# Patient Record
Sex: MALE | Race: BLACK OR AFRICAN AMERICAN | Employment: OTHER | URBAN - METROPOLITAN AREA
[De-identification: names, ages, dates, MRNs, and addresses within clinical notes are randomized per-mention and may not be internally consistent; named-entity substitution may affect disease eponyms.]

---

## 2017-01-03 ENCOUNTER — HOSPITAL ENCOUNTER (OUTPATIENT)
Dept: LAB | Age: 72
Discharge: HOME | End: 2017-01-03

## 2017-01-03 PROCEDURE — 99001 SPECIMEN HANDLING PT-LAB: CPT | Performed by: INTERNAL MEDICINE

## 2017-07-05 ENCOUNTER — HOSPITAL ENCOUNTER (OUTPATIENT)
Dept: LAB | Age: 72
Discharge: HOME | End: 2017-07-05

## 2017-07-05 PROCEDURE — 99001 SPECIMEN HANDLING PT-LAB: CPT | Performed by: INTERNAL MEDICINE

## 2017-07-18 ENCOUNTER — APPOINTMENT (OUTPATIENT)
Dept: CT IMAGING | Age: 72
End: 2017-07-18
Attending: EMERGENCY MEDICINE
Payer: COMMERCIAL

## 2017-07-18 ENCOUNTER — HOSPITAL ENCOUNTER (EMERGENCY)
Age: 72
Discharge: HOME | End: 2017-07-18
Attending: EMERGENCY MEDICINE
Payer: COMMERCIAL

## 2017-07-18 VITALS
OXYGEN SATURATION: 96 % | SYSTOLIC BLOOD PRESSURE: 162 MMHG | HEART RATE: 76 BPM | TEMPERATURE: 98.8 F | DIASTOLIC BLOOD PRESSURE: 78 MMHG | RESPIRATION RATE: 16 BRPM

## 2017-07-18 DIAGNOSIS — R74.8 ELEVATED CPK: ICD-10-CM

## 2017-07-18 DIAGNOSIS — R11.2 NON-INTRACTABLE VOMITING WITH NAUSEA, UNSPECIFIED VOMITING TYPE: ICD-10-CM

## 2017-07-18 DIAGNOSIS — R10.84 ABDOMINAL PAIN, GENERALIZED: Primary | ICD-10-CM

## 2017-07-18 LAB
ALBUMIN SERPL BCP-MCNC: 3.8 G/DL (ref 3.4–5)
ALBUMIN/GLOB SERPL: 1 {RATIO} (ref 0.8–1.7)
ALP SERPL-CCNC: 67 U/L (ref 45–117)
ALT SERPL-CCNC: 34 U/L (ref 16–61)
ANION GAP BLD CALC-SCNC: 8 MMOL/L (ref 3–18)
APPEARANCE UR: CLEAR
AST SERPL W P-5'-P-CCNC: 25 U/L (ref 15–37)
BACTERIA URNS QL MICRO: NEGATIVE /HPF
BASOPHILS # BLD AUTO: 0 K/UL (ref 0–0.1)
BASOPHILS # BLD: 0 % (ref 0–2)
BILIRUB SERPL-MCNC: 0.3 MG/DL (ref 0.2–1)
BILIRUB UR QL: NEGATIVE
BUN SERPL-MCNC: 18 MG/DL (ref 7–18)
BUN/CREAT SERPL: 10 (ref 12–20)
CALCIUM SERPL-MCNC: 9 MG/DL (ref 8.5–10.1)
CHLORIDE SERPL-SCNC: 106 MMOL/L (ref 100–108)
CK MB CFR SERPL CALC: 0.7 % (ref 0–4)
CK MB SERPL-MCNC: 8 NG/ML (ref 5–25)
CK SERPL-CCNC: 1167 U/L (ref 39–308)
CO2 SERPL-SCNC: 23 MMOL/L (ref 21–32)
COLOR UR: YELLOW
CREAT SERPL-MCNC: 1.76 MG/DL (ref 0.6–1.3)
DIFFERENTIAL METHOD BLD: ABNORMAL
EOSINOPHIL # BLD: 0.1 K/UL (ref 0–0.4)
EOSINOPHIL NFR BLD: 1 % (ref 0–5)
EPITH CASTS URNS QL MICRO: NORMAL /LPF (ref 0–5)
ERYTHROCYTE [DISTWIDTH] IN BLOOD BY AUTOMATED COUNT: 15.2 % (ref 11.6–14.5)
GLOBULIN SER CALC-MCNC: 4 G/DL (ref 2–4)
GLUCOSE SERPL-MCNC: 181 MG/DL (ref 74–99)
GLUCOSE UR STRIP.AUTO-MCNC: 100 MG/DL
HCT VFR BLD AUTO: 36.5 % (ref 36–48)
HGB BLD-MCNC: 12.2 G/DL (ref 13–16)
HGB UR QL STRIP: ABNORMAL
KETONES UR QL STRIP.AUTO: NEGATIVE MG/DL
LEUKOCYTE ESTERASE UR QL STRIP.AUTO: NEGATIVE
LIPASE SERPL-CCNC: 183 U/L (ref 73–393)
LYMPHOCYTES # BLD AUTO: 11 % (ref 21–52)
LYMPHOCYTES # BLD: 1.5 K/UL (ref 0.9–3.6)
MAGNESIUM SERPL-MCNC: 2 MG/DL (ref 1.6–2.6)
MCH RBC QN AUTO: 28 PG (ref 24–34)
MCHC RBC AUTO-ENTMCNC: 33.4 G/DL (ref 31–37)
MCV RBC AUTO: 83.9 FL (ref 74–97)
MONOCYTES # BLD: 0.7 K/UL (ref 0.05–1.2)
MONOCYTES NFR BLD AUTO: 5 % (ref 3–10)
NEUTS SEG # BLD: 12.1 K/UL (ref 1.8–8)
NEUTS SEG NFR BLD AUTO: 83 % (ref 40–73)
NITRITE UR QL STRIP.AUTO: NEGATIVE
PH UR STRIP: 6 [PH] (ref 5–8)
PLATELET # BLD AUTO: 229 K/UL (ref 135–420)
PMV BLD AUTO: 10.4 FL (ref 9.2–11.8)
POTASSIUM SERPL-SCNC: 3.7 MMOL/L (ref 3.5–5.5)
PROT SERPL-MCNC: 7.8 G/DL (ref 6.4–8.2)
PROT UR STRIP-MCNC: ABNORMAL MG/DL
RBC # BLD AUTO: 4.35 M/UL (ref 4.7–5.5)
RBC #/AREA URNS HPF: NORMAL /HPF (ref 0–5)
SODIUM SERPL-SCNC: 137 MMOL/L (ref 136–145)
SP GR UR REFRACTOMETRY: 1.01 (ref 1–1.03)
TROPONIN I SERPL-MCNC: <0.02 NG/ML (ref 0–0.04)
UROBILINOGEN UR QL STRIP.AUTO: 0.2 EU/DL (ref 0.2–1)
WBC # BLD AUTO: 14.4 K/UL (ref 4.6–13.2)
WBC URNS QL MICRO: NEGATIVE /HPF (ref 0–4)

## 2017-07-18 PROCEDURE — 82550 ASSAY OF CK (CPK): CPT | Performed by: EMERGENCY MEDICINE

## 2017-07-18 PROCEDURE — 81001 URINALYSIS AUTO W/SCOPE: CPT | Performed by: EMERGENCY MEDICINE

## 2017-07-18 PROCEDURE — 85025 COMPLETE CBC W/AUTO DIFF WBC: CPT | Performed by: EMERGENCY MEDICINE

## 2017-07-18 PROCEDURE — 83735 ASSAY OF MAGNESIUM: CPT | Performed by: EMERGENCY MEDICINE

## 2017-07-18 PROCEDURE — 96361 HYDRATE IV INFUSION ADD-ON: CPT

## 2017-07-18 PROCEDURE — 83690 ASSAY OF LIPASE: CPT | Performed by: EMERGENCY MEDICINE

## 2017-07-18 PROCEDURE — 93005 ELECTROCARDIOGRAM TRACING: CPT

## 2017-07-18 PROCEDURE — 74177 CT ABD & PELVIS W/CONTRAST: CPT

## 2017-07-18 PROCEDURE — 80053 COMPREHEN METABOLIC PANEL: CPT | Performed by: EMERGENCY MEDICINE

## 2017-07-18 PROCEDURE — 99285 EMERGENCY DEPT VISIT HI MDM: CPT

## 2017-07-18 PROCEDURE — 87086 URINE CULTURE/COLONY COUNT: CPT | Performed by: EMERGENCY MEDICINE

## 2017-07-18 PROCEDURE — 74011636320 HC RX REV CODE- 636/320: Performed by: EMERGENCY MEDICINE

## 2017-07-18 PROCEDURE — 96360 HYDRATION IV INFUSION INIT: CPT

## 2017-07-18 PROCEDURE — 74011250636 HC RX REV CODE- 250/636: Performed by: EMERGENCY MEDICINE

## 2017-07-18 RX ADMIN — SODIUM CHLORIDE 500 ML: 900 INJECTION, SOLUTION INTRAVENOUS at 04:34

## 2017-07-18 RX ADMIN — IOPAMIDOL 70 ML: 612 INJECTION, SOLUTION INTRAVENOUS at 02:46

## 2017-07-18 RX ADMIN — SODIUM CHLORIDE 500 ML: 900 INJECTION, SOLUTION INTRAVENOUS at 01:42

## 2017-07-18 NOTE — DISCHARGE INSTRUCTIONS
Abdominal Pain: Care Instructions  Your Care Instructions    Abdominal pain has many possible causes. Some aren't serious and get better on their own in a few days. Others need more testing and treatment. If your pain continues or gets worse, you need to be rechecked and may need more tests to find out what is wrong. You may need surgery to correct the problem. Don't ignore new symptoms, such as fever, nausea and vomiting, urination problems, pain that gets worse, and dizziness. These may be signs of a more serious problem. Your doctor may have recommended a follow-up visit in the next 8 to 12 hours. If you are not getting better, you may need more tests or treatment. The doctor has checked you carefully, but problems can develop later. If you notice any problems or new symptoms, get medical treatment right away. Follow-up care is a key part of your treatment and safety. Be sure to make and go to all appointments, and call your doctor if you are having problems. It's also a good idea to know your test results and keep a list of the medicines you take. How can you care for yourself at home? · Rest until you feel better. · To prevent dehydration, drink plenty of fluids, enough so that your urine is light yellow or clear like water. Choose water and other caffeine-free clear liquids until you feel better. If you have kidney, heart, or liver disease and have to limit fluids, talk with your doctor before you increase the amount of fluids you drink. · If your stomach is upset, eat mild foods, such as rice, dry toast or crackers, bananas, and applesauce. Try eating several small meals instead of two or three large ones. · Wait until 48 hours after all symptoms have gone away before you have spicy foods, alcohol, and drinks that contain caffeine. · Do not eat foods that are high in fat. · Avoid anti-inflammatory medicines such as aspirin, ibuprofen (Advil, Motrin), and naproxen (Aleve).  These can cause stomach upset. Talk to your doctor if you take daily aspirin for another health problem. When should you call for help? Call 911 anytime you think you may need emergency care. For example, call if:  · You passed out (lost consciousness). · You pass maroon or very bloody stools. · You vomit blood or what looks like coffee grounds. · You have new, severe belly pain. Call your doctor now or seek immediate medical care if:  · Your pain gets worse, especially if it becomes focused in one area of your belly. · You have a new or higher fever. · Your stools are black and look like tar, or they have streaks of blood. · You have unexpected vaginal bleeding. · You have symptoms of a urinary tract infection. These may include:  ¨ Pain when you urinate. ¨ Urinating more often than usual.  ¨ Blood in your urine. · You are dizzy or lightheaded, or you feel like you may faint. Watch closely for changes in your health, and be sure to contact your doctor if:  · You are not getting better after 1 day (24 hours). Where can you learn more? Go to http://ayePANOSOLmari.info/. Enter H389 in the search box to learn more about \"Abdominal Pain: Care Instructions. \"  Current as of: March 20, 2017  Content Version: 11.3  © 3395-5408 [employer]. Care instructions adapted under license by [employer] (which disclaims liability or warranty for this information). If you have questions about a medical condition or this instruction, always ask your healthcare professional. [place] [address] any warranty or liability for your use of this information. Nausea and Vomiting: Care Instructions  Your Care Instructions    When you are nauseated, you may feel weak and sweaty and notice a lot of saliva in your mouth. Nausea often leads to vomiting. Most of the time you do not need to worry about nausea and vomiting, but they can be signs of other illnesses.   Two common causes of nausea and vomiting are stomach flu and food poisoning. Nausea and vomiting from viral stomach flu will usually start to improve within 24 hours. Nausea and vomiting from food poisoning may last from 12 to 48 hours. The doctor has checked you carefully, but problems can develop later. If you notice any problems or new symptoms, get medical treatment right away. Follow-up care is a key part of your treatment and safety. Be sure to make and go to all appointments, and call your doctor if you are having problems. It's also a good idea to know your test results and keep a list of the medicines you take. How can you care for yourself at home? · To prevent dehydration, drink plenty of fluids, enough so that your urine is light yellow or clear like water. Choose water and other caffeine-free clear liquids until you feel better. If you have kidney, heart, or liver disease and have to limit fluids, talk with your doctor before you increase the amount of fluids you drink. · Rest in bed until you feel better. · When you are able to eat, try clear soups, mild foods, and liquids until all symptoms are gone for 12 to 48 hours. Other good choices include dry toast, crackers, cooked cereal, and gelatin dessert, such as Jell-O. When should you call for help? Call 911 anytime you think you may need emergency care. For example, call if:  · You passed out (lost consciousness). Call your doctor now or seek immediate medical care if:  · You have symptoms of dehydration, such as:  ¨ Dry eyes and a dry mouth. ¨ Passing only a little dark urine. ¨ Feeling thirstier than usual.  · You have new or worsening belly pain. · You have a new or higher fever. · You vomit blood or what looks like coffee grounds. Watch closely for changes in your health, and be sure to contact your doctor if:  · You have ongoing nausea and vomiting. · Your vomiting is getting worse. · Your vomiting lasts longer than 2 days.   · You are not getting better as expected. Where can you learn more? Go to http://aye-mari.info/. Enter 25 [id number] in the search box to learn more about \"Nausea and Vomiting: Care Instructions. \"  Current as of: March 20, 2017  Content Version: 11.3  © 2466-4188 [employer]. Care instructions adapted under license by [employer] (which disclaims liability or warranty for this information). If you have questions about a medical condition or this instruction, always ask your healthcare professional. [place] [address] any warranty or liability for your use of this information.

## 2017-07-18 NOTE — ED PROVIDER NOTES
HPI Comments: 1:31 AM: Marixa Hanson is a 66 y.o. male presenting to the ED with a 2 hour hx of abdominal pain and vomiting. Pt states 2 hours ago he was getting ready for bed when he had a sudden onset of generalized abdominal pain with associated nausea and vomiting. Denies exacerbating or relieving factors of sx. Denies fever, chills, CP, SOB, diarrhea, dysuria, difficulty urinating, headache, or coughing. Patient is a 66 y.o. male presenting with vomiting. Vomiting           Past Medical History:   Diagnosis Date    Acute ischemic stroke ([facility] [address] 9/1/2015    Acute Ischemic Stroke (early subacute infarct at the posterior right lentiform nucleus) with residual left hemiparesis and dysphagia    CKD (chronic kidney disease) stage 3, GFR 30-59 ml/min 6/9/9462    Diastolic dysfunction without heart failure 9/2/2015    Grade 1 diastolic dysfunction on 2D ECHO done 9/02/2015    Dyslipidemia 9/2/2015    History of noncompliance with medical treatment, presenting hazards to health 9/1/2015    History of tobacco use 9/1/2015    Hypertensive heart and kidney disease without heart failure and with chronic kidney disease stage III 9/2/2015    Obesity, Class I, BMI 30-34.9 9/1/2015    Rhabdomyolysis 9/1/2015    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 9/1/2015    Vitamin D deficiency 9/6/2015       History reviewed. No pertinent surgical history. History reviewed. No pertinent family history. Social History     Social History    Marital status: SINGLE     Spouse name: N/A    Number of children: N/A    Years of education: N/A     Occupational History    Not on file.      Social History Main Topics    Smoking status: Former Smoker     Quit date: 12/1/2014    Smokeless tobacco: Not on file    Alcohol use Not on file    Drug use: Not on file    Sexual activity: Not on file     Other Topics Concern    Not on file     Social History Narrative         ALLERGIES: Lipitor [atorvastatin]    Review of Systems Constitutional: Negative. HENT: Negative. Negative for congestion. Eyes: Negative. Negative for visual disturbance. Respiratory: Negative. Negative for chest tightness and shortness of breath. Cardiovascular: Negative. Negative for leg swelling. Gastrointestinal: Positive for vomiting. Genitourinary: Negative. Negative for difficulty urinating. Musculoskeletal: Negative. Skin: Negative. Negative for rash and wound. Neurological: Negative. Negative for dizziness, speech difficulty, weakness and light-headedness. Psychiatric/Behavioral: Negative. Negative for self-injury. All other systems reviewed and are negative. Vitals:    07/18/17 0430 07/18/17 0500 07/18/17 0530 07/18/17 0600   BP:       Pulse: 70 70 76    Resp: 19 15 16    Temp:       SpO2: 97% 97% 94% 98%            Physical Exam   Constitutional: He is oriented to person, place, and time. He appears well-developed and well-nourished. No distress. HENT:   Head: Normocephalic and atraumatic. Eyes: Conjunctivae and EOM are normal. Pupils are equal, round, and reactive to light. No scleral icterus. Neck: Normal range of motion. Neck supple. No JVD present. No thyromegaly present. Cardiovascular: Normal rate, regular rhythm, S1 normal and S2 normal.  Exam reveals no gallop and no friction rub. No murmur heard. Pulmonary/Chest: Effort normal and breath sounds normal. No accessory muscle usage. No respiratory distress. Abdominal: Soft. Normal appearance. He exhibits no distension. There is generalized tenderness. There is no rigidity, no rebound and no guarding. Easily reducible umbilical TTP. Genitourinary:   Genitourinary Comments: Rectal exam shows normal, nontender prostate. Musculoskeletal: Normal range of motion. He exhibits no edema or tenderness. Neurological: He is alert and oriented to person, place, and time. He has normal strength. No cranial nerve deficit or sensory deficit.  Coordination normal.   Skin: Skin is warm and intact. No rash noted. Psychiatric: He has a normal mood and affect. His speech is normal and behavior is normal.   Vitals reviewed. MDM  Number of Diagnoses or Management Options  Abdominal pain, generalized:   Elevated CPK:   Non-intractable vomiting with nausea, unspecified vomiting type:   Diagnosis management comments: Radha Bui is a 66 y.o. Male coming in with sudden onset generalized abd pain and vomiting. No vomiting throughout stay and only mild TTP. Tolerated PO without difficulty. CT negative other than umbilical hernia and bladder wall thickening. Noted mild elevation of WBC with shift. Creatinine better than baseline, but CPK elevated. S/p IVF rehydration here. No evidence of PNA, sepsis, or prostatitis. No other obvious infectious source. Spoke to PFM (PCP) and thoroughly discussed case. They will see patient in the next 24-48 hours in the office. Strict return precautions and follow up plan discussed and patient and sister in agreement.     ED Course     Vitals:  Patient Vitals for the past 12 hrs:   Temp Pulse Resp BP SpO2   07/18/17 0600 - - - - 98 %   07/18/17 0530 - 76 16 - 94 %   07/18/17 0500 - 70 15 - 97 %   07/18/17 0430 - 70 19 - 97 %   07/18/17 0400 - 76 19 - 97 %   07/18/17 0345 - 73 21 - 95 %   07/18/17 0315 - 73 17 162/78 95 %   07/18/17 0300 - 70 19 162/88 97 %   07/18/17 0215 - 75 16 (!) 185/105 96 %   07/18/17 0200 - 81 18 (!) 182/102 97 %   07/18/17 0145 - 72 23 (!) 167/93 98 %   07/18/17 0130 - 73 15 (!) 177/95 98 %   07/18/17 0128 98.8 °F (37.1 °C) 73 21 174/89 98 %       Medications Ordered:  Medications   sodium chloride 0.9 % bolus infusion 500 mL (0 mL IntraVENous IV Completed 7/18/17 0222)   iopamidol (ISOVUE 300) 61 % contrast injection  mL (70 mL IntraVENous Given 7/18/17 0246)   sodium chloride 0.9 % bolus infusion 500 mL (0 mL IntraVENous IV Completed 7/18/17 1084)         Lab Findings:  Recent Results (from the past 12 hour(s))   CBC WITH AUTOMATED DIFF    Collection Time: 07/18/17  1:35 AM   Result Value Ref Range    WBC 14.4 (H) 4.6 - 13.2 K/uL    RBC 4.35 (L) 4.70 - 5.50 M/uL    HGB 12.2 (L) 13.0 - 16.0 g/dL    HCT 36.5 36.0 - 48.0 %    MCV 83.9 74.0 - 97.0 FL    MCH 28.0 24.0 - 34.0 PG    MCHC 33.4 31.0 - 37.0 g/dL    RDW 15.2 (H) 11.6 - 14.5 %    PLATELET 552 964 - 817 K/uL    MPV 10.4 9.2 - 11.8 FL    NEUTROPHILS 83 (H) 40 - 73 %    LYMPHOCYTES 11 (L) 21 - 52 %    MONOCYTES 5 3 - 10 %    EOSINOPHILS 1 0 - 5 %    BASOPHILS 0 0 - 2 %    ABS. NEUTROPHILS 12.1 (H) 1.8 - 8.0 K/UL    ABS. LYMPHOCYTES 1.5 0.9 - 3.6 K/UL    ABS. MONOCYTES 0.7 0.05 - 1.2 K/UL    ABS. EOSINOPHILS 0.1 0.0 - 0.4 K/UL    ABS. BASOPHILS 0.0 0.0 - 0.1 K/UL    DF AUTOMATED     METABOLIC PANEL, COMPREHENSIVE    Collection Time: 07/18/17  1:35 AM   Result Value Ref Range    Sodium 137 136 - 145 mmol/L    Potassium 3.7 3.5 - 5.5 mmol/L    Chloride 106 100 - 108 mmol/L    CO2 23 21 - 32 mmol/L    Anion gap 8 3.0 - 18 mmol/L    Glucose 181 (H) 74 - 99 mg/dL    BUN 18 7.0 - 18 MG/DL    Creatinine 1.76 (H) 0.6 - 1.3 MG/DL    BUN/Creatinine ratio 10 (L) 12 - 20      GFR est AA 46 (L) >60 ml/min/1.73m2    GFR est non-AA 38 (L) >60 ml/min/1.73m2    Calcium 9.0 8.5 - 10.1 MG/DL    Bilirubin, total 0.3 0.2 - 1.0 MG/DL    ALT (SGPT) 34 16 - 61 U/L    AST (SGOT) 25 15 - 37 U/L    Alk.  phosphatase 67 45 - 117 U/L    Protein, total 7.8 6.4 - 8.2 g/dL    Albumin 3.8 3.4 - 5.0 g/dL    Globulin 4.0 2.0 - 4.0 g/dL    A-G Ratio 1.0 0.8 - 1.7     LIPASE    Collection Time: 07/18/17  1:35 AM   Result Value Ref Range    Lipase 183 73 - 393 U/L   MAGNESIUM    Collection Time: 07/18/17  1:35 AM   Result Value Ref Range    Magnesium 2.0 1.6 - 2.6 mg/dL   CARDIAC PANEL,(CK, CKMB & TROPONIN)    Collection Time: 07/18/17  1:35 AM   Result Value Ref Range    CK 1167 (H) 39 - 308 U/L    CK - MB 8.0 (H) <3.6 ng/ml    CK-MB Index 0.7 0.0 - 4.0 %    Troponin-I, Qt. <0.02 0.0 - 0.045 NG/ML EKG, 12 LEAD, INITIAL    Collection Time: 07/18/17  1:42 AM   Result Value Ref Range    Ventricular Rate 70 BPM    Atrial Rate 70 BPM    P-R Interval 168 ms    QRS Duration 128 ms    Q-T Interval 430 ms    QTC Calculation (Bezet) 464 ms    Calculated P Axis 64 degrees    Calculated R Axis -51 degrees    Calculated T Axis 58 degrees    Diagnosis       Normal sinus rhythm  Right bundle branch block  Left anterior fascicular block  Bifascicular block  Abnormal ECG  When compared with ECG of 23-MAY-2016 16:04,  ST elevation now present in Inferior leads     URINALYSIS W/ RFLX MICROSCOPIC    Collection Time: 07/18/17  2:20 AM   Result Value Ref Range    Color YELLOW      Appearance CLEAR      Specific gravity 1.013 1.005 - 1.030      pH (UA) 6.0 5.0 - 8.0      Protein TRACE (A) NEG mg/dL    Glucose 100 (A) NEG mg/dL    Ketone NEGATIVE  NEG mg/dL    Bilirubin NEGATIVE  NEG      Blood SMALL (A) NEG      Urobilinogen 0.2 0.2 - 1.0 EU/dL    Nitrites NEGATIVE  NEG      Leukocyte Esterase NEGATIVE  NEG     URINE MICROSCOPIC ONLY    Collection Time: 07/18/17  2:20 AM   Result Value Ref Range    WBC NEGATIVE  0 - 4 /hpf    RBC 0 to 3 0 - 5 /hpf    Epithelial cells NONE SEEN 0 - 5 /lpf    Bacteria NEGATIVE  NEG /hpf       EKG Interpretation by ED physician:    1:47 AM: NSR. Rate 70. Bifascicular block. No other acute ischemic changes      X-ray, CT or radiology findings or impressions:  Ct Abd Pelv W Cont    Result Date: 7/18/2017  EXAM: CT Abdomen and Pelvis with IV contrast CLINICAL INDICATION: Pain, nausea and vomiting. TECHNIQUE: CT of the abdomen and pelvis performed post IV contrast. Sagittal and coronal reformations obtained.  All CT scans at this facility are performed using dose optimization technique as appropriate to a performed exam, to include automated exposure control, adjustment of the mA and/or kV according to patient size (including appropriate matching for site specific examination) or use of iterative reconstruction technique. IV CONTRAST: 70 cc of Isovue 300 ENTERIC CONTRAST: None COMPARISON: Ultrasound dated 12/22/2015 FINDINGS: Lower Chest: Mild groundglass opacities are noted in the lung bases. No effusions appreciated. The visualized heart and pericardium are unremarkable. Peritoneum: No free air appreciated. No free fluid identified. No fluid collections seen. Liver: No focal lesion appreciated. Biliary/Gallbladder: No intrahepatic or extrahepatic biliary ductal dilatation appreciated. The gallbladder is unremarkable. No pericholecystic fluid or inflammation. Spleen: Unremarkable. Pancreas: No focal lesion appreciated. The pancreatic duct is unremarkable. No peripancreatic inflammation or adenopathy. : The adrenal glands are unremarkable. In the superior pole of the right kidney, there is a 1.1 x 1.3 cm cyst. In the inferior pole, there is a punctate nonobstructing stone in the right kidney. In the inferior pole of the left kidney, there is a 8 x 5 mm cyst. There is normal enhancement of both kidneys. No hydroureteronephrosis of either collecting system. The bladder has a mildly thickened wall. There is mild adjacent fat stranding. The prostate measures 3.7 x 4.7 x 5.5 cm. GI: The stomach is unremarkable. The small bowel is without evidence of obstruction. No small bowel wall thickening. The mesentery is without inflammation or adenopathy. The appendix is unremarkable. No pericolonic inflammation or wall thickening appreciated. Aorta and retroperitoneum: No aortic aneurysm seen. No periaortic adenopathy seen. No fluid collections in the retroperitoneum appreciated. Abdominal wall: At the umbilicus, there is a hernia with a portion of small bowel extending into the defect. No evidence to suggest obstruction or inflammatory process. Musculoskeletal: Multilevel degenerative disc disease and bilateral hip osteoarthritis are noted. IMPRESSION: 1.   Groundglass changes in the lung bases which may represent atelectasis or infiltrate. 2.  Umbilical hernia with a portion of small bowel within it. No evidence of complication appreciated. 3.  Prostatic hypertrophy. 4.  Mild inflammation of the bladder and mild bladder wall thickening. This may reflect changes due to prostatic hypertrophy or represent cystitis. Clinical correlation with laboratory values is recommended. Reevaluation of the patient:    4:31 AM:  Discussed all results with pt. Pt feeling better. Pt has eaten crackers and drank juice with no nausea or vomiting. States his abdominal pain is resolved and would like to go home. Rectal exam was performed and found to be normal. No tenderness or bogginess of the prostate appreciated. Pt has had no vomiting while in the ED. Denies pain with BM or cough. Discussed F/U instructions, and red flags for return to the ED. Pt verbalized understanding and is agreeable to plan. Stable for discharge home. 6:33 AM Consult: I discussed care with Dr. Eran Solano  (family medicine). It was a standard discussion including patient history, chief complaint, available diagnostic results, and predicted treatment course. Made aware of pt visit and findings and she agrees with plan for discharge and close outpatient follow up. Diagnosis:   1. Abdominal pain, generalized    2. Non-intractable vomiting with nausea, unspecified vomiting type    3. Elevated CPK        Disposition: Discharge     Follow-up Information     Follow up With Details Comments Contact Info    [facility] EMERGENCY DEPT  If symptoms worsen [address] [place] [address] [address] Call in 2 days  Nayana [address]ett [address]  [address] [facility]  [phone]            Patient's Medications   Start Taking    No medications on file   Continue Taking    AMLODIPINE (NORVASC) 10 MG TABLET    Take 1 Tab by mouth daily. ASPIRIN 81 MG CHEWABLE TABLET    Take 1 Tab by mouth daily.     CHOLECALCIFEROL (VITAMIN D3) 5,000 UNIT CAPSULE    Take 1 Cap by mouth daily. DOCUSATE SODIUM (COLACE) 100 MG CAPSULE    Take 100 mg by mouth two (2) times a day. EZETIMIBE (ZETIA) 10 MG TABLET    Take 10 mg by mouth daily. HYDRALAZINE (APRESOLINE) 25 MG TABLET    Take 25 mg by mouth daily as needed. LABETALOL (NORMODYNE) 200 MG TABLET    Take 1 Tab by mouth every twelve (12) hours. LOSARTAN (COZAAR) 100 MG TABLET    Take 1 Tab by mouth daily. These Medications have changed    No medications on file   Stop Taking    No medications on file       Procedures    I, Ricardo Valero, acting as a scribe for and in the presence of Dr. Nereida Mariano MD    Provider Attestation  I personally performed the services described in the documentation, reviewed the documentation, as recorded by the scribe in my presence, and it accurately and completely records my words and actions.      Signed By: oscar Rothmanibe for Dr. Nereida Mariano MD

## 2017-07-19 LAB
ATRIAL RATE: 70 BPM
CALCULATED P AXIS, ECG09: 64 DEGREES
CALCULATED R AXIS, ECG10: -51 DEGREES
CALCULATED T AXIS, ECG11: 58 DEGREES
DIAGNOSIS, 93000: NORMAL
P-R INTERVAL, ECG05: 168 MS
Q-T INTERVAL, ECG07: 430 MS
QRS DURATION, ECG06: 128 MS
QTC CALCULATION (BEZET), ECG08: 464 MS
VENTRICULAR RATE, ECG03: 70 BPM

## 2017-07-20 LAB
BACTERIA SPEC CULT: NORMAL
SERVICE CMNT-IMP: NORMAL

## 2017-07-24 ENCOUNTER — HOSPITAL ENCOUNTER (OUTPATIENT)
Dept: LAB | Age: 72
Discharge: HOME | End: 2017-07-24

## 2017-07-24 PROCEDURE — 99001 SPECIMEN HANDLING PT-LAB: CPT | Performed by: INTERNAL MEDICINE

## 2017-11-07 ENCOUNTER — HOSPITAL ENCOUNTER (OUTPATIENT)
Dept: LAB | Age: 72
Discharge: HOME | End: 2017-11-07

## 2017-11-07 DIAGNOSIS — N18.30 CHRONIC KIDNEY DISEASE, STAGE III (MODERATE) (HCC): ICD-10-CM

## 2017-11-07 DIAGNOSIS — E78.5 HYPERLIPIDEMIA: ICD-10-CM

## 2017-11-07 DIAGNOSIS — I10 ESSENTIAL HYPERTENSION, BENIGN: ICD-10-CM

## 2017-11-07 PROCEDURE — 99001 SPECIMEN HANDLING PT-LAB: CPT | Performed by: INTERNAL MEDICINE

## 2018-03-08 ENCOUNTER — HOSPITAL ENCOUNTER (OUTPATIENT)
Dept: LAB | Age: 73
Discharge: HOME | End: 2018-03-08

## 2018-03-08 PROCEDURE — 99001 SPECIMEN HANDLING PT-LAB: CPT | Performed by: INTERNAL MEDICINE

## 2018-03-29 ENCOUNTER — OFFICE VISIT (OUTPATIENT)
Dept: UROLOGY | Age: 73
End: 2018-03-29

## 2018-03-29 VITALS
DIASTOLIC BLOOD PRESSURE: 78 MMHG | OXYGEN SATURATION: 95 % | HEIGHT: 71 IN | SYSTOLIC BLOOD PRESSURE: 128 MMHG | HEART RATE: 71 BPM

## 2018-03-29 DIAGNOSIS — N40.1 BENIGN PROSTATIC HYPERPLASIA WITH LOWER URINARY TRACT SYMPTOMS, SYMPTOM DETAILS UNSPECIFIED: ICD-10-CM

## 2018-03-29 DIAGNOSIS — R35.0 URINARY FREQUENCY: Primary | ICD-10-CM

## 2018-03-29 LAB
BILIRUB UR QL STRIP: NEGATIVE
GLUCOSE UR-MCNC: NEGATIVE MG/DL
KETONES P FAST UR STRIP-MCNC: NEGATIVE MG/DL
PH UR STRIP: 5 [PH] (ref 4.6–8)
PROT UR QL STRIP: NEGATIVE
SP GR UR STRIP: 1.01 (ref 1–1.03)
UA UROBILINOGEN AMB POC: NORMAL (ref 0.2–1)
URINALYSIS CLARITY POC: CLEAR
URINALYSIS COLOR POC: YELLOW
URINE BLOOD POC: NORMAL
URINE LEUKOCYTES POC: NEGATIVE
URINE NITRITES POC: NEGATIVE

## 2018-03-29 RX ORDER — FUROSEMIDE 20 MG/1
TABLET ORAL DAILY
COMMUNITY
End: 2018-04-30

## 2018-03-29 RX ORDER — TOLTERODINE 4 MG/1
4 CAPSULE, EXTENDED RELEASE ORAL DAILY
Qty: 30 CAP | Refills: 6 | Status: SHIPPED | OUTPATIENT
Start: 2018-03-29 | End: 2018-04-20

## 2018-03-29 RX ORDER — CHLORPHENIRAMINE MALEATE 4 MG
TABLET ORAL 2 TIMES DAILY
COMMUNITY
End: 2018-04-20

## 2018-03-29 RX ORDER — DICYCLOMINE HYDROCHLORIDE 20 MG/1
20 TABLET ORAL 2 TIMES DAILY
COMMUNITY
End: 2018-04-20

## 2018-03-29 RX ORDER — LANOLIN ALCOHOL/MO/W.PET/CERES
1000 CREAM (GRAM) TOPICAL DAILY
COMMUNITY
End: 2021-02-19

## 2018-03-29 RX ORDER — HYDROCORTISONE 1 %
CREAM (GRAM) TOPICAL 2 TIMES DAILY
COMMUNITY
End: 2018-04-20

## 2018-03-29 NOTE — PROGRESS NOTES
MrTrevor Liu has a reminder for a \"due or due soon\" health maintenance. I have asked that he contact his primary care provider for follow-up on this health maintenance.

## 2018-03-29 NOTE — PROGRESS NOTES
Gely Zuniga    Chief Complaint   Patient presents with    New Patient    Urinary Frequency    Urinary Incontinence       History and Physical    Patient is a pleasant 79-year-old -American male with a greater than six-month history of daytime urinary incontinence. The patient states that he has no problems with nocturnal enuresis and does not arise at night to urinate. However, he does wear a diaper. He states that more often than not, the diaper is dry in the morning. During the day he states that he will void perhaps 2 times during the day. There are episodes of urinary leakage and the caregiver advises that there are times when he will void and flood his diaper and she has had to wear pads for her car to protect the upholstery. However, recently, this has not been occurring. The patient never had this difficulty before. He suffered an ischemic stroke in 2015. His urine function was never much of a problem during his recuperative phase. The patient denies any  injury surgery or instrumentation. Family history is negative for significant prostatic illness. Patient does not have any dysuria. When he gets an urge to void there is no pain. When he does get to the bathroom the stream is prompt and strong and continuous and he feels that he empties completely. The patient has been on Bentyl for extended period of time. He had originally been on 40 mg twice a day but this seemed to cause him constipation and the dose was dropped to 20 mg twice daily. Now, the patient will only have bowel issues if he consumes chocolate or other things that his family and caregiver have advised him are bad for him. As for this reason that he continues to wear a diaper.   The patient does not consume caffeine in any form to any significant degree    Past Medical History:   Diagnosis Date    Acute ischemic stroke ([facility] [address] 9/1/2015    Acute Ischemic Stroke (early subacute infarct at the posterior right lentiform nucleus) with residual left hemiparesis and dysphagia    CKD (chronic kidney disease) stage 3, GFR 30-59 ml/min 6/3/4410    Diastolic dysfunction without heart failure 9/2/2015    Grade 1 diastolic dysfunction on 2D ECHO done 9/02/2015    Dyslipidemia 9/2/2015    History of noncompliance with medical treatment, presenting hazards to health 9/1/2015    History of tobacco use 9/1/2015    Hypertension     Hypertensive heart and kidney disease without heart failure and with chronic kidney disease stage III 9/2/2015    Obesity, Class I, BMI 30-34.9 9/1/2015    Rhabdomyolysis 9/1/2015    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 9/1/2015    Vitamin D deficiency 9/6/2015     Patient Active Problem List   Diagnosis Code    Hypertensive urgency I16.0    Acute ischemic stroke ([facility] I63.9    Hypertensive heart and kidney disease without heart failure and with chronic kidney disease stage III I13.10, I63.7    Diastolic dysfunction without heart failure I51.9    Dyslipidemia E78.5    CKD (chronic kidney disease) stage 3, GFR 30-59 ml/min N18.3    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 A64.5    Bacterial conjunctivitis H10.9    Rhabdomyolysis M62.82    Obesity, Class I, BMI 30-34.9 E66.9    History of noncompliance with medical treatment, presenting hazards to health Z91.19    History of tobacco use Z87.891    Impaired mobility and ADLs Z74.09    Vitamin D deficiency E55.9    Community acquired pneumonia J18.9    Elevated LFTs R79.89    Acute kidney injury superimposed on chronic kidney disease ([facility] [address] N17.9, N18.9    Acute metabolic encephalopathy U01.65    Hx of essential hypertension Z86.79     History reviewed. No pertinent surgical history. Current Outpatient Prescriptions   Medication Sig Dispense Refill    dicyclomine (BENTYL) 20 mg tablet Take 20 mg by mouth two (2) times a day.  cyanocobalamin (VITAMIN B-12) 1,000 mcg tablet Take 1,000 mcg by mouth daily.       furosemide (LASIX) 20 mg tablet Take  by mouth daily.  LACTASE PO Take 3,000 Units by mouth as needed.  clotrimazole (LOTRIMIN) 1 % topical cream Apply  to affected area two (2) times a day.  hydrocortisone (CORTAID) 1 % topical cream Apply  to affected area two (2) times a day. use thin layer      ezetimibe (ZETIA) 10 mg tablet Take 10 mg by mouth daily.  labetalol (NORMODYNE) 200 mg tablet Take 1 Tab by mouth every twelve (12) hours. 30 Tab 0    losartan (COZAAR) 100 mg tablet Take 1 Tab by mouth daily. 15 Tab 0    amLODIPine (NORVASC) 10 mg tablet Take 1 Tab by mouth daily. 15 Tab 0    aspirin 81 mg chewable tablet Take 1 Tab by mouth daily. 30 Tab 0    hydrALAZINE (APRESOLINE) 25 mg tablet Take 25 mg by mouth daily as needed.  docusate sodium (COLACE) 100 mg capsule Take 100 mg by mouth two (2) times a day.  cholecalciferol (VITAMIN D3) 5,000 unit capsule Take 1 Cap by mouth daily. 15 Cap 0     Allergies   Allergen Reactions    Lipitor [Atorvastatin] Other (comments)     Elevated CK level     Social History     Social History    Marital status: SINGLE     Spouse name: N/A    Number of children: N/A    Years of education: N/A     Occupational History    Not on file.      Social History Main Topics    Smoking status: Current Every Day Smoker     Last attempt to quit: 12/1/2014    Smokeless tobacco: Never Used    Alcohol use Yes    Drug use: No    Sexual activity: No     Other Topics Concern    Not on file     Social History Narrative      Family History   Problem Relation Age of Onset    Diabetes Mother     Stroke Mother     Heart Disease Father     Hypertension Father     Diabetes Brother     Hypertension Brother              Visit Vitals    /78 (BP 1 Location: Left arm, BP Patient Position: Sitting)    Pulse 71    Ht 5' 11\" (1.803 m)    SpO2 95%     Physical        Gen: WDWN adult NAD  Head  : normocephalic,  Normal ROM; eyes without normal pupils, EOMs, no masses;  conjunctiva normal  Neck: normal movement,  no evident mass,  No evident adenopathy, trachea midline,    Abd :bowel sounds normal, no masses, tenderness, organomegaly  Flanks  negative  -penis is uncircumcised and normal.  Scrotal contents revealed normal size and texture of testes. Rectal tone normal.  Prostate 30 g smooth and benign    Extremities-gait is somewhat compromised due to stroke sequela  Psych- oriented, no evident anxiety, no cognitive impairment evident    Trace positive for blood but negative on microscopic  Bladder scan reveals 38 cc  Chart reveals no recent PSA                  Impression/ PLAN  The patient has BPH primarily manifest by irritative symptoms with urgency leakage. He also, however, has difficulty with diarrhea that is firing medication. Plan:  Fully discussed with the patient and his caregiver I am going to get a PSA today and I will call them with the result. Have advised that the patient stop Bentyl and I am going to put him on Detrol. Side effect and method of discussion discussed. The patient does not have glaucoma. They will return to see me if there are continued problems we will consider other alternatives but it is hoped that the Detrol will help with the issue of bowel and the issue of bladder. It should also be noted that the patient wears suspenders and does confess that sometimes the difficulty with leaking urine before he can get situated in the bathroom is because he has trouble getting the suspenders off            This visit exceeded 30 minutes and >50% was counselling  The patient understands the discussion and plan    PLEASE NOTE:      This document has been produced using voice recognition software.   Unrecognized errors in transcription may be present    Briana Martins MD Yes Yes

## 2018-03-29 NOTE — PATIENT INSTRUCTIONS

## 2018-03-29 NOTE — MR AVS SNAPSHOT
[address] [facility] [address] [facility] [address] 
[phone] Patient: Nina Mishra MRN: [id number] YCA:[id number] Visit Information Date & Time Provider Department Dept. Phone Encounter #  
 3/29/2018  2:00 PM Darrell Solo [phone] Upcoming Health Maintenance Date Due DTaP/Tdap/Td series (1 - Tdap) 11/19/1965 FOBT Q 1 YEAR AGE 50-75 11/19/1994 ZOSTER VACCINE AGE 60> 9/19/2004 GLAUCOMA SCREENING Q2Y 11/19/2009 Pneumococcal 65+ Low/Medium Risk (2 of 2 - PCV13) 9/22/2016 Influenza Age 5 to Adult 8/1/2017 MEDICARE YEARLY EXAM 3/28/2018 Allergies as of 3/29/2018  Review Complete On: 3/29/2018 By: Ran Cruz MD  
  
 Severity Noted Reaction Type Reactions Lipitor [Atorvastatin]  09/05/2015    Other (comments) Elevated CK level Current Immunizations  Reviewed on 9/5/2015 Name Date Pneumococcal Polysaccharide (PPSV-23) 9/22/2015  6:00 PM  
  
 Not reviewed this visit You Were Diagnosed With   
  
 Codes Comments Urinary frequency    -  Primary ICD-10-CM: R35.0 ICD-9-CM: 788.41 Vitals BP Pulse Height(growth percentile) SpO2 Smoking Status 128/78 (BP 1 Location: Left arm, BP Patient Position: Sitting) 71 5' 11\" (1.803 m) 95% Current Every Day Smoker Vitals History Preferred Pharmacy Pharmacy Name Phone [facility] [address] [facility], [address] [facility] [phone] Your Updated Medication List  
  
   
This list is accurate as of 3/29/18  3:20 PM.  Always use your most recent med list. amLODIPine 10 mg tablet Commonly known as:  Tylene Juan Diego Take 1 Tab by mouth daily. aspirin 81 mg chewable tablet Take 1 Tab by mouth daily. cholecalciferol 5,000 unit capsule Commonly known as:  VITAMIN D3 Take 1 Cap by mouth daily. clotrimazole 1 % topical cream  
Commonly known as:  Tamera Plate Apply  to affected area two (2) times a day. dicyclomine 20 mg tablet Commonly known as:  BENTYL Take 20 mg by mouth two (2) times a day. docusate sodium 100 mg capsule Commonly known as:  Elejosé miguel Gong Take 100 mg by mouth two (2) times a day.  
  
 ezetimibe 10 mg tablet Commonly known as:  Niurka Sneddon Take 10 mg by mouth daily. furosemide 20 mg tablet Commonly known as:  LASIX Take  by mouth daily. hydrALAZINE 25 mg tablet Commonly known as:  APRESOLINE Take 25 mg by mouth daily as needed. hydrocortisone 1 % topical cream  
Commonly known as:  CORTAID Apply  to affected area two (2) times a day. use thin layer  
  
 labetalol 200 mg tablet Commonly known as:  Yudy Sumner Take 1 Tab by mouth every twelve (12) hours. LACTASE PO Take 3,000 Units by mouth as needed. losartan 100 mg tablet Commonly known as:  COZAAR Take 1 Tab by mouth daily. VITAMIN B-12 1,000 mcg tablet Generic drug:  cyanocobalamin Take 1,000 mcg by mouth daily. We Performed the Following AMB POC URINALYSIS DIP STICK AUTO W/O MICRO [67203 CPT(R)] WI LORIE,POST-VOID RES,US,NON-IMAGING C387605 CPT(R)] Patient Instructions Benign Prostatic Hyperplasia: Care Instructions Your Care Instructions Benign prostatic hyperplasia, or BPH, is an enlarged prostate gland. The prostate is a small gland that makes some of the fluid in semen. Prostate enlargement happens to almost all men as they age. It is usually not serious. BPH does not cause prostate cancer. As the prostate gets bigger, it may partly block the flow of urine. You may have a hard time getting a urine stream started or completely stopped. BPH can cause dribbling. You may have a weak urine stream, or you may have to urinate more often than you used to, especially at night. Most men find these problems easy to manage. You do not need treatment unless your symptoms bother you a lot or you have other problems, such as bladder infections or stones. In these cases, medicines may help. Surgery is not needed unless the urine flow is blocked or the symptoms do not get better with medicine. Follow-up care is a key part of your treatment and safety. Be sure to make and go to all appointments, and call your doctor if you are having problems. It's also a good idea to know your test results and keep a list of the medicines you take. How can you care for yourself at home? · Take plenty of time to urinate. Try to relax. · Try \"double voiding. \" Urinate as much you can, relax for a few moments, and then try to urinate again. · Sit on the toilet to urinate. · Read or think of other things while you are waiting. · Turn on a faucet, or try to picture running water. Some men find that this helps get their urine flowing. · If dribbling is a problem, wash your penis daily to avoid skin irritation and infection. · Avoid caffeine and alcohol. These drinks will increase how often you need to urinate. Spread your fluid intake throughout the day. If the urge to urinate often wakes you at night, limit your fluid intake in the evening. Urinate right before you go to bed. · Many over-the-counter cold and allergy medicines can make the symptoms of BPH worse. Avoid antihistamines, decongestants, and allergy pills, if you can. Read the warnings on the package. · If you take any prescription medicines, especially tranquilizers or antidepressants, ask your doctor or pharmacist whether they can cause urination problems. There may be other medicines you can use that do not cause urinary problems. · Be safe with medicines. Take your medicines exactly as prescribed. Call your doctor if you think you are having a problem with your medicine. When should you call for help? Call your doctor now or seek immediate medical care if: ? · You cannot urinate at all. ? · You have symptoms of a urinary infection. For example: ¨ You have blood or pus in your urine. ¨ You have pain in your back just below your rib cage. This is called flank pain. ¨ You have a fever, chills, or body aches. ¨ It hurts to urinate. ¨ You have groin or belly pain. ? Watch closely for changes in your health, and be sure to contact your doctor if: 
? · It hurts when you ejaculate. ? · Your urinary problems get a lot worse or bother you a lot. Where can you learn more? Go to http://aye-mari.info/. Enter P756 in the search box to learn more about \"Benign Prostatic Hyperplasia: Care Instructions. \" Current as of: March 14, 2017 Content Version: 11.4 © 5843-2176 [employer]. Care instructions adapted under license by [employer] (which disclaims liability or warranty for this information). If you have questions about a medical condition or this instruction, always ask your healthcare professional. [address] any warranty or liability for your use of this information. Introducing [facility]! Jose Corado introduces [employer] patient portal. Now you can access parts of your medical record, email your doctor's office, and request medication refills online. 1. In your internet browser, go to [email or web address] 2. Click on the First Time User? Click Here link in the Sign In box. You will see the New Member Sign Up page. 3. Enter your [employer] Access Code exactly as it appears below. You will not need to use this code after youve completed the sign-up process. If you do not sign up before the expiration date, you must request a new code. · [employer] Access Code: [email or web address] Expires: 6/6/2018 11:32 AM 
 
4. Enter the last four digits of your Social Security Number (xxxx) and Date of Birth (mm/dd/yyyy) as indicated and click Submit.  You will be taken to the next sign-up page. 5. Create a [employer] ID. This will be your [employer] login ID and cannot be changed, so think of one that is secure and easy to remember. 6. Create a [employer] password. You can change your password at any time. 7. Enter your Password Reset Question and Answer. This can be used at a later time if you forget your password. 8. Enter your e-mail address. You will receive e-mail notification when new information is available in [address]. 9. Click Sign Up. You can now view and download portions of your medical record. 10. Click the Download Summary menu link to download a portable copy of your medical information. If you have questions, please visit the Frequently Asked Questions section of the [employer] website. Remember, [employer] is NOT to be used for urgent needs. For medical emergencies, dial 911. Now available from your iPhone and Android! Please provide this summary of care documentation to your next provider. Your primary care clinician is listed as Samuel Perea. If you have any questions after today's visit, please call [phone].

## 2018-03-30 LAB — PSA SERPL-MCNC: 4.8 NG/ML (ref 0–4)

## 2018-04-11 ENCOUNTER — HOSPITAL ENCOUNTER (OUTPATIENT)
Dept: PHYSICAL THERAPY | Age: 73
Discharge: HOME | End: 2018-04-11
Payer: MEDICARE

## 2018-04-11 PROCEDURE — 97165 OT EVAL LOW COMPLEX 30 MIN: CPT

## 2018-04-11 PROCEDURE — 92507 TX SP LANG VOICE COMM INDIV: CPT

## 2018-04-11 PROCEDURE — 97162 PT EVAL MOD COMPLEX 30 MIN: CPT

## 2018-04-11 PROCEDURE — 97530 THERAPEUTIC ACTIVITIES: CPT

## 2018-04-11 PROCEDURE — 97110 THERAPEUTIC EXERCISES: CPT

## 2018-04-11 PROCEDURE — G9162 LANG EXPRESS CURRENT STATUS: HCPCS

## 2018-04-11 PROCEDURE — G8979 MOBILITY GOAL STATUS: HCPCS

## 2018-04-11 PROCEDURE — 92523 SPEECH SOUND LANG COMPREHEN: CPT

## 2018-04-11 PROCEDURE — 92522 EVALUATE SPEECH PRODUCTION: CPT

## 2018-04-11 PROCEDURE — G9163 LANG EXPRESS GOAL STATUS: HCPCS

## 2018-04-11 PROCEDURE — G8978 MOBILITY CURRENT STATUS: HCPCS

## 2018-04-11 NOTE — PROGRESS NOTES
[facility] [employer] [employer]  [address] [facility]  [phone] fax    Plan of Care/ Statement of Necessity for Physical Therapy Services    Patient name: Briana Harper Start of Care: 2018   Referral source: Teryl Blizzard, MD : 1945    Medical Diagnosis: CVA (cerebrovascular accident) [facility]) [I63.9]   Onset Date:CVA 18    Treatment Diagnosis: CVA with Left Danny   Prior Hospitalization: see medical history Provider#: [id number]   Medications: Verified on Patient summary List    Comorbidities: Kidney disease, Depression, HTN. Prior Level of Function: Used to be an active community ambulator. Ambulates with a [employer] currently. Lives with his sister and has an [employer] [facility] 6/hrs-day to help with dressing and grooming. Currently smokes 10 cigarettes /day. The Plan of Care and following information is based on the information from the initial evaluation. Assessment/ key information: Pt is a 67 yr old male s/p CVA with Left Danny 2015. Initial BP at [facility] ogate=346/93. Pt presents to therapy ambulating with a LBQC with a slow and guarded gait. Pt denies pain to his extremities and denies dizziness or HA's. He reports fatigue during ambulation. His static and dynamic balance is poor w/o an AD. Pt denies any previous falls. LE ROM is WFL. Sensation is intact. Romberg EO/EC= 30 sec with increased sway. Pt has decreased hip/glute and core strength during ambulation and standing activities. He reports he needs help with dressing and grooming activities. Pt was able to demonstrate transfers out of bed independently and stand w/o use of UE. Pt will benefit from skilled therapy to improve with balance, ambulation, improving endurance, strength and independent mobility to decrease functional limitations.     Evaluation Complexity History MEDIUM  Complexity : 1-2 comorbidities / personal factors will impact the outcome/ POC ; Examination MEDIUM Complexity : 3 Standardized tests and measures addressing body structure, function, activity limitation and / or participation in recreation  ;Presentation MEDIUM Complexity : Evolving with changing characteristics  ; Clinical Decision Making MEDIUM Complexity : FOTO score of 26-74  Overall Complexity Rating: MEDIUM  Problem List: decrease ROM, decrease strength, edema affecting function, impaired gait/ balance, decrease ADL/ functional abilitiies, decrease activity tolerance, decrease flexibility/ joint mobility and decrease transfer abilities   Treatment Plan may include any combination of the following: Therapeutic exercise, Therapeutic activities, Neuromuscular re-education, Physical agent/modality, Gait/balance training, Manual therapy, Patient education, Self Care training, Functional mobility training, Home safety training and Stair training  Patient / Family readiness to learn indicated by: asking questions, trying to perform skills and interest  Persons(s) to be included in education: patient (P)  Barriers to Learning/Limitations: None  Patient Goal (s): To walk outdoors again  Patient Self Reported Health Status: good  Rehabilitation Potential: good    Short Term Goals: To be accomplished in 1 weeks:   1. Pt will be independent with a HEP to improve with ambulation function. Long Term Goals: To be accomplished in 6 weeks:   1. Pt will increase FOTO score by 7 pts to improve functional mobility. 2. Pt will ambulate on the TM at least 6 mins with good tolerance to ease with community ambulation. 3.  Pt will perform TUG test <20 sec to improve with safety during ambulation. 4. Pt will perform sit to stand 10x in 30 sec to ease with independent transfers at home. Frequency / Duration: Patient to be seen 2 times per week for 6 weeks.     Patient/ Caregiver education and instruction: Diagnosis, prognosis, self care, brace/ splint application and exercises   [x]  Plan of care has been reviewed with PTA    G-Codes (GP)  Mobility  [id number] Current  CL= 60-79%   Goal  CK= 40-59%    The severity rating is based on clinical judgment. Certification Period: 4/11/18-7/10/18  Umang Butler, PT 4/11/2018 1:26 PM    ________________________________________________________________________    I certify that the above Therapy Services are being furnished while the patient is under my care. I agree with the treatment plan and certify that this therapy is necessary.     Physician's Signature:____________________  Date:____________Time: _________    Please sign and return to [facility] [facility] [employer]  [address] [facility]  [phone] fax

## 2018-04-11 NOTE — PROGRESS NOTES
ST DAILY TREATMENT NOTE    Patient Name: Lisa Rogers  Date:2018  : 1945  [x]  Patient  Verified  Payor: [address] / Plan: [facility] / Product Type: Managed Care Medicaid /   In time: 13:05  Out time:14:03  Total Treatment Time (min): 62  Visit #: 1 of 24    SUBJECTIVE  Pain Level (0-10 scale): 0  Any medication changes, allergies to medications, adverse drug reactions, diagnosis change, or new procedure performed?: [x] No    [] Yes (see summary sheet for update)    Subjective functional status/changes:   [] No changes reported  This 66 y/o pleasant gentleman reports to skilled ST s/p CVA with a reported decline in his expressive speech and memory skills. He and his sister deny any dysphagia at this time. Pt's sister reports he is consuming aregular diet with thin liquids \"just like everyone else\" without any problems or concerns. Apparent left facial droop is noted with slurred speech upon establishing rapport. Date of Onset: CVA 16    Social History: Pt is  with 1 daughter and lives with his older sister. He reports that he enjoys going for walks, watching television and reading romance novels. Prior Functional level: Pt has hx of dysphagia, but pt's sister reports he is tolerating a regular diet. He previously had minimal cognitive impairment for STM which reported to be age appropriate. Pt reports his spee/language skills were WNLs. Lives with his sister and has an [employer] 6/hrs-day to help with dressing and grooming. Currently smokes 10 cigarettes /day.     OBJECTIVE    OUTPATIENT SPEECH-LANGUAGE EVALUATION    Receptive Language:  Receptive vocabulary    [x] WNL    [] Impaired [] Mild [] Mod [] Severe  Reliability of yes/no responses to questions [x] WNL    [] Impaired [] Mild [] Mod [] Severe Reliability of responses to complex ideation [x] WNL    [] Impaired [] Mild [] Mod [] Severe  Auditory retention/processing   [] WNL    [x] Impaired [x] Mild [] Mod [] Severe  Follow commands [x] 1 Step [x] 2 Step [x] 3 Step [] complex  Objective Information:    Expressive Language  Automatic speech   [x] WNL    [] Impaired [] Mild [] Mod [] Severe  Able to identify objects/pictures  [] WNL    [x] Impaired [x] Mild [] Mod [] Severe  Preservations    [x] WNL    [] Impaired [] Mild [] Mod [] Severe  Word retrieval    [] WNL    [x] Impaired [x] Mild [x] Mod [] Severe  Paraphasias   [x]None[] Literal    [] Phenomic   [] Jargon   [] Semantic  Able to make needs known at level [x] Word   [x] Phrase   [] Sentence   [x] Gesture        [] Unable   Objective Information:    Writing: [] L or [x] R [] WNL    [x] Impaired @ [x] Word [x] Sentence [] Paragraph    Reading:  [x] WNL    [] Impaired @ [] Word [] Sentence [] Paragraph    Cognition:  Attention: [] Alert    [] Drowsy  Orientation: [x] Person   [x] Place    [] Time  Memory: [] WNL    [x] Impaired ST   [] Impaired LT  Comments:    Executive Functions:  Problem Solving: [] WNL     [x] Impaired [] Mild [x] Mod [] Severe  Neglect:  [x] None    [] Left   [] Right  Self-regulation  [x] Appropriate   [] Impulsive   [] Requires verbal cues  Awareness:  [] Poor initiation   [x] Disinhibition   [] Constant supervision        Speech:  Oral Verbal Apraxia: [x] None    [] Mild    [] Moderate    [] Severe   Dysarthria:  [] None    [] Mild    [x] Moderate    [] Severe   Intelligibility:  [] WNL    [x] Words   [x] Phrases   [x] Sentences   [] Conversation      % Intelligible: 60-80  Voice:   [x] WNL    [] Hoarse   [] Breathy   Comments:  Fluency:  [x] WNL    [] Dysfluent:        Patient/Caregiver instruction/education: [x] Review HEP    [] Progressed/Changed    HEP/Handouts given: Convergent naming with expressive writing.      Pain Level (0-10 scale) post treatment: 0    ASSESSMENT  [x]  See Plan of Care     PLAN  []  Upgrade activities as tolerated     [x]  Continue plan of care  []  Discharge due to:__  [x] Other:Tx initated through pt education and initiating HEP with verbal instructions.      Edda Lang, ALVA 4/11/2018  3:04 PM  MA, [id number] [facility]  Speech-Language Pathologist

## 2018-04-11 NOTE — PROGRESS NOTES
[facility]  JANI  [address] [place] [address]  [phone] fax    Plan of Care/Statement of Necessity for Occupational Therapy Services    Patient name: Lani George Start of Care: 2018   Referral source: Ana Duarte MD : 1945    Medical Diagnosis: Muscle weakness [M62.81]   Onset Date:    Treatment Diagnosis: Incoordination, weakness, ADL   Prior Hospitalization: see medical history Provider#: [id number]   Medications: Verified on Patient summary List    Comorbidities: CVA, cataracts, HTN   Prior Level of Function: Ship[employer], cards, I self care, did not drive          The Plan of Care and following information is based on the information from the initial evaluation. Assessment/ key information: 67year old right hand dominant male who had two  CVAs in  and has had weakness and impairment in self care since. Weakness is present in left shoulder and both hands with  of 34# in right and 40 in left hand. His fine motor skills are impaired bilaterally, and worse on left, although patient has reduced legibility of handwriting in right hand. He requires assistance of aide for bathing and upper body dressing as well as all home care and meals. He is currently living with his sister. He denies pain. He will benefit from skilled occupational therapy to improve upper extremity strength and coordination for improved level of independence in self care tasks.       Evaluation Complexity: History LOW Complexity : Brief history review  Examination LOW Complexity : 1-3 performance deficits relating to physical, cognitive , or psychosocial skils that result in activity limitations and / or participation restrictions  Clinical Decision Making LOW Complexity : No comorbidities that affect functional and no verbal or physical assistance needed to complete eval tasks   Overall Complexity Rating: LOW     Problem List: Decreased strength, Decreased coordination/prehension and Decreased ADL/functional abilities      Treatment Plan may include any combination of the following: Therapeutic exercise, Therapeutic activities and Patient education    Patient / Family readiness to learn indicated by: asking questions, trying to perform skills and interest    Persons(s) to be included in education:   patient (P)    Barriers to Learning/Limitations: yes;  reading/writing and sensory deficits-vision/hearing/speech    Patient Goal (s): Better handwriting, be able to do more for myself    Patient Self Reported Health Status: fair    Rehabilitation Potential: good    Short Term Goals: To be accomplished in 2 weeks:  1. Patient will be independent in home activities ot improve handwriting with right hand. 2.  Patient will be able to don and doff upper extremity garments withut assistance. 3.  Patient will be independent in home program for fine motor skills and hand strengthening for both hands. Long Term Goals: To be accomplished in 12 treatments:   1. Patient will increase bilateral fine motor skills at a least 20% for improved use of fasteners. 2.  Patient will increase bilateral  strength 10# for opening jars. 3.  patient will be able to complete personal information legibly and accurately to improve written communication. Frequency / Duration: Patient to be seen 2 times per week for 12 treatments:    Patient/ Caregiver education and instruction: Diagnosis, prognosis, self care, activity modification and exercises   [x]  Plan of care has been reviewed with SHANTELL Encarnacion/L 4/11/2018 4:41 PM    _____________________________________________________________________    I certify that the above Therapy Services are being furnished while the patient is under my care. I agree with the treatment plan and certify that this therapy is necessary.     Physician's Signature:____________________  Date:____________Time:__________    Please sign and return to [facility]   [address] [place] [address]  [phone] fax

## 2018-04-11 NOTE — PROGRESS NOTES
PT DAILY TREATMENT NOTE - [facility]     Patient Name: Briana Harper  Date:2018  : 1945  [x]  Patient  Verified  Payor: [address] / Plan: 231 [facility] / Product Type: Managed Care Medicaid /    In time:1210  Out time:100  Total Treatment Time (min): 50  Total Timed Codes (min): 25  1:1 Treatment Time ( [address] only): 50   Visit #: 1 of 12    Treatment Area: CVA (cerebrovascular accident) ([facility] [address] [I63.9]    SUBJECTIVE  Pain Level (0-10 scale): 0/10  Any medication changes, allergies to medications, adverse drug reactions, diagnosis change, or new procedure performed?: [x] No    [] Yes (see summary sheet for update)  Subjective functional status/changes:   [] No changes reported  CVA 2015 affected left side. Affected speech   Lives in 1 story home. Lives with sister and has  An aide 6/hrs / day. [facility] Need help with grooming dressing, bathing. No HA's. Wants to walk outdoors. Smokes 10 cigarettes a day. Pt is good historian and is pleasant. Wears dark sunglasses since Cataract surgery months ago  OBJECTIVE    25 min []Eval                  []Re-Eval       25 min Therapeutic Exercise:  [] See flow sheet :   Rationale: improve balance and increase proprioception to improve the patients ability to ease with ADL's            With   [] TE   [] TA   [] neuro   [] other: Patient Education: [x] Review HEP    [] Progressed/Changed HEP based on:   [] positioning   [] body mechanics   [] transfers   [] heat/ice application    [] other:      Other Objective/Functional Measures: BP =130/93, 1uc=545/91. LE Sensation is intact. Motor control is WNL. no ridgidity or spasticity. Understands all commands most times. Has back pain from previous old injury. Uses a quad cane in right UE. Ambulation is poor w/o AD.        Pain Level (0-10 scale) post treatment: 0/10    ASSESSMENT/Changes in Function: see poc    Patient will continue to benefit from skilled PT services to modify and progress therapeutic interventions, address functional mobility deficits, address ROM deficits, address strength deficits, analyze and address soft tissue restrictions, analyze and cue movement patterns, analyze and modify body mechanics/ergonomics, assess and modify postural abnormalities and address imbalance/dizziness to attain remaining goals.      [x]  See Plan of Care  []  See progress note/recertification  []  See Discharge Summary         Progress towards goals / Updated goals:  See POC    PLAN  []  Upgrade activities as tolerated     []  Continue plan of care  []  Update interventions per flow sheet       []  Discharge due to:_  []  Other:_      Yoni Cruz, PT 4/11/2018  12:16 PM    Future Appointments  Date Time Provider Clarice Jasmina   4/11/2018 1:00 PM Анна Choe, ALVA [facility]   4/11/2018 3:00 PM Joshua Platt OTR/L [facility]   4/26/2018 11:00 AM Christiano Fierro MD [address] [place] [address]

## 2018-04-11 NOTE — PROGRESS NOTES
[facility] JANI  [address] [facility] ([place], [place])  [phone] fax    Plan of Care/ Statement of Necessity for Speech Therapy Services    Patient name: Wilner Kauffman Start of Care: 2018   Referral source: Piotr Joshi MD : 1945    Medical Diagnosis: There are no admission diagnoses documented for this encounter. Onset Date: CVA 2017    Treatment Diagnosis: I69.320; R47.1   Prior Hospitalization: see medical history Provider#: [id number]   Medications: Verified on Patient summary List    Comorbidities: Kidney disease, Depression, HTN; cataracts   Prior Level of Function: Pt has hx of dysphagia, but pt's sister reports he is tolerating a regular diet. He previously had minimal cognitive impairment for STM which reported to be age appropriate. Pt reports his spee/language skills were WNLs. Lives with his sister and has an [employer] [employer] [facility] [employer] 6/hrs-day to help with dressing and grooming. Currently smokes 10 cigarettes /day. The Plan of Care and following information is based on the information from the initial evaluation. Assessment/ key information: This 68 y/o pleasant gentleman reports to skilled ST s/p CVA with a reported decline in his expressive speech and memory skills. He and his sister deny any dysphagia at this time. Pt's sister reports he is consuming aregular diet with thin liquids \"just like everyone else\" without any problems or concerns. Apparent left facial droop is noted with slurred speech upon establishing rapport. SLP administered portions of the [facility] Evaluation of Aphasia.  Results are as follows:  Conversational speech: 2-3 word utterances, distorted and omitted phonemes, effortful  Recognition of Common Nouns:   Naming Common Nouns:   Recognition of Common Functions:   Yes-No Questions: 10/10  Following commands:10/10  Repeating Phrases:   Logic Questions:8/10  Verbal sequencin/5  Definition of Terms: 4/13  Number recognition: 10/12  Numeric Word recognition: 10/12  Reading Words and Sentences Orally:  10/10  Writing words and sentences: 4/10  Mathmatic Calculation: DNT  Informally, pt noted to have Left facial droop with orofacial weakness. Intelligibility judged~ 60-80%, depending on level of hierarchy. Slurring/distortions/omissions noted. SLP recommends a formal dysarthria assessed be completed to further assess all structures/functions. Additionally, the [address] [place] [address] Status Examination (SLUMS) was administered. Pt obtained a score of 18/30, indicating a borderline score of mod cognitive deficit, c/b STM, immediate recall, orientation, problem solving, executive function, and divergent naming deficits. Based on the data collected during this evaluation, Mr Maxime Romero presents with  moderate expressive aphasia, a moderate cognitive deficit, and suspected moderate dysarthria. Pt would benefit from skilled ST to further address all impairments, in order to promote, safety, independence, ability to communicate effectively, and increased QOL. Problem List:      [x]aphasic  [x]dysarthric  []dysphagic       []alexic  []agraphic  []dysphonia       []dysfluency   []Cognitive-Linguistic Disorder       []other   Treatment Plan may include any combination of the following: Aphasia Treatment, Cognitive/Language Treatment, Oral Motor Therapeutic Excerise, Dysarthria Treatment and Patient Education      Patient / Family readiness to learn indicated by: trying to perform skills and interest    Persons(s) to be included in education:   patient (P) and family support person (FSP);list Ann Marie Ruth (sister)  Barriers to Learning/Limitations: yes;  sensory deficits-vision/hearing/speech    Patient Goal (s): Talk better    Patient Self Reported Health Status: fair    Rehabilitation Potential: good    Short Term Goals:  To be accomplished in 6 weeks  1) Patient will perform assorted STM tasks such as 2-3 errand recall, 3 word recall, recall from conversational stories with intervening imposed delays with 80% acc when provided with min-mod A to increase safety/ independence and QOL. 2). Patient will be oriented to the current date, day/wk, and daily schedule and to main temporal events  And respond to questions with 80% acc when provided with Polina and  use of  external compensatory strategies to increase safety/ independence and QOL. 3) Patient will respond to questions with 5+ word utterances with 90% acc when provided with min-mod cues to increase length of verbal utterance and communicative competence. 4) Patient will utilize comp strategies (ie: phonemic awareness, long vs short vowel sounds, proofreading, rhyming words, etc) to increase correct spelling in structured writing tasks using 3-6 word utterances with 80% acc when provided with mod cues to improve written expression of thought. 5) Patient will formulate and VERBALLY EXPRESS thoughts/sentences through various functional communication tasks (ie: to describe pictures, re-tell events, explain instructions, define word meanings, and sequencing of events with 90% acc when provided with min cues  in order to improve verbal expression/ word retrieval skills. 6) Pt will complete additional dx testing, suchas the FDA-2 to further assess his deficits in order to establish appropriate goals to promote successful return to PLOF. Long Term Goals: To be accomplished in 10-12 weeks   1) Patient will develop functional cognitive-linguistic based skills and utilize compensatory strategies to communicate want and needs effectively to different communication partners, maintain safety and participate socially in functional living environment with 80% acc when provided with min cues.   2) Patient will improve his verbal expression skills at the conversational level with 90% acc when provided with min cues in order to express his needs, wants, and thoughts in order to communicate to others more effectively and accurately. 3) Patient will develop functional and intelligible speech with use of compensatory strategies through the use of adequate labial and lingual function, adequate intensity, increased articulatory precision, and speech prosody with intelligibility > 90% acc. Frequency / Duration: Patient to be seen 2 times per week for 12 weeks: The severity rating is based on the following outcomes:    National Outcomes Measures (NOMS); clinical judgment; Portions of the [facility]/[place]    Patient/ Caregiver education and instruction: Diagnosis, prognosis, Establish POC with long term and short term goals, and  HEP  Certification Period: 4/11/2018-7/10/2018    ALVA Quintanilla 4/11/2018 3:13 PM  MA, [facility]  Speech-Language Pathologist    ________________________________________________________________________    I certify that the above Therapy Services are being furnished while the patient is under my care. I agree with the treatment plan and certify that this therapy is necessary.     Physician's Signature:____________________  Date:___________Time:_________    Please sign and return to [facility]  JANI  [address] [place] [address]  [phone] fax     Thank you

## 2018-04-11 NOTE — PROGRESS NOTES
OT DAILY TREATMENT NOTE - [facility]     Patient Name: Anshul Raymundo  Date:2018  : 1945  [x]  Patient  Verified  Payor: [address] / Plan: 231 [facility] / Product Type: Managed Care Medicaid /    In time:**210*  Out time:**250*  Total Treatment Time (min): 40     Visit #: 1 of 12    Treatment Area: There are no admission diagnoses documented for this encounter. SUBJECTIVE  Pain Level (0-10 scale): 0/10  Any medication changes, allergies to medications, adverse drug reactions, diagnosis change, or new procedure performed?: [x] No    [] Yes (see summary sheet for update)  Subjective functional status/changes:   [] No changes reported  My handwriting is bad    OBJECTIVE        **10* min Therapeutic Activity:  []  See flow sheet :   Rationale: improve coordination  to improve the patients ability to write       With   [] TE   [] TA   [] neuro   [] other: Patient Education: [x] Review HEP    [] Progressed/Changed HEP based on: handwriting homework  [] positioning   [] body mechanics   [] transfers   [] heat/ice application   [] Splint wear/care   [] Sensory re-education   [] scar management      [x] other: [employer]           Other Objective/Functional Measures:   Subjective: pt is a right hand dominant, 67 y.o.y/o, male who sustained his/her injury *2015 had rehabilitation at that time**had second CVA again a few months later. Prior level of function: Retired shipyard, plays cards, TV  o pain 10-debilitating pain) no      Current functional limitations/living situation: Lives with sister, aide helps with dressing , trouble with upper body garments,   Has help for bath on chair, sister cooks, laundry  Medical hx: HTN, cataracts, CVA x 2    Medications: See the written copy of this report in the patient's paper medical record.        Objective:    Range of Motion:WFL  Strength: right 4/5, Left 3+/5 shoulder, rest 4/5  Hand ROM WFL  Hand Strength:   Gross Grasp 3pt Pinch Lateral Pinch Tip Pinch   Right  34 10 20 8   Left 40 8 17 6     Nine-Hole Peg Test:  Left= __50___seconds  Right=__35___seconds  [place] left  214    Right 142  Finger Opposition: To all tips       ADLs  Feeding:        []MaxA   []ModA   []Ky   [] CGA   []SBA   []Keegan   [x]Independent  UE Dressing:       []MaxA   [x]ModA   []Ky   [] CGA   []SBA   []Keegan   []Independent  LE Dressing:       []MaxA   []ModA   []Ky   [] CGA   []SBA   [x]Keegan   []Independent  Grooming:       []MaxA   []ModA   []Ky   [] CGA   []SBA   [x]Keegan   []Independent  Toileting:       []MaxA   []ModA   []Ky   [] CGA   []SBA   []Keegan   [x]Independent  Bathing:       [x]MaxA   []ModA   []Ky   [] CGA   []SBA   []Keegan   []Independent  Light Meal Prep:    [x]MaxA   []ModA   []Ky   [] CGA   []SBA   []Keegan   []Independent  Household/Other:  [x]MaxA   []ModA   []Ky   [] CGA   []SBA   []Keegan   []Independent  Adaptive Equip:     []MaxA   []ModA   []Ky   [] CGA   []SBA   []Keegan   []Independent  Driving: N/A      []MaxA   []ModA   []Ky   [] CGA   []SBA   []Keegan   []Independent  Money Mgmt:        []MaxA   []ModA   []Ky   [] CGA   []SBA   []Keegan   []Independent    Coordination   GM                           FM [x]WFL          [] Impaired   []WFL          [x] Impaired    Tone/Motor Control [x]WFL          [] Impaired    Midline Symmetry [x]WFL          [] Impaired    Visual Perception:                    R/L   Neglect           R/L Discrimination                   Body Scheme [x]WFL          [] Impaired   [x]WFL          [] Impaired     [x]WFL          [] Impaired     [x]WFL          [] Impaired    Visual Motor Skills                           Tracking                           Tracing                            Writing   [x]WFL          [] Impaired     [x]WFL          [] Impaired   [x]WFL          [] Impaired    Cognition [x]Person         [x]Place            [x]Date   [x]Situation/ Behavior    Follows Commands  []     1-step  [] 2-step [x]Multi-step      Memory:                             STM                             LTM   [x]WFL          [] Impaired   []WFL          [x] Impaired    Safety Awareness [x]WFL          [] Impaired    Judgment  [x]WFL          [] Impaired    Express Needs []WFL          [x] Impaired           Pain Level (0-10 scale) post treatment: 0/10    ASSESSMENT/Changes in Function: *     [x]  See Plan of Care  []  See progress note/recertification  []  See Discharge Summary             PLAN  []  Upgrade activities as tolerated     []  Continue plan of care  []  Update interventions per flow sheet       []  Discharge due to:_  []  Other:_      Gayla Lira OTR/L 4/11/2018  1:57 PM    Future Appointments  Date Time Provider [facility] [place]   4/11/2018 2:00 PM Gayla Lira OTR/L [facility]   4/26/2018 11:00 AM Logan Lepe MD [address] [place] [address]

## 2018-04-18 ENCOUNTER — DOCUMENTATION ONLY (OUTPATIENT)
Dept: UROLOGY | Age: 73
End: 2018-04-18

## 2018-04-18 NOTE — PROGRESS NOTES
Alan Vaughan's sister called to let us know he was having a reaction from the Detrol Dr. Shari Nava had put him on. Talked with Dr. Shari Nava he said if he was cramping to put him back on the [facility] if not stop taking the Detrol to see if symptoms have in proved . She said she will do that and call me back on Friday and let me know. Mrs. Irma Dickinson is his sister her  Number is [phone].

## 2018-04-19 ENCOUNTER — APPOINTMENT (OUTPATIENT)
Dept: CT IMAGING | Age: 73
DRG: 338 | End: 2018-04-19
Attending: EMERGENCY MEDICINE
Payer: MEDICARE

## 2018-04-19 ENCOUNTER — HOSPITAL ENCOUNTER (INPATIENT)
Age: 73
LOS: 10 days | Discharge: NURSING, REHAB OR LONG-TERM CARE | DRG: 338 | End: 2018-04-30
Attending: EMERGENCY MEDICINE | Admitting: FAMILY MEDICINE
Payer: MEDICARE

## 2018-04-19 DIAGNOSIS — K65.1 INTRA-ABDOMINAL ABSCESS (HCC): ICD-10-CM

## 2018-04-19 DIAGNOSIS — N39.0 ACUTE UTI: ICD-10-CM

## 2018-04-19 DIAGNOSIS — K35.32 RUPTURED APPENDICITIS: Primary | ICD-10-CM

## 2018-04-19 LAB
ALBUMIN SERPL-MCNC: 3 G/DL (ref 3.4–5)
ALBUMIN/GLOB SERPL: 0.7 {RATIO} (ref 0.8–1.7)
ALP SERPL-CCNC: 164 U/L (ref 45–117)
ALT SERPL-CCNC: 91 U/L (ref 16–61)
ANION GAP SERPL CALC-SCNC: 11 MMOL/L (ref 3–18)
AST SERPL-CCNC: 46 U/L (ref 15–37)
BILIRUB SERPL-MCNC: 0.8 MG/DL (ref 0.2–1)
BUN SERPL-MCNC: 30 MG/DL (ref 7–18)
BUN/CREAT SERPL: 14 (ref 12–20)
CALCIUM SERPL-MCNC: 9.8 MG/DL (ref 8.5–10.1)
CHLORIDE SERPL-SCNC: 103 MMOL/L (ref 100–108)
CO2 SERPL-SCNC: 21 MMOL/L (ref 21–32)
CREAT SERPL-MCNC: 2.14 MG/DL (ref 0.6–1.3)
GLOBULIN SER CALC-MCNC: 4.3 G/DL (ref 2–4)
GLUCOSE SERPL-MCNC: 104 MG/DL (ref 74–99)
LIPASE SERPL-CCNC: 172 U/L (ref 73–393)
POTASSIUM SERPL-SCNC: 3.8 MMOL/L (ref 3.5–5.5)
PROT SERPL-MCNC: 7.3 G/DL (ref 6.4–8.2)
SODIUM SERPL-SCNC: 135 MMOL/L (ref 136–145)

## 2018-04-19 PROCEDURE — 83690 ASSAY OF LIPASE: CPT | Performed by: EMERGENCY MEDICINE

## 2018-04-19 PROCEDURE — 85025 COMPLETE CBC W/AUTO DIFF WBC: CPT | Performed by: EMERGENCY MEDICINE

## 2018-04-19 PROCEDURE — 99285 EMERGENCY DEPT VISIT HI MDM: CPT

## 2018-04-19 PROCEDURE — 93005 ELECTROCARDIOGRAM TRACING: CPT

## 2018-04-19 PROCEDURE — 85610 PROTHROMBIN TIME: CPT | Performed by: STUDENT IN AN ORGANIZED HEALTH CARE EDUCATION/TRAINING PROGRAM

## 2018-04-19 PROCEDURE — 80053 COMPREHEN METABOLIC PANEL: CPT | Performed by: EMERGENCY MEDICINE

## 2018-04-19 PROCEDURE — 74176 CT ABD & PELVIS W/O CONTRAST: CPT

## 2018-04-19 NOTE — IP AVS SNAPSHOT
[address] [id number] [phone] Patient: Adia Estes MRN: [id number] NWL:01/04/0448 A check ta indicates which time of day the medication should be taken. My Medications START taking these medications Instructions Each Dose to Equal  
 Morning Noon Evening Bedtime  
 docusate sodium 100 mg capsule Commonly known as:  Mojganaretha Harrdarnell Take 1 Cap by mouth daily for 90 days. 100 mg  
    
  
   
   
   
  
 fluconazole 200 mg tablet Commonly known as:  DIFLUCAN Take 2 Tabs by mouth daily for 3 days. FDA advises cautious prescribing of oral fluconazole in pregnancy. 400 mg HYDROcodone-acetaminophen 5-325 mg per tablet Commonly known as:  Luis Enrique Syeda Take 1 Tab by mouth every four (4) hours as needed. Max Daily Amount: 6 Tabs. 1 Tab  
    
   
   
   
  
 lactobacillus sp. 50 billion cpu 50 billion cell -375 mg Cap capsule Commonly known as:  BIO-K PLUS Take 1 Cap by mouth daily. 1 Cap  
    
  
   
   
   
  
 naloxone 0.4 mg/mL injection Commonly known as:  NARCAN  
   
 0.5 mL by IntraVENous route once as needed. 0.2 mg  
    
   
   
   
  
 simethicone 80 mg chewable tablet Commonly known as:  Jesús Watkins Take 1 Tab by mouth every eight (8) hours as needed for Flatulence. Indications: FLATULENCE  
 80 mg  
    
   
   
   
  
 tamsulosin 0.4 mg capsule Commonly known as:  FLOMAX Take 1 Cap by mouth daily. 0.4 mg  
    
  
   
   
   
  
  
CHANGE how you take these medications Instructions Each Dose to Equal  
 Morning Noon Evening Bedtime  
 labetalol 200 mg tablet Commonly known as:  Cyndi Blancas What changed:  how much to take Take 1 Tab by mouth every twelve (12) hours. 200 mg CONTINUE taking these medications Instructions Each Dose to Equal  
 Morning Noon Evening Bedtime amLODIPine 10 mg tablet Commonly known as:  Liseth Thomas Take 1 Tab by mouth daily. 10 mg  
    
  
   
   
   
  
 ezetimibe 10 mg tablet Commonly known as:  Criseldabhakti Souzaamari Take 10 mg by mouth daily. 10 mg  
    
  
   
   
   
  
 VITAMIN B-12 1,000 mcg tablet Generic drug:  cyanocobalamin Take 1,000 mcg by mouth daily. 1000 mcg STOP taking these medications   
 furosemide 20 mg tablet Commonly known as:  LASIX  
   
  
 losartan 100 mg tablet Commonly known as:  COZAAR Where to Get Your Medications These medications were [facility] [address] [place] [address] [facility] [address] [place] [address] Phone:  [phone]  
  docusate sodium 100 mg capsule  
 fluconazole 200 mg tablet  
 naloxone 0.4 mg/mL injection  
 simethicone 80 mg chewable tablet  
 tamsulosin 0.4 mg capsule Information on where to get these meds will be given to you by the nurse or doctor. ! Ask your nurse or doctor about these medications HYDROcodone-acetaminophen 5-325 mg per tablet  
 lactobacillus sp. 50 billion cpu 50 billion cell -375 mg Cap capsule

## 2018-04-19 NOTE — IP AVS SNAPSHOT
[address] [address] 
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 
[phone] MAGUE Durbin Schedule an appointment as soon as possible for a visit today  [address] [place] [id number] 
[phone] patient to transferred to facility Discharge Orders None A check ta indicates which time of day the medication should be taken. My Medications START taking these medications Instructions Each Dose to Equal  
 Morning Noon Evening Bedtime  
 docusate sodium 100 mg capsule Commonly known as:  Vernard Brandon Take 1 Cap by mouth daily for 90 days. 100 mg  
    
  
   
   
   
  
 fluconazole 200 mg tablet Commonly known as:  DIFLUCAN Take 2 Tabs by mouth daily for 3 days. FDA advises cautious prescribing of oral fluconazole in pregnancy. 400 mg HYDROcodone-acetaminophen 5-325 mg per tablet Commonly known as:  Tellis Anthony Take 1 Tab by mouth every four (4) hours as needed. Max Daily Amount: 6 Tabs. 1 Tab lactobacillus sp. 50 billion cpu 50 billion cell -375 mg Cap capsule Commonly known as:  BIO-K PLUS Take 1 Cap by mouth daily. 1 Cap  
    
  
   
   
   
  
 naloxone 0.4 mg/mL injection Commonly known as:  NARCAN  
   
 0.5 mL by IntraVENous route once as needed. 0.2 mg  
    
   
   
   
  
 simethicone 80 mg chewable tablet Commonly known as:  Alvin Exon Take 1 Tab by mouth every eight (8) hours as needed for Flatulence. Indications: FLATULENCE  
 80 mg  
    
   
   
   
  
 tamsulosin 0.4 mg capsule Commonly known as:  FLOMAX Take 1 Cap by mouth daily. 0.4 mg  
    
  
   
   
   
  
  
CHANGE how you take these medications Instructions Each Dose to Equal  
 Morning Noon Evening Bedtime  
 labetalol 200 mg tablet Commonly known as:  Trena Lee What changed:  how much to take Take 1 Tab by mouth every twelve (12) hours. 200 mg CONTINUE taking these medications Instructions Each Dose to Equal  
 Morning Noon Evening Bedtime  
 amLODIPine 10 mg tablet Commonly known as:  Liseth Acron Take 1 Tab by mouth daily. 10 mg  
    
  
   
   
   
  
 ezetimibe 10 mg tablet Commonly known as:  Abelino Boothe Take 10 mg by mouth daily. 10 mg  
    
  
   
   
   
  
 VITAMIN B-12 1,000 mcg tablet Generic drug:  cyanocobalamin Take 1,000 mcg by mouth daily. 1000 mcg STOP taking these medications   
 furosemide 20 mg tablet Commonly known as:  LASIX  
   
  
 losartan 100 mg tablet Commonly known as:  COZAAR Where to Get Your Medications [facility] [address] Marti Boyerjimi [facility] Phone:  [phone]  
  docusate sodium 100 mg capsule  
 fluconazole 200 mg tablet  
 naloxone 0.4 mg/mL injection  
 simethicone 80 mg chewable tablet tamsulosin 0.4 mg capsule Information on where to get these meds will be given to you by the nurse or doctor. ! Ask your nurse or doctor about these medications HYDROcodone-acetaminophen 5-325 mg per tablet  
 lactobacillus sp. 50 billion cpu 50 billion cell -375 mg Cap capsule Opioid Education Prescription Opioids: What You Need to Know: 
 
Prescription opioids can be used to help relieve moderate-to-severe pain and are often prescribed following a surgery or injury, or for certain health conditions. These medications can be an important part of treatment but also come with serious risks. Opioids are strong pain medicines. Examples include hydrocodone, oxycodone, fentanyl, and morphine. Heroin is an example of an illegal opioid. It is important to work with your health care provider to make sure you are getting the safest, most effective care. WHAT ARE THE RISKS AND SIDE EFFECTS OF OPIOID USE? Prescription opioids carry serious risks of addiction and overdose, especially with prolonged use. An opioid overdose, often marked by slow breathing, can cause sudden death. The use of prescription opioids can have a number of side effects as well, even when taken as directed. · Tolerance-meaning you might need to take more of a medication for the same pain relief · Physical dependence-meaning you have symptoms of withdrawal when the medication is stopped. Withdrawal symptoms can include nausea, sweating, chills, diarrhea, stomach cramps, and muscle aches. Withdrawal can last up to several weeks, depending on which drug you took and how long you took it. · Increased sensitivity to pain · Constipation · Nausea, vomiting, and dry mouth · Sleepiness and dizziness · Confusion · Depression · Low levels of testosterone that can result in lower sex drive, energy, and strength · Itching and sweating RISKS ARE GREATER WITH:      
 · History of drug misuse, substance use disorder, or overdose · Mental health conditions (such as depression or anxiety) · Sleep apnea · Older age (72 years or older) · Pregnancy Avoid alcohol while taking prescription opioids. Also, unless specifically advised by your health care provider, medications to avoid include: · Benzodiazepines (such as Xanax or Valium) · Muscle relaxants (such as Soma or Flexeril) · Hypnotics (such as Ambien or Lunesta) · Other prescription opioids KNOW YOUR OPTIONS Talk to your health care provider about ways to manage your pain that don't involve prescription opioids. Some of these options may actually work better and have fewer risks and side effects. Options may include: 
· Pain relievers such as acetaminophen, ibuprofen, and naproxen · Some medications that are also used for depression or seizures · Physical therapy and exercise · Counseling to help patients learn how to cope better with triggers of pain and stress. · Application of heat or cold compress · Massage therapy · Relaxation techniques Be Informed Make sure you know the name of your medication, how much and how often to take it, and its potential risks & side effects. IF YOU ARE PRESCRIBED OPIOIDS FOR PAIN: 
· Never take opioids in greater amounts or more often than prescribed. Remember the goal is not to be pain-free but to manage your pain at a tolerable level. · Follow up with your primary care provider to: · Work together to create a plan on how to manage your pain. · Talk about ways to help manage your pain that don't involve prescription opioids. · Talk about any and all concerns and side effects. · Help prevent misuse and abuse. · Never sell or share prescription opioids · Help prevent misuse and abuse. · Store prescription opioids in a secure place and out of reach of others (this may include visitors, children, friends, and family). · Safely dispose of unused/unwanted prescription opioids: Find your community drug take-back program or your pharmacy mail-back program, or flush them down the toilet, following guidance from the Food and Drug Administration (www.fda.gov/Drugs/ResourcesForYou). · Visit www.cdc.gov/drugoverdose to learn about the risks of opioid abuse and overdose. · If you believe you may be struggling with addiction, tell your health care provider and ask for guidance or call [facility] [employer] at 9-751-728-THQU. Discharge Instructions Appendectomy: What to Expect at [facility] Your Recovery Your doctor removed your appendix either by making many small cuts, called incisions, in your belly (laparoscopic surgery) or through open surgery. In open surgery, the doctor makes one large incision. The incisions leave scars that usually fade over time. After your surgery, it is normal to feel weak and tired for several days after you return home. Your belly may be swollen and may be painful. If you had laparoscopic surgery, you may have pain in your shoulder for about 24 hours. You may also feel sick to your stomach and have diarrhea, constipation, gas, or a headache. This usually goes away in a few days. Your recovery time depends on the type of surgery you had. If you had laparoscopic surgery, you will probably be able to return to work or a normal routine 1 to 3 weeks after surgery. If you had an open surgery, it may take 2 to 4 weeks. If your appendix ruptured, you may have a drain in your incision. Your body will work fine without an appendix. You will not have to make any changes in your diet or lifestyle. This care sheet gives you a general idea about how long it will take for you to recover. But each person recovers at a different pace. Follow the steps below to get better as quickly as possible. How can you care for yourself at home? Activity ? · Rest when you feel tired. Getting enough sleep will help you recover. ? · Try to walk each day. Start by walking a little more than you did the day before. Bit by bit, increase the amount you walk. Walking boosts blood flow and helps prevent pneumonia and constipation. ? · For about 2 weeks, avoid lifting anything that would make you strain. This may include a child, heavy grocery bags and milk containers, a heavy briefcase or backpack, cat litter or dog food bags, or a vacuum . ? · Avoid strenuous activities, such as bicycle riding, jogging, weight lifting, or aerobic exercise, until your doctor says it is okay. ? · You may be able to take showers (unless you have a drain near your incision) 24 to 48 hours after surgery. Pat the incision dry. Do not take a bath for the first 2 weeks, or until your doctor tells you it is okay. If you have a drain near your incision, follow your doctor's instructions. ? · You may drive when you are no longer taking pain medicine and can quickly move your foot from the gas pedal to the brake. You must also be able to sit comfortably for a long period of time, even if you do not plan on going far. You might get caught in traffic. ? · You will probably be able to go back to work in 1 to 3 weeks. If you had an open surgery, it may take 3 to 4 weeks. ? · Your doctor will tell you when you can have sex again. Diet ? · You can eat your normal diet. If your stomach is upset, try bland, low-fat foods like plain rice, broiled chicken, toast, and yogurt. ? · Drink plenty of fluids (unless your doctor tells you not to). ? · You may notice that your bowel movements are not regular right after your surgery. This is common. Try to avoid constipation and straining with bowel movements. You may want to take a fiber supplement every day. If you have not had a bowel movement after a couple of days, ask your doctor about taking a mild laxative. Medicines ? · Your doctor will tell you if and when you can restart your medicines. He or she will also give you instructions about taking any new medicines. ? · If you take blood thinners, such as warfarin (Coumadin), clopidogrel (Plavix), or aspirin, be sure to talk to your doctor. He or she will tell you if and when to start taking those medicines again. Make sure that you understand exactly what your doctor wants you to do. ? · If your appendix ruptured, you will need to take antibiotics. Take them as directed. Do not stop taking them just because you feel better. You need to take the full course of antibiotics. ? · Be safe with medicines. Take pain medicines exactly as directed. ¨ If the doctor gave you a prescription medicine for pain, take it as prescribed. ¨ If you are not taking a prescription pain medicine, take an over-the-counter medicine such as acetaminophen (Tylenol), ibuprofen (Advil, Motrin), or naproxen (Aleve). Read and follow all instructions on the label. ¨ Do not take two or more pain medicines at the same time unless the doctor told you to. Many pain medicines have acetaminophen, which is Tylenol. Too much Tylenol can be harmful. ? · If you think your pain medicine is making you sick to your stomach: 
¨ Take your medicine after meals (unless your doctor has told you not to). ¨ Ask your doctor for a different pain medicine. Incision care ? · If you had an open surgery, you may have staples in your incision. The doctor will take these out in 7 to 10 days. ? · If you have strips of tape on the incision, leave the tape on for a week or until it falls off.  
? · You may wash the area with warm, soapy water 24 to 48 hours after your surgery, unless your doctor tells you not to. Pat the area dry. ? · Keep the area clean and dry. You may cover it with a gauze bandage if it weeps or rubs against clothing. Change the bandage every day. ? · If your appendix ruptured, you may have an incision with packing in it. Change the packing as often as your doctor tells you to. ¨ Packing changes may hurt at first. Taking pain medicine about half an hour before you change the dressing can help. ¨ If your dressing sticks to your wound, try soaking it with warm water for about 10 minutes before you remove it. You can do this in the shower or by placing a wet washcloth over the dressing. ¨ Remove the old packing and flush the incision with water. Gently pat the top area dry. ¨ The size of the incision determines how much gauze you need to put inside. Fold the gauze over once, but do not wad it up so that it hurts. Put it in the wound carefully. You want to keep the sides of the wound from touching. A cotton swab may help you push the gauze in as needed. ¨ Put a gauze pad over the wound, and tape it down. ¨ You may notice greenish gray fluid seeping from your wound as you start to heal. This is normal. It is a sign that your wound is healing. Follow-up care is a key part of your treatment and safety. Be sure to make and go to all appointments, and call your doctor if you are having problems. It's also a good idea to know your test results and keep a list of the medicines you take. When should you call for help? Call 911 anytime you think you may need emergency care. For example, call if: 
? · You passed out (lost consciousness). ? · You are short of breath. Ruben Cevallos ? Call your doctor now or seek immediate medical care if: 
? · You are sick to your stomach and cannot drink fluids. ? · You cannot pass stools or gas. ? · You have pain that does not get better when you take your pain medicine. ? · You have signs of infection, such as: 
¨ Increased pain, swelling, warmth, or redness. ¨ Red streaks leading from the wound. ¨ Pus draining from the wound. ¨ A fever. ? · You have loose stitches, or your incision comes open. ? · Bright red blood has soaked through the bandage over your incision. ? · You have signs of a blood clot in your leg (called a deep vein thrombosis), such as: 
¨ Pain in your calf, back of knee, thigh, or groin. ¨ Redness and swelling in your leg or groin. ? Watch closely for any changes in your health, and be sure to contact your doctor if you have any problems. Where can you learn more? Go to http://aye-mari.info/. Enter N024 in the search box to learn more about \"Appendectomy: What to Expect at Home. \" Current as of: May 12, 2017 Content Version: 11.4 © 4960-7660 [employer]. Care instructions adapted under license by [employer] (which disclaims liability or warranty for this information). If you have questions about a medical condition or this instruction, always ask your healthcare professional. [address] [address] any warranty or liability for your use of this information. Learning About Appendectomy What is an appendectomy? An appendectomy is surgery to take out the appendix. This organ is a small sac that is shaped like a finger. It's attached to your large intestine. Appendicitis happens when the appendix becomes infected and inflamed. An appendectomy is the main treatment for it. If surgery is delayed, the inflamed appendix may burst. A burst appendix can cause serious health problems. If your appendix has burst, you may need an emergency surgery to remove the burst appendix. How is this surgery done? Before surgery, you will get medicine to make you sleep. Appendectomy is usually done as a laparoscopic surgery. That means it is done with only small cuts. These cuts are called incisions. The doctor puts a lighted tube, or scope, and other surgical tools through the cuts in your belly. The doctor is able to see your organs with the scope. The doctor removes the appendix.  The cuts heal quickly, and the scars usually fade over time. In some cases, the surgery is done through a single larger cut in the belly. This is called open surgery. What can you expect after surgery? Most people leave the hospital 1 to 3 days after surgery. Some even go home the same day. After you go home, it is normal to feel weak and tired for several days. Your belly may be swollen and may be painful. If you had laparoscopic surgery, you may have shoulder pain for about 24 hours. You may also feel sick to your stomach and have diarrhea, constipation, gas, or a headache. This usually goes away in a few days. Your recovery time depends on the type of surgery you had. If you had laparoscopic surgery, you will probably be able to go back to work or your normal routine 1 to 3 weeks after surgery. If you had an open surgery, it may take 2 to 4 weeks. If your appendix burst, you may have a drain in your incision. Your body will work fine without an appendix. You will not have to make any changes in your diet or daily life. After surgery, be sure to follow your doctor's advice about problems to watch for. These may include fever, worse belly pain, or problems with your incision. Follow-up care is a key part of your treatment and safety. Be sure to make and go to all appointments, and call your doctor if you are having problems. It's also a good idea to know your test results and keep a list of the medicines you take. Where can you learn more? Go to http://aye-mari.info/. Enter Z546 in the search box to learn more about \"Learning About Appendectomy. \" Current as of: May 12, 2017 Content Version: 11.4 © 7778-1283 [employer]. Care instructions adapted under license by [employer] (which disclaims liability or warranty for this information).  If you have questions about a medical condition or this instruction, always ask your healthcare professional. [employer] disclaims any warranty or liability for your use of this information. [facility] Announcement We are excited to announce that we are making your provider's discharge notes available to you in [facility]. You will see these notes when they are completed and signed by the physician that discharged you from your recent hospital stay. If you have any questions or concerns about any information you see in [facility], please call the Health Information Department where you were seen or reach out to your Primary Care Provider for more information about your plan of care. Introducing [facility]! [employer] introduces [employer] patient portal. Now you can access parts of your medical record, email your doctor's office, and request medication refills online. 1. In your internet browser, go to [email or web address][email or web address] 2. Click on the First Time User? Click Here link in the Sign In box. You will see the New Member Sign Up page. 3. Enter your [facility] Access Code exactly as it appears below. You will not need to use this code after youve completed the sign-up process. If you do not sign up before the expiration date, you must request a new code. · [facility] Access Code: [email or web address] Expires: 6/6/2018 11:32 AM 
 
4. Enter the last four digits of your Social Security Number (xxxx) and Date of Birth (mm/dd/yyyy) as indicated and click Submit. You will be taken to the next sign-up page. 5. Create a [facility] ID. This will be your [facility] login ID and cannot be changed, so think of one that is secure and easy to remember. 6. Create a [facility] password. You can change your password at any time. 7. Enter your Password Reset Question and Answer. This can be used at a later time if you forget your password. 8. Enter your e-mail address. You will receive e-mail notification when new information is available in [address] 9. Click Sign Up. You can now view and download portions of your medical record. 10. Click the Download Summary menu link to download a portable copy of your medical information. If you have questions, please visit the Frequently Asked Questions section of the [employer] website. Remember, [employer] is NOT to be used for urgent needs. For medical emergencies, dial 911. Now available from your iPhone and Android! Introducing Bradly Perez As a [facility] patient, I wanted to make you aware of our electronic visit tool called Bradly Perez. [employer] 24/7 allows you to connect within minutes with a medical provider 24 hours a day, seven days a week via a mobile device or tablet or logging into a secure website from your computer. You can access Bradly Perez from anywhere in the [place]. A virtual visit might be right for you when you have a simple condition and feel like you just dont want to get out of bed, or cant get away from work for an appointment, when your regular [facility] provider is not available (evenings, weekends or holidays), or when youre out of town and need minor care. Electronic visits cost only $49 and if the [employer] [facility] 24/7 provider determines a prescription is needed to treat your condition, one can be electronically transmitted to a nearby pharmacy*. Please take a moment to enroll today if you have not already done so. The enrollment process is free and takes just a few minutes. To enroll, please download the [employer] 24/7 césar to your tablet or phone, or visit [email or web address] to enroll on your computer. And, as an [address] [place] [address] patient with a [employer] account, the results of your visits will be scanned into your electronic medical record and your primary care provider will be able to view the scanned results.    
We urge you to continue to see your regular [facility] provider for your ongoing medical care. And while your primary care provider may not be the one available when you seek a Bradly Escamillafin virtual visit, the peace of mind you get from getting a real diagnosis real time can be priceless. For more information on Bradly Escamillafin, view our Frequently Asked Questions (FAQs) at [email or web address]. Sincerely, 
 
Jm Garcia MD 
Chief Medical Officer Clara Meyer *:  certain medications cannot be prescribed via Bradly Makdesmondfin Providers Seen During Your Hospitalization Provider Specialty Primary office phone Silvia Forde MD Emergency Medicine [phone] Olga Eden MD Family Practice [phone] Shereen Felix MD Family Practice [phone] Amber Lindo MD Internal Medicine [phone] Your Primary Care Physician (PCP) Primary Care Physician Office Phone Office Fax 750 [address] [phone] You are allergic to the following Allergen Reactions Lipitor (Atorvastatin) Other (comments) Elevated CK level Pt has no awareness of this allergy. Recent Documentation Weight BMI Smoking Status 99.1 kg 28.05 kg/m2 Current Every Day Smoker Emergency Contacts Name Discharge Info Relation Home Work Mobile [employer] CAREGIVER [3] Sister [23] [phone] Era Phillips(Niece)  Other Relative [6] [phone] [facility] CAREGIVER [3] Other Relative [6] [phone] Patient Belongings The following personal items are in your possession at time of discharge: 
  Dental Appliances: None  Visual Aid: None      Home Medications: None   Jewelry: None  Clothing: At bedside Discharge Instructions Attachments/References APPENDICITIS (ENGLISH) HYDROCODONE/ACETAMINOPHEN (BY MOUTH) (ENGLISH) Patient Handouts Possible Appendicitis: Care Instructions Your Care Instructions Your doctor thinks you may have appendicitis. This means that your appendix may be infected. The appendix is a small sac that is shaped like a finger. It's attached to your large intestine. Sometimes it's hard to tell if a person has appendicitis. It is especially hard to tell in children, pregnant women, and older adults. If your doctor thinks it's possible that you have appendicitis, he or she may want to order more tests. Or your doctor may want to wait to see if your symptoms change. Your doctor thinks it's okay for you to go home right now. But you will need to watch for symptoms of appendicitis at home. If your symptoms continue or get worse, it's important to call your doctor or get medical care right away. Appendicitis can get serious very quickly. The main treatment is surgery to remove your appendix. Follow-up care is a key part of your treatment and safety. Be sure to make and go to all appointments, and call your doctor if you are having problems. It's also a good idea to know your test results and keep a list of the medicines you take. How can you care for yourself at home? · Do not eat or drink, unless your doctor says it is okay. If you need surgery, it's best to have an empty stomach. If you're thirsty, you can rinse your mouth with water. Or you can suck on hard candy. · Do not take laxatives. If you have appendicitis, they can make the appendix burst. 
· Follow your doctor's instructions about taking medicines. Your doctor may tell you not to take antibiotics or pain pills. These medicines can make it harder to tell if you have appendicitis. · Watch for symptoms of appendicitis. See the When should you call for help section below. It is very important to follow your doctor's instructions about getting treatment if you have these symptoms. When should you call for help? Call your doctor now or seek immediate medical care if: 
? · You have pain that becomes focused on one area of your belly. ? · You have new or worse belly pain. ? · You are vomiting. ? · You have a fever. ? · You cannot pass stools or gas. ? Watch closely for changes in your health, and be sure to contact your doctor if: 
? · You do not get better as expected. Where can you learn more? Go to http://aye-mari.info/. Enter V327 in the search box to learn more about \"Possible Appendicitis: Care Instructions. \" Current as of: May 12, 2017 Content Version: 11.4 © 0575-5528 [employer]. Care instructions adapted under license by [employer] (which disclaims liability or warranty for this information). If you have questions about a medical condition or this instruction, always ask your healthcare professional. [place] [address] any warranty or liability for your use of this information. Hydrocodone/Acetaminophen (By mouth) Acetaminophen (n-yivh-c-MIN-oh-fen), Hydrocodone Bitartrate (she-xmfb-JOK-done bye-TAR-trate) Treats pain. This medicine contains a narcotic pain reliever. Brand Name(s): Hycet, Lorcet, Lorcet HD, Lorcet Plus, Lortab 10/325, Lortab 5/325, Lortab 7.5/325, Lortab Elixir, Norco, Verdrocet, Vicodin, Vicodin ES, Vicodin HP, Xodol, Xodol 5/300 There may be other brand names for this medicine. When This Medicine Should Not Be Used: This medicine is not right for everyone. Do not use it if you had an allergic reaction to acetaminophen, hydrocodone, or other narcotic medicines, or stomach or bowel blockage (including paralytic ileus). How to Use This Medicine:  
Capsule, Liquid, Tablet · Your doctor will tell you how much medicine to use. Do not use more than directed. · An overdose can be dangerous. Follow directions carefully so you do not get too much medicine at one time. · Oral liquid: Measure the oral liquid medicine with a marked measuring spoon, oral syringe, or medicine cup. · Drink plenty of liquids to help avoid constipation. · This medicine should come with a Medication Guide. Ask your pharmacist for a copy if you do not have one. · Missed dose: Take a dose as soon as you remember. If it is almost time for your next dose, wait until then and take a regular dose. Do not take extra medicine to make up for a missed dose. · Store the medicine in a closed container at room temperature, away from heat, moisture, and direct light. Flush any unused Norco® tablets down the toilet. Drugs and Foods to Avoid: Ask your doctor or pharmacist before using any other medicine, including over-the-counter medicines, vitamins, and herbal products. · Do not use this medicine if you are using or have used an MAO inhibitor within the past 14 days. · Some medicines can affect how hydrocodone/acetaminophen works. Tell your doctor if you are using any of the following: ¨ Carbamazepine, erythromycin, ketoconazole, mirtazapine, phenytoin, rifampin, ritonavir, tramadol, trazodone ¨ Diuretic (water pill) ¨ Medicine to treat depression or mental health problems ¨ Medicine to treat migraine headaches ¨ Phenothiazine medicine · Tell your doctor if you use anything else that makes you sleepy. Some examples are allergy medicine, narcotic pain medicine, and alcohol. Tell your doctor if you are using buprenorphine, butorphanol, nalbuphine, pentazocine, or a muscle relaxer. · Do not drink alcohol while you are using this medicine. Acetaminophen can damage your liver, and your risk is higher if you also drink alcohol. Warnings While Using This Medicine: · Tell your doctor if you are pregnant or breastfeeding, or if you have kidney disease, liver disease, lung or breathing problems, gallbladder or pancreas problems, an underactive thyroid, Crispin disease, prostate problems, trouble urinating, stomach problems, or a history of head injury or brain tumor, seizures, alcohol or drug addiction. · This medicine may cause the following problems:  
¨ High risk of overdose, which can lead to death ¨ Respiratory depression (serious breathing problem that can be life-threatening) ¨ Liver problems ¨ Serious skin reactions ¨ Serotonin syndrome (when used with certain medicines) · This medicine can be habit-forming. Do not use more than your prescribed dose. Call your doctor if you think your medicine is not working. · This medicine may make you dizzy or drowsy. Do not drive or doing anything else that could be dangerous until you know how this medicine affects you. · This medicine contains acetaminophen. Read the labels of all other medicines you are using to see if they also contain acetaminophen, or ask your doctor or pharmacist. Sher Dura not use more than 4 grams (4,000 milligrams) total of acetaminophen in one day. · Tell any doctor or dentist who treats you that you are using this medicine. This medicine may affect certain medical test results. · This medicine may cause constipation, especially with long-term use. Ask your doctor if you should use a laxative to prevent and treat constipation. · This medicine could cause infertility. Talk with your doctor before using this medicine if you plan to have children. · Keep all medicine out of the reach of children. Never share your medicine with anyone. Possible Side Effects While Using This Medicine:  
Call your doctor right away if you notice any of these side effects: · Allergic reaction: Itching or hives, swelling in your face or hands, swelling or tingling in your mouth or throat, chest tightness, trouble breathing · Anxiety, restlessness, fast heartbeat, fever, sweating, muscle spasms, twitching, diarrhea, seeing or hearing things that are not there · Blistering, peeling, red skin rash · Blue lips, fingernails, or skin · Dark urine or pale stools, loss of appetite, nausea or vomiting, stomach pain, yellow skin or eyes · Extreme weakness, shallow breathing, slow heartbeat, sweating, seizures, cold or clammy skin · Lightheadedness, dizziness, fainting If you notice these less serious side effects, talk with your doctor: · Constipation, nausea, vomiting · Tiredness or sleepiness If you notice other side effects that you think are caused by this medicine, tell your doctor. Call your doctor for medical advice about side effects. You may report side effects to FDA at 4-197-FDA-8920 © 2017 [address] Information is for End User's use only and may not be sold, redistributed or otherwise used for commercial purposes. The above information is an  only. It is not intended as medical advice for individual conditions or treatments. Talk to your doctor, nurse or pharmacist before following any medical regimen to see if it is safe and effective for you. Please provide this summary of care documentation to your next provider. Signatures-by signing, you are acknowledging that this After Visit Summary has been reviewed with you and you have received a copy. Patient Signature:  ____________________________________________________________ Date:  ____________________________________________________________  
  
Kolby Chavarria Provider Signature:  ____________________________________________________________ Date:  ____________________________________________________________

## 2018-04-20 ENCOUNTER — ANESTHESIA EVENT (OUTPATIENT)
Dept: SURGERY | Age: 73
DRG: 338 | End: 2018-04-20
Payer: MEDICARE

## 2018-04-20 ENCOUNTER — ANESTHESIA (OUTPATIENT)
Dept: SURGERY | Age: 73
DRG: 338 | End: 2018-04-20
Payer: MEDICARE

## 2018-04-20 PROBLEM — K35.32 RUPTURED APPENDICITIS: Status: ACTIVE | Noted: 2018-04-20

## 2018-04-20 PROBLEM — K35.33 APPENDICITIS WITH ABSCESS: Status: ACTIVE | Noted: 2018-04-20

## 2018-04-20 PROBLEM — R11.0 NAUSEA: Status: ACTIVE | Noted: 2018-04-20

## 2018-04-20 PROBLEM — N39.0 ACUTE UTI: Status: ACTIVE | Noted: 2018-04-20

## 2018-04-20 PROBLEM — R10.9 ABDOMINAL PAIN: Status: ACTIVE | Noted: 2018-04-20

## 2018-04-20 PROBLEM — K65.1 INTRA-ABDOMINAL ABSCESS (HCC): Status: ACTIVE | Noted: 2018-04-20

## 2018-04-20 LAB
ABO + RH BLD: NORMAL
ALBUMIN SERPL-MCNC: 2.4 G/DL (ref 3.4–5)
ALBUMIN/GLOB SERPL: 0.6 {RATIO} (ref 0.8–1.7)
ALP SERPL-CCNC: 127 U/L (ref 45–117)
ALT SERPL-CCNC: 65 U/L (ref 16–61)
AMORPH CRY URNS QL MICRO: ABNORMAL
ANION GAP SERPL CALC-SCNC: 8 MMOL/L (ref 3–18)
APPEARANCE UR: CLEAR
AST SERPL-CCNC: 29 U/L (ref 15–37)
ATRIAL RATE: 71 BPM
BACTERIA URNS QL MICRO: NEGATIVE /HPF
BASOPHILS # BLD: 0 K/UL (ref 0–0.06)
BASOPHILS # BLD: 0 K/UL (ref 0–0.06)
BASOPHILS NFR BLD: 0 % (ref 0–3)
BASOPHILS NFR BLD: 0 % (ref 0–3)
BILIRUB SERPL-MCNC: 1.2 MG/DL (ref 0.2–1)
BILIRUB UR QL: NEGATIVE
BLOOD GROUP ANTIBODIES SERPL: NORMAL
BUN SERPL-MCNC: 27 MG/DL (ref 7–18)
BUN/CREAT SERPL: 14 (ref 12–20)
CALCIUM SERPL-MCNC: 9 MG/DL (ref 8.5–10.1)
CALCULATED P AXIS, ECG09: 29 DEGREES
CALCULATED R AXIS, ECG10: -45 DEGREES
CALCULATED T AXIS, ECG11: 33 DEGREES
CHLORIDE SERPL-SCNC: 109 MMOL/L (ref 100–108)
CO2 SERPL-SCNC: 20 MMOL/L (ref 21–32)
COLOR UR: YELLOW
CREAT SERPL-MCNC: 2 MG/DL (ref 0.6–1.3)
DIAGNOSIS, 93000: NORMAL
DIFFERENTIAL METHOD BLD: ABNORMAL
DIFFERENTIAL METHOD BLD: ABNORMAL
EOSINOPHIL # BLD: 0 K/UL (ref 0–0.4)
EOSINOPHIL # BLD: 0.2 K/UL (ref 0–0.4)
EOSINOPHIL NFR BLD: 0 % (ref 0–5)
EOSINOPHIL NFR BLD: 1 % (ref 0–5)
EPITH CASTS URNS QL MICRO: ABNORMAL /LPF (ref 0–5)
ERYTHROCYTE [DISTWIDTH] IN BLOOD BY AUTOMATED COUNT: 14.5 % (ref 11.6–14.5)
ERYTHROCYTE [DISTWIDTH] IN BLOOD BY AUTOMATED COUNT: 14.6 % (ref 11.6–14.5)
GLOBULIN SER CALC-MCNC: 4.2 G/DL (ref 2–4)
GLUCOSE SERPL-MCNC: 113 MG/DL (ref 74–99)
GLUCOSE UR STRIP.AUTO-MCNC: NEGATIVE MG/DL
HCT VFR BLD AUTO: 33.1 % (ref 36–48)
HCT VFR BLD AUTO: 35 % (ref 36–48)
HGB BLD-MCNC: 11.4 G/DL (ref 13–16)
HGB BLD-MCNC: 12.1 G/DL (ref 13–16)
HGB UR QL STRIP: ABNORMAL
HYALINE CASTS URNS QL MICRO: ABNORMAL /LPF (ref 0–2)
INR PPP: 1.2 (ref 0.8–1.2)
KETONES UR QL STRIP.AUTO: NEGATIVE MG/DL
LACTATE BLD-SCNC: 1 MMOL/L (ref 0.4–2)
LEUKOCYTE ESTERASE UR QL STRIP.AUTO: NEGATIVE
LYMPHOCYTES # BLD: 1.9 K/UL (ref 0.8–3.5)
LYMPHOCYTES # BLD: 2.2 K/UL (ref 0.8–3.5)
LYMPHOCYTES NFR BLD: 12 % (ref 20–51)
LYMPHOCYTES NFR BLD: 9 % (ref 20–51)
MCH RBC QN AUTO: 27.6 PG (ref 24–34)
MCH RBC QN AUTO: 27.7 PG (ref 24–34)
MCHC RBC AUTO-ENTMCNC: 34.4 G/DL (ref 31–37)
MCHC RBC AUTO-ENTMCNC: 34.6 G/DL (ref 31–37)
MCV RBC AUTO: 80.1 FL (ref 74–97)
MCV RBC AUTO: 80.1 FL (ref 74–97)
METAMYELOCYTES NFR BLD MANUAL: 1 %
METAMYELOCYTES NFR BLD MANUAL: 4 %
MONOCYTES # BLD: 1.6 K/UL (ref 0–1)
MONOCYTES # BLD: 3.2 K/UL (ref 0–1)
MONOCYTES NFR BLD: 10 % (ref 2–9)
MONOCYTES NFR BLD: 13 % (ref 2–9)
MUCOUS THREADS URNS QL MICRO: ABNORMAL /LPF
MYELOCYTES NFR BLD MANUAL: 1 %
MYELOCYTES NFR BLD MANUAL: 1 %
NEUTS BAND NFR BLD MANUAL: 12 % (ref 0–5)
NEUTS BAND NFR BLD MANUAL: 2 % (ref 0–5)
NEUTS SEG # BLD: 11.6 K/UL (ref 1.8–8)
NEUTS SEG # BLD: 18.5 K/UL (ref 1.8–8)
NEUTS SEG NFR BLD: 61 % (ref 42–75)
NEUTS SEG NFR BLD: 73 % (ref 42–75)
NITRITE UR QL STRIP.AUTO: NEGATIVE
OTHER,OTHU: ABNORMAL
P-R INTERVAL, ECG05: 176 MS
PH UR STRIP: 5 [PH] (ref 5–8)
PLATELET # BLD AUTO: 397 K/UL (ref 135–420)
PLATELET # BLD AUTO: 413 K/UL (ref 135–420)
PLATELET COMMENTS,PCOM: ABNORMAL
PLATELET COMMENTS,PCOM: ABNORMAL
PMV BLD AUTO: 10.2 FL (ref 9.2–11.8)
PMV BLD AUTO: 10.2 FL (ref 9.2–11.8)
POTASSIUM SERPL-SCNC: 4.2 MMOL/L (ref 3.5–5.5)
PROT SERPL-MCNC: 6.6 G/DL (ref 6.4–8.2)
PROT UR STRIP-MCNC: 30 MG/DL
PROTHROMBIN TIME: 14.8 SEC (ref 11.5–15.2)
Q-T INTERVAL, ECG07: 402 MS
QRS DURATION, ECG06: 108 MS
QTC CALCULATION (BEZET), ECG08: 436 MS
RBC # BLD AUTO: 4.13 M/UL (ref 4.7–5.5)
RBC # BLD AUTO: 4.37 M/UL (ref 4.7–5.5)
RBC #/AREA URNS HPF: ABNORMAL /HPF (ref 0–5)
RBC MORPH BLD: ABNORMAL
SODIUM SERPL-SCNC: 137 MMOL/L (ref 136–145)
SP GR UR REFRACTOMETRY: 1.02 (ref 1–1.03)
SPECIMEN EXP DATE BLD: NORMAL
UROBILINOGEN UR QL STRIP.AUTO: 1 EU/DL (ref 0.2–1)
VENTRICULAR RATE, ECG03: 71 BPM
WBC # BLD AUTO: 15.9 K/UL (ref 4.6–13.2)
WBC # BLD AUTO: 24.6 K/UL (ref 4.6–13.2)
WBC URNS QL MICRO: ABNORMAL /HPF (ref 0–4)

## 2018-04-20 PROCEDURE — 77030002996 HC SUT SLK J&J -A: Performed by: SURGERY

## 2018-04-20 PROCEDURE — 77030011640 HC PAD GRND REM COVD -A: Performed by: SURGERY

## 2018-04-20 PROCEDURE — 74011250636 HC RX REV CODE- 250/636: Performed by: ANESTHESIOLOGY

## 2018-04-20 PROCEDURE — 77030018706 HC CORD MPLR COVD -A: Performed by: SURGERY

## 2018-04-20 PROCEDURE — 77030020255 HC SOL INJ LR 1000ML BG: Performed by: SURGERY

## 2018-04-20 PROCEDURE — 77030031139 HC SUT VCRL2 J&J -A: Performed by: SURGERY

## 2018-04-20 PROCEDURE — 83605 ASSAY OF LACTIC ACID: CPT

## 2018-04-20 PROCEDURE — 74011250636 HC RX REV CODE- 250/636

## 2018-04-20 PROCEDURE — 77030002966 HC SUT PDS J&J -A: Performed by: SURGERY

## 2018-04-20 PROCEDURE — 87040 BLOOD CULTURE FOR BACTERIA: CPT | Performed by: EMERGENCY MEDICINE

## 2018-04-20 PROCEDURE — 77030011810 HC STPLR ENDOSC J&J -G: Performed by: SURGERY

## 2018-04-20 PROCEDURE — 77030037892: Performed by: SURGERY

## 2018-04-20 PROCEDURE — 85025 COMPLETE CBC W/AUTO DIFF WBC: CPT | Performed by: STUDENT IN AN ORGANIZED HEALTH CARE EDUCATION/TRAINING PROGRAM

## 2018-04-20 PROCEDURE — 87070 CULTURE OTHR SPECIMN AEROBIC: CPT | Performed by: FAMILY MEDICINE

## 2018-04-20 PROCEDURE — 74011000250 HC RX REV CODE- 250: Performed by: ANESTHESIOLOGY

## 2018-04-20 PROCEDURE — 87077 CULTURE AEROBIC IDENTIFY: CPT | Performed by: FAMILY MEDICINE

## 2018-04-20 PROCEDURE — 77030034154 HC SHR COAG HARM ACE J&J -F: Performed by: SURGERY

## 2018-04-20 PROCEDURE — 74011000250 HC RX REV CODE- 250: Performed by: FAMILY MEDICINE

## 2018-04-20 PROCEDURE — 74011250636 HC RX REV CODE- 250/636: Performed by: EMERGENCY MEDICINE

## 2018-04-20 PROCEDURE — 77030034849

## 2018-04-20 PROCEDURE — 65660000004 HC RM CVT STEPDOWN

## 2018-04-20 PROCEDURE — 80053 COMPREHEN METABOLIC PANEL: CPT | Performed by: STUDENT IN AN ORGANIZED HEALTH CARE EDUCATION/TRAINING PROGRAM

## 2018-04-20 PROCEDURE — 77030022474 HC RELD STPLR ENDO GIA COVD -C: Performed by: SURGERY

## 2018-04-20 PROCEDURE — 36415 COLL VENOUS BLD VENIPUNCTURE: CPT | Performed by: STUDENT IN AN ORGANIZED HEALTH CARE EDUCATION/TRAINING PROGRAM

## 2018-04-20 PROCEDURE — 88302 TISSUE EXAM BY PATHOLOGIST: CPT | Performed by: SURGERY

## 2018-04-20 PROCEDURE — 77030003029 HC SUT VCRL J&J -B: Performed by: SURGERY

## 2018-04-20 PROCEDURE — 77030032490 HC SLV COMPR SCD KNE COVD -B: Performed by: SURGERY

## 2018-04-20 PROCEDURE — 77030018836 HC SOL IRR NACL ICUM -A: Performed by: SURGERY

## 2018-04-20 PROCEDURE — 87076 CULTURE ANAEROBE IDENT EACH: CPT | Performed by: SURGERY

## 2018-04-20 PROCEDURE — 74011000250 HC RX REV CODE- 250

## 2018-04-20 PROCEDURE — 81001 URINALYSIS AUTO W/SCOPE: CPT | Performed by: EMERGENCY MEDICINE

## 2018-04-20 PROCEDURE — 76210000017 HC OR PH I REC 1.5 TO 2 HR: Performed by: SURGERY

## 2018-04-20 PROCEDURE — 77030035045 HC TRCR ENDOSC VRSPRT BLDLSS COVD -B: Performed by: SURGERY

## 2018-04-20 PROCEDURE — 76010000131 HC OR TIME 2 TO 2.5 HR: Performed by: SURGERY

## 2018-04-20 PROCEDURE — 76060000035 HC ANESTHESIA 2 TO 2.5 HR: Performed by: SURGERY

## 2018-04-20 PROCEDURE — 77030008462 HC STPLR SKN PROX J&J -A: Performed by: SURGERY

## 2018-04-20 PROCEDURE — 77030009411 HC ELECTRD PRB J&J -C: Performed by: SURGERY

## 2018-04-20 PROCEDURE — 74011000250 HC RX REV CODE- 250: Performed by: EMERGENCY MEDICINE

## 2018-04-20 PROCEDURE — 77030008683 HC TU ET CUF COVD -A: Performed by: NURSE ANESTHETIST, CERTIFIED REGISTERED

## 2018-04-20 PROCEDURE — 77030008771 HC TU NG SALEM SUMP -A

## 2018-04-20 PROCEDURE — 74011250636 HC RX REV CODE- 250/636: Performed by: SURGERY

## 2018-04-20 PROCEDURE — 86900 BLOOD TYPING SEROLOGIC ABO: CPT | Performed by: STUDENT IN AN ORGANIZED HEALTH CARE EDUCATION/TRAINING PROGRAM

## 2018-04-20 PROCEDURE — 77030011943

## 2018-04-20 PROCEDURE — 77030034850: Performed by: SURGERY

## 2018-04-20 PROCEDURE — 77030009932 HC PRB FT CTRL J&J -B: Performed by: SURGERY

## 2018-04-20 PROCEDURE — 96374 THER/PROPH/DIAG INJ IV PUSH: CPT

## 2018-04-20 PROCEDURE — 87086 URINE CULTURE/COLONY COUNT: CPT | Performed by: EMERGENCY MEDICINE

## 2018-04-20 PROCEDURE — 74011250636 HC RX REV CODE- 250/636: Performed by: FAMILY MEDICINE

## 2018-04-20 PROCEDURE — 87075 CULTR BACTERIA EXCEPT BLOOD: CPT | Performed by: SURGERY

## 2018-04-20 PROCEDURE — 0DTJ0ZZ RESECTION OF APPENDIX, OPEN APPROACH: ICD-10-PCS | Performed by: SURGERY

## 2018-04-20 PROCEDURE — 77030018004 HC RELD STPLR ENDOSC1 J&J -C: Performed by: SURGERY

## 2018-04-20 PROCEDURE — 77030003028 HC SUT VCRL J&J -A: Performed by: SURGERY

## 2018-04-20 PROCEDURE — 77030013567 HC DRN WND RESERV BARD -A: Performed by: SURGERY

## 2018-04-20 PROCEDURE — 77030012406 HC DRN WND PENRS BARD -A: Performed by: SURGERY

## 2018-04-20 PROCEDURE — 77030026438 HC STYL ET INTUB CARD -A: Performed by: NURSE ANESTHETIST, CERTIFIED REGISTERED

## 2018-04-20 PROCEDURE — 0W9G4ZZ DRAINAGE OF PERITONEAL CAVITY, PERCUTANEOUS ENDOSCOPIC APPROACH: ICD-10-PCS | Performed by: SURGERY

## 2018-04-20 PROCEDURE — 77030019605: Performed by: SURGERY

## 2018-04-20 PROCEDURE — 77030020782 HC GWN BAIR PAWS FLX 3M -B: Performed by: SURGERY

## 2018-04-20 PROCEDURE — 88304 TISSUE EXAM BY PATHOLOGIST: CPT | Performed by: SURGERY

## 2018-04-20 PROCEDURE — 74011250637 HC RX REV CODE- 250/637: Performed by: SURGERY

## 2018-04-20 PROCEDURE — 77030022473 HC HNDL ENDO GIA UNIV USDA -C: Performed by: SURGERY

## 2018-04-20 PROCEDURE — 77030011296 HC FCPS GRSP ENDOSC J&J -B: Performed by: SURGERY

## 2018-04-20 PROCEDURE — 74011000250 HC RX REV CODE- 250: Performed by: SURGERY

## 2018-04-20 PROCEDURE — 77030012407 HC DRN WND BARD -B: Performed by: SURGERY

## 2018-04-20 PROCEDURE — 96375 TX/PRO/DX INJ NEW DRUG ADDON: CPT

## 2018-04-20 PROCEDURE — 77030035048 HC TRCR ENDOSC OPTCL COVD -B: Performed by: SURGERY

## 2018-04-20 PROCEDURE — 87186 SC STD MICRODIL/AGAR DIL: CPT | Performed by: FAMILY MEDICINE

## 2018-04-20 RX ORDER — FENTANYL CITRATE 50 UG/ML
INJECTION, SOLUTION INTRAMUSCULAR; INTRAVENOUS AS NEEDED
Status: DISCONTINUED | OUTPATIENT
Start: 2018-04-20 | End: 2018-04-20 | Stop reason: HOSPADM

## 2018-04-20 RX ORDER — DIPHENHYDRAMINE HYDROCHLORIDE 50 MG/ML
12.5 INJECTION, SOLUTION INTRAMUSCULAR; INTRAVENOUS
Status: DISCONTINUED | OUTPATIENT
Start: 2018-04-20 | End: 2018-04-20 | Stop reason: HOSPADM

## 2018-04-20 RX ORDER — MORPHINE SULFATE 2 MG/ML
2 INJECTION, SOLUTION INTRAMUSCULAR; INTRAVENOUS
Status: DISCONTINUED | OUTPATIENT
Start: 2018-04-20 | End: 2018-04-20 | Stop reason: HOSPADM

## 2018-04-20 RX ORDER — ONDANSETRON 2 MG/ML
INJECTION INTRAMUSCULAR; INTRAVENOUS
Status: COMPLETED
Start: 2018-04-20 | End: 2018-04-20

## 2018-04-20 RX ORDER — LEVOFLOXACIN 5 MG/ML
750 INJECTION, SOLUTION INTRAVENOUS EVERY 24 HOURS
Status: DISCONTINUED | OUTPATIENT
Start: 2018-04-20 | End: 2018-04-20 | Stop reason: SDUPTHER

## 2018-04-20 RX ORDER — NEOSTIGMINE METHYLSULFATE 5 MG/5 ML
SYRINGE (ML) INTRAVENOUS AS NEEDED
Status: DISCONTINUED | OUTPATIENT
Start: 2018-04-20 | End: 2018-04-20 | Stop reason: HOSPADM

## 2018-04-20 RX ORDER — FENTANYL CITRATE 50 UG/ML
50 INJECTION, SOLUTION INTRAMUSCULAR; INTRAVENOUS
Status: COMPLETED | OUTPATIENT
Start: 2018-04-20 | End: 2018-04-20

## 2018-04-20 RX ORDER — FENTANYL CITRATE 50 UG/ML
INJECTION, SOLUTION INTRAMUSCULAR; INTRAVENOUS
Status: COMPLETED
Start: 2018-04-20 | End: 2018-04-20

## 2018-04-20 RX ORDER — NALOXONE HYDROCHLORIDE 0.4 MG/ML
0.1 INJECTION, SOLUTION INTRAMUSCULAR; INTRAVENOUS; SUBCUTANEOUS AS NEEDED
Status: DISCONTINUED | OUTPATIENT
Start: 2018-04-20 | End: 2018-04-20 | Stop reason: HOSPADM

## 2018-04-20 RX ORDER — MORPHINE SULFATE 2 MG/ML
1 INJECTION, SOLUTION INTRAMUSCULAR; INTRAVENOUS
Status: DISCONTINUED | OUTPATIENT
Start: 2018-04-20 | End: 2018-04-20 | Stop reason: SDUPTHER

## 2018-04-20 RX ORDER — SUCCINYLCHOLINE CHLORIDE 20 MG/ML
INJECTION INTRAMUSCULAR; INTRAVENOUS AS NEEDED
Status: DISCONTINUED | OUTPATIENT
Start: 2018-04-20 | End: 2018-04-20 | Stop reason: HOSPADM

## 2018-04-20 RX ORDER — SODIUM CHLORIDE 9 MG/ML
150 INJECTION, SOLUTION INTRAVENOUS CONTINUOUS
Status: DISCONTINUED | OUTPATIENT
Start: 2018-04-20 | End: 2018-04-20

## 2018-04-20 RX ORDER — ONDANSETRON 2 MG/ML
4 INJECTION INTRAMUSCULAR; INTRAVENOUS ONCE
Status: DISCONTINUED | OUTPATIENT
Start: 2018-04-20 | End: 2018-04-20 | Stop reason: HOSPADM

## 2018-04-20 RX ORDER — MORPHINE SULFATE 2 MG/ML
2 INJECTION, SOLUTION INTRAMUSCULAR; INTRAVENOUS
Status: DISCONTINUED | OUTPATIENT
Start: 2018-04-20 | End: 2018-04-21

## 2018-04-20 RX ORDER — NALOXONE HYDROCHLORIDE 0.4 MG/ML
0.2 INJECTION, SOLUTION INTRAMUSCULAR; INTRAVENOUS; SUBCUTANEOUS
Status: DISCONTINUED | OUTPATIENT
Start: 2018-04-20 | End: 2018-04-30 | Stop reason: HOSPADM

## 2018-04-20 RX ORDER — MORPHINE SULFATE 4 MG/ML
4 INJECTION INTRAVENOUS
Status: DISCONTINUED | OUTPATIENT
Start: 2018-04-20 | End: 2018-04-20 | Stop reason: HOSPADM

## 2018-04-20 RX ORDER — ACETAMINOPHEN 325 MG/1
650 TABLET ORAL
Status: DISCONTINUED | OUTPATIENT
Start: 2018-04-20 | End: 2018-04-30 | Stop reason: HOSPADM

## 2018-04-20 RX ORDER — LEVOFLOXACIN 5 MG/ML
750 INJECTION, SOLUTION INTRAVENOUS
Status: DISCONTINUED | OUTPATIENT
Start: 2018-04-22 | End: 2018-04-21

## 2018-04-20 RX ORDER — SODIUM CHLORIDE, SODIUM LACTATE, POTASSIUM CHLORIDE, CALCIUM CHLORIDE 600; 310; 30; 20 MG/100ML; MG/100ML; MG/100ML; MG/100ML
75 INJECTION, SOLUTION INTRAVENOUS CONTINUOUS
Status: DISCONTINUED | OUTPATIENT
Start: 2018-04-20 | End: 2018-04-30

## 2018-04-20 RX ORDER — ONDANSETRON 2 MG/ML
4 INJECTION INTRAMUSCULAR; INTRAVENOUS
Status: COMPLETED | OUTPATIENT
Start: 2018-04-20 | End: 2018-04-20

## 2018-04-20 RX ORDER — LEVOFLOXACIN 5 MG/ML
750 INJECTION, SOLUTION INTRAVENOUS EVERY 24 HOURS
Status: DISCONTINUED | OUTPATIENT
Start: 2018-04-20 | End: 2018-04-20

## 2018-04-20 RX ORDER — TAMSULOSIN HYDROCHLORIDE 0.4 MG/1
0.4 CAPSULE ORAL DAILY
Status: DISCONTINUED | OUTPATIENT
Start: 2018-04-21 | End: 2018-04-30 | Stop reason: HOSPADM

## 2018-04-20 RX ORDER — BUPIVACAINE HYDROCHLORIDE 2.5 MG/ML
INJECTION, SOLUTION EPIDURAL; INFILTRATION; INTRACAUDAL AS NEEDED
Status: DISCONTINUED | OUTPATIENT
Start: 2018-04-20 | End: 2018-04-20 | Stop reason: HOSPADM

## 2018-04-20 RX ORDER — ONDANSETRON 2 MG/ML
INJECTION INTRAMUSCULAR; INTRAVENOUS AS NEEDED
Status: DISCONTINUED | OUTPATIENT
Start: 2018-04-20 | End: 2018-04-20 | Stop reason: HOSPADM

## 2018-04-20 RX ORDER — ROCURONIUM BROMIDE 10 MG/ML
INJECTION, SOLUTION INTRAVENOUS AS NEEDED
Status: DISCONTINUED | OUTPATIENT
Start: 2018-04-20 | End: 2018-04-20 | Stop reason: HOSPADM

## 2018-04-20 RX ORDER — SODIUM CHLORIDE, SODIUM LACTATE, POTASSIUM CHLORIDE, CALCIUM CHLORIDE 600; 310; 30; 20 MG/100ML; MG/100ML; MG/100ML; MG/100ML
75 INJECTION, SOLUTION INTRAVENOUS CONTINUOUS
Status: DISCONTINUED | OUTPATIENT
Start: 2018-04-20 | End: 2018-04-20 | Stop reason: HOSPADM

## 2018-04-20 RX ORDER — EPHEDRINE SULFATE/0.9% NACL/PF 25 MG/5 ML
SYRINGE (ML) INTRAVENOUS AS NEEDED
Status: DISCONTINUED | OUTPATIENT
Start: 2018-04-20 | End: 2018-04-20 | Stop reason: HOSPADM

## 2018-04-20 RX ORDER — LIDOCAINE HYDROCHLORIDE 20 MG/ML
INJECTION, SOLUTION EPIDURAL; INFILTRATION; INTRACAUDAL; PERINEURAL AS NEEDED
Status: DISCONTINUED | OUTPATIENT
Start: 2018-04-20 | End: 2018-04-20 | Stop reason: HOSPADM

## 2018-04-20 RX ORDER — TAMSULOSIN HYDROCHLORIDE 0.4 MG/1
0.8 CAPSULE ORAL
Status: COMPLETED | OUTPATIENT
Start: 2018-04-20 | End: 2018-04-20

## 2018-04-20 RX ORDER — GLYCOPYRROLATE 0.2 MG/ML
INJECTION INTRAMUSCULAR; INTRAVENOUS AS NEEDED
Status: DISCONTINUED | OUTPATIENT
Start: 2018-04-20 | End: 2018-04-20 | Stop reason: HOSPADM

## 2018-04-20 RX ORDER — OXYCODONE AND ACETAMINOPHEN 5; 325 MG/1; MG/1
1 TABLET ORAL AS NEEDED
Status: DISCONTINUED | OUTPATIENT
Start: 2018-04-20 | End: 2018-04-20 | Stop reason: HOSPADM

## 2018-04-20 RX ORDER — MORPHINE SULFATE 10 MG/ML
INJECTION, SOLUTION INTRAMUSCULAR; INTRAVENOUS AS NEEDED
Status: DISCONTINUED | OUTPATIENT
Start: 2018-04-20 | End: 2018-04-20 | Stop reason: HOSPADM

## 2018-04-20 RX ORDER — PROPOFOL 10 MG/ML
INJECTION, EMULSION INTRAVENOUS AS NEEDED
Status: DISCONTINUED | OUTPATIENT
Start: 2018-04-20 | End: 2018-04-20 | Stop reason: HOSPADM

## 2018-04-20 RX ADMIN — MORPHINE SULFATE 4 MG: 10 INJECTION, SOLUTION INTRAMUSCULAR; INTRAVENOUS at 07:28

## 2018-04-20 RX ADMIN — CEFEPIME HYDROCHLORIDE 2 G: 2 INJECTION, POWDER, FOR SOLUTION INTRAVENOUS at 14:57

## 2018-04-20 RX ADMIN — SODIUM CHLORIDE 150 ML/HR: 900 INJECTION, SOLUTION INTRAVENOUS at 02:00

## 2018-04-20 RX ADMIN — ROCURONIUM BROMIDE 25 MG: 10 INJECTION, SOLUTION INTRAVENOUS at 06:49

## 2018-04-20 RX ADMIN — SODIUM CHLORIDE, SODIUM LACTATE, POTASSIUM CHLORIDE, AND CALCIUM CHLORIDE 75 ML/HR: 600; 310; 30; 20 INJECTION, SOLUTION INTRAVENOUS at 06:31

## 2018-04-20 RX ADMIN — ONDANSETRON 4 MG: 2 INJECTION INTRAMUSCULAR; INTRAVENOUS at 01:34

## 2018-04-20 RX ADMIN — MORPHINE SULFATE 4 MG: 10 INJECTION, SOLUTION INTRAMUSCULAR; INTRAVENOUS at 08:28

## 2018-04-20 RX ADMIN — Medication 2 MG: at 19:41

## 2018-04-20 RX ADMIN — SUCCINYLCHOLINE CHLORIDE 120 MG: 20 INJECTION INTRAMUSCULAR; INTRAVENOUS at 06:43

## 2018-04-20 RX ADMIN — ONDANSETRON 4 MG: 2 INJECTION INTRAMUSCULAR; INTRAVENOUS at 07:04

## 2018-04-20 RX ADMIN — CEFEPIME 2 G: 2 INJECTION, POWDER, FOR SOLUTION INTRAVENOUS at 01:30

## 2018-04-20 RX ADMIN — Medication 3 MG: at 08:23

## 2018-04-20 RX ADMIN — FENTANYL CITRATE 50 MCG: 50 INJECTION, SOLUTION INTRAMUSCULAR; INTRAVENOUS at 06:42

## 2018-04-20 RX ADMIN — Medication 2 MG: at 18:37

## 2018-04-20 RX ADMIN — FENTANYL CITRATE 50 MCG: 50 INJECTION, SOLUTION INTRAMUSCULAR; INTRAVENOUS at 06:51

## 2018-04-20 RX ADMIN — Medication 10 MG: at 07:47

## 2018-04-20 RX ADMIN — FAMOTIDINE 20 MG: 10 INJECTION INTRAVENOUS at 06:28

## 2018-04-20 RX ADMIN — FENTANYL CITRATE 50 MCG: 50 INJECTION INTRAMUSCULAR; INTRAVENOUS at 01:34

## 2018-04-20 RX ADMIN — Medication 2 MG: at 22:31

## 2018-04-20 RX ADMIN — Medication 2 MG: at 14:57

## 2018-04-20 RX ADMIN — TAMSULOSIN HYDROCHLORIDE 0.8 MG: 0.4 CAPSULE ORAL at 18:37

## 2018-04-20 RX ADMIN — ROCURONIUM BROMIDE 10 MG: 10 INJECTION, SOLUTION INTRAVENOUS at 07:03

## 2018-04-20 RX ADMIN — ROCURONIUM BROMIDE 20 MG: 10 INJECTION, SOLUTION INTRAVENOUS at 07:27

## 2018-04-20 RX ADMIN — MORPHINE SULFATE 2 MG: 10 INJECTION, SOLUTION INTRAMUSCULAR; INTRAVENOUS at 08:38

## 2018-04-20 RX ADMIN — ONDANSETRON 4 MG: 2 SOLUTION INTRAMUSCULAR; INTRAVENOUS at 01:34

## 2018-04-20 RX ADMIN — PROPOFOL 150 MG: 10 INJECTION, EMULSION INTRAVENOUS at 06:43

## 2018-04-20 RX ADMIN — FENTANYL CITRATE 50 MCG: 50 INJECTION, SOLUTION INTRAMUSCULAR; INTRAVENOUS at 01:34

## 2018-04-20 RX ADMIN — LIDOCAINE HYDROCHLORIDE 80 MG: 20 INJECTION, SOLUTION EPIDURAL; INFILTRATION; INTRACAUDAL; PERINEURAL at 06:42

## 2018-04-20 RX ADMIN — ROCURONIUM BROMIDE 5 MG: 10 INJECTION, SOLUTION INTRAVENOUS at 06:42

## 2018-04-20 RX ADMIN — LEVOFLOXACIN 750 MG: 5 INJECTION INTRAVENOUS at 01:30

## 2018-04-20 RX ADMIN — FENTANYL CITRATE 50 MCG: 50 INJECTION, SOLUTION INTRAMUSCULAR; INTRAVENOUS at 06:38

## 2018-04-20 RX ADMIN — SODIUM CHLORIDE: 900 INJECTION, SOLUTION INTRAVENOUS at 07:30

## 2018-04-20 RX ADMIN — GLYCOPYRROLATE 0.6 MG: 0.2 INJECTION INTRAMUSCULAR; INTRAVENOUS at 08:23

## 2018-04-20 NOTE — ED NOTES
TRANSFER - OUT REPORT:    Verbal report given to Dawit Summers RN (name) on Svetlana Lozano  being transferred to N (unit) for routine progression of care       Report consisted of patients Situation, Background, Assessment and   Recommendations(SBAR). Information from the following report(s) SBAR, ED Summary and MAR was reviewed with the receiving nurse. Lines:   Peripheral IV 04/20/18 Left Arm (Active)   Site Assessment Clean, dry, & intact 4/20/2018  1:46 AM   Phlebitis Assessment 0 4/20/2018  1:46 AM   Infiltration Assessment 0 4/20/2018  1:46 AM   Dressing Status Clean, dry, & intact 4/20/2018  1:46 AM       Peripheral IV 04/20/18 Left Wrist (Active)   Site Assessment Clean, dry, & intact 4/20/2018  2:10 AM   Phlebitis Assessment 0 4/20/2018  2:10 AM   Infiltration Assessment 0 4/20/2018  2:10 AM   Dressing Status Clean, dry, & intact 4/20/2018  2:10 AM        Opportunity for questions and clarification was provided.       Patient transported with:   Patient-specific medications from [facility]

## 2018-04-20 NOTE — PROGRESS NOTES
Pt hasn't voided urine in 12 hours. I discussed the patient with Dr. Ford Frederick. I attempted to place a coude catheter into the bladder but I could not pass the catheter thru the prostate. There was bleeding from the urethral meatus. I informed Dr. Ford Frederick of my difficulty passing the lew. He is on the way.

## 2018-04-20 NOTE — ED TRIAGE NOTES
Pt arrived via EMS, pt was contacted by physician about abnormal lab values, was unable to contact him so police were called for welfare check, pt was transported here, family at bedside, pt states he has had decreased appetite, RUQ abdominal pain and nausea for the last week, pt has dementia, hx of CVA, pt was diagnosed with UTI today at the doctors office and started antibiotics, cipro.  Pt rates pain as 4/10

## 2018-04-20 NOTE — PROGRESS NOTES
Post-procedure Progress Note  [facility]       Patient: Javier Head MRN: [id number]  [facility]: [id number]    YOB: 1945  Age: 67 y.o. Sex: male    DOA: 4/19/2018 LOS:  LOS: 0 days                    Subjective:     Acute events: Patient was lying comfortably in bed. Was complaining of abdominal pain. He was sleepy. Review of Systems   Respiratory: Negative for shortness of breath. Cardiovascular: Negative for chest pain. Objective:      Patient Vitals for the past 24 hrs:   Temp Pulse Resp BP SpO2   04/20/18 1539 98.9 °F (37.2 °C) 91 18 124/81 98 %   04/20/18 1044 98.1 °F (36.7 °C) 80 18 124/81 100 %   04/20/18 1002 - 75 13 122/77 100 %   04/20/18 0952 - 77 18 118/81 100 %   04/20/18 0943 - 79 22 115/67 100 %   04/20/18 0932 - 76 18 126/79 99 %   04/20/18 0848 97.6 °F (36.4 °C) 79 16 115/68 99 %   04/20/18 0632 98.8 °F (37.1 °C) 70 18 123/73 96 %   04/20/18 0356 98.9 °F (37.2 °C) 69 20 129/72 98 %   04/20/18 0323 97.5 °F (36.4 °C) 70 20 140/78 99 %   04/20/18 0230 - 68 18 118/58 96 %   04/20/18 0201 - 75 16 133/75 99 %   04/20/18 0123 - 76 27 - 98 %   04/20/18 0120 - - - 133/75 98 %   04/20/18 0110 - - - 129/84 99 %   04/20/18 0100 - - - 113/57 97 %   04/20/18 0050 - - - 145/72 98 %   04/20/18 0045 - - - - 99 %   04/20/18 0015 - 69 18 - 97 %   04/20/18 0000 - 72 17 132/78 98 %   04/19/18 2345 - 70 18 134/77 96 %   04/19/18 2330 - 72 18 140/79 97 %   04/19/18 2315 - 72 19 128/76 97 %   04/19/18 2300 - 74 17 132/72 99 %   04/19/18 2245 97.7 °F (36.5 °C) 75 20 126/70 98 %       Physical Exam:   General:  Alert and Responsive and in No acute distress. CV:  RRR, no rubs gallops or murmurs. RESP:  Unlabored breathing. Equal expansion bilaterally. ABD:  Tender to palpation. Neuro: axo3    Assessment/Plan     67 y. o. male with PMHCVA with residual dysphagia and left sided weakness, dysarthria, CKD 3, HTN, Vitamin D/B12 deficiency, urinary incontinence,BPH, now admitted with rupture appendicitis and intrabdominal abscess. Ruptured appendix with intrabdominal abscess: Patient underwent today laparoscopic appendectomy that got converted to open for acute Ruptured Appendix with abscess.  There were no complications with the procedure.    -Continue current management    Minal Pablo MD, PGY-1  Mahesh Salguero 10

## 2018-04-20 NOTE — ED PROVIDER NOTES
EMERGENCY DEPARTMENT HISTORY AND PHYSICAL EXAM    Date: 4/19/2018  Patient Name: Satish Valverde    History of Presenting Illness     Chief Complaint   Patient presents with    Abdominal Pain    Nausea         History Provided By: Patient and Patient's Sister    Chief Complaint: abnormal labs  Duration: 1 Days  Timing:  Acute  Location: NA  Quality: NA  Severity: N/A  Modifying Factors: first dose Cipro tonight  Associated Symptoms: fatigue, decreased appetite, nausea x 6-7d      Additional History (Context): Satish Valverde is a 67 y.o. male with hypertension, hyperlipidemia, obesity and renal insufficiency who presents with nearly a week of fatigue, poor appetite, and nausea. Denies vomiting, fever, dysuria, diarrhea. Has right sided abd pain as well. Saw his PCP today and had labs drawn. Colonoscopy @2 weeks ago. Tonight, police at door per request of PCP as physican unable to get a hold of pt and was brought to ED.       PCP: MAGUE Jarvis    Current Facility-Administered Medications   Medication Dose Route Frequency Provider Last Rate Last Dose    cefepime (MAXIPIME) 2 g in sterile water (preservative free) 10 mL IV syringe  2 g IntraVENous Q8H Dago Mention, PA        levoFLOXacin (LEVAQUIN) 750 mg in D5W IVPB  750 mg IntraVENous Q24H Dago Mention, PA        fentaNYL citrate (PF) injection 50 mcg  50 mcg IntraVENous NOW Dago Mention, PA        0.9% sodium chloride infusion  150 mL/hr IntraVENous CONTINUOUS Dago Mention, PA        ondansetron (ZOFRAN) injection 4 mg  4 mg IntraVENous NOW Dago Mention, PA        cefepime (MAXIPIME) 2 g in sterile water (preservative free) 10 mL IV syringe  2 g IntraVENous Q8H Dago Mention, PA        levoFLOXacin (LEVAQUIN) 750 mg in D5W IVPB  750 mg IntraVENous Q24H Dago Mention, PA        ondansetron (ZOFRAN) 4 mg/2 mL injection             fentaNYL citrate (PF) 50 mcg/mL injection              Current Outpatient Prescriptions   Medication Sig Dispense Refill    dicyclomine (BENTYL) 20 mg tablet Take 20 mg by mouth two (2) times a day.  cyanocobalamin (VITAMIN B-12) 1,000 mcg tablet Take 1,000 mcg by mouth daily.  furosemide (LASIX) 20 mg tablet Take  by mouth daily.  LACTASE PO Take 3,000 Units by mouth as needed.  clotrimazole (LOTRIMIN) 1 % topical cream Apply  to affected area two (2) times a day.  hydrocortisone (CORTAID) 1 % topical cream Apply  to affected area two (2) times a day. use thin layer      tolterodine ER (DETROL LA) 4 mg ER capsule Take 1 Cap by mouth daily. 30 Cap 6    aspirin 81 mg chewable tablet Take 1 Tab by mouth daily. 30 Tab 0    ezetimibe (ZETIA) 10 mg tablet Take 10 mg by mouth daily.  hydrALAZINE (APRESOLINE) 25 mg tablet Take 25 mg by mouth daily as needed.  docusate sodium (COLACE) 100 mg capsule Take 100 mg by mouth two (2) times a day.  labetalol (NORMODYNE) 200 mg tablet Take 1 Tab by mouth every twelve (12) hours. 30 Tab 0    losartan (COZAAR) 100 mg tablet Take 1 Tab by mouth daily. 15 Tab 0    amLODIPine (NORVASC) 10 mg tablet Take 1 Tab by mouth daily. 15 Tab 0    cholecalciferol (VITAMIN D3) 5,000 unit capsule Take 1 Cap by mouth daily.  15 Cap 0       Past History     Past Medical History:  Past Medical History:   Diagnosis Date    Acute ischemic stroke ([facility] [address] 9/1/2015    Acute Ischemic Stroke (early subacute infarct at the posterior right lentiform nucleus) with residual left hemiparesis and dysphagia    CKD (chronic kidney disease) stage 3, GFR 30-59 ml/min 2/8/7472    Diastolic dysfunction without heart failure 9/2/2015    Grade 1 diastolic dysfunction on 2D ECHO done 9/02/2015    Dyslipidemia 9/2/2015    History of noncompliance with medical treatment, presenting hazards to health 9/1/2015    History of tobacco use 9/1/2015    Hypertension     Hypertensive heart and kidney disease without heart failure and with chronic kidney disease stage III 9/2/2015    Obesity, Class I, BMI 30-34.9 9/1/2015    Rhabdomyolysis 9/1/2015    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 9/1/2015    Vitamin D deficiency 9/6/2015       Past Surgical History:  Past Surgical History:   Procedure Laterality Date    COLONOSCOPY N/A 4/3/2018    COLONOSCOPY,  w heated snared polypectomy performed by Lluvia Rowe MD at [facility] ENDOSCOPY       Family History:  Family History   Problem Relation Age of Onset    Diabetes Mother     Stroke Mother     Heart Disease Father     Hypertension Father     Diabetes Brother     Hypertension Brother        Social History:  Social History   Substance Use Topics    Smoking status: Current Every Day Smoker     Last attempt to quit: 12/1/2014    Smokeless tobacco: Never Used    Alcohol use Yes       Allergies: Allergies   Allergen Reactions    Lipitor [Atorvastatin] Other (comments)     Elevated CK level    Pt has no awareness of this allergy. Review of Systems   Review of Systems   Constitutional: Positive for appetite change, fatigue and unexpected weight change. Negative for chills, diaphoresis and fever. HENT: Negative for congestion, dental problem, drooling, ear discharge, ear pain, facial swelling, hearing loss, mouth sores, nosebleeds, postnasal drip, rhinorrhea, sinus pressure, sneezing, sore throat, tinnitus, trouble swallowing and voice change. Eyes: Negative. Respiratory: Negative. Negative for cough. Cardiovascular: Negative. Gastrointestinal: Positive for abdominal pain and nausea. Negative for constipation, diarrhea and vomiting. Endocrine: Negative. Genitourinary: Negative. Negative for dysuria. Musculoskeletal: Negative. Negative for neck pain and neck stiffness. Skin: Negative. Neurological: Negative. Hematological: Negative. Psychiatric/Behavioral: Negative.       All Other Systems Negative  Physical Exam     Vitals:    04/19/18 2245   BP: 126/70   Pulse: 75   Resp: 20   Temp: 97.7 °F (36.5 °C) SpO2: 98%   Weight: 99.8 kg (220 lb)     Physical Exam   Constitutional: Vital signs are normal. He appears well-developed and well-nourished. He is active. Non-toxic appearance. He does not appear ill. No distress. HENT:   Head: Normocephalic and atraumatic. Neck: Normal range of motion. Neck supple. Carotid bruit is not present. No tracheal deviation present. No thyromegaly present. Cardiovascular: Normal rate, regular rhythm and normal heart sounds. Exam reveals no gallop and no friction rub. No murmur heard. Pulmonary/Chest: Effort normal and breath sounds normal. No stridor. No respiratory distress. He has no wheezes. He has no rales. He exhibits no tenderness. Abdominal: Soft. He exhibits no distension and no mass. There is tenderness. There is no rebound, no guarding and no CVA tenderness. Right mid abd TTP, moderate   Musculoskeletal: Normal range of motion. Neurological: He is alert. Skin: Skin is warm, dry and intact. He is not diaphoretic. No pallor. Psychiatric: He has a normal mood and affect. His speech is normal and behavior is normal. Judgment and thought content normal.   Nursing note and vitals reviewed. Diagnostic Study Results     Labs -     Recent Results (from the past 12 hour(s))   CBC WITH AUTOMATED DIFF    Collection Time: 04/19/18 10:49 PM   Result Value Ref Range    WBC 24.6 (H) 4.6 - 13.2 K/uL    RBC 4.13 (L) 4.70 - 5.50 M/uL    HGB 11.4 (L) 13.0 - 16.0 g/dL    HCT 33.1 (L) 36.0 - 48.0 %    MCV 80.1 74.0 - 97.0 FL    MCH 27.6 24.0 - 34.0 PG    MCHC 34.4 31.0 - 37.0 g/dL    RDW 14.5 11.6 - 14.5 %    PLATELET 392 268 - 282 K/uL    MPV 10.2 9.2 - 11.8 FL    NEUTROPHILS 73 42 - 75 %    BAND NEUTROPHILS 2 0 - 5 %    LYMPHOCYTES 9 (L) 20 - 51 %    MONOCYTES 13 (H) 2 - 9 %    EOSINOPHILS 1 0 - 5 %    BASOPHILS 0 0 - 3 %    METAMYELOCYTES 1 (H) 0 %    MYELOCYTES 1 (H) 0 %    ABS. NEUTROPHILS 18.5 (H) 1.8 - 8.0 K/UL    ABS. LYMPHOCYTES 2.2 0.8 - 3.5 K/UL    ABS. MONOCYTES 3.2 (H) 0 - 1.0 K/UL    ABS. EOSINOPHILS 0.2 0.0 - 0.4 K/UL    ABS. BASOPHILS 0.0 0.0 - 0.06 K/UL    DF MANUAL      PLATELET COMMENTS ADEQUATE PLATELETS      RBC COMMENTS ANISOCYTOSIS  1+        RBC COMMENTS POLYCHROMASIA  1+       METABOLIC PANEL, COMPREHENSIVE    Collection Time: 04/19/18 10:49 PM   Result Value Ref Range    Sodium 135 (L) 136 - 145 mmol/L    Potassium 3.8 3.5 - 5.5 mmol/L    Chloride 103 100 - 108 mmol/L    CO2 21 21 - 32 mmol/L    Anion gap 11 3.0 - 18 mmol/L    Glucose 104 (H) 74 - 99 mg/dL    BUN 30 (H) 7.0 - 18 MG/DL    Creatinine 2.14 (H) 0.6 - 1.3 MG/DL    BUN/Creatinine ratio 14 12 - 20      GFR est AA 37 (L) >60 ml/min/1.73m2    GFR est non-AA 30 (L) >60 ml/min/1.73m2    Calcium 9.8 8.5 - 10.1 MG/DL    Bilirubin, total 0.8 0.2 - 1.0 MG/DL    ALT (SGPT) 91 (H) 16 - 61 U/L    AST (SGOT) 46 (H) 15 - 37 U/L    Alk. phosphatase 164 (H) 45 - 117 U/L    Protein, total 7.3 6.4 - 8.2 g/dL    Albumin 3.0 (L) 3.4 - 5.0 g/dL    Globulin 4.3 (H) 2.0 - 4.0 g/dL    A-G Ratio 0.7 (L) 0.8 - 1.7     LIPASE    Collection Time: 04/19/18 10:49 PM   Result Value Ref Range    Lipase 172 73 - 393 U/L   URINALYSIS W/ RFLX MICROSCOPIC    Collection Time: 04/20/18  1:02 AM   Result Value Ref Range    Color YELLOW      Appearance CLEAR      Specific gravity 1.016 1.005 - 1.030      pH (UA) 5.0 5.0 - 8.0      Protein 30 (A) NEG mg/dL    Glucose NEGATIVE  NEG mg/dL    Ketone NEGATIVE  NEG mg/dL    Bilirubin NEGATIVE  NEG      Blood SMALL (A) NEG      Urobilinogen 1.0 0.2 - 1.0 EU/dL    Nitrites NEGATIVE  NEG      Leukocyte Esterase NEGATIVE  NEG         Radiologic Studies -   CT ABD PELV WO CONT   Final Result        CT Results  (Last 48 hours)               04/20/18 0049  CT ABD PELV WO CONT Final result    Impression:  IMPRESSION:    1.   Air-fluid collection in the right mid abdomen at the expected location of   the appendix. This likely presents a ruptured appendix with fluid collection.        2. 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 3.  Prostatic hypertrophy. 4.  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 5.  Umbilical hernia with a portion of small bowel within it. These findings were discussed with Lillian Gaines at 0115 hours on 4/20/2018. Narrative:  EXAM: CT of the Abdomen and Pelvis without IV contrast       CLINICAL INDICATION: Right upper quadrant pain and nausea. Recent diagnosis   urinary tract infection. TECHNIQUE: CT of the abdomen and pelvis. Sagittal and coronal reformations       All CT scans at this facility are performed using dose optimization technique as   appropriate to a performed exam, to include automated exposure control,   adjustment of the mA and/or kV according to patient size (including appropriate   matching for site specific examination) or use of iterative reconstruction   technique. IV CONTRAST: None       ENTERIC CONTRAST: None       COMPARISON: None       FINDINGS:    Limitations: The evaluation of the solid organs is limited due to the lack of IV   contrast.       Lower Chest: Mild bibasilar atelectasis is noted. The heart and pericardium are   unremarkable. Peritoneum: No free air appreciated. No free fluid present. Adjacent the   ileocecal valve, there is a roughly 6 x 10 x 7 cm fluid and air collection which   may represent an abscess in the setting of a ruptured appendix. Multiple   adjacent lymph nodes are noted. The largest is 1.2 cm. Liver: No focal lesion appreciated. Biliary/Gallbladder: No intrahepatic or extrahepatic biliary ductal dilatation   appreciated. The gallbladder is unremarkable. No pericholecystic fluid or   inflammation. Spleen: Unremarkable. Pancreas: No focal lesion appreciated. The pancreatic duct is unremarkable. No   peripancreatic inflammation or adenopathy.        : The adrenal glands are mildly thickened but otherwise unremarkable. There   is a punctate nonobstructing stone in the right kidney. There is right-sided   perinephric fat stranding possibly related to the fluid collection. The right   kidney is malrotated. No hydroureteronephrosis seen. No acute pathology in the   bladder. The prostate is 3.3 x 4.1 x 4.6 cm. Calcifications are present within   it. GI: The stomach is unremarkable. The small bowel is without evidence of   obstruction. No small bowel wall thickening. The mesentery is without   inflammation or adenopathy. As mentioned previously, there is a likely ruptured   appendix. It is not seen. There is a fluid and air collection in the area of the   expected appendix. There is inflammation adjacent the ileocecal valve. The   descending and sigmoid colon has diverticula. These otherwise unremarkable. Aorta and retroperitoneum: The abdominal aorta is tortuous. No aneurysm of the   abdominal aorta. 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 The right is 2 cm. The left is 1.6 cm. No periaortic adenopathy seen. No fluid collections in the   retroperitoneum appreciated. Abdominal wall/Inguinal: Umbilical hernia is noted which has a portion of small   bowel within it. This is nonobstructive. Musculoskeletal: Multilevel degenerative disc disease in the lumbar spine is   noted. CXR Results  (Last 48 hours)    None            Medical Decision Making   I am the first provider for this patient. I reviewed the vital signs, available nursing notes, past medical history, past surgical history, family history and social history. Vital Signs-Reviewed the patient's vital signs. Records Reviewed: Nursing Notes, Old Medical Records, Previous Radiology Studies and Previous Laboratory Studies    Procedures:  Procedures    Provider Notes (Medical Decision Making): UTI; hydroureter; mass; colitis    Ruptured appendix w/large intra-abdominal abscess; UTI reactionary. Discussed w/Dereck. Kandi Alcantara here for another ED patient and went to examine patient after reviewing CT Scan. Agrees w/txmt plan of IV ABX choice, IVF. No prior abd surgeries. Pt's sister contacted per patient's request as she left earlier tonight. MED RECONCILIATION:  Current Facility-Administered Medications   Medication Dose Route Frequency    cefepime (MAXIPIME) 2 g in sterile water (preservative free) 10 mL IV syringe  2 g IntraVENous Q8H    levoFLOXacin (LEVAQUIN) 750 mg in D5W IVPB  750 mg IntraVENous Q24H    fentaNYL citrate (PF) injection 50 mcg  50 mcg IntraVENous NOW    0.9% sodium chloride infusion  150 mL/hr IntraVENous CONTINUOUS    ondansetron (ZOFRAN) injection 4 mg  4 mg IntraVENous NOW    cefepime (MAXIPIME) 2 g in sterile water (preservative free) 10 mL IV syringe  2 g IntraVENous Q8H    levoFLOXacin (LEVAQUIN) 750 mg in D5W IVPB  750 mg IntraVENous Q24H    ondansetron (ZOFRAN) 4 mg/2 mL injection        fentaNYL citrate (PF) 50 mcg/mL injection         Current Outpatient Prescriptions   Medication Sig    dicyclomine (BENTYL) 20 mg tablet Take 20 mg by mouth two (2) times a day.  cyanocobalamin (VITAMIN B-12) 1,000 mcg tablet Take 1,000 mcg by mouth daily.  furosemide (LASIX) 20 mg tablet Take  by mouth daily.  LACTASE PO Take 3,000 Units by mouth as needed.  clotrimazole (LOTRIMIN) 1 % topical cream Apply  to affected area two (2) times a day.  hydrocortisone (CORTAID) 1 % topical cream Apply  to affected area two (2) times a day. use thin layer    tolterodine ER (DETROL LA) 4 mg ER capsule Take 1 Cap by mouth daily.  aspirin 81 mg chewable tablet Take 1 Tab by mouth daily.  ezetimibe (ZETIA) 10 mg tablet Take 10 mg by mouth daily.  hydrALAZINE (APRESOLINE) 25 mg tablet Take 25 mg by mouth daily as needed.  docusate sodium (COLACE) 100 mg capsule Take 100 mg by mouth two (2) times a day.  labetalol (NORMODYNE) 200 mg tablet Take 1 Tab by mouth every twelve (12) hours.  losartan (COZAAR) 100 mg tablet Take 1 Tab by mouth daily.  amLODIPine (NORVASC) 10 mg tablet Take 1 Tab by mouth daily.  cholecalciferol (VITAMIN D3) 5,000 unit capsule Take 1 Cap by mouth daily. Disposition:  admit. Follow-up Information     None          Current Discharge Medication List            Core Measures:    Critical Care Time:   Critical Care Time:   I have spent 30 minutes of critical care time involved in lab review, consultations with specialist, family decision-making, and documentation. During this entire length of time I was immediately available to the patient.     Critical Care: The reason for providing this level of medical care for this critically ill patient was due a critical illness that impaired one or more vital organ systems such that there was a high probability of imminent or life threatening deterioration in the patients condition. This care involved high complexity decision making to assess, manipulate, and support vital system functions, to treat this degreee vital organ system failure and to prevent further life threatening deterioration of the patients condition. For Hospitalized Patients:    1. Hospitalization Decision Time:  The decision to hospitalize the patient was made by Dr. All Coeloh, and myself at 1:20a on 4/19/2018    2. Aspirin: Aspirin was not given because the patient did not present with a stroke at the time of their Emergency Department evaluation    Diagnosis     Clinical Impression:   1. Ruptured appendicitis    2. Intra-abdominal abscess ([address]    3.  Acute UTI

## 2018-04-20 NOTE — PROGRESS NOTES
1100 - Pt returned to unit from OR. .. Lethargic, sleepy, oriented to person. NG in placed on intermittent low suction. STEVEN drain intact with serosanguineous fluid. 1200 - Pt family at bedside, pt sleeping intermittently. Bed alarm on.  1415 - Pt pulled out NG tube. 1430 - Notified PFM. Also called and spoke with staff for Dr Lou [place] [facility] to call back. [address] [facility] [address] Lou [facility] Edge staff, M for him to call back with instructions re: NG tube. Pt complaining of abd pain - Morphine 2mg given. [address] Lou [facility] - aware of no NG tube and lack of urination. Per Dr Lou [facility], will notify urology. No new orders.

## 2018-04-20 NOTE — ANESTHESIA PREPROCEDURE EVALUATION
Anesthetic History   No history of anesthetic complications            Review of Systems / Medical History  Patient summary reviewed and pertinent labs reviewed    Pulmonary          Smoker         Neuro/Psych       CVA (Left hemiparesis)       Cardiovascular    Hypertension: well controlled              Exercise tolerance: <4 METS     GI/Hepatic/Renal         Renal disease: CRI       Endo/Other        Morbid obesity     Other Findings   Comments: Documentation of current medication  Current medications obtained, documented and obtained? YES      Risk Factors for Postoperative nausea/vomiting:       History of postoperative nausea/vomiting? NO       Female? NO       Motion sickness? NO       Intended opioid administration for postoperative analgesia? NO      Smoking Abstinence:  Current Smoker? YES  Elective Surgery? NO  Seen preoperatively by anesthesiologist or proxy prior to day of surgery? YES  Pt abstained from smoking 24 hours prior to anesthesia?  NO    Preventive care/screening for High Blood Pressure:  Aged 18 years and older: YES  Screened for high blood pressure: YES  Patients with high blood pressure referred to primary care provider   for BP management: YES               Physical Exam    Airway  Mallampati: III  TM Distance: 4 - 6 cm  Neck ROM: normal range of motion   Mouth opening: Normal     Cardiovascular  Regular rate and rhythm,  S1 and S2 normal,  no murmur, click, rub, or gallop             Dental  No notable dental hx       Pulmonary  Breath sounds clear to auscultation               Abdominal  GI exam deferred       Other Findings            Anesthetic Plan    ASA: 3, emergent            Induction: Intravenous  Anesthetic plan and risks discussed with: Patient

## 2018-04-20 NOTE — BRIEF OP NOTE
BRIEF OPERATIVE NOTE    Date of Procedure: 4/20/2018   Preoperative Diagnosis: acute Ruptured Appendix with abcess  Postoperative Diagnosis: acute Ruptured Appendix with abcess    Procedure(s):  APPENDECTOMY LAPAROSCOPIC CONVERTED TO OPEN  Surgeon(s) and Role:     * Claudia Orta MD - Primary     Surgical Staff:  Circ-1: Topher August RN  Circ-Relief: Arron Gooden RN  Scrub Tech-1: Jhonny Crisp English  Scrub Tech-Relief: Orlena Alias  Surg Asst-1: Glenetta Gables  Surg Asst-Relief: Dione Esequiel  Event Time In   Incision Start 7837   Incision Close 7849     Anesthesia: General   Estimated Blood Loss: 50 mL  Specimens:   ID Type Source Tests Collected by Time Destination   1 : appendix, hernia sac with contents, stool tone, abcess cavity Preservative Appendix  Claudia Orta MD 4/20/2018 8225 Pathology   1 : appendix abcess Body Fluid Wound CULTURE, ANAEROBIC, CULTURE, BODY FLUID, GRAM STAIN Claudia Orta MD 4/20/2018 3553 Microbiology      Findings: appendiceal abscess with fecalith   Complications: None  Implants: * No implants in log *   Job ID: [id number]

## 2018-04-20 NOTE — PERIOP NOTES
TRANSFER - OUT REPORT:    Verbal report given to Brendon RN(name) on [facility]  being transferred to CVT Stepdown(unit) for routine post - op       Report consisted of patients Situation, Background, Assessment and   Recommendations(SBAR). Information from the following report(s) SBAR, OR Summary, Procedure Summary, Intake/Output and MAR was reviewed with the receiving nurse. Lines:   Peripheral IV 04/20/18 Left Arm (Active)   Site Assessment Clean, dry, & intact 4/20/2018  9:00 AM   Phlebitis Assessment 0 4/20/2018  9:00 AM   Infiltration Assessment 0 4/20/2018  9:00 AM   Dressing Status Clean, dry, & intact 4/20/2018  9:00 AM   Dressing Type Transparent;Tape 4/20/2018  9:00 AM   Hub Color/Line Status Blue;Capped 4/20/2018  9:00 AM   Action Taken Open ports on tubing capped 4/20/2018  4:00 AM   Alcohol Cap Used Yes 4/20/2018  4:00 AM       Peripheral IV 04/20/18 Left Wrist (Active)   Site Assessment Clean, dry, & intact 4/20/2018  9:00 AM   Phlebitis Assessment 0 4/20/2018  9:00 AM   Infiltration Assessment 0 4/20/2018  9:00 AM   Dressing Status Clean, dry, & intact 4/20/2018  9:00 AM   Dressing Type Transparent;Tape 4/20/2018  9:00 AM   Hub Color/Line Status Pink; Infusing 4/20/2018  9:00 AM   Action Taken Open ports on tubing capped 4/20/2018  4:00 AM   Alcohol Cap Used Yes 4/20/2018  4:00 AM        Opportunity for questions and clarification was provided.       Patient transported with:   O2 @ 3 liters  Tech

## 2018-04-20 NOTE — CONSULTS
[facility]  General Surgery    Subjective:      HPI:  Pt is a very pleasant 76yo male with a PMHx of htn, dyslipidemia, hypertensive heart and kidney disease, acute ischemic stroke and tobacco abuse. I was asked to see him by the ED physician Dr. Néstor Hillman for acute appendicitis with abscess. The patient states that he began to feel ill 4 days ago. The pain has localized to the RLQ. The pain is sharp and intense.      Patient Active Problem List    Diagnosis Date Noted    Nausea 04/20/2018    Abdominal pain 04/20/2018    Intra-abdominal abscess ([address] 04/20/2018    Acute UTI 04/20/2018    Ruptured appendicitis 04/20/2018    Community acquired pneumonia 05/23/2016    Elevated LFTs 05/23/2016    Acute kidney injury superimposed on chronic kidney disease ([address] 05/23/2016    Acute metabolic encephalopathy 85/98/8287    Hx of essential hypertension 05/23/2016    Vitamin D deficiency 09/06/2015    Hypertensive heart and kidney disease without heart failure and with chronic kidney disease stage III 04/47/3035    Diastolic dysfunction without heart failure 09/02/2015    Dyslipidemia 09/02/2015    Hypertensive urgency 09/01/2015    Acute ischemic stroke ([address] 09/01/2015    CKD (chronic kidney disease) stage 3, GFR 30-59 ml/min 09/01/2015    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 09/01/2015    Bacterial conjunctivitis 09/01/2015    Rhabdomyolysis 09/01/2015    Obesity, Class I, BMI 30-34.9 09/01/2015    History of noncompliance with medical treatment, presenting hazards to health 09/01/2015    History of tobacco use 09/01/2015    Impaired mobility and ADLs 09/01/2015     Past Medical History:   Diagnosis Date    Acute ischemic stroke ([address] 9/1/2015    Acute Ischemic Stroke (early subacute infarct at the posterior right lentiform nucleus) with residual left hemiparesis and dysphagia    CKD (chronic kidney disease) stage 3, GFR 30-59 ml/min 1/0/7799    Diastolic dysfunction without heart failure 9/2/2015    Grade 1 diastolic dysfunction on 2D ECHO done 9/02/2015    Dyslipidemia 9/2/2015    History of noncompliance with medical treatment, presenting hazards to health 9/1/2015    History of tobacco use 9/1/2015    Hypertension     Hypertensive heart and kidney disease without heart failure and with chronic kidney disease stage III 9/2/2015    Obesity, Class I, BMI 30-34.9 9/1/2015    Rhabdomyolysis 9/1/2015    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 9/1/2015    Vitamin D deficiency 9/6/2015      Past Surgical History:   Procedure Laterality Date    COLONOSCOPY N/A 4/3/2018    COLONOSCOPY,  w heated snared polypectomy performed by Zari [facility] at [facility] ENDOSCOPY      Family History   Problem Relation Age of Onset    Diabetes Mother     Stroke Mother     Heart Disease Father     Hypertension Father     Diabetes Brother     Hypertension Brother       Social History   Substance Use Topics    Smoking status: Current Every Day Smoker     Last attempt to quit: 12/1/2014    Smokeless tobacco: Never Used    Alcohol use Yes      Allergies   Allergen Reactions    Lipitor [Atorvastatin] Other (comments)     Elevated CK level    Pt has no awareness of this allergy. Prior to Admission medications    Medication Sig Start Date End Date Taking? Authorizing Provider   dicyclomine (BENTYL) 20 mg tablet Take 20 mg by mouth two (2) times a day. Historical Provider   cyanocobalamin (VITAMIN B-12) 1,000 mcg tablet Take 1,000 mcg by mouth daily. Historical Provider   furosemide (LASIX) 20 mg tablet Take  by mouth daily. Historical Provider   LACTASE PO Take 3,000 Units by mouth as needed. Historical Provider   clotrimazole (LOTRIMIN) 1 % topical cream Apply  to affected area two (2) times a day. Historical Provider   hydrocortisone (CORTAID) 1 % topical cream Apply  to affected area two (2) times a day.  use thin layer    Historical Provider   tolterodine ER (DETROL LA) 4 mg ER capsule Take 1 Cap by mouth daily. 3/29/18   Eugenie Burks MD   aspirin 81 mg chewable tablet Take 1 Tab by mouth daily. 5/26/16   Emmanuel March MD   ezetimibe (ZETIA) 10 mg tablet Take 10 mg by mouth daily. Historical Provider   hydrALAZINE (APRESOLINE) 25 mg tablet Take 25 mg by mouth daily as needed. Historical Provider   docusate sodium (COLACE) 100 mg capsule Take 100 mg by mouth two (2) times a day. Historical Provider   labetalol (NORMODYNE) 200 mg tablet Take 1 Tab by mouth every twelve (12) hours. 9/22/15   Katja Berry MD   losartan (COZAAR) 100 mg tablet Take 1 Tab by mouth daily. 9/22/15   Katja Berry MD   amLODIPine (NORVASC) 10 mg tablet Take 1 Tab by mouth daily. 9/22/15   Katja Berry MD   cholecalciferol (VITAMIN D3) 5,000 unit capsule Take 1 Cap by mouth daily. 9/22/15   Katja Berry MD       Review of Systems:    14 systems were reviewed. The results are as above in the HPI and otherwise negative. Objective:     Vitals:    04/19/18 2245   BP: 126/70   Pulse: 75   Resp: 20   Temp: 97.7 °F (36.5 °C)   SpO2: 98%   Weight: 99.8 kg (220 lb)       Physical Exam:  GENERAL: alert, cooperative, no distress, appears stated age,   EYE: conjunctivae/corneas clear. PERRL, EOM's intact. THROAT & NECK: normal and no erythema or exudates noted. ,    LYMPHATIC: Cervical, supraclavicular, and axillary nodes normal. ,   LUNG: clear to auscultation bilaterally,   HEART: regular rate and rhythm, S1, S2 normal, no murmur, click, rub or gallop,   ABDOMEN: soft, obese, distended, tender diffusely but greatest in RLQ with localized peritonitis, large reducible umbilical hernia. Bowel sounds normal. No masses,  no organomegaly,   EXTREMITIES:  extremities normal, atraumatic, no cyanosis or edema,   SKIN: Normal.,   NEUROLOGIC: AOx3. Cranial nerves 2-12 and sensation grossly intact. ,     Data Review:    Recent Results (from the past 24 hour(s))   CBC WITH AUTOMATED DIFF    Collection Time: 04/19/18 10:49 PM   Result Value Ref Range    WBC 24.6 (H) 4.6 - 13.2 K/uL    RBC 4.13 (L) 4.70 - 5.50 M/uL    HGB 11.4 (L) 13.0 - 16.0 g/dL    HCT 33.1 (L) 36.0 - 48.0 %    MCV 80.1 74.0 - 97.0 FL    MCH 27.6 24.0 - 34.0 PG    MCHC 34.4 31.0 - 37.0 g/dL    RDW 14.5 11.6 - 14.5 %    PLATELET 390 369 - 242 K/uL    MPV 10.2 9.2 - 11.8 FL    NEUTROPHILS 73 42 - 75 %    BAND NEUTROPHILS 2 0 - 5 %    LYMPHOCYTES 9 (L) 20 - 51 %    MONOCYTES 13 (H) 2 - 9 %    EOSINOPHILS 1 0 - 5 %    BASOPHILS 0 0 - 3 %    METAMYELOCYTES 1 (H) 0 %    MYELOCYTES 1 (H) 0 %    ABS. NEUTROPHILS 18.5 (H) 1.8 - 8.0 K/UL    ABS. LYMPHOCYTES 2.2 0.8 - 3.5 K/UL    ABS. MONOCYTES 3.2 (H) 0 - 1.0 K/UL    ABS. EOSINOPHILS 0.2 0.0 - 0.4 K/UL    ABS. BASOPHILS 0.0 0.0 - 0.06 K/UL    DF MANUAL      PLATELET COMMENTS ADEQUATE PLATELETS      RBC COMMENTS ANISOCYTOSIS  1+        RBC COMMENTS POLYCHROMASIA  1+       METABOLIC PANEL, COMPREHENSIVE    Collection Time: 04/19/18 10:49 PM   Result Value Ref Range    Sodium 135 (L) 136 - 145 mmol/L    Potassium 3.8 3.5 - 5.5 mmol/L    Chloride 103 100 - 108 mmol/L    CO2 21 21 - 32 mmol/L    Anion gap 11 3.0 - 18 mmol/L    Glucose 104 (H) 74 - 99 mg/dL    BUN 30 (H) 7.0 - 18 MG/DL    Creatinine 2.14 (H) 0.6 - 1.3 MG/DL    BUN/Creatinine ratio 14 12 - 20      GFR est AA 37 (L) >60 ml/min/1.73m2    GFR est non-AA 30 (L) >60 ml/min/1.73m2    Calcium 9.8 8.5 - 10.1 MG/DL    Bilirubin, total 0.8 0.2 - 1.0 MG/DL    ALT (SGPT) 91 (H) 16 - 61 U/L    AST (SGOT) 46 (H) 15 - 37 U/L    Alk.  phosphatase 164 (H) 45 - 117 U/L    Protein, total 7.3 6.4 - 8.2 g/dL    Albumin 3.0 (L) 3.4 - 5.0 g/dL    Globulin 4.3 (H) 2.0 - 4.0 g/dL    A-G Ratio 0.7 (L) 0.8 - 1.7     LIPASE    Collection Time: 04/19/18 10:49 PM   Result Value Ref Range    Lipase 172 73 - 393 U/L   URINALYSIS W/ RFLX MICROSCOPIC    Collection Time: 04/20/18  1:02 AM   Result Value Ref Range    Color YELLOW      Appearance CLEAR      Specific gravity 1.016 1.005 - 1.030 pH (UA) 5.0 5.0 - 8.0      Protein 30 (A) NEG mg/dL    Glucose NEGATIVE  NEG mg/dL    Ketone NEGATIVE  NEG mg/dL    Bilirubin NEGATIVE  NEG      Blood SMALL (A) NEG      Urobilinogen 1.0 0.2 - 1.0 EU/dL    Nitrites NEGATIVE  NEG      Leukocyte Esterase NEGATIVE  NEG       I reviewed the CT abd/pel independently on the monitor. I agree with the finding of ruptured acute appendicitis with abscess. Impression:     · Pt with acute ruptured appendicitis with abscess.     Plan:     · IV cefepime and levaquin given in ED  · Laparoscopic appendectomy  · I spoke with the patient's sister Deisi Line      Signed By: Araceli Scott MD     April 20, 2018

## 2018-04-20 NOTE — PROGRESS NOTES
New PT order noted however after chart review it is noted that patient is currently having a surgical procedure. Patient will need a new order after surgery. Please re-order when appropriate.   Roxy Ryder, PT

## 2018-04-20 NOTE — ROUTINE PROCESS
0400-Verbal report from Allyssa Jaramillo on [address] Pt arrived to the unit, alert and oriented times 4, obvious pain upon rolling/during assessment. Abdomen, tender, distended, but soft. Vital signs stable. Cardiac monitoring placed on the patient, SR on the monitor. 0430- Paged PFM about there being nothing on MAR for pain control. Orders received, pt sleeping at the moment, will continue to monitor. 2810-CM RN called for report. Pt left the unit, stable, with OR tech Joss for surgery. Pt is off the unit at this time. 0700-Verbal report given to Russian Marbella, RN. Pt off the unit still at this time.

## 2018-04-20 NOTE — ANESTHESIA POSTPROCEDURE EVALUATION
Post-Anesthesia Evaluation and Assessment    Patient: Juan Lui MRN: [id number]  SSN: [id number]    YOB: 1945  Age: 67 y.o. Sex: male      Data from PACU flowsheet    Cardiovascular Function/Vital Signs  Visit Vitals    /81    Pulse 80    Temp 36.7 °C (98.1 °F)    Resp 18    Wt 97.9 kg (215 lb 14.4 oz)    SpO2 100%    BMI 27.72 kg/m2       Patient is status post general anesthesia for Procedure(s):  APPENDECTOMY LAPAROSCOPIC CONVERTED TO OPEN. Nausea/Vomiting: controlled    Postoperative hydration reviewed and adequate. Pain:  Pain Scale 1: Numeric (0 - 10) (04/20/18 0848)  Pain Intensity 1: 0 (04/20/18 0848)   Managed      Mental Status and Level of Consciousness: Alert and oriented     Pulmonary Status:   O2 Device: Nasal cannula (04/20/18 0912)   Adequate oxygenation and airway patent    Complications related to anesthesia: None    Post-anesthesia assessment completed.  No concerns    Signed By: Merlyn Schneider MD     April 20, 2018

## 2018-04-20 NOTE — H&P
Admission History and Physical  [facility]      Patient: Phan Vides MRN: [id number]  [facility]: [id number]    YOB: 1945  Age: 67 y.o. Sex: male      DOA: 4/19/2018       HPI:     Phan Vides is a 67 y.o. male with PMH CVA, HTN, HLD, CKD III, chronic diarrhea, BPH/OAB, now presenting with complaint of abdominal pain. History collected per patient and caregiver (sister). History limited as patient has aphasia following CVA (?dementia) and as patient had received fentanyl immediately prior to examination. Patient reports that he has not been feeling well for the last 2 weeks or so. He has been feeling weak with decreased appetite and stomach pain. Sister reports that patient has had some 'dry heaving' spells as well as chronic diarrhea and incontinence following the stroke. He had a colonoscopy performed 8-9 days ago at [facility]. He was seen at [facility] today with noted RUQ tenderness to palpation, he was sent home with ciprofloxacin d/t concern for UTI with abnormal UA. PCP attempted to call the patient and sister regarding increased Cr and leukocytosis resulted on in-office labs, but was not able to reach either and reached out to the police, who went to the patients home and called EMS. Patient reportedly was awake, alert and appropriate at police arrival.     ED Course: CT abd/pelvis indicative of large abscess in the appendix area. Leukocytosis, elevated creatinine and transaminitis on labs. Surgery (Dr. Priscila Alston) consulted, recommending laparoscopic appendectomy and treatment with cefepime and levaquin (started in the ED)    Note ROS limited due to aphasia/dementia noted above   Review of Systems   Constitutional: Positive for malaise/fatigue. HENT:        Dry mouth   Respiratory: Negative for shortness of breath. Cardiovascular: Negative for chest pain. Gastrointestinal: Positive for abdominal pain.  Negative for blood in stool, constipation, diarrhea and nausea. Dry heaving   Genitourinary: Negative for dysuria. Neurological: Negative for dizziness. Past Medical History:   Diagnosis Date    Acute ischemic stroke ([address] 9/1/2015    Acute Ischemic Stroke (early subacute infarct at the posterior right lentiform nucleus) with residual left hemiparesis and dysphagia    CKD (chronic kidney disease) stage 3, GFR 30-59 ml/min 3/4/9859    Diastolic dysfunction without heart failure 9/2/2015    Grade 1 diastolic dysfunction on 2D ECHO done 9/02/2015    Dyslipidemia 9/2/2015    History of noncompliance with medical treatment, presenting hazards to health 9/1/2015    History of tobacco use 9/1/2015    Hypertension     Hypertensive heart and kidney disease without heart failure and with chronic kidney disease stage III 9/2/2015    Obesity, Class I, BMI 30-34.9 9/1/2015    Rhabdomyolysis 9/1/2015    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 9/1/2015    Vitamin D deficiency 9/6/2015       Past Surgical History:   Procedure Laterality Date    COLONOSCOPY N/A 4/3/2018    COLONOSCOPY,  w heated snared polypectomy performed by Tiffany Barger MD at [facility] ENDOSCOPY       Family History   Problem Relation Age of Onset    Diabetes Mother     Stroke Mother     Heart Disease Father     Hypertension Father     Diabetes Brother     Hypertension Brother        Social History     Social History    Marital status: SINGLE     Spouse name: N/A    Number of children: N/A    Years of education: N/A     Social History Main Topics    Smoking status: Current Every Day Smoker     Last attempt to quit: 12/1/2014    Smokeless tobacco: Never Used    Alcohol use Yes    Drug use: No    Sexual activity: No     Other Topics Concern    None     Social History Narrative       Allergies   Allergen Reactions    Lipitor [Atorvastatin] Other (comments)     Elevated CK level    Pt has no awareness of this allergy.        Prior to Admission Medications   Prescriptions Last Dose Informant Patient Reported? Taking? LACTASE PO   Yes No   Sig: Take 3,000 Units by mouth as needed. amLODIPine (NORVASC) 10 mg tablet   No No   Sig: Take 1 Tab by mouth daily. aspirin 81 mg chewable tablet   No No   Sig: Take 1 Tab by mouth daily. cholecalciferol (VITAMIN D3) 5,000 unit capsule   No No   Sig: Take 1 Cap by mouth daily. clotrimazole (LOTRIMIN) 1 % topical cream   Yes No   Sig: Apply  to affected area two (2) times a day. cyanocobalamin (VITAMIN B-12) 1,000 mcg tablet   Yes No   Sig: Take 1,000 mcg by mouth daily. dicyclomine (BENTYL) 20 mg tablet   Yes No   Sig: Take 20 mg by mouth two (2) times a day. docusate sodium (COLACE) 100 mg capsule   Yes No   Sig: Take 100 mg by mouth two (2) times a day.   ezetimibe (ZETIA) 10 mg tablet   Yes No   Sig: Take 10 mg by mouth daily. furosemide (LASIX) 20 mg tablet   Yes No   Sig: Take  by mouth daily. hydrALAZINE (APRESOLINE) 25 mg tablet   Yes No   Sig: Take 25 mg by mouth daily as needed. hydrocortisone (CORTAID) 1 % topical cream   Yes No   Sig: Apply  to affected area two (2) times a day. use thin layer   labetalol (NORMODYNE) 200 mg tablet   No No   Sig: Take 1 Tab by mouth every twelve (12) hours. losartan (COZAAR) 100 mg tablet   No No   Sig: Take 1 Tab by mouth daily. tolterodine ER (DETROL LA) 4 mg ER capsule   No No   Sig: Take 1 Cap by mouth daily. Facility-Administered Medications: None       Physical Exam:     Patient Vitals for the past 24 hrs:   Temp Pulse Resp BP SpO2   04/19/18 2245 97.7 °F (36.5 °C) 75 20 126/70 98 %       Physical Exam   Constitutional: He appears well-developed and well-nourished. No distress. HENT:   Head: Normocephalic and atraumatic. Mouth/Throat: Oropharynx is clear and moist.   Eyes: Conjunctivae are normal. Right eye exhibits no discharge. Left eye exhibits no discharge. No scleral icterus. Neck: No JVD present. No tracheal deviation present. No thyromegaly present. Cardiovascular: Normal rate and regular rhythm. Exam reveals no gallop and no friction rub. No murmur heard. Pulmonary/Chest: Effort normal. No stridor. No respiratory distress. He has no wheezes. He has no rales. Abdominal: Soft. Bowel sounds are normal. He exhibits distension. There is no rebound. TTP throughout  Umbilical hernia, not reducible d/t pain. No erythema/induration at site of umbilical hernia. Musculoskeletal: He exhibits no edema. Neurological: He is alert. No cranial nerve deficit. Oriented x 2 (person, place, year reported as \"20,000\")    5/5 strength UE B/L   3/5 strength + LE B/L (unable to follow commands to resist examiner, but able to lift both legs off of bed)   Skin: Skin is warm and dry. No rash noted. He is not diaphoretic. No erythema. No pallor. Psychiatric: He has a normal mood and affect. His behavior is normal. Judgment and thought content normal.       IMAGING:     CT abd/pelvis  IMPRESSION:   1.   Air-fluid collection in the right mid abdomen at the expected location of  the appendix. This likely presents a ruptured appendix with fluid collection.     2.  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     3.  Prostatic hypertrophy.     4. 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     5. Umbilical hernia with a portion of small bowel within it.     These findings were discussed with Tracy Juarez at 0115 hours on 4/20/2018.      Recent Results (from the past 12 hour(s))   CBC WITH AUTOMATED DIFF    Collection Time: 04/19/18 10:49 PM   Result Value Ref Range    WBC 24.6 (H) 4.6 - 13.2 K/uL    RBC 4.13 (L) 4.70 - 5.50 M/uL    HGB 11.4 (L) 13.0 - 16.0 g/dL    HCT 33.1 (L) 36.0 - 48.0 %    MCV 80.1 74.0 - 97.0 FL    MCH 27.6 24.0 - 34.0 PG    MCHC 34.4 31.0 - 37.0 g/dL    RDW 14.5 11.6 - 14.5 %    PLATELET 466 345 - 345 K/uL    MPV 10.2 9.2 - 11.8 FL    NEUTROPHILS 73 42 - 75 %    BAND NEUTROPHILS 2 0 - 5 %    LYMPHOCYTES 9 (L) 20 - 51 % MONOCYTES 13 (H) 2 - 9 %    EOSINOPHILS 1 0 - 5 %    BASOPHILS 0 0 - 3 %    METAMYELOCYTES 1 (H) 0 %    MYELOCYTES 1 (H) 0 %    ABS. NEUTROPHILS 18.5 (H) 1.8 - 8.0 K/UL    ABS. LYMPHOCYTES 2.2 0.8 - 3.5 K/UL    ABS. MONOCYTES 3.2 (H) 0 - 1.0 K/UL    ABS. EOSINOPHILS 0.2 0.0 - 0.4 K/UL    ABS. BASOPHILS 0.0 0.0 - 0.06 K/UL    DF MANUAL      PLATELET COMMENTS ADEQUATE PLATELETS      RBC COMMENTS ANISOCYTOSIS  1+        RBC COMMENTS POLYCHROMASIA  1+       METABOLIC PANEL, COMPREHENSIVE    Collection Time: 04/19/18 10:49 PM   Result Value Ref Range    Sodium 135 (L) 136 - 145 mmol/L    Potassium 3.8 3.5 - 5.5 mmol/L    Chloride 103 100 - 108 mmol/L    CO2 21 21 - 32 mmol/L    Anion gap 11 3.0 - 18 mmol/L    Glucose 104 (H) 74 - 99 mg/dL    BUN 30 (H) 7.0 - 18 MG/DL    Creatinine 2.14 (H) 0.6 - 1.3 MG/DL    BUN/Creatinine ratio 14 12 - 20      GFR est AA 37 (L) >60 ml/min/1.73m2    GFR est non-AA 30 (L) >60 ml/min/1.73m2    Calcium 9.8 8.5 - 10.1 MG/DL    Bilirubin, total 0.8 0.2 - 1.0 MG/DL    ALT (SGPT) 91 (H) 16 - 61 U/L    AST (SGOT) 46 (H) 15 - 37 U/L    Alk.  phosphatase 164 (H) 45 - 117 U/L    Protein, total 7.3 6.4 - 8.2 g/dL    Albumin 3.0 (L) 3.4 - 5.0 g/dL    Globulin 4.3 (H) 2.0 - 4.0 g/dL    A-G Ratio 0.7 (L) 0.8 - 1.7     LIPASE    Collection Time: 04/19/18 10:49 PM   Result Value Ref Range    Lipase 172 73 - 393 U/L   URINALYSIS W/ RFLX MICROSCOPIC    Collection Time: 04/20/18  1:02 AM   Result Value Ref Range    Color YELLOW      Appearance CLEAR      Specific gravity 1.016 1.005 - 1.030      pH (UA) 5.0 5.0 - 8.0      Protein 30 (A) NEG mg/dL    Glucose NEGATIVE  NEG mg/dL    Ketone NEGATIVE  NEG mg/dL    Bilirubin NEGATIVE  NEG      Blood SMALL (A) NEG      Urobilinogen 1.0 0.2 - 1.0 EU/dL    Nitrites NEGATIVE  NEG      Leukocyte Esterase NEGATIVE  NEG           Assessment/Plan:   67 y.o. male with PMH significant for CVA, HTN, HLD, CKD III, chronic diarrhea, BPH/OAB now admitted with ruptured appendix with abscess formation. Ruptured appendix with abscess formation CT abdomen pelvis indicative of right mid abdomen 6cm x 10cm x 7cm. H/o chronic diarrhea since August and recent colonoscopy about 8-9 days ago at [facility]. VSS in the ED. DARINEL and transaminitis below 2/2 end organ dysfunction due to this acute infection and/or dehydration. Lactic acid 1.0. Leukocytosis to 24.6 with 2 bands.   - Dr. Dusty Richardson consulted in the ED. Plan for laparoscopic appendectomy   - Continue Cefepime, levaquin  - Pharmacy to dose consult on above d/t CKD and DARINEL   - NS @ 150   - NPO for surgery   - Admit to stepdown   - Tele d/t risk of decompensation   - PT/OT/case management  - PT/INR, type and screen   - Daily CBC, CMP  - FU UC, BC     DARINEL on CKD III follows with Dr. Emily Hernandez as an outpatient. Baseline Cr ~1.5  - Cr 2.14 on admission  - Hydration as above  - Avoid nephrotoxic meds     Transaminitis likely 2/2 acute illness and dehydration. ALT 91, AST 46 alkaline phosphatase 164 on admission   - Daily CMP    H/o CVA, HTN, HLD, Vitamin D/B12 deficiency CVA in 2015 with residual mild L sided weakness and some aphasia   - Hold home amlodipine 10mg QD, labetalol 100mg BID and losartan 200mg BID   - Hold home lasix 20 mg every day  - Hold home ezetimibe 10mg QD   - Hold home vitamin B12  - Fall precautions, aspiration precautions     Recurrent cystitis/OAB/BPH holding home tolteridine, dicyclomine as an outpatient. Continue to hold.     - FU UC. UA with protein, small blood     Tobacco use current tobacco user  - Nicotine replacement should the patient request     Diet: NPO  DVT Prophylaxis: SCDs  Code Status: Full  Point of Contact: Lokesh Argueta [phone]    Disposition and anticipated LOS: 2 midnight     Ford Kern MD  PGY-1 [address] [facility]  04/20/18 1:33 AM          Senior Addendum to History and Physical  [address]        Patient: Phan Vides MRN: [id number]  CSN: [id number]    YOB: 1945  Age: 67 y.o. Sex: male       DOA: 4/19/2018      HPI:      Phan Vides is a 67 y.o. male with PMH CVA with residual dysphagia and left sided weakness, dysarthria, CKD 3, HTN, Vitamin D/B12 deficiency, urinary incontinence,BPH, now presenting with complaint of abdominal pain, loss of appetite and weakness.      Per patient and sister patient has been sick for approximately 1 week with abdominal pain and loss of appetite. His sister also reports loose stools and urinary incontinence. He recently discontinued dycyclomine and tolterodine. His sister reports he had a colonoscopy 1 week ago as well. He has had increasing confusion.      He was seen at the [facility] office today by his PCP for these complaints. He was noted to have RUQ TTP and an abnormal urinalysis. Labs were drawn and he was sent home with a prescription for ciprofloxacin. His PCP was called regarding lab results which showed elevated creatinine and leukocytosis. PCP attempted to contact patient and  him to come to the ED, but was unable to reach patient, so called the police. Police at patient's home called EMS for transport.      In the ED he was evaluated with a CBC which showed anemia and leukocytosis, a CMP which showed hyponatremia and elevated creatinine, as well as elevated liver enzymes, urinalysis which showed no nitrites or leukocyte esterase, and negative bacteria and CT of the abdomen which showed air fluid collection in the right mid abdomen at the expected location of the appendix, likely respresenting a ruptured appendix with abdominal fluid collection.      He was started on IVF and IV abx, surgery was consulted.      HPI, ROS, PMH, PSH, Family Hx, Social Hx, Home medications, and allergies as above in intern H&P. Which has been reviewed in full.  Additional comments to subjective history include:     Physical Exam:      Physical Exam:  General:  Alert and Responsive and in No acute distress. HEENT: Conjunctiva pink, sclera anicteric. PERRL. EOMI. Dry mucous membranes. Thyroid not enlarged. No cervical, supraclavicular, occipital or submandibular lymphadenopathy. No other gross abnormalities present. CV:  RRR, no murmurs. PMI not displaced. No visible pulsations or thrills. No carotid bruits. RESP:  Unlabored breathing. Lungs clear to auscultation. no wheeze, rales, or rhonchi. Equal expansion bilaterally. ABD: Mildly distended, but soft. Tender to palpation on the right upper and lower quadrant without or rebound. Umbilical hernia noted, also tender to palpation, unable to be reduced easily at bedside. MS:  No joint deformity or instability. No atrophy. Neuro:  4/5 strength right upper extremities and lower extremities. 3/5 strength on the left. CN II-XII intact. Sensation grossly intact. A+Ox2  Ext:  No edema. 2+ radial and dp pulses bilaterally. Skin:  No rashes, lesions, or ulcers. Good turgor.     Labs and imaging reviewed      Assessment/Plan:   67 y.o. male with PMHCVA with residual dysphagia and left sided weakness, dysarthria, CKD 3, HTN, Vitamin D/B12 deficiency, urinary incontinence,BPH, now admitted with rupture appendicitis and intrabdominal abscess.     1. Ruptured appendix with intrabdominal abscess - symptoms as noted above for 1 week. Surgery consulted. On IV abx and pain medications. 1/4 SIRS with leukocytosis but no tachycardia, tachypnea, fever, or hypotension. Patient awake and alert, though slightly confused. - Admit to stepdown  - Continue IV cefepime and levaquin which PA in the ED discussed with surgery  - NPO  - IVF with NS @ 150 ccs/hr  - Daily CBC, CMP  - Surgery consulted, appreciate recs  - PT/INR and Type and screen  - FU blood and urine cultures   - Fall and aspiration precautions  - PT/OT/CM      2. DARINEL on CKD 3 - creatinine elevated to 2.1 with baseline of 1.5 per review of records.  Per sister patient is to see outpatient nephrologist soon.    - Monitor with daily CMP as noted above  - Renally dose all medications   - Avoid nephrotoxic meds  - IV fluid hydration as noted above  - Strict I/Os     3. Elevated transaminases - has been noted on previous labs and admissions, likely 2/2 acute illness and dehydration  - Follow with daily CMP     Active Problems:    Nausea (4/20/2018)       Abdominal pain (4/20/2018)       Intra-abdominal abscess ([address] (4/20/2018)       Acute UTI (4/20/2018)       Ruptured appendicitis (4/20/2018)           For additional problem list, assessment, and plan see intern note     Alger Gottron, MD  4/20/2018, 1:50 AM

## 2018-04-20 NOTE — ROUTINE PROCESS
TRANSFER - IN REPORT:    Verbal report received from [address] [place] [address] (name) on Ervin Salinas  being received from [facility]) for routine progression of care      Report consisted of patients Situation, Background, Assessment and   Recommendations(SBAR). Information from the following report(s) SBAR, MAR and Recent Results was reviewed with the receiving nurse. Opportunity for questions and clarification was provided. Assessment completed upon patients arrival to unit and care assumed.

## 2018-04-21 LAB
ALBUMIN SERPL-MCNC: 2.1 G/DL (ref 3.4–5)
ALBUMIN/GLOB SERPL: 0.5 {RATIO} (ref 0.8–1.7)
ALP SERPL-CCNC: 106 U/L (ref 45–117)
ALT SERPL-CCNC: 55 U/L (ref 16–61)
ANION GAP SERPL CALC-SCNC: 10 MMOL/L (ref 3–18)
AST SERPL-CCNC: 32 U/L (ref 15–37)
BACTERIA SPEC CULT: NORMAL
BASOPHILS # BLD: 0 K/UL (ref 0–0.06)
BASOPHILS NFR BLD: 0 % (ref 0–3)
BILIRUB SERPL-MCNC: 1.4 MG/DL (ref 0.2–1)
BUN SERPL-MCNC: 28 MG/DL (ref 7–18)
BUN/CREAT SERPL: 13 (ref 12–20)
CALCIUM SERPL-MCNC: 9 MG/DL (ref 8.5–10.1)
CHLORIDE SERPL-SCNC: 109 MMOL/L (ref 100–108)
CO2 SERPL-SCNC: 19 MMOL/L (ref 21–32)
CREAT SERPL-MCNC: 2.12 MG/DL (ref 0.6–1.3)
DIFFERENTIAL METHOD BLD: ABNORMAL
EOSINOPHIL # BLD: 0 K/UL (ref 0–0.4)
EOSINOPHIL NFR BLD: 0 % (ref 0–5)
ERYTHROCYTE [DISTWIDTH] IN BLOOD BY AUTOMATED COUNT: 14.7 % (ref 11.6–14.5)
GLOBULIN SER CALC-MCNC: 4.1 G/DL (ref 2–4)
GLUCOSE SERPL-MCNC: 92 MG/DL (ref 74–99)
HCT VFR BLD AUTO: 32.3 % (ref 36–48)
HGB BLD-MCNC: 10.8 G/DL (ref 13–16)
LYMPHOCYTES # BLD: 2.4 K/UL (ref 0.8–3.5)
LYMPHOCYTES NFR BLD: 11 % (ref 20–51)
MCH RBC QN AUTO: 26.9 PG (ref 24–34)
MCHC RBC AUTO-ENTMCNC: 33.4 G/DL (ref 31–37)
MCV RBC AUTO: 80.5 FL (ref 74–97)
MONOCYTES # BLD: 1.3 K/UL (ref 0–1)
MONOCYTES NFR BLD: 6 % (ref 2–9)
NEUTS BAND NFR BLD MANUAL: 27 % (ref 0–5)
NEUTS SEG # BLD: 18.2 K/UL (ref 1.8–8)
NEUTS SEG NFR BLD: 56 % (ref 42–75)
PLATELET # BLD AUTO: 326 K/UL (ref 135–420)
PLATELET COMMENTS,PCOM: ABNORMAL
PMV BLD AUTO: 10 FL (ref 9.2–11.8)
POTASSIUM SERPL-SCNC: 4.3 MMOL/L (ref 3.5–5.5)
PROT SERPL-MCNC: 6.2 G/DL (ref 6.4–8.2)
RBC # BLD AUTO: 4.01 M/UL (ref 4.7–5.5)
RBC MORPH BLD: ABNORMAL
SERVICE CMNT-IMP: NORMAL
SODIUM SERPL-SCNC: 138 MMOL/L (ref 136–145)
WBC # BLD AUTO: 21.9 K/UL (ref 4.6–13.2)

## 2018-04-21 PROCEDURE — 74011250636 HC RX REV CODE- 250/636: Performed by: FAMILY MEDICINE

## 2018-04-21 PROCEDURE — 80053 COMPREHEN METABOLIC PANEL: CPT | Performed by: STUDENT IN AN ORGANIZED HEALTH CARE EDUCATION/TRAINING PROGRAM

## 2018-04-21 PROCEDURE — 85025 COMPLETE CBC W/AUTO DIFF WBC: CPT | Performed by: STUDENT IN AN ORGANIZED HEALTH CARE EDUCATION/TRAINING PROGRAM

## 2018-04-21 PROCEDURE — 36415 COLL VENOUS BLD VENIPUNCTURE: CPT | Performed by: STUDENT IN AN ORGANIZED HEALTH CARE EDUCATION/TRAINING PROGRAM

## 2018-04-21 PROCEDURE — 74011000258 HC RX REV CODE- 258: Performed by: SURGERY

## 2018-04-21 PROCEDURE — 74011000250 HC RX REV CODE- 250: Performed by: FAMILY MEDICINE

## 2018-04-21 PROCEDURE — 65660000004 HC RM CVT STEPDOWN

## 2018-04-21 PROCEDURE — 74011250636 HC RX REV CODE- 250/636: Performed by: SURGERY

## 2018-04-21 PROCEDURE — 74011250637 HC RX REV CODE- 250/637: Performed by: STUDENT IN AN ORGANIZED HEALTH CARE EDUCATION/TRAINING PROGRAM

## 2018-04-21 PROCEDURE — 74011250637 HC RX REV CODE- 250/637: Performed by: SURGERY

## 2018-04-21 RX ORDER — FLUCONAZOLE 2 MG/ML
800 INJECTION, SOLUTION INTRAVENOUS ONCE
Status: COMPLETED | OUTPATIENT
Start: 2018-04-21 | End: 2018-04-21

## 2018-04-21 RX ORDER — FLUCONAZOLE 2 MG/ML
200 INJECTION, SOLUTION INTRAVENOUS DAILY
Status: DISCONTINUED | OUTPATIENT
Start: 2018-04-22 | End: 2018-04-23

## 2018-04-21 RX ORDER — HYDROCODONE BITARTRATE AND ACETAMINOPHEN 5; 325 MG/1; MG/1
1 TABLET ORAL
Status: DISCONTINUED | OUTPATIENT
Start: 2018-04-21 | End: 2018-04-30 | Stop reason: HOSPADM

## 2018-04-21 RX ORDER — SODIUM CHLORIDE 9 MG/ML
500 INJECTION, SOLUTION INTRAVENOUS ONCE
Status: COMPLETED | OUTPATIENT
Start: 2018-04-21 | End: 2018-04-21

## 2018-04-21 RX ADMIN — SODIUM CHLORIDE 500 ML: 900 INJECTION, SOLUTION INTRAVENOUS at 15:00

## 2018-04-21 RX ADMIN — CEFEPIME HYDROCHLORIDE 2 G: 2 INJECTION, POWDER, FOR SOLUTION INTRAVENOUS at 02:52

## 2018-04-21 RX ADMIN — TAMSULOSIN HYDROCHLORIDE 0.4 MG: 0.4 CAPSULE ORAL at 08:46

## 2018-04-21 RX ADMIN — SODIUM CHLORIDE 2.25 G: 900 INJECTION, SOLUTION INTRAVENOUS at 22:38

## 2018-04-21 RX ADMIN — SODIUM CHLORIDE, SODIUM LACTATE, POTASSIUM CHLORIDE, AND CALCIUM CHLORIDE 125 ML/HR: 600; 310; 30; 20 INJECTION, SOLUTION INTRAVENOUS at 11:16

## 2018-04-21 RX ADMIN — CEFEPIME HYDROCHLORIDE 2 G: 2 INJECTION, POWDER, FOR SOLUTION INTRAVENOUS at 12:39

## 2018-04-21 RX ADMIN — Medication 2 MG: at 11:16

## 2018-04-21 RX ADMIN — Medication 2 MG: at 06:32

## 2018-04-21 RX ADMIN — FLUCONAZOLE 800 MG: 2 INJECTION, SOLUTION INTRAVENOUS at 15:23

## 2018-04-21 RX ADMIN — VANCOMYCIN HYDROCHLORIDE 2000 MG: 10 INJECTION, POWDER, LYOPHILIZED, FOR SOLUTION INTRAVENOUS at 17:29

## 2018-04-21 RX ADMIN — ACETAMINOPHEN 650 MG: 325 TABLET ORAL at 07:36

## 2018-04-21 RX ADMIN — SODIUM CHLORIDE, SODIUM LACTATE, POTASSIUM CHLORIDE, AND CALCIUM CHLORIDE 125 ML/HR: 600; 310; 30; 20 INJECTION, SOLUTION INTRAVENOUS at 22:39

## 2018-04-21 RX ADMIN — SODIUM CHLORIDE, SODIUM LACTATE, POTASSIUM CHLORIDE, AND CALCIUM CHLORIDE 125 ML/HR: 600; 310; 30; 20 INJECTION, SOLUTION INTRAVENOUS at 02:52

## 2018-04-21 RX ADMIN — HYDROCODONE BITARTRATE AND ACETAMINOPHEN 1 TABLET: 5; 325 TABLET ORAL at 15:20

## 2018-04-21 NOTE — CONSULTS
CYSTOSCOPY PROCEDURE NOTE    Patient Name: Cb Sparks Date of Procedure: 4/20/18      Preoperative Diagnosis: BPH, Urinary retention, Failed multiple attempts at placing lew, hematuria, s/p open Appendectomy today 4/20/18  Postoperative Diagnosis: Same  Procedure: Cystoscopy, lew placement    Surgeon: Balaji Macedo MD  Assistant: None    This procedure has been fully reviewed with the patient, and written informed consent has been obtained from [facility]. History/Imaging:   Cb Sparks is a 67 y.o. male with rupture appy with abscess s/p lap converted to open appy today. Unable to void for 12 hours and multiple attempts at placing lew was unsuccessful with inciting hematuria - lew trauma. He has h/o CKD Cr 1.5-17  Consult from Dr Bairon Martin for lew placement. Above results reviewed w/ patient. Past Medical History:   Diagnosis Date    Acute ischemic stroke ([facility] [address] 9/1/2015    Acute Ischemic Stroke (early subacute infarct at the posterior right lentiform nucleus) with residual left hemiparesis and dysphagia    CKD (chronic kidney disease) stage 3, GFR 30-59 ml/min 9/3/0429    Diastolic dysfunction without heart failure 9/2/2015    Grade 1 diastolic dysfunction on 2D ECHO done 9/02/2015    Dyslipidemia 9/2/2015    History of noncompliance with medical treatment, presenting hazards to health 9/1/2015    History of tobacco use 9/1/2015    Hypertension     Hypertensive heart and kidney disease without heart failure and with chronic kidney disease stage III 9/2/2015    Obesity, Class I, BMI 30-34.9 9/1/2015    Rhabdomyolysis 9/1/2015    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 9/1/2015    Vitamin D deficiency 9/6/2015       Past Surgical History:   Procedure Laterality Date    COLONOSCOPY N/A 4/3/2018    COLONOSCOPY,  w heated snared polypectomy performed by Irma Harris MD at [phone] [facility]     .   Allergies   Allergen Reactions    Lipitor [Atorvastatin] Other (comments)     Elevated CK level    Pt has no awareness of this allergy. Social History     Social History    Marital status: SINGLE     Spouse name: N/A    Number of children: N/A    Years of education: N/A     Social History Main Topics    Smoking status: Current Every Day Smoker     Last attempt to quit: 12/1/2014    Smokeless tobacco: Never Used    Alcohol use Yes    Drug use: No    Sexual activity: No     Other Topics Concern    None     Social History Narrative     Non contributory    Results for orders placed or performed during the hospital encounter of 04/19/18   CULTURE, BLOOD   Result Value Ref Range    Special Requests: NO SPECIAL REQUESTS      Culture result: NO GROWTH AFTER 5 HOURS     CULTURE, BLOOD   Result Value Ref Range    Special Requests: NO SPECIAL REQUESTS      Culture result: NO GROWTH AFTER 5 HOURS     CULTURE, SURGICAL WOUND W GRAM STAIN   Result Value Ref Range    Special Requests: ABSCESS      GRAM STAIN MODERATE WBC'S      GRAM STAIN FEW GRAM POSITIVE COCCI      GRAM STAIN MODERATE GRAM POSITIVE RODS      GRAM STAIN FEW GRAM NEGATIVE RODS      Culture result: PENDING    CBC WITH AUTOMATED DIFF   Result Value Ref Range    WBC 24.6 (H) 4.6 - 13.2 K/uL    RBC 4.13 (L) 4.70 - 5.50 M/uL    HGB 11.4 (L) 13.0 - 16.0 g/dL    HCT 33.1 (L) 36.0 - 48.0 %    MCV 80.1 74.0 - 97.0 FL    MCH 27.6 24.0 - 34.0 PG    MCHC 34.4 31.0 - 37.0 g/dL    RDW 14.5 11.6 - 14.5 %    PLATELET 165 757 - 078 K/uL    MPV 10.2 9.2 - 11.8 FL    NEUTROPHILS 73 42 - 75 %    BAND NEUTROPHILS 2 0 - 5 %    LYMPHOCYTES 9 (L) 20 - 51 %    MONOCYTES 13 (H) 2 - 9 %    EOSINOPHILS 1 0 - 5 %    BASOPHILS 0 0 - 3 %    METAMYELOCYTES 1 (H) 0 %    MYELOCYTES 1 (H) 0 %    ABS. NEUTROPHILS 18.5 (H) 1.8 - 8.0 K/UL    ABS. LYMPHOCYTES 2.2 0.8 - 3.5 K/UL    ABS. MONOCYTES 3.2 (H) 0 - 1.0 K/UL    ABS. EOSINOPHILS 0.2 0.0 - 0.4 K/UL    ABS.  BASOPHILS 0.0 0.0 - 0.06 K/UL    DF MANUAL      PLATELET COMMENTS ADEQUATE PLATELETS RBC COMMENTS ANISOCYTOSIS  1+        RBC COMMENTS POLYCHROMASIA  1+       METABOLIC PANEL, COMPREHENSIVE   Result Value Ref Range    Sodium 135 (L) 136 - 145 mmol/L    Potassium 3.8 3.5 - 5.5 mmol/L    Chloride 103 100 - 108 mmol/L    CO2 21 21 - 32 mmol/L    Anion gap 11 3.0 - 18 mmol/L    Glucose 104 (H) 74 - 99 mg/dL    BUN 30 (H) 7.0 - 18 MG/DL    Creatinine 2.14 (H) 0.6 - 1.3 MG/DL    BUN/Creatinine ratio 14 12 - 20      GFR est AA 37 (L) >60 ml/min/1.73m2    GFR est non-AA 30 (L) >60 ml/min/1.73m2    Calcium 9.8 8.5 - 10.1 MG/DL    Bilirubin, total 0.8 0.2 - 1.0 MG/DL    ALT (SGPT) 91 (H) 16 - 61 U/L    AST (SGOT) 46 (H) 15 - 37 U/L    Alk.  phosphatase 164 (H) 45 - 117 U/L    Protein, total 7.3 6.4 - 8.2 g/dL    Albumin 3.0 (L) 3.4 - 5.0 g/dL    Globulin 4.3 (H) 2.0 - 4.0 g/dL    A-G Ratio 0.7 (L) 0.8 - 1.7     LIPASE   Result Value Ref Range    Lipase 172 73 - 393 U/L   URINALYSIS W/ RFLX MICROSCOPIC   Result Value Ref Range    Color YELLOW      Appearance CLEAR      Specific gravity 1.016 1.005 - 1.030      pH (UA) 5.0 5.0 - 8.0      Protein 30 (A) NEG mg/dL    Glucose NEGATIVE  NEG mg/dL    Ketone NEGATIVE  NEG mg/dL    Bilirubin NEGATIVE  NEG      Blood SMALL (A) NEG      Urobilinogen 1.0 0.2 - 1.0 EU/dL    Nitrites NEGATIVE  NEG      Leukocyte Esterase NEGATIVE  NEG     URINE MICROSCOPIC ONLY   Result Value Ref Range    WBC 0 to 3 0 - 4 /hpf    RBC 0 to 3 0 - 5 /hpf    Epithelial cells 2+ 0 - 5 /lpf    Bacteria NEGATIVE  NEG /hpf    Mucus 1+ (A) NEG /lpf    Amorphous Crystals 1+ (A) NEG    Hyaline cast 0 to 3 0 - 2 /lpf    Other: RENAL TUBULAR EPITHELIAL CELLS     PROTHROMBIN TIME + INR   Result Value Ref Range    Prothrombin time 14.8 11.5 - 15.2 sec    INR 1.2 0.8 - 1.2     CBC WITH AUTOMATED DIFF   Result Value Ref Range    WBC 15.9 (H) 4.6 - 13.2 K/uL    RBC 4.37 (L) 4.70 - 5.50 M/uL    HGB 12.1 (L) 13.0 - 16.0 g/dL    HCT 35.0 (L) 36.0 - 48.0 %    MCV 80.1 74.0 - 97.0 FL    MCH 27.7 24.0 - 34.0 PG    MCHC 34.6 31.0 - 37.0 g/dL    RDW 14.6 (H) 11.6 - 14.5 %    PLATELET 775 153 - 455 K/uL    MPV 10.2 9.2 - 11.8 FL    NEUTROPHILS 61 42 - 75 %    BAND NEUTROPHILS 12 (H) 0 - 5 %    LYMPHOCYTES 12 (L) 20 - 51 %    MONOCYTES 10 (H) 2 - 9 %    EOSINOPHILS 0 0 - 5 %    BASOPHILS 0 0 - 3 %    METAMYELOCYTES 4 (H) 0 %    MYELOCYTES 1 (H) 0 %    ABS. NEUTROPHILS 11.6 (H) 1.8 - 8.0 K/UL    ABS. LYMPHOCYTES 1.9 0.8 - 3.5 K/UL    ABS. MONOCYTES 1.6 (H) 0 - 1.0 K/UL    ABS. EOSINOPHILS 0.0 0.0 - 0.4 K/UL    ABS. BASOPHILS 0.0 0.0 - 0.06 K/UL    DF MANUAL      PLATELET COMMENTS Increased Platelets      RBC COMMENTS ANISOCYTOSIS  1+        RBC COMMENTS OVALOCYTES  1+       METABOLIC PANEL, COMPREHENSIVE   Result Value Ref Range    Sodium 137 136 - 145 mmol/L    Potassium 4.2 3.5 - 5.5 mmol/L    Chloride 109 (H) 100 - 108 mmol/L    CO2 20 (L) 21 - 32 mmol/L    Anion gap 8 3.0 - 18 mmol/L    Glucose 113 (H) 74 - 99 mg/dL    BUN 27 (H) 7.0 - 18 MG/DL    Creatinine 2.00 (H) 0.6 - 1.3 MG/DL    BUN/Creatinine ratio 14 12 - 20      GFR est AA 40 (L) >60 ml/min/1.73m2    GFR est non-AA 33 (L) >60 ml/min/1.73m2    Calcium 9.0 8.5 - 10.1 MG/DL    Bilirubin, total 1.2 (H) 0.2 - 1.0 MG/DL    ALT (SGPT) 65 (H) 16 - 61 U/L    AST (SGOT) 29 15 - 37 U/L    Alk.  phosphatase 127 (H) 45 - 117 U/L    Protein, total 6.6 6.4 - 8.2 g/dL    Albumin 2.4 (L) 3.4 - 5.0 g/dL    Globulin 4.2 (H) 2.0 - 4.0 g/dL    A-G Ratio 0.6 (L) 0.8 - 1.7     POC LACTIC ACID   Result Value Ref Range    Lactic Acid (POC) 1.0 0.4 - 2.0 mmol/L   EKG, 12 LEAD, INITIAL   Result Value Ref Range    Ventricular Rate 71 BPM    Atrial Rate 71 BPM    P-R Interval 176 ms    QRS Duration 108 ms    Q-T Interval 402 ms    QTC Calculation (Bezet) 436 ms    Calculated P Axis 29 degrees    Calculated R Axis -45 degrees    Calculated T Axis 33 degrees    Diagnosis       Normal sinus rhythm  RSR' or QR pattern in V1 suggests right ventricular conduction delay  Left anterior fascicular block  Inferior infarct , age undetermined  Abnormal ECG  No previous ECGs available  Confirmed by Kiki Akins MD, Ema Gore ([id number]) on 4/20/2018 9:23:11 AM     TYPE & SCREEN   Result Value Ref Range    Crossmatch Expiration 04/23/2018     ABO/Rh(D) Ronnie Anny POSITIVE     Antibody screen NEG        Consent: All risks, benefits and options were reviewed in detail and the patient agrees to procedure. Risks include but are not limited to bleeding, infection, sepsis, death, dysuria and others. Universal Procedure Pause:  1. Correct patient confirmed:  yes  2. Allergies confirmed:  yes  3. Patient taking antiplatelet medications:  no  4. Patient taking anticoagulants:  no  5. Correct procedure and side confirmed and consent signed:  yes  6. Correct antibiotics confirmed:  yes      Procedure:  A time-out was performed to properly identify patient, the procedure to be performed. The patient was placed in the supine position, and prepped and draped in the normal fashion. 5 ml of 4% Lidocaine gel was placed in the urethra. Once adequate anesthesia was achieved; the flexible cystoscope was placed into the bladder. Visit Vitals    /81    Pulse 91    Temp 98.9 °F (37.2 °C)    Resp 18    Wt 215 lb 14.4 oz (97.9 kg)    SpO2 98%    BMI 27.72 kg/m2       Gen: AA0x3, mild pain from abdo  Lungs: clear  Abdo: soft, distended, STEVEN serosanguinous, palpable bladder, + mod tenderness diffuse  Heent: normal  Back: no CVA  : testes and scrotum wnl, uncirc, blood in meatus  ALAINA: difficulty and uncomfortable. Ext: no edema  Lymphatics: No supraclavicular LAD      Meatus: Normal - blood at meatus  Urethra: false passage with mild oozing distal to jie  Prostate: severely Obstructing  Bladder neck: elevated  Trigone: Normal / Squamous metaplasia  Trabeculation:   2+   Lesion:limited view. Sensor wire and super stiff wire placed under vision into bladder  16Fr lew [place] placed over superstiff wire, both wires removed.   Clear urine drainage. 10cc sterile water placed in balloon and connected to drainage bag. Drained: > 1.5L    Patient feels better. Specimen: None  Complications: None  Blood Loss: Minimal   Condition/Disposition: Stable    Assessment & Plan:      1. BPH / Urinary retention / false passage   Chow placed 16Fr   Maintain for 1 weeks   Flomax 0.4mg daily   Will see next week if still in house        Antibiotic provided: PO Cipro  __________________________________  Physician: Kevon Kussmaul, MD     Cc;   MAGUE Arredondo    Medical Documentation is provided with the assistance of Linsey Roberto, Medical Scribe for Kevon Kussmaul, MD on 4/20/2018

## 2018-04-21 NOTE — PROGRESS NOTES
Patient is not able to complete the AMD at this time.       [address] [facility].   [phone]

## 2018-04-21 NOTE — ROUTINE PROCESS
Patient pulled PIV, stat lock, found standing by bedside, O2 on floor, patient confused pulling on lew. Changed patient room closer to nursing station, replaced PIV, stat lock, O2, replaced, orders for sitter placed. IV pain meds converted to PO. Bed alarm on and active.

## 2018-04-21 NOTE — PROGRESS NOTES
Dressing changed to surgical site  due to old drainage and soiled bandgage during lew insertion. Pain medicine given. Pt tolerated okay. Pranav Banegasg directly next to incision not removed. Replaced old dressing with 4x4 guaze and abd pad. Secured with medipore tape.

## 2018-04-21 NOTE — OP NOTES
Laparoscopic Appendectomy Operative Note    Pre-operative Diagnosis: acute appendicitis with perforation and abscess    Post-operative Diagnosis: Same    Procedure: Diagnostic laparoscopy, open appendectomy with drainage of appendiceal abscess    Surgeon: Jeanine Donaldson MD    Assistant:  Doyle Quinones SA    Anesthesia: General with local (.25 % marcaine)    Findings: acute appendicitis with perforation and abscess    Specimens: Appendix and cultures of appendiceal abscess            Estimated Blood Loss:  less than 50 ml    Total IV Fluids: 1500 ml    Drains: STEVEN  19 Fr appendiceal abscess cavity    Procedure Details: The patient was identified in the holding area   where consent for laparoscopic, possible open, appendectomy   was verified. In the operating room, a time-out was performed to insure   correct procedure. The abdomen was prepped and draped in sterile fashion   using chlorhexidine solution and sterile drapes. The laparoscopic equipment   was assembled and proper function was visualized prior to the initial   incision. The local anesthetic was infiltrated into the skin and deep   dermal tissues at each proposed incision site prior to the incision. The   initial incision was made to accommodate a 5-mm, blunt-tipped trocar   assembled to the 5-mm 0-degree scope to create the Optiview system. Safe   entry into the peritoneal cavity was visualized. The pneumoperitoneum was   obtained using carbon dioxide gas to 15 mmHg. A second trocar, a 5-mm   blunt-tipped trocar was placed into the suprapubic region and a third   trocar was placed at the left anterior superior iliac spine, a 12-mm   blunt-tipped trocar. With all trocars in place, the patient was placed in   Trendelenburg with the right side up. Attention was turned to the right   lower quadrant. The appendix could not be visualized. The right colon was partially mobilized at the white line of Toldt. The appendix still could not be seen.   The bulge at the small bowel mesentery was opened. Pus drained. Cultures were obtained. Stool could be visualized. The abdomen was opened through the midline from 4 cm above the umbilicus to the pubic bone. The umbilical hernia was reduced. All laparoscopic equipment was removed from the field. where the appendix was visualized. The open area in the mesentery of the terminal ileum was inspected. The stool was an appendicolith or fecalith. No communication between the colon or small bowel was visualized. The base of the appendix could be visualized. The blue load of ARELY stapler was used to transect the appendix at its base. The appendix was then dissected into the abscess cavity were it was ruptured. As much of the abscess cavity as could be debrided safely was removed. No evidence of stool leak. Irrigation was   performed with approximately 4 liters of warm sterile saline. A 19 Fr STEVEN was placed into the appendiceal abscess cavity and exited from the right lower quadrant. The fascia was reapproximated using #1 single stranded PDS suture. An interrupted stapling technique was used to place betadine soaked telfa malissa times 6 into the incision. Sterile dressings were applied. The patient tolerated the procedure very well. Complications:  None; patient tolerated the procedure well. Disposition: PACU - hemodynamically stable.            Condition: stable    20/Apr/2018

## 2018-04-21 NOTE — PROGRESS NOTES
Urology Progress Note        Assessment/Plan:     Patient Active Problem List   Diagnosis Code    Hypertensive urgency I16.0    Acute ischemic stroke (HCC) I63.9    Hypertensive heart and kidney disease without heart failure and with chronic kidney disease stage III I13.10, H93.3    Diastolic dysfunction without heart failure I51.9    Dyslipidemia E78.5    CKD (chronic kidney disease) stage 3, GFR 30-59 ml/min N18.3    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 A64.5    Bacterial conjunctivitis H10.9    Rhabdomyolysis M62.82    Obesity, Class I, BMI 30-34.9 E66.9    History of noncompliance with medical treatment, presenting hazards to health Z91.19    History of tobacco use Z87.891    Impaired mobility and ADLs Z74.09    Vitamin D deficiency E55.9    Community acquired pneumonia J18.9    Elevated LFTs R79.89    Acute kidney injury superimposed on chronic kidney disease ([facility] [address] N17.9, N18.9    Acute metabolic encephalopathy F09.43    Hx of essential hypertension Z86.79    Nausea R11.0    Abdominal pain R10.9    Intra-abdominal abscess ([facility] [address] K65.1    Acute UTI N39.0    Ruptured appendicitis K35.2    Appendicitis with abscess K35.3       ASSESSMENT: Cb Sparks is a 67 y.o. male:   1. Chow trauma  2. Urinary retention   3. BPH   4. CKD Cr 2.12      Plan:   1. Chow for 7 days  2. flomax 0.4mg daily   3. Will sign off. F/u as outpt [phone]    Kiana Woods MD  Pager: [phone]    Daily Progress Note: 2018 11:34 AM  Admit Date: 2018     Subjective:     No acute  changes / events. Feels better  Chow draining well.      Objective:     Visit Vitals    /81 (BP 1 Location: Right arm, BP Patient Position: At rest)    Pulse 80    Temp 97.7 °F (36.5 °C)    Resp 18    Wt 215 lb 13.3 oz (97.9 kg)    SpO2 96%    BMI 27.71 kg/m2        Temp (24hrs), Av.8 °F (37.1 °C), Min:97.6 °F (36.4 °C), Max:99.7 °F (37.6 °C)      Intake and Output:   1901 -  0700  In: 4188.8 [P.O.:50; I.V.:4138.8]  Out: 2025 [DBRAC:2301; Drains:200]  04/21 0701 - 04/21 1900  In: 545.8 [P.O.:250; I.V.:295.8]  Out: -     PHYSICAL EXAMINATION:   Visit Vitals    /81 (BP 1 Location: Right arm, BP Patient Position: At rest)    Pulse 80    Temp 97.7 °F (36.5 °C)    Resp 18    Wt 215 lb 13.3 oz (97.9 kg)    SpO2 96%    BMI 27.71 kg/m2     Constitutional: Well developed, well nourished. No acute distress. HEENT: Normocephalic, Atraumatic, EOM's intact   CV:  RRR  Respiratory: No respiratory distress or difficulties breathing   Abdomen:  Soft, less distended, inc healing    Male:           SCROTUM:  normal         PENIS: normal  Chow: draining well - clear   Neuro/Psych:  Alert and oriented. Affect appropriate. Lab/Data Review:  Lab results reviewed. For significant abnormal values and values requiring intervention, see assessment and plan.     Labs:     Labs: Results:   Chemistry  Recent Labs      04/21/18 0326 04/20/18   1055  04/19/18 2249   GLU  92  113*  104*   NA  138  137  135*   K  4.3  4.2  3.8   CL  109*  109*  103   CO2  19*  20*  21   BUN  28*  27*  30*   CREA  2.12*  2.00*  2.14*   CA  9.0  9.0  9.8   AGAP  10  8  11   BUCR  13  14  14   AP  106  127*  164*   TP  6.2*  6.6  7.3   ALB  2.1*  2.4*  3.0*   GLOB  4.1*  4.2*  4.3*   AGRAT  0.5*  0.6*  0.7*      CBC w/Diff Recent Labs      04/21/18 0326 04/20/18   1055  04/19/18 2249   WBC  21.9*  15.9*  24.6*   RBC  4.01*  4.37*  4.13*   HGB  10.8*  12.1*  11.4*   HCT  32.3*  35.0*  33.1*   PLT  326  413  397   GRANS  56  61  73   LYMPH  11*  12*  9*   EOS  0  0  1      Cultures Recent Labs      04/20/18   0734  04/20/18   0102  04/20/18   0015  04/20/18   0000   CULT  PENDING  NO GROWTH 1 DAY  NO GROWTH 1 DAY  NO GROWTH 1 DAY     All Micro Results     Procedure Component Value Units Date/Time    CULTURE, URINE [[id number]] Collected:  04/20/18 0102    Order Status:  Completed Specimen:  Clean catch Updated: 04/21/18 1000     Special Requests: NO SPECIAL REQUESTS        Culture result: NO GROWTH 1 DAY       CULTURE, BLOOD [[id number]] Collected:  04/20/18 0000    Order Status:  Completed Specimen:  Blood from Blood Updated:  04/21/18 0630     Special Requests: NO SPECIAL REQUESTS        Culture result: NO GROWTH 1 DAY       CULTURE, BLOOD [[id number]] Collected:  04/20/18 0015    Order Status:  Completed Specimen:  Whole Blood from Blood Updated:  04/21/18 0630     Special Requests: NO SPECIAL REQUESTS        Culture result: NO GROWTH 1 DAY       CULTURE, SURGICAL Windy Cinthya STAIN [[id number]] Collected:  04/20/18 0734    Order Status:  Completed Specimen:  Appendix Updated:  04/20/18 1238     Special Requests: ABSCESS        GRAM STAIN MODERATE WBC'S         FEW GRAM POSITIVE COCCI                 MODERATE GRAM POSITIVE RODS      FEW GRAM NEGATIVE RODS        Culture result: PENDING    CULTURE, ANAEROBIC [[id number]] Collected:  04/20/18 0734    Order Status:  Completed Specimen:  Appendix Updated:  04/20/18 0932    CULTURE, BODY FLUID Rowan Mangum STAIN [[id number]] Collected:  04/20/18 0745    Order Status:  Canceled Specimen:  Drainage             Urinalysis Color   Date Value Ref Range Status   04/20/2018 YELLOW   Final     Appearance   Date Value Ref Range Status   04/20/2018 CLEAR   Final     Specific gravity   Date Value Ref Range Status   04/20/2018 1.016 1.005 - 1.030   Final     pH (UA)   Date Value Ref Range Status   04/20/2018 5.0 5.0 - 8.0   Final     Protein   Date Value Ref Range Status   04/20/2018 30 (A) NEG mg/dL Final     Ketone   Date Value Ref Range Status   04/20/2018 NEGATIVE  NEG mg/dL Final     Bilirubin   Date Value Ref Range Status   04/20/2018 NEGATIVE  NEG   Final     Blood   Date Value Ref Range Status   04/20/2018 SMALL (A) NEG   Final     Urobilinogen   Date Value Ref Range Status   04/20/2018 1.0 0.2 - 1.0 EU/dL Final     Nitrites   Date Value Ref Range Status   04/20/2018 NEGATIVE  NEG   Final Leukocyte Esterase   Date Value Ref Range Status   04/20/2018 NEGATIVE  NEG   Final     Potassium   Date Value Ref Range Status   04/21/2018 4.3 3.5 - 5.5 mmol/L Final     Creatinine   Date Value Ref Range Status   04/21/2018 2.12 (H) 0.6 - 1.3 MG/DL Final     BUN   Date Value Ref Range Status   04/21/2018 28 (H) 7.0 - 18 MG/DL Final      PSA No results for input(s): PSA in the last 72 hours. Coagulation Lab Results   Component Value Date/Time    Prothrombin time 14.8 04/19/2018 10:49 PM    Prothrombin time 12.6 09/01/2015 03:00 PM    INR 1.2 04/19/2018 10:49 PM    INR 0.9 09/01/2015 03:00 PM    aPTT 23.7 (L) 09/01/2015 03:00 PM           Micro  Recent Labs      04/20/18   0734  04/20/18   0102  04/20/18   0015   CULT  PENDING  NO GROWTH 1 DAY  NO GROWTH 1 DAY     Recent Labs      04/20/18   0734  04/20/18   0102  04/20/18   0015  04/20/18   0000   CULT  PENDING  NO GROWTH 1 DAY  NO GROWTH 1 DAY  NO GROWTH 1 DAY        US Results: (Most Recent)   Results from Hospital Encounter encounter on 12/22/15   US RETROPERITONEUM COMP   Narrative PROCEDURE:  Ultrasound Retroperitoneal Scan. CPT code 55023. INDICATION:  Chronic kidney disease. COMPARISON:  None. FINDINGS:    Right kidney size:  10.1 x 4.3 x 4.6 cm. Left kidney size:  10.1 x 4.0 x 5.1 cm. The renal cortical parenchymal echogenicity is mildly increased. No evidence of  solid or cystic mass, hydronephrosis, or ectopic calcification is detected in  the kidneys. The bladder is partially contracted and appears grossly unremarkable. No free  intraperitoneal fluid is seen. The visualized portions of the liver and spleen are unremarkable. The spleen  Size is estimated at 9.0 x 3.6 x 9.0 cm. The inferior vena cava and aorta are  not well visualized. Impression IMPRESSION:    1. Mildly increased renal parenchymal echogenicity. Suggestive of medical  renal disease. 2.  No acute findings otherwise.            CT (Most Recent) Results from [facility] encounter on 04/19/18   CT ABD PELV WO CONT   Narrative EXAM: CT of the Abdomen and Pelvis without IV contrast    CLINICAL INDICATION: Right upper quadrant pain and nausea. Recent diagnosis  urinary tract infection. TECHNIQUE: CT of the abdomen and pelvis. Sagittal and coronal reformations    All CT scans at this facility are performed using dose optimization technique as  appropriate to a performed exam, to include automated exposure control,  adjustment of the mA and/or kV according to patient size (including appropriate  matching for site specific examination) or use of iterative reconstruction  technique. IV CONTRAST: None    ENTERIC CONTRAST: None    COMPARISON: None    FINDINGS:   Limitations: The evaluation of the solid organs is limited due to the lack of IV  contrast.    Lower Chest: Mild bibasilar atelectasis is noted. The heart and pericardium are  unremarkable. Peritoneum: No free air appreciated. No free fluid present. Adjacent the  ileocecal valve, there is a roughly 6 x 10 x 7 cm fluid and air collection which  may represent an abscess in the setting of a ruptured appendix. Multiple  adjacent lymph nodes are noted. The largest is 1.2 cm. Liver: No focal lesion appreciated. Biliary/Gallbladder: No intrahepatic or extrahepatic biliary ductal dilatation  appreciated. The gallbladder is unremarkable. No pericholecystic fluid or  inflammation. Spleen: Unremarkable. Pancreas: No focal lesion appreciated. The pancreatic duct is unremarkable. No  peripancreatic inflammation or adenopathy. : The adrenal glands are mildly thickened but otherwise unremarkable. There  is a punctate nonobstructing stone in the right kidney. There is right-sided  perinephric fat stranding possibly related to the fluid collection. 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 No hydroureteronephrosis seen. No acute pathology in the  bladder. The prostate is 3.3 x 4.1 x 4.6 cm. Calcifications are present within  it. GI: The stomach is unremarkable. The small bowel is without evidence of  obstruction. No small bowel wall thickening. The mesentery is without  inflammation or adenopathy. As mentioned previously, there is a likely ruptured  appendix. It is not seen. There is a fluid and air collection in the area of the  expected appendix. There is inflammation adjacent the ileocecal valve. The  descending and sigmoid colon has diverticula. These otherwise unremarkable. Aorta and retroperitoneum: The abdominal aorta is tortuous. No aneurysm of the  abdominal aorta. 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 The right is 2 cm. The left is 1.6 cm. No periaortic adenopathy seen. No fluid collections in the  retroperitoneum appreciated. Abdominal wall/Inguinal: Umbilical hernia is noted which has a portion of small  bowel within it. This is nonobstructive. Musculoskeletal: Multilevel degenerative disc disease in the lumbar spine is  noted. Impression IMPRESSION:   1.   Air-fluid collection in the right mid abdomen at the expected location of  the appendix. This likely presents a ruptured appendix with fluid collection. 2.  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 3.  Prostatic hypertrophy. 4.  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 5.  Umbilical hernia with a portion of small bowel within it. These findings were discussed with Idania Grande at 0115 hours on 4/20/2018.                 Current Facility-Administered Medications   Medication Dose Route Frequency    acetaminophen (TYLENOL) tablet 650 mg  650 mg Oral Q4H PRN    cefepime (MAXIPIME) 2 g in sterile water (preservative free) 10 mL IV syringe  2 g IntraVENous Q12H    [START ON 4/22/2018] levoFLOXacin (LEVAQUIN) 750 mg in D5W IVPB  750 mg IntraVENous Q48H    lactated Ringers infusion  125 mL/hr IntraVENous CONTINUOUS    morphine injection 2 mg  2 mg IntraVENous Q2H PRN    naloxone (NARCAN) injection 0.2 mg  0.2 mg IntraVENous ONCE PRN    tamsulosin (FLOMAX) capsule 0.4 mg  0.4 mg Oral DAILY

## 2018-04-21 NOTE — PROGRESS NOTES
Intern Progress Note  [facility]       Patient: Дмитрий Byrnes MRN: [id number]  CSN: [id number]    YOB: 1945  Age: 67 y.o. Sex: male    DOA: 4/19/2018 LOS:  LOS: 0 days                    Subjective: Lew placed per Urology overnight due as patient had not voided for 12 hours, 16 [place] lew in place now to stay in for one week per Dr. Jose M Lara. Patient endorsing abdominal pain this morning, slightly confused, asking what happened. Discussed with the patient his course leading up to his admission and his course during admission. No further questions at this time. When asked if he felt he had fevers/chills he said \"maybe\". Review of Systems   Constitutional:        \"Maybe\" has fever/chills   Eyes: Negative for blurred vision and double vision. Respiratory: Negative for cough, sputum production, shortness of breath and wheezing. Cardiovascular: Negative for chest pain and palpitations. Gastrointestinal: Positive for abdominal pain. Negative for constipation, diarrhea, nausea and vomiting. Genitourinary: Lew in place   Neurological: Negative for dizziness and headaches.        Objective:      Patient Vitals for the past 24 hrs:   Temp Pulse Resp BP SpO2   04/21/18 0403 98.9 °F (37.2 °C) 89 20 117/73 96 %   04/21/18 0000 99.7 °F (37.6 °C) 91 18 117/74 98 %   04/20/18 1930 99.7 °F (37.6 °C) 94 18 110/71 96 %   04/20/18 1539 98.9 °F (37.2 °C) 91 18 124/81 98 %   04/20/18 1044 98.1 °F (36.7 °C) 80 18 124/81 100 %   04/20/18 1002 - 75 13 122/77 100 %   04/20/18 0952 - 77 18 118/81 100 %   04/20/18 0943 - 79 22 115/67 100 %   04/20/18 0932 - 76 18 126/79 99 %   04/20/18 0848 97.6 °F (36.4 °C) 79 16 115/68 99 %         Intake/Output Summary (Last 24 hours) at 04/20/18 2137  Last data filed at 04/20/18 1930   Gross per 24 hour   Intake             1900 ml   Output              790 ml   Net             1110 ml       Physical Exam   Constitutional: He appears well-developed and well-nourished. No distress. Cardiovascular: Normal rate and regular rhythm. Exam reveals no gallop and no friction rub. No murmur heard. Pulmonary/Chest: Effort normal. No respiratory distress. He has no wheezes. He has no rales. Abdominal: Bowel sounds are normal. There is no rebound. Slight distension, TTP with gaurding RLQ  Bandage in place, c/d/i. Drain with serosanguinous fluid   Musculoskeletal: He exhibits no edema. Neurological: He is alert. Skin: Skin is warm and dry. No rash noted. He is not diaphoretic. No erythema. No pallor.        Lab/Data Reviewed:  BMP:   Lab Results   Component Value Date/Time     04/21/2018 03:26 AM    K 4.3 04/21/2018 03:26 AM     (H) 04/21/2018 03:26 AM    CO2 19 (L) 04/21/2018 03:26 AM    AGAP 10 04/21/2018 03:26 AM    GLU 92 04/21/2018 03:26 AM    BUN 28 (H) 04/21/2018 03:26 AM    CREA 2.12 (H) 04/21/2018 03:26 AM    GFRAA 37 (L) 04/21/2018 03:26 AM    GFRNA 31 (L) 04/21/2018 03:26 AM     CBC:   Lab Results   Component Value Date/Time    WBC 21.9 (H) 04/21/2018 03:26 AM    HGB 10.8 (L) 04/21/2018 03:26 AM    HCT 32.3 (L) 04/21/2018 03:26 AM     04/21/2018 03:26 AM        Scheduled Medications Reviewed:  Current Facility-Administered Medications   Medication Dose Route Frequency    cefepime (MAXIPIME) 2 g in sterile water (preservative free) 10 mL IV syringe  2 g IntraVENous Q12H    [START ON 4/22/2018] levoFLOXacin (LEVAQUIN) 750 mg in D5W IVPB  750 mg IntraVENous Q48H    lactated Ringers infusion  125 mL/hr IntraVENous CONTINUOUS    [START ON 4/21/2018] tamsulosin (FLOMAX) capsule 0.4 mg  0.4 mg Oral DAILY         Imaging, microbiology, and EKG/Telemetry:  None new    Assessment/Plan     67 y.o. male with PMH significant for CVA, HTN, HLD, CKD III, chronic diarrhea, BPH/OAB now admitted with ruptured appendix with abscess formation.     Ruptured appendix with abscess formation POD1 Open (converted from lap) Appendectomy CT abdomen pelvis indicative of right mid abdomen 6cm x 10cm x 7cm on admission, recent colonoscopy at [facility] 8-9 days ago. Improved leukocytosis 24>15, bands increasing 2>12>27. VSS throughout admission.   - Dr. Angela Perla following, appreciate the recs   - Continue Cefepime, levaquin. Will consider changing antibiotics should leukocytosis continue, bandemia continue/worsen  - Pain control per surgery  - Pharmacy to dose consult on above d/t CKD and DARINEL   - LR @ 125  - Tele d/t risk of decompensation   - PT/OT/case management  - Daily CBC, CMP  - FU UC, BC     Acute urinary retention/OAB/BPH holding home tolteridine, dicyclomine as an outpatient. Continue to hold. - FU UC.   - Urinary retention following surgery with no UOP for 12 hours  - Urology following, appreciate the recs  - Dr. Senthil Browne placed 16 fr catheter 04/20, rec's to keep catheter in place for one week. 0.4mg tamulosin QD     DARINEL on CKD III follows with Dr. Kat Cochran as an outpatient. Baseline Cr ~1.5  - Roughly stable 2.14>2.0>2.12  - Hydration as above  - Avoid nephrotoxic meds   - Daily CMP     Transaminitis on admission likely 2/2 acute illness and dehydration. Improving: ALT 91>6555, AST 46>29>32, Alkaline phosphatase [id number]  - Daily CMP     H/o CVA, HTN, HLD, Vitamin D/B12 deficiency CVA in 2015 with residual mild L sided weakness and some aphasia  - BPs well controlled without medications for now.  Hold home amlodipine 10mg QD, labetalol 100mg BID and losartan 200mg BID   - Hold home lasix 20 mg every day  - Hold home ezetimibe 10mg QD   - Hold home vitamin B12  - Fall precautions, aspiration precautions      Tobacco use current tobacco user  - Nicotine replacement should the patient request      Diet: NPO  DVT Prophylaxis: SCDs  Code Status: Full  Point of Contact: Marian Solares [phone]     Disposition and anticipated LOS: 2 midnight      Jennifer Buckley MD  [id number] [address] [facility] [employer]

## 2018-04-21 NOTE — PROGRESS NOTES
Problem: Patient Education: Go to Patient Education Activity  Goal: Patient/Family Education  Outcome: Progressing Towards Goal  Patient is oriented to self w episodes of confusion. He follows commands but requires frequent redirection and reinforcement. Education and return demonstration on use of incentive spirometer.

## 2018-04-21 NOTE — PROGRESS NOTES
Riaz Vásquez M.D. FACS  PROGRESS NOTE    Name: Jenna Alexander MRN: [id number]   : 1945 [facility]   Date: 2018 Admission Date: 2018 10:27 PM     Hospital Day: 3  1 Day Post-Op  Subjective:  Pt confused the pm.  Standing at bedside with O2 on floor. IV on floor. Chow placed yesterday by Dr. Sue Hdez for BPH with urinary retention - 1500mL +    Objective:  Vitals:    18 0000 18 0403 18 0745 18 1118   BP: 117/74 117/73 115/70 129/81   Pulse: 91 89 80 80   Resp:    Temp: 99.7 °F (37.6 °C) 98.9 °F (37.2 °C) 97.6 °F (36.4 °C) 97.7 °F (36.5 °C)   SpO2: 98% 96% 97% 96%   Weight:  97.9 kg (215 lb 13.3 oz)         Date 18 07 - 18 0659 18 0700 - 18 0659   Shift 6072-7759 6867-7846 24 Hour Total 1189-7117 4491-5336 24 Hour Total   I  N  T  A  K  E   P. O.  50 50 250  250      P. O.  50 50 250  250    I.V.  (mL/kg/hr) 2852.1  (2.4) 1286.7  (1.1) 4138.8  (1.8) 295.8  295.8      Volume (0.9% sodium chloride infusion) 1900  1900         Volume (lactated Ringers infusion) 952.1 1266.7 2218.8 295.8  295.8      Volume (cefepime (MAXIPIME) 2 g in sterile water (preservative free) 10 mL IV syringe)  20 20       Shift Total  (mL/kg) 2852.1  (29.1) 1336.7  (13.7) 4188.8  (42.8) 545.8  (5.6)  545.8  (5.6)   O  U  T  P  U  T   Urine  (mL/kg/hr)  1175  (1) 1175  (0.5)         Urine Output (mL) (Urinary Catheter 18 Coude)  1175 1175       Emesis/NG output            Emesis Occurrence(s)  0 x 0 x       Drains 140 60 200         Output (ml) ([place] Drain 18 Right; Lower Abdomen) 140 60 200       Stool            Stool Occurrence(s)  0 x 0 x       Blood 50  50         Estimated Blood Loss 50  50       Shift Total  (mL/kg) 190  (1.9) 1235  (12.6) 1425  (14.6)      NET 2662.1 101.7 2763.8 545.8  545. 8   Weight (kg) 97.9 97.9 97.9 97.9 97.9 97.9         Physical Exam:    General: A&A, Confused   Abdomen: abdomen is soft with incisional  tenderness. Incision with serosanguinous drainage  but no surrounding erythema. No masses,  organomegaly or guarding    Labs:  Recent Results (from the past 24 hour(s))   CBC WITH AUTOMATED DIFF    Collection Time: 04/21/18  3:26 AM   Result Value Ref Range    WBC 21.9 (H) 4.6 - 13.2 K/uL    RBC 4.01 (L) 4.70 - 5.50 M/uL    HGB 10.8 (L) 13.0 - 16.0 g/dL    HCT 32.3 (L) 36.0 - 48.0 %    MCV 80.5 74.0 - 97.0 FL    MCH 26.9 24.0 - 34.0 PG    MCHC 33.4 31.0 - 37.0 g/dL    RDW 14.7 (H) 11.6 - 14.5 %    PLATELET 999 253 - 680 K/uL    MPV 10.0 9.2 - 11.8 FL    NEUTROPHILS 56 42 - 75 %    BAND NEUTROPHILS 27 (H) 0 - 5 %    LYMPHOCYTES 11 (L) 20 - 51 %    MONOCYTES 6 2 - 9 %    EOSINOPHILS 0 0 - 5 %    BASOPHILS 0 0 - 3 %    ABS. NEUTROPHILS 18.2 (H) 1.8 - 8.0 K/UL    ABS. LYMPHOCYTES 2.4 0.8 - 3.5 K/UL    ABS. MONOCYTES 1.3 (H) 0 - 1.0 K/UL    ABS. EOSINOPHILS 0.0 0.0 - 0.4 K/UL    ABS. BASOPHILS 0.0 0.0 - 0.06 K/UL    DF MANUAL      PLATELET COMMENTS ADEQUATE PLATELETS      RBC COMMENTS ANISOCYTOSIS  1+       METABOLIC PANEL, COMPREHENSIVE    Collection Time: 04/21/18  3:26 AM   Result Value Ref Range    Sodium 138 136 - 145 mmol/L    Potassium 4.3 3.5 - 5.5 mmol/L    Chloride 109 (H) 100 - 108 mmol/L    CO2 19 (L) 21 - 32 mmol/L    Anion gap 10 3.0 - 18 mmol/L    Glucose 92 74 - 99 mg/dL    BUN 28 (H) 7.0 - 18 MG/DL    Creatinine 2.12 (H) 0.6 - 1.3 MG/DL    BUN/Creatinine ratio 13 12 - 20      GFR est AA 37 (L) >60 ml/min/1.73m2    GFR est non-AA 31 (L) >60 ml/min/1.73m2    Calcium 9.0 8.5 - 10.1 MG/DL    Bilirubin, total 1.4 (H) 0.2 - 1.0 MG/DL    ALT (SGPT) 55 16 - 61 U/L    AST (SGOT) 32 15 - 37 U/L    Alk.  phosphatase 106 45 - 117 U/L    Protein, total 6.2 (L) 6.4 - 8.2 g/dL    Albumin 2.1 (L) 3.4 - 5.0 g/dL    Globulin 4.1 (H) 2.0 - 4.0 g/dL    A-G Ratio 0.5 (L) 0.8 - 1.7       All Micro Results     Procedure Component Value Units Date/Time    CULTURE, ANAEROBIC [[id number]] Collected: 04/20/18 0734    Order Status:  Completed Specimen:  Appendix Updated:  04/21/18 1136     Special Requests: ABSCESS        Culture result:         CULTURE IN PROGRESS,FURTHER UPDATES TO FOLLOW    CULTURE, SURGICAL WOUND Nehemias Ricardo STAIN [[id number]]  (Abnormal) Collected:  04/20/18 0734    Order Status:  Completed Specimen:  Appendix Updated:  04/21/18 1135     Special Requests: ABSCESS        GRAM STAIN MODERATE WBC'S         FEW GRAM POSITIVE COCCI                 MODERATE GRAM POSITIVE RODS      FEW GRAM NEGATIVE RODS        Culture result:         FEW GRAM NEGATIVE RODS (A)              FEW POSSIBLE 2ND GRAM NEGATIVE UMM (A)      FEW  POSSIBLE  3RD GRAM NEGATIVE UMM   (A)               CULTURE IN PROGRESS,FURTHER UPDATES TO FOLLOW    CULTURE, URINE [[id number]] Collected:  04/20/18 0102    Order Status:  Completed Specimen:  Clean catch Updated:  04/21/18 1000     Special Requests: NO SPECIAL REQUESTS        Culture result: NO GROWTH 1 DAY       CULTURE, BLOOD [[id number]] Collected:  04/20/18 0000    Order Status:  Completed Specimen:  Blood from Blood Updated:  04/21/18 0630     Special Requests: NO SPECIAL REQUESTS        Culture result: NO GROWTH 1 DAY       CULTURE, BLOOD [[id number]] Collected:  04/20/18 0015    Order Status:  Completed Specimen:  Whole Blood from Blood Updated:  04/21/18 0630     Special Requests: NO SPECIAL REQUESTS        Culture result: NO GROWTH 1 DAY       CULTURE, BODY FLUID Nehemias Ricardo STAIN [[id number]] Collected:  04/20/18 0745    Order Status:  Canceled Specimen:  Drainage           Current Medications:  Current Facility-Administered Medications   Medication Dose Route Frequency Provider Last Rate Last Dose    acetaminophen (TYLENOL) tablet 650 mg  650 mg Oral Q4H PRN Tiff Avila MD   650 mg at 04/21/18 0736    cefepime (MAXIPIME) 2 g in sterile water (preservative free) 10 mL IV syringe  2 g IntraVENous Q12H Mohan Lozano MD   2 g at 04/21/18 0252    [START ON 4/22/2018] levoFLOXacin (LEVAQUIN) 750 mg in D5W IVPB  750 mg IntraVENous Q48H LINDA Baird        lactated Ringers infusion  125 mL/hr IntraVENous CONTINUOUS Miquel Sharma  mL/hr at 04/21/18 1116 125 mL/hr at 04/21/18 1116    morphine injection 2 mg  2 mg IntraVENous Q2H PRN Miquel Sharma MD   2 mg at 04/21/18 1116    naloxone (NARCAN) injection 0.2 mg  0.2 mg IntraVENous ONCE PRN Maite Crews MD        [facility]) capsule 0.4 mg  0.4 mg Oral DAILY Maite Crews MD   0.4 mg at 04/21/18 9444       Chart and notes reviewed. Data reviewed. I have evaluated and examined the patient. IMPRESSION:   · Pt is POD 1 from lap to open appy with drainage of appendiceal abscess.       PLAN:/DISCUSION:   · Sitter for safety  · Bolus 500 mL of saline - creat elevated and urine output low  · Call if UOP < 120 mL over 4 hours  · Continue Abx - broad spectrum, de escalate with cultures  · Sips of clears only   · Jenifer Drummond MD

## 2018-04-22 LAB
ALBUMIN SERPL-MCNC: 2 G/DL (ref 3.4–5)
ALBUMIN/GLOB SERPL: 0.5 {RATIO} (ref 0.8–1.7)
ALP SERPL-CCNC: 100 U/L (ref 45–117)
ALT SERPL-CCNC: 45 U/L (ref 16–61)
ANION GAP SERPL CALC-SCNC: 12 MMOL/L (ref 3–18)
AST SERPL-CCNC: 37 U/L (ref 15–37)
ATRIAL RATE: 147 BPM
BASOPHILS # BLD: 0 K/UL (ref 0–0.06)
BASOPHILS NFR BLD: 0 % (ref 0–3)
BILIRUB SERPL-MCNC: 0.7 MG/DL (ref 0.2–1)
BUN SERPL-MCNC: 25 MG/DL (ref 7–18)
BUN/CREAT SERPL: 15 (ref 12–20)
CALCIUM SERPL-MCNC: 9 MG/DL (ref 8.5–10.1)
CALCULATED R AXIS, ECG10: -47 DEGREES
CALCULATED T AXIS, ECG11: 30 DEGREES
CHLORIDE SERPL-SCNC: 109 MMOL/L (ref 100–108)
CK MB CFR SERPL CALC: 1.6 % (ref 0–4)
CK MB SERPL-MCNC: 7.1 NG/ML (ref 5–25)
CK SERPL-CCNC: 453 U/L (ref 39–308)
CO2 SERPL-SCNC: 20 MMOL/L (ref 21–32)
CREAT SERPL-MCNC: 1.64 MG/DL (ref 0.6–1.3)
DIAGNOSIS, 93000: NORMAL
DIFFERENTIAL METHOD BLD: ABNORMAL
EOSINOPHIL # BLD: 0 K/UL (ref 0–0.4)
EOSINOPHIL NFR BLD: 0 % (ref 0–5)
ERYTHROCYTE [DISTWIDTH] IN BLOOD BY AUTOMATED COUNT: 14.9 % (ref 11.6–14.5)
GLOBULIN SER CALC-MCNC: 4.1 G/DL (ref 2–4)
GLUCOSE SERPL-MCNC: 82 MG/DL (ref 74–99)
HCT VFR BLD AUTO: 31.9 % (ref 36–48)
HGB BLD-MCNC: 10.6 G/DL (ref 13–16)
LYMPHOCYTES # BLD: 2.6 K/UL (ref 0.8–3.5)
LYMPHOCYTES NFR BLD: 9 % (ref 20–51)
MCH RBC QN AUTO: 26.8 PG (ref 24–34)
MCHC RBC AUTO-ENTMCNC: 33.2 G/DL (ref 31–37)
MCV RBC AUTO: 80.8 FL (ref 74–97)
METAMYELOCYTES NFR BLD MANUAL: 1 %
MONOCYTES # BLD: 2 K/UL (ref 0–1)
MONOCYTES NFR BLD: 7 % (ref 2–9)
MYELOCYTES NFR BLD MANUAL: 1 %
NEUTS BAND NFR BLD MANUAL: 20 % (ref 0–5)
NEUTS SEG # BLD: 23.8 K/UL (ref 1.8–8)
NEUTS SEG NFR BLD: 62 % (ref 42–75)
PLATELET # BLD AUTO: 356 K/UL (ref 135–420)
PLATELET COMMENTS,PCOM: ABNORMAL
PMV BLD AUTO: 10.4 FL (ref 9.2–11.8)
POTASSIUM SERPL-SCNC: 4.1 MMOL/L (ref 3.5–5.5)
PROT SERPL-MCNC: 6.1 G/DL (ref 6.4–8.2)
Q-T INTERVAL, ECG07: 316 MS
QRS DURATION, ECG06: 94 MS
QTC CALCULATION (BEZET), ECG08: 470 MS
RBC # BLD AUTO: 3.95 M/UL (ref 4.7–5.5)
RBC MORPH BLD: ABNORMAL
RBC MORPH BLD: ABNORMAL
SODIUM SERPL-SCNC: 141 MMOL/L (ref 136–145)
TROPONIN I SERPL-MCNC: <0.02 NG/ML (ref 0–0.04)
TSH SERPL DL<=0.05 MIU/L-ACNC: 0.65 UIU/ML (ref 0.36–3.74)
VENTRICULAR RATE, ECG03: 133 BPM
WBC # BLD AUTO: 29 K/UL (ref 4.6–13.2)

## 2018-04-22 PROCEDURE — 74011000258 HC RX REV CODE- 258: Performed by: SURGERY

## 2018-04-22 PROCEDURE — 85025 COMPLETE CBC W/AUTO DIFF WBC: CPT | Performed by: STUDENT IN AN ORGANIZED HEALTH CARE EDUCATION/TRAINING PROGRAM

## 2018-04-22 PROCEDURE — 84443 ASSAY THYROID STIM HORMONE: CPT | Performed by: STUDENT IN AN ORGANIZED HEALTH CARE EDUCATION/TRAINING PROGRAM

## 2018-04-22 PROCEDURE — 74011000250 HC RX REV CODE- 250: Performed by: STUDENT IN AN ORGANIZED HEALTH CARE EDUCATION/TRAINING PROGRAM

## 2018-04-22 PROCEDURE — 93005 ELECTROCARDIOGRAM TRACING: CPT

## 2018-04-22 PROCEDURE — 74011250637 HC RX REV CODE- 250/637: Performed by: STUDENT IN AN ORGANIZED HEALTH CARE EDUCATION/TRAINING PROGRAM

## 2018-04-22 PROCEDURE — 65660000004 HC RM CVT STEPDOWN

## 2018-04-22 PROCEDURE — 36415 COLL VENOUS BLD VENIPUNCTURE: CPT | Performed by: STUDENT IN AN ORGANIZED HEALTH CARE EDUCATION/TRAINING PROGRAM

## 2018-04-22 PROCEDURE — 74011250637 HC RX REV CODE- 250/637: Performed by: SURGERY

## 2018-04-22 PROCEDURE — 82550 ASSAY OF CK (CPK): CPT | Performed by: STUDENT IN AN ORGANIZED HEALTH CARE EDUCATION/TRAINING PROGRAM

## 2018-04-22 PROCEDURE — 80053 COMPREHEN METABOLIC PANEL: CPT | Performed by: STUDENT IN AN ORGANIZED HEALTH CARE EDUCATION/TRAINING PROGRAM

## 2018-04-22 PROCEDURE — 74011250636 HC RX REV CODE- 250/636: Performed by: SURGERY

## 2018-04-22 RX ORDER — METOPROLOL TARTRATE 5 MG/5ML
2.5 INJECTION INTRAVENOUS ONCE
Status: COMPLETED | OUTPATIENT
Start: 2018-04-22 | End: 2018-04-22

## 2018-04-22 RX ADMIN — TAMSULOSIN HYDROCHLORIDE 0.4 MG: 0.4 CAPSULE ORAL at 10:12

## 2018-04-22 RX ADMIN — HYDROCODONE BITARTRATE AND ACETAMINOPHEN 1 TABLET: 5; 325 TABLET ORAL at 08:01

## 2018-04-22 RX ADMIN — SODIUM CHLORIDE 2.25 G: 900 INJECTION, SOLUTION INTRAVENOUS at 03:44

## 2018-04-22 RX ADMIN — METOPROLOL TARTRATE 2.5 MG: 5 INJECTION, SOLUTION INTRAVENOUS at 02:04

## 2018-04-22 RX ADMIN — SODIUM CHLORIDE 3.38 G: 900 INJECTION, SOLUTION INTRAVENOUS at 21:06

## 2018-04-22 RX ADMIN — VANCOMYCIN HYDROCHLORIDE 1500 MG: 10 INJECTION, POWDER, LYOPHILIZED, FOR SOLUTION INTRAVENOUS at 18:24

## 2018-04-22 RX ADMIN — SODIUM CHLORIDE, SODIUM LACTATE, POTASSIUM CHLORIDE, AND CALCIUM CHLORIDE 125 ML/HR: 600; 310; 30; 20 INJECTION, SOLUTION INTRAVENOUS at 06:52

## 2018-04-22 RX ADMIN — FLUCONAZOLE 200 MG: 2 INJECTION, SOLUTION INTRAVENOUS at 10:12

## 2018-04-22 RX ADMIN — HYDROCODONE BITARTRATE AND ACETAMINOPHEN 1 TABLET: 5; 325 TABLET ORAL at 00:15

## 2018-04-22 RX ADMIN — HYDROCODONE BITARTRATE AND ACETAMINOPHEN 1 TABLET: 5; 325 TABLET ORAL at 21:06

## 2018-04-22 RX ADMIN — HYDROCODONE BITARTRATE AND ACETAMINOPHEN 1 TABLET: 5; 325 TABLET ORAL at 16:28

## 2018-04-22 RX ADMIN — SODIUM CHLORIDE 2.25 G: 900 INJECTION, SOLUTION INTRAVENOUS at 10:12

## 2018-04-22 RX ADMIN — SODIUM CHLORIDE 3.38 G: 900 INJECTION, SOLUTION INTRAVENOUS at 16:29

## 2018-04-22 NOTE — PROGRESS NOTES
Intern Progress Note  [facility]       Patient: Ervin Salinas MRN: [id number]  CSN: [id number]    YOB: 1945  Age: 67 y.o. Sex: male    DOA: 4/19/2018 LOS:  LOS: 2 days                    Subjective:     Pt reports a little bit of chest pain off and on no SOB. Has not had flatus or BM. Stil with significant abdominal pain    Review of Systems   Eyes: Negative for blurred vision and double vision. Respiratory: Negative for cough, sputum production, shortness of breath and wheezing. Cardiovascular: Positive for chest pain and palpitations. Gastrointestinal: Positive for abdominal pain. Negative for constipation, diarrhea, nausea and vomiting. Neurological: Negative for dizziness and headaches. Objective:      Patient Vitals for the past 24 hrs:   Temp Pulse Resp BP SpO2   04/22/18 0029 97.3 °F (36.3 °C) (!) 115 18 111/62 98 %   04/22/18 0000 97.5 °F (36.4 °C) 90 19 123/81 94 %   04/21/18 2000 97.2 °F (36.2 °C) 89 18 129/89 96 %   04/21/18 1516 97.3 °F (36.3 °C) 84 18 130/73 96 %   04/21/18 1118 97.7 °F (36.5 °C) 80 18 129/81 96 %   04/21/18 0745 97.6 °F (36.4 °C) 80 18 115/70 97 %   04/21/18 0403 98.9 °F (37.2 °C) 89 20 117/73 96 %         Intake/Output Summary (Last 24 hours) at 04/22/18 0108  Last data filed at 04/21/18 2144   Gross per 24 hour   Intake             3100 ml   Output             1605 ml   Net             1495 ml       Physical Exam   Constitutional: He appears well-developed and well-nourished. No distress. Cardiovascular: Normal rate and regular rhythm. Exam reveals no gallop and no friction rub. No murmur heard. Pulmonary/Chest: Effort normal. No respiratory distress. He has no wheezes. He has no rales. Abdominal: Soft. Bowel sounds are normal. He exhibits distension. There is tenderness. There is guarding. Slight distension, TTP with gaurding RLQ  Bandage in place, c/d/i.  Drain with serosanguinous fluid   Musculoskeletal: He exhibits no edema. Neurological: He is alert. Skin: Skin is warm and dry. No rash noted. He is not diaphoretic. No erythema. No pallor. Lab/Data Reviewed:  BMP:   No results found for: NA, K, CL, CO2, AGAP, GLU, BUN, CREA, GFRAA, GFRNA  CBC:   Lab Results   Component Value Date/Time    WBC 29.0 (H) 04/22/2018 03:14 AM    HGB 10.6 (L) 04/22/2018 03:14 AM    HCT 31.9 (L) 04/22/2018 03:14 AM     04/22/2018 03:14 AM        Scheduled Medications Reviewed:  Current Facility-Administered Medications   Medication Dose Route Frequency    piperacillin-tazobactam (ZOSYN) 2.25 g in 0.9% sodium chloride (MBP/ADV) 50 mL MBP  2.25 g IntraVENous Q6H    fluconazole (DIFLUCAN) 200mg/100 mL IVPB (premix)  200 mg IntraVENous DAILY    vancomycin (VANCOCIN) 1,500 mg in 0.9% sodium chloride 500 mL IVPB  1,500 mg IntraVENous Q24H    lactated Ringers infusion  125 mL/hr IntraVENous CONTINUOUS    tamsulosin (FLOMAX) capsule 0.4 mg  0.4 mg Oral DAILY         Imaging, microbiology, and EKG/Telemetry:  None new    Assessment/Plan     67 y.o. male with PMH significant for CVA, HTN, HLD, CKD III, chronic diarrhea, BPH/OAB now admitted with ruptured appendix with abscess formation.     Ruptured appendix with abscess formation POD2 Open (converted from lap) Appendectomy CT abdomen pelvis indicative of right mid abdomen 6cm x 10cm x 7cm on admission, recent colonoscopy at [facility] 8-9 days ago. Improved leukocytosis 24>15, bands increasing 2>12>27. VSS throughout admission.   - Dr. Terra Payne following, appreciate the recs  - wound culture GNR 2 species   - Continue zosyn, vanc, diflucan pharmacy to dose due to DARINEL    - Pain control per surgery  - surgery to advance diet as clinically indicated currently NPO with Ice chips and sips of clears  - LR @ 125  - Tele d/t risk of decompensation   - PT/OT/case management  - Daily CBC, CMP  - FU UC, BC     Afib, new onset: Converted to sinus overnight. Appears to be new onset.  Is on labetalol outpt but hypertension dosing. Pt with mild intermittent chest pain. Lopressor 2.5 mg given over night. EKG showed RVR HR 130s slightly above goal of <120 for chronic afib pt. --monitor for response to metoprolol  --TSH mag, Potssium  --consider anticoagulation, discuss with surgery in am: chadsvasc score is 5 points 7.2%  --has bleed score is 4 points 8.9% riks  --Pt is on SCDs for DVT prevention  --cardiac enzymes x2  -- consider cardiac consult, ECHO, PVLs? AMS: likely metabolic and due to pain and anesthesia  --sitter as needed    Acute urinary retention/OAB/BPH holding home tolteridine, dicyclomine as an outpatient. Continue to hold. - Ucx [id number]  - Urinary retention following surgery with no UOP for 12 hours  - Urology following, appreciate the recs  - Dr. Joyce Prabhakar placed 16 fr catheter 04/20, rec's to keep catheter in place for one week. 0.4mg tamulosin QD     DARINEL on CKD III follows with Dr. Page Mehta as an outpatient. Baseline Cr ~1.5  - Roughly stable 2.14>2.0>2.12  - Hydration as above  - Avoid nephrotoxic meds   - Daily CMP     Transaminitis on admission likely 2/2 acute illness and dehydration. Improving: ALT 91>6555, AST 46>29>32, Alkaline phosphatase 832>269371  - Daily CMP     H/o CVA, HTN, HLD, Vitamin D/B12 deficiency CVA in 2015 with residual mild L sided weakness and some aphasia  - BPs well controlled without medications for now.  Hold home amlodipine 10mg QD, labetalol 100mg BID and losartan 200mg BID   - Hold home lasix 20 mg every day  - Hold home ezetimibe 10mg QD   - Hold home vitamin B12  - Fall precautions, aspiration precautions      Tobacco use current tobacco user  - Nicotine replacement should the patient request      Diet: NPO  DVT Prophylaxis: SCDs  Code Status: Full  Point of Contact: Saskia Cool [phone]     Disposition and anticipated LOS: 2 midnight      Irwin Mckinney MD PGY1  [facility]

## 2018-04-22 NOTE — PROGRESS NOTES
Pt noted to have rhythm change with increased heart rate at approx. 2356. Pt assessed, vitals taken, ekg obtained, pain medication given, and MD notified. Additional orders given for IV Metoprolol. Pt currently on 3 lpm O2 via NC. Sitter at bedside, call bell within reach. Will continue to monitor. Mews score 3 due to event.   Patient Vitals for the past 4 hrs:   Temp Pulse Resp BP SpO2   04/22/18 0029 97.3 °F (36.3 °C) (!) 115 18 111/62 98 %   04/22/18 0000 97.5 °F (36.4 °C) 90 19 123/81 94 %

## 2018-04-22 NOTE — ROUTINE PROCESS
Bedside and Verbal shift change report given to Nicole ADKINS (oncoming nurse) by Eleazar Sam (offgoing nurse). Report included the following information SBAR, Kardex, MAR, Recent Results and Med Rec Status.

## 2018-04-22 NOTE — PROGRESS NOTES
Riaz Payne M.D. FACS  PROGRESS NOTE    Name: Briana Harper MRN: [id number]   : 1945 [facility]   Date: 2018 Admission Date: 2018 10:27 PM     Hospital Day: 4  2 Days Post-Op  Subjective:  Pt without acute overnight events. No flatus or BM. Objective:  Vitals:    18 0347 18 0400 18 0650 18 0751   BP: 109/81 116/75  137/83   Pulse: (!) 122 93  81   Resp:     Temp:  98 °F (36.7 °C)  98 °F (36.7 °C)   SpO2:  100%  96%   Weight:   102 kg (224 lb 12.8 oz)        Date 18 0700 - 18 0659 18 0700 - 18 0659   Shift 3849-6888 9440-7676 24 Hour Total 4397-7138 2977-9881 24 Hour Total   I  N  T  A  K  E   P.O. 620 120 740         P. O. 620 120 740       I.V.  (mL/kg/hr) 1910  (1.6) 1600  (1.3) 3510  (1.4)         Volume (lactated Ringers infusion) 1500 1500 3000         Volume (cefepime (MAXIPIME) 2 g in sterile water (preservative free) 10 mL IV syringe) 10  10         Volume (piperacillin-tazobactam (ZOSYN) 2.25 g in 0.9% sodium chloride (MBP/ADV) 50 mL MBP)  100 100         Volume (fluconazole (DIFLUCAN) 400mg/200 mL IVPB (premix)) 400  400       Shift Total  (mL/kg) 2530  (25.8) 1720  (16.9) 4250  (41.7)      O  U  T  P  U  T   Urine  (mL/kg/hr) 650  (0.6) 1000  (0.8) 1650  (0.7)         Urine Voided          Urine Output (mL) (Urinary Catheter 18 Coude) 300 325 625       Drains 10 20 30         Output (ml) ([place] Drain 18 Right; Lower Abdomen) 10 20 30       Shift Total  (mL/kg) 660  (6.7) 1020  (10) 1680  (16.5)      NET 8749 917 8854      Weight (kg) 97.9 102 102 102 102 102         Physical Exam:    General: A&A, NAD, Oriented to self   Abdomen: abdomen is soft with midline incisional  tenderness. Incision with serosanguinous drainage. No masses, organomegaly or guarding. STEVEN with  sanguinopurulent drainage.      Labs:  Recent Results (from the past 24 hour(s))   EKG, 12 LEAD, SUBSEQUENT    Collection Time: 04/22/18 12:12 AM   Result Value Ref Range    Ventricular Rate 133 BPM    Atrial Rate 147 BPM    QRS Duration 94 ms    Q-T Interval 316 ms    QTC Calculation (Bezet) 470 ms    Calculated R Axis -47 degrees    Calculated T Axis 30 degrees    Diagnosis       Atrial fibrillation with rapid ventricular response  Incomplete right bundle branch block  Left anterior fascicular block  Inferior infarct (cited on or before 19-APR-2018)  Abnormal ECG    Confirmed by Lenny Zvaala MD, Mariana Thompson ([id number]) on 4/22/2018 9:14:48 AM     CBC WITH AUTOMATED DIFF    Collection Time: 04/22/18  3:14 AM   Result Value Ref Range    WBC 29.0 (H) 4.6 - 13.2 K/uL    RBC 3.95 (L) 4.70 - 5.50 M/uL    HGB 10.6 (L) 13.0 - 16.0 g/dL    HCT 31.9 (L) 36.0 - 48.0 %    MCV 80.8 74.0 - 97.0 FL    MCH 26.8 24.0 - 34.0 PG    MCHC 33.2 31.0 - 37.0 g/dL    RDW 14.9 (H) 11.6 - 14.5 %    PLATELET 602 405 - 876 K/uL    MPV 10.4 9.2 - 11.8 FL    NEUTROPHILS 62 42 - 75 %    BAND NEUTROPHILS 20 (H) 0 - 5 %    LYMPHOCYTES 9 (L) 20 - 51 %    MONOCYTES 7 2 - 9 %    EOSINOPHILS 0 0 - 5 %    BASOPHILS 0 0 - 3 %    METAMYELOCYTES 1 (H) 0 %    MYELOCYTES 1 (H) 0 %    ABS. NEUTROPHILS 23.8 (H) 1.8 - 8.0 K/UL    ABS. LYMPHOCYTES 2.6 0.8 - 3.5 K/UL    ABS. MONOCYTES 2.0 (H) 0 - 1.0 K/UL    ABS. EOSINOPHILS 0.0 0.0 - 0.4 K/UL    ABS.  BASOPHILS 0.0 0.0 - 0.06 K/UL    DF MANUAL      PLATELET COMMENTS ADEQUATE PLATELETS      RBC COMMENTS ANISOCYTOSIS  1+        RBC COMMENTS OVALOCYTES  1+       METABOLIC PANEL, COMPREHENSIVE    Collection Time: 04/22/18  3:14 AM   Result Value Ref Range    Sodium 141 136 - 145 mmol/L    Potassium 4.1 3.5 - 5.5 mmol/L    Chloride 109 (H) 100 - 108 mmol/L    CO2 20 (L) 21 - 32 mmol/L    Anion gap 12 3.0 - 18 mmol/L    Glucose 82 74 - 99 mg/dL    BUN 25 (H) 7.0 - 18 MG/DL    Creatinine 1.64 (H) 0.6 - 1.3 MG/DL    BUN/Creatinine ratio 15 12 - 20      GFR est AA 50 (L) >60 ml/min/1.73m2    GFR est non-AA 41 (L) >60 ml/min/1.73m2 Calcium 9.0 8.5 - 10.1 MG/DL    Bilirubin, total 0.7 0.2 - 1.0 MG/DL    ALT (SGPT) 45 16 - 61 U/L    AST (SGOT) 37 15 - 37 U/L    Alk.  phosphatase 100 45 - 117 U/L    Protein, total 6.1 (L) 6.4 - 8.2 g/dL    Albumin 2.0 (L) 3.4 - 5.0 g/dL    Globulin 4.1 (H) 2.0 - 4.0 g/dL    A-G Ratio 0.5 (L) 0.8 - 1.7     CARDIAC PANEL,(CK, CKMB & TROPONIN)    Collection Time: 04/22/18  3:14 AM   Result Value Ref Range     (H) 39 - 308 U/L    CK - MB 7.1 (H) <3.6 ng/ml    CK-MB Index 1.6 0.0 - 4.0 %    Troponin-I, Qt. <0.02 0.0 - 0.045 NG/ML   TSH 3RD GENERATION    Collection Time: 04/22/18  3:14 AM   Result Value Ref Range    TSH 0.65 0.36 - 3.74 uIU/mL     All Micro Results     Procedure Component Value Units Date/Time    CULTURE, BLOOD [[id number]] Collected:  04/20/18 0000    Order Status:  Completed Specimen:  Blood from Blood Updated:  04/22/18 0650     Special Requests: NO SPECIAL REQUESTS        Culture result: NO GROWTH 2 DAYS       CULTURE, BLOOD [[id number]] Collected:  04/20/18 0015    Order Status:  Completed Specimen:  Whole Blood from Blood Updated:  04/22/18 0650     Special Requests: NO SPECIAL REQUESTS        Culture result: NO GROWTH 2 DAYS       CULTURE, ANAEROBIC [[id number]] Collected:  04/20/18 0734    Order Status:  Completed Specimen:  Appendix Updated:  04/21/18 1136     Special Requests: ABSCESS        Culture result:         CULTURE IN PROGRESS,FURTHER UPDATES TO FOLLOW    CULTURE, SURGICAL WOUND Ofeliaiel Charleston STAIN [[id number]]  (Abnormal) Collected:  04/20/18 0734    Order Status:  Completed Specimen:  Appendix Updated:  04/21/18 1135     Special Requests: ABSCESS        GRAM STAIN MODERATE WBC'S         FEW GRAM POSITIVE COCCI                 MODERATE GRAM POSITIVE RODS      FEW GRAM NEGATIVE RODS        Culture result:         FEW GRAM NEGATIVE RODS (A)              FEW POSSIBLE 2ND GRAM NEGATIVE UMM (A)      FEW  POSSIBLE  3RD GRAM NEGATIVE UMM   (A)               CULTURE IN PROGRESS,FURTHER UPDATES TO FOLLOW    CULTURE, URINE [[id number]] Collected:  04/20/18 0102    Order Status:  Completed Specimen:  Clean catch Updated:  04/21/18 1000     Special Requests: NO SPECIAL REQUESTS        Culture result: NO GROWTH 1 DAY       CULTURE, BODY FLUID Brown Songster STAIN [[id number]] Collected:  04/20/18 0745    Order Status:  Canceled Specimen:  Drainage           Current Medications:  Current Facility-Administered Medications   Medication Dose Route Frequency Provider Last Rate Last Dose    HYDROcodone-acetaminophen (NORCO) 5-325 mg per tablet 1 Tab  1 Tab Oral Q4H PRN Mari Magdaleno MD   1 Tab at 04/22/18 0801    piperacillin-tazobactam (ZOSYN) 2.25 g in 0.9% sodium chloride (MBP/ADV) 50 mL MBP  2.25 g IntraVENous Q6H Mari Magdaleno  mL/hr at 04/22/18 1012 2.25 g at 04/22/18 1012    fluconazole (DIFLUCAN) 200mg/100 mL IVPB (premix)  200 mg IntraVENous DAILY Mari Magdaleno  mL/hr at 04/22/18 1012 200 mg at 04/22/18 1012    vancomycin (VANCOCIN) 1,500 mg in 0.9% sodium chloride 500 mL IVPB  1,500 mg IntraVENous Q24H Mari Magdaleno MD        acetaminophen (TYLENOL) tablet 650 mg  650 mg Oral Q4H PRN Joleen Mane MD   650 mg at 04/21/18 0736    lactated Ringers infusion  125 mL/hr IntraVENous CONTINUOUS Mari Magdaleno  mL/hr at 04/22/18 0652 125 mL/hr at 04/22/18 7761    naloxone (NARCAN) injection 0.2 mg  0.2 mg IntraVENous ONCE PRN Joleen Mane MD        [facility]) capsule 0.4 mg  0.4 mg Oral DAILY Joleen Mane MD   0.4 mg at 04/22/18 1012       Chart and notes reviewed. Data reviewed. I have evaluated and examined the patient. IMPRESSION:   · Pt is POD 2 from lap to open appendectomy with perforation and  Abscess. PLAN:/DISCUSION:   · Sepsis - Vanco, Zosyn and diflucan.  Cultures pending  · Sips of clears  · OOB to chair with sitter, encourage IS  · Flomax and lew for urinary retention        Mari Magdaleno MD

## 2018-04-22 NOTE — PROGRESS NOTES
Problem: Falls - Risk of  Goal: *Absence of Falls  Document Leyla Fall Risk and appropriate interventions in the flowsheet.    Outcome: Progressing Towards Goal  Fall Risk Interventions:  Mobility Interventions: Bed/chair exit alarm, Mechanical lift, PT Consult for mobility concerns, OT consult for ADLs, Assess mobility with egress test, Communicate number of staff needed for ambulation/transfer    Mentation Interventions: Bed/chair exit alarm, Door open when patient unattended, Family/sitter at bedside    Medication Interventions: Bed/chair exit alarm, Patient to call before getting OOB, Teach patient to arise slowly    Elimination Interventions: Call light in reach, Toilet paper/wipes in reach, Patient to call for help with toileting needs    History of Falls Interventions: Consult care management for discharge planning, Bed/chair exit alarm, Investigate reason for fall

## 2018-04-22 NOTE — PROGRESS NOTES
OT orders received and chart reviewed. Patient had surgery yesterday. Will d/c current OT orders. Will need new OT orders post procedure, when medically stable. Thank you.     Rene Camargo OTR/L

## 2018-04-23 LAB
ANION GAP SERPL CALC-SCNC: 8 MMOL/L (ref 3–18)
BASOPHILS # BLD: 0 K/UL (ref 0–0.06)
BASOPHILS NFR BLD: 0 % (ref 0–3)
BUN SERPL-MCNC: 26 MG/DL (ref 7–18)
BUN/CREAT SERPL: 19 (ref 12–20)
CALCIUM SERPL-MCNC: 9.2 MG/DL (ref 8.5–10.1)
CHLORIDE SERPL-SCNC: 113 MMOL/L (ref 100–108)
CO2 SERPL-SCNC: 22 MMOL/L (ref 21–32)
CREAT SERPL-MCNC: 1.39 MG/DL (ref 0.6–1.3)
DIFFERENTIAL METHOD BLD: ABNORMAL
EOSINOPHIL # BLD: 0 K/UL (ref 0–0.4)
EOSINOPHIL NFR BLD: 0 % (ref 0–5)
ERYTHROCYTE [DISTWIDTH] IN BLOOD BY AUTOMATED COUNT: 15.2 % (ref 11.6–14.5)
GLUCOSE SERPL-MCNC: 90 MG/DL (ref 74–99)
HCT VFR BLD AUTO: 30.9 % (ref 36–48)
HGB BLD-MCNC: 10.5 G/DL (ref 13–16)
LYMPHOCYTES # BLD: 1.4 K/UL (ref 0.8–3.5)
LYMPHOCYTES NFR BLD: 5 % (ref 20–51)
MCH RBC QN AUTO: 27.3 PG (ref 24–34)
MCHC RBC AUTO-ENTMCNC: 34 G/DL (ref 31–37)
MCV RBC AUTO: 80.3 FL (ref 74–97)
MONOCYTES # BLD: 0.9 K/UL (ref 0–1)
MONOCYTES NFR BLD: 3 % (ref 2–9)
NEUTS BAND NFR BLD MANUAL: 6 % (ref 0–5)
NEUTS SEG # BLD: 26.6 K/UL (ref 1.8–8)
NEUTS SEG NFR BLD: 86 % (ref 42–75)
PLATELET # BLD AUTO: 356 K/UL (ref 135–420)
PLATELET COMMENTS,PCOM: ABNORMAL
PMV BLD AUTO: 9.3 FL (ref 9.2–11.8)
POTASSIUM SERPL-SCNC: 4.3 MMOL/L (ref 3.5–5.5)
RBC # BLD AUTO: 3.85 M/UL (ref 4.7–5.5)
RBC MORPH BLD: ABNORMAL
SODIUM SERPL-SCNC: 143 MMOL/L (ref 136–145)
WBC # BLD AUTO: 28.9 K/UL (ref 4.6–13.2)

## 2018-04-23 PROCEDURE — 65660000004 HC RM CVT STEPDOWN

## 2018-04-23 PROCEDURE — 74011250636 HC RX REV CODE- 250/636: Performed by: INTERNAL MEDICINE

## 2018-04-23 PROCEDURE — 74011250637 HC RX REV CODE- 250/637: Performed by: SURGERY

## 2018-04-23 PROCEDURE — 80048 BASIC METABOLIC PNL TOTAL CA: CPT | Performed by: STUDENT IN AN ORGANIZED HEALTH CARE EDUCATION/TRAINING PROGRAM

## 2018-04-23 PROCEDURE — 85025 COMPLETE CBC W/AUTO DIFF WBC: CPT | Performed by: STUDENT IN AN ORGANIZED HEALTH CARE EDUCATION/TRAINING PROGRAM

## 2018-04-23 PROCEDURE — 74011250637 HC RX REV CODE- 250/637: Performed by: STUDENT IN AN ORGANIZED HEALTH CARE EDUCATION/TRAINING PROGRAM

## 2018-04-23 PROCEDURE — 74011000258 HC RX REV CODE- 258: Performed by: SURGERY

## 2018-04-23 PROCEDURE — 77010033678 HC OXYGEN DAILY: Performed by: FAMILY MEDICINE

## 2018-04-23 PROCEDURE — 36415 COLL VENOUS BLD VENIPUNCTURE: CPT | Performed by: STUDENT IN AN ORGANIZED HEALTH CARE EDUCATION/TRAINING PROGRAM

## 2018-04-23 PROCEDURE — 74011250636 HC RX REV CODE- 250/636: Performed by: SURGERY

## 2018-04-23 PROCEDURE — 74011000250 HC RX REV CODE- 250: Performed by: INTERNAL MEDICINE

## 2018-04-23 RX ORDER — FLUCONAZOLE 2 MG/ML
400 INJECTION, SOLUTION INTRAVENOUS DAILY
Status: DISCONTINUED | OUTPATIENT
Start: 2018-04-24 | End: 2018-04-27

## 2018-04-23 RX ORDER — LEVOFLOXACIN 5 MG/ML
500 INJECTION, SOLUTION INTRAVENOUS EVERY 24 HOURS
Status: DISCONTINUED | OUTPATIENT
Start: 2018-04-23 | End: 2018-04-25

## 2018-04-23 RX ADMIN — SODIUM CHLORIDE 3.38 G: 900 INJECTION, SOLUTION INTRAVENOUS at 10:16

## 2018-04-23 RX ADMIN — FLUCONAZOLE 200 MG: 2 INJECTION, SOLUTION INTRAVENOUS at 10:17

## 2018-04-23 RX ADMIN — HYDROCODONE BITARTRATE AND ACETAMINOPHEN 1 TABLET: 5; 325 TABLET ORAL at 06:56

## 2018-04-23 RX ADMIN — MEROPENEM 1 G: 1 INJECTION, POWDER, FOR SOLUTION INTRAVENOUS at 16:38

## 2018-04-23 RX ADMIN — SODIUM CHLORIDE 3.38 G: 900 INJECTION, SOLUTION INTRAVENOUS at 03:45

## 2018-04-23 RX ADMIN — ACETAMINOPHEN 650 MG: 325 TABLET ORAL at 16:57

## 2018-04-23 RX ADMIN — LEVOFLOXACIN 500 MG: 5 INJECTION, SOLUTION INTRAVENOUS at 16:38

## 2018-04-23 RX ADMIN — HYDROCODONE BITARTRATE AND ACETAMINOPHEN 1 TABLET: 5; 325 TABLET ORAL at 20:14

## 2018-04-23 RX ADMIN — HYDROCODONE BITARTRATE AND ACETAMINOPHEN 1 TABLET: 5; 325 TABLET ORAL at 01:46

## 2018-04-23 RX ADMIN — TAMSULOSIN HYDROCHLORIDE 0.4 MG: 0.4 CAPSULE ORAL at 10:17

## 2018-04-23 RX ADMIN — SODIUM CHLORIDE, SODIUM LACTATE, POTASSIUM CHLORIDE, AND CALCIUM CHLORIDE 125 ML/HR: 600; 310; 30; 20 INJECTION, SOLUTION INTRAVENOUS at 03:47

## 2018-04-23 RX ADMIN — SODIUM CHLORIDE, SODIUM LACTATE, POTASSIUM CHLORIDE, AND CALCIUM CHLORIDE 125 ML/HR: 600; 310; 30; 20 INJECTION, SOLUTION INTRAVENOUS at 20:19

## 2018-04-23 RX ADMIN — SODIUM CHLORIDE, SODIUM LACTATE, POTASSIUM CHLORIDE, AND CALCIUM CHLORIDE 125 ML/HR: 600; 310; 30; 20 INJECTION, SOLUTION INTRAVENOUS at 11:34

## 2018-04-23 NOTE — CONSULTS
Infectious Disease Consultation Note    Requested by: dr. Jennyfer Cochran    Reason: perforated appendicitis, increasing wbc    Current abx Prior abx   Zosyn, vancomycin since 4/21 Cefepime, levofloxacin 4/20-4/21     Lines:       Assessment :    67 y.o. 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 Ct  Scan 4/20/18- 6x10x7 cm RLQ abdominal abscess. S/p laparoscopy, open appendectomy with drainage of appendiceal abscess on 4/21/18- found to have perforated appendix with walled off abscess. Intra op cx: e.coli, proteus, 3rd gnr    Clinical presentation c/w perforated appendicitis with contained perforation, abdominal abscess. Patient has been appropriately managed with appendectomy, drainage of abscess. Persistent leukocytosis with wbc:28k today compared to 15k on 4/20, diffuse tenderness could be due to partially treated infection due to resistant gram negative pathogens, untreated yersinia infection /undiagnosed abdominal pathology, intra op complication. I will modify abx to cover for multidrug resistant gram negatives, yersinia and monitor clinically. Acute renal failure: likely due to urinary retention, volume depletion due to poor po intake - s/p lew insertion by urologist on 4/20/18 - improved renal function      Recommendations:    1. D/c pip/tazo, vancomycin. Start meropenem, levofloxacin. Increase fluconazole to 400 mg q 24 hour  2. F/u id and susceptibility of 3rd gnr  3. F/u cbc in am  4. Agree with plans for repeat ct scan if no significant improvement by am  5. Monitor for nosocomial infections    Advance Care planning: full code: discussed  with patient/surrogate decision maker:RuizMelania: [phone]    Thank you for consultation request. Above plan was discussed in details with patient,  and dr Usha Delgado.  Please call me if any further questions or concerns. Will continue to participate in the care of this patient. HPI:    67 y.o. 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 Did not report any abdominal pain. Came to Dr and lab showed WBC for 24,000. Sent to ER and found to johnathon large RLQ abdominal abscess. Taken to OR by Dr Nazia Montes on 4/21/18 - underwent laparoscopy, open appendectomy with drainage of appendiceal abscess - found to have perforated appendix with walled off abscess. Patient has persistent leukocytosis with wbc:28k today compared to 15k on 4/20. I have been consulted for further recommendations. Patient has been on pip/tazo, vancomycin since 4/21. Intra op cultures reveal proteus, e.coli, possible 3rd gnr. Patient complains of abdominal pain not improved since admission. Patient denies headaches, visual disturbances, sore throat, runny nose, earaches, cp, sob, chills, cough, diarrhea, burning micturition, pain or weakness in extremities. He denies back pain/flank pain.   Unable to state if he has h/o MRSA colonization or infection in the past.        Past Medical History:   Diagnosis Date    Acute ischemic stroke ([facility] [address] 9/1/2015    Acute Ischemic Stroke (early subacute infarct at the posterior right lentiform nucleus) with residual left hemiparesis and dysphagia    CKD (chronic kidney disease) stage 3, GFR 30-59 ml/min 7/0/0906    Diastolic dysfunction without heart failure 9/2/2015    Grade 1 diastolic dysfunction on 2D ECHO done 9/02/2015    Dyslipidemia 9/2/2015    History of noncompliance with medical treatment, presenting hazards to health 9/1/2015    History of tobacco use 9/1/2015    Hypertension     Hypertensive heart and kidney disease without heart failure and with chronic kidney disease stage III 9/2/2015  Obesity, Class I, BMI 30-34.9 9/1/2015    Rhabdomyolysis 9/1/2015    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 9/1/2015    Vitamin D deficiency 9/6/2015       Past Surgical History:   Procedure Laterality Date    COLONOSCOPY N/A 4/3/2018    COLONOSCOPY,  w heated snared polypectomy performed by Yodit Diaz MD at [facility] ENDOSCOPY       home Medication List    Details   cyanocobalamin (VITAMIN B-12) 1,000 mcg tablet Take 1,000 mcg by mouth daily. furosemide (LASIX) 20 mg tablet Take  by mouth daily. ezetimibe (ZETIA) 10 mg tablet Take 10 mg by mouth daily. labetalol (NORMODYNE) 200 mg tablet Take 1 Tab by mouth every twelve (12) hours. Qty: 30 Tab, Refills: 0    Associated Diagnoses: Hypertensive heart and kidney disease without heart failure and with chronic kidney disease stage III      losartan (COZAAR) 100 mg tablet Take 1 Tab by mouth daily. Qty: 15 Tab, Refills: 0    Associated Diagnoses: Hypertensive heart and kidney disease without heart failure and with chronic kidney disease stage III      amLODIPine (NORVASC) 10 mg tablet Take 1 Tab by mouth daily.   Qty: 15 Tab, Refills: 0    Associated Diagnoses: Hypertensive heart and kidney disease without heart failure and with chronic kidney disease stage III             Current Facility-Administered Medications   Medication Dose Route Frequency    [START ON 4/24/2018] Vancomycin Trough Level on Tuesday, 4/24 @ 1730  1 Each Other ONCE    piperacillin-tazobactam (ZOSYN) 3.375 g in 0.9% sodium chloride (MBP/ADV) 100 mL ADV  3.375 g IntraVENous Q6H    HYDROcodone-acetaminophen (NORCO) 5-325 mg per tablet 1 Tab  1 Tab Oral Q4H PRN    fluconazole (DIFLUCAN) 200mg/100 mL IVPB (premix)  200 mg IntraVENous DAILY    vancomycin (VANCOCIN) 1,500 mg in 0.9% sodium chloride 500 mL IVPB  1,500 mg IntraVENous Q24H    acetaminophen (TYLENOL) tablet 650 mg  650 mg Oral Q4H PRN    lactated Ringers infusion  125 mL/hr IntraVENous CONTINUOUS    naloxone (NARCAN) injection 0.2 mg  0.2 mg IntraVENous ONCE PRN    tamsulosin (FLOMAX) capsule 0.4 mg  0.4 mg Oral DAILY       Allergies: Lipitor [atorvastatin]    Family History   Problem Relation Age of Onset    Diabetes Mother     Stroke Mother     Heart Disease Father     Hypertension Father     Diabetes Brother     Hypertension Brother      Social History     Social History    Marital status: SINGLE     Spouse name: N/A    Number of children: N/A    Years of education: N/A     Occupational History    Not on file. Social History Main Topics    Smoking status: Current Every Day Smoker     Last attempt to quit: 2014    Smokeless tobacco: Never Used    Alcohol use Yes    Drug use: No    Sexual activity: No     Other Topics Concern    Not on file     Social History Narrative     History   Smoking Status    Current Every Day Smoker    Last attempt to quit: 2014   Smokeless Tobacco    Never Used        Temp (24hrs), Av.6 °F (36.4 °C), Min:97 °F (36.1 °C), Max:98.4 °F (36.9 °C)    Visit Vitals    /88    Pulse 75    Temp 97.1 °F (36.2 °C)    Resp 18    Wt 105.2 kg (232 lb)    SpO2 95%    BMI 29.79 kg/m2       ROS: 12 point ROS obtained in details. Pertinent positives as mentioned in HPI,   otherwise negative    Physical Exam:    General: Well developed, well nourished male laying on the bed, AAOx2, in mild distress due to abdominal pain    General:   awake alert and oriented   HEENT:  Normocephalic, atraumatic, PERRL, EOMI, no scleral icterus or pallor; no conjunctival hemmohage;  nasal and oral mucous are moist and without evidence of lesions. Neck supple, no bruits. Lymph Nodes:   no cervical, axillary or inguinal adenopathy   Lungs:   non-labored, bilaterally clear to auscultation- no crackles wheezes rales or rhonchi   Heart:  RRR, s1 and s2; no  rubs or gallops, no edema, + pedal pulses   Abdomen:  soft, hypoactive bowel sounds, no hepatomegaly, no splenomegaly.  Appropriate surgical scars for stated surgeries. Diffuse tenderness. STEVEN drain in place with serosanguinous drainage   Genitourinary:  lew in place   Extremities:   no clubbing, cyanosis; no joint effusions or swelling; muscle mass appropriate for age   Neurologic:  No gross focal sensory abnormalities; 5/5 muscle strength to upper and lower extremities. Speech appropriate. Cranial nerves intact                        Skin:  No rash or ulcers noted   Back:  no spinal or paraspinal muscle tenderness or rigidity, no CVA tenderness     Psychiatric:  Unable to assess         Labs: Results:   Chemistry Recent Labs      04/23/18   0840  04/22/18   0314  04/21/18   0326   GLU  90  82  92   NA  143  141  138   K  4.3  4.1  4.3   CL  113*  109*  109*   CO2  22  20*  19*   BUN  26*  25*  28*   CREA  1.39*  1.64*  2.12*   CA  9.2  9.0  9.0   AGAP  8  12  10   BUCR  19  15  13   AP   --   100  106   TP   --   6.1*  6.2*   ALB   --   2.0*  2.1*   GLOB   --   4.1*  4.1*   AGRAT   --   0.5*  0.5*      CBC w/Diff Recent Labs      04/23/18   0840  04/22/18   0314  04/21/18   0326   WBC  28.9*  29.0*  21.9*   RBC  3.85*  3.95*  4.01*   HGB  10.5*  10.6*  10.8*   HCT  30.9*  31.9*  32.3*   PLT  356  356  326   GRANS  86*  62  56   LYMPH  5*  9*  11*   EOS  0  0  0      Microbiology No results for input(s): CULT in the last 72 hours.        RADIOLOGY:    All available imaging studies/reports in [facility] for this admission were reviewed    Dr. Leighton Ramirez, Infectious Disease Specialist  [phone]  April 23, 2018  3:11 PM

## 2018-04-23 NOTE — PROGRESS NOTES
Intern Progress Note  [facility]       Patient: Sahara East MRN: [id number]  CSN: [id number]    YOB: 1945  Age: 67 y.o. Sex: male    DOA: 4/19/2018 LOS:  LOS: 3 days                    Subjective:     Acute events: Patient reports abdominal pain this morning with nausea. Denies having a BM. Last night he remained in normal sinus rhythm. Review of Systems   Respiratory: Negative for shortness of breath. Cardiovascular: Negative for chest pain. Gastrointestinal: Positive for abdominal pain and nausea. Objective:      Patient Vitals for the past 24 hrs:   Temp Pulse Resp BP SpO2   04/23/18 0400 97 °F (36.1 °C) 83 11 (!) 148/92 95 %   04/23/18 0007 98.3 °F (36.8 °C) 78 15 (!) 159/96 95 %   04/22/18 2031 98.4 °F (36.9 °C) 81 17 (!) 144/93 96 %   04/22/18 1630 97.6 °F (36.4 °C) 84 18 139/86 99 %   04/22/18 1126 98 °F (36.7 °C) 71 20 145/87 94 %   04/22/18 0751 98 °F (36.7 °C) 81 22 137/83 96 %         Intake/Output Summary (Last 24 hours) at 04/23/18 0650  Last data filed at 04/23/18 0430   Gross per 24 hour   Intake          3031.25 ml   Output             2285 ml   Net           746.25 ml         Physical Exam:   General:  Alert and Responsive and in No acute distress. CV:  RRR, no rubs gallops or murmurs. RESP:  Unlabored breathing. Lungs clear to auscultation. no wheeze, rales, or rhonchi. Equal expansion bilaterally. ABD:  Soft,tender to palpation in the general abdomen, nondistended. Wound covered by dressing. MS:  No joint deformity or instability. No atrophy. Neuro: axo2  Ext:  No edema.    Skin: dry skin      Lab/Data Reviewed:  BMP: No results found for: NA, K, CL, CO2, AGAP, GLU, BUN, CREA, GFRAA, GFRNA  CBC: No results found for: WBC, HGB, HGBEXT, HCT, HCTEXT, PLT, PLTEXT, HGBEXT, HCTEXT, PLTEXT     Scheduled Medications Reviewed:  Current Facility-Administered Medications   Medication Dose Route Frequency    piperacillin-tazobactam (Alycia Limon) 3.375 g in 0.9% sodium chloride (MBP/ADV) 100 mL ADV  3.375 g IntraVENous Q6H    fluconazole (DIFLUCAN) 200mg/100 mL IVPB (premix)  200 mg IntraVENous DAILY    vancomycin (VANCOCIN) 1,500 mg in 0.9% sodium chloride 500 mL IVPB  1,500 mg IntraVENous Q24H    lactated Ringers infusion  125 mL/hr IntraVENous CONTINUOUS    tamsulosin (FLOMAX) capsule 0.4 mg  0.4 mg Oral DAILY         Imaging, microbiology, and EKG/Telemetry:  none    Assessment/Plan     67 y. o. male with PMH significant for CVA, HTN, HLD, CKD III, chronic diarrhea, BPH/OAB now admitted with ruptured appendix with abscess formation.      Ruptured appendix with abscess formation POD3 Open (converted from lap) Appendectomy: The CT abdomen pelvis indicative of right mid abdomen 6cm x 10cm x 7cm on admission. With leukocytosis 24>15>29 trending up, bands increasing 2>12>27>20. Blood cx from 4/20 with no growth in 3 days. Surgical culture grew GNR, 2 species.   - Dr. Margot Rain following, appreciate the recs  - Continue zosyn, vanc, diflucan   - Pain control per surgery  - surgery to advance diet as clinically indicated currently NPO with Ice chips and sips of clears  - LR @ 125 ml/hr  - PT/OT  - Daily CBC, CMP      AMS: likely metabolic and due to pain and anesthesia. Axo2.  -sitter as needed     Acute urinary retention/OAB/BPH: holding home tolteridine, dicyclomine as an outpatient. The Ucx 4/20 with NG1day. Had urinary retention following surgery so lew was placed by urology. - Urology following, appreciate the recs  - Dr. Johnathon Ramos placed 16 fr catheter 04/20, rec's to keep catheter in place for one week.   - 0.4mg tamulosin QD      DARINEL on CKD III follows with Dr. Efrain Edwards as an outpatient. Baseline Cr ~1.5. Cr 2.14>2.0>2.12>1.64 trending down. - Hydration as above  - Avoid nephrotoxic meds   - Daily CMP      Transaminitis(resolved): on admission likely 2/2 acute illness and dehydration. ALT, AST and Alkaline phosphatase are back to normal levels.   - Daily CMP      H/o CVA, HTN, HLD, Vitamin D/B12 deficiency: CVA in 2015 with residual mild L sided weakness and some aphasia.  - Hold home amlodipine 10mg QD, labetalol 100mg BID and losartan 200mg BID   - Hold home lasix 20 mg every day  - Hold home ezetimibe 10mg QD   - Hold home vitamin B12  - Fall precautions, aspiration precautions       Tobacco use: current tobacco user  - Nicotine replacement should the patient request       Diet: sips of clear  DVT Prophylaxis: SCDs  Code Status: Full  Point of Contact: [facility] [phone]      Disposition and anticipated LOS: 2 midnight     Vineet Lino MD, PGY-1  Mahesh Salguero 10

## 2018-04-23 NOTE — PROGRESS NOTES
Riaz Ang M.D. FACS  PROGRESS NOTE    Name: Lani George MRN: [id number]   : 1945 [facility]   Date: 2018 Admission Date: 2018 10:27 PM     Hospital Day: 5  3 Days Post-Op  Subjective:  Pt without acute overnight events. No flatus or BM. Pain is 6/10. Objective:  Vitals:    18 2031 18 0007 18 0400 18 0815   BP: (!) 144/93 (!) 159/96 (!) 148/92 137/85   Pulse: 81 78 83 74   Resp: 17 15 11 18   Temp: 98.4 °F (36.9 °C) 98.3 °F (36.8 °C) 97 °F (36.1 °C) 97.4 °F (36.3 °C)   SpO2: 96% 95% 95% 97%   Weight:   105.2 kg (232 lb)        Date 18 07 - 18 0659 18 0700 - 18 0659   Shift 7358-4242 8404-5694 24 Hour Total 6407-1715 7744-0477 24 Hour Total   I  N  T  A  K  E   I.V.  (mL/kg/hr) 2100  (1.7) 1700  (1.3) 3800  (1.5) 158. 3  158. 3      Volume (lactated Ringers infusion) 1500 1500 3000 158. 3  158. 3      Volume (vancomycin (VANCOCIN) 1,500 mg in 0.9% sodium chloride 500 mL IVPB) 500  500         Volume (piperacillin-tazobactam (ZOSYN) 3.375 g in 0.9% sodium chloride (MBP/ADV) 100 mL ADV) 100 200 300       Shift Total  (mL/kg) 2100  (20.6) 1700  (16.2) 3800  (36.1) 158.3  (1.5)  158.3  (1.5)   O  U  T  P  U  T   Urine  (mL/kg/hr) 800  (0.7) 1250  (1) 2050  (0.8)         Urine Voided 250 650 900         Urine Occurrence(s) 1 x  1 x         Urine Output (mL) (Urinary Catheter 18 [place])        Drains  10 10         Output (ml) ([place] Drain 18 Right; Lower Abdomen)  10 10       Shift Total  (mL/kg) 800  (7.8) 1260  (12) 2060  (19.6)      NET 6390 387 2926 158. 3  158. 3   Weight (kg) 102 105.2 105.2 105.2 105.2 105.2         Physical Exam:    General: A&A, NAD   Abdomen: abdomen is soft with incisional  tenderness.   Incision without erythema,    No   masses, organomegaly or guarding    Labs:  Recent Results (from the past 24 hour(s))   CBC WITH AUTOMATED DIFF    Collection Time: 18 8:40 AM   Result Value Ref Range    WBC 28.9 (H) 4.6 - 13.2 K/uL    RBC 3.85 (L) 4.70 - 5.50 M/uL    HGB 10.5 (L) 13.0 - 16.0 g/dL    HCT 30.9 (L) 36.0 - 48.0 %    MCV 80.3 74.0 - 97.0 FL    MCH 27.3 24.0 - 34.0 PG    MCHC 34.0 31.0 - 37.0 g/dL    RDW 15.2 (H) 11.6 - 14.5 %    PLATELET 640 981 - 639 K/uL    MPV 9.3 9.2 - 11.8 FL    NEUTROPHILS 86 (H) 42 - 75 %    BAND NEUTROPHILS 6 (H) 0 - 5 %    LYMPHOCYTES 5 (L) 20 - 51 %    MONOCYTES 3 2 - 9 %    EOSINOPHILS 0 0 - 5 %    BASOPHILS 0 0 - 3 %    ABS. NEUTROPHILS 26.6 (H) 1.8 - 8.0 K/UL    ABS. LYMPHOCYTES 1.4 0.8 - 3.5 K/UL    ABS. MONOCYTES 0.9 0 - 1.0 K/UL    ABS. EOSINOPHILS 0.0 0.0 - 0.4 K/UL    ABS.  BASOPHILS 0.0 0.0 - 0.06 K/UL    DF MANUAL      PLATELET COMMENTS ADEQUATE PLATELETS      RBC COMMENTS ANISOCYTOSIS  1+       METABOLIC PANEL, BASIC    Collection Time: 04/23/18  8:40 AM   Result Value Ref Range    Sodium 143 136 - 145 mmol/L    Potassium 4.3 3.5 - 5.5 mmol/L    Chloride 113 (H) 100 - 108 mmol/L    CO2 22 21 - 32 mmol/L    Anion gap 8 3.0 - 18 mmol/L    Glucose 90 74 - 99 mg/dL    BUN 26 (H) 7.0 - 18 MG/DL    Creatinine 1.39 (H) 0.6 - 1.3 MG/DL    BUN/Creatinine ratio 19 12 - 20      GFR est AA >60 >60 ml/min/1.73m2    GFR est non-AA 50 (L) >60 ml/min/1.73m2    Calcium 9.2 8.5 - 10.1 MG/DL     All Micro Results     Procedure Component Value Units Date/Time    CULTURE, SURGICAL WOUND Evelena [place] STAIN [[id number]]  (Abnormal)  (Susceptibility) Collected:  04/20/18 0734    Order Status:  Completed Specimen:  Appendix Updated:  04/23/18 0955     Special Requests: ABSCESS        GRAM STAIN MODERATE WBC'S         FEW GRAM POSITIVE COCCI                 MODERATE GRAM POSITIVE RODS      FEW GRAM NEGATIVE RODS        Culture result: FEW PROTEUS MIRABILIS (A)         FEW ESCHERICHIA COLI (A)         FEW  POSSIBLE  3RD GRAM NEGATIVE UMM   (A)               CULTURE IN PROGRESS,FURTHER UPDATES TO FOLLOW    CULTURE, ANAEROBIC [[id number]] Collected:  04/20/18 0734 Order Status:  Completed Specimen:  Appendix Updated:  04/23/18 0834     Special Requests: ABSCESS        Culture result:         ANAEROBES NOT RULED OUT. SCREENING IN PROGRESS.     CULTURE, BLOOD [[id number]] Collected:  04/20/18 0000    Order Status:  Completed Specimen:  Blood from Blood Updated:  04/23/18 0654     Special Requests: NO SPECIAL REQUESTS        Culture result: NO GROWTH 3 DAYS       CULTURE, BLOOD [[id number]] Collected:  04/20/18 0015    Order Status:  Completed Specimen:  Whole Blood from Blood Updated:  04/23/18 0654     Special Requests: NO SPECIAL REQUESTS        Culture result: NO GROWTH 3 DAYS       CULTURE, URINE [[id number]] Collected:  04/20/18 0102    Order Status:  Completed Specimen:  Clean catch Updated:  04/21/18 1000     Special Requests: NO SPECIAL REQUESTS        Culture result: NO GROWTH 1 DAY       CULTURE, BODY FLUID Thomas [place] STAIN [[id number]] Collected:  04/20/18 0745    Order Status:  Canceled Specimen:  Drainage           Current Medications:  Current Facility-Administered Medications   Medication Dose Route Frequency Provider Last Rate Last Dose    [START ON 4/24/2018] Vancomycin Trough Level on Tuesday, 4/24 @ 1730  1 Each Other Kenny Shabazz MD        piperacillin-tazobactam (ZOSYN) 3.375 g in 0.9% sodium chloride (MBP/ADV) 100 mL ADV  3.375 g IntraVENous Pb Chow MD   3.375 g at 04/23/18 1016    HYDROcodone-acetaminophen (NORCO) 5-325 mg per tablet 1 Tab  1 Tab Oral Q4H PRN Nicole Chambers MD   1 Tab at 04/23/18 0656    fluconazole (DIFLUCAN) 200mg/100 mL IVPB (premix)  200 mg IntraVENous DAILY Nicole Chambers  mL/hr at 04/23/18 1017 200 mg at 04/23/18 1017    vancomycin (VANCOCIN) 1,500 mg in 0.9% sodium chloride 500 mL IVPB  1,500 mg IntraVENous Q24H Nicole Chambers .3 mL/hr at 04/22/18 1824 1,500 mg at 04/22/18 1824    acetaminophen (TYLENOL) tablet 650 mg  650 mg Oral Q4H PRN Fe Peterson MD   650 mg at 04/21/18 6200    lactated Ringers infusion  125 mL/hr IntraVENous CONTINUOUS Josh Bearden  mL/hr at 04/23/18 1134 125 mL/hr at 04/23/18 1134    naloxone (NARCAN) injection 0.2 mg  0.2 mg IntraVENous ONCE PRN Javi Whiteside MD        [facility]) capsule 0.4 mg  0.4 mg Oral DAILY Javi Whiteside MD   0.4 mg at 04/23/18 1017       Chart and notes reviewed. Data reviewed. I have evaluated and examined the patient. IMPRESSION:   · Pt is POD 4 from lap to open appendectomy with drainage of abscess.        PLAN:/DISCUSION:   · IR consult for management of abx  · OOB to chair  · CLD  · CT abd/pel in am        Josh Bearden MD

## 2018-04-24 ENCOUNTER — APPOINTMENT (OUTPATIENT)
Dept: CT IMAGING | Age: 73
DRG: 338 | End: 2018-04-24
Attending: SURGERY
Payer: MEDICARE

## 2018-04-24 LAB
ANION GAP SERPL CALC-SCNC: 7 MMOL/L (ref 3–18)
BACTERIA SPEC CULT: ABNORMAL
BACTERIA SPEC CULT: ABNORMAL
BASOPHILS # BLD: 0 K/UL (ref 0–0.06)
BASOPHILS NFR BLD: 0 % (ref 0–3)
BUN SERPL-MCNC: 20 MG/DL (ref 7–18)
BUN/CREAT SERPL: 16 (ref 12–20)
CALCIUM SERPL-MCNC: 9.3 MG/DL (ref 8.5–10.1)
CHLORIDE SERPL-SCNC: 109 MMOL/L (ref 100–108)
CO2 SERPL-SCNC: 26 MMOL/L (ref 21–32)
CREAT SERPL-MCNC: 1.22 MG/DL (ref 0.6–1.3)
DIFFERENTIAL METHOD BLD: ABNORMAL
EOSINOPHIL # BLD: 0 K/UL (ref 0–0.4)
EOSINOPHIL NFR BLD: 0 % (ref 0–5)
ERYTHROCYTE [DISTWIDTH] IN BLOOD BY AUTOMATED COUNT: 15.5 % (ref 11.6–14.5)
GLUCOSE SERPL-MCNC: 81 MG/DL (ref 74–99)
HCT VFR BLD AUTO: 30 % (ref 36–48)
HGB BLD-MCNC: 10.2 G/DL (ref 13–16)
LYMPHOCYTES # BLD: 1.2 K/UL (ref 0.8–3.5)
LYMPHOCYTES NFR BLD: 5 % (ref 20–51)
MCH RBC QN AUTO: 27.8 PG (ref 24–34)
MCHC RBC AUTO-ENTMCNC: 34 G/DL (ref 31–37)
MCV RBC AUTO: 81.7 FL (ref 74–97)
METAMYELOCYTES NFR BLD MANUAL: 6 %
MONOCYTES # BLD: 1.4 K/UL (ref 0–1)
MONOCYTES NFR BLD: 6 % (ref 2–9)
MYELOCYTES NFR BLD MANUAL: 2 %
NEUTS BAND NFR BLD MANUAL: 6 % (ref 0–5)
NEUTS SEG # BLD: 19.4 K/UL (ref 1.8–8)
NEUTS SEG NFR BLD: 75 % (ref 42–75)
PLATELET # BLD AUTO: 372 K/UL (ref 135–420)
PLATELET COMMENTS,PCOM: ABNORMAL
PMV BLD AUTO: 9.6 FL (ref 9.2–11.8)
POTASSIUM SERPL-SCNC: 4 MMOL/L (ref 3.5–5.5)
RBC # BLD AUTO: 3.67 M/UL (ref 4.7–5.5)
RBC MORPH BLD: ABNORMAL
SERVICE CMNT-IMP: ABNORMAL
SODIUM SERPL-SCNC: 142 MMOL/L (ref 136–145)
WBC # BLD AUTO: 24 K/UL (ref 4.6–13.2)

## 2018-04-24 PROCEDURE — 36415 COLL VENOUS BLD VENIPUNCTURE: CPT | Performed by: STUDENT IN AN ORGANIZED HEALTH CARE EDUCATION/TRAINING PROGRAM

## 2018-04-24 PROCEDURE — 74011250637 HC RX REV CODE- 250/637: Performed by: STUDENT IN AN ORGANIZED HEALTH CARE EDUCATION/TRAINING PROGRAM

## 2018-04-24 PROCEDURE — 74177 CT ABD & PELVIS W/CONTRAST: CPT

## 2018-04-24 PROCEDURE — 85025 COMPLETE CBC W/AUTO DIFF WBC: CPT | Performed by: STUDENT IN AN ORGANIZED HEALTH CARE EDUCATION/TRAINING PROGRAM

## 2018-04-24 PROCEDURE — 74011250636 HC RX REV CODE- 250/636: Performed by: INTERNAL MEDICINE

## 2018-04-24 PROCEDURE — 74011000250 HC RX REV CODE- 250: Performed by: INTERNAL MEDICINE

## 2018-04-24 PROCEDURE — 65660000004 HC RM CVT STEPDOWN

## 2018-04-24 PROCEDURE — 74011250636 HC RX REV CODE- 250/636: Performed by: SURGERY

## 2018-04-24 PROCEDURE — 80048 BASIC METABOLIC PNL TOTAL CA: CPT | Performed by: STUDENT IN AN ORGANIZED HEALTH CARE EDUCATION/TRAINING PROGRAM

## 2018-04-24 PROCEDURE — 74011636320 HC RX REV CODE- 636/320: Performed by: FAMILY MEDICINE

## 2018-04-24 PROCEDURE — 74011250637 HC RX REV CODE- 250/637: Performed by: SURGERY

## 2018-04-24 RX ORDER — LABETALOL 100 MG/1
50 TABLET, FILM COATED ORAL EVERY 12 HOURS
Status: DISCONTINUED | OUTPATIENT
Start: 2018-04-24 | End: 2018-04-25

## 2018-04-24 RX ADMIN — SODIUM CHLORIDE, SODIUM LACTATE, POTASSIUM CHLORIDE, AND CALCIUM CHLORIDE 125 ML/HR: 600; 310; 30; 20 INJECTION, SOLUTION INTRAVENOUS at 22:27

## 2018-04-24 RX ADMIN — HYDROCODONE BITARTRATE AND ACETAMINOPHEN 1 TABLET: 5; 325 TABLET ORAL at 22:29

## 2018-04-24 RX ADMIN — HYDROCODONE BITARTRATE AND ACETAMINOPHEN 1 TABLET: 5; 325 TABLET ORAL at 00:23

## 2018-04-24 RX ADMIN — MEROPENEM 1 G: 1 INJECTION, POWDER, FOR SOLUTION INTRAVENOUS at 15:35

## 2018-04-24 RX ADMIN — LABETALOL HYDROCHLORIDE 50 MG: 100 TABLET, FILM COATED ORAL at 13:04

## 2018-04-24 RX ADMIN — FLUCONAZOLE 400 MG: 2 INJECTION, SOLUTION INTRAVENOUS at 09:18

## 2018-04-24 RX ADMIN — MEROPENEM 1 G: 1 INJECTION, POWDER, FOR SOLUTION INTRAVENOUS at 00:23

## 2018-04-24 RX ADMIN — LABETALOL HYDROCHLORIDE 50 MG: 100 TABLET, FILM COATED ORAL at 22:29

## 2018-04-24 RX ADMIN — IOPAMIDOL 100 ML: 612 INJECTION, SOLUTION INTRAVENOUS at 11:49

## 2018-04-24 RX ADMIN — SODIUM CHLORIDE, SODIUM LACTATE, POTASSIUM CHLORIDE, AND CALCIUM CHLORIDE 125 ML/HR: 600; 310; 30; 20 INJECTION, SOLUTION INTRAVENOUS at 13:06

## 2018-04-24 RX ADMIN — MEROPENEM 1 G: 1 INJECTION, POWDER, FOR SOLUTION INTRAVENOUS at 08:14

## 2018-04-24 RX ADMIN — TAMSULOSIN HYDROCHLORIDE 0.4 MG: 0.4 CAPSULE ORAL at 09:17

## 2018-04-24 RX ADMIN — DIATRIZOATE MEGLUMINE AND DIATRIZOATE SODIUM 30 ML: 600; 100 SOLUTION ORAL; RECTAL at 08:09

## 2018-04-24 RX ADMIN — LEVOFLOXACIN 500 MG: 5 INJECTION, SOLUTION INTRAVENOUS at 17:35

## 2018-04-24 NOTE — PROGRESS NOTES
Patient given soap suds enema. Patient tolerated well and was able to hold for 20 mins. Patient then had a large watery, liquid BM. Will continue to monitor.

## 2018-04-24 NOTE — PROGRESS NOTES
Infectious Disease Progress Note    Requested by: dr. Jer Walsh    Reason: perforated appendicitis, increasing wbc    Current abx Prior abx   Meropenem, levofloxacin since 4/23 Cefepime, levofloxacin 4/20-4/21  Zosyn, vancomycin 4/21-4/23     Lines: Lew since 4/20 placed by urology for retention    Assessment :    67 y.o. 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 Ct  Scan 4/20/18- 6x10x7 cm RLQ abdominal abscess. S/p laparoscopy, open appendectomy with drainage of appendiceal abscess on 4/21/18- found to have perforated appendix with walled off abscess. Intra op cx: e.coli, proteus, 3rd gnr    Clinical presentation c/w perforated appendicitis with contained perforation, abdominal abscess. Patient has been appropriately managed with appendectomy, drainage of abscess. Persistent leukocytosis with wbc:28k on 4/23 compared to 15k on 4/20, diffuse tenderness could be due to partially treated infection due to resistant gram negative pathogens, untreated yersinia infection /undiagnosed abdominal pathology, intra op complication. Wbc:24k today, slightly decreased abdominal pain/tenderness. R/o bowel perforation/leak. I will modify abx to cover for multidrug resistant gram negatives, yersinia and monitor clinically. Acute renal failure: likely due to urinary retention, volume depletion due to poor po intake - s/p lew insertion by urologist on 4/20/18 - improved renal function      Recommendations:    1. cont meropenem, levofloxacin, fluconazole   2. F/u id and susceptibility of 3rd gnr  3. Agree with plans for repeat ct scan abd/pelvis  4.  F/u cbc, clinically      Advance Care planning: full code: discussed  with patient/surrogate decision maker:Melania Ruiz: [phone]    Above plan was discussed in details with patient, and dr Angela Perla. Please call me if any further questions or concerns. Will continue to participate in the care of this patient. subjective:    Feels better. Improved abdominal pain. Patient denies headaches, visual disturbances, sore throat, runny nose, earaches, cp, sob, chills, cough, diarrhea, burning micturition, pain or weakness in extremities. He denies back pain/flank pain. home Medication List    Details   cyanocobalamin (VITAMIN B-12) 1,000 mcg tablet Take 1,000 mcg by mouth daily. furosemide (LASIX) 20 mg tablet Take  by mouth daily. ezetimibe (ZETIA) 10 mg tablet Take 10 mg by mouth daily. labetalol (NORMODYNE) 200 mg tablet Take 1 Tab by mouth every twelve (12) hours. Qty: 30 Tab, Refills: 0    Associated Diagnoses: Hypertensive heart and kidney disease without heart failure and with chronic kidney disease stage III      losartan (COZAAR) 100 mg tablet Take 1 Tab by mouth daily. Qty: 15 Tab, Refills: 0    Associated Diagnoses: Hypertensive heart and kidney disease without heart failure and with chronic kidney disease stage III      amLODIPine (NORVASC) 10 mg tablet Take 1 Tab by mouth daily.   Qty: 15 Tab, Refills: 0    Associated Diagnoses: Hypertensive heart and kidney disease without heart failure and with chronic kidney disease stage III             Current Facility-Administered Medications   Medication Dose Route Frequency    meropenem (MERREM) 1 g in sterile water (preservative free) 20 mL IV syringe  1 g IntraVENous Q8H    fluconazole (DIFLUCAN) 400mg/200 mL IVPB (premix)  400 mg IntraVENous DAILY    levoFLOXacin (LEVAQUIN) 500 mg in D5W IVPB  500 mg IntraVENous Q24H    HYDROcodone-acetaminophen (NORCO) 5-325 mg per tablet 1 Tab  1 Tab Oral Q4H PRN    acetaminophen (TYLENOL) tablet 650 mg  650 mg Oral Q4H PRN    lactated Ringers infusion  125 mL/hr IntraVENous CONTINUOUS    naloxone (NARCAN) injection 0.2 mg  0.2 mg IntraVENous ONCE PRN    tamsulosin (FLOMAX) capsule 0.4 mg  0.4 mg Oral DAILY       Allergies: Lipitor [atorvastatin]    Temp (24hrs), Av.5 °F (36.4 °C), Min:97.1 °F (36.2 °C), Max:97.8 °F (36.6 °C)    Visit Vitals    /82    Pulse 79    Temp 97.4 °F (36.3 °C)    Resp 18    Wt 105.2 kg (232 lb)    SpO2 97%    BMI 29.79 kg/m2       ROS: 12 point ROS obtained in details. Pertinent positives as mentioned in HPI,   otherwise negative    Physical Exam:    General: Well developed, well nourished male laying on the bed, appears more comfortable    General:   awake alert and oriented   HEENT:  Normocephalic, atraumatic, PERRL, EOMI, no scleral icterus or pallor; no conjunctival hemmohage;  nasal and oral mucous are moist and without evidence of lesions. Neck supple, no bruits. Lymph Nodes:   no cervical, axillary or inguinal adenopathy   Lungs:   non-labored, bilaterally clear to auscultation- no crackles wheezes rales or rhonchi   Heart:  RRR, s1 and s2; no  rubs or gallops, no edema, + pedal pulses   Abdomen:  soft, hypoactive bowel sounds, no hepatomegaly, no splenomegaly. Appropriate surgical scars for stated surgeries. Decreased tenderness. No guarding. Distended abdomen. STEVEN drain in place with serosanguinous drainage   Genitourinary:  lew in place   Extremities:   no clubbing, cyanosis; no joint effusions or swelling; muscle mass appropriate for age   Neurologic:  No gross focal sensory abnormalities; 5/5 muscle strength to upper and lower extremities. Speech appropriate.  Cranial nerves intact                        Skin:  No rash or ulcers noted   Back:  no spinal or paraspinal muscle tenderness or rigidity, no CVA tenderness     Psychiatric:  Unable to assess         Labs: Results:   Chemistry Recent Labs      18   0320  18   0840  18   0314   GLU  81  90  82   NA  142  143  141   K  4.0  4.3  4.1   CL  109*  113*  109*   CO2  26  22  20*   BUN  20*  26*  25*   CREA  1.22  1.39*  1.64*   CA  9.3  9.2  9.0   AGAP  7  8 12   BUCR  16  19  15   AP   --    --   100   TP   --    --   6.1*   ALB   --    --   2.0*   GLOB   --    --   4.1*   AGRAT   --    --   0.5*      CBC w/Diff Recent Labs      04/24/18   0320  04/23/18   0840  04/22/18   0314   WBC  24.0*  28.9*  29.0*   RBC  3.67*  3.85*  3.95*   HGB  10.2*  10.5*  10.6*   HCT  30.0*  30.9*  31.9*   PLT  372  356  356   GRANS  75  86*  62   LYMPH  5*  5*  9*   EOS  0  0  0      Microbiology No results for input(s): CULT in the last 72 hours.        RADIOLOGY:    All available imaging studies/reports in [facility] for this admission were reviewed    Dr. Elen Enrique, Infectious Disease Specialist  [phone]  April 24, 2018  3:11 PM

## 2018-04-24 NOTE — PROGRESS NOTES
Riaz Richardson M.D. FACS  PROGRESS NOTE    Name: Rivas Bertrand MRN: [id number]   : 1945 [facility]   Date: 2018 Admission Date: 2018 10:27 PM     Hospital Day: 6  4 Days Post-Op  Subjective:  Patient without complaints. No bowel movement or flatus overnight. Objective:  Vitals:    18 2000 18 0000 18 0406 18 0724   BP: (!) 160/96 156/89 (!) 164/91 156/82   Pulse: 72 68 65 79   Resp:    Temp: 97.6 °F (36.4 °C) 97.7 °F (36.5 °C) 97.8 °F (36.6 °C) 97.4 °F (36.3 °C)   SpO2: 96% 96% 92% 97%   Weight:           Date 18 0700 - 18 0659 18 0700 - 18 0659   Shift 2256-3239 3157-7444 24 Hour Total 1443-4782 6330-6409 24 Hour Total   I  N  T  A  K  E   I.V.  (mL/kg/hr) 158.3  (0.1)  158.3  (0.1)         Volume (lactated Ringers infusion) 158. 3  158. 3       Shift Total  (mL/kg) 158.3  (1.5)  158.3  (1.5)      O  U  T  P  U  T   Urine  (mL/kg/hr) 700  (0.6) 1700  (1.3) 2400  (1)         Urine Voided  1700 1700         Urine Occurrence(s)  2 x 2 x         Urine Output (mL) (Urinary Catheter 18 Coude) 700  700       Stool  1000 1000         Stool Occurrence(s)  0 x 0 x         Stool  1000 1000       Shift Total  (mL/kg) 700  (6.7) 2700  (25.7) 3400  (32.3)      NET -541.7 -2700 -3241.7      Weight (kg) 105.2 105.2 105.2 105.2 105.2 105.2         Physical Exam:    General: Awake and alert, oriented ×4, no apparent distress   Abdomen: abdomen is soft with incisional tenderness. All Telfa malissa were removed. Incision(s) are clean and intact with serosanguineous drainage. STEVEN drainage is serosanguineous.   No masses, organomegaly or guarding    Labs:  Recent Results (from the past 24 hour(s))   CBC WITH AUTOMATED DIFF    Collection Time: 18  8:40 AM   Result Value Ref Range    WBC 28.9 (H) 4.6 - 13.2 K/uL    RBC 3.85 (L) 4.70 - 5.50 M/uL    HGB 10.5 (L) 13.0 - 16.0 g/dL    HCT 30.9 (L) 36.0 - 48.0 %    MCV 80.3 74.0 - 97.0 FL    MCH 27.3 24.0 - 34.0 PG    MCHC 34.0 31.0 - 37.0 g/dL    RDW 15.2 (H) 11.6 - 14.5 %    PLATELET 794 141 - 434 K/uL    MPV 9.3 9.2 - 11.8 FL    NEUTROPHILS 86 (H) 42 - 75 %    BAND NEUTROPHILS 6 (H) 0 - 5 %    LYMPHOCYTES 5 (L) 20 - 51 %    MONOCYTES 3 2 - 9 %    EOSINOPHILS 0 0 - 5 %    BASOPHILS 0 0 - 3 %    ABS. NEUTROPHILS 26.6 (H) 1.8 - 8.0 K/UL    ABS. LYMPHOCYTES 1.4 0.8 - 3.5 K/UL    ABS. MONOCYTES 0.9 0 - 1.0 K/UL    ABS. EOSINOPHILS 0.0 0.0 - 0.4 K/UL    ABS. BASOPHILS 0.0 0.0 - 0.06 K/UL    DF MANUAL      PLATELET COMMENTS ADEQUATE PLATELETS      RBC COMMENTS ANISOCYTOSIS  1+       METABOLIC PANEL, BASIC    Collection Time: 04/23/18  8:40 AM   Result Value Ref Range    Sodium 143 136 - 145 mmol/L    Potassium 4.3 3.5 - 5.5 mmol/L    Chloride 113 (H) 100 - 108 mmol/L    CO2 22 21 - 32 mmol/L    Anion gap 8 3.0 - 18 mmol/L    Glucose 90 74 - 99 mg/dL    BUN 26 (H) 7.0 - 18 MG/DL    Creatinine 1.39 (H) 0.6 - 1.3 MG/DL    BUN/Creatinine ratio 19 12 - 20      GFR est AA >60 >60 ml/min/1.73m2    GFR est non-AA 50 (L) >60 ml/min/1.73m2    Calcium 9.2 8.5 - 10.1 MG/DL   CBC WITH AUTOMATED DIFF    Collection Time: 04/24/18  3:20 AM   Result Value Ref Range    WBC 24.0 (H) 4.6 - 13.2 K/uL    RBC 3.67 (L) 4.70 - 5.50 M/uL    HGB 10.2 (L) 13.0 - 16.0 g/dL    HCT 30.0 (L) 36.0 - 48.0 %    MCV 81.7 74.0 - 97.0 FL    MCH 27.8 24.0 - 34.0 PG    MCHC 34.0 31.0 - 37.0 g/dL    RDW 15.5 (H) 11.6 - 14.5 %    PLATELET 192 503 - 808 K/uL    MPV 9.6 9.2 - 11.8 FL    NEUTROPHILS 75 42 - 75 %    BAND NEUTROPHILS 6 (H) 0 - 5 %    LYMPHOCYTES 5 (L) 20 - 51 %    MONOCYTES 6 2 - 9 %    EOSINOPHILS 0 0 - 5 %    BASOPHILS 0 0 - 3 %    METAMYELOCYTES 6 (H) 0 %    MYELOCYTES 2 (H) 0 %    ABS. NEUTROPHILS 19.4 (H) 1.8 - 8.0 K/UL    ABS. LYMPHOCYTES 1.2 0.8 - 3.5 K/UL    ABS. MONOCYTES 1.4 (H) 0 - 1.0 K/UL    ABS. EOSINOPHILS 0.0 0.0 - 0.4 K/UL    ABS.  BASOPHILS 0.0 0.0 - 0.06 K/UL    DF MANUAL      PLATELET COMMENTS ADEQUATE PLATELETS      RBC COMMENTS ANISOCYTOSIS  1+        RBC COMMENTS POLYCHROMASIA  1+        RBC COMMENTS OVALOCYTES  1+       METABOLIC PANEL, BASIC    Collection Time: 04/24/18  3:20 AM   Result Value Ref Range    Sodium 142 136 - 145 mmol/L    Potassium 4.0 3.5 - 5.5 mmol/L    Chloride 109 (H) 100 - 108 mmol/L    CO2 26 21 - 32 mmol/L    Anion gap 7 3.0 - 18 mmol/L    Glucose 81 74 - 99 mg/dL    BUN 20 (H) 7.0 - 18 MG/DL    Creatinine 1.22 0.6 - 1.3 MG/DL    BUN/Creatinine ratio 16 12 - 20      GFR est AA >60 >60 ml/min/1.73m2    GFR est non-AA 58 (L) >60 ml/min/1.73m2    Calcium 9.3 8.5 - 10.1 MG/DL     All Micro Results     Procedure Component Value Units Date/Time    CULTURE, BLOOD [[id number]] Collected:  04/20/18 0000    Order Status:  Completed Specimen:  Blood from Blood Updated:  04/24/18 0656     Special Requests: NO SPECIAL REQUESTS        Culture result: NO GROWTH 4 DAYS       CULTURE, BLOOD [[id number]] Collected:  04/20/18 0015    Order Status:  Completed Specimen:  Whole Blood from Blood Updated:  04/24/18 0656     Special Requests: NO SPECIAL REQUESTS        Culture result: NO GROWTH 4 DAYS       CULTURE, SURGICAL WOUND Skylar Gunner STAIN [[id number]]  (Abnormal)  (Susceptibility) Collected:  04/20/18 0734    Order Status:  Completed Specimen:  Appendix Updated:  04/23/18 1227     Special Requests: ABSCESS        GRAM STAIN MODERATE WBC'S         FEW GRAM POSITIVE COCCI                 MODERATE GRAM POSITIVE RODS      FEW GRAM NEGATIVE RODS        Culture result: FEW PROTEUS MIRABILIS (A)         FEW ESCHERICHIA COLI (A)         FEW  POSSIBLE  3RD GRAM NEGATIVE UMM   (A)               MODERATE LACTOBACILLUS SPECIES (A)    CULTURE, ANAEROBIC [[id number]] Collected:  04/20/18 0734    Order Status:  Completed Specimen:  Appendix Updated:  04/23/18 0834     Special Requests: ABSCESS        Culture result:         ANAEROBES NOT RULED OUT. SCREENING IN PROGRESS.     CULTURE, URINE [[id number]] Collected: 04/20/18 0102    Order Status:  Completed Specimen:  Clean catch Updated:  04/21/18 1000     Special Requests: NO SPECIAL REQUESTS        Culture result: NO GROWTH 1 DAY       CULTURE, BODY FLUID Devoria Greulich STAIN [[id number]] Collected:  04/20/18 0745    Order Status:  Canceled Specimen:  Drainage           Current Medications:  Current Facility-Administered Medications   Medication Dose Route Frequency Provider Last Rate Last Dose    meropenem (MERREM) 1 g in sterile water (preservative free) 20 mL IV syringe  1 g IntraVENous Q8H Annmarie Grace MD   1 g at 04/24/18 0023    fluconazole (DIFLUCAN) 400mg/200 mL IVPB (premix)  400 mg IntraVENous DAILY Annmarie Grace MD        levoFLOXacin (LEVAQUIN) 500 mg in D5W IVPB  500 mg IntraVENous Q24H Annmarie Grace  mL/hr at 04/23/18 1638 500 mg at 04/23/18 1638    HYDROcodone-acetaminophen (NORCO) 5-325 mg per tablet 1 Tab  1 Tab Oral Q4H PRN Nilam Ruvalcaba MD   1 Tab at 04/24/18 0023    acetaminophen (TYLENOL) tablet 650 mg  650 mg Oral Q4H PRN Kaushik Pittman MD   650 mg at 04/23/18 1657    lactated Ringers infusion  125 mL/hr IntraVENous CONTINUOUS Nilam Ruvalcaba  mL/hr at 04/23/18 2019 125 mL/hr at 04/23/18 2019    naloxone (NARCAN) injection 0.2 mg  0.2 mg IntraVENous ONCE PRN Kaushik Pittman MD        On license of [facility]) capsule 0.4 mg  0.4 mg Oral DAILY Kaushik Pittman MD   0.4 mg at 04/23/18 1017       Chart and notes reviewed. Data reviewed. I have evaluated and examined the patient. IMPRESSION:   · Patient is postop day 5 from laparoscopy open appendectomy with drainage of appendiceal abscess. PLAN:/DISCUSION:   · CT of the abdomen and pelvis does not reveal any abscess.   Ileus of small bowel  · Soapsuds enema  · Continue IV antibiotics per infectious disease  · Out of bed to chair  · Encourage deep breathing and cough  · Continue Chow catheter for urinary retention secondary to prostatic hypertrophy with bladder outlet obstruction  · Continue Flomax        Maya Lucero MD

## 2018-04-24 NOTE — CDMP QUERY
Please clarify if this patient is being treated/managed for:    =>Metabolic encephalopathy as evidenced by AMS requiring a sitter as needed  =>Other Explanation of clinical findings  =>Unable to Determine (no explanation of clinical findings)    The medical record reflects the following:    Risk: Acute illness    Clinical Indicators:   4/23 PN- AMS: likely metabolic and due to pain and anesthesia. Axo2.  -sitter as needed  4/24  PN- Acute events: Patient reports abdominal pain this morning. Patient tried to call 911 this AM because he wanted to leave. Patient unsure why he is in the hospital    Treatment: Sitter as needed      If you DECLINE this query or would like to communicate with [employer], please utilize the \"[employer] message box\" at the TOP of the Progress Note on the right.       Thank you,  Lisseth Cooley RN/CCDS  [phone]

## 2018-04-24 NOTE — PROGRESS NOTES
CDMP    Patient is being treated for metabolic encephalopathy while inpatient. A sitter is ordered to sit with patient.     Candi Hemphill MD  4/24/2018    3:42 PM

## 2018-04-24 NOTE — PROGRESS NOTES
Intern Progress Note  [facility]       Patient: Jennifer Pierre MRN: [id number]  CSN: [id number]    YOB: 1945  Age: 67 y.o. Sex: male    DOA: 4/19/2018 LOS:  LOS: 4 days                    Subjective:     Acute events: Patient reports abdominal pain this morning. Patient tried to call 911 this AM because he wanted to leave. Patient unsure why he is in the hospital. Denies having a BM or flatus. NSR on tele    Review of Systems   Constitutional: Negative for chills and fever. Respiratory: Negative for shortness of breath. Cardiovascular: Negative for chest pain. Gastrointestinal: Positive for abdominal pain. Negative for nausea. Neurological: Negative for dizziness and headaches. Objective:      Patient Vitals for the past 24 hrs:   Temp Pulse Resp BP SpO2   04/24/18 0724 97.4 °F (36.3 °C) 79 18 156/82 97 %   04/24/18 0406 97.8 °F (36.6 °C) 65 18 (!) 164/91 92 %   04/24/18 0000 97.7 °F (36.5 °C) 68 19 156/89 96 %   04/23/18 2000 97.6 °F (36.4 °C) 72 20 (!) 160/96 96 %   04/23/18 1655 - - - - 97 %   04/23/18 1618 97.6 °F (36.4 °C) 71 18 162/79 97 %   04/23/18 1200 97.1 °F (36.2 °C) 75 18 144/88 95 %         Intake/Output Summary (Last 24 hours) at 04/24/18 0708  Last data filed at 04/24/18 0615   Gross per 24 hour   Intake           158.33 ml   Output             3400 ml   Net         -3241.67 ml         Physical Exam:   General:  Alert and Responsive and in moderate distress. CV:  RRR, no rubs gallops or murmurs. RESP:  Unlabored breathing. Lungs clear to auscultation. no wheeze, rales, or rhonchi. Equal expansion bilaterally. ABD:  TTP in the general abdomen. Distended. Midline surgical dressing C/D/I.   MS:  No joint deformity or instability. No atrophy. Neuro: axo2 (self and place)  Ext:  No edema.    Skin: dry skin  Uro/Genital: Chow cath in place and draining clear yellow urine      Lab/Data Reviewed:  BMP:   Lab Results   Component Value Date/Time  04/24/2018 03:20 AM    K 4.0 04/24/2018 03:20 AM     (H) 04/24/2018 03:20 AM    CO2 26 04/24/2018 03:20 AM    AGAP 7 04/24/2018 03:20 AM    GLU 81 04/24/2018 03:20 AM    BUN 20 (H) 04/24/2018 03:20 AM    CREA 1.22 04/24/2018 03:20 AM    GFRAA >60 04/24/2018 03:20 AM    GFRNA 58 (L) 04/24/2018 03:20 AM     CBC:   Lab Results   Component Value Date/Time    WBC 24.0 (H) 04/24/2018 03:20 AM    HGB 10.2 (L) 04/24/2018 03:20 AM    HCT 30.0 (L) 04/24/2018 03:20 AM     04/24/2018 03:20 AM        Scheduled Medications Reviewed:  Current Facility-Administered Medications   Medication Dose Route Frequency    meropenem (MERREM) 1 g in sterile water (preservative free) 20 mL IV syringe  1 g IntraVENous Q8H    fluconazole (DIFLUCAN) 400mg/200 mL IVPB (premix)  400 mg IntraVENous DAILY    levoFLOXacin (LEVAQUIN) 500 mg in D5W IVPB  500 mg IntraVENous Q24H    lactated Ringers infusion  125 mL/hr IntraVENous CONTINUOUS    tamsulosin (FLOMAX) capsule 0.4 mg  0.4 mg Oral DAILY         Imaging, microbiology, and EKG/Telemetry:  All Micro Results     Procedure Component Value Units Date/Time    CULTURE, BLOOD [[id number]] Collected:  04/20/18 0000    Order Status:  Completed Specimen:  Blood from Blood Updated:  04/24/18 0656     Special Requests: NO SPECIAL REQUESTS        Culture result: NO GROWTH 4 DAYS       CULTURE, BLOOD [[id number]] Collected:  04/20/18 0015    Order Status:  Completed Specimen:  Whole Blood from Blood Updated:  04/24/18 0656     Special Requests: NO SPECIAL REQUESTS        Culture result: NO GROWTH 4 DAYS       CULTURE, SURGICAL WOUND Leanne Kasal STAIN [[id number]]  (Abnormal)  (Susceptibility) Collected:  04/20/18 0734    Order Status:  Completed Specimen:  Appendix Updated:  04/23/18 1227     Special Requests: ABSCESS        GRAM STAIN MODERATE WBC'S         FEW GRAM POSITIVE COCCI                 MODERATE GRAM POSITIVE RODS      FEW GRAM NEGATIVE RODS        Culture result: FEW PROTEUS MIRABILIS (A)         FEW ESCHERICHIA COLI (A)         FEW  POSSIBLE  3RD GRAM NEGATIVE UMM   (A)               MODERATE LACTOBACILLUS SPECIES (A)    CULTURE, ANAEROBIC [[id number]] Collected:  04/20/18 0734    Order Status:  Completed Specimen:  Appendix Updated:  04/23/18 0834     Special Requests: ABSCESS        Culture result:         ANAEROBES NOT RULED OUT. SCREENING IN PROGRESS. CULTURE, URINE [[id number]] Collected:  04/20/18 0102    Order Status:  Completed Specimen:  Clean catch Updated:  04/21/18 1000     Special Requests: NO SPECIAL REQUESTS        Culture result: NO GROWTH 1 DAY       CULTURE, BODY FLUID Laurance Lias STAIN [[id number]] Collected:  04/20/18 0745    Order Status:  Canceled Specimen:  Drainage             Assessment/Plan     67 y. o. male with PMH significant for CVA, HTN, HLD, CKD III, chronic diarrhea, BPH/OAB now admitted with ruptured appendix with abscess formation.      Perforated appendix w/ abscess POD4 Open (converted from ex lap) Appendectomy: WBC still elevated at 24 w/ 6 band. Blood cx from 4/20 with no growth in 3 days. Surgical cx shows P mirabilis, E coli, 3rd GNR.  - Dr. Zoe Severe following, appreciate the recs  - ID consulted. Appreciate recs. Abx regimen w/ Diflucan, Levo, merrem, and Vanco   - Repeat CT A/P today. - Pain control and diet per surgery  - LR @ 125 ml/hr  - PT/OT  - Daily CBC, CMP      AMS: likely metabolic and due to pain and anesthesia. Axo2.  -sitter as needed     Acute urinary retention/OAB/BPH: holding home tolteridine, dicyclomine as an outpatient. The Ucx 4/20 with NG1day. Had urinary retention following surgery so lew was placed by urology. - Urology following  - Dr. Carloz Ochoa placed 16 fr catheter 04/20, rec's to keep catheter in place for one week.   - 0.4mg tamulosin QD      DARINEL on CKD III (resolved) follows with Dr. Margoth Reynoso as an outpatient.  Baseline Cr ~1.5. Cr 2.14>>1.22.   - Hydration as above  - Avoid nephrotoxic meds       H/o CVA, HTN, HLD, Vitamin D/B12 deficiency: CVA in 2015 with residual mild L sided weakness and some aphasia. BP elevated w/ SBP in 160s  - Hold home amlodipine 10mg QD, labetalol 100mg BID and losartan 200mg BID   - Hold home lasix 20 mg every day  - Hold home ezetimibe 10mg QD   - Hold home vitamin B12  - Fall precautions, aspiration precautions   - Restart meds once tolerating PO      Diet: Per surgery  DVT Prophylaxis: SCDs  Code Status: Full  Point of Contact: Angela Parker [phone]      Disposition and anticipated LOS: 2 midnight     Anthony Price In ENOC Garcia  PGY-1 [address] [facility]  04/24/18 7:08 AM

## 2018-04-24 NOTE — PROGRESS NOTES
Bedside shift change report given to LUCILLE Lombardi (oncoming nurse) by Holly Thomas RN (offgoing nurse). Report included the following information SBAR, Kardex, Intake/Output, Recent Results and Cardiac Rhythm NSR.

## 2018-04-25 LAB
ANION GAP SERPL CALC-SCNC: 9 MMOL/L (ref 3–18)
BACTERIA SPEC CULT: ABNORMAL
BASOPHILS # BLD: 0 K/UL (ref 0–0.06)
BASOPHILS NFR BLD: 0 % (ref 0–3)
BUN SERPL-MCNC: 16 MG/DL (ref 7–18)
BUN/CREAT SERPL: 14 (ref 12–20)
CALCIUM SERPL-MCNC: 8.8 MG/DL (ref 8.5–10.1)
CHLORIDE SERPL-SCNC: 106 MMOL/L (ref 100–108)
CO2 SERPL-SCNC: 25 MMOL/L (ref 21–32)
CREAT SERPL-MCNC: 1.16 MG/DL (ref 0.6–1.3)
DIFFERENTIAL METHOD BLD: ABNORMAL
EOSINOPHIL # BLD: 0 K/UL (ref 0–0.4)
EOSINOPHIL NFR BLD: 0 % (ref 0–5)
ERYTHROCYTE [DISTWIDTH] IN BLOOD BY AUTOMATED COUNT: 15 % (ref 11.6–14.5)
GLUCOSE BLD STRIP.AUTO-MCNC: 145 MG/DL (ref 70–110)
GLUCOSE SERPL-MCNC: 89 MG/DL (ref 74–99)
GRAM STN SPEC: ABNORMAL
HCT VFR BLD AUTO: 30.2 % (ref 36–48)
HGB BLD-MCNC: 10.4 G/DL (ref 13–16)
LYMPHOCYTES # BLD: 2.2 K/UL (ref 0.8–3.5)
LYMPHOCYTES NFR BLD: 11 % (ref 20–51)
MCH RBC QN AUTO: 27.6 PG (ref 24–34)
MCHC RBC AUTO-ENTMCNC: 34.4 G/DL (ref 31–37)
MCV RBC AUTO: 80.1 FL (ref 74–97)
METAMYELOCYTES NFR BLD MANUAL: 3 %
MONOCYTES # BLD: 2 K/UL (ref 0–1)
MONOCYTES NFR BLD: 10 % (ref 2–9)
MYELOCYTES NFR BLD MANUAL: 5 %
NEUTS BAND NFR BLD MANUAL: 3 % (ref 0–5)
NEUTS SEG # BLD: 13.1 K/UL (ref 1.8–8)
NEUTS SEG NFR BLD: 64 % (ref 42–75)
OTHER CELLS NFR BLD MANUAL: 4 %
PLATELET # BLD AUTO: 380 K/UL (ref 135–420)
PLATELET COMMENTS,PCOM: ABNORMAL
PMV BLD AUTO: 10.2 FL (ref 9.2–11.8)
POTASSIUM SERPL-SCNC: 3.9 MMOL/L (ref 3.5–5.5)
RBC # BLD AUTO: 3.77 M/UL (ref 4.7–5.5)
RBC MORPH BLD: ABNORMAL
SERVICE CMNT-IMP: ABNORMAL
SODIUM SERPL-SCNC: 140 MMOL/L (ref 136–145)
WBC # BLD AUTO: 19.6 K/UL (ref 4.6–13.2)

## 2018-04-25 PROCEDURE — 36415 COLL VENOUS BLD VENIPUNCTURE: CPT

## 2018-04-25 PROCEDURE — 65660000004 HC RM CVT STEPDOWN

## 2018-04-25 PROCEDURE — 74011250636 HC RX REV CODE- 250/636: Performed by: INTERNAL MEDICINE

## 2018-04-25 PROCEDURE — 74011250637 HC RX REV CODE- 250/637: Performed by: STUDENT IN AN ORGANIZED HEALTH CARE EDUCATION/TRAINING PROGRAM

## 2018-04-25 PROCEDURE — 74011250637 HC RX REV CODE- 250/637: Performed by: SURGERY

## 2018-04-25 PROCEDURE — 80048 BASIC METABOLIC PNL TOTAL CA: CPT

## 2018-04-25 PROCEDURE — 74011000250 HC RX REV CODE- 250: Performed by: INTERNAL MEDICINE

## 2018-04-25 PROCEDURE — 74011250636 HC RX REV CODE- 250/636: Performed by: SURGERY

## 2018-04-25 PROCEDURE — 85025 COMPLETE CBC W/AUTO DIFF WBC: CPT

## 2018-04-25 PROCEDURE — 82962 GLUCOSE BLOOD TEST: CPT

## 2018-04-25 RX ORDER — LABETALOL 100 MG/1
100 TABLET, FILM COATED ORAL EVERY 12 HOURS
Status: DISCONTINUED | OUTPATIENT
Start: 2018-04-25 | End: 2018-04-30 | Stop reason: HOSPADM

## 2018-04-25 RX ORDER — AMLODIPINE BESYLATE 10 MG/1
10 TABLET ORAL DAILY
Status: DISCONTINUED | OUTPATIENT
Start: 2018-04-26 | End: 2018-04-30 | Stop reason: HOSPADM

## 2018-04-25 RX ADMIN — MEROPENEM 1 G: 1 INJECTION, POWDER, FOR SOLUTION INTRAVENOUS at 15:17

## 2018-04-25 RX ADMIN — HYDROCODONE BITARTRATE AND ACETAMINOPHEN 1 TABLET: 5; 325 TABLET ORAL at 14:25

## 2018-04-25 RX ADMIN — MEROPENEM 1 G: 1 INJECTION, POWDER, FOR SOLUTION INTRAVENOUS at 01:00

## 2018-04-25 RX ADMIN — SODIUM CHLORIDE, SODIUM LACTATE, POTASSIUM CHLORIDE, AND CALCIUM CHLORIDE 125 ML/HR: 600; 310; 30; 20 INJECTION, SOLUTION INTRAVENOUS at 06:08

## 2018-04-25 RX ADMIN — MEROPENEM 1 G: 1 INJECTION, POWDER, FOR SOLUTION INTRAVENOUS at 23:48

## 2018-04-25 RX ADMIN — FLUCONAZOLE 400 MG: 2 INJECTION, SOLUTION INTRAVENOUS at 08:22

## 2018-04-25 RX ADMIN — MEROPENEM 1 G: 1 INJECTION, POWDER, FOR SOLUTION INTRAVENOUS at 08:30

## 2018-04-25 RX ADMIN — TAMSULOSIN HYDROCHLORIDE 0.4 MG: 0.4 CAPSULE ORAL at 08:36

## 2018-04-25 RX ADMIN — HYDROCODONE BITARTRATE AND ACETAMINOPHEN 1 TABLET: 5; 325 TABLET ORAL at 08:48

## 2018-04-25 RX ADMIN — LABETALOL HYDROCHLORIDE 50 MG: 100 TABLET, FILM COATED ORAL at 08:36

## 2018-04-25 RX ADMIN — LABETALOL HYDROCHLORIDE 100 MG: 100 TABLET, FILM COATED ORAL at 22:23

## 2018-04-25 RX ADMIN — SODIUM CHLORIDE, SODIUM LACTATE, POTASSIUM CHLORIDE, AND CALCIUM CHLORIDE 125 ML/HR: 600; 310; 30; 20 INJECTION, SOLUTION INTRAVENOUS at 23:57

## 2018-04-25 NOTE — CDMP QUERY
Per dietician notes 4/25; patient meets criteria for severe protein calorie malnutrition. Please add to your progress notes if you concur with findings. =>Severe protein calorie malnutrition  =>Other Explanation of clinical findings  =>Unable to Determine (no explanation of clinical findings)    The medical record reflects the following:    Risk:  Acute illness    Clinical Indicators:  Energy Intake: Less than/equal to 50% est energy req for greater than/equal to 5 days  Weight Loss: Greater than 1-2% x 1 wk  ASPEN Malnutrition Score - Acute Illness: 12  Acute Illness - Malnutrition Diagnosis: Severe malnutrition. Treatment: - Monitor po intake and diet tolerance. - Recommend advancing diet as tolerated. - Consider SLP consult to determine appropriate diet consistency. - Continue IV fluid as medically appropriate.   - Continue RD inpatient monitoring and evaluation. If you DECLINE this query or would like to communicate with [employer], please utilize the \"[employer] message box\" at the TOP of the Progress Note on the right.       Thank you,  Ly Bolanos RN/CCDS  [phone]

## 2018-04-25 NOTE — PROGRESS NOTES
Infectious Disease Progress Note    Requested by: dr. Caroline Chicas    Reason: perforated appendicitis, increasing wbc    Current abx Prior abx   Meropenem, levofloxacin since 4/23 Cefepime, levofloxacin 4/20-4/21  Zosyn, vancomycin 4/21-4/23     Lines: Lew since 4/20 placed by urology for retention    Assessment :    67 y.o. 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 Ct  Scan 4/20/18- 6x10x7 cm RLQ abdominal abscess. S/p laparoscopy, open appendectomy with drainage of appendiceal abscess on 4/21/18- found to have perforated appendix with walled off abscess. Intra op cx: e.coli, proteus, lactobacillus, bacteroides    Clinical presentation c/w perforated appendicitis with contained perforation, abdominal abscess. Patient has been appropriately managed with appendectomy, drainage of abscess. Etiology of persistent leukocytosis with wbc:28k on 4/23 compared to 15k on 4/20, diffuse tenderness - unclear. ? Leukemoid reaction to recent surgery/ileus versus evolving antibiotic resistant bacterial infection. No evidence of undrained pocket of infection, anastomotic leak noted on ct scan 4/24    Acute renal failure: likely due to urinary retention, volume depletion due to poor po intake - s/p lew insertion by urologist on 4/20/18 - improved renal function    D/w dr. Terra Payne. Plans for TPN noted. Recommendations:    1. cont meropenem, fluconazole. D/c levofloxacin  2. Will further de escalate antibiotics in am based on cbc, clinical course  3. mx of ileus per primary team  4. F/u cbc, clinically      Advance Care planning: full code: discussed  with patient/surrogate decision maker:Melania Ruiz: [phone]    Above plan was discussed in details with patient,  and dr Terra Payne.  Please call me if any further questions or concerns. Will continue to participate in the care of this patient. subjective:    decreased abdominal pain. Patient denies headaches, visual disturbances, sore throat, runny nose, earaches, cp, sob, chills, cough, diarrhea, burning micturition, pain or weakness in extremities. He denies back pain/flank pain. home Medication List    Details   cyanocobalamin (VITAMIN B-12) 1,000 mcg tablet Take 1,000 mcg by mouth daily. furosemide (LASIX) 20 mg tablet Take  by mouth daily. ezetimibe (ZETIA) 10 mg tablet Take 10 mg by mouth daily. labetalol (NORMODYNE) 200 mg tablet Take 1 Tab by mouth every twelve (12) hours. Qty: 30 Tab, Refills: 0    Associated Diagnoses: Hypertensive heart and kidney disease without heart failure and with chronic kidney disease stage III      losartan (COZAAR) 100 mg tablet Take 1 Tab by mouth daily. Qty: 15 Tab, Refills: 0    Associated Diagnoses: Hypertensive heart and kidney disease without heart failure and with chronic kidney disease stage III      amLODIPine (NORVASC) 10 mg tablet Take 1 Tab by mouth daily.   Qty: 15 Tab, Refills: 0    Associated Diagnoses: Hypertensive heart and kidney disease without heart failure and with chronic kidney disease stage III             Current Facility-Administered Medications   Medication Dose Route Frequency    labetalol (NORMODYNE) tablet 50 mg  50 mg Oral Q12H    meropenem (MERREM) 1 g in sterile water (preservative free) 20 mL IV syringe  1 g IntraVENous Q8H    fluconazole (DIFLUCAN) 400mg/200 mL IVPB (premix)  400 mg IntraVENous DAILY    levoFLOXacin (LEVAQUIN) 500 mg in D5W IVPB  500 mg IntraVENous Q24H    HYDROcodone-acetaminophen (NORCO) 5-325 mg per tablet 1 Tab  1 Tab Oral Q4H PRN    acetaminophen (TYLENOL) tablet 650 mg  650 mg Oral Q4H PRN    lactated Ringers infusion  125 mL/hr IntraVENous CONTINUOUS    naloxone (NARCAN) injection 0.2 mg  0.2 mg IntraVENous ONCE PRN    tamsulosin (FLOMAX) capsule 0.4 mg  0.4 mg Oral DAILY       Allergies: Lipitor [atorvastatin]    Temp (24hrs), Av °F (36.7 °C), Min:97.7 °F (36.5 °C), Max:98.2 °F (36.8 °C)    Visit Vitals    /89    Pulse 75    Temp 97.7 °F (36.5 °C)    Resp 18    Wt 105 kg (231 lb 6.4 oz)    SpO2 96%    BMI 29.71 kg/m2       ROS: 12 point ROS obtained in details. Pertinent positives as mentioned in HPI,   otherwise negative    Physical Exam:    General: Well developed, well nourished male laying on the bed, appears more comfortable    General:   awake alert and oriented   HEENT:  Normocephalic, atraumatic, PERRL, EOMI, no scleral icterus or pallor; no conjunctival hemmohage;  nasal and oral mucous are moist and without evidence of lesions. Neck supple, no bruits. Lymph Nodes:   no cervical, axillary or inguinal adenopathy   Lungs:   non-labored, bilaterally clear to auscultation- no crackles wheezes rales or rhonchi   Heart:  RRR, s1 and s2; no  rubs or gallops, no edema, + pedal pulses   Abdomen:  soft, hypoactive bowel sounds, no hepatomegaly, no splenomegaly. Appropriate surgical scars for stated surgeries. Decreased tenderness. No guarding. Distended abdomen. STEVEN drain in place with serosanguinous drainage   Genitourinary:  lew in place   Extremities:   no clubbing, cyanosis; no joint effusions or swelling; muscle mass appropriate for age   Neurologic:  No gross focal sensory abnormalities; 5/5 muscle strength to upper and lower extremities. Speech appropriate.  Cranial nerves intact                        Skin:  No rash or ulcers noted   Back:  no spinal or paraspinal muscle tenderness or rigidity, no CVA tenderness     Psychiatric:  Unable to assess         Labs: Results:   Chemistry Recent Labs      18   0525  18   0320  18   0840   GLU  89  81  90   NA  140  142  143   K  3.9  4.0  4.3   CL  106  109*  113*   CO2  25  26  22   BUN  16  20*  26*   CREA  1.16  1.22  1.39*   CA  8.8  9.3  9.2   AGAP  9  7  8   BUCR  14  16  19 CBC w/Diff Recent Labs      04/25/18   0525  04/24/18   0320  04/23/18   0840   WBC  19.6*  24.0*  28.9*   RBC  3.77*  3.67*  3.85*   HGB  10.4*  10.2*  10.5*   HCT  30.2*  30.0*  30.9*   PLT  380  372  356   GRANS  PENDING  75  86*   LYMPH  PENDING  5*  5*   EOS  PENDING  0  0      Microbiology No results for input(s): CULT in the last 72 hours.        RADIOLOGY:    All available imaging studies/reports in [facility] for this admission were reviewed    Dr. Tim Virgen, Infectious Disease Specialist  [phone]  April 25, 2018  3:11 PM

## 2018-04-25 NOTE — PROGRESS NOTES
Intern Progress Note  [facility]       Patient: Vickie Bryan MRN: [id number]  CSN: [id number]    YOB: 1945  Age: 67 y.o. Sex: male    DOA: 4/19/2018 LOS:  LOS: 5 days                    Subjective:     Acute events: Patient reports abdominal pain this morning. Sitter at bedside. Patient has not ambulated at this time. Denies flatus or BM. Instructed the patient how to use the incentive spirometer    Review of Systems   Constitutional: Negative for chills and fever. Respiratory: Negative for shortness of breath. Cardiovascular: Negative for chest pain. Gastrointestinal: Positive for abdominal pain. Negative for nausea. Neurological: Negative for dizziness and headaches. Objective:      Patient Vitals for the past 24 hrs:   Temp Pulse Resp BP SpO2   04/25/18 0400 97.8 °F (36.6 °C) 76 19 154/90 94 %   04/25/18 0000 98.2 °F (36.8 °C) 75 19 143/80 95 %   04/24/18 2232 - - - 144/88 -   04/24/18 2200 98 °F (36.7 °C) 83 16 (!) 155/91 96 %   04/24/18 1721 - 80 - - 93 %   04/24/18 1540 98.2 °F (36.8 °C) 81 18 144/85 96 %   04/24/18 1053 97.9 °F (36.6 °C) 82 16 (!) 151/92 93 %   04/24/18 0724 97.4 °F (36.3 °C) 79 18 156/82 97 %         Intake/Output Summary (Last 24 hours) at 04/25/18 0708  Last data filed at 04/25/18 0600   Gross per 24 hour   Intake             1652 ml   Output             4800 ml   Net            -3148 ml         Physical Exam:   General:  Alert and Responsive and in moderate distress. CV:  RRR, no rubs gallops or murmurs. RESP:  Unlabored breathing. Lungs clear to auscultation. no wheeze, rales, or rhonchi. Noisy upper airway. Equal expansion bilaterally. ABD:  TTP in the general abdomen. Distended. Hypertympanic. Midline surgical dressing C/D/I. STEVEN drain draining serosang fluid   MS:  No joint deformity or instability. No atrophy. Neuro: axo2 (self and place)  Ext:  No edema.    Skin: dry skin  Uro/Genital: Chow cath in place and draining clear yellow urine      Lab/Data Reviewed:  BMP:   Lab Results   Component Value Date/Time     04/25/2018 05:25 AM    K 3.9 04/25/2018 05:25 AM     04/25/2018 05:25 AM    CO2 25 04/25/2018 05:25 AM    AGAP 9 04/25/2018 05:25 AM    GLU 89 04/25/2018 05:25 AM    BUN 16 04/25/2018 05:25 AM    CREA 1.16 04/25/2018 05:25 AM    GFRAA >60 04/25/2018 05:25 AM    GFRNA >60 04/25/2018 05:25 AM     CBC:   Lab Results   Component Value Date/Time    WBC 19.6 (H) 04/25/2018 05:25 AM    HGB 10.4 (L) 04/25/2018 05:25 AM    HCT 30.2 (L) 04/25/2018 05:25 AM     04/25/2018 05:25 AM        Scheduled Medications Reviewed:  Current Facility-Administered Medications   Medication Dose Route Frequency    labetalol (NORMODYNE) tablet 50 mg  50 mg Oral Q12H    meropenem (MERREM) 1 g in sterile water (preservative free) 20 mL IV syringe  1 g IntraVENous Q8H    fluconazole (DIFLUCAN) 400mg/200 mL IVPB (premix)  400 mg IntraVENous DAILY    levoFLOXacin (LEVAQUIN) 500 mg in D5W IVPB  500 mg IntraVENous Q24H    lactated Ringers infusion  125 mL/hr IntraVENous CONTINUOUS    tamsulosin (FLOMAX) capsule 0.4 mg  0.4 mg Oral DAILY         Imaging, microbiology, and EKG/Telemetry:  All Micro Results     Procedure Component Value Units Date/Time    CULTURE, ANAEROBIC [[id number]]  (Abnormal) Collected:  04/20/18 0734    Order Status:  Completed Specimen:  Appendix Updated:  04/24/18 9138     Special Requests: ABSCESS        Culture result:         MODERATE BACTEROIDES THETAIOTAOMICRON BETA LACTAMASE POSITIVE (A)              MODERATE BACTEROIDES STERCORIS BETA LACTAMASE POSITIVE (A)    CULTURE, SURGICAL WOUND Chris Palestinian STAIN [[id number]]  (Abnormal)  (Susceptibility) Collected:  04/20/18 0734    Order Status:  Completed Specimen:  Appendix Updated:  04/24/18 1026     Special Requests: ABSCESS        GRAM STAIN MODERATE WBC'S         FEW GRAM POSITIVE COCCI                 MODERATE GRAM POSITIVE RODS      FEW GRAM NEGATIVE RODS Culture result: FEW PROTEUS MIRABILIS (A)         FEW ESCHERICHIA COLI (A)                 MODERATE LACTOBACILLUS SPECIES (A)    CULTURE, BLOOD [[id number]] Collected:  04/20/18 0000    Order Status:  Completed Specimen:  Blood from Blood Updated:  04/24/18 0656     Special Requests: NO SPECIAL REQUESTS        Culture result: NO GROWTH 4 DAYS       CULTURE, BLOOD [[id number]] Collected:  04/20/18 0015    Order Status:  Completed Specimen:  Whole Blood from Blood Updated:  04/24/18 0656     Special Requests: NO SPECIAL REQUESTS        Culture result: NO GROWTH 4 DAYS       CULTURE, URINE [[id number]] Collected:  04/20/18 0102    Order Status:  Completed Specimen:  Clean catch Updated:  04/21/18 1000     Special Requests: NO SPECIAL REQUESTS        Culture result: NO GROWTH 1 DAY       CULTURE, BODY FLUID Robyn Coventry STAIN [[id number]] Collected:  04/20/18 0745    Order Status:  Canceled Specimen:  Drainage             Assessment/Plan     67 y. o. male with PMH significant for CVA, HTN, HLD, CKD III, chronic diarrhea, BPH/OAB, admitted with ruptured appendix with abscess formation. S/P open appendectomy.      Perforated appendix w/ abscess POD4 Open Appendectomy: WBC still elevated at 19.6 but decreasing. Blood cx from 4/20 with no growth in 3 days. Surgical cx shows P mirabilis, E coli, and lactobacillus. Pt is afebrile. Repeat CT A/P showed likely ileus.  - Dr. Mercedez Story following, appreciate the recs. - ID consulted. Appreciate recs. Abx regimen w/ Diflucan, Levo, merrem, and Vanco   - Pain control and diet per surgery  - LR @ 125 ml/hr  - PT/OT. Needs to start ambulating  - Encourage incentive spirometer      AMS/Metabolic encephalopathy: likely metabolic and due to pain and anesthesia. Axo2.  -sitter as needed     Acute urinary retention/OAB/BPH: holding home tolteridine, dicyclomine as an outpatient. The Ucx 4/20 with NG1day.  Had urinary retention following surgery so lew was placed by urology.  - Dr. Martha Gooden placed 16 fr catheter 04/20, rec's to keep catheter in place for one week. Till 4/27  - 0.4mg tamulosin QD      H/o CVA, HTN, HLD, Vitamin D/B12 deficiency: CVA in 2015 with residual mild L sided weakness and some aphasia. BP elevated w/ SBP in 160s  - Hold home amlodipine 10mg QD, labetalol 100mg BID and losartan 200mg BID   - Hold home lasix 20 mg every day  - Hold home ezetimibe 10mg QD   - Hold home vitamin B12  - Fall precautions, aspiration precautions   - Restart meds once tolerating PO      Diet: Per surgery  DVT Prophylaxis: SCDs  Code Status: Full  Point of Contact: Lindsey Sinha [phone]      Disposition and anticipated LOS: 2 midnight     Rebecca Grace In [facility]  PGY-1 [facility]  04/25/18 7:08 AM

## 2018-04-25 NOTE — PROGRESS NOTES
Riaz Shi M.D. FACS  PROGRESS NOTE    Name: Kris Bower MRN: [id number]   : 1945 Hospital: [facility]   Date: 2018 Admission Date: 2018 10:27 PM   Late entry:  Pt seen at 0815 hrs. Hospital Day: 7  5 Days Post-Op  Subjective:  Patient without acute overnight events. He did have a good response to the enema on yesterday. Objective:  Vitals:    18 0400 18 0728 18 1121 18 1128   BP: 154/90 153/89 150/90 146/88   Pulse: 76 75 65    Resp: 19 18 20    Temp: 97.8 °F (36.6 °C) 97.7 °F (36.5 °C) 97.7 °F (36.5 °C)    SpO2: 94% 96% 97%    Weight: 105 kg (231 lb 6.4 oz)          Date 18 0700 - 18 0659 18 0700 - 18 0659   Shift 0763-3764 9045-7713 24 Hour Total 1689-1180 9734-5420 24 Hour Total   I  N  T  A  K  E   P.O. 240  240 240  240      P. O. 240  240 240  240    I.V.  (mL/kg/hr) 1412  (1.1)  1412  (0.6)         Volume (lactated Ringers infusion) 1312  1312         Volume (levoFLOXacin (LEVAQUIN) 500 mg in D5W IVPB) 100  100       Shift Total  (mL/kg) 1652  (15.7)  1652  (15.7) 240  (2.3)  240  (2.3)   O  U  T  P  U  T   Urine  (mL/kg/hr) 2250  (1.8) 2500  (2) 4750  (1.9) 650  650      Urine Voided  1300 1300         Urine Occurrence(s)  2 x 2 x         Urine Output (mL) (Urinary Catheter 18 Coude) 2250 1200 3450 650  650    Drains 30 20 50         Output (ml) ([place] Drain 18 Right; Lower Abdomen) 30 20 50       Stool            Stool Occurrence(s) 2 x 0 x 2 x       Shift Total  (mL/kg) 2280  (21.7) 2520  (24) 4800  (45.7) 650  (6.2)  650  (6.2)   NET -628 -2520 -3148 -410  -410   Weight (kg) 105.2 105 105 105 105 105         Physical Exam:    General: Patient is awake and alert. Oriented to self year month   Abdomen: abdomen is distended but soft with incisional tenderness. STEVEN site is clean and clear incision(s) are clean/intact with serosanguineous drainage.   No  masses, organomegaly or guardi    Labs:  Recent Results (from the past 24 hour(s))   CBC WITH AUTOMATED DIFF    Collection Time: 04/25/18  5:25 AM   Result Value Ref Range    WBC 19.6 (H) 4.6 - 13.2 K/uL    RBC 3.77 (L) 4.70 - 5.50 M/uL    HGB 10.4 (L) 13.0 - 16.0 g/dL    HCT 30.2 (L) 36.0 - 48.0 %    MCV 80.1 74.0 - 97.0 FL    MCH 27.6 24.0 - 34.0 PG    MCHC 34.4 31.0 - 37.0 g/dL    RDW 15.0 (H) 11.6 - 14.5 %    PLATELET 773 270 - 716 K/uL    MPV 10.2 9.2 - 11.8 FL    NEUTROPHILS 64 42 - 75 %    BAND NEUTROPHILS 3 0 - 5 %    LYMPHOCYTES 11 (L) 20 - 51 %    MONOCYTES 10 (H) 2 - 9 %    EOSINOPHILS 0 0 - 5 %    BASOPHILS 0 0 - 3 %    METAMYELOCYTES 3 (H) 0 %    MYELOCYTES 5 (H) 0 %    OTHER CELL 4 (H) 0      ABS. NEUTROPHILS 13.1 (H) 1.8 - 8.0 K/UL    ABS. LYMPHOCYTES 2.2 0.8 - 3.5 K/UL    ABS. MONOCYTES 2.0 (H) 0 - 1.0 K/UL    ABS. EOSINOPHILS 0.0 0.0 - 0.4 K/UL    ABS.  BASOPHILS 0.0 0.0 - 0.06 K/UL    DF MANUAL      PLATELET COMMENTS ADEQUATE PLATELETS      RBC COMMENTS ANISOCYTOSIS  1+        RBC COMMENTS POLYCHROMASIA  1+        RBC COMMENTS TARGET CELLS  1+        RBC COMMENTS OVALOCYTES  1+       METABOLIC PANEL, BASIC    Collection Time: 04/25/18  5:25 AM   Result Value Ref Range    Sodium 140 136 - 145 mmol/L    Potassium 3.9 3.5 - 5.5 mmol/L    Chloride 106 100 - 108 mmol/L    CO2 25 21 - 32 mmol/L    Anion gap 9 3.0 - 18 mmol/L    Glucose 89 74 - 99 mg/dL    BUN 16 7.0 - 18 MG/DL    Creatinine 1.16 0.6 - 1.3 MG/DL    BUN/Creatinine ratio 14 12 - 20      GFR est AA >60 >60 ml/min/1.73m2    GFR est non-AA >60 >60 ml/min/1.73m2    Calcium 8.8 8.5 - 10.1 MG/DL     All Micro Results     Procedure Component Value Units Date/Time    CULTURE, SURGICAL WOUND Lonza Barrack STAIN [[id number]]  (Abnormal)  (Susceptibility) Collected:  04/20/18 0734    Order Status:  Completed Specimen:  Appendix Updated:  04/25/18 1411     Special Requests: ABSCESS        GRAM STAIN MODERATE WBC'S         FEW GRAM POSITIVE COCCI                 MODERATE GRAM POSITIVE RODS      FEW GRAM NEGATIVE RODS        Culture result: FEW PROTEUS MIRABILIS (A)         FEW ESCHERICHIA COLI (A)                 MODERATE LACTOBACILLUS SPECIES (A)    CULTURE, BLOOD [[id number]] Collected:  04/20/18 0000    Order Status:  Completed Specimen:  Blood from Blood Updated:  04/25/18 0737     Special Requests: NO SPECIAL REQUESTS        Culture result: NO GROWTH 5 DAYS       CULTURE, BLOOD [[id number]] Collected:  04/20/18 0015    Order Status:  Completed Specimen:  Whole Blood from Blood Updated:  04/25/18 0737     Special Requests: NO SPECIAL REQUESTS        Culture result: NO GROWTH 5 DAYS       CULTURE, ANAEROBIC [[id number]]  (Abnormal) Collected:  04/20/18 0734    Order Status:  Completed Specimen:  Appendix Updated:  04/24/18 1528     Special Requests: ABSCESS        Culture result:         MODERATE BACTEROIDES THETAIOTAOMICRON BETA LACTAMASE POSITIVE (A)              MODERATE BACTEROIDES STERCORIS BETA LACTAMASE POSITIVE (A)    CULTURE, URINE [[id number]] Collected:  04/20/18 0102    Order Status:  Completed Specimen:  Clean catch Updated:  04/21/18 1000     Special Requests: NO SPECIAL REQUESTS        Culture result: NO GROWTH 1 DAY       CULTURE, BODY FLUID Marceil Found STAIN [[id number]] Collected:  04/20/18 0745    Order Status:  Canceled Specimen:  Drainage       Current Medications:  Current Facility-Administered Medications   Medication Dose Route Frequency Provider Last Rate Last Dose    labetalol (NORMODYNE) tablet 50 mg  50 mg Oral Q12H Valentino Holly MD   50 mg at 04/25/18 0836    meropenem (MERREM) 1 g in sterile water (preservative free) 20 mL IV syringe  1 g IntraVENous Q8H Eduardo Figueroa MD   1 g at 04/25/18 1517    fluconazole (DIFLUCAN) 400mg/200 mL IVPB (premix)  400 mg IntraVENous DAILY Eduardo Figueroa  mL/hr at 04/25/18 0822 400 mg at 04/25/18 8910    HYDROcodone-acetaminophen (NORCO) 5-325 mg per tablet 1 Tab  1 Tab Oral Q4H PRN Liliana Perez MD   1 Tab at 04/25/18 1425    acetaminophen (TYLENOL) tablet 650 mg  650 mg Oral Q4H PRN Dionna Tolbert MD   650 mg at 04/23/18 1657    lactated Ringers infusion  125 mL/hr IntraVENous CONTINUOUS Alex Villasenor  mL/hr at 04/25/18 8170 125 mL/hr at 04/25/18 0816    naloxone (NARCAN) injection 0.2 mg  0.2 mg IntraVENous ONCE PRN Dionna Tolbert MD        [facility]) capsule 0.4 mg  0.4 mg Oral DAILY Dionna Tolbert MD   0.4 mg at 04/25/18 0705   Chart and notes reviewed. Data reviewed. I have evaluated and examined the patient. IMPRESSION:   · Patient is postop day 5 from laparoscopy open appendectomy with drainage of appendiceal abscess.       PLAN:/DISCUSION:   · Clear liquid diet with Ensure supplements  · Speech pathology to evaluate  · Continue Chow and Flomax per urology  · IV antibiotics per infectious disease        Alex Villasenor MD

## 2018-04-25 NOTE — ROUTINE PROCESS
Bedside shift change report given to [address] [place] [address] (oncoming nurse) by Kat Rocha RN (offgoing nurse). Report included the following information SBAR, Procedure Summary, Intake/Output, MAR, Accordion, Recent Results, Med Rec Status, Cardiac Rhythm NSR and Alarm Parameters .

## 2018-04-26 LAB
ANION GAP SERPL CALC-SCNC: 7 MMOL/L (ref 3–18)
BACTERIA SPEC CULT: NORMAL
BACTERIA SPEC CULT: NORMAL
BASOPHILS # BLD: 0 K/UL (ref 0–0.06)
BASOPHILS NFR BLD: 0 % (ref 0–3)
BUN SERPL-MCNC: 18 MG/DL (ref 7–18)
BUN/CREAT SERPL: 15 (ref 12–20)
CALCIUM SERPL-MCNC: 9.1 MG/DL (ref 8.5–10.1)
CHLORIDE SERPL-SCNC: 105 MMOL/L (ref 100–108)
CO2 SERPL-SCNC: 27 MMOL/L (ref 21–32)
CREAT SERPL-MCNC: 1.2 MG/DL (ref 0.6–1.3)
DIFFERENTIAL METHOD BLD: ABNORMAL
EOSINOPHIL # BLD: 0 K/UL (ref 0–0.4)
EOSINOPHIL NFR BLD: 0 % (ref 0–5)
ERYTHROCYTE [DISTWIDTH] IN BLOOD BY AUTOMATED COUNT: 14.9 % (ref 11.6–14.5)
GLUCOSE SERPL-MCNC: 116 MG/DL (ref 74–99)
HCT VFR BLD AUTO: 31.8 % (ref 36–48)
HGB BLD-MCNC: 10.5 G/DL (ref 13–16)
LYMPHOCYTES # BLD: 1.5 K/UL (ref 0.8–3.5)
LYMPHOCYTES NFR BLD: 8 % (ref 20–51)
MCH RBC QN AUTO: 26.7 PG (ref 24–34)
MCHC RBC AUTO-ENTMCNC: 33 G/DL (ref 31–37)
MCV RBC AUTO: 80.9 FL (ref 74–97)
METAMYELOCYTES NFR BLD MANUAL: 2 %
MONOCYTES # BLD: 1.5 K/UL (ref 0–1)
MONOCYTES NFR BLD: 8 % (ref 2–9)
NEUTS BAND NFR BLD MANUAL: 5 % (ref 0–5)
NEUTS SEG # BLD: 15.3 K/UL (ref 1.8–8)
NEUTS SEG NFR BLD: 77 % (ref 42–75)
PLATELET # BLD AUTO: 416 K/UL (ref 135–420)
PLATELET COMMENTS,PCOM: ABNORMAL
PMV BLD AUTO: 9.9 FL (ref 9.2–11.8)
POTASSIUM SERPL-SCNC: 3.9 MMOL/L (ref 3.5–5.5)
RBC # BLD AUTO: 3.93 M/UL (ref 4.7–5.5)
RBC MORPH BLD: ABNORMAL
SERVICE CMNT-IMP: NORMAL
SERVICE CMNT-IMP: NORMAL
SODIUM SERPL-SCNC: 139 MMOL/L (ref 136–145)
WBC # BLD AUTO: 18.6 K/UL (ref 4.6–13.2)

## 2018-04-26 PROCEDURE — 80048 BASIC METABOLIC PNL TOTAL CA: CPT

## 2018-04-26 PROCEDURE — 74011000250 HC RX REV CODE- 250: Performed by: INTERNAL MEDICINE

## 2018-04-26 PROCEDURE — 36415 COLL VENOUS BLD VENIPUNCTURE: CPT

## 2018-04-26 PROCEDURE — 74011250637 HC RX REV CODE- 250/637: Performed by: STUDENT IN AN ORGANIZED HEALTH CARE EDUCATION/TRAINING PROGRAM

## 2018-04-26 PROCEDURE — 74011000258 HC RX REV CODE- 258: Performed by: INTERNAL MEDICINE

## 2018-04-26 PROCEDURE — 97161 PT EVAL LOW COMPLEX 20 MIN: CPT

## 2018-04-26 PROCEDURE — 77030027138 HC INCENT SPIROMETER -A

## 2018-04-26 PROCEDURE — 74011250636 HC RX REV CODE- 250/636: Performed by: INTERNAL MEDICINE

## 2018-04-26 PROCEDURE — 85025 COMPLETE CBC W/AUTO DIFF WBC: CPT

## 2018-04-26 PROCEDURE — 65660000004 HC RM CVT STEPDOWN

## 2018-04-26 PROCEDURE — 77030011256 HC DRSG MEPILEX <16IN NO BORD MOLN -A

## 2018-04-26 PROCEDURE — 97166 OT EVAL MOD COMPLEX 45 MIN: CPT

## 2018-04-26 RX ADMIN — FLUCONAZOLE 400 MG: 2 INJECTION, SOLUTION INTRAVENOUS at 08:59

## 2018-04-26 RX ADMIN — SODIUM CHLORIDE 3.38 G: 900 INJECTION, SOLUTION INTRAVENOUS at 23:52

## 2018-04-26 RX ADMIN — TAMSULOSIN HYDROCHLORIDE 0.4 MG: 0.4 CAPSULE ORAL at 08:59

## 2018-04-26 RX ADMIN — MEROPENEM 1 G: 1 INJECTION, POWDER, FOR SOLUTION INTRAVENOUS at 08:46

## 2018-04-26 RX ADMIN — AMLODIPINE BESYLATE 10 MG: 10 TABLET ORAL at 08:58

## 2018-04-26 RX ADMIN — LABETALOL HYDROCHLORIDE 100 MG: 100 TABLET, FILM COATED ORAL at 21:43

## 2018-04-26 RX ADMIN — LABETALOL HYDROCHLORIDE 100 MG: 100 TABLET, FILM COATED ORAL at 08:58

## 2018-04-26 NOTE — PROGRESS NOTES
Intern Progress Note  [facility]       Patient: Shauna Lopez MRN: [id number]  CSN: [id number]    YOB: 1945  Age: 67 y.o. Sex: male    DOA: 4/19/2018 LOS:  LOS: 6 days                    Subjective:     Acute events: Patient reports abdominal pain this morning but improved. Sitter at bedside. Patient ambulating to bed and having BM and flatus. Tolerating more PO intake    Review of Systems   Constitutional: Negative for chills and fever. Respiratory: Negative for shortness of breath. Cardiovascular: Negative for chest pain. Gastrointestinal: Positive for abdominal pain. Negative for nausea. Neurological: Negative for dizziness and headaches. Objective:      Patient Vitals for the past 24 hrs:   Temp Pulse Resp BP SpO2   04/26/18 0400 98.1 °F (36.7 °C) 72 16 (!) 142/93 92 %   04/26/18 0000 98.5 °F (36.9 °C) 80 18 (!) 150/94 93 %   04/1945 97.7 °F (36.5 °C) 75 18 (!) 136/92 95 %   04/25/18 1614 98.7 °F (37.1 °C) 89 20 134/82 97 %   04/25/18 1128 - - - 146/88 -   04/25/18 1121 97.7 °F (36.5 °C) 65 20 150/90 97 %   04/25/18 0728 97.7 °F (36.5 °C) 75 18 153/89 96 %         Intake/Output Summary (Last 24 hours) at 04/26/18 0708  Last data filed at 04/26/18 3678   Gross per 24 hour   Intake             1210 ml   Output             1715 ml   Net             -505 ml         Physical Exam:   General:  Alert and Responsive and in moderate distress. CV:  RRR, no rubs gallops or murmurs. RESP:  Unlabored breathing. Lungs clear to auscultation. no wheeze, rales, or rhonchi. Noisy upper airway. Equal expansion bilaterally. ABD:  TTP in the general abdomen. Distended. Hypertympanic. Midline surgical dressing C/D/I. STEVEN drain draining serosang fluid   MS:  No joint deformity or instability. No atrophy. Neuro: axo2 (self and place)  Ext:  No edema.    Skin: dry skin  Uro/Genital: Chow cath in place and draining clear yellow urine      Lab/Data Reviewed:  BMP:   Lab Results   Component Value Date/Time     04/26/2018 05:15 AM    K 3.9 04/26/2018 05:15 AM     04/26/2018 05:15 AM    CO2 27 04/26/2018 05:15 AM    AGAP 7 04/26/2018 05:15 AM     (H) 04/26/2018 05:15 AM    BUN 18 04/26/2018 05:15 AM    CREA 1.20 04/26/2018 05:15 AM    GFRAA >60 04/26/2018 05:15 AM    GFRNA 59 (L) 04/26/2018 05:15 AM     CBC:   Lab Results   Component Value Date/Time    WBC 18.6 (H) 04/26/2018 05:15 AM    HGB 10.5 (L) 04/26/2018 05:15 AM    HCT 31.8 (L) 04/26/2018 05:15 AM     04/26/2018 05:15 AM        Scheduled Medications Reviewed:  Current Facility-Administered Medications   Medication Dose Route Frequency    amLODIPine (NORVASC) tablet 10 mg  10 mg Oral DAILY    labetalol (NORMODYNE) tablet 100 mg  100 mg Oral Q12H    meropenem (MERREM) 1 g in sterile water (preservative free) 20 mL IV syringe  1 g IntraVENous Q8H    fluconazole (DIFLUCAN) 400mg/200 mL IVPB (premix)  400 mg IntraVENous DAILY    lactated Ringers infusion  125 mL/hr IntraVENous CONTINUOUS    tamsulosin (FLOMAX) capsule 0.4 mg  0.4 mg Oral DAILY         Imaging, microbiology, and EKG/Telemetry:  All Micro Results     Procedure Component Value Units Date/Time    CULTURE, SURGICAL [facility] STAIN [[id number]]  (Abnormal)  (Susceptibility) Collected:  04/20/18 0734    Order Status:  Completed Specimen:  Appendix Updated:  04/25/18 1411     Special Requests: ABSCESS        GRAM STAIN MODERATE WBC'S         FEW GRAM POSITIVE COCCI                 MODERATE GRAM POSITIVE RODS      FEW GRAM NEGATIVE RODS        Culture result: FEW PROTEUS MIRABILIS (A)         FEW ESCHERICHIA COLI (A)                 MODERATE LACTOBACILLUS SPECIES (A)    CULTURE, BLOOD [[id number]] Collected:  04/20/18 0000    Order Status:  Completed Specimen:  Blood from Blood Updated:  04/25/18 0737     Special Requests: NO SPECIAL REQUESTS        Culture result: NO GROWTH 5 DAYS       CULTURE, BLOOD [[id number]] Collected:  04/20/18 0015 Order Status:  Completed Specimen:  Whole Blood from Blood Updated:  04/25/18 0737     Special Requests: NO SPECIAL REQUESTS        Culture result: NO GROWTH 5 DAYS       CULTURE, ANAEROBIC [[id number]]  (Abnormal) Collected:  04/20/18 0734    Order Status:  Completed Specimen:  Appendix Updated:  04/24/18 1528     Special Requests: ABSCESS        Culture result:         MODERATE BACTEROIDES THETAIOTAOMICRON BETA LACTAMASE POSITIVE (A)              MODERATE BACTEROIDES STERCORIS BETA LACTAMASE POSITIVE (A)    CULTURE, URINE [[id number]] Collected:  04/20/18 0102    Order Status:  Completed Specimen:  Clean catch Updated:  04/21/18 1000     Special Requests: NO SPECIAL REQUESTS        Culture result: NO GROWTH 1 DAY       CULTURE, BODY FLUID [facility] STAIN [[id number]] Collected:  04/20/18 0745    Order Status:  Canceled Specimen:  Drainage             Assessment/Plan     67 y. o. male with PMH significant for CVA, HTN, HLD, CKD III, chronic diarrhea, BPH/OAB, admitted with ruptured appendix with abscess formation. S/P open appendectomy.      Perforated appendix w/ abscess POD4 Open Appendectomy: WBC still elevated at 18.6 but decreasing. Surgical cx shows P mirabilis, E coli, and lactobacillus. Pt is afebrile. Repeat CT A/P showed likely ileus.  - Dr. Rodrigo Huggins following, appreciate the recs. - ID consulted. Appreciate recs. Abx regimen w/ Diflucan, merrem  - Pain control and diet per surgery  - LR @ 125 ml/hr. Consider discontinuing IVF if PO intake increases  - PT/OT  - Encourage incentive spirometer      AMS/Metabolic encephalopathy: likely metabolic and due to pain and anesthesia. Axo2.  -sitter as needed     Severe Protein Calorie Malnutrition.    ASPEN Malnutrition Criteria  Acute Illness, Chronic Illness, or Social/Enviornmental: Acute illness  Energy Intake: Less than/equal to 50% est energy req for greater than/equal to 5 days  Weight Loss: Greater than 1-2% x 1 wk  ASPEN Malnutrition Score - Acute Illness: 12  - Nutrition following    Acute urinary retention/OAB/BPH: holding home tolteridine, dicyclomine as an outpatient. The Ucx 4/20 with NG1day. Had urinary retention following surgery so lew was placed by urology.  - Dr. Amy Jerry placed 16 fr catheter 04/20, rec's to keep catheter in place for one week. Till 4/27  - 0.4mg tamulosin QD      H/o CVA, HTN, HLD, Vitamin D/B12 deficiency: CVA in 2015 with residual mild L sided weakness and some aphasia. BP elevated w/ SBP in 160s  - Hold home losartan 200mg BID   - Restarted Norvasc 10mg daily and labetalol 100mg BID  - Hold home lasix 20 mg every day  - Hold home ezetimibe 10mg QD   - Hold home vitamin B12  - Fall precautions, aspiration precautions   - Restart meds once tolerating PO      Diet: Per surgery. CLD  DVT Prophylaxis: SCDs  Code Status: Full  Point of Contact: Philippe Cote [phone]      Disposition and anticipated LOS: +/= 2 midnight     Samina Abebe  PGY-1 [address] [facility]  04/26/18 7:08 AM

## 2018-04-26 NOTE — PROGRESS NOTES
Problem: Mobility Impaired (Adult and Pediatric)  Goal: *Acute Goals and Plan of Care (Insert Text)  Physical Therapy Goals  Initiated 4/26/2018 and to be accomplished within 7 day(s)  1. Patient will move from supine to sit and sit to supine  in bed with modified independence. 2.  Patient will transfer from bed to chair and chair to bed with supervision/set-up using the least restrictive device. 3.  Patient will perform sit to stand with supervision/set-up. 4.  Patient will ambulate with contact guard assist for 50 feet with the least restrictive device. 5.  Patient will ascend/descend 3 stairs with 1 handrail(s) with minimal assistance/contact guard assist.  physical Therapy EVALUATION    Patient: Ashley Mishra (89 y.o. male)  Date: 4/26/2018  Primary Diagnosis: Ruptured appendicitis  Intra-abdominal abscess (HCC)  Acute UTI  Nausea  Abdominal pain  ACUTE APPENDICITIS  Appendicitis with abscess  Procedure(s) (LRB):  APPENDECTOMY LAPAROSCOPIC CONVERTED TO OPEN (N/A) 6 Days Post-Op   Precautions:   Fall    ASSESSMENT :  Based on the objective data described below, the patient presents with decreased strength, balance and activity tolerance resulting in decreased independence with functional mobility. Pt has mild residual weakness on Left from previous CVA. Pt transferred supine to sit with SBA and increased time. Pt complained of dizziness initially when sitting however this passed with time. Pt stood from the edge of the bed with min A and verbal cues. Pt ambulated 4 feet with RW and min A with decreased step length and foot clearance. Pt sat in the recliner with CGA and verbal cues for hand placement. Patients feet were elevated and he was left with needs in reach. Patient will benefit from skilled intervention to address the above impairments.   Patients rehabilitation potential is considered to be Good  Factors which may influence rehabilitation potential include:   []         None noted  []         Mental ability/status  [x]         Medical condition  []         Home/family situation and support systems  []         Safety awareness  []         Pain tolerance/management  []         Other:      PLAN :  Recommendations and Planned Interventions:  [x]           Bed Mobility Training             [x]    Neuromuscular Re-Education  [x]           Transfer Training                   []    Orthotic/Prosthetic Training  [x]           Gait Training                          []    Modalities  [x]           Therapeutic Exercises          [x]    Edema Management/Control  [x]           Therapeutic Activities            [x]    Patient and Family Training/Education  []           Other (comment):    Frequency/Duration: Patient will be followed by physical therapy 1-2 times per day/4-7 days per week to address goals. Discharge Recommendations: Inpatient Rehab  Further Equipment Recommendations for Discharge: N/A     G-CODES      Mobility  Current  CJ= 20-39%   Goal  CI= 1-19%. The severity rating is based on the Level of Assistance required for Functional Mobility and ADLs. Eval Complexity: History: MEDIUM  Complexity : 1-2 comorbidities / personal factors will impact the outcome/ POC Exam:MEDIUM Complexity : 3 Standardized tests and measures addressing body structure, function, activity limitation and / or participation in recreation  Presentation: MEDIUM Complexity : Evolving with changing characteristics  Clinical Decision Making:Low Complexity , Overall Complexity:LOW     SUBJECTIVE:   Patient stated That took a lot out of me.     OBJECTIVE DATA SUMMARY:     Past Medical History:   Diagnosis Date    Acute ischemic stroke ([facility] [address] 9/1/2015    Acute Ischemic Stroke (early subacute infarct at the posterior right lentiform nucleus) with residual left hemiparesis and dysphagia    CKD (chronic kidney disease) stage 3, GFR 30-59 ml/min 5/1/8938    Diastolic dysfunction without heart failure 9/2/2015 Grade 1 diastolic dysfunction on 2D ECHO done 9/02/2015    Dyslipidemia 9/2/2015    History of noncompliance with medical treatment, presenting hazards to health 9/1/2015    History of tobacco use 9/1/2015    Hypertension     Hypertensive heart and kidney disease without heart failure and with chronic kidney disease stage III 9/2/2015    Obesity, Class I, BMI 30-34.9 9/1/2015    Rhabdomyolysis 9/1/2015    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 9/1/2015    Vitamin D deficiency 9/6/2015     Past Surgical History:   Procedure Laterality Date    APPENDECTOMY,RUPT APPENDX+ABSCESS N/A 04/20/2018    Dr. Lesli Reynoso COLONOSCOPY N/A 4/3/2018    COLONOSCOPY,  w heated snared polypectomy performed by Tommy Sanford MD at [facility] ENDOSCOPY     Prior Level of Function/Home Situation: patient lives with his sister, ambulated with a quad cane prior to admission, has several steps to enter home  [address]: Private residence  One/Two Story Residence: One story  Living Alone: No  Support Systems: S[employer] Dennison / [facility], Family member(s), Friends \ neighbors  Patient Expects to be Discharged to[de-identified] Private residence  Current DME Used/Available at Home: Cane, quad  Tub or Shower Type: Shower (with seat)  Critical Behavior:   calm and cooperative, motivated  Strength:    Strength: Generally decreased, functional (UEs; 4/5 throughout)   Tone & Sensation:   Tone: Normal (UEs)   Sensation: Intact (UEs)   Range Of Motion:  AROM: Generally decreased, functional (UEs; min decrease in LUE from previous CVA)   Functional Mobility:  Bed Mobility:  Rolling: Supervision  Supine to Sit:  (Pt up in chair upon arrival. )  Transfers:  Sit to Stand: Minimum assistance  Stand to Sit: Contact guard assistance  Bed to Chair: Minimum assistance     Balance:   Sitting: Intact  Standing: With support  Standing - Static: Fair  Standing - Dynamic :  (fair-)  Ambulation/Gait Training:  Distance (ft): 4 Feet (ft)  Assistive Device: Walker, rolling  Ambulation - Level of Assistance: Minimal assistance  Gait Abnormalities: Decreased step clearance  Step Length: Left shortened;Right shortened    Therapeutic Exercises: Ankle pumps, heel slides  Pain:  Pt reports 2/10 pain or discomfort prior to treatment.    Pt reports 2/10 pain or discomfort post treatment. Activity Tolerance:   Poor+  Please refer to the flowsheet for vital signs taken during this treatment. After treatment:   [x]         Patient left in no apparent distress sitting up in chair  []         Patient left in no apparent distress in bed  [x]         Call bell left within reach  [x]         Nursing notified  []         Caregiver present  []         Bed alarm activated    COMMUNICATION/EDUCATION:   [x]         Fall prevention education was provided and the patient/caregiver indicated understanding. [x]         Patient/family have participated as able in goal setting and plan of care. [x]         Patient/family agree to work toward stated goals and plan of care. []         Patient understands intent and goals of therapy, but is neutral about his/her participation. []         Patient is unable to participate in goal setting and plan of care.   Educated patient on calling for assistance to get up and on transfer techniques    Thank you for this referral.  Lieutenant Cohen, PT   Time Calculation: 16 mins

## 2018-04-26 NOTE — PROGRESS NOTES
Infectious Disease Progress Note    Requested by: dr. Jericho Bird    Reason: perforated appendicitis, increasing wbc    Current abx Prior abx   Meropenem, levofloxacin since 4/23 Cefepime, levofloxacin 4/20-4/21  Zosyn, vancomycin 4/21-4/23     Lines: Lew since 4/20 placed by urology for retention    Assessment :    67 y.o. 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 Ct  Scan 4/20/18- 6x10x7 cm RLQ abdominal abscess. S/p laparoscopy, open appendectomy with drainage of appendiceal abscess on 4/21/18- found to have perforated appendix with walled off abscess. Intra op cx: e.coli, proteus, lactobacillus, bacteroides    Clinical presentation c/w perforated appendicitis with contained perforation, abdominal abscess. Patient has been appropriately managed with appendectomy, drainage of abscess on 4/20/18     Etiology of persistent leukocytosis with wbc:28k on 4/23 compared to 15k on 4/20, diffuse tenderness - unclear. ? Leukemoid reaction to recent surgery/ileus versus evolving antibiotic resistant bacterial infection. No evidence of undrained pocket of infection, anastomotic leak noted on ct scan 4/24    Acute renal failure: likely due to urinary retention, volume depletion due to poor po intake - s/p lew insertion by urologist on 4/20/18 - improved renal function    Clinically better. Tolerating po diet. Decreased abdominal pain. Recommendations:    1. cont fluconazole. D/c meropenem. Start pip/tazo  2. Advance diet as tolerated  3. Recommend removal of lew and voiding trial to determine need for longterm lew      Advance Care planning: full code: discussed  with patient/surrogate decision maker:Melania Ruiz: [phone]    Above plan was discussed in details with patient, RN.  Please call me if any further questions or concerns. Will continue to participate in the care of this patient. subjective:    decreased abdominal pain. Patient denies headaches, visual disturbances, sore throat, runny nose, earaches, cp, sob, chills, cough, diarrhea, burning micturition, pain or weakness in extremities. He denies back pain/flank pain. home Medication List    Details   cyanocobalamin (VITAMIN B-12) 1,000 mcg tablet Take 1,000 mcg by mouth daily. furosemide (LASIX) 20 mg tablet Take  by mouth daily. ezetimibe (ZETIA) 10 mg tablet Take 10 mg by mouth daily. labetalol (NORMODYNE) 200 mg tablet Take 1 Tab by mouth every twelve (12) hours. Qty: 30 Tab, Refills: 0    Associated Diagnoses: Hypertensive heart and kidney disease without heart failure and with chronic kidney disease stage III      losartan (COZAAR) 100 mg tablet Take 1 Tab by mouth daily. Qty: 15 Tab, Refills: 0    Associated Diagnoses: Hypertensive heart and kidney disease without heart failure and with chronic kidney disease stage III      amLODIPine (NORVASC) 10 mg tablet Take 1 Tab by mouth daily.   Qty: 15 Tab, Refills: 0    Associated Diagnoses: Hypertensive heart and kidney disease without heart failure and with chronic kidney disease stage III             Current Facility-Administered Medications   Medication Dose Route Frequency    amLODIPine (NORVASC) tablet 10 mg  10 mg Oral DAILY    labetalol (NORMODYNE) tablet 100 mg  100 mg Oral Q12H    meropenem (MERREM) 1 g in sterile water (preservative free) 20 mL IV syringe  1 g IntraVENous Q8H    fluconazole (DIFLUCAN) 400mg/200 mL IVPB (premix)  400 mg IntraVENous DAILY    HYDROcodone-acetaminophen (NORCO) 5-325 mg per tablet 1 Tab  1 Tab Oral Q4H PRN    acetaminophen (TYLENOL) tablet 650 mg  650 mg Oral Q4H PRN    lactated Ringers infusion  125 mL/hr IntraVENous CONTINUOUS    naloxone (NARCAN) injection 0.2 mg  0.2 mg IntraVENous ONCE PRN    tamsulosin (FLOMAX) capsule 0.4 mg  0.4 mg Oral DAILY Allergies: Lipitor [atorvastatin]    Temp (24hrs), Av.1 °F (36.7 °C), Min:97.7 °F (36.5 °C), Max:98.7 °F (37.1 °C)    Visit Vitals    BP (!) 142/93 (BP 1 Location: Left arm)    Pulse 72    Temp 98.1 °F (36.7 °C)    Resp 16    Wt 103.8 kg (228 lb 12.8 oz)    SpO2 92%    BMI 29.38 kg/m2       ROS: 12 point ROS obtained in details. Pertinent positives as mentioned in HPI,   otherwise negative    Physical Exam:    General: Well developed, well nourished male laying on the bed, appears more comfortable    General:   awake alert and oriented   HEENT:  Normocephalic, atraumatic, PERRL, EOMI, no scleral icterus or pallor; no conjunctival hemmohage;  nasal and oral mucous are moist and without evidence of lesions. Neck supple, no bruits. Lymph Nodes:   no cervical, axillary or inguinal adenopathy   Lungs:   non-labored, bilaterally clear to auscultation- no crackles wheezes rales or rhonchi   Heart:  RRR, s1 and s2; no  rubs or gallops, no edema, + pedal pulses   Abdomen:  soft, hypoactive bowel sounds, no hepatomegaly, no splenomegaly. Appropriate surgical scars for stated surgeries. Decreased tenderness. No guarding. Decreased distension of abdomen. STEVEN drain in place with serosanguinous drainage   Genitourinary:  lew in place   Extremities:   no clubbing, cyanosis; no joint effusions or swelling; muscle mass appropriate for age   Neurologic:  No gross focal sensory abnormalities; 5/5 muscle strength to upper and lower extremities. Speech appropriate.  Cranial nerves intact                        Skin:  No rash or ulcers noted   Back:  no spinal or paraspinal muscle tenderness or rigidity, no CVA tenderness     Psychiatric:  Unable to assess         Labs: Results:   Chemistry Recent Labs      18   0515  18   0525  18   0320   GLU  116*  89  81   NA  139  140  142   K  3.9  3.9  4.0   CL  105  106  109*   CO2  27  25  26   BUN  18  16  20*   CREA  1.20  1.16  1.22   CA  9.1  8.8  9.3 AGAP  7  9  7   BUCR  15  14  16      CBC w/Diff Recent Labs      04/26/18   0515  04/25/18   0525  04/24/18   0320   WBC  18.6*  19.6*  24.0*   RBC  3.93*  3.77*  3.67*   HGB  10.5*  10.4*  10.2*   HCT  31.8*  30.2*  30.0*   PLT  416  380  372   GRANS  77*  64  75   LYMPH  8*  11*  5*   EOS  0  0  0      Microbiology No results for input(s): CULT in the last 72 hours.        RADIOLOGY:    All available imaging studies/reports in [facility] for this admission were reviewed    Dr. Grady Bauman, Infectious Disease Specialist  [phone]  April 26, 2018  3:11 PM

## 2018-04-26 NOTE — PROGRESS NOTES
Charles P. Johnie Prader, M.D. FACS  PROGRESS NOTE    Name: Javier Head MRN: [id number]   : 1945 [facility]   Date: 2018 Admission Date: 2018 10:27 PM     Hospital Day: 8  6 Days Post-Op  Subjective:  Pt without acute events. BM x 3. Objective:  Vitals:    18 194400 18 0400 18 0800   BP: (!) 136/92 (!) 150/94 (!) 142/93 (!) 157/95   Pulse: 75 80 72 71   Resp:    Temp: 97.7 °F (36.5 °C) 98.5 °F (36.9 °C) 98.1 °F (36.7 °C) 97.9 °F (36.6 °C)   SpO2: 95% 93% 92% 94%   Weight:   103.8 kg (228 lb 12.8 oz)        Date 18 07 - 18 0659 18 0700 - 18 0659   Shift 6292-3040 0260-7511 24 Hour Total 7146-7211 0612-3100 24 Hour Total   I  N  T  A  K  E   P.O. 360 90 450         P. O. 360 90 450       I.V.  (mL/kg/hr)  760  (0.6) 760  (0.3)         Volume (lactated Ringers infusion)  750 750         Volume (meropenem (MERREM) 1 g in sterile water (preservative free) 20 mL IV syringe)  10 10       Shift Total  (mL/kg) 360  (3.4) 850  (8.2) 1210  (11.7)      O  U  T  P  U  T   Urine  (mL/kg/hr) 1000  (0.8) 575  (0.5) 1575  (0.6) 250  250      Urine Output (mL) (Urinary Catheter 18 Coude) 1596 540 5187 250  250    Drains  40 40         Output (ml) ([place] Drain 18 Right; Lower Abdomen)  40 40       Stool  100 100         Stool Occurrence(s) 1 x 1 x 2 x 1 x  1 x      Stool  100 100       Shift Total  (mL/kg) 1000  (9.5) 715  (6.9) 1715  (16.5) 250  (2.4)  250  (2.4)   NET -640 135 -505 -250  -250   Weight (kg) 105 103.8 103.8 103.8 103.8 103.8         Physical Exam:    General: A&A, NAD, Ox4   Abdomen: abdomen is soft with incisional  tenderness. Incision(s) are clean/intact with  serosanguinous drainage. STEVEN site is clean and clear.    No masses, organomegaly or guarding    Labs:  Recent Results (from the past 24 hour(s))   GLUCOSE, POC    Collection Time: 18  9:17 PM   Result Value Ref Range    Glucose (POC) 145 (H) 70 - 110 mg/dL   CBC WITH AUTOMATED DIFF    Collection Time: 04/26/18  5:15 AM   Result Value Ref Range    WBC 18.6 (H) 4.6 - 13.2 K/uL    RBC 3.93 (L) 4.70 - 5.50 M/uL    HGB 10.5 (L) 13.0 - 16.0 g/dL    HCT 31.8 (L) 36.0 - 48.0 %    MCV 80.9 74.0 - 97.0 FL    MCH 26.7 24.0 - 34.0 PG    MCHC 33.0 31.0 - 37.0 g/dL    RDW 14.9 (H) 11.6 - 14.5 %    PLATELET 927 301 - 296 K/uL    MPV 9.9 9.2 - 11.8 FL    NEUTROPHILS 77 (H) 42 - 75 %    BAND NEUTROPHILS 5 0 - 5 %    LYMPHOCYTES 8 (L) 20 - 51 %    MONOCYTES 8 2 - 9 %    EOSINOPHILS 0 0 - 5 %    BASOPHILS 0 0 - 3 %    METAMYELOCYTES 2 (H) 0 %    ABS. NEUTROPHILS 15.3 (H) 1.8 - 8.0 K/UL    ABS. LYMPHOCYTES 1.5 0.8 - 3.5 K/UL    ABS. MONOCYTES 1.5 (H) 0 - 1.0 K/UL    ABS. EOSINOPHILS 0.0 0.0 - 0.4 K/UL    ABS.  BASOPHILS 0.0 0.0 - 0.06 K/UL    DF MANUAL      PLATELET COMMENTS Increased Platelets      RBC COMMENTS ANISOCYTOSIS  1+       METABOLIC PANEL, BASIC    Collection Time: 04/26/18  5:15 AM   Result Value Ref Range    Sodium 139 136 - 145 mmol/L    Potassium 3.9 3.5 - 5.5 mmol/L    Chloride 105 100 - 108 mmol/L    CO2 27 21 - 32 mmol/L    Anion gap 7 3.0 - 18 mmol/L    Glucose 116 (H) 74 - 99 mg/dL    BUN 18 7.0 - 18 MG/DL    Creatinine 1.20 0.6 - 1.3 MG/DL    BUN/Creatinine ratio 15 12 - 20      GFR est AA >60 >60 ml/min/1.73m2    GFR est non-AA 59 (L) >60 ml/min/1.73m2    Calcium 9.1 8.5 - 10.1 MG/DL     All Micro Results     Procedure Component Value Units Date/Time    CULTURE, BLOOD [[id number]] Collected:  04/20/18 0015    Order Status:  Completed Specimen:  Whole Blood from Blood Updated:  04/26/18 0708     Special Requests: NO SPECIAL REQUESTS        Culture result: NO GROWTH 6 DAYS       CULTURE, BLOOD [[id number]] Collected:  04/20/18 0000    Order Status:  Completed Specimen:  Blood from Blood Updated:  04/26/18 0708     Special Requests: NO SPECIAL REQUESTS        Culture result: NO GROWTH 6 DAYS       CULTURE, SURGICAL WOUND W GRAM STAIN [[id number]]  (Abnormal)  (Susceptibility) Collected:  04/20/18 0734    Order Status:  Completed Specimen:  Appendix Updated:  04/25/18 1411     Special Requests: ABSCESS        GRAM STAIN MODERATE WBC'S         FEW GRAM POSITIVE COCCI                 MODERATE GRAM POSITIVE RODS      FEW GRAM NEGATIVE RODS        Culture result: FEW PROTEUS MIRABILIS (A)         FEW ESCHERICHIA COLI (A)                 MODERATE LACTOBACILLUS SPECIES (A)    CULTURE, ANAEROBIC [[id number]]  (Abnormal) Collected:  04/20/18 0734    Order Status:  Completed Specimen:  Appendix Updated:  04/24/18 1528     Special Requests: ABSCESS        Culture result:         MODERATE BACTEROIDES THETAIOTAOMICRON BETA LACTAMASE POSITIVE (A)              MODERATE BACTEROIDES STERCORIS BETA LACTAMASE POSITIVE (A)    CULTURE, URINE [[id number]] Collected:  04/20/18 0102    Order Status:  Completed Specimen:  Clean catch Updated:  04/21/18 1000     Special Requests: NO SPECIAL REQUESTS        Culture result: NO GROWTH 1 DAY       CULTURE, BODY FLUID [place] STAIN [[id number]] Collected:  04/20/18 0745    Order Status:  Canceled Specimen:  Drainage           Current Medications:  Current Facility-Administered Medications   Medication Dose Route Frequency Provider Last Rate Last Dose    piperacillin-tazobactam (ZOSYN) 3.375 g in 0.9% sodium chloride (MBP/ADV) 100 mL MBP  3.375 g IntraVENous Q6H Amita Caballero MD        amLODIPine (NORVASC) tablet 10 mg  10 mg Oral DAILY Valentino Holly MD   10 mg at 04/26/18 0858    labetalol (NORMODYNE) tablet 100 mg  100 mg Oral Q12H Valentino Holly MD   100 mg at 04/26/18 0858    fluconazole (DIFLUCAN) 400mg/200 mL IVPB (premix)  400 mg IntraVENous DAILY Amita Caballero  mL/hr at 04/26/18 0859 400 mg at 04/26/18 0859    HYDROcodone-acetaminophen (NORCO) 5-325 mg per tablet 1 Tab  1 Tab Oral Q4H PRN Bryant Chaidez MD   1 Tab at 04/25/18 1425    acetaminophen (TYLENOL) tablet 650 mg  650 mg Oral Q4H PRN Jozef PRUETT Buffy Elmore MD   650 mg at 04/23/18 1657    lactated Ringers infusion  125 mL/hr IntraVENous CONTINUOUS Nilam Ruvalcaba  mL/hr at 04/25/18 2357 125 mL/hr at 04/25/18 2357    naloxone [facility]) injection 0.2 mg  0.2 mg IntraVENous ONCE PRN Kaushik Pittman MD        tamsulosin [facility]) capsule 0.4 mg  0.4 mg Oral DAILY Kaushik Pittman MD   0.4 mg at 04/26/18 9690       Chart and notes reviewed. Data reviewed. I have evaluated and examined the patient. IMPRESSION:   · Pt is POD 6 from lap to open appendectomy with drainage of appendiceal abscess.        PLAN:/DISCUSION:   · ST evaluation in am  · Possible advance diet in am based on speech recommendationis  · OOB to chair, encourage IS  · Abx per ID  · Wound care as written        Nilam Ruvalcaba MD

## 2018-04-26 NOTE — PROGRESS NOTES
Problem: Self Care Deficits Care Plan (Adult)  Goal: *Acute Goals and Plan of Care (Insert Text)  Occupational Therapy Goals  Initiated 4/26/2018 within 7 day(s). 1.  Patient will perform lower body dressing with minimal assistance/contact guard assist.   2.  Patient will perform functional task for 5 minutes with supervision for balance. 3.  Patient will perform toilet transfers with supervision/set-up. 4.  Patient will participate in upper extremity therapeutic exercise/activities with supervision/set-up for 8 minutes to increase strength/endurance for ADLs. Outcome: Progressing Towards Goal  Occupational Therapy EVALUATION    Patient: Cb Sparks (79 y.o. male)  Date: 4/26/2018  Primary Diagnosis: Ruptured appendicitis  Intra-abdominal abscess (HCC)  Acute UTI  Nausea  Abdominal pain  ACUTE APPENDICITIS  Appendicitis with abscess  Procedure(s) (LRB):  APPENDECTOMY LAPAROSCOPIC CONVERTED TO OPEN (N/A) 6 Days Post-Op   Precautions:   Fall    ASSESSMENT :  Based on the objective data described below, the patient presents with decreased ADLs, decreased functional mobility and muscle weakness, complicated by abdominal discomfort. Patient had a prior CVA and min weakness noted in UEs. He had difficulty reaching his LEs for self care tasks and given min assist for functional standing/transfers. Recommend continued therapy at rehab to maximize patient's functional independence for safe return to home with his sister. Patient will benefit from skilled intervention to address the above impairments.   Patients rehabilitation potential is considered to be Good  Factors which may influence rehabilitation potential include:   []             None noted  []             Mental ability/status  [x]             Medical condition  []             Home/family situation and support systems  []             Safety awareness  []             Pain tolerance/management  []             Other:      PLAN :  Recommendations and Planned Interventions:  [x]               Self Care Training                  [x]        Therapeutic Activities  [x]               Functional Mobility Training    []        Cognitive Retraining  [x]               Therapeutic Exercises           [x]        Endurance Activities  [x]               Balance Training                   []        Neuromuscular Re-Education  []               Visual/Perceptual Training     [x]   Home Safety Training  [x]               Patient Education                 [x]        Family Training/Education  []               Other (comment):    Frequency/Duration: Patient will be followed by occupational therapy 1-2 times per day/4-7 days per week to address goals. Discharge Recommendations: Inpatient Rehab  Further Equipment Recommendations for Discharge: TBD at next level of care     Barriers to Learning/Limitations: None  Compensate with: visual, verbal, tactile, kinesthetic cues/model     PATIENT COMPLEXITY      Eval Complexity: History: MEDIUM Complexity : Expanded review of history including physical, cognitive and psychosocial  history ; Examination: MEDIUM Complexity : 3-5 performance deficits relating to physical, cognitive , or psychosocial skils that result in activity limitations and / or participation restrictions; Decision Making:MEDIUM Complexity : Patient may present with comorbidities that affect occupational performnce. Miniml to moderate modification of tasks or assistance (eg, physical or verbal ) with assesment(s) is necessary to enable patient to complete evaluation  Assessment: Moderate Complexity     G-CODES:     Self Care  Current  CK= 40-59%   Goal  CJ= 20-39%. The severity rating is based on the Level of Assistance required for Functional Mobility and ADLs. SUBJECTIVE:   Patient stated I live with my sister.     OBJECTIVE DATA SUMMARY:     Past Medical History:   Diagnosis Date    Acute ischemic stroke ([facility] [address] 9/1/2015    Acute Ischemic Stroke (early subacute infarct at the posterior right lentiform nucleus) with residual left hemiparesis and dysphagia    CKD (chronic kidney disease) stage 3, GFR 30-59 ml/min 3/5/4071    Diastolic dysfunction without heart failure 9/2/2015    Grade 1 diastolic dysfunction on 2D ECHO done 9/02/2015    Dyslipidemia 9/2/2015    History of noncompliance with medical treatment, presenting hazards to health 9/1/2015    History of tobacco use 9/1/2015    Hypertension     Hypertensive heart and kidney disease without heart failure and with chronic kidney disease stage III 9/2/2015    Obesity, Class I, BMI 30-34.9 9/1/2015    Rhabdomyolysis 9/1/2015    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 9/1/2015    Vitamin D deficiency 9/6/2015     Past Surgical History:   Procedure Laterality Date    APPENDECTOMY,RUPT APPENDX+ABSCESS N/A 04/20/2018    Dr. Timo Subramanian    COLONOSCOPY N/A 4/3/2018    COLONOSCOPY,  w heated snared polypectomy performed by Vasiliy Jaime MD at [address] [place] [address]     Prior Level of Function/Home Situation: Pt was modified independent with basic self care tasks and used a RW/SPC for functional mobility PTA. Home Situation  Home Environment: Private residence  One/Two Story Residence: One story  Living Alone: No  Support Systems: [employer] / matthew community, Family member(s), Friends \ neighbors  Patient Expects to be Discharged to[de-identified] Private residence  Current DME Used/Available at Home: Cane, quad  Tub or Shower Type: Shower (with seat)  [x]  Right hand dominant   []  Left hand dominant  Cognitive/Behavioral Status:  Neurologic State: Alert  Orientation Level: Oriented to person;Oriented to place;Oriented to situation  Cognition: Follows commands  Safety/Judgement: Fall prevention    Skin: Intact on UEs    Edema: None noted in UEs    Vision/Perceptual:    Acuity: Within Defined Limits      Coordination:  Fine Motor Skills-Upper: Left Intact; Right Intact    Gross Motor Skills-Upper: Left Intact; Right Intact    Balance:  Sitting: Intact  Standing: With support    Strength:  Strength: Generally decreased, functional (UEs; 4/5 throughout)    Tone & Sensation:  Tone: Normal (UEs)  Sensation: Intact (UEs)    Range of Motion:  AROM: Generally decreased, functional (UEs; min decrease in LUE from previous CVA)    Functional Mobility and Transfers for ADLs:  Bed Mobility:  Supine to Sit:  (Pt up in chair upon arrival. )  Transfers:  Sit to Stand: Minimum assistance   Toilet Transfer : Minimum assistance    ADL Assessment:  Feeding: Setup;Supervision    Oral Facial Hygiene/Grooming: Setup;Supervision    Bathing: Moderate assistance    Upper Body Dressing: Setup;Supervision    Lower Body Dressing: Moderate assistance    Toileting: Total assistance (lew catheter)    Pain:  Pt reports 0/10 pain or discomfort prior to treatment.    Pt reports 0/10 pain or discomfort post treatment. Activity Tolerance:   Good    Please refer to the flowsheet for vital signs taken during this treatment. After treatment:   [x] Patient left in no apparent distress sitting up in chair  [] Patient left in no apparent distress in bed  [x] Call bell left within reach  [] Nursing notified  [] Caregiver present  [] Bed alarm activated    COMMUNICATION/EDUCATION:   [x] Home safety education was provided and the patient/caregiver indicated understanding. [x] Patient/family have participated as able in goal setting and plan of care. [x] Patient/family agree to work toward stated goals and plan of care. [] Patient understands intent and goals of therapy, but is neutral about his/her participation. [] Patient is unable to participate in goal setting and plan of care.     Thank you for this referral.  Bibiana Ramsey MS OTR/L  Time Calculation: 15 mins

## 2018-04-26 NOTE — PROGRESS NOTES
Care Management Interventions  PCP Verified by CM: Yes  Mode of Transport at Discharge:  (family)  Transition of Care Consult (CM Consult): Discharge Planning  Physical Therapy Consult: Yes  Occupational Therapy Consult: Yes  Current Support Network: Relative's Home (lives with sister)  Confirm Follow Up Transport: Family  Plan discussed with Pt/Family/Caregiver: Yes  Freedom of Choice Offered: Yes (rehab)  Discharge Location  Discharge Placement:  (inpatient rehab versus SNF rehab)    Reason for Admission:   Ruptured appendicitis, intra-abdominal abscess, acute UTI, nausea, abdominal pain. RRAT Score:    25              Resources/supports as identified by patient/family:                   Top Challenges facing patient (as identified by patient/family and CM): Finances/Medication cost?      Pt reports that he has medicaid and medicare that cover his medication costs              Transportation? Pt reports that his personal care aid will transport him to and from appts in [facility] but that he also uses medicaid [facility] if appt is outside of [place]. Support system or lack thereof? Pt has support from sister and personal care aid                     Living arrangements? Pt lives with his sister Selina Contreras [phone] and reports that he has personal care aid Monday through Friday from 10am to 4pm              Self-care/ADLs/Cognition? Pt needs assistance with personal care and reports that he has a cane and walker at home. Current Advanced Directive/Advance Care Plan:  Pt does not have an advanced directive but is interested in making one and reports that he would like his sister Selina Contreras [phone]([phone]) first and niece  Lauren Reynoso [phone] second. Paged [phone]. Tiffani came to room but Félix Phillips had already left and pt states that he did not feel \"comfortable\" doing this without her here.  Tiffani states that they will follow up with pt later. Pt requests that his sister Lanre Tyler be called. Pt's sister Lanre Tyler states that she will be back Saturday. Encouraged her to ask nursing staff to steve ryder for her when pt is ready to make advanced directive. Plan for utilizing home health:   Pt reports that he has personal care at home but denies skilled care at home. Likelihood of readmission: high risk, red zone                 Transition of Care Plan:            Pt is alert and oriented and sister Lanre Tyler at bedside. Pt reports that he plans to go to rehab prior to returning home. Pt reports that his first choice is Inpatient rehab and second choice [facility]. Pt's sister reports that pt has been to inpatient rehab before and that pt has been to [facility] twice before. FOC signed. [facility] is already following pt per [facility] [employer]. Referral sent to [facility].

## 2018-04-26 NOTE — PROGRESS NOTES
NUTRITION    Nutrition Screen      RECOMMENDATIONS / PLAN:     - Recommend advancing diet as tolerated. Consider SLP consult for swallow evaluation.  - Consider NGT placement to provide supplemental enteral nutrition if patient remains on clear liquid diet or if appetite and meal intake do not improve in the next 24-48 hours. - Continue IV fluid as medically appropriate.   - Continue RD inpatient monitoring and evaluation. NUTRITION INTERVENTIONS & DIAGNOSIS:     [x] Meals/snacks: modified composition  [x] Medical food supplement therapy: Ensure Enlive TID  [x] IV fluid: LR at 125 mL/hr   [x] Collaboration and referral of nutrition care: discussed consideration for SLP consult and swallow evaluation with Dr Avni MD to order if appropriate per conversation 4/25/18; pt discussed with RN today    Nutrition Diagnosis: Unintended weight loss related to decreased appetite, inadequate energy intake as evidenced by 8 lb, 4% weight loss x 2 weeks PTA. Inadequate energy intake related to abdominal pain, diet intolerance due to altered GI function as evidenced by poor meal intake x 9 days PTA, NPO x 5 days from admission, started on clears on LOS day 5 (4/25). Patient meets criteria for Severe Protein Calorie Malnutrition as evidenced by:   ASPEN Malnutrition Criteria  Acute Illness, Chronic Illness, or Social/Enviornmental: Acute illness  Energy Intake: Less than/equal to 50% est energy req for greater than/equal to 5 days  Weight Loss: Greater than 1-2% x 1 wk  ASPEN Malnutrition Score - Acute Illness: 12  Acute Illness - Malnutrition Diagnosis: Severe malnutrition. ASSESSMENT:     4/26: Pt tolerating clears with poor appetite, minimal intake of meals but likes and is consuming Ensure supplements. Denies nausea/vomiting or abdominal pain today, frequent loose and large volume stools per RN (2 before lunch today). 4/25: S/p appendectomy and abscess drainage on 4/21/18.   Some small bowel distension suggestive of postoperative ileus per CT 4/24. Pt NPO x 5 days and started on clear liquids with Ensure Enlive supplements today, no meal intake yet. Pt c/o abdominal pain but improved today, denies nausea/vomiting and +BM today and multiple BMs yesterday. Not hungry. Average po intake adequate to meet patients estimated nutritional needs:   [] Yes     [x] No   [] Unable to determine at this time    Diet: DIET CLEAR LIQUID      Food Allergies: NKFA  Current Appetite:   [] Good     [] Fair     [x] Poor     [] Other:  Appetite/meal intake prior to admission:   [] Good     [] Fair     [x] Poor x 9 days PTA per MD note, fair appetite prior to that reported by patient    [] Other:  Feeding Limitations:  [x] Swallowing difficulty: hx of stroke and dysphagia, followed by SLP in the past    [] Chewing difficulty    [] Other:  Current Meal Intake: No data found. BM: 4/26, incontinent with multiple loose stools  Skin Integrity: abdominal surgical incision; abdominal STEVEN drain   Edema: trace LEs  Pertinent Medications: Reviewed: LR at 125 mL/hr    Recent Labs      04/26/18   0515  04/25/18   0525  04/24/18   0320   NA  139  140  142   K  3.9  3.9  4.0   CL  105  106  109*   CO2  27  25  26   GLU  116*  89  81   BUN  18  16  20*   CREA  1.20  1.16  1.22   CA  9.1  8.8  9.3       Intake/Output Summary (Last 24 hours) at 04/26/18 1235  Last data filed at 04/26/18 0800   Gross per 24 hour   Intake              970 ml   Output             1315 ml   Net             -345 ml       Anthropometrics:  Ht Readings from Last 1 Encounters:   04/02/18 6' 2\" (1.88 m)     Last 3 Recorded Weights in this Encounter    04/23/18 0400 04/25/18 0400 04/26/18 0400   Weight: 105.2 kg (232 lb) 105 kg (231 lb 6.4 oz) 103.8 kg (228 lb 12.8 oz)     Body mass index is 29.38 kg/(m^2).           Weight History: patient reports recent 8 lb (4%) weight loss x 2 weeks PTA    Weight Metrics 4/26/2018 4/3/2018 3/29/2018 5/25/2016 9/8/2015 9/5/2015   Weight 228 lb 12.8 oz 226 lb 10.1 oz - 228 lb 235 lb 240 lb   BMI 29.38 kg/m2 29.1 kg/m2 - 31.81 kg/m2 32.79 kg/m2 33.49 kg/m2        Admitting Diagnosis: Ruptured appendicitis  Intra-abdominal abscess (HCC)  Acute UTI  Nausea  Abdominal pain  ACUTE APPENDICITIS  Appendicitis with abscess  Pertinent PMHx: HTN, CVA, CKD stage III, dyslipidemia    Education Needs:        [x] None identified  [] Identified - Not appropriate at this time  []  Identified and addressed - refer to education log  Learning Limitations:   [x] None identified  [] Identified  Cultural, Presybeterian & ethnic food preferences:  [x] None identified    [] Identified and addressed     ESTIMATED NUTRITION NEEDS:     Calories: 7758-7967 kcal (25-35 kcal/kg) based on  [] Actual BW      [x] SBW 77 kg  Protein: 62-92 gm (0.8-1.2 gm/kg) based on  [] Actual BW      [x] SBW   Fluid: 1 mL/kcal     MONITORING & EVALUATION:     Nutrition Goal(s):   1. Po intake of meals will meet >75% of patient estimated nutritional needs within the next 7 days.   Outcome:  [] Met/Ongoing    [x]  Not Met    [] New/Initial Goal      Monitoring:   [x] Food and beverage intake   [x] Diet order   [x] Nutrition-focused physical findings   [x] Treatment/therapy   [] Weight   [] Enteral nutrition intake        Previous Recommendations (for follow-up assessments only):     []   Implemented       [x]   Not Implemented (RD to address, discussed with MD 4/25/18)      [] No Longer Appropriate     [] No Recommendation Made     Discharge Planning: cardiac diet as tolerated   [x] Participated in care planning, discharge planning, & interdisciplinary rounds as appropriate      Deann Jennings, [address] [place] [address]    Pager: [phone]

## 2018-04-27 ENCOUNTER — APPOINTMENT (OUTPATIENT)
Dept: GENERAL RADIOLOGY | Age: 73
DRG: 338 | End: 2018-04-27
Attending: SURGERY
Payer: MEDICARE

## 2018-04-27 LAB
ANION GAP SERPL CALC-SCNC: 6 MMOL/L (ref 3–18)
BASOPHILS # BLD: 0 K/UL (ref 0–0.06)
BASOPHILS NFR BLD: 0 % (ref 0–3)
BUN SERPL-MCNC: 17 MG/DL (ref 7–18)
BUN/CREAT SERPL: 14 (ref 12–20)
CALCIUM SERPL-MCNC: 9.2 MG/DL (ref 8.5–10.1)
CHLORIDE SERPL-SCNC: 106 MMOL/L (ref 100–108)
CO2 SERPL-SCNC: 26 MMOL/L (ref 21–32)
CREAT SERPL-MCNC: 1.18 MG/DL (ref 0.6–1.3)
DIFFERENTIAL METHOD BLD: ABNORMAL
EOSINOPHIL # BLD: 0 K/UL (ref 0–0.4)
EOSINOPHIL NFR BLD: 0 % (ref 0–5)
ERYTHROCYTE [DISTWIDTH] IN BLOOD BY AUTOMATED COUNT: 15 % (ref 11.6–14.5)
GLUCOSE BLD STRIP.AUTO-MCNC: 119 MG/DL (ref 70–110)
GLUCOSE BLD STRIP.AUTO-MCNC: 123 MG/DL (ref 70–110)
GLUCOSE SERPL-MCNC: 109 MG/DL (ref 74–99)
HCT VFR BLD AUTO: 31.9 % (ref 36–48)
HGB BLD-MCNC: 10.7 G/DL (ref 13–16)
LYMPHOCYTES # BLD: 2.6 K/UL (ref 0.8–3.5)
LYMPHOCYTES NFR BLD: 15 % (ref 20–51)
MCH RBC QN AUTO: 27.2 PG (ref 24–34)
MCHC RBC AUTO-ENTMCNC: 33.5 G/DL (ref 31–37)
MCV RBC AUTO: 81 FL (ref 74–97)
METAMYELOCYTES NFR BLD MANUAL: 2 %
MONOCYTES # BLD: 0.7 K/UL (ref 0–1)
MONOCYTES NFR BLD: 4 % (ref 2–9)
MYELOCYTES NFR BLD MANUAL: 2 %
NEUTS BAND NFR BLD MANUAL: 2 % (ref 0–5)
NEUTS SEG # BLD: 13.2 K/UL (ref 1.8–8)
NEUTS SEG NFR BLD: 75 % (ref 42–75)
PLATELET # BLD AUTO: 432 K/UL (ref 135–420)
PLATELET COMMENTS,PCOM: ABNORMAL
PMV BLD AUTO: 9.5 FL (ref 9.2–11.8)
POTASSIUM SERPL-SCNC: 4 MMOL/L (ref 3.5–5.5)
RBC # BLD AUTO: 3.94 M/UL (ref 4.7–5.5)
RBC MORPH BLD: ABNORMAL
RBC MORPH BLD: ABNORMAL
SODIUM SERPL-SCNC: 138 MMOL/L (ref 136–145)
WBC # BLD AUTO: 17.1 K/UL (ref 4.6–13.2)

## 2018-04-27 PROCEDURE — 74011250637 HC RX REV CODE- 250/637: Performed by: STUDENT IN AN ORGANIZED HEALTH CARE EDUCATION/TRAINING PROGRAM

## 2018-04-27 PROCEDURE — 74011000255 HC RX REV CODE- 255: Performed by: FAMILY MEDICINE

## 2018-04-27 PROCEDURE — 74011250637 HC RX REV CODE- 250/637: Performed by: INTERNAL MEDICINE

## 2018-04-27 PROCEDURE — 36415 COLL VENOUS BLD VENIPUNCTURE: CPT

## 2018-04-27 PROCEDURE — 92610 EVALUATE SWALLOWING FUNCTION: CPT

## 2018-04-27 PROCEDURE — 92526 ORAL FUNCTION THERAPY: CPT

## 2018-04-27 PROCEDURE — 82962 GLUCOSE BLOOD TEST: CPT

## 2018-04-27 PROCEDURE — 74011250636 HC RX REV CODE- 250/636: Performed by: INTERNAL MEDICINE

## 2018-04-27 PROCEDURE — 85025 COMPLETE CBC W/AUTO DIFF WBC: CPT

## 2018-04-27 PROCEDURE — 65660000004 HC RM CVT STEPDOWN

## 2018-04-27 PROCEDURE — 80048 BASIC METABOLIC PNL TOTAL CA: CPT

## 2018-04-27 PROCEDURE — 74011000258 HC RX REV CODE- 258: Performed by: INTERNAL MEDICINE

## 2018-04-27 PROCEDURE — 92611 MOTION FLUOROSCOPY/SWALLOW: CPT

## 2018-04-27 PROCEDURE — 74230 X-RAY XM SWLNG FUNCJ C+: CPT

## 2018-04-27 RX ORDER — FLUCONAZOLE 100 MG/1
400 TABLET ORAL DAILY
Status: DISCONTINUED | OUTPATIENT
Start: 2018-04-27 | End: 2018-04-30 | Stop reason: HOSPADM

## 2018-04-27 RX ADMIN — TAMSULOSIN HYDROCHLORIDE 0.4 MG: 0.4 CAPSULE ORAL at 10:51

## 2018-04-27 RX ADMIN — Medication 1 CAPSULE: at 10:51

## 2018-04-27 RX ADMIN — LABETALOL HYDROCHLORIDE 100 MG: 100 TABLET, FILM COATED ORAL at 10:51

## 2018-04-27 RX ADMIN — SODIUM CHLORIDE 3.38 G: 900 INJECTION, SOLUTION INTRAVENOUS at 18:44

## 2018-04-27 RX ADMIN — FLUCONAZOLE 400 MG: 100 TABLET ORAL at 10:51

## 2018-04-27 RX ADMIN — LABETALOL HYDROCHLORIDE 100 MG: 100 TABLET, FILM COATED ORAL at 21:48

## 2018-04-27 RX ADMIN — BARIUM SULFATE 45 G: 960 POWDER, FOR SUSPENSION ORAL at 12:30

## 2018-04-27 RX ADMIN — SODIUM CHLORIDE 3.38 G: 900 INJECTION, SOLUTION INTRAVENOUS at 05:00

## 2018-04-27 RX ADMIN — SODIUM CHLORIDE 3.38 G: 900 INJECTION, SOLUTION INTRAVENOUS at 23:29

## 2018-04-27 RX ADMIN — AMLODIPINE BESYLATE 10 MG: 10 TABLET ORAL at 10:51

## 2018-04-27 RX ADMIN — SODIUM CHLORIDE 3.38 G: 900 INJECTION, SOLUTION INTRAVENOUS at 11:18

## 2018-04-27 RX ADMIN — BARIUM SULFATE 30 ML: 400 PASTE ORAL at 12:30

## 2018-04-27 NOTE — PROGRESS NOTES
Infectious Disease Progress Note    Requested by: dr. Senthil Henning    Reason: perforated appendicitis, increasing wbc    Current abx Prior abx   Pip/tazo sinc 4/25 Cefepime, levofloxacin 4/20-4/21  Zosyn, vancomycin 4/21-4/23  Meropenem 4/23-4/26   levofloxacin  4/23-4/25     Lines: Lew since 4/20 placed by urology for retention    Assessment :    67 y.o. 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 Ct  Scan 4/20/18- 6x10x7 cm RLQ abdominal abscess. S/p laparoscopy, open appendectomy with drainage of appendiceal abscess on 4/21/18- found to have perforated appendix with walled off abscess. Intra op cx: e.coli, proteus, lactobacillus, bacteroides    Clinical presentation c/w perforated appendicitis with contained perforation, abdominal abscess. Patient has been appropriately managed with appendectomy, drainage of abscess on 4/20/18     Etiology of persistent leukocytosis with wbc:28k on 4/23 compared to 15k on 4/20, diffuse tenderness - unclear. ? Leukemoid reaction to recent surgery/ileus versus evolving antibiotic resistant bacterial infection. No evidence of undrained pocket of infection, anastomotic leak noted on ct scan 4/24    Acute renal failure: likely due to urinary retention, volume depletion due to poor po intake - s/p lew insertion by urologist on 4/20/18 - improved renal function    Clinically better. Tolerating po diet. Decreased abdominal pain. Recommendations:    1. cont pip/tazo for now. Switch fluconazole to po till 5/3/18  2. Advance diet as tolerated  3. Recommend removal of lew and voiding trial to determine need for longterm lew if ok with urology  4. Switch to po ciprofloxacin plus augmentin till 4/30 if continued improvement in am  5.  Start probiotics      Advance Care planning: full code: discussed with patient/surrogate decision maker:Melania Ruiz: [phone]    Above plan was discussed in details with patient, RN. Please call me if any further questions or concerns. Will continue to participate in the care of this patient. subjective:    Resolved abdominal pain. Patient denies headaches, visual disturbances, sore throat, runny nose, earaches, cp, sob, chills, cough, diarrhea, burning micturition, pain or weakness in extremities. He denies back pain/flank pain. home Medication List    Details   cyanocobalamin (VITAMIN B-12) 1,000 mcg tablet Take 1,000 mcg by mouth daily. furosemide (LASIX) 20 mg tablet Take  by mouth daily. ezetimibe (ZETIA) 10 mg tablet Take 10 mg by mouth daily. labetalol (NORMODYNE) 200 mg tablet Take 1 Tab by mouth every twelve (12) hours. Qty: 30 Tab, Refills: 0    Associated Diagnoses: Hypertensive heart and kidney disease without heart failure and with chronic kidney disease stage III      losartan (COZAAR) 100 mg tablet Take 1 Tab by mouth daily. Qty: 15 Tab, Refills: 0    Associated Diagnoses: Hypertensive heart and kidney disease without heart failure and with chronic kidney disease stage III      amLODIPine (NORVASC) 10 mg tablet Take 1 Tab by mouth daily.   Qty: 15 Tab, Refills: 0    Associated Diagnoses: Hypertensive heart and kidney disease without heart failure and with chronic kidney disease stage III             Current Facility-Administered Medications   Medication Dose Route Frequency    piperacillin-tazobactam (ZOSYN) 3.375 g in 0.9% sodium chloride (MBP/ADV) 100 mL MBP  3.375 g IntraVENous Q6H    amLODIPine (NORVASC) tablet 10 mg  10 mg Oral DAILY    labetalol (NORMODYNE) tablet 100 mg  100 mg Oral Q12H    fluconazole (DIFLUCAN) 400mg/200 mL IVPB (premix)  400 mg IntraVENous DAILY    HYDROcodone-acetaminophen (NORCO) 5-325 mg per tablet 1 Tab  1 Tab Oral Q4H PRN    acetaminophen (TYLENOL) tablet 650 mg  650 mg Oral Q4H PRN    lactated Ringers infusion  125 mL/hr IntraVENous CONTINUOUS    naloxone (NARCAN) injection 0.2 mg  0.2 mg IntraVENous ONCE PRN    tamsulosin (FLOMAX) capsule 0.4 mg  0.4 mg Oral DAILY       Allergies: Lipitor [atorvastatin]    Temp (24hrs), Av.7 °F (36.5 °C), Min:97.3 °F (36.3 °C), Max:98.4 °F (36.9 °C)    Visit Vitals    /81 (BP 1 Location: Right arm, BP Patient Position: At rest)    Pulse 65    Temp 97.5 °F (36.4 °C)    Resp 16    Wt 100.6 kg (221 lb 11.2 oz)    SpO2 95%    BMI 28.46 kg/m2       ROS: 12 point ROS obtained in details. Pertinent positives as mentioned in HPI,   otherwise negative    Physical Exam:    General: Well developed, well nourished male laying on the bed, appears more comfortable    General:   awake alert and oriented   HEENT:  Normocephalic, atraumatic, PERRL, EOMI, no scleral icterus or pallor; no conjunctival hemmohage;  nasal and oral mucous are moist and without evidence of lesions. Neck supple, no bruits. Lymph Nodes:   no cervical, axillary or inguinal adenopathy   Lungs:   non-labored, bilaterally clear to auscultation- no crackles wheezes rales or rhonchi   Heart:  RRR, s1 and s2; no  rubs or gallops, no edema, + pedal pulses   Abdomen:  soft, hypoactive bowel sounds, no hepatomegaly, no splenomegaly. Appropriate surgical scars for stated surgeries. resolved tenderness. No guarding. Decreased distension of abdomen. STEVEN drain in place with serosanguinous drainage   Genitourinary:  lew in place   Extremities:   no clubbing, cyanosis; no joint effusions or swelling; muscle mass appropriate for age   Neurologic:  No gross focal sensory abnormalities; 5/5 muscle strength to upper and lower extremities. Speech appropriate.  Cranial nerves intact                        Skin:  No rash or ulcers noted   Back:  no spinal or paraspinal muscle tenderness or rigidity, no CVA tenderness     Psychiatric:  Appropriate mood and affect         Labs: Results:   Chemistry Recent Labs      04/27/18   0102  04/26/18   0515  04/25/18   0525   GLU  109*  116*  89   NA  138  139  140   K  4.0  3.9  3.9   CL  106  105  106   CO2  26  27  25   BUN  17  18  16   CREA  1.18  1.20  1.16   CA  9.2  9.1  8.8   AGAP  6  7  9   BUCR  14  15  14      CBC w/Diff Recent Labs      04/27/18   0102  04/26/18   0515  04/25/18   0525   WBC  17.1*  18.6*  19.6*   RBC  3.94*  3.93*  3.77*   HGB  10.7*  10.5*  10.4*   HCT  31.9*  31.8*  30.2*   PLT  432*  416  380   GRANS  75  77*  64   LYMPH  15*  8*  11*   EOS  0  0  0      Microbiology No results for input(s): CULT in the last 72 hours.        RADIOLOGY:    All available imaging studies/reports in [facility] for this admission were reviewed    Dr. Elen Enrique, Infectious Disease Specialist  [phone]  April 27, 2018  3:11 PM

## 2018-04-27 NOTE — PROGRESS NOTES
Intern Progress Note  [facility]       Patient: Juan Lui MRN: [id number]  CSN: [id number]    YOB: 1945  Age: 67 y.o. Sex: male    DOA: 4/19/2018 LOS:  LOS: 7 days                    Subjective:     Acute events: Patient denied abd pain this AM. Sitter at bedside. Patient ambulating to chair and having BM and flatus. Tolerating more PO intake    Review of Systems   Constitutional: Negative for chills and fever. Respiratory: Negative for shortness of breath. Cardiovascular: Negative for chest pain. Gastrointestinal: Negative for abdominal pain and nausea. Neurological: Negative for dizziness and headaches. Objective:      Patient Vitals for the past 24 hrs:   Temp Pulse Resp BP SpO2   04/27/18 0403 97.7 °F (36.5 °C) 64 18 126/81 95 %   04/27/18 0004 97.4 °F (36.3 °C) 66 17 118/78 94 %   04/26/18 2143 - 80 - 122/90 -   04/26/18 2000 98.4 °F (36.9 °C) 88 18 118/73 97 %   04/26/18 1930 - - - - 97 %   04/26/18 1553 98.1 °F (36.7 °C) 75 18 127/86 96 %   04/26/18 1139 97.3 °F (36.3 °C) 66 18 131/82 95 %   04/26/18 0800 97.9 °F (36.6 °C) 71 18 (!) 157/95 94 %         Intake/Output Summary (Last 24 hours) at 04/27/18 0711  Last data filed at 04/27/18 0649   Gross per 24 hour   Intake                0 ml   Output             1770 ml   Net            -1770 ml         Physical Exam:   General:  Alert and Responsive and in moderate distress. CV:  RRR, no rubs gallops or murmurs. RESP:  Unlabored breathing. Lungs clear to auscultation. no wheeze, rales, or rhonchi. Noisy upper airway. Equal expansion bilaterally. ABD:  TTP in the general abdomen. Distended. Hypertympanic. Midline surgical dressing C/D/I. STEVEN drain draining serosang fluid   MS:  No joint deformity or instability. No atrophy. Neuro: axo2 (self and place)  Ext:  No edema.    Skin: dry skin  Uro/Genital: Chow cath in place and draining clear yellow urine      Lab/Data Reviewed:  BMP:   Lab Results Component Value Date/Time     04/27/2018 01:02 AM    K 4.0 04/27/2018 01:02 AM     04/27/2018 01:02 AM    CO2 26 04/27/2018 01:02 AM    AGAP 6 04/27/2018 01:02 AM     (H) 04/27/2018 01:02 AM    BUN 17 04/27/2018 01:02 AM    CREA 1.18 04/27/2018 01:02 AM    GFRAA >60 04/27/2018 01:02 AM    GFRNA >60 04/27/2018 01:02 AM     CBC:   Lab Results   Component Value Date/Time    WBC 17.1 (H) 04/27/2018 01:02 AM    HGB 10.7 (L) 04/27/2018 01:02 AM    HCT 31.9 (L) 04/27/2018 01:02 AM     (H) 04/27/2018 01:02 AM        Scheduled Medications Reviewed:  Current Facility-Administered Medications   Medication Dose Route Frequency    piperacillin-tazobactam (ZOSYN) 3.375 g in 0.9% sodium chloride (MBP/ADV) 100 mL MBP  3.375 g IntraVENous Q6H    amLODIPine (NORVASC) tablet 10 mg  10 mg Oral DAILY    labetalol (NORMODYNE) tablet 100 mg  100 mg Oral Q12H    fluconazole (DIFLUCAN) 400mg/200 mL IVPB (premix)  400 mg IntraVENous DAILY    lactated Ringers infusion  125 mL/hr IntraVENous CONTINUOUS    tamsulosin (FLOMAX) capsule 0.4 mg  0.4 mg Oral DAILY         Imaging, microbiology, and EKG/Telemetry:  All Micro Results     Procedure Component Value Units Date/Time    CULTURE, BLOOD [[id number]] Collected:  04/20/18 0015    Order Status:  Completed Specimen:  Whole Blood from Blood Updated:  04/26/18 0708     Special Requests: NO SPECIAL REQUESTS        Culture result: NO GROWTH 6 DAYS       CULTURE, BLOOD [[id number]] Collected:  04/20/18 0000    Order Status:  Completed Specimen:  Blood from Blood Updated:  04/26/18 0708     Special Requests: NO SPECIAL REQUESTS        Culture result: NO GROWTH 6 DAYS       CULTURE, SURGICAL WOUND Dexter Yung STAIN [[id number]]  (Abnormal)  (Susceptibility) Collected:  04/20/18 0734    Order Status:  Completed Specimen:  Appendix Updated:  04/25/18 1411     Special Requests: ABSCESS        GRAM STAIN MODERATE WBC'S         FEW GRAM POSITIVE COCCI                 MODERATE GRAM POSITIVE RODS      FEW GRAM NEGATIVE RODS        Culture result: FEW PROTEUS MIRABILIS (A)         FEW ESCHERICHIA COLI (A)                 MODERATE LACTOBACILLUS SPECIES (A)    CULTURE, ANAEROBIC [[id number]]  (Abnormal) Collected:  04/20/18 0734    Order Status:  Completed Specimen:  Appendix Updated:  04/24/18 1528     Special Requests: ABSCESS        Culture result:         MODERATE BACTEROIDES THETAIOTAOMICRON BETA LACTAMASE POSITIVE (A)              MODERATE BACTEROIDES STERCORIS BETA LACTAMASE POSITIVE (A)    CULTURE, URINE [[id number]] Collected:  04/20/18 0102    Order Status:  Completed Specimen:  Clean catch Updated:  04/21/18 1000     Special Requests: NO SPECIAL REQUESTS        Culture result: NO GROWTH 1 DAY       CULTURE, BODY FLUID Kenith Hoots STAIN [[id number]] Collected:  04/20/18 0745    Order Status:  Canceled Specimen:  Drainage           Date 04/26/18 0700 - 04/27/18 0659 04/27/18 0700 - 04/28/18 0659   Shift 1672-9180 6549-9113 24 Hour Total 8119-2667 0398-0565 24 Hour Total   I  N  T  A  K  E   P. O.  0 0         P. O.  0 0       Shift Total  (mL/kg)  0  (0) 0  (0)      O  U  T  P  U  T   Urine  (mL/kg/hr) 700  (0.6) 1050  (0.9) 1750  (0.7)         Urine Voided  350 350         Urine Occurrence(s)  2 x 2 x         Urine Output (mL) (Urinary Catheter 04/20/18 [place])        Drains 20  20         Output (ml) ([place] Drain 04/20/18 Right; Lower Abdomen) 20  20       Stool            Stool Occurrence(s) 3 x 1 x 4 x       Shift Total  (mL/kg) 720  (6.9) 1050  (10.4) 1770  (17.6)      NET -720 -1050 -1770      Weight (kg) 103.8 100.6 100.6 100.6 100.6 100.6         Assessment/Plan     72 y. o. male with PMH significant for CVA, HTN, HLD, CKD III, chronic diarrhea, BPH/OAB, admitted with ruptured appendix with abscess formation. S/P open appendectomy.      Perforated appendix w/ abscess POD6 Open Appendectomy: WBC still elevated at 17.1 but decreasing.  Surgical cx shows P mirabilis, E coli, and lactobacillus. Pt is afebrile. Repeat CT A/P showed likely ileus.  - Dr. Dusty Richardson following, appreciate the recs. - ID consulted. Appreciate recs. To transition to PO abx.  - Pain control and diet per surgery  - LR @ 125 ml/hr. Consider discontinuing IVF if PO intake increases  - PT/OT. OT recommends rehab  - Encourage incentive spirometer      AMS/Metabolic encephalopathy: likely metabolic and due to pain and anesthesia. Axo2.  -sitter as needed     Severe Protein Calorie Malnutrition. ASPEN Malnutrition Criteria  Acute Illness, Chronic Illness, or Social/Enviornmental: Acute illness  Energy Intake: Less than/equal to 50% est energy req for greater than/equal to 5 days  Weight Loss: Greater than 1-2% x 1 wk  ASPEN Malnutrition Score - Acute Illness: 12  - Nutrition following    Acute urinary retention/OAB/BPH: holding home tolteridine, dicyclomine as an outpatient. The Ucx 4/20 with NG1day. Had urinary retention following surgery so lew was placed by urology.  - Dr. Marshal Brush placed 16 fr catheter 04/20, rec's to keep catheter in place for one week. Till 4/27. Will call uro  - 0.4mg tamulosin QD      H/o CVA, HTN, HLD, Vitamin D/B12 deficiency: CVA in 2015 with residual mild L sided weakness and some aphasia. BP elevated w/ SBP in 160s  - Hold home losartan 200mg BID   - Restarted Norvasc 10mg daily and labetalol 100mg BID  - Hold home lasix 20 mg every day  - Hold home ezetimibe 10mg QD   - Hold home vitamin B12  - Fall precautions, aspiration precautions   - Restart meds once tolerating PO      Diet: Per surgery. CLD  DVT Prophylaxis: SCDs  Code Status: Full  Point of Contact: Lokesh Argueta [phone]      Disposition and anticipated LOS: Pending     Olive Dubin In D. Irine Getting  PGY-1 [facility]  04/27/18 7:08 AM

## 2018-04-27 NOTE — PROGRESS NOTES
1100 - Removed lew per Dr Adele Paredes [facility]) orders. Changed abdominal dressing - small amt serosanguineous drainage. Pt tolerated both procedures well. Will continue to monitor for void trial.  1400 - Pt has not voided, states has no need to void yet. 1625 - Pt voided 250cc's, yellow. 1800 - Bed pad soaked with urine. 1830 - Ambulated with 2 person assist OOB to chair. 1925 - Bedside and Verbal shift change report given to Phillip Santiago (oncoming nurse) by Alida Burks RN (offgoing nurse). Report included the following information SBAR, Kardex and Recent Results.

## 2018-04-27 NOTE — PROGRESS NOTES
conducted a Follow up consultation and Spiritual Assessment for Shauna Lopez, who is a 67 y. o.,male. The  provided the following Interventions:  Continued the relationship of care and support patient who desires to execute an Advance Medical Directive but only when his sister is present. Sitter will call when sister arrives. Listened empathically (see above statement). Offered  assurance of continued prayer on patients behalf. The following outcomes were achieved:  Patient expressed gratitude for 's visit. Assessment:  There are no further spiritual or Rastafarian issues which require Spiritual Care Services interventions at this time. Plan:  Chaplains will continue to follow and will provide pastoral care on an as needed/requested basis.  recommends bedside caregivers page  on duty if patient shows signs of acute spiritual or emotional distress.      Ree German, [address] [place] [address] Care  [phone]

## 2018-04-27 NOTE — PROGRESS NOTES
Speech Pathology Note    Swallow eval completed b/s with recs of MBS to r/o silent aspiration and determine swallow integrity. Orders written, diet recs will be made at that time. SLP will f/u. Thank you.     Pritesh Baldwin MS, CCC/SLP  Speech- Language Pathologist

## 2018-04-27 NOTE — PROGRESS NOTES
Problem: Dysphagia (Adult)  Goal: *Acute Goals and Plan of Care (Insert Text)  Patient will:  1. Tolerate PO trials with 0 s/s overt distress in 4/5 trials  2. Utilize compensatory swallow strategies/maneuvers (decrease bite/sip, size/rate, alt. liq/sol) with min cues in 4/5 trials  3. Perform oral-motor/laryngeal exercises to increase oropharyngeal swallow function with min cues  4. Complete an objective swallow study (i.e., MBSS) to assess swallow integrity, r/o aspiration, and determine of safest LRD, min A    Recommendations:  Diet: pending results of MBS  Meds: crushed in puree  Aspiration Precautions  Other: MBS later this am         Outcome: Not Met  Speech LAnguage Pathology bedside swallow evaluation    Patient: Martínez Jalloh (33 y.o. male)  Date: 4/27/2018  Primary Diagnosis: Ruptured appendicitis  Intra-abdominal abscess ([facility] [address]  Acute UTI  Nausea  Abdominal pain  ACUTE APPENDICITIS  Appendicitis with abscess  Procedure(s) (LRB):  APPENDECTOMY LAPAROSCOPIC CONVERTED TO OPEN (N/A) 7 Days Post-Op   Precautions: aspiration,  Fall    ASSESSMENT :  Based on the objective data described below, the patient presents with mild oropharyngeal dysphagia in setting of RLQ mass with perforated appendix. Pt awake, lethargic, followed basic commands during eval.  Pt accepted thin liquids via straw, puree solids and mechanical soft solids. Pt demo discoordinated oral phase with oral holding of all solids, mildly prolonged anterior mastication, slow A-P transit with good oral clearing,  mildly delayed swallow response, audible gulp, multiple swallows per presentation. Pt without overt s/sx aspiration, however, pt with intermittent gurgly vocal quality and consistent hiccups. Per sitter in room, pt always gets hiccups when he's drinking any liquid. Given previous, pt would benefit from MBS to r/o silent aspiration and determine swallow integrity. Orders written, diet recs to be made at that time. SLP will f/u. Thank you. Tx completed with education of above with compensatory swallow strategies (sitting upright for all po, small bites/ sips, alternate solids/ liquids, remain upright at least 30 minutes following all po). Comprehension expressed, ?level carry over? SLP will f/u. Patient will benefit from skilled intervention to address the above impairments. Patients rehabilitation potential is considered to be Fair  Factors which may influence rehabilitation potential include:   []            None noted  [x]            Mental ability/status  [x]            Medical condition  []            Home/family situation and support systems  []            Safety awareness  []            Pain tolerance/management  []            Other:      PLAN :  Recommendations and Planned Interventions:   MBS to r/o silent aspiration and determine swallow integrity. Orders written, diet recs to be made at that time. SLP will f/u. Thank you. Frequency/Duration: Patient will be followed by speech-language pathology 1-2 times per day/4-7 days per week to address goals. Discharge Recommendations: Faraz Swartz and To Be Determined     SUBJECTIVE:   Patient stated I can't swallow good.     OBJECTIVE:     Past Medical History:   Diagnosis Date    Acute ischemic stroke ([facility] [address] 9/1/2015    Acute Ischemic Stroke (early subacute infarct at the posterior right lentiform nucleus) with residual left hemiparesis and dysphagia    CKD (chronic kidney disease) stage 3, GFR 30-59 ml/min 9/1/6271    Diastolic dysfunction without heart failure 9/2/2015    Grade 1 diastolic dysfunction on 2D ECHO done 9/02/2015    Dyslipidemia 9/2/2015    History of noncompliance with medical treatment, presenting hazards to health 9/1/2015    History of tobacco use 9/1/2015    Hypertension     Hypertensive heart and kidney disease without heart failure and with chronic kidney disease stage III 9/2/2015    Obesity, Class I, BMI 30-34.9 9/1/2015    Rhabdomyolysis 9/1/2015    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 9/1/2015    Vitamin D deficiency 9/6/2015     Past Surgical History:   Procedure Laterality Date    APPENDECTOMY,RUPT APPENDX+ABSCESS N/A 04/20/2018    Dr. Eliezer Solo COLONOSCOPY N/A 4/3/2018    COLONOSCOPY,  w heated snared polypectomy performed by Frankie Vick MD at [facility] ENDOSCOPY     Prior Level of Function/Home Situation: as per H&P  Home Situation  Home Environment: Private residence  One/Two Story Residence: One story  Living Alone: No  Support Systems: [address] [place] [address] / [place] community, Family member(s), Friends \ neighbors  Patient Expects to be Discharged to[de-identified] Private residence  Current DME Used/Available at Home: Cane, quad  Tub or Shower Type: Shower (with seat)  Diet prior to admission: pt reported soft foods like mashed potatoes  Current Diet:  Clear liquid s/p surgery   Cognitive and Communication Status:  Neurologic State: Alert  Orientation Level: Oriented to person  Cognition: Follows commands  Perception: Appears intact  Perseveration: No perseveration noted  Safety/Judgement: Fall prevention (sitter in room)  Oral Assessment:  Oral Assessment  Labial: Decreased rate;Left droop  Dentition: Natural (natural lowers, upper edentulous)  Oral Hygiene: good  Lingual: Decreased rate  Velum: No impairment  Mandible: No impairment  P.O. Trials:  Patient Position: HOB 50*  Vocal quality prior to P.O.: Hoarse  Consistency Presented: Thin liquid;Puree;Mechanical soft  How Presented: SLP-fed/presented;Spoon;Straw;Successive swallows     Bolus Acceptance: No impairment  Bolus Formation/Control: Impaired  Type of Impairment: Delayed; Anterior;Mastication;Piecemeal  Propulsion: Delayed (# of seconds)  Oral Residue: None  Initiation of Swallow: Delayed (# of seconds)  Laryngeal Elevation: Functional  Aspiration Signs/Symptoms: Decrease in O2 saturations; Other (comment) (hiccups (?))  Pharyngeal Phase Characteristics: Audible swallow;Double swallow; Poor endurance; Suspected pharyngeal residue  Effective Modifications: Alternate liquids/solids;Small sips and bites  Cues for Modifications: Moderate       Oral Phase Severity: Mild  Pharyngeal Phase Severity : Mild    GCODESwallowing:  Swallow Current Status CJ= 20-39%   Swallow Goal Status CI= 1-19%   Swallow D/C Status CI= 1-19%    The severity rating is based on the following outcomes:  TO [place] Swallow Level 5    Clinical Judgement    PAIN:  Start of Eval: 0  End of Eval: 0     After treatment:   []            Patient left in no apparent distress sitting up in chair  [x]            Patient left in no apparent distress in bed  [x]            Call bell left within reach  [x]            Nursing notified  []            Family present  []            Caregiver present  []            Bed alarm activated    COMMUNICATION/EDUCATION:   [x]            Aspiration precautions; swallow safety; compensatory techniques. []            Patient/family have participated as able in goal setting and plan of care. []            Patient/family agree to work toward stated goals and plan of care. []            Patient understands intent and goals of therapy; neutral about participation. []            Patient unable to participate in goal setting/plan of care; educ ongoing with interdisciplinary staff  []         Posted safety precautions in patient's room.     Thank you for this referral.  Mirta Michael MS, CCC/SLP  Eval: 12 minutes  Tx: 10 minutes

## 2018-04-27 NOTE — PROGRESS NOTES
Received a message from [place] with [place] [phone] ext [phone] who is available to assist with discharge plan for this pt. Attempted to call number but number is disconnected.

## 2018-04-27 NOTE — DISCHARGE SUMMARY
Discharge Summary  [facility] [employer]      Patient: Martínez Jalloh Age: 67 y.o. Sex: male  : 1945    MRN: [id number]      DOA: 2018      Discharge Date: 18      Tamar Landon MD      PCP: MAGUE Miller        ================================================================    Reason for Admission:   Ruptured appendicitis  Appendicitis with abscess    Discharge Diagnoses:   Ruptured appendix with abscess formation. S/p open appendectomy   DARINEL on CKD III  Transaminitis  H/o CVA/HTN/HLD/Vit D/B12 deficiency  Acute urinary retention/OAB/BPH    Important notes to PCP/ follow-up studies and evaluations   1) Per infectious disease rec: Fluconazole till 5/3. Cipro and augmentin till   2) Losartan and Lasix were held due to hypotension  3) Started Flomax for urinary retention. Removed lew cath and patient voiding adequately    Pending labs and studies:  None    Operative Procedures:   Laparoscopic appendectomy converted to open appendectomy for acute ruptured appendix with abscess. No complications. Surgeon: Mary Jo Thompson MD, General Surgery. 18    Discharge Medications:     Current Discharge Medication List      START taking these medications    Details   fluconazole (DIFLUCAN) 200 mg tablet Take 2 Tabs by mouth daily for 3 days. FDA advises cautious prescribing of oral fluconazole in pregnancy. Qty: 6 Tab, Refills: 0      HYDROcodone-acetaminophen (NORCO) 5-325 mg per tablet Take 1 Tab by mouth every four (4) hours as needed. Max Daily Amount: 6 Tabs. Qty: 20 Tab, Refills: 0    Associated Diagnoses: Ruptured appendicitis      lactobacillus sp. 50 billion cpu (BIO-K PLUS) 50 billion cell -375 mg cap capsule Take 1 Cap by mouth daily. Qty: 30 Cap, Refills: 0      naloxone (NARCAN) 0.4 mg/mL injection 0.5 mL by IntraVENous route once as needed. Qty: 20 mL, Refills: 0      tamsulosin (FLOMAX) 0.4 mg capsule Take 1 Cap by mouth daily.   Qty: 10 Cap, Refills: 0      docusate sodium (COLACE) 100 mg capsule Take 1 Cap by mouth daily for 90 days. Qty: 30 Cap, Refills: 0         CONTINUE these medications which have NOT CHANGED    Details   cyanocobalamin (VITAMIN B-12) 1,000 mcg tablet Take 1,000 mcg by mouth daily. ezetimibe (ZETIA) 10 mg tablet Take 10 mg by mouth daily. labetalol (NORMODYNE) 200 mg tablet Take 1 Tab by mouth every twelve (12) hours. Qty: 30 Tab, Refills: 0    Associated Diagnoses: Hypertensive heart and kidney disease without heart failure and with chronic kidney disease stage III      amLODIPine (NORVASC) 10 mg tablet Take 1 Tab by mouth daily. Qty: 15 Tab, Refills: 0    Associated Diagnoses: Hypertensive heart and kidney disease without heart failure and with chronic kidney disease stage III         STOP taking these medications       furosemide (LASIX) 20 mg tablet Comments:   Reason for Stopping:         losartan (COZAAR) 100 mg tablet Comments:   Reason for Stopping:                 Disposition: Acute Rehab    Consultants:    Pillo Pool MD, Wilber Alegria MD, Infectious Disease    Brief Hospital Course (including pertinent history and physical findings)  67 y. o. male with PMH significant for CVA, HTN, HLD, CKD III, chronic diarrhea, BPH/OAB admitted for ruptured appendix with abscess formation. Ruptured appendix with abscess formation  The patient had CT abdomen that showed perforated appendix with fluid collection. Initial lab revealed leukocytosis of 24.6 with 2 bands. Also showed transaminitis and DARINEL. General surgery was consulted and the patient was sent to the OR for emergency surgery. Laparoscopic approach was attempted initially but needed to be converted to open appendectomy. The patient was continued on antibiotics post surgery and was tailored to the surgical culture. The patient had initial increase in WBC without much improvement of his abdominal pain so infectious disease was consulted. The patient's pain was controlled with opioids and his diet was advanced as tolerated. The patient was working with PT/OT and was recommended for acute rehab. The patient was also seen by speech and was advanced in his diet further to mechanical soft. The patient started to improve and became more functional. He was hemodynamically stable and was passing flatus and having bowel movements prior to transfer to acute rehab. Acute Metabolic Encephalopathy Resolved  Likely from infectious/surgery. The patient was slightly agitated post op and required a sitter. However, the patient quickly returned to his baseline and no confusion and agitations were noted for >48hrs. Transient Afib w/ RVR  Post open appendectomy, the patient was found to have afib with RVR that was resolved with lopressor. Afterwards, the patient did not have any further events on telemetry and his labetalol was started once his BP stabilized. DARINEL on CKD III Resolved  The patient initially had elevated Cr from his baseline >0.4 which was corrected rapidly with lactated ringer IVF. The medications were renally dosed and nephrotoxic medications were avoided. Transaminitis Resolved  Patient had initial elevation of LFT which resolved on subsequent CMP checks. H/o CVA/HTN/HLD/Vit D/B12 deficiency  Home medications were held initially due to poor PO status but restarted on transfer except for his losartan and lasix which were held due to hypotension. Acute urinary retention/OAB/BPH  The patient was unable to void for 12hrs post op so urology was consulted and coude catheter was attempted by surgery without success. Urology was able to placed the lew with cystoscopy and was started on Flomax. The patient remained on the lew catheter for 7 days per urology recommendations and was removed afterwards.  The patient was voiding adequately after the removal. The patient was found to have urinary incontinence and patient was started on condom catheter. Summarized key findings and results (labs, imaging studies, ECHO, cardiac cath, endoscopies, etc):  Xr Swallow Func Video    Result Date: 4/27/2018  Impression: As above. Please see speech pathologist's report for further evaluation and recommendations. Ct Abd Pelv Wo Cont    Result Date: 4/20/2018  IMPRESSION: 1.   Air-fluid collection in the right mid abdomen at the expected location of the appendix. This likely presents a ruptured appendix with fluid collection. 2.  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 3.  Prostatic hypertrophy. 4.  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 5.  Umbilical hernia with a portion of small bowel within it. These findings were discussed with Reg Long at 0115 hours on 4/20/2018. Ct Abd Pelv W Cont    Result Date: 4/24/2018  IMPRESSION[de-identified] 1.  Interval appendectomy and drainage of right lower quadrant abscess. Satisfactory postoperative appearance for recent surgery. No residual sizable or drainable collection. 2. Some distention of small bowel, fluid-filled. This may reflect postoperative ileus. 3. Some third spacing of fluid with small volume pleural effusions.  Thank you for this referral.    All Micro Results     Procedure Component Value Units Date/Time    C DIFFICILE, CYTOTOXIN B [[id number]]     Order Status:  Canceled Specimen:  Stool     CULTURE, BLOOD [[id number]] Collected:  04/20/18 0015    Order Status:  Completed Specimen:  Whole Blood from Blood Updated:  04/26/18 0708     Special Requests: NO SPECIAL REQUESTS        Culture result: NO GROWTH 6 DAYS       CULTURE, BLOOD [[id number]] Collected:  04/20/18 0000    Order Status:  Completed Specimen:  Blood from Blood Updated:  04/26/18 0708     Special Requests: NO SPECIAL REQUESTS        Culture result: NO GROWTH 6 DAYS       CULTURE, SURGICAL WOUND [employer] STAIN [[id number]]  (Abnormal)  (Susceptibility) Collected:  04/20/18 0734    Order Status:  Completed Specimen: Appendix Updated:  04/25/18 1411     Special Requests: ABSCESS        GRAM STAIN MODERATE WBC'S         FEW GRAM POSITIVE COCCI                 MODERATE GRAM POSITIVE RODS      FEW GRAM NEGATIVE RODS        Culture result: FEW PROTEUS MIRABILIS (A)         FEW ESCHERICHIA COLI (A)                 MODERATE LACTOBACILLUS SPECIES (A)    CULTURE, ANAEROBIC [[id number]]  (Abnormal) Collected:  04/20/18 0734    Order Status:  Completed Specimen:  Appendix Updated:  04/24/18 1528     Special Requests: ABSCESS        Culture result:         MODERATE BACTEROIDES THETAIOTAOMICRON BETA LACTAMASE POSITIVE (A)              MODERATE BACTEROIDES STERCORIS BETA LACTAMASE POSITIVE (A)    CULTURE, URINE [[id number]] Collected:  04/20/18 0102    Order Status:  Completed Specimen:  Clean catch Updated:  04/21/18 1000     Special Requests: NO SPECIAL REQUESTS        Culture result: NO GROWTH 1 DAY       CULTURE, BODY FLUID [employer] STAIN [[id number]] Collected:  04/20/18 0745    Order Status:  Canceled Specimen:  Drainage         Recent Results (from the past 24 hour(s))   GLUCOSE, POC    Collection Time: 04/27/18  9:37 PM   Result Value Ref Range    Glucose (POC) 123 (H) 70 - 110 mg/dL   CBC WITH AUTOMATED DIFF    Collection Time: 04/28/18  5:04 AM   Result Value Ref Range    WBC 15.1 (H) 4.6 - 13.2 K/uL    RBC 3.82 (L) 4.70 - 5.50 M/uL    HGB 10.4 (L) 13.0 - 16.0 g/dL    HCT 31.1 (L) 36.0 - 48.0 %    MCV 81.4 74.0 - 97.0 FL    MCH 27.2 24.0 - 34.0 PG    MCHC 33.4 31.0 - 37.0 g/dL    RDW 15.1 (H) 11.6 - 14.5 %    PLATELET 698 (H) 961 - 420 K/uL    MPV 9.7 9.2 - 11.8 FL    NEUTROPHILS 80 (H) 42 - 75 %    BAND NEUTROPHILS 3 0 - 5 %    LYMPHOCYTES 10 (L) 20 - 51 %    MONOCYTES 4 2 - 9 %    EOSINOPHILS 1 0 - 5 %    BASOPHILS 0 0 - 3 %    METAMYELOCYTES 2 (H) 0 %    ABS. NEUTROPHILS 12.5 (H) 1.8 - 8.0 K/UL    ABS. LYMPHOCYTES 1.5 0.8 - 3.5 K/UL    ABS. MONOCYTES 0.6 0 - 1.0 K/UL    ABS. EOSINOPHILS 0.2 0.0 - 0.4 K/UL    ABS.  BASOPHILS 0.0 0.0 - 0.06 K/UL    DF AUTOMATED      PLATELET COMMENTS ADEQUATE PLATELETS      RBC COMMENTS ANISOCYTOSIS  1+        RBC COMMENTS MICROCYTOSIS  1+        RBC COMMENTS HYPOCHROMIA  1+       METABOLIC PANEL, BASIC    Collection Time: 04/28/18  5:04 AM   Result Value Ref Range    Sodium 138 136 - 145 mmol/L    Potassium 3.9 3.5 - 5.5 mmol/L    Chloride 105 100 - 108 mmol/L    CO2 27 21 - 32 mmol/L    Anion gap 6 3.0 - 18 mmol/L    Glucose 93 74 - 99 mg/dL    BUN 16 7.0 - 18 MG/DL    Creatinine 1.35 (H) 0.6 - 1.3 MG/DL    BUN/Creatinine ratio 12 12 - 20      GFR est AA >60 >60 ml/min/1.73m2    GFR est non-AA 52 (L) >60 ml/min/1.73m2    Calcium 8.9 8.5 - 10.1 MG/DL   GLUCOSE, POC    Collection Time: 04/28/18  7:46 AM   Result Value Ref Range    Glucose (POC) 102 70 - 110 mg/dL     Pathology: 4/20/18: Consistent for appendiceal abscess    Functional status and cognitive function:    Ambulates with assistance  Status: alert, cooperative, no distress, appears stated age    Diet: Mechanical soft/Thin liquid per Speech rec    Code status and advanced care plan: Full  [employer] of Contact: Sarai Alvarado [phone]    Patient Education:  Patient was educated on the following topics prior to discharge:  Appendectomy/Appenditicis    Follow-up:   Follow-up Information     Follow up With Details Comments Reina Adamson MD Schedule an appointment as soon as possible for a visit  [address]      MAGUE Gunderson Schedule an appointment as soon as possible for a visit [address]  [phone]        patient to transferred to facility             ================================================================  Leo Alegria  PGY-1 [place] Resident  [facility]  04/27/18 1:45 PM

## 2018-04-27 NOTE — ROUTINE PROCESS
1930-Bedside and verbal report from Kevin Bentley RN. Pt in recliner, resting, no apparent pain, call bell within reach, lew bag attached to leg and not pulling, sitter at bedside. 2030-Pt resting, drowsy, alert to self, place, time, not situation. No c/o pain, call bell within reach, vital signs stable, on room air. 2130-Pt resting in recliner, NAD, call bell within reach, sitter at bedside, no c/o pain. 2230-Pt resting in , NAD, call bell within reach, sitter at bedside, no c/o pain, pt drowsy but arosuable. Pt c/o tenderness at IV site, noted phlebitis. IV pulled and new IV placed in right arm, saline lock. 2330-Pt sleeping, NAD, call bell within reach, lew bag emptied with 700 clear/yellow non malodorous urine. Sitter bat bedside, no apparent pain. 0130-Pt sleeping, NAD, call bell within reach, sitter at bedside, no apparent pain. 0330-Pt resting in bed, NAD, bed low for safety, no apparent pain, call bell within reach, sitter at bedside. 0530-Pt resting in bed, sitter present, bed low for safety, no c/o pain, call bell within reach. 0700-Bedside and verbal report given to Manjinder Tyson RN.

## 2018-04-27 NOTE — PROGRESS NOTES
NUTRITION    Nutrition Screen      RECOMMENDATIONS / PLAN:     - Continue supplements: Ensure Enlive TID.  - Monitor po intake and diet tolerance. - Continue RD inpatient monitoring and evaluation. NUTRITION INTERVENTIONS & DIAGNOSIS:     [x] Meals/snacks: modified composition  [x] Medical food supplement therapy: Ensure Enlive TID    Nutrition Diagnosis: Unintended weight loss related to decreased appetite, inadequate energy intake as evidenced by 8 lb, 4% weight loss x 2 weeks PTA. Inadequate energy intake related to abdominal pain, diet intolerance due to altered GI function as evidenced by poor meal intake x 9 days PTA, NPO x 5 days from admission, started on clears on LOS day 5 (4/25) and advanced to dysphagia diet day 7 (4/27). Patient meets criteria for Severe Protein Calorie Malnutrition as evidenced by:   ASPEN Malnutrition Criteria  Acute Illness, Chronic Illness, or Social/Enviornmental: Acute illness  Energy Intake: Less than/equal to 50% est energy req for greater than/equal to 5 days  Weight Loss: Greater than 1-2% x 1 wk  ASPEN Malnutrition Score - Acute Illness: 12  Acute Illness - Malnutrition Diagnosis: Severe malnutrition. ASSESSMENT:     4/27: Appetite and meal intake remain poor, little to no meal intake but consuming Ensure supplements. Advanced to dysphagia diet after MBS today. No c/o abdominal pain or nausea/vomiting.   4/26: Pt tolerating clears with poor appetite, minimal intake of meals but likes and is consuming Ensure supplements. Denies nausea/vomiting or abdominal pain today, frequent loose and large volume stools per RN (2 before lunch today). 4/25: S/p appendectomy and abscess drainage on 4/21/18. Some small bowel distension suggestive of postoperative ileus per CT 4/24. Pt NPO x 5 days and started on clear liquids with Ensure Enlive supplements today, no meal intake yet.  Pt c/o abdominal pain but improved today, denies nausea/vomiting and +BM today and multiple BMs yesterday. Not hungry. Average po intake adequate to meet patients estimated nutritional needs:   [] Yes     [x] No   [] Unable to determine at this time    Diet: DIET DYSPHAGIA [address] [facility] (NDD2)      Food Allergies: NKFA  Current Appetite:   [] Good     [] Fair     [x] Poor     [] Other:  Appetite/meal intake prior to admission:   [] Good     [] Fair     [x] Poor x 9 days PTA per MD note, fair appetite prior to that reported by patient    [] Other:  Feeding Limitations:  [x] Swallowing difficulty: hx of stroke and dysphagia, followed by SLP in the past    [] Chewing difficulty    [] Other:  Current Meal Intake:   Patient Vitals for the past 100 hrs:   % Diet Eaten   04/27/18 1215 0 %   04/27/18 0903 0 %     BM: 4/26, incontinent with multiple loose stools  Skin Integrity: abdominal surgical incision; abdominal STEVEN drain   Edema: trace LEs  Pertinent Medications: Reviewed: LR at 125 mL/hr    Recent Labs      04/27/18   0102  04/26/18   0515  04/25/18   0525   NA  138  139  140   K  4.0  3.9  3.9   CL  106  105  106   CO2  26  27  25   GLU  109*  116*  89   BUN  17  18  16   CREA  1.18  1.20  1.16   CA  9.2  9.1  8.8       Intake/Output Summary (Last 24 hours) at 04/27/18 1316  Last data filed at 04/27/18 1215   Gross per 24 hour   Intake              480 ml   Output             1935 ml   Net            -1455 ml       Anthropometrics:  Ht Readings from Last 1 Encounters:   04/02/18 6' 2\" (1.88 m)     Last 3 Recorded Weights in this Encounter    04/25/18 0400 04/26/18 0400 04/27/18 0339   Weight: 105 kg (231 lb 6.4 oz) 103.8 kg (228 lb 12.8 oz) 100.6 kg (221 lb 11.2 oz)     Body mass index is 28.46 kg/(m^2).           Weight History: patient reports recent 8 lb (4%) weight loss x 2 weeks PTA    Weight Metrics 4/27/2018 4/3/2018 3/29/2018 5/25/2016 9/8/2015 9/5/2015   Weight 221 lb 11.2 oz 226 lb 10.1 oz - 228 lb 235 lb 240 lb   BMI 28.46 kg/m2 29.1 kg/m2 - 31.81 kg/m2 32.79 kg/m2 33.49 kg/m2        Admitting Diagnosis: Ruptured appendicitis  Intra-abdominal abscess (HCC)  Acute UTI  Nausea  Abdominal pain  ACUTE APPENDICITIS  Appendicitis with abscess  Pertinent PMHx: HTN, CVA, CKD stage III, dyslipidemia    Education Needs:        [x] None identified  [] Identified - Not appropriate at this time  []  Identified and addressed - refer to education log  Learning Limitations:   [x] None identified  [] Identified  Cultural, Jain & ethnic food preferences:  [x] None identified    [] Identified and addressed     ESTIMATED NUTRITION NEEDS:     Calories: 7496-9734 kcal (25-35 kcal/kg) based on  [] Actual BW      [x] SBW 77 kg  Protein: 62-92 gm (0.8-1.2 gm/kg) based on  [] Actual BW      [x] SBW   Fluid: 1 mL/kcal     MONITORING & EVALUATION:     Nutrition Goal(s):   1. Po intake of meals will meet >75% of patient estimated nutritional needs within the next 7 days.   Outcome:  [] Met/Ongoing    [x]  Not Met/Progressing    [] New/Initial Goal      Monitoring:   [x] Food and beverage intake   [x] Diet order   [x] Nutrition-focused physical findings   [x] Treatment/therapy   [] Weight   [] Enteral nutrition intake        Previous Recommendations (for follow-up assessments only):     []   Implemented       [x]   Not Implemented (RD to address, discussed with MD 4/25/18)      [] No Longer Appropriate     [] No Recommendation Made     Discharge Planning: cardiac diet, consistency as tolerated per SLP  [x] Participated in care planning, discharge planning, & interdisciplinary rounds as appropriate      Wally Current, [address], [address] [place] [address]    Pager: [phone]

## 2018-04-27 NOTE — PROGRESS NOTES
Riaz Story M.D. FACS  PROGRESS NOTE    Name: Juan Lui MRN: [id number]   : 1945 [facility]   Date: 2018 Admission Date: 2018 10:27 PM     Hospital Day: 9  7 Days Post-Op  Subjective:  Patient without complaints. No acute events overnight. Bowel movements ×3 overnight. Objective:  Vitals:    18 0339 18 0403 18 0713 18 1132   BP:  126/81 126/81 (!) 134/94   Pulse:  64 65 66   Resp:  18 16 14   Temp:  97.7 °F (36.5 °C) 97.5 °F (36.4 °C) 97.3 °F (36.3 °C)   SpO2:  95% 95% 98%   Weight: 100.6 kg (221 lb 11.2 oz)          Date 18 07 - 18 0659 18 07 - 18 0659   Shift 2862-8241 1730-4764 24 Hour Total 2797-0914 7482-9593 24 Hour Total   I  N  T  A  K  E   P. O.  0 0 480  480      P. O.  0 0 480  480    Shift Total  (mL/kg)  0  (0) 0  (0) 480  (4.8)  480  (4.8)   O  U  T  P  U  T   Urine  (mL/kg/hr) 700  (0.6) 1050  (0.9) 1750  (0.7) 400  400      Urine    400  400      Urine Voided  350 350         Urine Occurrence(s)  2 x 2 x         Urine Output (mL) (Urinary Catheter 18 Coude)        Drains 20  20 25  25      Output (ml) ([place] Drain 18 Right; Lower Abdomen) 20  20 25  25    Stool            Stool Occurrence(s) 3 x 1 x 4 x 2 x  2 x    Shift Total  (mL/kg) 720  (6.9) 1050  (10.4) 1770  (17.6) 425  (4.2)  425  (4.2)   NET -720 -1050 -1770 55  55   Weight (kg) 103.8 100.6 100.6 100.6 100.6 100.6         Physical Exam:    General: Awake and alert, oriented ×4, no apparent distress   Abdomen: abdomen is soft with incisional tenderness. Incision(s) are clean and intact with serosanguineous drainage. STEVEN drain with serosanguineous drainage.   No masses, organomegaly or guarding    Labs:  Recent Results (from the past 24 hour(s))   CBC WITH AUTOMATED DIFF    Collection Time: 18  1:02 AM   Result Value Ref Range    WBC 17.1 (H) 4.6 - 13.2 K/uL    RBC 3.94 (L) 4.70 - 5.50 M/uL    HGB 10.7 (L) 13.0 - 16.0 g/dL    HCT 31.9 (L) 36.0 - 48.0 %    MCV 81.0 74.0 - 97.0 FL    MCH 27.2 24.0 - 34.0 PG    MCHC 33.5 31.0 - 37.0 g/dL    RDW 15.0 (H) 11.6 - 14.5 %    PLATELET 785 (H) 201 - 420 K/uL    MPV 9.5 9.2 - 11.8 FL    NEUTROPHILS 75 42 - 75 %    BAND NEUTROPHILS 2 0 - 5 %    LYMPHOCYTES 15 (L) 20 - 51 %    MONOCYTES 4 2 - 9 %    EOSINOPHILS 0 0 - 5 %    BASOPHILS 0 0 - 3 %    METAMYELOCYTES 2 (H) 0 %    MYELOCYTES 2 (H) 0 %    ABS. NEUTROPHILS 13.2 (H) 1.8 - 8.0 K/UL    ABS. LYMPHOCYTES 2.6 0.8 - 3.5 K/UL    ABS. MONOCYTES 0.7 0 - 1.0 K/UL    ABS. EOSINOPHILS 0.0 0.0 - 0.4 K/UL    ABS.  BASOPHILS 0.0 0.0 - 0.06 K/UL    DF MANUAL      PLATELET COMMENTS Increased Platelets      RBC COMMENTS ANISOCYTOSIS  1+        RBC COMMENTS POLYCHROMASIA  1+       METABOLIC PANEL, BASIC    Collection Time: 04/27/18  1:02 AM   Result Value Ref Range    Sodium 138 136 - 145 mmol/L    Potassium 4.0 3.5 - 5.5 mmol/L    Chloride 106 100 - 108 mmol/L    CO2 26 21 - 32 mmol/L    Anion gap 6 3.0 - 18 mmol/L    Glucose 109 (H) 74 - 99 mg/dL    BUN 17 7.0 - 18 MG/DL    Creatinine 1.18 0.6 - 1.3 MG/DL    BUN/Creatinine ratio 14 12 - 20      GFR est AA >60 >60 ml/min/1.73m2    GFR est non-AA >60 >60 ml/min/1.73m2    Calcium 9.2 8.5 - 10.1 MG/DL     All Micro Results     Procedure Component Value Units Date/Time    CULTURE, BLOOD [[id number]] Collected:  04/20/18 0015    Order Status:  Completed Specimen:  Whole Blood from Blood Updated:  04/26/18 0708     Special Requests: NO SPECIAL REQUESTS        Culture result: NO GROWTH 6 DAYS       CULTURE, BLOOD [[id number]] Collected:  04/20/18 0000    Order Status:  Completed Specimen:  Blood from Blood Updated:  04/26/18 0708     Special Requests: NO SPECIAL REQUESTS        Culture result: NO GROWTH 6 DAYS       CULTURE, SURGICAL WOUND Efren Hosteller STAIN [[id number]]  (Abnormal)  (Susceptibility) Collected:  04/20/18 0734    Order Status:  Completed Specimen:  Appendix Updated:  04/25/18 1411 Special Requests: ABSCESS        GRAM STAIN MODERATE WBC'S         FEW GRAM POSITIVE COCCI                 MODERATE GRAM POSITIVE RODS      FEW GRAM NEGATIVE RODS        Culture result: FEW PROTEUS MIRABILIS (A)         FEW ESCHERICHIA COLI (A)                 MODERATE LACTOBACILLUS SPECIES (A)    CULTURE, ANAEROBIC [[id number]]  (Abnormal) Collected:  04/20/18 0734    Order Status:  Completed Specimen:  Appendix Updated:  04/24/18 1528     Special Requests: ABSCESS        Culture result:         MODERATE BACTEROIDES THETAIOTAOMICRON BETA LACTAMASE POSITIVE (A)              MODERATE BACTEROIDES STERCORIS BETA LACTAMASE POSITIVE (A)    CULTURE, URINE [[id number]] Collected:  04/20/18 0102    Order Status:  Completed Specimen:  Clean catch Updated:  04/21/18 1000     Special Requests: NO SPECIAL REQUESTS        Culture result: NO GROWTH 1 DAY       CULTURE, BODY FLUID Brown Songster STAIN [[id number]] Collected:  04/20/18 0745    Order Status:  Canceled Specimen:  Drainage           Current Medications:  Current Facility-Administered Medications   Medication Dose Route Frequency Provider Last Rate Last Dose    fluconazole (DIFLUCAN) tablet 400 mg  400 mg Oral DAILY Ml Oneil MD   400 mg at 04/27/18 1051    lactobacillus sp. 50 billion cpu (BIO-K PLUS) capsule 1 Cap  1 Cap Oral DAILY Ml Oneil MD   1 Cap at 04/27/18 1051    piperacillin-tazobactam (ZOSYN) 3.375 g in 0.9% sodium chloride (MBP/ADV) 100 mL MBP  3.375 g IntraVENous Q6H Ml Oneil  mL/hr at 04/27/18 1118 3.375 g at 04/27/18 1118    amLODIPine (NORVASC) tablet 10 mg  10 mg Oral DAILY Valentino Holly MD   10 mg at 04/27/18 1051    labetalol (NORMODYNE) tablet 100 mg  100 mg Oral Q12H Valentino Holly MD   100 mg at 04/27/18 1051    HYDROcodone-acetaminophen (NORCO) 5-325 mg per tablet 1 Tab  1 Tab Oral Q4H PRN Mari Magdaleno MD   1 Tab at 04/25/18 1425    acetaminophen (TYLENOL) tablet 650 mg  650 mg Oral Q4H PRN Joleen Mane MD   650 mg at 04/23/18 1657    lactated Ringers infusion  125 mL/hr IntraVENous CONTINUOUS Bryant Chaidez  mL/hr at 04/25/18 2357 125 mL/hr at 04/25/18 2357    naloxone [facility]) injection 0.2 mg  0.2 mg IntraVENous ONCE PRN Nilton Wells MD        tamsulosin [facility]) capsule 0.4 mg  0.4 mg Oral DAILY Nilton Wells MD   0.4 mg at 04/27/18 1051       Chart and notes reviewed. Data reviewed. I have evaluated and examined the patient. IMPRESSION:   · Patient doing well postop day 7 from laparoscopy to open appendectomy with drainage of appendiceal abscess. PLAN:/DISCUSION:   · Appreciate ID input continue antibiotics per ID  · Out of bed to chair, encourage incentive spirometry use  · Speech therapy evaluation appreciated.   ·         Bryant Chaidez MD

## 2018-04-27 NOTE — PROGRESS NOTES
Problem: Dysphagia (Adult)  Goal: *Acute Goals and Plan of Care (Insert Text)  Patient will:  1. Tolerate PO trials with 0 s/s overt distress in 4/5 trials  2. Utilize compensatory swallow strategies/maneuvers (decrease bite/sip, size/rate, alt. liq/sol) with min cues in 4/5 trials  3. Perform oral-motor/laryngeal exercises to increase oropharyngeal swallow function with min cues  4. Complete an objective swallow study (i.e., MBSS) to assess swallow integrity, r/o aspiration, and determine of safest LRD, min A-- met 4/27/18    Recommendations:  Diet: mechanical soft/ thin  Meds: as tolerated  Basic Aspiration Precautions  Other: upright for all po feeds and 30 minutes following, small sips with 2 swallows per sip, small bites         Outcome: Progressing Towards Goal  Speech Pathology Modified barium swallow Study    Patient: Ashley Mishra (40 y.o. male)  Date: 4/27/2018  Primary Diagnosis: Ruptured appendicitis  Intra-abdominal abscess (HCC)  Acute UTI  Nausea  Abdominal pain  ACUTE APPENDICITIS  Appendicitis with abscess  Procedure(s) (LRB):  APPENDECTOMY LAPAROSCOPIC CONVERTED TO OPEN (N/A) 7 Days Post-Op   Precautions: basic aspiration,  Fall    ASSESSMENT :  MBS completed with recs of mechanical soft solids/ thin liquids, 2 swallows per small sip of thin. Pt demo prolonged oral transit of solids, mildly delayed swallow response with pudding portion of mixed consistency into valleculae, functional swallow response once initiated with solids, no pharyngeal residue following solids. Pt demo moderate valleculae, mild pyriform sinus residue following thin which cleared with cued dry swallow. No penetration or aspiration observed under fluoro. Pt did present with hiccup response under fluoro independent of swallow function. Rec: advance diet to mechanical soft with thin liquids, small sips with 2 swallows per sip, upright for all po feeds and at least 30 minutes following.   SLP will f/u 1-2 visits with diet tolerance. Tx completed with education of previous both utilizing video during study and still picture once study completed. Education completed re: compensatory swallow strategies to include: small sips, 2 swallows per sip, small bites, upright for all po and at least 30 minutes following. Comprehension expressed, ?level carry over? SLP will f/u. Pt presents with mild oropharyngeal dysphagia, as evidenced above, which places pt at risk for aspiration. At this time, safest for mechanical soft solid, thin liquid diet. SLP utilized video of study for visual feedback, education, and recommendations for pt/caregiver; verbalized comprehension. Patient will benefit from skilled intervention to address the above impairments. Patients rehabilitation potential is considered to be Good  Factors which may influence rehabilitation potential include:   []              None noted  [x]              Mental ability/status  [x]              Medical condition  []              Home/family situation and support systems  [x]              Safety awareness  []              Pain tolerance/management  []              Other:      PLAN :  Recommendations and Planned Interventions:  advance diet to mechanical soft with thin liquids, small sips with 2 swallows per sip, upright for all po feeds and at least 30 minutes following. SLP will f/u 1-2 visits with diet tolerance. Frequency/Duration: Patient will be followed by speech-language pathology 1-2 times  to address goals.   Discharge Recommendations: Faraz Swartz and To Be Determined     SUBJECTIVE:   Patient stated     OBJECTIVE:     Past Medical History:   Diagnosis Date    Acute ischemic stroke ([facility] [address] 9/1/2015    Acute Ischemic Stroke (early subacute infarct at the posterior right lentiform nucleus) with residual left hemiparesis and dysphagia    CKD (chronic kidney disease) stage 3, GFR 30-59 ml/min 1/8/7925    Diastolic dysfunction without heart failure 9/2/2015    Grade 1 diastolic dysfunction on 2D ECHO done 9/02/2015    Dyslipidemia 9/2/2015    History of noncompliance with medical treatment, presenting hazards to health 9/1/2015    History of tobacco use 9/1/2015    Hypertensive heart and kidney disease without heart failure and with chronic kidney disease stage III 9/2/2015    Obesity, Class I, BMI 30-34.9 9/1/2015    Rhabdomyolysis 9/1/2015    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 9/1/2015    Vitamin D deficiency 9/6/2015     Past Surgical History:   Procedure Laterality Date    APPENDECTOMY,RUPT APPENDX+ABSCESS N/A 04/20/2018    Dr. Fuentes Serrato    COLONOSCOPY N/A 4/3/2018    COLONOSCOPY,  w heated snared polypectomy performed by Jacinto Ramírez MD at [facility] ENDOSCOPY     Prior Level of Function/Home Situation: as per H&P  Home Situation  Home Environment: Private residence  One/Two Story Residence: One story  Living Alone: No  Support Systems: [address] [place] [address] / [place], Family member(s), Friends \ neighbors  Patient Expects to be Discharged to[de-identified] Private residence  Current DME Used/Available at Home: Cane, quad  Tub or Shower Type: Shower (with seat)  Diet prior to admission: pt reported eating softer foods   Current Diet:  Full liquid   Radiologist: [employer] Views: Lateral;Fluoro  Patient Position: Hausted chair    Trial 1: Trial 2:   Consistency Presented:  Thin liquid;Puree;Mechanical soft     How Presented: Self-fed/presented;SLP-fed/presented;Spoon;Straw;Successive swallows;Cup/sip;Cup/gulp           Bolus Acceptance: No impairment     Bolus Formation/Control: Impaired: Delayed;Premature spillage;Mastication;Piecemeal  :     Propulsion: Delayed (# of seconds)     Oral Residue: None     Initiation of Swallow: Triggered at vallecula (with puree portion of soft solid)     Timing: Pooling 6-10 sec (with mixed consistency)     Penetration: None     Aspiration/Timing: No evidence of aspiration     Pharyngeal Clearance: Vallecular residue;Pyriform residue ;10-50% (following thin)     Attempted Modifications: Double swallow;Small sips and bites     Effective Modifications: Double swallow;Small sips and bites     Cues for Modifications: Minimal             Trial 3: Trial 4:                            :    :                                                                        Decreased Tongue Base Retraction?: No  Laryngeal Elevation: WFL (within functional limits)  Aspiration/Penetration Score: 1 (No penetration or aspiration-Contrast does not enter the airway)  Pharyngeal Symmetry: Not assessed  Pharyngeal-Esophageal Segment: No impairment  Pharyngeal Dysfunction: Decreased strength;Vertebral calcification    Oral Phase Severity: Mild  Pharyngeal Phase Severity: Mild    GCODESwallowing:  Swallow Current Status CI= 1-19%   Swallow Goal Status CH= 0%   Swallow D/C Status CH= 0%    The severity rating is based on the following outcomes:  TO [place] Swallow Level 6    8-point Penetration-Aspiration Scale: Score 1  Clinical Judgement    PAIN:  Start of Study: 0  End of Study: 0     COMMUNICATION/EDUCATION:   [x]  Aspiration risks/precautions; compensatory swallow techniques  [x]  Patient/family have participated as able in goal setting and plan of care. []  Patient/family agree to work toward stated goals and plan of care. []  Patient understands intent and goals of therapy, but is neutral about his/her participation. []  Patient is unable to participate in goal setting and plan of care.     Thank you for this referral.  Arianna Snyder MS, CCC/SLP  MBS 13 minutes  Tx: 11 minutes

## 2018-04-27 NOTE — PROGRESS NOTES
Problem: Mobility Impaired (Adult and Pediatric)  Goal: *Acute Goals and Plan of Care (Insert Text)  Physical Therapy Goals  Initiated 4/26/2018 and to be accomplished within 7 day(s)  1. Patient will move from supine to sit and sit to supine  in bed with modified independence. 2.  Patient will transfer from bed to chair and chair to bed with supervision/set-up using the least restrictive device. 3.  Patient will perform sit to stand with supervision/set-up. 4.  Patient will ambulate with contact guard assist for 50 feet with the least restrictive device. 5.  Patient will ascend/descend 3 stairs with 1 handrail(s) with minimal assistance/contact guard assist.     PT attempt note:     Pt presents supine in bed and when therapist arrived in room and offered PT, pt asks Shanta Oh is that? \" Pt educated on standing and ambulating for rx and pt stated \"No, I'm not walking!\" several times.      Carlie Muhammad

## 2018-04-27 NOTE — PROGRESS NOTES
Chart reviewed. Lew placed under cysto guidance - false passage. Now late in afternoon. Leave lew till Monday - voiding trial on Monday. If discharged prior to Monday, f/u in office.      Discussed with Dr Virginia Fink MD

## 2018-04-27 NOTE — PROGRESS NOTES
Care Management Interventions  PCP Verified by CM: Yes  Mode of Transport at Discharge:  (family)  Transition of Care Consult (CM Consult): Discharge Planning  Physical Therapy Consult: Yes  Occupational Therapy Consult: Yes  Current Support Network: Relative's Home (lives with sister)  Confirm Follow Up Transport: Family  Plan discussed with Pt/Family/Caregiver: Yes  Freedom of Choice Offered: Yes  Discharge Location  Discharge Placement: Rehab hospital/unit acute    Spoke to Mary with [facility] who states that this pt has authorization to come to [facility] tomorrow am.     Notified Dr Anila Bhakta with [facility] and let him know that [facility] is requesting an early morning admission if possible. Called pt's sister Marimar Gomez [phone] to notify.

## 2018-04-27 NOTE — PROGRESS NOTES
ARU/IPR REFERRAL CONTACT NOTE  [id number] [place] [address] for Physical Rehabilitation    RE: Kimberly Ballard    Referral received to review this patient's case for admission to [id number] [place] [address] for Physical Rehabilitation. Current status reviewed.  When appropriate, will need PT/OT evaluation/treatment on this patient to complete the pre-admission evaluation. Patient also has chest tubes in place and remains on an amiodarone gtt. These will need to be discontinued to be considered for ARU. Will continue to follow and advise following review with the team.    Thank you for this referral.  Should you have any questions please do not hesitate to call. Sincerely,  Vernice Sandifer.  [address] [place] [address] for Physical Rehabilitation  [phone]

## 2018-04-28 LAB
ANION GAP SERPL CALC-SCNC: 6 MMOL/L (ref 3–18)
BASOPHILS # BLD: 0 K/UL (ref 0–0.06)
BASOPHILS NFR BLD: 0 % (ref 0–3)
BUN SERPL-MCNC: 16 MG/DL (ref 7–18)
BUN/CREAT SERPL: 12 (ref 12–20)
CALCIUM SERPL-MCNC: 8.9 MG/DL (ref 8.5–10.1)
CHLORIDE SERPL-SCNC: 105 MMOL/L (ref 100–108)
CO2 SERPL-SCNC: 27 MMOL/L (ref 21–32)
CREAT SERPL-MCNC: 1.35 MG/DL (ref 0.6–1.3)
DIFFERENTIAL METHOD BLD: ABNORMAL
EOSINOPHIL # BLD: 0.2 K/UL (ref 0–0.4)
EOSINOPHIL NFR BLD: 1 % (ref 0–5)
ERYTHROCYTE [DISTWIDTH] IN BLOOD BY AUTOMATED COUNT: 15.1 % (ref 11.6–14.5)
GLUCOSE BLD STRIP.AUTO-MCNC: 102 MG/DL (ref 70–110)
GLUCOSE SERPL-MCNC: 93 MG/DL (ref 74–99)
HCT VFR BLD AUTO: 31.1 % (ref 36–48)
HGB BLD-MCNC: 10.4 G/DL (ref 13–16)
LYMPHOCYTES # BLD: 1.5 K/UL (ref 0.8–3.5)
LYMPHOCYTES NFR BLD: 10 % (ref 20–51)
MCH RBC QN AUTO: 27.2 PG (ref 24–34)
MCHC RBC AUTO-ENTMCNC: 33.4 G/DL (ref 31–37)
MCV RBC AUTO: 81.4 FL (ref 74–97)
METAMYELOCYTES NFR BLD MANUAL: 2 %
MONOCYTES # BLD: 0.6 K/UL (ref 0–1)
MONOCYTES NFR BLD: 4 % (ref 2–9)
NEUTS BAND NFR BLD MANUAL: 3 % (ref 0–5)
NEUTS SEG # BLD: 12.5 K/UL (ref 1.8–8)
NEUTS SEG NFR BLD: 80 % (ref 42–75)
PLATELET # BLD AUTO: 429 K/UL (ref 135–420)
PLATELET COMMENTS,PCOM: ABNORMAL
PMV BLD AUTO: 9.7 FL (ref 9.2–11.8)
POTASSIUM SERPL-SCNC: 3.9 MMOL/L (ref 3.5–5.5)
RBC # BLD AUTO: 3.82 M/UL (ref 4.7–5.5)
RBC MORPH BLD: ABNORMAL
SODIUM SERPL-SCNC: 138 MMOL/L (ref 136–145)
WBC # BLD AUTO: 15.1 K/UL (ref 4.6–13.2)

## 2018-04-28 PROCEDURE — 36415 COLL VENOUS BLD VENIPUNCTURE: CPT

## 2018-04-28 PROCEDURE — 74011250637 HC RX REV CODE- 250/637: Performed by: STUDENT IN AN ORGANIZED HEALTH CARE EDUCATION/TRAINING PROGRAM

## 2018-04-28 PROCEDURE — 97535 SELF CARE MNGMENT TRAINING: CPT

## 2018-04-28 PROCEDURE — 74011250636 HC RX REV CODE- 250/636: Performed by: INTERNAL MEDICINE

## 2018-04-28 PROCEDURE — 65660000004 HC RM CVT STEPDOWN

## 2018-04-28 PROCEDURE — 82962 GLUCOSE BLOOD TEST: CPT

## 2018-04-28 PROCEDURE — 97116 GAIT TRAINING THERAPY: CPT

## 2018-04-28 PROCEDURE — 74011000258 HC RX REV CODE- 258: Performed by: INTERNAL MEDICINE

## 2018-04-28 PROCEDURE — 74011250637 HC RX REV CODE- 250/637: Performed by: INTERNAL MEDICINE

## 2018-04-28 PROCEDURE — 80048 BASIC METABOLIC PNL TOTAL CA: CPT

## 2018-04-28 PROCEDURE — 85025 COMPLETE CBC W/AUTO DIFF WBC: CPT

## 2018-04-28 RX ORDER — EZETIMIBE 10 MG/1
10 TABLET ORAL EVERY EVENING
Status: DISCONTINUED | OUTPATIENT
Start: 2018-04-28 | End: 2018-04-30 | Stop reason: HOSPADM

## 2018-04-28 RX ORDER — TAMSULOSIN HYDROCHLORIDE 0.4 MG/1
0.4 CAPSULE ORAL DAILY
Qty: 10 CAP | Refills: 0 | Status: SHIPPED | OUTPATIENT
Start: 2018-04-28 | End: 2020-12-04

## 2018-04-28 RX ORDER — AMOXICILLIN AND CLAVULANATE POTASSIUM 500; 125 MG/1; MG/1
1 TABLET, FILM COATED ORAL EVERY 12 HOURS
Qty: 4 TAB | Refills: 0 | Status: SHIPPED | OUTPATIENT
Start: 2018-04-28 | End: 2018-04-30

## 2018-04-28 RX ORDER — NALOXONE HYDROCHLORIDE 0.4 MG/ML
0.2 INJECTION, SOLUTION INTRAMUSCULAR; INTRAVENOUS; SUBCUTANEOUS
Qty: 20 ML | Refills: 0 | Status: ON HOLD | OUTPATIENT
Start: 2018-04-28 | End: 2018-05-15 | Stop reason: CLARIF

## 2018-04-28 RX ORDER — CIPROFLOXACIN 500 MG/1
500 TABLET ORAL 2 TIMES DAILY
Qty: 4 TAB | Refills: 0 | Status: SHIPPED | OUTPATIENT
Start: 2018-04-28 | End: 2018-04-30

## 2018-04-28 RX ORDER — FLUCONAZOLE 200 MG/1
400 TABLET ORAL DAILY
Qty: 10 TAB | Refills: 0 | Status: SHIPPED | OUTPATIENT
Start: 2018-04-28 | End: 2018-04-30

## 2018-04-28 RX ORDER — HYDROCODONE BITARTRATE AND ACETAMINOPHEN 5; 325 MG/1; MG/1
1 TABLET ORAL
Qty: 20 TAB | Refills: 0 | Status: ON HOLD | OUTPATIENT
Start: 2018-04-28 | End: 2018-05-15 | Stop reason: CLARIF

## 2018-04-28 RX ORDER — DOCUSATE SODIUM 100 MG/1
100 CAPSULE, LIQUID FILLED ORAL DAILY
Qty: 30 CAP | Refills: 0 | Status: SHIPPED | OUTPATIENT
Start: 2018-04-28 | End: 2018-07-27

## 2018-04-28 RX ADMIN — EZETIMIBE 10 MG: 10 TABLET ORAL at 19:26

## 2018-04-28 RX ADMIN — SODIUM CHLORIDE 3.38 G: 900 INJECTION, SOLUTION INTRAVENOUS at 12:03

## 2018-04-28 RX ADMIN — SODIUM CHLORIDE 3.38 G: 900 INJECTION, SOLUTION INTRAVENOUS at 19:26

## 2018-04-28 RX ADMIN — LABETALOL HYDROCHLORIDE 100 MG: 100 TABLET, FILM COATED ORAL at 21:19

## 2018-04-28 RX ADMIN — Medication 1 CAPSULE: at 09:10

## 2018-04-28 RX ADMIN — LABETALOL HYDROCHLORIDE 100 MG: 100 TABLET, FILM COATED ORAL at 09:11

## 2018-04-28 RX ADMIN — AMLODIPINE BESYLATE 10 MG: 10 TABLET ORAL at 09:11

## 2018-04-28 RX ADMIN — FLUCONAZOLE 400 MG: 100 TABLET ORAL at 09:12

## 2018-04-28 RX ADMIN — SODIUM CHLORIDE 3.38 G: 900 INJECTION, SOLUTION INTRAVENOUS at 05:13

## 2018-04-28 RX ADMIN — TAMSULOSIN HYDROCHLORIDE 0.4 MG: 0.4 CAPSULE ORAL at 09:11

## 2018-04-28 NOTE — PROGRESS NOTES
ARU/IPR REFERRAL CONTACT NOTE  [address] [place] [address] for Physical Rehabilitation          RE:  Robel Tilley     Thank you for the opportunity to review this patient's case for admission to [address] [place] [address] for Physical Rehabilitation. Based on our pre-admission screening patient meets criteria for admission to [facility] for Physical Rehabilitation. Medicare [employer] plan authorized patient to come to ARU on 4/28/18 or when medically stable Plan for admission tomorrow to room 184 at 10am if patient is medically stable. Will need d/c summary/med rec completed and report called to [phone] prior to patient's arrival.     Again, Thank you for this referral. Should you have any questions please do not hesitate to call. Sincerely,  Ashley Jeronimo  [address] [place] [address] Physical Rehabilitation  [phone]

## 2018-04-28 NOTE — PROGRESS NOTES
Problem: Self Care Deficits Care Plan (Adult)  Goal: *Acute Goals and Plan of Care (Insert Text)  Occupational Therapy Goals  Initiated 4/26/2018 within 7 day(s). 1.  Patient will perform lower body dressing with minimal assistance/contact guard assist.   2.  Patient will perform functional task for 5 minutes with supervision for balance. 3.  Patient will perform toilet transfers with supervision/set-up. 4.  Patient will participate in upper extremity therapeutic exercise/activities with supervision/set-up for 8 minutes to increase strength/endurance for ADLs. Outcome: Progressing Towards Goal  Occupational Therapy TREATMENT    Patient: Anshul Raymundo (69 y.o. male)  Date: 4/28/2018  Diagnosis: Ruptured appendicitis  Intra-abdominal abscess ([facility] [address]  Acute UTI  Nausea  Abdominal pain  ACUTE APPENDICITIS  Appendicitis with abscess Intra-abdominal abscess (HCC)  Procedure(s) (LRB):  APPENDECTOMY LAPAROSCOPIC CONVERTED TO OPEN (N/A) 8 Days Post-Op  Precautions: Fall  Chart, occupational therapy assessment, plan of care, and goals were reviewed. ASSESSMENT:  Pt required some encouragement to participate in OT. Pt was able to don socks seated in the chair w/min A. Pt reports he needs to be cleaned up, required Min A to maneuver to [facility] w/FWW, and CGA to complete his toileting hygiene in std. Following toileting pt maneuvered to the sink and completed std ADL grooming task w/o LOB and w/SBA for safety. Pt was able to don gown simulating robe w/CGA for safety for unsupported std. Pt returned to the chair at the end of the session, call bell left within reach. Progression toward goals:  [x]          Improving appropriately and progressing toward goals  []          Improving slowly and progressing toward goals  []          Not making progress toward goals and plan of care will be adjusted     PLAN:  Patient continues to benefit from skilled intervention to address the above impairments.   Continue treatment per established plan of care. Discharge Recommendations:  Rehab  Further Equipment Recommendations for Discharge:  bedside commode and shower chair      G-CODES:     Self Care  Current  CJ= 20-39%   Goal  CI= 1-19%. The severity rating is based on the Level of Assistance required for Functional Mobility and ADLs. SUBJECTIVE:   Patient stated I need to be cleaned up.     OBJECTIVE DATA SUMMARY:   Cognitive/Behavioral Status:  Neurologic State: Alert  Orientation Level: Oriented X4  Cognition: Follows commands  Safety/Judgement: Fall prevention    Functional Mobility and Transfers for ADLs:    Transfers:  Sit to Stand: Minimum assistance;Contact guard assistance (Min A from the chair, CGA from [facility])    Toilet Transfer : Contact guard assistance (w/FWW)      Balance:  Sitting: Intact  Standing: Impaired; With support  Standing - Static: Fair  Standing - Dynamic : Fair    ADL Intervention:   Grooming  Grooming Assistance: Stand-by assistance (std sinkside)  Washing Face: Stand-by assistance  Washing Hands: Stand-by assistance    Upper Body Dressing Assistance  Shirt simulation with hospital gown: Contact guard assistance (in std)    Lower Body Dressing Assistance  Socks: Minimum assistance (seated in the chair)    Toileting  Bowel Hygiene: Contact guard assistance    Pain:  Pt reports 0/10 pain or discomfort prior to treatment.    Pt reports 0/10 pain or discomfort post treatment. Activity Tolerance:    Fair    Please refer to the flowsheet for vital signs taken during this treatment.   After treatment:   [x]  Patient left in no apparent distress sitting up in chair  []  Patient left in no apparent distress in bed  [x]  Call bell left within reach  [x]  Nursing notified  []  Caregiver present  []  Bed alarm activated    Render Faywood, ROSE/L  Time Calculation: 25 mins

## 2018-04-28 NOTE — ROUTINE PROCESS
Patient had 3 rd loose watery stool in BSC of yellow in color, large amt. Patient C/O intermitted abd pain periodically.

## 2018-04-28 NOTE — ROUTINE PROCESS
Bedside and Verbal shift change report given to Raheel De La Torre RN (oncoming nurse) by Galileo Dupree RN (offgoing nurse).  Report included the following information SBAR, Intake/Output, MAR and Cardiac Rhythm SR.

## 2018-04-28 NOTE — ROUTINE PROCESS
Dr Holly was in to see patient. Patient was also inc of stool while sitting up in chair. Informed patient to have call the nurse to assist him up for the [facility]. He said\" I did'nt know\" Patient assist to [facility]. Benna Him Patient given inc and am care. mouth care also given Gown changed.

## 2018-04-28 NOTE — ROUTINE PROCESS
1930-Bedside and verbal report from [facility], [facility]. Pt resting in recliner, call bell within reach, pt contracted to not get up without assistance, urinal within reach. No c/o pain. 2030-Pt resting in recliner, NAD, call bell within reach, no c/o pain. 2130-Pt sleeping, NAD, call bell within reach, no c/o pain. 2230-Pt sleeping, NAD, call bell within reach, no c/o pain. 2330-Pt sleeping in recliner, NAD, alert and oriented times 3, call bell within reach. 0130-Pt sleeping in recliner, NAD, call bell within reach. 0330-Pt sleeping in recliner, NAD, call bell within reach, no c/o pain. 0530-Pt sleeping in recliner, NAD, call bell within reach, no c/o pain. 0700-Bedside and verbal report given to Elizabeth Edmonds RN.

## 2018-04-28 NOTE — ROUTINE PROCESS
Received report from night staff. Patient sitting up in chair , completed with eating breakfast. Patient without C/O pain. Dressing to abd dry and intact.

## 2018-04-28 NOTE — PROGRESS NOTES
[facility] 2 CV STEPDOWN  [address] [facility] [address]  [phone]  Colon and Rectal Surgery Progress Note      Patient: Yvrose Duval MRN: [id number]  SSN: [id number]    YOB: 1945  Age: 67 y.o. Sex: male      Admit Date: 4/19/2018    LOS: 8 days     Subjective: Tolerating nutritional supplements but has not had much PO. Moving bowels,+ flatus.      Objective:     Vitals:    04/27/18 1516 04/27/18 1930 04/28/18 0000 04/28/18 0400   BP: 134/85 115/80 114/71 123/73   Pulse: 70 74 64 95   Resp: 14 16 16 16   Temp: 97.7 °F (36.5 °C) 98.3 °F (36.8 °C) 97.9 °F (36.6 °C) 98.4 °F (36.9 °C)   SpO2: 97% 97% 97% 99%   Weight:    100.7 kg (222 lb)        Intake and Output:  Current Shift:    Last three shifts: 04/26 1901 - 04/28 0700  In: 720 [P.O.:720]  Out: 3452 [Urine:2500; Drains:55]    STEVEN - 45    Physical Exam:     abd soft, distended, non-tender    Lab/Data Review:    CMP:   Lab Results   Component Value Date/Time     04/28/2018 05:04 AM    K 3.9 04/28/2018 05:04 AM     04/28/2018 05:04 AM    CO2 27 04/28/2018 05:04 AM    AGAP 6 04/28/2018 05:04 AM    GLU 93 04/28/2018 05:04 AM    BUN 16 04/28/2018 05:04 AM    CREA 1.35 (H) 04/28/2018 05:04 AM    GFRAA >60 04/28/2018 05:04 AM    GFRNA 52 (L) 04/28/2018 05:04 AM    CA 8.9 04/28/2018 05:04 AM     CBC:   Lab Results   Component Value Date/Time    WBC 15.1 (H) 04/28/2018 05:04 AM    HGB 10.4 (L) 04/28/2018 05:04 AM    HCT 31.1 (L) 04/28/2018 05:04 AM     (H) 04/28/2018 05:04 AM        Assessment:     S/p appendectomy with perforation and abscess    Plan:     Continue abx per ID rec's  Continue solid diet  Continue STEVEN drainage  Recommend holding of on D/C until clearly tolerating solids and exam improved    Signed By: Mily Colunga MD        April 28, 2018

## 2018-04-28 NOTE — ROUTINE PROCESS
Informed patient on transfer update for today. Dr Gurpreet Norton will be in to see him today prior to D/C.

## 2018-04-28 NOTE — PROGRESS NOTES
Problem: Mobility Impaired (Adult and Pediatric)  Goal: *Acute Goals and Plan of Care (Insert Text)  Physical Therapy Goals  Initiated 4/26/2018 and to be accomplished within 7 day(s)  1. Patient will move from supine to sit and sit to supine  in bed with modified independence. 2.  Patient will transfer from bed to chair and chair to bed with supervision/set-up using the least restrictive device. 3.  Patient will perform sit to stand with supervision/set-up. 4.  Patient will ambulate with contact guard assist for 50 feet with the least restrictive device. 5.  Patient will ascend/descend 3 stairs with 1 handrail(s) with minimal assistance/contact guard assist.   Outcome: Progressing Towards Goal  physical Therapy TREATMENT    Patient: Radha Dickens (25 y.o. male)  Date: 4/28/2018  Diagnosis: Ruptured appendicitis  Intra-abdominal abscess (HCC)  Acute UTI  Nausea  Abdominal pain  ACUTE APPENDICITIS  Appendicitis with abscess Intra-abdominal abscess (HCC)  Procedure(s) (LRB):  APPENDECTOMY LAPAROSCOPIC CONVERTED TO OPEN (N/A) 8 Days Post-Op  Precautions: Fall   Chart, physical therapy assessment, plan of care and goals were reviewed. OBJECTIVE/ ASSESSMENT:  Pt found sitting in b/s chair willing to work with PT. Pt seen with OT to maximize safety of pt and staff. Pt ambulated to b/s commode, had no BM but is soiled from having BM in the chair. Pt performed leslie-care then ambulated to sink to wash hands before ambulating into hallway. Pt ambulated an increased distance with forward trunk lean, pt reports standing tall would increase his pain. Pt returned to b/s chair and was left with needs in reach. Pt is progressing well and has met goal #4 with RW. Pt's HR, BP and O2 satuaration are in normal range immediately post ambulation.      Education: transfers, gait  Progression toward goals:  [x]      Improving appropriately and progressing toward goals  []      Improving slowly and progressing toward goals  [] Not making progress toward goals and plan of care will be adjusted     PLAN:  Patient continues to benefit from skilled intervention to address the above impairments. Continue treatment per established plan of care. Discharge Recommendations:  Rehab  Further Equipment Recommendations for Discharge:  rolling walker     SUBJECTIVE:   Patient stated I can't go too far out there.     OBJECTIVE DATA SUMMARY:   Critical Behavior:  Neurologic State: Alert  Orientation Level: Oriented X4  Cognition: Follows commands  Safety/Judgement: Fall prevention  Functional Mobility Training:  Transfers:  Sit to Stand: Minimum assistance;Contact guard assistance (Min A from the chair, CGA from [facility])  Balance:  Sitting: Intact  Standing: Impaired; With support  Standing - Static: Fair  Standing - Dynamic : Fair    Pain:  Pre tx pain: 0  Post tx pain: 0  Pain Scale 1: Numeric (0 - 10)  Pain Intensity 1: 0  Activity Tolerance:   Fair  Please refer to the flowsheet for vital signs taken during this treatment. After treatment:   [x] Patient left in no apparent distress sitting up in chair  [] Patient left in no apparent distress in bed  [x] Call bell left within reach  [x] Nursing notified  [] Caregiver present  [] Bed alarm activated  [] SCDs applied  [] Ice applied      Westley Ro PTA   Time Calculation: 26 mins    Mobility [id number] Current  CJ= 20-39%. The severity rating is based on the Level of Assistance required for Functional Mobility and ADLs. Mobility   Goal  CI= 1-19%. The severity rating is based on the Level of Assistance required for Functional Mobility and ADLs.

## 2018-04-28 NOTE — PROGRESS NOTES
ARU/IPR REFERRAL CONTACT NOTE  [id number] [place] [address] for Physical Rehabilitation    RE:Loki Ocampoann     Thank you for the opportunity to review this patient's case for admission to [id number] [place] [address] for Physical Rehabilitation. Based on our pre-admission screening:     [x ] Our Team/Medical Director is following this case. Comments:Spoke with Rosio Lucero re: DC. They said that they cannot DC the patient until the surgeon agrees with DC. Dr. Al Snow plans to see the patient this afternoon. He is covering for Dr. Usha Delgado this weekend. Called the nurse Stan Pulido on CVT and Jose L in ARU and notified them of the delay of DC. Will not be transferring to ARU at 10am today. 120 Philippe Lucero said they would call me this afternoon with an update and possible admission to ARU if the surgeon agrees. Again, Thank you for this referral. Should you have any questions please do not hesitate to call. Sincerely,  Amador Patel. Ras Michelle, [address] [address]   Ras Michelle, RN  Admissions [facility] for Physical Rehabilitation  [phone]

## 2018-04-28 NOTE — ROUTINE PROCESS
Abdominal dressing changed. Midline abd incision with staples intact. Dressing had larg amt of serosanguious drainage with small amout of drainage in STEVEN drain. Dry sterile dressings césar,ied with 4x4 and ABD pads. Patient tolerated procedure of dressing changed.

## 2018-04-28 NOTE — ROUTINE PROCESS
Patient incontinent again of loose yellow stool in chair. Patient assist to [facility]. [facility] care given.

## 2018-04-28 NOTE — PROGRESS NOTES
Intern Progress Note  [facility]       Patient: Shauna Lopez MRN: [id number]  CSN: [id number]    YOB: 1945  Age: 67 y.o. Sex: male    DOA: 4/19/2018 LOS:  LOS: 8 days                    Subjective:     Acute events: Patient improving overall. Ate ~30% of his bkfast. Passing flatus and having BM. Accepted at [facility] rehab. Patient also ambulating without issues. Pain is more tolerable today    Review of Systems   Constitutional: Negative for chills and fever. Respiratory: Negative for shortness of breath. Cardiovascular: Negative for chest pain. Gastrointestinal: Negative for abdominal pain and nausea. Neurological: Negative for dizziness and headaches. Objective:      Patient Vitals for the past 24 hrs:   Temp Pulse Resp BP SpO2   04/28/18 1245 98.5 °F (36.9 °C) - - - -   04/28/18 0800 98.2 °F (36.8 °C) 71 13 119/80 99 %   04/28/18 0400 98.4 °F (36.9 °C) 95 16 123/73 99 %   04/28/18 0000 97.9 °F (36.6 °C) 64 16 114/71 97 %   04/27/18 1930 98.3 °F (36.8 °C) 74 16 115/80 97 %   04/27/18 1516 97.7 °F (36.5 °C) 70 14 134/85 97 %         Intake/Output Summary (Last 24 hours) at 04/28/18 1305  Last data filed at 04/28/18 1027   Gross per 24 hour   Intake              360 ml   Output              470 ml   Net             -110 ml         Physical Exam:   General:  Alert and Responsive and in moderate distress. CV:  RRR, no rubs gallops or murmurs. RESP:  Unlabored breathing. Lungs clear to auscultation. no wheeze, rales, or rhonchi. Noisy upper airway. Equal expansion bilaterally. ABD:  TTP in the general abdomen. Distended. Hypertympanic. Midline surgical dressing C/D/I. STEVEN drain draining serosang fluid   MS:  No joint deformity or instability. No atrophy. Neuro: axo2 (self and place)  Ext:  No edema.    Skin: dry skin  Uro/Genital: Chow cath in place and draining clear yellow urine      Lab/Data Reviewed:  BMP:   Lab Results   Component Value Date/Time     04/28/2018 05:04 AM    K 3.9 04/28/2018 05:04 AM     04/28/2018 05:04 AM    CO2 27 04/28/2018 05:04 AM    AGAP 6 04/28/2018 05:04 AM    GLU 93 04/28/2018 05:04 AM    BUN 16 04/28/2018 05:04 AM    CREA 1.35 (H) 04/28/2018 05:04 AM    GFRAA >60 04/28/2018 05:04 AM    GFRNA 52 (L) 04/28/2018 05:04 AM     CBC:   Lab Results   Component Value Date/Time    WBC 15.1 (H) 04/28/2018 05:04 AM    HGB 10.4 (L) 04/28/2018 05:04 AM    HCT 31.1 (L) 04/28/2018 05:04 AM     (H) 04/28/2018 05:04 AM        Scheduled Medications Reviewed:  Current Facility-Administered Medications   Medication Dose Route Frequency    fluconazole (DIFLUCAN) tablet 400 mg  400 mg Oral DAILY    lactobacillus sp. 50 billion cpu (BIO-K PLUS) capsule 1 Cap  1 Cap Oral DAILY    piperacillin-tazobactam (ZOSYN) 3.375 g in 0.9% sodium chloride (MBP/ADV) 100 mL MBP  3.375 g IntraVENous Q6H    amLODIPine (NORVASC) tablet 10 mg  10 mg Oral DAILY    labetalol (NORMODYNE) tablet 100 mg  100 mg Oral Q12H    lactated Ringers infusion  125 mL/hr IntraVENous CONTINUOUS    tamsulosin (FLOMAX) capsule 0.4 mg  0.4 mg Oral DAILY         Imaging, microbiology, and EKG/Telemetry:  All Micro Results     Procedure Component Value Units Date/Time    CULTURE, BLOOD [[id number]] Collected:  04/20/18 0015    Order Status:  Completed Specimen:  Whole Blood from Blood Updated:  04/26/18 0708     Special Requests: NO SPECIAL REQUESTS        Culture result: NO GROWTH 6 DAYS       CULTURE, BLOOD [[id number]] Collected:  04/20/18 0000    Order Status:  Completed Specimen:  Blood from Blood Updated:  04/26/18 0708     Special Requests: NO SPECIAL REQUESTS        Culture result: NO GROWTH 6 DAYS       CULTURE, SURGICAL WOUND Skylar Gunner STAIN [[id number]]  (Abnormal)  (Susceptibility) Collected:  04/20/18 0734    Order Status:  Completed Specimen:  Appendix Updated:  04/25/18 1411     Special Requests: ABSCESS        GRAM STAIN MODERATE WBC'S         FEW GRAM POSITIVE COCCI MODERATE GRAM POSITIVE RODS      FEW GRAM NEGATIVE RODS        Culture result: FEW PROTEUS MIRABILIS (A)         FEW ESCHERICHIA COLI (A)                 MODERATE LACTOBACILLUS SPECIES (A)    CULTURE, ANAEROBIC [[id number]]  (Abnormal) Collected:  04/20/18 0734    Order Status:  Completed Specimen:  Appendix Updated:  04/24/18 1528     Special Requests: ABSCESS        Culture result:         MODERATE BACTEROIDES THETAIOTAOMICRON BETA LACTAMASE POSITIVE (A)              MODERATE BACTEROIDES STERCORIS BETA LACTAMASE POSITIVE (A)    CULTURE, URINE [[id number]] Collected:  04/20/18 0102    Order Status:  Completed Specimen:  Clean catch Updated:  04/21/18 1000     Special Requests: NO SPECIAL REQUESTS        Culture result: NO GROWTH 1 DAY       CULTURE, BODY FLUID Emy Card STAIN [[id number]] Collected:  04/20/18 0745    Order Status:  Canceled Specimen:  Drainage           Date 04/27/18 0700 - 04/28/18 0659 04/28/18 0700 - 04/29/18 0659   Shift 3149-8941 2707-6402 24 Hour Total 7689-2858 4121-1403 24 Hour Total   I  N  T  A  K  E   P. O. 720 0 720 120  120      P. O. 720 0 720 120  120    Shift Total  (mL/kg) 720  (7.2) 0  (0) 720  (7.2) 120  (1.2)  120  (1.2)   O  U  T  P  U  T   Urine  (mL/kg/hr) 1250  (1) 200  (0.2) 1450  (0.6)         Urine 400  400         Urine Voided 250 200 450         Urine Occurrence(s) 1 x 0 x 1 x 1 x  1 x      Urine Output (mL) ([REMOVED] Urinary Catheter 04/20/18 [place]) 600  600       Emesis/NG output            Emesis Occurrence(s)  0 x 0 x       Drains 25 20 45         Output (ml) ([place] Drain 04/20/18 Right; Lower Abdomen) 25 20 45       Stool            Stool Occurrence(s) 2 x 1 x 3 x 1 x  1 x    Shift Total  (mL/kg) 1275  (12.7) 220  (2.2) 1495  (14.8)      NET -861 -220 -917 120  120   Weight (kg) 100.6 100.7 100.7 100.7 100.7 100.7         Assessment/Plan     72 y. o. male with PMH significant for CVA, HTN, HLD, CKD III, chronic diarrhea, BPH/OAB, admitted with ruptured appendix with abscess formation. S/P open appendectomy.      Perforated appendix w/ abscess POD7 Open Appendectomy: WBC still elevated at 17.1 but decreasing. Surgical cx shows P mirabilis, E coli, and lactobacillus. Pt is afebrile. Repeat CT A/P showed likely ileus.  - Surgery following, appreciate recs. - ID consulted. Appreciate recs. To transition to PO abx on transfer to Cipro and Augmentin. - Pain control and diet per surgery  - LR @ 75 ml/hr. Consider discontinuing IVF if PO intake increases  - PT/OT. OT recommends rehab  - Encourage incentive spirometer      AMS/Metabolic encephalopathy (resolved): likely metabolic and due to pain and anesthesia. Axo2.     Severe Protein Calorie Malnutrition. ASPEN Malnutrition Criteria  Acute Illness, Chronic Illness, or Social/Enviornmental: Acute illness  Energy Intake: Less than/equal to 50% est energy req for greater than/equal to 5 days  Weight Loss: Greater than 1-2% x 1 wk  ASPEN Malnutrition Score - Acute Illness: 12  - Nutrition following    Acute urinary retention/OAB/BPH: holding home tolteridine, dicyclomine as an outpatient. The Ucx 4/20 with NG1day. Had urinary retention following surgery so lew was placed by urology. Lew discontinued on 4/27. Has been voiding adequately.  - 0.4mg tamulosin QD      H/o CVA, HTN, HLD, Vitamin D/B12 deficiency: CVA in 2015 with residual mild L sided weakness and some aphasia. BP elevated w/ SBP in 160s  - Hold home losartan 200mg BID   - C/w Norvasc 10mg daily and labetalol 100mg BID  - Hold home lasix 20 mg every day  - Restart home ezetimibe 10mg QD   - Hold home vitamin B12. On Ensure supplement  - Fall precautions, aspiration precautions       Diet: Mechanical soft  DVT Prophylaxis: SCDs  Code Status: Full  Point of Contact: Shell Gambino [phone]      Disposition and anticipated LOS: Pending     Lucita Bautista  PGY-1 [address] [facility]  04/28/18 7:08 AM

## 2018-04-28 NOTE — ROUTINE PROCESS
Called and spoke with PFM resident and informed him that he had 2 large loose watery stool of yellow in color and was incontinence while sitting up  In the chair, twice.  MD states that after 4 total number of loose stool to order a c- diff test.

## 2018-04-29 LAB
ANION GAP SERPL CALC-SCNC: 7 MMOL/L (ref 3–18)
BASOPHILS # BLD: 0 K/UL (ref 0–0.1)
BASOPHILS NFR BLD: 0 % (ref 0–2)
BUN SERPL-MCNC: 15 MG/DL (ref 7–18)
BUN/CREAT SERPL: 12 (ref 12–20)
CALCIUM SERPL-MCNC: 9 MG/DL (ref 8.5–10.1)
CHLORIDE SERPL-SCNC: 106 MMOL/L (ref 100–108)
CO2 SERPL-SCNC: 27 MMOL/L (ref 21–32)
CREAT SERPL-MCNC: 1.3 MG/DL (ref 0.6–1.3)
DIFFERENTIAL METHOD BLD: ABNORMAL
EOSINOPHIL # BLD: 0.1 K/UL (ref 0–0.4)
EOSINOPHIL NFR BLD: 1 % (ref 0–5)
ERYTHROCYTE [DISTWIDTH] IN BLOOD BY AUTOMATED COUNT: 15 % (ref 11.6–14.5)
GLUCOSE BLD STRIP.AUTO-MCNC: 107 MG/DL (ref 70–110)
GLUCOSE BLD STRIP.AUTO-MCNC: 114 MG/DL (ref 70–110)
GLUCOSE BLD STRIP.AUTO-MCNC: 122 MG/DL (ref 70–110)
GLUCOSE BLD STRIP.AUTO-MCNC: 138 MG/DL (ref 70–110)
GLUCOSE SERPL-MCNC: 98 MG/DL (ref 74–99)
HCT VFR BLD AUTO: 30.4 % (ref 36–48)
HGB BLD-MCNC: 9.9 G/DL (ref 13–16)
LYMPHOCYTES # BLD: 1.8 K/UL (ref 0.9–3.6)
LYMPHOCYTES NFR BLD: 12 % (ref 21–52)
MCH RBC QN AUTO: 26.5 PG (ref 24–34)
MCHC RBC AUTO-ENTMCNC: 32.6 G/DL (ref 31–37)
MCV RBC AUTO: 81.3 FL (ref 74–97)
MONOCYTES # BLD: 0.8 K/UL (ref 0.05–1.2)
MONOCYTES NFR BLD: 6 % (ref 3–10)
NEUTS SEG # BLD: 12.1 K/UL (ref 1.8–8)
NEUTS SEG NFR BLD: 81 % (ref 40–73)
PLATELET # BLD AUTO: 417 K/UL (ref 135–420)
PMV BLD AUTO: 9.8 FL (ref 9.2–11.8)
POTASSIUM SERPL-SCNC: 4 MMOL/L (ref 3.5–5.5)
RBC # BLD AUTO: 3.74 M/UL (ref 4.7–5.5)
SODIUM SERPL-SCNC: 140 MMOL/L (ref 136–145)
WBC # BLD AUTO: 14.8 K/UL (ref 4.6–13.2)

## 2018-04-29 PROCEDURE — 36415 COLL VENOUS BLD VENIPUNCTURE: CPT | Performed by: FAMILY MEDICINE

## 2018-04-29 PROCEDURE — 74011000258 HC RX REV CODE- 258: Performed by: INTERNAL MEDICINE

## 2018-04-29 PROCEDURE — 74011250636 HC RX REV CODE- 250/636: Performed by: INTERNAL MEDICINE

## 2018-04-29 PROCEDURE — 74011250637 HC RX REV CODE- 250/637: Performed by: STUDENT IN AN ORGANIZED HEALTH CARE EDUCATION/TRAINING PROGRAM

## 2018-04-29 PROCEDURE — 77030018836 HC SOL IRR NACL ICUM -A

## 2018-04-29 PROCEDURE — 82962 GLUCOSE BLOOD TEST: CPT

## 2018-04-29 PROCEDURE — 65660000004 HC RM CVT STEPDOWN

## 2018-04-29 PROCEDURE — 85025 COMPLETE CBC W/AUTO DIFF WBC: CPT | Performed by: FAMILY MEDICINE

## 2018-04-29 PROCEDURE — 80048 BASIC METABOLIC PNL TOTAL CA: CPT | Performed by: FAMILY MEDICINE

## 2018-04-29 PROCEDURE — 74011250637 HC RX REV CODE- 250/637: Performed by: INTERNAL MEDICINE

## 2018-04-29 RX ADMIN — Medication 1 CAPSULE: at 09:03

## 2018-04-29 RX ADMIN — SODIUM CHLORIDE 3.38 G: 900 INJECTION, SOLUTION INTRAVENOUS at 18:02

## 2018-04-29 RX ADMIN — LABETALOL HYDROCHLORIDE 100 MG: 100 TABLET, FILM COATED ORAL at 20:44

## 2018-04-29 RX ADMIN — FLUCONAZOLE 400 MG: 100 TABLET ORAL at 09:03

## 2018-04-29 RX ADMIN — SODIUM CHLORIDE 3.38 G: 900 INJECTION, SOLUTION INTRAVENOUS at 02:27

## 2018-04-29 RX ADMIN — TAMSULOSIN HYDROCHLORIDE 0.4 MG: 0.4 CAPSULE ORAL at 09:04

## 2018-04-29 RX ADMIN — LABETALOL HYDROCHLORIDE 100 MG: 100 TABLET, FILM COATED ORAL at 09:03

## 2018-04-29 RX ADMIN — SODIUM CHLORIDE 3.38 G: 900 INJECTION, SOLUTION INTRAVENOUS at 12:33

## 2018-04-29 RX ADMIN — SODIUM CHLORIDE 3.38 G: 900 INJECTION, SOLUTION INTRAVENOUS at 08:00

## 2018-04-29 RX ADMIN — EZETIMIBE 10 MG: 10 TABLET ORAL at 17:57

## 2018-04-29 RX ADMIN — AMLODIPINE BESYLATE 10 MG: 10 TABLET ORAL at 09:02

## 2018-04-29 NOTE — PROGRESS NOTES
[facility] 2 CV STEPDOWN  [address] [place] [address]  [phone]  Colon and Rectal Surgery Progress Note      Patient: Anshul Raymundo MRN: [id number]  SSN: [id number]    YOB: 1945  Age: 67 y.o. Sex: male      Admit Date: 4/19/2018    LOS: 9 days     Subjective: Tolerating small amounts food. Feels bloated. Belching during interview. Yellow stools yesterday. Objective:     Vitals:    04/28/18 1509 04/28/18 1949 04/29/18 0000 04/29/18 0440   BP: 115/79 137/76 120/77 135/74   Pulse: 69 65 69 65   Resp: 16 16 16 16   Temp: 97 °F (36.1 °C) 97.9 °F (36.6 °C) 97.5 °F (36.4 °C) 98 °F (36.7 °C)   SpO2: 99% 96% 98% 97%   Weight:            Intake and Output:  Current Shift:    Last three shifts: 04/27 1901 - 04/29 0700  In: 4611 [P.O.:320;  I.V.:6975]  Out: 230 [Urine:200; Drains:30]    STEVEN - 45    Physical Exam:     abd soft, distended, non-tender  Wound appears healthy, staples intact    Lab/Data Review:    CMP:   Lab Results   Component Value Date/Time     04/29/2018 03:40 AM    K 4.0 04/29/2018 03:40 AM     04/29/2018 03:40 AM    CO2 27 04/29/2018 03:40 AM    AGAP 7 04/29/2018 03:40 AM    GLU 98 04/29/2018 03:40 AM    BUN 15 04/29/2018 03:40 AM    CREA 1.30 04/29/2018 03:40 AM    GFRAA >60 04/29/2018 03:40 AM    GFRNA 54 (L) 04/29/2018 03:40 AM    CA 9.0 04/29/2018 03:40 AM     CBC:   Lab Results   Component Value Date/Time    WBC 14.8 (H) 04/29/2018 03:40 AM    HGB 9.9 (L) 04/29/2018 03:40 AM    HCT 30.4 (L) 04/29/2018 03:40 AM     04/29/2018 03:40 AM        Assessment:     S/p appendectomy with perforation and abscess, ileus    Plan:     Continue abx per ID rec's  Continue solid diet  Continue STEVEN drainage  F/u C Diff  Recommend holding off on d/c given ileus  Dr. Chad Hassaniters to return tomorrow regarding drain removal and decision on readiness for rehab d/c        Signed By: Mariola Soria MD        April 29, 2018

## 2018-04-29 NOTE — PROGRESS NOTES
Bedside and Verbal shift change report given to [address] (oncoming nurse) by Rosamaria Avery (offgoing nurse). Report included the following information SBAR and Recent Results.

## 2018-04-29 NOTE — PROGRESS NOTES
Intern Progress Note  [facility]       Patient: Mary Rios MRN: [id number]  CSN: [id number]    YOB: 1945  Age: 67 y.o. Sex: male    DOA: 4/19/2018 LOS:  LOS: 9 days                    Subjective:     Acute events: Patient was sitting comfortably. Reports having abdominal pain that went away. Having BM and passing flatus. No acute events overnight. Review of Systems   Respiratory: Negative for shortness of breath. Cardiovascular: Negative for chest pain. Objective:      Patient Vitals for the past 24 hrs:   Temp Pulse Resp BP SpO2   04/29/18 0440 98 °F (36.7 °C) 65 16 135/74 97 %   04/29/18 0000 97.5 °F (36.4 °C) 69 16 120/77 98 %   04/28/18 1949 97.9 °F (36.6 °C) 65 16 137/76 96 %   04/28/18 1509 97 °F (36.1 °C) 69 16 115/79 99 %   04/28/18 1245 98.5 °F (36.9 °C) 66 14 111/69 -   04/28/18 0800 98.2 °F (36.8 °C) 71 13 119/80 99 %         Intake/Output Summary (Last 24 hours) at 04/29/18 0740  Last data filed at 04/28/18 1738   Gross per 24 hour   Intake             6695 ml   Output               10 ml   Net             6685 ml         Physical Exam:   General:  Alert and Responsive and in No acute distress. CV:  RRR, no rubs gallops or murmurs. RESP:  Unlabored breathing. Lungs clear to auscultation. no wheeze, rales, or rhonchi. Equal expansion bilaterally. ABD:  Soft, nontender, nondistended. Wound covered with dressing. MS:  No joint deformity or instability. No atrophy. Neuro:  axo2  Ext:  No edema. Skin: dry skin.       Lab/Data Reviewed:  BMP:   Lab Results   Component Value Date/Time     04/29/2018 03:40 AM    K 4.0 04/29/2018 03:40 AM     04/29/2018 03:40 AM    CO2 27 04/29/2018 03:40 AM    AGAP 7 04/29/2018 03:40 AM    GLU 98 04/29/2018 03:40 AM    BUN 15 04/29/2018 03:40 AM    CREA 1.30 04/29/2018 03:40 AM    GFRAA >60 04/29/2018 03:40 AM    GFRNA 54 (L) 04/29/2018 03:40 AM     CBC:   Lab Results   Component Value Date/Time WBC 14.8 (H) 04/29/2018 03:40 AM    HGB 9.9 (L) 04/29/2018 03:40 AM    HCT 30.4 (L) 04/29/2018 03:40 AM     04/29/2018 03:40 AM        Scheduled Medications Reviewed:  Current Facility-Administered Medications   Medication Dose Route Frequency    ezetimibe (ZETIA) tablet 10 mg  10 mg Oral QPM    fluconazole (DIFLUCAN) tablet 400 mg  400 mg Oral DAILY    lactobacillus sp. 50 billion cpu (BIO-K PLUS) capsule 1 Cap  1 Cap Oral DAILY    piperacillin-tazobactam (ZOSYN) 3.375 g in 0.9% sodium chloride (MBP/ADV) 100 mL MBP  3.375 g IntraVENous Q6H    amLODIPine (NORVASC) tablet 10 mg  10 mg Oral DAILY    labetalol (NORMODYNE) tablet 100 mg  100 mg Oral Q12H    lactated Ringers infusion  75 mL/hr IntraVENous CONTINUOUS    tamsulosin (FLOMAX) capsule 0.4 mg  0.4 mg Oral DAILY         Imaging, microbiology, and EKG/Telemetry:  n/a    Assessment/Plan     67 y. o. male with PMH significant for CVA, HTN, HLD, CKD III, chronic diarrhea, BPH/OAB, admitted with ruptured appendix with abscess formation. S/P open appendectomy.      Perforated appendix w/ abscess POD8 s/p Open Appendectomy: WBC trending down today. Surgical cx grew P mirabilis, E coli, and lactobacillus. Repeat CT A/P showed likely ileus.  - Surgery following, appreciate recs. - ID consulted. Appreciate recs. -continue zosyn  - Pain control and diet per surgery  - LR @ 75 ml/hr.   - PT/OT recommend rehab      Severe Protein Calorie Malnutrition: ASPEN Malnutrition Criteria. Acute Illness, Chronic Illness, or Social/Enviornmental: Acute illness. Energy Intake: Less than/equal to 50% est energy req for greater than/equal to 5 days. Weight Loss: Greater than 1-2% x 1 wk. - Nutrition following     Acute urinary retention/OAB/BPH: holding home tolteridine, dicyclomine as an outpatient. The Ucx 4/20 with NG1day. Had urinary retention following surgery so lew was placed by urology. Lew discontinued on 4/27.  Has been voiding adequately.  - 0.4mg tamulosin QD      H/o CVA, HTN, HLD, Vitamin D/B12 deficiency: CVA in 2015 with residual mild L sided weakness and some aphasia. BP has been controlled. - Hold home losartan 200mg BID   - C/w Norvasc 10mg daily and labetalol 100mg BID  - Hold home lasix 20 mg every day  - continue home ezetimibe 10mg QD   - Hold home vitamin B12.  On Ensure supplement  - Fall precautions, aspiration precautions       Diet: Mechanical soft,dysphagia  DVT Prophylaxis: SCDs  Code Status: Full  Point of Contact: Darryl Mandel [phone]      Disposition and anticipated LOS: Pending     Beryl Logan MD, PGY-1  Mahesh Salguero 10

## 2018-04-29 NOTE — PROGRESS NOTES
ARU/IPR REFERRAL CONTACT NOTE  [id number] [place] [address] for Physical Rehabilitation    RE: Rafi Valdez     Thank you for the opportunity to review this patient's case for admission to [id number] [place] [address] for Physical Rehabilitation. Based on our pre-admission screening:     [x ] Our Team/Medical Director is following this case. Comments:Per Dr Troy Bence note this am, patient is not medically stable to DC from the acute [facility] hospital at this time. We will continue to follow patients medical status as well as his therapy progress and advise re: admission  to the ARU unit if appropriate. Again, Thank you for this referral. Should you have any questions please do not hesitate to call. Sincerely,  Crystal Mariano. Reed New, [address] [address]   Reed New RN  Admissions [facility] for Physical Rehabilitation  [phone]

## 2018-04-30 ENCOUNTER — HOSPITAL ENCOUNTER (INPATIENT)
Age: 73
LOS: 15 days | Discharge: HOME | DRG: 949 | End: 2018-05-15
Attending: INTERNAL MEDICINE | Admitting: INTERNAL MEDICINE
Payer: MEDICARE

## 2018-04-30 VITALS
SYSTOLIC BLOOD PRESSURE: 126 MMHG | WEIGHT: 218.48 LBS | DIASTOLIC BLOOD PRESSURE: 78 MMHG | OXYGEN SATURATION: 95 % | HEART RATE: 69 BPM | RESPIRATION RATE: 20 BRPM | TEMPERATURE: 98 F | BODY MASS INDEX: 28.05 KG/M2

## 2018-04-30 DIAGNOSIS — I69.30 HISTORY OF STROKE WITH RESIDUAL DEFICIT: ICD-10-CM

## 2018-04-30 DIAGNOSIS — F32.A DEPRESSION, UNSPECIFIED DEPRESSION TYPE: ICD-10-CM

## 2018-04-30 DIAGNOSIS — Z90.49 STATUS POST APPENDECTOMY: Primary | ICD-10-CM

## 2018-04-30 DIAGNOSIS — D62 ACUTE BLOOD LOSS AS CAUSE OF POSTOPERATIVE ANEMIA: ICD-10-CM

## 2018-04-30 PROBLEM — I48.91 POSTOPERATIVE ATRIAL FIBRILLATION (HCC): Status: ACTIVE | Noted: 2018-04-21

## 2018-04-30 PROBLEM — R53.1 GENERALIZED WEAKNESS: Status: ACTIVE | Noted: 2018-04-20

## 2018-04-30 PROBLEM — K35.33 ACUTE APPENDICITIS WITH PERITONEAL ABSCESS: Status: ACTIVE | Noted: 2018-04-20

## 2018-04-30 PROBLEM — R33.8 POSTOPERATIVE URINARY RETENTION: Status: ACTIVE | Noted: 2018-04-22

## 2018-04-30 PROBLEM — R41.0 POSTOPERATIVE CONFUSION: Status: ACTIVE | Noted: 2018-04-22

## 2018-04-30 PROBLEM — Z74.09 IMPAIRED MOBILITY AND ADLS: Status: ACTIVE | Noted: 2018-04-20

## 2018-04-30 PROBLEM — N99.89 POSTOPERATIVE URINARY RETENTION: Status: ACTIVE | Noted: 2018-04-22

## 2018-04-30 PROBLEM — I97.89 POSTOPERATIVE ATRIAL FIBRILLATION (HCC): Status: ACTIVE | Noted: 2018-04-21

## 2018-04-30 PROBLEM — Z78.9 IMPAIRED MOBILITY AND ADLS: Status: ACTIVE | Noted: 2018-04-20

## 2018-04-30 LAB
ANION GAP SERPL CALC-SCNC: 5 MMOL/L (ref 3–18)
BASOPHILS # BLD: 0 K/UL (ref 0–0.06)
BASOPHILS NFR BLD: 0 % (ref 0–2)
BUN SERPL-MCNC: 16 MG/DL (ref 7–18)
BUN/CREAT SERPL: 12 (ref 12–20)
CALCIUM SERPL-MCNC: 9.1 MG/DL (ref 8.5–10.1)
CHLORIDE SERPL-SCNC: 106 MMOL/L (ref 100–108)
CO2 SERPL-SCNC: 28 MMOL/L (ref 21–32)
CREAT SERPL-MCNC: 1.35 MG/DL (ref 0.6–1.3)
DIFFERENTIAL METHOD BLD: ABNORMAL
EOSINOPHIL # BLD: 0.1 K/UL (ref 0–0.4)
EOSINOPHIL NFR BLD: 1 % (ref 0–5)
ERYTHROCYTE [DISTWIDTH] IN BLOOD BY AUTOMATED COUNT: 15.1 % (ref 11.6–14.5)
GLUCOSE SERPL-MCNC: 91 MG/DL (ref 74–99)
HCT VFR BLD AUTO: 29 % (ref 36–48)
HGB BLD-MCNC: 9.7 G/DL (ref 13–16)
LYMPHOCYTES # BLD: 2.1 K/UL (ref 0.9–3.6)
LYMPHOCYTES NFR BLD: 16 % (ref 21–52)
MCH RBC QN AUTO: 27.1 PG (ref 24–34)
MCHC RBC AUTO-ENTMCNC: 33.4 G/DL (ref 31–37)
MCV RBC AUTO: 81 FL (ref 74–97)
MONOCYTES # BLD: 0.9 K/UL (ref 0.05–1.2)
MONOCYTES NFR BLD: 7 % (ref 3–10)
NEUTS SEG # BLD: 9.4 K/UL (ref 1.8–8)
NEUTS SEG NFR BLD: 76 % (ref 40–73)
PLATELET # BLD AUTO: 401 K/UL (ref 135–420)
PMV BLD AUTO: 9.8 FL (ref 9.2–11.8)
POTASSIUM SERPL-SCNC: 4.2 MMOL/L (ref 3.5–5.5)
RBC # BLD AUTO: 3.58 M/UL (ref 4.7–5.5)
SODIUM SERPL-SCNC: 139 MMOL/L (ref 136–145)
WBC # BLD AUTO: 12.5 K/UL (ref 4.6–13.2)

## 2018-04-30 PROCEDURE — 74011000258 HC RX REV CODE- 258: Performed by: INTERNAL MEDICINE

## 2018-04-30 PROCEDURE — 77030010545

## 2018-04-30 PROCEDURE — 80048 BASIC METABOLIC PNL TOTAL CA: CPT | Performed by: FAMILY MEDICINE

## 2018-04-30 PROCEDURE — 85025 COMPLETE CBC W/AUTO DIFF WBC: CPT | Performed by: FAMILY MEDICINE

## 2018-04-30 PROCEDURE — 65310000000 HC RM PRIVATE REHAB

## 2018-04-30 PROCEDURE — 97116 GAIT TRAINING THERAPY: CPT

## 2018-04-30 PROCEDURE — 36415 COLL VENOUS BLD VENIPUNCTURE: CPT | Performed by: FAMILY MEDICINE

## 2018-04-30 PROCEDURE — 74011250637 HC RX REV CODE- 250/637: Performed by: STUDENT IN AN ORGANIZED HEALTH CARE EDUCATION/TRAINING PROGRAM

## 2018-04-30 PROCEDURE — 74011250637 HC RX REV CODE- 250/637: Performed by: INTERNAL MEDICINE

## 2018-04-30 PROCEDURE — 74011250636 HC RX REV CODE- 250/636: Performed by: INTERNAL MEDICINE

## 2018-04-30 PROCEDURE — 74011250636 HC RX REV CODE- 250/636: Performed by: STUDENT IN AN ORGANIZED HEALTH CARE EDUCATION/TRAINING PROGRAM

## 2018-04-30 PROCEDURE — 97530 THERAPEUTIC ACTIVITIES: CPT

## 2018-04-30 RX ORDER — TAMSULOSIN HYDROCHLORIDE 0.4 MG/1
0.4 CAPSULE ORAL DAILY
Status: DISCONTINUED | OUTPATIENT
Start: 2018-05-01 | End: 2018-05-15 | Stop reason: HOSPADM

## 2018-04-30 RX ORDER — GUAIFENESIN 100 MG/5ML
81 LIQUID (ML) ORAL
Status: DISCONTINUED | OUTPATIENT
Start: 2018-05-01 | End: 2018-05-15 | Stop reason: HOSPADM

## 2018-04-30 RX ORDER — EZETIMIBE 10 MG/1
10 TABLET ORAL DAILY
Status: DISCONTINUED | OUTPATIENT
Start: 2018-05-01 | End: 2018-05-02

## 2018-04-30 RX ORDER — HYDROCODONE BITARTRATE AND ACETAMINOPHEN 5; 325 MG/1; MG/1
1 TABLET ORAL
Status: DISCONTINUED | OUTPATIENT
Start: 2018-04-30 | End: 2018-05-15 | Stop reason: HOSPADM

## 2018-04-30 RX ORDER — AMLODIPINE BESYLATE 10 MG/1
10 TABLET ORAL DAILY
Status: DISCONTINUED | OUTPATIENT
Start: 2018-05-01 | End: 2018-05-03

## 2018-04-30 RX ORDER — BISACODYL 5 MG
10 TABLET, DELAYED RELEASE (ENTERIC COATED) ORAL
Status: DISCONTINUED | OUTPATIENT
Start: 2018-04-30 | End: 2018-05-15 | Stop reason: HOSPADM

## 2018-04-30 RX ORDER — UREA 10 %
2 LOTION (ML) TOPICAL 2 TIMES DAILY
Status: DISCONTINUED | OUTPATIENT
Start: 2018-04-30 | End: 2018-05-15 | Stop reason: HOSPADM

## 2018-04-30 RX ORDER — SIMETHICONE 80 MG
80 TABLET,CHEWABLE ORAL
Qty: 20 TAB | Refills: 0 | Status: ON HOLD | OUTPATIENT
Start: 2018-04-30 | End: 2018-05-15 | Stop reason: CLARIF

## 2018-04-30 RX ORDER — SIMETHICONE 80 MG
80 TABLET,CHEWABLE ORAL
Status: DISCONTINUED | OUTPATIENT
Start: 2018-04-30 | End: 2018-04-30 | Stop reason: HOSPADM

## 2018-04-30 RX ORDER — FLUCONAZOLE 200 MG/1
400 TABLET ORAL DAILY
Status: COMPLETED | OUTPATIENT
Start: 2018-05-01 | End: 2018-05-03

## 2018-04-30 RX ORDER — ACETAMINOPHEN 325 MG/1
650 TABLET ORAL
Status: DISCONTINUED | OUTPATIENT
Start: 2018-04-30 | End: 2018-05-15 | Stop reason: HOSPADM

## 2018-04-30 RX ORDER — LABETALOL 100 MG/1
100 TABLET, FILM COATED ORAL EVERY 12 HOURS
Status: DISCONTINUED | OUTPATIENT
Start: 2018-04-30 | End: 2018-05-01

## 2018-04-30 RX ORDER — LANOLIN ALCOHOL/MO/W.PET/CERES
1000 CREAM (GRAM) TOPICAL DAILY
Status: DISCONTINUED | OUTPATIENT
Start: 2018-05-01 | End: 2018-05-15 | Stop reason: HOSPADM

## 2018-04-30 RX ORDER — FLUCONAZOLE 200 MG/1
400 TABLET ORAL DAILY
Qty: 6 TAB | Refills: 0 | Status: ON HOLD | OUTPATIENT
Start: 2018-04-30 | End: 2018-05-15 | Stop reason: CLARIF

## 2018-04-30 RX ORDER — DOCUSATE SODIUM 100 MG/1
100 CAPSULE, LIQUID FILLED ORAL 2 TIMES DAILY
Status: DISCONTINUED | OUTPATIENT
Start: 2018-04-30 | End: 2018-05-15 | Stop reason: HOSPADM

## 2018-04-30 RX ADMIN — AMLODIPINE BESYLATE 10 MG: 10 TABLET ORAL at 10:30

## 2018-04-30 RX ADMIN — EZETIMIBE 10 MG: 10 TABLET ORAL at 19:06

## 2018-04-30 RX ADMIN — SODIUM CHLORIDE 3.38 G: 900 INJECTION, SOLUTION INTRAVENOUS at 00:14

## 2018-04-30 RX ADMIN — LABETALOL HYDROCHLORIDE 100 MG: 100 TABLET, FILM COATED ORAL at 23:36

## 2018-04-30 RX ADMIN — FLUCONAZOLE 400 MG: 100 TABLET ORAL at 10:30

## 2018-04-30 RX ADMIN — TAMSULOSIN HYDROCHLORIDE 0.4 MG: 0.4 CAPSULE ORAL at 10:30

## 2018-04-30 RX ADMIN — DOCUSATE SODIUM 100 MG: 100 CAPSULE, LIQUID FILLED ORAL at 22:22

## 2018-04-30 RX ADMIN — LACTOBACILLUS TAB 2 TABLET: TAB at 22:22

## 2018-04-30 RX ADMIN — SODIUM CHLORIDE, SODIUM LACTATE, POTASSIUM CHLORIDE, AND CALCIUM CHLORIDE 75 ML/HR: 600; 310; 30; 20 INJECTION, SOLUTION INTRAVENOUS at 00:14

## 2018-04-30 RX ADMIN — SODIUM CHLORIDE 3.38 G: 900 INJECTION, SOLUTION INTRAVENOUS at 16:26

## 2018-04-30 RX ADMIN — Medication 1 CAPSULE: at 10:30

## 2018-04-30 RX ADMIN — SODIUM CHLORIDE 3.38 G: 900 INJECTION, SOLUTION INTRAVENOUS at 10:30

## 2018-04-30 RX ADMIN — LABETALOL HYDROCHLORIDE 100 MG: 100 TABLET, FILM COATED ORAL at 10:30

## 2018-04-30 NOTE — PROGRESS NOTES
NUTRITION    Nutrition Screen      RECOMMENDATIONS / PLAN:     - Continue current nutrition interventions. - Continue RD inpatient monitoring and evaluation. NUTRITION INTERVENTIONS & DIAGNOSIS:     [x] Meals/snacks: modified composition  [x] Medical food supplement therapy: Ensure Enlive TID    Nutrition Diagnosis: Unintended weight loss related to decreased appetite, inadequate energy intake as evidenced by 8 lb, 4% weight loss x 2 weeks PTA. Inadequate energy intake related to abdominal pain, diet intolerance due to altered GI function as evidenced by poor meal intake x 9 days PTA, NPO x 5 days from admission, started on clears on LOS day 5 (4/25) and advanced to dysphagia diet day 7 (4/27). Patient meets criteria for Severe Protein Calorie Malnutrition as evidenced by:   ASPEN Malnutrition Criteria  Acute Illness, Chronic Illness, or Social/Enviornmental: Acute illness  Energy Intake: Less than/equal to 50% est energy req for greater than/equal to 5 days  Weight Loss: Greater than 1-2% x 1 wk  ASPEN Malnutrition Score - Acute Illness: 12  Acute Illness - Malnutrition Diagnosis: Severe malnutrition. ASSESSMENT:     4/30: Appetite and meal intake improved, tolerating diet and consuming most of recent meals. Noted plan for transfer to rehab.   4/27: Appetite and meal intake remain poor, little to no meal intake but consuming Ensure supplements. Advanced to dysphagia diet after MBS today. No c/o abdominal pain or nausea/vomiting.   4/26: Pt tolerating clears with poor appetite, minimal intake of meals but likes and is consuming Ensure supplements. Denies nausea/vomiting or abdominal pain today, frequent loose and large volume stools per RN (2 before lunch today). 4/25: S/p appendectomy and abscess drainage on 4/21/18. Some small bowel distension suggestive of postoperative ileus per CT 4/24. Pt NPO x 5 days and started on clear liquids with Ensure Enlive supplements today, no meal intake yet.  Pt c/o abdominal pain but improved today, denies nausea/vomiting and +BM today and multiple BMs yesterday. Not hungry. Average po intake adequate to meet patients estimated nutritional needs:   [x] Yes     [] No   [] Unable to determine at this time    Diet: DIET DYSPHAGIA [address] [facility] (NDD2)  DIET NUTRITIONAL SUPPLEMENTS All Meals; ENSURE ENLIVE      Food Allergies: NKFA  Current Appetite:   [x] Good     [] Fair     [] Poor     [] Other:  Appetite/meal intake prior to admission:   [] Good     [] Fair     [x] Poor x 9 days PTA per MD note, fair appetite prior to that reported by patient    [] Other:  Feeding Limitations:  [x] Swallowing difficulty: hx of stroke and dysphagia, followed by SLP in the past    [] Chewing difficulty    [] Other:  Current Meal Intake:   Patient Vitals for the past 100 hrs:   % Diet Eaten   04/30/18 1235 50 %   04/30/18 0837 50 %   04/28/18 1027 50 %   04/27/18 1720 25 %   04/27/18 1215 0 %   04/27/18 0903 0 %     BM: 4/30  Skin Integrity: abdominal surgical incision  Edema: trace UEs, 2+ LEs   Pertinent Medications: Reviewed    Recent Labs      04/30/18   0520  04/29/18   0340  04/28/18   0504   NA  139  140  138   K  4.2  4.0  3.9   CL  106  106  105   CO2  28  27  27   GLU  91  98  93   BUN  16  15  16   CREA  1.35*  1.30  1.35*   CA  9.1  9.0  8.9       Intake/Output Summary (Last 24 hours) at 04/30/18 1332  Last data filed at 04/30/18 1235   Gross per 24 hour   Intake             1490 ml   Output               25 ml   Net             1465 ml       Anthropometrics:  Ht Readings from Last 1 Encounters:   04/02/18 6' 2\" (1.88 m)     Last 3 Recorded Weights in this Encounter    04/27/18 0339 04/28/18 0400 04/30/18 0655   Weight: 100.6 kg (221 lb 11.2 oz) 100.7 kg (222 lb) 99.1 kg (218 lb 7.6 oz)     Body mass index is 28.05 kg/(m^2).           Weight History: patient reports recent 8 lb (4%) weight loss x 2 weeks PTA    Weight Metrics 4/30/2018 4/3/2018 3/29/2018 5/25/2016 9/8/2015 9/5/2015   Weight 218 lb 7.6 oz 226 lb 10.1 oz - 228 lb 235 lb 240 lb   BMI 28.05 kg/m2 29.1 kg/m2 - 31.81 kg/m2 32.79 kg/m2 33.49 kg/m2        Admitting Diagnosis: Ruptured appendicitis  Intra-abdominal abscess (HCC)  Acute UTI  Nausea  Abdominal pain  ACUTE APPENDICITIS  Appendicitis with abscess  Pertinent PMHx: HTN, CVA, CKD stage III, dyslipidemia    Education Needs:        [x] None identified  [] Identified - Not appropriate at this time  []  Identified and addressed - refer to education log  Learning Limitations:   [x] None identified  [] Identified  Cultural, Adventism & ethnic food preferences:  [x] None identified    [] Identified and addressed     ESTIMATED NUTRITION NEEDS:     Calories: 3790-9658 kcal (25-35 kcal/kg) based on  [] Actual BW      [x] SBW 77 kg  Protein: 62-92 gm (0.8-1.2 gm/kg) based on  [] Actual BW      [x] SBW   Fluid: 1 mL/kcal     MONITORING & EVALUATION:     Nutrition Goal(s):   1. Po intake of meals will meet >75% of patient estimated nutritional needs within the next 7 days.   Outcome:  [x] Met/Ongoing    []  Not Met/Progressing    [] New/Initial Goal      Monitoring:   [x] Food and beverage intake   [x] Diet order   [x] Nutrition-focused physical findings   [x] Treatment/therapy   [] Weight   [] Enteral nutrition intake        Previous Recommendations (for follow-up assessments only):     [x]   Implemented       []   Not Implemented (RD to address, discussed with MD 4/25/18)      [] No Longer Appropriate     [] No Recommendation Made     Discharge Planning: cardiac diet, consistency as tolerated per SLP  [x] Participated in care planning, discharge planning, & interdisciplinary rounds as appropriate      Dereck Scott, [address]    Pager: [phone]

## 2018-04-30 NOTE — PROGRESS NOTES
Infectious Disease Progress Note    Requested by: dr. Mati Gatica    Reason: perforated appendicitis, increasing wbc    Current abx Prior abx   Pip/tazo sinc 4/25 Cefepime, levofloxacin 4/20-4/21  Zosyn, vancomycin 4/21-4/23  Meropenem 4/23-4/26   levofloxacin  4/23-4/25     Lines: Lew since 4/20 placed by urology for retention    Assessment :    67 y.o. 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 Ct  Scan 4/20/18- 6x10x7 cm RLQ abdominal abscess. S/p laparoscopy, open appendectomy with drainage of appendiceal abscess on 4/21/18- found to have perforated appendix with walled off abscess. Intra op cx: e.coli, proteus, lactobacillus, bacteroides    Clinical presentation c/w perforated appendicitis with contained perforation, abdominal abscess. Patient has been appropriately managed with appendectomy, drainage of abscess on 4/20/18     Etiology of persistent leukocytosis with wbc:28k on 4/23 compared to 15k on 4/20, diffuse tenderness - unclear. ? Leukemoid reaction to recent surgery/ileus versus evolving antibiotic resistant bacterial infection. No evidence of undrained pocket of infection, anastomotic leak noted on ct scan 4/24    Acute renal failure: likely due to urinary retention, volume depletion due to poor po intake - s/p lew insertion by urologist on 4/20/18 - improved renal function    Clinically better. Tolerating po diet. Decreased abdominal pain. Recommendations:    1. D/c pip/tazo after today pm dose. continue fluconazole  po till 5/3/18  2. Advance diet as tolerated  3. Recommend removal of lew and voiding trial per urology recommendations  4. Abdominal drain removal per surgery  5.  Start probiotics      Advance Care planning: full code: discussed  with patient/surrogate decision maker:Melania Ruiz: [phone]    Above plan was discussed in details with patient, RN. Please call me if any further questions or concerns. Will continue to participate in the care of this patient. subjective:    Resolved abdominal pain. Patient denies headaches, visual disturbances, sore throat, runny nose, earaches, cp, sob, chills, cough, diarrhea, burning micturition, pain or weakness in extremities. He denies back pain/flank pain. home Medication List    Details   cyanocobalamin (VITAMIN B-12) 1,000 mcg tablet Take 1,000 mcg by mouth daily. furosemide (LASIX) 20 mg tablet Take  by mouth daily. ezetimibe (ZETIA) 10 mg tablet Take 10 mg by mouth daily. labetalol (NORMODYNE) 200 mg tablet Take 1 Tab by mouth every twelve (12) hours. Qty: 30 Tab, Refills: 0    Associated Diagnoses: Hypertensive heart and kidney disease without heart failure and with chronic kidney disease stage III      losartan (COZAAR) 100 mg tablet Take 1 Tab by mouth daily. Qty: 15 Tab, Refills: 0    Associated Diagnoses: Hypertensive heart and kidney disease without heart failure and with chronic kidney disease stage III      amLODIPine (NORVASC) 10 mg tablet Take 1 Tab by mouth daily.   Qty: 15 Tab, Refills: 0    Associated Diagnoses: Hypertensive heart and kidney disease without heart failure and with chronic kidney disease stage III             Current Facility-Administered Medications   Medication Dose Route Frequency    ezetimibe (ZETIA) tablet 10 mg  10 mg Oral QPM    fluconazole (DIFLUCAN) tablet 400 mg  400 mg Oral DAILY    lactobacillus sp. 50 billion cpu (BIO-K PLUS) capsule 1 Cap  1 Cap Oral DAILY    piperacillin-tazobactam (ZOSYN) 3.375 g in 0.9% sodium chloride (MBP/ADV) 100 mL MBP  3.375 g IntraVENous Q6H    amLODIPine (NORVASC) tablet 10 mg  10 mg Oral DAILY    labetalol (NORMODYNE) tablet 100 mg  100 mg Oral Q12H    HYDROcodone-acetaminophen (NORCO) 5-325 mg per tablet 1 Tab  1 Tab Oral Q4H PRN    acetaminophen (TYLENOL) tablet 650 mg  650 mg Oral Q4H PRN    lactated Ringers infusion  75 mL/hr IntraVENous CONTINUOUS    naloxone (NARCAN) injection 0.2 mg  0.2 mg IntraVENous ONCE PRN    tamsulosin (FLOMAX) capsule 0.4 mg  0.4 mg Oral DAILY       Allergies: Lipitor [atorvastatin]    Temp (24hrs), Av.1 °F (36.7 °C), Min:97.6 °F (36.4 °C), Max:98.8 °F (37.1 °C)    Visit Vitals    BP (!) 144/91 (BP 1 Location: Left arm, BP Patient Position: Sitting)    Pulse 77    Temp 98.2 °F (36.8 °C)    Resp 20    Wt 99.1 kg (218 lb 7.6 oz)    SpO2 95%    BMI 28.05 kg/m2       ROS: 12 point ROS obtained in details. Pertinent positives as mentioned in HPI,   otherwise negative    Physical Exam:    General: Well developed, well nourished male laying on the bed, appears more comfortable    General:   awake alert and oriented   HEENT:  Normocephalic, atraumatic, PERRL, EOMI, no scleral icterus or pallor; no conjunctival hemmohage;  nasal and oral mucous are moist and without evidence of lesions. Neck supple, no bruits. Lymph Nodes:   no cervical, axillary or inguinal adenopathy   Lungs:   non-labored, bilaterally clear to auscultation- no crackles wheezes rales or rhonchi   Heart:  RRR, s1 and s2; no  rubs or gallops, no edema, + pedal pulses   Abdomen:  soft, hypoactive bowel sounds, no hepatomegaly, no splenomegaly. Appropriate surgical scars for stated surgeries. resolved tenderness. No guarding. Decreased distension of abdomen. STEVEN drain in place with serosanguinous drainage   Genitourinary:  lew in place   Extremities:   no clubbing, cyanosis; no joint effusions or swelling; muscle mass appropriate for age   Neurologic:  No gross focal sensory abnormalities; 5/5 muscle strength to upper and lower extremities. Speech appropriate.  Cranial nerves intact                        Skin:  No rash or ulcers noted   Back:  no spinal or paraspinal muscle tenderness or rigidity, no CVA tenderness     Psychiatric: Appropriate mood and affect         Labs: Results:   Chemistry Recent Labs      04/30/18   0520  04/29/18   0340  04/28/18   0504   GLU  91  98  93   NA  139  140  138   K  4.2  4.0  3.9   CL  106  106  105   CO2  28  27  27   BUN  16  15  16   CREA  1.35*  1.30  1.35*   CA  9.1  9.0  8.9   AGAP  5  7  6   BUCR  12  12  12      CBC w/Diff Recent Labs      04/30/18   0520  04/29/18   0340  04/28/18   0504   WBC  12.5  14.8*  15.1*   RBC  3.58*  3.74*  3.82*   HGB  9.7*  9.9*  10.4*   HCT  29.0*  30.4*  31.1*   PLT  401  417  429*   GRANS  76*  81*  80*   LYMPH  16*  12*  10*   EOS  1  1  1      Microbiology No results for input(s): CULT in the last 72 hours.        RADIOLOGY:    All available imaging studies/reports in [facility] for this admission were reviewed    Dr. Margurite Soulier, Infectious Disease Specialist  [phone]  April 30, 2018  3:11 PM

## 2018-04-30 NOTE — PROGRESS NOTES
SLP Note:      Attempted dysphagia follow up; however, pt toileting and with RN. Will continue to follow per POC.       Yakelin Watson M.S., [id number] [facility]  Speech-Language Pathologist

## 2018-04-30 NOTE — PROGRESS NOTES
Pt being discharged to [facility] Acute Rehab. Educated patient re: discharge to rehab and follow-up appts to be done after d/c from rehab stay. Reviewed d/c packet with patient. Pt verbalized understanding and signed d/c acknowledgement. Pt's LUCILLE Scott to call report to rehab, and transport to rehab after 1930.

## 2018-04-30 NOTE — DISCHARGE INSTRUCTIONS
Appendectomy: What to Expect at Home  Your Recovery    Your doctor removed your appendix either by making many small cuts, called incisions, in your belly (laparoscopic surgery) or through open surgery. In open surgery, the doctor makes one large incision. The incisions leave scars that usually fade over time. After your surgery, it is normal to feel weak and tired for several days after you return home. Your belly may be swollen and may be painful. If you had laparoscopic surgery, you may have pain in your shoulder for about 24 hours. You may also feel sick to your stomach and have diarrhea, constipation, gas, or a headache. This usually goes away in a few days. Your recovery time depends on the type of surgery you had. If you had laparoscopic surgery, you will probably be able to return to work or a normal routine 1 to 3 weeks after surgery. If you had an open surgery, it may take 2 to 4 weeks. If your appendix ruptured, you may have a drain in your incision. Your body will work fine without an appendix. You will not have to make any changes in your diet or lifestyle. This care sheet gives you a general idea about how long it will take for you to recover. But each person recovers at a different pace. Follow the steps below to get better as quickly as possible. How can you care for yourself at home? Activity  ? · Rest when you feel tired. Getting enough sleep will help you recover. ? · Try to walk each day. Start by walking a little more than you did the day before. Bit by bit, increase the amount you walk. Walking boosts blood flow and helps prevent pneumonia and constipation. ? · For about 2 weeks, avoid lifting anything that would make you strain. This may include a child, heavy grocery bags and milk containers, a heavy briefcase or backpack, cat litter or dog food bags, or a vacuum .    ? · Avoid strenuous activities, such as bicycle riding, jogging, weight lifting, or aerobic exercise, until your doctor says it is okay. ? · You may be able to take showers (unless you have a drain near your incision) 24 to 48 hours after surgery. Pat the incision dry. Do not take a bath for the first 2 weeks, or until your doctor tells you it is okay. If you have a drain near your incision, follow your doctor's instructions. ? · You may drive when you are no longer taking pain medicine and can quickly move your foot from the gas pedal to the brake. You must also be able to sit comfortably for a long period of time, even if you do not plan on going far. You might get caught in traffic. ? · You will probably be able to go back to work in 1 to 3 weeks. If you had an open surgery, it may take 3 to 4 weeks. ? · Your doctor will tell you when you can have sex again. Diet  ? · You can eat your normal diet. If your stomach is upset, try bland, low-fat foods like plain rice, broiled chicken, toast, and yogurt. ? · Drink plenty of fluids (unless your doctor tells you not to). ? · You may notice that your bowel movements are not regular right after your surgery. This is common. Try to avoid constipation and straining with bowel movements. You may want to take a fiber supplement every day. If you have not had a bowel movement after a couple of days, ask your doctor about taking a mild laxative. Medicines  ? · Your doctor will tell you if and when you can restart your medicines. He or she will also give you instructions about taking any new medicines. ? · If you take blood thinners, such as warfarin (Coumadin), clopidogrel (Plavix), or aspirin, be sure to talk to your doctor. He or she will tell you if and when to start taking those medicines again. Make sure that you understand exactly what your doctor wants you to do. ? · If your appendix ruptured, you will need to take antibiotics. Take them as directed. Do not stop taking them just because you feel better. You need to take the full course of antibiotics. ? · Be safe with medicines. Take pain medicines exactly as directed. ¨ If the doctor gave you a prescription medicine for pain, take it as prescribed. ¨ If you are not taking a prescription pain medicine, take an over-the-counter medicine such as acetaminophen (Tylenol), ibuprofen (Advil, Motrin), or naproxen (Aleve). Read and follow all instructions on the label. ¨ Do not take two or more pain medicines at the same time unless the doctor told you to. Many pain medicines have acetaminophen, which is Tylenol. Too much Tylenol can be harmful. ? · If you think your pain medicine is making you sick to your stomach:  ¨ Take your medicine after meals (unless your doctor has told you not to). ¨ Ask your doctor for a different pain medicine. Incision care  ? · If you had an open surgery, you may have staples in your incision. The doctor will take these out in 7 to 10 days. ? · If you have strips of tape on the incision, leave the tape on for a week or until it falls off.   ? · You may wash the area with warm, soapy water 24 to 48 hours after your surgery, unless your doctor tells you not to. Pat the area dry. ? · Keep the area clean and dry. You may cover it with a gauze bandage if it weeps or rubs against clothing. Change the bandage every day. ? · If your appendix ruptured, you may have an incision with packing in it. Change the packing as often as your doctor tells you to. ¨ Packing changes may hurt at first. Taking pain medicine about half an hour before you change the dressing can help. ¨ If your dressing sticks to your wound, try soaking it with warm water for about 10 minutes before you remove it. You can do this in the shower or by placing a wet washcloth over the dressing. ¨ Remove the old packing and flush the incision with water. Gently pat the top area dry. ¨ The size of the incision determines how much gauze you need to put inside.  Fold the gauze over once, but do not wad it up so that it hurts. Put it in the wound carefully. You want to keep the sides of the wound from touching. A cotton swab may help you push the gauze in as needed. ¨ Put a gauze pad over the wound, and tape it down. ¨ You may notice greenish gray fluid seeping from your wound as you start to heal. This is normal. It is a sign that your wound is healing. Follow-up care is a key part of your treatment and safety. Be sure to make and go to all appointments, and call your doctor if you are having problems. It's also a good idea to know your test results and keep a list of the medicines you take. When should you call for help? Call 911 anytime you think you may need emergency care. For example, call if:  ? · You passed out (lost consciousness). ? · You are short of breath. Artist Hazy ? Call your doctor now or seek immediate medical care if:  ? · You are sick to your stomach and cannot drink fluids. ? · You cannot pass stools or gas. ? · You have pain that does not get better when you take your pain medicine. ? · You have signs of infection, such as:  ¨ Increased pain, swelling, warmth, or redness. ¨ Red streaks leading from the wound. ¨ Pus draining from the wound. ¨ A fever. ? · You have loose stitches, or your incision comes open. ? · Bright red blood has soaked through the bandage over your incision. ? · You have signs of a blood clot in your leg (called a deep vein thrombosis), such as:  ¨ Pain in your calf, back of knee, thigh, or groin. ¨ Redness and swelling in your leg or groin. ? Watch closely for any changes in your health, and be sure to contact your doctor if you have any problems. Where can you learn more? Go to http://aye-mari.info/. Enter N042 in the search box to learn more about \"Appendectomy: What to Expect at Home. \"  Current as of: May 12, 2017  Content Version: 11.4  © 1062-0630 [employer].  Care instructions adapted under license by [employer] (which disclaims liability or warranty for this information). If you have questions about a medical condition or this instruction, always ask your healthcare professional. [address] any warranty or liability for your use of this information. Learning About Appendectomy  What is an appendectomy? An appendectomy is surgery to take out the appendix. This organ is a small sac that is shaped like a finger. It's attached to your large intestine. Appendicitis happens when the appendix becomes infected and inflamed. An appendectomy is the main treatment for it. If surgery is delayed, the inflamed appendix may burst. A burst appendix can cause serious health problems. If your appendix has burst, you may need an emergency surgery to remove the burst appendix. How is this surgery done? Before surgery, you will get medicine to make you sleep. Appendectomy is usually done as a laparoscopic surgery. That means it is done with only small cuts. These cuts are called incisions. The doctor puts a lighted tube, or scope, and other surgical tools through the cuts in your belly. The doctor is able to see your organs with the scope. The doctor removes the appendix. The cuts heal quickly, and the scars usually fade over time. In some cases, the surgery is done through a single larger cut in the belly. This is called open surgery. What can you expect after surgery? Most people leave the hospital 1 to 3 days after surgery. Some even go home the same day. After you go home, it is normal to feel weak and tired for several days. Your belly may be swollen and may be painful. If you had laparoscopic surgery, you may have shoulder pain for about 24 hours. You may also feel sick to your stomach and have diarrhea, constipation, gas, or a headache. This usually goes away in a few days. Your recovery time depends on the type of surgery you had.  If you had laparoscopic surgery, you will probably be able to go back to work or your normal routine 1 to 3 weeks after surgery. If you had an open surgery, it may take 2 to 4 weeks. If your appendix burst, you may have a drain in your incision. Your body will work fine without an appendix. You will not have to make any changes in your diet or daily life. After surgery, be sure to follow your doctor's advice about problems to watch for. These may include fever, worse belly pain, or problems with your incision. Follow-up care is a key part of your treatment and safety. Be sure to make and go to all appointments, and call your doctor if you are having problems. It's also a good idea to know your test results and keep a list of the medicines you take. Where can you learn more? Go to http://aye-mari.info/. Enter J572 in the search box to learn more about \"Learning About Appendectomy. \"  Current as of: May 12, 2017  Content Version: 11.4  © 5791-3395 [employer]. Care instructions adapted under license by [employer] (which disclaims liability or warranty for this information). If you have questions about a medical condition or this instruction, always ask your healthcare professional. [address] any warranty or liability for your use of this information.

## 2018-04-30 NOTE — IP AVS SNAPSHOT
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 Brad Valenzuela on May 21, 2018 @ 11:00am. [address] [facility] [address] 
[phone] Caryn Pickett MD On 5/23/2018 Patient has an appointment with Surgeon Dr. Saint Schwab on May 23, 2018 @ 11:00am. 27 [facility] [address] [phone] [place] [facility]. On 5/16/2018 Arrive at 80 for an appointment with SPEECH THERAPY at 1000am 22 [address] [address] 
[phone] [place] [facility]. On 5/17/2018 430pm appointment with [address] [place] [address] 
[phone] [place] [facility]. On 5/21/2018 1000am appointment with PHYSICAL THERAPY [address] [address] 
[phone]  Odella Apley, MD On 5/24/2018 Patient has an appointment scheduled with Nephrology Dr. Alaniz. Ralfpito Lauren on May 24, 2017 @ 10:30am. [address] [facility] [address] 
[phone] Your Scheduled Appointments Wednesday May 16, 2018 10:00 AM EDT Hr Sp Eval with Skip Yanez, SLP  
[facility]) [address] [facility] [address]  
[phone] Thursday May 17, 2018  4:30 PM EDT Hr Ot Eval with Jason Toro, OTR/L  
[facility] PT [facility]) [address] [facility] [id number] [phone] Monday May 21, 2018 10:00 AM EDT  
pt eval with Jorge Stern, PT  
[facility] PT [facility]) [facility] [address]  
[phone] Wednesday May 23, 2018 11:00 AM EDT Follow Up with Ricky Shell MD  
MILLER [facility] ([address]) [address] [place] [address]  
[phone] Discharge Orders None A check ta indicates which time of day the medication should be taken. My Medications START taking these medications Instructions Each Dose to Equal  
 Morning Noon Evening Bedtime  
 ascorbic acid (vitamin C) 250 mg tablet Commonly known as:  VITAMIN C Start taking on:  5/16/2018 Your last dose was: Your next dose is: Take 1 Tab by mouth daily (with breakfast). 250 mg  
    
   
   
   
  
 co-enzyme Q-10 100 mg capsule Commonly known as:  CO Q-10 Start taking on:  5/16/2018 Your last dose was: Your next dose is: Take 1 Cap by mouth daily. 100 mg  
    
   
   
   
  
 escitalopram oxalate 10 mg tablet Commonly known as:  Whitney Moots Your last dose was: Your next dose is: Take 1 Tab by mouth every evening. Indications: Depression 10 mg  
    
   
   
   
  
 ferrous sulfate 325 mg (65 mg iron) tablet Start taking on:  5/16/2018 Your last dose was: Your next dose is: Take 1 Tab by mouth daily (with breakfast). 325 mg CONTINUE taking these medications Instructions Each Dose to Equal  
 Morning Noon Evening Bedtime  
 aspirin 81 mg chewable tablet Your last dose was: Your next dose is: Take 81 mg by mouth daily. 81 mg  
    
   
   
   
  
 cholecalciferol (VITAMIN D3) 5,000 unit Tab tablet Commonly known as:  VITAMIN D3 Your last dose was: Your next dose is: Take 5,000 Units by mouth daily. 5000 Units DETROL LA 4 mg ER capsule Generic drug:  tolterodine ER Your last dose was: Your next dose is: Take 4 mg by mouth daily. 4 mg  
    
   
   
   
  
 docusate sodium 100 mg capsule Commonly known as:  Ethyl Hoit Your last dose was: Your next dose is: Take 1 Cap by mouth daily for 90 days. 100 mg  
    
   
   
   
  
 ezetimibe 10 mg tablet Commonly known as:  Preston Story Your last dose was: Your next dose is: Take 10 mg by mouth daily. 10 mg  
    
   
   
   
  
 lactase 3,000 unit tablet Commonly known as:  Murl Carls Your last dose was: Your next dose is: Take 1 Tab by mouth as needed. 1 Tab  
    
   
   
   
  
 tamsulosin 0.4 mg capsule Commonly known as:  FLOMAX Your last dose was: Your next dose is: Take 1 Cap by mouth daily. 0.4 mg  
    
   
   
   
  
 VITAMIN B-12 1,000 mcg tablet Generic drug:  cyanocobalamin Your last dose was: Your next dose is: Take 1,000 mcg by mouth daily. 1000 mcg STOP taking these medications   
 amLODIPine 10 mg tablet Commonly known as:  Aureliano Hurst BENTYL 20 mg tablet Generic drug:  dicyclomine CORTAID 1 % topical cream  
Generic drug:  hydrocortisone  
   
  
 furosemide 20 mg tablet Commonly known as:  LASIX  
   
  
 hydrALAZINE 25 mg tablet Commonly known as:  APRESOLINE  
   
  
 labetalol 200 mg tablet Commonly known as:  NORMODYNE  
   
  
 losartan 100 mg tablet Commonly known as:  COZAAR  
   
  
 LOTRIMIN AF (CLOTRIMAZOLE) 1 % topical cream  
Generic drug:  clotrimazole Where to Get Your Medications These medications were sent to [address] [address] [address] [place] [address] Phone:  [phone]  
  ascorbic acid (vitamin C) 250 mg tablet  
 co-enzyme Q-10 100 mg capsule  
 escitalopram oxalate 10 mg tablet  
 ferrous sulfate 325 mg (65 mg iron) tablet Discharge Instructions OUTPATIENT INFUSION CENTER 
                                                           GENERAL DISCHARGE INSTRUCTIONS 1. If you have any signs or symptoms of infection, or you have recently been diagnosed and treated for an infection,  
2. Contact your doctor right away if you have signs of infection, such as fever, chills, sore throat, flu symptoms. 3. Contact your doctor right away if you have easy bruising or bleeding, unusually pale skin, or unusual weakness. 4.  Check with your physician before receiving a \"live\" vaccine during therapy due to a possible decrease in ability to fight infections. 5.  Avoid contact with individuals with known infections. 6.   Wash hands frequently. 7.  All medications can potentially cause side effects. Possible general side effects may include · Mild abdominal pain, fatigue or headache; 
·  Stuffy nose, sinus pain or mild skin rash; 
·  Mild joint pain or swelling, malaise/fatigue; ·  Mild hair loss. 6.  Signs/Symptoms of an allergic reaction may require immediate medical attention: 
  
·  Skin - Redness, itching, swelling, blistering, weeping, crusting, rash,   eruptions, or hives; 
·  Lungs - Wheezing, cough, or shortness of breath; 
·  Cardiac - changes in blood pressure, swelling of extremities,   unexplained weakness, chest pain or tightness; ·  Swelling of the face, eyelids, lips, tongue, or throat;  severe headache; 
·  Stuffy nose, runny nose (clear, thin discharge), sneezing;  
·  Red (bloodshot), itchy, swollen, or watery eyes; ·  Stomach  pain, nausea, vomiting, or bloody diarrhea. Contact your physician's office with questions or concerns or if you experience any of the above symptoms. Ashly Gomez, Char                                                                           5/15/2018 Antony Huang RN 
  
 
 
------------------------------------------------------------------------------------------------------------ 
 
DISCHARGE INSTRUCTIONS 1. Make sure that when you request refills at the pharmacy that the refill requests are sent to your PCP (NOT to the prescriber at the [facility]) to avoid any delays in getting your medication refills. The physician at the [facility] [address] will not able to order medications or refills after discharge -- Please understand that though we would like to help, it is simply not safe for our physician to order you a medication that we cannot monitor. 2. If any of the prescribed medications require a prior authorization, contact your Primary Care Physician or specialist to EITHER complete prior authorizations and paperwork on your behalf OR prescribe an alternative medication. -- Please understand that though we would like to help, it is simply not safe for our physician to order you a medication that we cannot monitor. 3. Over-the-counter (OTC) medications will NOT be prescribed. You need to buy these medications over-the-counter. ------------------------------------------------------------------------------------------------------------------- Patient armband removed and shredded MyCharHuan Xiong Activation Thank you for requesting access to [employer]. Please follow the instructions below to securely access and download your online medical record. [employer] allows you to send messages to your doctor, view your test results, renew your prescriptions, schedule appointments, and more. How Do I Sign Up? 1. In your internet browser, go to [email or web address] 
2. Click on the First Time User? Click Here link in the Sign In box. You will be redirect to the New Member Sign Up page. 3. Enter your [employer] Access Code exactly as it appears below. You will not need to use this code after youve completed the sign-up process. If you do not sign up before the expiration date, you must request a new code. [employer] Access Code: [email or web address] Expires: 2018 11:32 AM (This is the date your [employer] access code will ) 4. Enter the last four digits of your Social Security Number (xxxx) and Date of Birth (mm/dd/yyyy) as indicated and click Submit. You will be taken to the next sign-up page. 5. Create a [employer] ID. This will be your [employer] login ID and cannot be changed, so think of one that is secure and easy to remember. 6. Create a [employer] password. You can change your password at any time. 7. Enter your Password Reset Question and Answer. This can be used at a later time if you forget your password. 8. Enter your e-mail address. You will receive e-mail notification when new information is available in [address] 9. Click Sign Up. You can now view and download portions of your medical record. 10. Click the Download Summary menu link to download a portable copy of your medical information. Additional Information If you have questions, please visit the Frequently Asked Questions section of the NO[employer]OT website at [email or web address] Remember, [employer] is NOT to be used for urgent needs. For medical emergencies, dial 911. NO[employer]OT Announcement We are excited to announce that we are making your provider's discharge notes available to you in NOBOT. You will see these notes when they are completed and signed by the physician that discharged you from your recent hospital stay. If you have any questions or concerns about any information you see in NOBOT, please call the Health Information Department where you were seen or reach out to your Primary Care Provider for more information about your plan of care. Introducing [facility]! [facility] introduces NOBOT patient portal. Now you can access parts of your medical record, email your doctor's office, and request medication refills online. 1. In your internet browser, go to [email or web address] 2. Click on the First Time User? Click Here link in the Sign In box. You will see the New Member Sign Up page. 3. Enter your NOBOT Access Code exactly as it appears below. You will not need to use this code after youve completed the sign-up process. If you do not sign up before the expiration date, you must request a new code. · NO[employer]OT Access Code: [email or web address] Expires: 6/6/2018 11:32 AM 
 
4. Enter the last four digits of your Social Security Number (xxxx) and Date of Birth (mm/dd/yyyy) as indicated and click Submit. You will be taken to the next sign-up page. 5. Create a NOBOT ID. This will be your NOBOT login ID and cannot be changed, so think of one that is secure and easy to remember. 6. Create a NOBOT password. You can change your password at any time. 7. Enter your Password Reset Question and Answer. This can be used at a later time if you forget your password. 8. Enter your e-mail address. You will receive e-mail notification when new information is available in [address] 9. Click Sign Up. You can now view and download portions of your medical record. 10. Click the Download Summary menu link to download a portable copy of your medical information. If you have questions, please visit the Frequently Asked Questions section of the Xo[employer]art website. Remember, [employer] is NOT to be used for urgent needs. For medical emergencies, dial 911. Now available from your iPhone and Android! Introducing Bradly Perez As a [employer] patient, I wanted to make you aware of our electronic visit tool called Bradly Perez. [employer] 24/7 allows you to connect within minutes with a medical provider 24 hours a day, seven days a week via a mobile device or tablet or logging into a secure website from your computer. You can access Bradly Perez from anywhere in the [place]. A virtual visit might be right for you when you have a simple condition and feel like you just dont want to get out of bed, or cant get away from work for an appointment, when your regular [employer] provider is not available (evenings, weekends or holidays), or when youre out of town and need minor care. Electronic visits cost only $49 and if the [employer] 24/7 provider determines a prescription is needed to treat your condition, one can be electronically transmitted to a nearby pharmacy*. Please take a moment to enroll today if you have not already done so. The enrollment process is free and takes just a few minutes. To enroll, please download the [employer] 24/7 césar to your tablet or phone, or visit [email or web address] to enroll on your computer.    
And, as an [address] [place] [address] patient with a DocTree account, the results of your visits will be scanned into your electronic medical record and your primary care provider will be able to view the scanned results. We urge you to continue to see your regular [facility] provider for your ongoing medical care. And while your primary care provider may not be the one available when you seek a Bradly Escamillafin virtual visit, the peace of mind you get from getting a real diagnosis real time can be priceless. For more information on GameSkinny, view our Frequently Asked Questions (FAQs) at [email or web address]. Sincerely, 
 
Yanci Field MD 
Chief Medical Officer [employer] *:  certain medications cannot be prescribed via Pixiadesmondfin Providers Seen During Your Hospitalization Provider Specialty Primary office phone Prateek Peres MD Internal Medicine [phone] Your Primary Care Physician (PCP) Primary Care Physician Office Phone Office Fax [address] [phone] You are allergic to the following Allergen Reactions Lipitor (Atorvastatin) Other (comments) Elevated CK level Pt has no awareness of this allergy. Simvastatin Other (comments) Elevation in LFTs Recent Documentation Height Weight BMI Smoking Status 1.88 m 99.1 kg 28.05 kg/m2 Current Every Day Smoker Emergency Contacts Name Discharge Info Relation Home Work Mobile quitchen 76 CAREGIVER [3] Sister [23] [phone] AlanEra(Niece)  Other Relative [6] [phone] [facility] CAREGIVER [3] Other Relative [6] [phone] Patient Belongings The following personal items are in your possession at time of discharge: 
  Dental Appliances: None  Visual Aid: None      Home Medications: None   Jewelry: None  Clothing: At bedside Please provide this summary of care documentation to your next provider.  
  
  
 
  
Signatures-by signing, you are acknowledging that this After Visit Summary has been reviewed with you and you have received a copy. Patient Signature:  ____________________________________________________________ Date:  ____________________________________________________________  
  
Orbie Salvador Provider Signature:  ____________________________________________________________ Date:  ____________________________________________________________

## 2018-04-30 NOTE — PROGRESS NOTES
Riaz Domínguez M.D. FACS  PROGRESS NOTE    Name: Ervin Salinas MRN: [id number]   : 1945 [facility]   Date: 2018 Admission Date: 2018 10:27 PM     Hospital Day: 12  10 Days Post-Op  Subjective:  Pt upright in chair. No c/o. Objective:  Vitals:    18 0000 18 0401 18 0655 18 0728   BP: 116/77 122/82  (!) 144/91   Pulse: 68 72  77   Resp: 18   20   Temp: 98.2 °F (36.8 °C) 97.6 °F (36.4 °C)  98.2 °F (36.8 °C)   SpO2: 97% 99%  95%   Weight:   99.1 kg (218 lb 7.6 oz)        Date 18 0700 - 18 0659 18 0700 - 18 0659   Shift 7854-4365 7602-1488 24 Hour Total 2405-9811 6468-1485 24 Hour Total   I  N  T  A  K  E   P. O.    600  600      P. O.    600  600    I.V.  (mL/kg/hr) 650  (0.5)  650  (0.3)         Volume (lactated Ringers infusion) 450  450         Volume (piperacillin-tazobactam (ZOSYN) 3.375 g in 0.9% sodium chloride (MBP/ADV) 100 mL MBP) 200  200       Shift Total  (mL/kg) 650  (6.5)  650  (6.6) 600  (6.1)  600  (6.1)   O  U  T  P  U  T   Urine  (mL/kg/hr)    0  0      Urine Voided    0  0      Urine Occurrence(s)  1 x 1 x 1 x  1 x    Emesis/NG output    0  0      Emesis    0  0      Emesis Occurrence(s)    0 x  0 x    Drains 15 10 25         Output (ml) ([place] Drain 18 Right; Lower Abdomen) 15 10 25       Other    0  0      Other Output    0  0    Stool    0  0      Stool Occurrence(s) 1 x  1 x 1 x  1 x      Stool    0  0    Blood    0  0      Estimated Blood Loss    0  0      Blood    0  0    Shift Total  (mL/kg) 15  (0.1) 10  (0.1) 25  (0.3) 0  (0)  0  (0)    -10 625 600  600   Weight (kg) 100.7 99.1 99.1 99.1 99.1 99.1         Physical Exam:    General: A&A, NAD   Abdomen: abdomen is soft with midline incisional tenderness. Incision(s) are C/D/I.   No masses, organomegaly or guarding     STEVEN with serosanguinous drainage  Labs:  Recent Results (from the past 24 hour(s))   GLUCOSE, POC Collection Time: 04/29/18 11:15 AM   Result Value Ref Range    Glucose (POC) 122 (H) 70 - 110 mg/dL   GLUCOSE, POC    Collection Time: 04/29/18  4:25 PM   Result Value Ref Range    Glucose (POC) 114 (H) 70 - 110 mg/dL   GLUCOSE, POC    Collection Time: 04/29/18  8:30 PM   Result Value Ref Range    Glucose (POC) 138 (H) 70 - 110 mg/dL   CBC WITH AUTOMATED DIFF    Collection Time: 04/30/18  5:20 AM   Result Value Ref Range    WBC 12.5 4.6 - 13.2 K/uL    RBC 3.58 (L) 4.70 - 5.50 M/uL    HGB 9.7 (L) 13.0 - 16.0 g/dL    HCT 29.0 (L) 36.0 - 48.0 %    MCV 81.0 74.0 - 97.0 FL    MCH 27.1 24.0 - 34.0 PG    MCHC 33.4 31.0 - 37.0 g/dL    RDW 15.1 (H) 11.6 - 14.5 %    PLATELET 902 921 - 011 K/uL    MPV 9.8 9.2 - 11.8 FL    NEUTROPHILS 76 (H) 40 - 73 %    LYMPHOCYTES 16 (L) 21 - 52 %    MONOCYTES 7 3 - 10 %    EOSINOPHILS 1 0 - 5 %    BASOPHILS 0 0 - 2 %    ABS. NEUTROPHILS 9.4 (H) 1.8 - 8.0 K/UL    ABS. LYMPHOCYTES 2.1 0.9 - 3.6 K/UL    ABS. MONOCYTES 0.9 0.05 - 1.2 K/UL    ABS. EOSINOPHILS 0.1 0.0 - 0.4 K/UL    ABS.  BASOPHILS 0.0 0.0 - 0.06 K/UL    DF AUTOMATED     METABOLIC PANEL, BASIC    Collection Time: 04/30/18  5:20 AM   Result Value Ref Range    Sodium 139 136 - 145 mmol/L    Potassium 4.2 3.5 - 5.5 mmol/L    Chloride 106 100 - 108 mmol/L    CO2 28 21 - 32 mmol/L    Anion gap 5 3.0 - 18 mmol/L    Glucose 91 74 - 99 mg/dL    BUN 16 7.0 - 18 MG/DL    Creatinine 1.35 (H) 0.6 - 1.3 MG/DL    BUN/Creatinine ratio 12 12 - 20      GFR est AA >60 >60 ml/min/1.73m2    GFR est non-AA 52 (L) >60 ml/min/1.73m2    Calcium 9.1 8.5 - 10.1 MG/DL     All Micro Results     Procedure Component Value Units Date/Time    C DIFFICILE, CYTOTOXIN B [[id number]]     Order Status:  Canceled Specimen:  Stool     CULTURE, BLOOD [[id number]] Collected:  04/20/18 0015    Order Status:  Completed Specimen:  Whole Blood from Blood Updated:  04/26/18 0708     Special Requests: NO SPECIAL REQUESTS        Culture result: NO GROWTH 6 DAYS       CULTURE, BLOOD [[id number]] Collected:  04/20/18 0000    Order Status:  Completed Specimen:  Blood from Blood Updated:  04/26/18 0708     Special Requests: NO SPECIAL REQUESTS        Culture result: NO GROWTH 6 DAYS       CULTURE, SURGICAL WOUND [place] STAIN [[id number]]  (Abnormal)  (Susceptibility) Collected:  04/20/18 0734    Order Status:  Completed Specimen:  Appendix Updated:  04/25/18 1411     Special Requests: ABSCESS        GRAM STAIN MODERATE WBC'S         FEW GRAM POSITIVE COCCI                 MODERATE GRAM POSITIVE RODS      FEW GRAM NEGATIVE RODS        Culture result: FEW PROTEUS MIRABILIS (A)         FEW ESCHERICHIA COLI (A)                 MODERATE LACTOBACILLUS SPECIES (A)    CULTURE, ANAEROBIC [[id number]]  (Abnormal) Collected:  04/20/18 0734    Order Status:  Completed Specimen:  Appendix Updated:  04/24/18 1528     Special Requests: ABSCESS        Culture result:         MODERATE BACTEROIDES THETAIOTAOMICRON BETA LACTAMASE POSITIVE (A)              MODERATE BACTEROIDES STERCORIS BETA LACTAMASE POSITIVE (A)    CULTURE, URINE [[id number]] Collected:  04/20/18 0102    Order Status:  Completed Specimen:  Clean catch Updated:  04/21/18 1000     Special Requests: NO SPECIAL REQUESTS        Culture result: NO GROWTH 1 DAY       CULTURE, BODY FLUID [place] STAIN [[id number]] Collected:  04/20/18 0745    Order Status:  Canceled Specimen:  Drainage           Current Medications:  Current Facility-Administered Medications   Medication Dose Route Frequency Provider Last Rate Last Dose    ezetimibe (ZETIA) tablet 10 mg  10 mg Oral QPM Valentino Holly MD   10 mg at 04/29/18 1757    fluconazole (DIFLUCAN) tablet 400 mg  400 mg Oral DAILY Gabi Kendall MD   400 mg at 04/29/18 3374    lactobacillus sp. 50 billion cpu (BIO-K PLUS) capsule 1 Cap  1 Cap Oral DAILY Gabi Kendall MD   1 Cap at 04/29/18 0903    piperacillin-tazobactam (ZOSYN) 3.375 g in 0.9% sodium chloride (MBP/ADV) 100 mL MBP  3.375 g IntraVENous Q6H Shannon OSMAN Lorene Good  mL/hr at 04/30/18 0014 3.375 g at 04/30/18 0014    amLODIPine (NORVASC) tablet 10 mg  10 mg Oral DAILY Valentino Holly MD   10 mg at 04/29/18 0902    labetalol (NORMODYNE) tablet 100 mg  100 mg Oral Q12H Valentino Holly MD   100 mg at 04/29/18 2044    HYDROcodone-acetaminophen (NORCO) 5-325 mg per tablet 1 Tab  1 Tab Oral Q4H PRN Jeet Aden MD   1 Tab at 04/25/18 1425    acetaminophen (TYLENOL) tablet 650 mg  650 mg Oral Q4H PRN Ford Kern MD   650 mg at 04/23/18 1657    lactated Ringers infusion  75 mL/hr IntraVENous CONTINUOUS Valentino Holly MD 75 mL/hr at 04/30/18 0014 75 mL/hr at 04/30/18 0014    naloxone [facility]) injection 0.2 mg  0.2 mg IntraVENous ONCE PRN Ford Kern MD        [facility] capsule 0.4 mg  0.4 mg Oral DAILY Ford Kern MD   0.4 mg at 04/29/18 0899       Chart and notes reviewed. Data reviewed. I have evaluated and examined the patient. IMPRESSION:   · Pt is POD 10 from lap - open appy with drainage of appendiceal abscess. PLAN:/DISCUSION:   · Ok to transfer to rehab  · I will remove the drain when the patient is back in a bed this evening or tomorrow morning.          Jeet Aden MD

## 2018-04-30 NOTE — PROGRESS NOTES
Transition of Care (MARY) Plan:  Pt has received authorization to transfer to ARU. Plan is for pt to transfer to room 184 at 1930. MARY Transportation:  Inhouse transport to unit      How is patient being transported at discharge? Hospital in house transportation      When? 04/30/2018 at 1     Is transport scheduled? Yes    Follow-up appointment and transportation:     PCP? Specialist?     Time/Date? Who is transporting to the follow-up appointment? Is transport for follow up appointment scheduled? Communication plan (with patient/family): Who is being called? Patient or Next of Kin? Responsible party? Patient and sister Radha Jauregui)      What number(s) is to be used? [phone]      What service provider is calling for [facility] services? ARU      When are they calling? Readmission Risk? (Green/Low; Yellow/Moderate; Red/High): Red/High      Call made to pt's sister Cristo Gupta to inform of transfer plans to ARU. Pt, physician and nurse are aware.

## 2018-04-30 NOTE — PROGRESS NOTES
Problem: Mobility Impaired (Adult and Pediatric)  Goal: *Acute Goals and Plan of Care (Insert Text)  Physical Therapy Goals  Initiated 4/26/2018 and to be accomplished within 7 day(s)  1. Patient will move from supine to sit and sit to supine  in bed with modified independence. 2.  Patient will transfer from bed to chair and chair to bed with supervision/set-up using the least restrictive device. 3.  Patient will perform sit to stand with supervision/set-up. 4.  Patient will ambulate with contact guard assist for 50 feet with the least restrictive device. 5.  Patient will ascend/descend 3 stairs with 1 handrail(s) with minimal assistance/contact guard assist.   physical Therapy TREATMENT    Patient: Ervin Salinas (63 y.o. male)  Date: 4/30/2018  Diagnosis: Ruptured appendicitis  Intra-abdominal abscess (HCC)  Acute UTI  Nausea  Abdominal pain  ACUTE APPENDICITIS  Appendicitis with abscess Intra-abdominal abscess (HCC)  Procedure(s) (LRB):  APPENDECTOMY LAPAROSCOPIC CONVERTED TO OPEN (N/A) 10 Days Post-Op  Precautions: Fall  Chart, physical therapy assessment, plan of care and goals were reviewed. ASSESSMENT:  Pt presents with impaired functional strength, balance and endurance. However, pt is progressing toward established physical therapy goals and appears more agreeable and cooperative with participation this session. Pt presented seated OOB with partially eaten lunch in front of him on bedside table. However, pt is agreeable to participate in gait training. After gait training, pt speaks with PT re: POC and d/c recommendations. Pt indicates strong desire to return home and notes that his sister is available for assistance. Pt requests that PT speak with sister on the phone and pt calls his sister and hands the phone to the PT. Based on conversation with pt's sister, it is unclear if she would be available to provide 24 hour supervision/assistance.   Recommended that pt and his sister communicate with each other re: safe d/c plans. Progression toward goals:  []      Improving appropriately and progressing toward goals  [x]      Improving slowly and progressing toward goals  []      Not making progress toward goals and plan of care will be adjusted     PLAN:  Patient continues to benefit from skilled intervention to address the above impairments. Continue treatment per established plan of care. Discharge Recommendations:  Home Health with 24 hour assistance if available versus Rehab for short stay  Further Equipment Recommendations for Discharge:  rolling walker      G-CODES:     Mobility  Current  CJ= 20-39%   Goal  CI= 1-19%. The severity rating is based on the Level of Assistance required for Functional Mobility and ADLs. SUBJECTIVE:   Patient stated I'm going home, when PT spoke with pt re: recommendations for continued therapy to address functional weakness. Pt was initially unclear re: available assistance at home initially identifying a part-time caregiver/aide as support but then noted that he lives with his sister who is \"always there. \"  Pt asks, \"how did I do? \" after gait trial.     OBJECTIVE DATA SUMMARY:   Critical Behavior:  Neurologic State: Alert  Orientation Level: Oriented X4  Cognition: Follows commands  Safety/Judgement: Fall prevention  Functional Mobility Training:  Bed Mobility: NT secondary to pt OOB for lunch at start of treatment session. Transfers:  Sit to Stand: Minimum assistance - pt requires minimal assistance for balance and anterior weight shift with pt utilizing B UEs to push up from chair. Stand to Sit: Minimum assistance - pt requires minimal assistance for balance and safety for slow controlled descent with stand to sit.   Balance:  Sitting: Intact  Standing: Impaired  Standing - Static: Fair  Standing - Dynamic : Fair  Ambulation/Gait Training:  Distance (ft): 30 Feet (ft)  Assistive Device: Walker, rolling  Ambulation - Level of Assistance: Minimal assistance     Gait Description (WDL): Exceptions to WDL  Gait Abnormalities: Decreased step clearance  Step Length: Left shortened;Right shortened      Pain:  Pt denies pain or discomfort. Activity Tolerance:   Fair    Please refer to the flowsheet for vital signs taken during this treatment. After treatment:   [x] Patient left in no apparent distress sitting up in chair  [x] Patient left in no apparent distress in bed  [] Call bell left within reach  [] Nursing notified  [x] Sitter present in room.   Pt's physician's entered room at conclusion of PT as well.  [] Bed alarm activated      Jono Alberto PT, DPT   Time Calculation: 30 mins

## 2018-04-30 NOTE — PROGRESS NOTES
Pt alert and oriented x3 with intermittent periods of disorientation. Pt noted ambulating to bedside commode, very unsteady on feet, nearly dislodging lines. Pt stated he forgot to use call bell, \" forget sometimes\"-Sitter provided.

## 2018-04-30 NOTE — PROGRESS NOTES
Intern Progress Note  [facility]       Patient: Phan Vides MRN: [id number]  CSN: [id number]    YOB: 1945  Age: 67 y.o. Sex: male    DOA: 4/19/2018 LOS:  LOS: 10 days                    Subjective:     Acute events: Patient was sitting comfortably. Complaining of bloating. Passing small amounts of flatus and having BM. Ambulating and voiding on his own. STEVEN drain still in place and draining 25mL of serosang fluid in 24hrs. Patient had urinary incontinence today and condom cath was placed. Review of Systems   Constitutional: Negative for chills and fever. Respiratory: Negative for shortness of breath. Cardiovascular: Negative for chest pain. Gastrointestinal: Negative for abdominal pain and vomiting. Neurological: Negative for headaches. Objective:      Patient Vitals for the past 24 hrs:   Temp Pulse Resp BP SpO2   04/30/18 0728 98.2 °F (36.8 °C) 77 20 (!) 144/91 95 %   04/30/18 0401 97.6 °F (36.4 °C) 72 20 122/82 99 %   04/30/18 0000 98.2 °F (36.8 °C) 68 18 116/77 97 %   04/29/18 2000 97.9 °F (36.6 °C) 73 18 120/79 96 %   04/29/18 1618 98.8 °F (37.1 °C) 69 20 112/76 97 %   04/29/18 1120 97.7 °F (36.5 °C) 69 20 121/78 97 %         Intake/Output Summary (Last 24 hours) at 04/30/18 0924  Last data filed at 04/30/18 0837   Gross per 24 hour   Intake              650 ml   Output               25 ml   Net              625 ml         Physical Exam:   General:  Alert and Responsive and in No acute distress. CV:  RRR, no rubs gallops or murmurs. RESP:  Unlabored breathing. Lungs clear to auscultation. no wheeze, rales, or rhonchi. Equal expansion bilaterally. ABD:  tender, distended. STEVEN drain in place and draining serosang fluid. Surgical dressing c/d/i  MS:  No joint deformity or instability. No atrophy. Neuro:  axo2  Ext:  No edema. Skin: dry skin.       Lab/Data Reviewed:  BMP:   Lab Results   Component Value Date/Time     04/30/2018 05:20 AM K 4.2 04/30/2018 05:20 AM     04/30/2018 05:20 AM    CO2 28 04/30/2018 05:20 AM    AGAP 5 04/30/2018 05:20 AM    GLU 91 04/30/2018 05:20 AM    BUN 16 04/30/2018 05:20 AM    CREA 1.35 (H) 04/30/2018 05:20 AM    GFRAA >60 04/30/2018 05:20 AM    GFRNA 52 (L) 04/30/2018 05:20 AM     CBC:   Lab Results   Component Value Date/Time    WBC 12.5 04/30/2018 05:20 AM    HGB 9.7 (L) 04/30/2018 05:20 AM    HCT 29.0 (L) 04/30/2018 05:20 AM     04/30/2018 05:20 AM        Scheduled Medications Reviewed:  Current Facility-Administered Medications   Medication Dose Route Frequency    ezetimibe (ZETIA) tablet 10 mg  10 mg Oral QPM    fluconazole (DIFLUCAN) tablet 400 mg  400 mg Oral DAILY    lactobacillus sp. 50 billion cpu (BIO-K PLUS) capsule 1 Cap  1 Cap Oral DAILY    piperacillin-tazobactam (ZOSYN) 3.375 g in 0.9% sodium chloride (MBP/ADV) 100 mL MBP  3.375 g IntraVENous Q6H    amLODIPine (NORVASC) tablet 10 mg  10 mg Oral DAILY    labetalol (NORMODYNE) tablet 100 mg  100 mg Oral Q12H    lactated Ringers infusion  75 mL/hr IntraVENous CONTINUOUS    tamsulosin (FLOMAX) capsule 0.4 mg  0.4 mg Oral DAILY         Imaging, microbiology, and EKG/Telemetry:  n/a    Assessment/Plan     67 y. o. male with PMH significant for CVA, HTN, HLD, CKD III, chronic diarrhea, BPH/OAB, admitted with ruptured appendix with abscess formation. S/P open appendectomy.      Perforated appendix w/ abscess POD8 s/p Open Appendectomy: WBC trending down today. Surgical cx grew P mirabilis, E coli, and lactobacillus. Repeat CT A/P showed likely ileus.  - Surgery following, appreciate recs. - ID consulted. Appreciate recs. DC zosyn tonight. C/w diflucan till 5/3  - Pain control and diet per surgery  - LR @ 75 ml/hr. Patient still not eating >50% of tray  - PT/OT recommend rehab       Severe Protein Calorie Malnutrition: ASPEN Malnutrition Criteria. Acute Illness, Chronic Illness, or Social/Enviornmental: Acute illness.  Energy Intake: Less than/equal to 50% est energy req for greater than/equal to 5 days. Weight Loss: Greater than 1-2% x 1 wk. - Nutrition following     Acute urinary retention/OAB/BPH: holding home tolteridine, dicyclomine as an outpatient. The Ucx 4/20 with NG1day. Had urinary retention following surgery so lew was placed by urology. Lew discontinued on 4/27. Has been voiding adequately.  - 0.4mg tamulosin every day  - Had urinary incontinence in AM. Currently on condom cath. - FU I&Os      H/o CVA, HTN, HLD, Vitamin D/B12 deficiency: CVA in 2015 with residual mild L sided weakness and some aphasia. BP has been controlled. - Hold home losartan 200mg BID   - C/w Norvasc 10mg daily and labetalol 100mg BID  - Hold home lasix 20 mg every day  - continue home ezetimibe 10mg QD   - Hold home vitamin B12. On Ensure supplement  - Fall precautions, aspiration precautions       Diet: Mechanical soft,dysphagia  DVT Prophylaxis: SCDs  Code Status: Full  Point of Contact: Golden Kam [phone]      Disposition and anticipated LOS: Pending. Will transfer to rehab when surgically cleared    Franca Zhang In [facility] [place]  PGY-1 [facility]  04/30/18 9:24 AM

## 2018-04-30 NOTE — PROGRESS NOTES
ARU/IPR REFERRAL CONTACT NOTE  [address] [place] [address] for Physical Rehabilitation          RE:  Nishant Walsh     Thank you for the opportunity to review this patient's case for admission to [address] [place] [address] for Physical Rehabilitation. Based on our pre-admission screening patient meets criteria for admission to [facility] for Physical Rehabilitation. Plan for admission today to room 184 at 7:30pm.  Will need d/c summary/med rec completed and report called to [phone] prior to patient's arrival.    Again, Thank you for this referral. Should you have any questions please do not hesitate to call. Sincerely,  Lalitha Castellon.  Rolando Terry [facility] for Physical Rehabilitation  [phone]

## 2018-04-30 NOTE — IP AVS SNAPSHOT
[address] [id number] [address] Patient: Destiny Sims MRN: [id number] MMN:[id number] A check ta indicates which time of day the medication should be taken. My Medications START taking these medications Instructions Each Dose to Equal  
 Morning Noon Evening Bedtime  
 ascorbic acid (vitamin C) 250 mg tablet Commonly known as:  VITAMIN C Start taking on:  5/16/2018 Your last dose was: Your next dose is: Take 1 Tab by mouth daily (with breakfast). 250 mg  
    
   
   
   
  
 co-enzyme Q-10 100 mg capsule Commonly known as:  CO Q-10 Start taking on:  5/16/2018 Your last dose was: Your next dose is: Take 1 Cap by mouth daily. 100 mg  
    
   
   
   
  
 escitalopram oxalate 10 mg tablet Commonly known as:  Julien Mccollum Your last dose was: Your next dose is: Take 1 Tab by mouth every evening. Indications: Depression 10 mg  
    
   
   
   
  
 ferrous sulfate 325 mg (65 mg iron) tablet Start taking on:  5/16/2018 Your last dose was: Your next dose is: Take 1 Tab by mouth daily (with breakfast). 325 mg CONTINUE taking these medications Instructions Each Dose to Equal  
 Morning Noon Evening Bedtime  
 aspirin 81 mg chewable tablet Your last dose was: Your next dose is: Take 81 mg by mouth daily. 81 mg  
    
   
   
   
  
 cholecalciferol (VITAMIN D3) 5,000 unit Tab tablet Commonly known as:  VITAMIN D3 Your last dose was: Your next dose is: Take 5,000 Units by mouth daily. 5000 Units DETROL LA 4 mg ER capsule Generic drug:  tolterodine ER Your last dose was: Your next dose is: Take 4 mg by mouth daily. 4 mg docusate sodium 100 mg capsule Commonly known as:  Doc Nani Your last dose was: Your next dose is: Take 1 Cap by mouth daily for 90 days. 100 mg  
    
   
   
   
  
 ezetimibe 10 mg tablet Commonly known as:  Karlenezen Elliset Your last dose was: Your next dose is: Take 10 mg by mouth daily. 10 mg  
    
   
   
   
  
 lactase 3,000 unit tablet Commonly known as:  Prescott Ee Your last dose was: Your next dose is: Take 1 Tab by mouth as needed. 1 Tab  
    
   
   
   
  
 tamsulosin 0.4 mg capsule Commonly known as:  FLOMAX Your last dose was: Your next dose is: Take 1 Cap by mouth daily. 0.4 mg  
    
   
   
   
  
 VITAMIN B-12 1,000 mcg tablet Generic drug:  cyanocobalamin Your last dose was: Your next dose is: Take 1,000 mcg by mouth daily. 1000 mcg STOP taking these medications   
 amLODIPine 10 mg tablet Commonly known as:  Albert Chafe BENTYL 20 mg tablet Generic drug:  dicyclomine CORTAID 1 % topical cream  
Generic drug:  hydrocortisone  
   
  
 furosemide 20 mg tablet Commonly known as:  LASIX  
   
  
 hydrALAZINE 25 mg tablet Commonly known as:  APRESOLINE  
   
  
 labetalol 200 mg tablet Commonly known as:  NORMODYNE  
   
  
 losartan 100 mg tablet Commonly known as:  COZAAR  
   
  
 LOTRIMIN AF (CLOTRIMAZOLE) 1 % topical cream  
Generic drug:  clotrimazole Where to Get Your Medications These medications were sent to [address] [place] [address] [facility] [address] [place] [address] Phone:  [phone]  
  ascorbic acid (vitamin C) 250 mg tablet  
 co-enzyme Q-10 100 mg capsule  
 escitalopram oxalate 10 mg tablet  
 ferrous sulfate 325 mg (65 mg iron) tablet

## 2018-04-30 NOTE — ROUTINE PROCESS
Bedside shift change report given to NIKOLAY ADKINS (oncoming nurse) by Marylee Spina (offgoing nurse). Report included the following information SBAR, ED Summary, Procedure Summary, Intake/Output, MAR, Recent Results, Med Rec Status and Cardiac Rhythm NSR.

## 2018-05-01 PROBLEM — E55.9 VITAMIN D DEFICIENCY: Chronic | Status: ACTIVE | Noted: 2018-05-01

## 2018-05-01 LAB
25(OH)D3 SERPL-MCNC: 17.9 NG/ML (ref 30–100)
ANION GAP SERPL CALC-SCNC: 6 MMOL/L (ref 3–18)
BASOPHILS # BLD: 0 K/UL (ref 0–0.1)
BASOPHILS NFR BLD: 0 % (ref 0–2)
BUN SERPL-MCNC: 13 MG/DL (ref 7–18)
BUN/CREAT SERPL: 10 (ref 12–20)
CALCIUM SERPL-MCNC: 9.4 MG/DL (ref 8.5–10.1)
CHLORIDE SERPL-SCNC: 106 MMOL/L (ref 100–108)
CO2 SERPL-SCNC: 27 MMOL/L (ref 21–32)
CREAT SERPL-MCNC: 1.27 MG/DL (ref 0.6–1.3)
DIFFERENTIAL METHOD BLD: ABNORMAL
EOSINOPHIL # BLD: 0.1 K/UL (ref 0–0.4)
EOSINOPHIL NFR BLD: 1 % (ref 0–5)
ERYTHROCYTE [DISTWIDTH] IN BLOOD BY AUTOMATED COUNT: 14.8 % (ref 11.6–14.5)
FERRITIN SERPL-MCNC: 356 NG/ML (ref 8–388)
FOLATE SERPL-MCNC: 10.1 NG/ML (ref 3.1–17.5)
GLUCOSE SERPL-MCNC: 85 MG/DL (ref 74–99)
HCT VFR BLD AUTO: 29.7 % (ref 36–48)
HGB BLD-MCNC: 9.7 G/DL (ref 13–16)
LYMPHOCYTES # BLD: 2 K/UL (ref 0.9–3.6)
LYMPHOCYTES NFR BLD: 15 % (ref 21–52)
MAGNESIUM SERPL-MCNC: 2.1 MG/DL (ref 1.6–2.6)
MCH RBC QN AUTO: 26.5 PG (ref 24–34)
MCHC RBC AUTO-ENTMCNC: 32.7 G/DL (ref 31–37)
MCV RBC AUTO: 81.1 FL (ref 74–97)
MONOCYTES # BLD: 1 K/UL (ref 0.05–1.2)
MONOCYTES NFR BLD: 8 % (ref 3–10)
NEUTS SEG # BLD: 9.9 K/UL (ref 1.8–8)
NEUTS SEG NFR BLD: 76 % (ref 40–73)
PLATELET # BLD AUTO: 443 K/UL (ref 135–420)
PMV BLD AUTO: 10 FL (ref 9.2–11.8)
POTASSIUM SERPL-SCNC: 4.2 MMOL/L (ref 3.5–5.5)
RBC # BLD AUTO: 3.66 M/UL (ref 4.7–5.5)
RETICS/RBC NFR AUTO: 1 % (ref 0.5–2.3)
SODIUM SERPL-SCNC: 139 MMOL/L (ref 136–145)
VIT B12 SERPL-MCNC: 572 PG/ML (ref 211–911)
WBC # BLD AUTO: 13 K/UL (ref 4.6–13.2)

## 2018-05-01 PROCEDURE — 82306 VITAMIN D 25 HYDROXY: CPT | Performed by: INTERNAL MEDICINE

## 2018-05-01 PROCEDURE — 97162 PT EVAL MOD COMPLEX 30 MIN: CPT

## 2018-05-01 PROCEDURE — 97542 WHEELCHAIR MNGMENT TRAINING: CPT

## 2018-05-01 PROCEDURE — 36415 COLL VENOUS BLD VENIPUNCTURE: CPT | Performed by: INTERNAL MEDICINE

## 2018-05-01 PROCEDURE — 92522 EVALUATE SPEECH PRODUCTION: CPT

## 2018-05-01 PROCEDURE — 97530 THERAPEUTIC ACTIVITIES: CPT

## 2018-05-01 PROCEDURE — 83540 ASSAY OF IRON: CPT | Performed by: INTERNAL MEDICINE

## 2018-05-01 PROCEDURE — 74011250637 HC RX REV CODE- 250/637: Performed by: INTERNAL MEDICINE

## 2018-05-01 PROCEDURE — 85025 COMPLETE CBC W/AUTO DIFF WBC: CPT | Performed by: INTERNAL MEDICINE

## 2018-05-01 PROCEDURE — 82607 VITAMIN B-12: CPT | Performed by: INTERNAL MEDICINE

## 2018-05-01 PROCEDURE — 96105 ASSESSMENT OF APHASIA: CPT

## 2018-05-01 PROCEDURE — 92610 EVALUATE SWALLOWING FUNCTION: CPT

## 2018-05-01 PROCEDURE — 96125 COGNITIVE TEST BY HC PRO: CPT

## 2018-05-01 PROCEDURE — 97116 GAIT TRAINING THERAPY: CPT

## 2018-05-01 PROCEDURE — 80048 BASIC METABOLIC PNL TOTAL CA: CPT | Performed by: INTERNAL MEDICINE

## 2018-05-01 PROCEDURE — 82728 ASSAY OF FERRITIN: CPT | Performed by: INTERNAL MEDICINE

## 2018-05-01 PROCEDURE — 83735 ASSAY OF MAGNESIUM: CPT | Performed by: INTERNAL MEDICINE

## 2018-05-01 PROCEDURE — 65310000000 HC RM PRIVATE REHAB

## 2018-05-01 PROCEDURE — 97535 SELF CARE MNGMENT TRAINING: CPT

## 2018-05-01 PROCEDURE — 77030012891

## 2018-05-01 PROCEDURE — 85045 AUTOMATED RETICULOCYTE COUNT: CPT | Performed by: INTERNAL MEDICINE

## 2018-05-01 RX ORDER — TERAZOSIN 1 MG/1
1 CAPSULE ORAL EVERY EVENING
Status: DISCONTINUED | OUTPATIENT
Start: 2018-05-01 | End: 2018-05-05

## 2018-05-01 RX ORDER — CHOLECALCIFEROL (VITAMIN D3) 125 MCG
5000 CAPSULE ORAL DAILY
Status: DISCONTINUED | OUTPATIENT
Start: 2018-05-02 | End: 2018-05-15 | Stop reason: HOSPADM

## 2018-05-01 RX ORDER — ASCORBIC ACID 250 MG
250 TABLET ORAL
Status: DISCONTINUED | OUTPATIENT
Start: 2018-05-02 | End: 2018-05-15 | Stop reason: HOSPADM

## 2018-05-01 RX ORDER — LANOLIN ALCOHOL/MO/W.PET/CERES
1 CREAM (GRAM) TOPICAL
Status: DISCONTINUED | OUTPATIENT
Start: 2018-05-02 | End: 2018-05-15 | Stop reason: HOSPADM

## 2018-05-01 RX ADMIN — DOCUSATE SODIUM 100 MG: 100 CAPSULE, LIQUID FILLED ORAL at 09:54

## 2018-05-01 RX ADMIN — ASPIRIN 81 MG 81 MG: 81 TABLET ORAL at 09:54

## 2018-05-01 RX ADMIN — CYANOCOBALAMIN TAB 1000 MCG 1000 MCG: 1000 TAB at 09:54

## 2018-05-01 RX ADMIN — TAMSULOSIN HYDROCHLORIDE 0.4 MG: 0.4 CAPSULE ORAL at 09:54

## 2018-05-01 RX ADMIN — DOCUSATE SODIUM 100 MG: 100 CAPSULE, LIQUID FILLED ORAL at 18:15

## 2018-05-01 RX ADMIN — Medication 50000 UNITS: at 13:47

## 2018-05-01 RX ADMIN — LACTOBACILLUS TAB 2 TABLET: TAB at 09:54

## 2018-05-01 RX ADMIN — FLUCONAZOLE 400 MG: 200 TABLET ORAL at 09:54

## 2018-05-01 RX ADMIN — LACTOBACILLUS TAB 2 TABLET: TAB at 18:15

## 2018-05-01 RX ADMIN — AMLODIPINE BESYLATE 10 MG: 10 TABLET ORAL at 09:54

## 2018-05-01 RX ADMIN — EZETIMIBE 10 MG: 10 TABLET ORAL at 09:54

## 2018-05-01 NOTE — PROGRESS NOTES
4 eyes skin assessment: Staples to LLQ, Sutures to RLQ that holding STEVEN drain in place,Swollen red to RLQ, Mole to Left shoulder, Midline incision with staples to ABD. Completed by Thea Oneil RN and Frankey Kinds.

## 2018-05-01 NOTE — PROGRESS NOTES
NUTRITION    Nutrition Consult: General Nutrition Management and Supplements     RECOMMENDATIONS / PLAN:     - Add supplement: Ensure Enlive TID.   - Continue RD inpatient monitoring and evaluation. NUTRITION INTERVENTIONS & DIAGNOSIS:     [x] Meals/snacks: modified composition  [x] Medical food supplement therapy: initiate    Nutrition Diagnosis: Unintended weight loss related to decreased appetite, inadequate energy intake as evidenced by 8 lb, 3.5% weight loss x 1 month PTA    ASSESSMENT:     Pt reported fair/good appetite and meal intake since admission. Had fair meal intake PTA when in main hospital. Was received nutrition supplements. Discussed resuming supplements; pt agreed with plan. Average po intake adequate to meet patients estimated nutritional needs:   [] Yes     [x] No   [] Unable to determine at this time    Diet: DIET CARDIAC Mechanical Soft      Food Allergies: NKFA  Current Appetite:   [x] Good     [x] Fair     [] Poor     [] Other:  Appetite/meal intake prior to admission:   [] Good     [x] Fair     [] Poor   [] Other:  Feeding Limitations:  [x] Swallowing difficulty: hx of stroke and dysphagia, followed by SLP in the past    [] Chewing difficulty    [] Other:  Current Meal Intake:   No data found.     BM: 4/30 - loose  Skin Integrity: abdominal surgical incision  Edema: 1+ generalized, UEs;  3+ LLE;  3+ pitting RLE  Pertinent Medications: Reviewed: bowel regimen, cyanocobalamin    Recent Labs      05/01/18   0613  04/30/18   0520  04/29/18   0340   NA  139  139  140   K  4.2  4.2  4.0   CL  106  106  106   CO2  27  28  27   GLU  85  91  98   BUN  13  16  15   CREA  1.27  1.35*  1.30   CA  9.4  9.1  9.0   MG  2.1   --    --        Intake/Output Summary (Last 24 hours) at 05/01/18 1715  Last data filed at 05/01/18 1200   Gross per 24 hour   Intake              960 ml   Output              610 ml   Net              350 ml       Anthropometrics:  Ht Readings from Last 1 Encounters:   05/01/18 6' 2\" (1.88 m)     Last 3 Recorded Weights in this Encounter    05/01/18 1714   Weight: 99.1 kg (218 lb 7.6 oz)     Body mass index is 28.05 kg/(m^2). Weight History: patient reported unplanned wt loss PTA; noted weight loss of 8 lb (3.5%) x 1 month PTA    Weight Metrics 5/1/2018 4/30/2018 4/30/2018 4/3/2018 3/29/2018 5/25/2016 9/8/2015   Weight 218 lb 7.6 oz 218 lb 7.6 oz - 226 lb 10.1 oz - 228 lb 235 lb   BMI - 28.05 kg/m2 28.05 kg/m2 29.1 kg/m2 - 31.81 kg/m2 32.79 kg/m2        Admitting Diagnosis: Dibility  Acute appendicitis with peritoneal abscess  Pertinent PMHx: HTN, stroke, CKD stage III, dyslipidemia    Education Needs:        [x] None identified  [] Identified - Not appropriate at this time  []  Identified and addressed - refer to education log  Learning Limitations:   [x] None identified  [] Identified  Cultural, Zoroastrian & ethnic food preferences:  [x] None identified    [] Identified and addressed     ESTIMATED NUTRITION NEEDS:     Calories: 7836-7853 kcal (25-30 kcal/kg) based on  [] Actual BW      [x] SBW 77 kg  Protein:  gm (0.8-1.2 gm/kg) based on  [x] Actual BW: 99 kg      [] SBW   Fluid: 1 mL/kcal     MONITORING & EVALUATION:     Nutrition Goal(s):   1. Po intake of meals will meet >75% of patient estimated nutritional needs within the next 7 days.   Outcome:  [] Met/Ongoing    []  Not Met/Progressing    [x] New/Initial Goal      Monitoring:   [x] Food and beverage intake   [x] Diet order   [x] Nutrition-focused physical findings   [x] Treatment/therapy   [] Weight   [] Enteral nutrition intake      Previous Recommendations (for follow-up assessments only):     []   Implemented       []   Not Implemented (RD to address)    [] No Longer Appropriate     [] No Recommendation Made     Discharge Planning: cardiac diet, consistency as tolerated per SLP  [x] Participated in care planning, discharge planning, & interdisciplinary rounds as appropriate      Jennifer Ravi RD  Pager: [phone]

## 2018-05-01 NOTE — PROGRESS NOTES
Speech LAnguage Pathology evaluation    Patient: Cate Montgomery (56 y.o. male)  Date: 5/1/2018  Primary Diagnosis: Dibility  Acute appendicitis with peritoneal abscess        Precautions:   Aspiration, Fall  Time In: 1400   Time Out[de-identified]  1430  SUBJECTIVE:   Patient stated You changed your watch to th other hand.     OBJECTIVE:     Past Medical History:   Diagnosis Date    Acute blood loss as cause of postoperative anemia 4/21/2018    Benign prostatic hyperplasia     CKD (chronic kidney disease) stage 3, GFR 30-59 ml/min 8/4/7003    Diastolic dysfunction without heart failure 9/2/2015    Grade 1 diastolic dysfunction on 2D ECHO done 9/02/2015    Dyslipidemia 9/2/2015    Dysphagia as late effect of cerebrovascular accident (CVA) 9/1/2015    Hemiparesis affecting left side as late effect of cerebrovascular accident (CVA) ([facility] [address] 9/1/2015    History of noncompliance with medical treatment, presenting hazards to health 9/1/2015    History of stroke with residual deficit 9/1/2015    Acute Ischemic Stroke (early subacute infarct at the posterior right lentiform nucleus) with residual left hemiparesis and dysphagia     History of tobacco use 9/1/2015    History of trichomonal urethritis 9/1/2015    History of vitamin D deficiency 9/6/2015    Hypertensive heart and kidney disease without heart failure and with chronic kidney disease stage III 9/2/2015    Obesity, Class I, BMI 30-34.9 9/1/2015    Overactive bladder     Postoperative atrial fibrillation ([facility] [address] 4/21/2018    Resolved    Postoperative confusion 4/22/2018    Postoperative urinary retention 4/22/2018    Vitamin D deficiency 5/1/2018    Vitamin D 25-Hydroxy (5/1/2018) = 17.9      Past Surgical History:   Procedure Laterality Date    APPENDECTOMY,RUPT APPENDX+ABSCESS N/A 04/20/2018    Dr. Kathia Joshua    COLONOSCOPY N/A 4/3/2018    COLONOSCOPY,  w heated snared polypectomy performed by Sahil Mariscal MD at [phone] [facility] Level of Function/Home Situation:   Barriers to Learning/Limitations: yes;  language and cognitive  Compensate with: visual, verbal, tactile, kinesthetic cues/model  Home Situation  Home Environment: Private residence  # Steps to Enter:  (1 step up + 1 threshold)  Rails to Enter: No  Wheelchair Ramp: No  One/Two Story Residence: One story  Living Alone: No  Support Systems: Family member(s)  Patient Expects to be Discharged to[de-identified] Private residence  Current DME Used/Available at Home: Cane, quad  Mental Status:  Neurologic State: Alert  Orientation Level: Oriented to person, Oriented to place, Oriented to situation  Cognition: Decreased command following, Impaired decision making, Memory loss  Perception: Appears intact  Perseveration: No perseveration noted  Safety/Judgement: Fall prevention  Motor Speech:  Oral-Motor Structure/Motor Speech  Face: Lower facial weakness  Labial: Decreased rate;Left droop  Dentition: Full;Limited (full lower teeth; limited upper dentition)  Oral Hygiene: WFL  Lingual: Decreased rate  Velum: No impairment  Mandible: No impairment  Dysarthric Characteristics: Blended word boundaries; Imprecise  Intelligibility: Impaired  Word Intelligibility (%): 100 %  Phrase Intelligibility (%): 95 %  Sentence Intelligibility (%): 90 %  Conversation Intelligibility (%): 90 %  Intonation: Limited  Rate: WFL  Prosody: Limited  Overall Impairment Severity: Mild  Language Comprehension and Expression:  Auditory Comprehension  Auditory Impairment: Yes  Hearing Aid: None  Response to Moderately Complex Yes/No Questions (%): 75 %  Three-Step Basic Commands (%): 80 %  Complex Commands (%): 0 %  Interfering Components: Attention to detail;Processing speed; Working memory  Effective Techniques: Increased volume;Repetition; Slowed speech;Stressing words  Cueing type: Verbal  Cueing amount:  Moderate  Verbal Expression  Primary Mode of Expression: Verbal  Initiation: No impairment  Automatic Speech Task: No impairment  Repetition: Impaired  Naming: No impairment  Sentence Completion: No impairment  Sentence Formulation: No impairment  Conversation: Dysarthric;Fluent  Speech Characteristics: Word retrieval  Interfering Components: Impaired thought organization  Effective Techniques: Cueing;Provide extra time  Overall Impairment: Mild  Cueing type: Verbal  Reading Comprehension  Visual Impairment: No impairment  Pre-Morbid Reading Status: Unknown/unable to assess  Scanning/Tracking : No impairment  Words : Yes  Sentence: Impaired  Oral Reading: No impairment  Interfering Components: Attention to detail;Processing time; Working memory  Overall Impairment Severity: Moderate  Written Expression  Pre-Morbid Dominant Hand: Right  Pre-Morbid Writing Skills: Unknown  Legibility : No impairment (Signiature)  Neuro-Linguistics:  Verbal Reasoning Tasks: Impaired  Verbal Problem Solving: Impaired  Verbal Organization: No Impairment  Thought Organization: Mild Impairment  Mathematical:  (DNT)  Memory: Impaired  Attention : No Impairement  Pragmatics:  Pragmatics Impairment: Abnormal affect; Inconsistent eye contact  Pragmatics Impairment Severity: Mild-moderate  Voice:  Vocal Quality: Breathy;Low volume  Thank you for this referral.  Juan Clark, SLP  Time Calculation: 30 mins    Speech LAnguage Pathology bedside swallow evaluation    Patient: Son Bell (71 y.o. male)  Date: 5/1/2018  Primary Diagnosis: Dibility  Acute appendicitis with peritoneal abscess        Precautions:   Aspiration, Fall  Time In: 1300   Time Out[de-identified]  1330    Diet prior to admission: Mechanical soft/thin liquids  Current Diet:  Mechanical soft/thin liquids  Barriers to Learning/Limitations: yes;  language and cognitive  Compensate with: visual, verbal, tactile, kinesthetic cues/model  Cognitive and Communication Status:  Neurologic State: Alert  Orientation Level: Oriented to person, Oriented to place, Oriented to situation  Cognition: Decreased command following, Impaired decision making, Memory loss  Perception: Appears intact  Perseveration: No perseveration noted  Safety/Judgement: Fall prevention  Oral Assessment:  Oral Assessment  Labial: Decreased rate;Left droop  Dentition: Full;Limited (full lower teeth; limited upper dentition)  Oral Hygiene: WFL  Lingual: Decreased rate  Velum: No impairment  Mandible: No impairment  P.O. Trials:  Patient Position: Seated  Vocal quality prior to P.O.: Breathy;Low volume  Consistency Presented: Mechanical soft;Puree; Thin liquid; Other (comment) (Soft solid)  How Presented: Self-fed/presented;Cup/sip;Spoon     Bolus Acceptance: No impairment  Bolus Formation/Control: Impaired  Type of Impairment: Delayed;Piecemeal  Propulsion: Delayed (# of seconds)  Oral Residue: Less than 10% of bolus; Lingual  Initiation of Swallow: Delayed (# of seconds)  Laryngeal Elevation: Functional  Aspiration Signs/Symptoms: None  Pharyngeal Phase Characteristics: Double swallow    Oral Phase Severity: Mild  Pharyngeal Phase Severity : Mild  Pain:  Pre-Treatment:    No report of pain  Post-Treatment:  No report of pain    After treatment:   [x]            Patient left in no apparent distress sitting up in chair  []            Patient left in no apparent distress in bed  [x]            Call bell left within reach  [x]            Nursing notified  []            Caregiver present  []            Bed alarm activated    COMMUNICATION/EDUCATION:   [x]            Posted safety precautions in patient's room. [x]            Patient/family have participated as able in goal setting and plan of care. [x]            Patient/family agree to work toward stated goals and plan of care. []            Patient understands intent and goals of therapy, but is neutral about his/her participation. []            Patient is unable to participate in goal setting and plan of care.     ASSESSMENT:   Based on the objective data described above, the patient presents with moderate language processing deficits, mild deficits of verbal expression, moderate cognitive deficits, mild oral-pharyngeal dysaphgia. Patient will benefit from skilled intervention to address the above impairments. Patients rehabilitation potential is considered to be Fair  Factors which may influence rehabilitation potential include:   []            None noted  [x]            Mental ability/status  [x]            Medical condition  [x]            Home/family situation and support systems  [x]            Safety awareness  [x]            Pain tolerance/management  []            Other:    PLAN:   Recommendations and Planned Interventions:  SLP will follow daily to address language skills, cognitive retraining and dyspahgia. Frequency/Duration: Patient will be followed by speech-language pathology 1-2 times per day/4-7 days per week to address goals. Discharge Recommendations:  To Be Determined    Estimated LOS: 2 Weeks    Thank you for this referral.  ALVA Griffith  Time Calculation: 30 mins

## 2018-05-01 NOTE — PROGRESS NOTES
Problem: Falls - Risk of  Goal: *Absence of Falls  Document Leyla Fall Risk and appropriate interventions in the flowsheet.    Outcome: Progressing Towards Goal  Fall Risk Interventions:  Mobility Interventions: Bed/chair exit alarm, Communicate number of staff needed for ambulation/transfer, Patient to call before getting OOB, PT Consult for mobility concerns, PT Consult for assist device competence, Utilize gait belt for transfers/ambulation, Utilize walker, cane, or other assitive device    Mentation Interventions: Adequate sleep, hydration, pain control, Bed/chair exit alarm, Door open when patient unattended, Update white board, Toileting rounds, Room close to nurse's station    Medication Interventions: Patient to call before getting OOB, Teach patient to arise slowly, Bed/chair exit alarm    Elimination Interventions: Bed/chair exit alarm, Call light in reach, Patient to call for help with toileting needs, Urinal in reach, Toileting schedule/hourly rounds    History of Falls Interventions: Bed/chair exit alarm, Door open when patient unattended, Room close to nurse's station

## 2018-05-01 NOTE — H&P
[facility] PHYSICAL REHABILITATION  [address] [place] [address], [address]     INPATIENT REHABILITATION  HISTORY AND PHYSICAL  (Post Admission Physician Evaluation)    Name: Getachew Fitch CSN: [id number]   Age: 67 y.o. MRN: [id number]   Sex: male Admit Date: 4/30/2018     PCP: Dr. Robby Berry      Primary Rehab Impairment Category (OSMAR): Miscellaneous    Impairment Group Label: Debility    Etiologic Diagnosis: Acute appendicitis with appendiceal abscess      Subjective:     Patient seen and examined. History of the Present Illness: The patient is a 69-year-old [facility] male with multiple medical comorbidities who was admitted to [facility] on 4/19/2018 due to abdominal pain. The patient was apparently well until 9 days prior to admission, the patient had been having generalized abdominal pain and anorexia. On the morning of admission, the patient was brought to the office of his PCP where labs were done. Patient went home and the patient ws called due to abnormal labs but no one picked up so the police was dispatched to his house and he was brought to the [facility] Emergency Department for further evaluation. 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 WBC count was elevated at 24.6. Patient was empirically started on Cefepime IV and Levofloxacin IV. The patient was admitted under the service of [address] [place] [address] (Dr. Balaji Zepeda). General Surgery consult (Dr. Sofía Werner) was called for evaluation and comanagement.     The patient underwent a Diagnostic laparoscopy followed by open appendectomy with drainage of appendiceal abscess on 4/20/2018 done by Dr. Kirill Reid. The patient tolerated the procedure well with no intraoperative complications. The patient developed acute postoperative blood loss anemia but no blood transfusion was given. Pain was controlled with oral opioids. Sequential compression devices were used for DVT prophylaxis. Urology consult (Dr. Maria Fernanda Holder) was called for evaluation and comanagement of postoperative urinary retention and failed multiple attempts at placing an indwelling lew catheter. A 16 Estonian lew catheter was placed on 4/20/2018. Patient was started on Tamsulosin 0.4 mg PO once daily. Postoperatively, the patient was noted with confusion. A sitter was provided. On 4/22/2018, due to persistent leukocytosis, Cefepime IV and Levofloxacin IV were discontinued and the patient was started on Vancomycin IV, Piperacillin-Tazobactam IV and Fluconazole IV. Infectious Diseases consult (Dr. Mark Griffin) was called for evaluation and comanagement of antibiotics. On 4/23/2018, Piperacillin-Tazobactam IV and Vancomycin IV were discontinued, Fluconazole IV dose was increased, and added Meropenem IV and Levofloxacin IV. CT scan of the abdomen and pelvis (4/24/2018) showed an interval appendectomy and drainage of right lower quadrant abscess; satisfactory postoperative appearance for recent surgery; no residual sizable or drainable collection; some distention of small bowel, fluid-filled - this may reflect postoperative ileus; some third spacing of fluid with small volume pleural effusions. On 4/25/2018, Levofloxacin IV was discontinued. On 4/26/2018, Meropenem IV was discontinued.     Modified barium swallow (4/27/2018) showed there is moderate amount of vallecular and piriform sinus residual as well as oral delay with thin barium; premature spillage and oral delay with solid or cracker; mild oral delay with pudding; the patient demonstrated no laryngeal penetration or aspiration. On 4/30/2018, WBC count (12.5) had normalized and Piperacillin-Tazobactam IV was discontinued. The patient had remained hemodynamically stable but due to the above events, the patient was noted to be generally weak and with impaired mobility and ADLs. Patient was felt to be a good candidate for acute inpatient rehabilitation. Upon evaluation by Physical Therapy and Occupational Therapy, the patient was recommended for acute inpatient rehabilitation. The patient was discharged and was subsequently admitted to the [facility] for intensive rehabilitation to help recover strength, function and mobility.       Past Medical History:  Past Medical History:   Diagnosis Date    Acute blood loss as cause of postoperative anemia 4/21/2018    Benign prostatic hyperplasia     CKD (chronic kidney disease) stage 3, GFR 30-59 ml/min 5/2/1520    Diastolic dysfunction without heart failure 9/2/2015    Grade 1 diastolic dysfunction on 2D ECHO done 9/02/2015    Dyslipidemia 9/2/2015    Dysphagia as late effect of cerebrovascular accident (CVA) 9/1/2015    Hemiparesis affecting left side as late effect of cerebrovascular accident (CVA) ([address] 9/1/2015    History of noncompliance with medical treatment, presenting hazards to health 9/1/2015    History of stroke with residual deficit 9/1/2015    Acute Ischemic Stroke (early subacute infarct at the posterior right lentiform nucleus) with residual left hemiparesis and dysphagia     History of tobacco use 9/1/2015    History of trichomonal urethritis 9/1/2015    History of vitamin D deficiency 9/6/2015    Hypertensive heart and kidney disease without heart failure and with chronic kidney disease stage III 9/2/2015    Obesity, Class I, BMI 30-34.9 9/1/2015    Overactive bladder     Postoperative atrial fibrillation ([address] 4/21/2018    Resolved    Postoperative confusion 2018    Postoperative urinary retention 2018       Past Surgical History:  Past Surgical History:   Procedure Laterality Date    APPENDECTOMY,RUPT APPENDX+ABSCESS N/A 2018    Dr[facility]    COLONOSCOPY N/A 4/3/2018    COLONOSCOPY,  w heated snared polypectomy performed by Ary Oliver MD at [facility] ENDOSCOPY       Allergies: Allergies   Allergen Reactions    Lipitor [Atorvastatin] Other (comments)     Elevated CK level    Pt has no awareness of this allergy. Social History: The patient is , lives with his sister in a 1-story house with a 1-step entry in [place], [place]. He smokes 5 cigarettes a day. He denied any alcohol or illicit drug use. He is a retired .      Family History: Both parents are . Family History   Problem Relation Age of Onset    Diabetes Mother     Stroke Mother     Heart Disease Father     Hypertension Father     Diabetes Brother     Hypertension Brother        Transfer Medications (from the transfer summary prepared by Dr. Chel Michelle / Dr. Michael Patricio In):    Prior to Admission Medications   Prescriptions Last Dose Informant Patient Reported? Taking? HYDROcodone-acetaminophen (NORCO) 5-325 mg per tablet   No No   Sig: Take 1 Tab by mouth every four (4) hours as needed. Max Daily Amount: 6 Tabs. amLODIPine (NORVASC) 10 mg tablet   No No   Sig: Take 1 Tab by mouth daily. cyanocobalamin (VITAMIN B-12) 1,000 mcg tablet   Yes No   Sig: Take 1,000 mcg by mouth daily. docusate sodium (COLACE) 100 mg capsule   No No   Sig: Take 1 Cap by mouth daily for 90 days. ezetimibe (ZETIA) 10 mg tablet   Yes No   Sig: Take 10 mg by mouth daily. fluconazole (DIFLUCAN) 200 mg tablet   No No   Sig: Take 2 Tabs by mouth daily for 3 days. FDA advises cautious prescribing of oral fluconazole in pregnancy. labetalol (NORMODYNE) 200 mg tablet   No No   Sig: Take 1 Tab by mouth every twelve (12) hours.    Patient taking differently: Take 100 mg by mouth every twelve (12) hours. lactobacillus sp. 50 billion cpu (BIO-K PLUS) 50 billion cell -375 mg cap capsule   No No   Sig: Take 1 Cap by mouth daily. naloxone (NARCAN) 0.4 mg/mL injection   No No   Si.5 mL by IntraVENous route once as needed. simethicone (MYLICON) 80 mg chewable tablet   No No   Sig: Take 1 Tab by mouth every eight (8) hours as needed for Flatulence. Indications: FLATULENCE   tamsulosin (FLOMAX) 0.4 mg capsule   No No   Sig: Take 1 Cap by mouth daily. Facility-Administered Medications: None       Review Of Systems:   CONSTITUTIONAL: No weight loss. EYES: No blurred vision and no eye discharge. ENT: No nasal discharge. No ear pain. CARDIOVASCULAR: No chest pain and no diaphoresis. RESPIRATORY: No cough, no hemoptysis. GI: No vomiting, no diarrhea   : No urinary frequency and no dysuria. MUSCULOSKELETAL: Significant for acute postoperative musculoskeletal pain. SKIN: No rashes. NEURO: Significant for postoperative confusion (intermittent). ENDOCRINE: No polyphagia and no polydipsia. HEMATOLOGY: Significant for acute postoperative blood loss anemia. Objective:     Vital Signs:  Patient Vitals for the past 24 hrs:   BP Temp Pulse Resp SpO2   18 0659 132/81 97.4 °F (36.3 °C) 70 18 99 %   18 0527 109/67 - 75 - -   18 2336 124/80 - 82 - -   18 2141 113/71 97.4 °F (36.3 °C) 73 20 -        Body mass index is 28.05 kg/(m^2). Physical Examination:  GENERAL SURVEY: Patient is awake, alert, oriented x 3, sitting comfortably on the chair, not in acute respiratory distress.   HEENT: pale palpebral conjunctivae, anicteric sclerae, no nasoaural discharge, moist oral mucosa  NECK: supple, no jugular venous distention, no palpable lymph nodes  CHEST/LUNGS: symmetrical chest expansion, good air entry, clear breath sounds  HEART: adynamic precordium, good S1 S2, no S3, regular rhythm, no murmurs  ABDOMEN: flat, bowel sounds appreciated, soft, non-tender  EXTREMITIES: pale nailbeds, (+) bipedal edema, full and equal pulses, no calf tenderness   NEUROLOGICAL EXAM: The patient is awake, alert and oriented x3, able to answer questions fairly appropriately, able to follow 1 and 2 step commands. Able to tell time from the wall clock. Cranial nerves II-XII are grossly intact. No gross sensory deficit. Motor strength is 4+/5 on BUE, 4-/5 on the RLE, 4/5 on the LLE.     Incision(s): covered, clean, dry, and intact      Current Medications:  Current Facility-Administered Medications   Medication Dose Route Frequency    acetaminophen (TYLENOL) tablet 650 mg  650 mg Oral Q4H PRN    docusate sodium (COLACE) capsule 100 mg  100 mg Oral BID    bisacodyl (DULCOLAX) tablet 10 mg  10 mg Oral Q48H PRN    fluconazole (DIFLUCAN) tablet 400 mg  400 mg Oral DAILY    Lactobacillus Acidoph & Bulgar (FLORANEX) tablet 2 Tab  2 Tab Oral BID    HYDROcodone-acetaminophen (NORCO) 5-325 mg per tablet 1 Tab  1 Tab Oral Q4H PRN    tamsulosin (FLOMAX) capsule 0.4 mg  0.4 mg Oral DAILY    ezetimibe (ZETIA) tablet 10 mg  10 mg Oral DAILY    labetalol (NORMODYNE) tablet 100 mg  100 mg Oral Q12H    amLODIPine (NORVASC) tablet 10 mg  10 mg Oral DAILY    cyanocobalamin tablet 1,000 mcg  1,000 mcg Oral DAILY    aspirin chewable tablet 81 mg  81 mg Oral DAILY WITH BREAKFAST       Functional Assessment:     Occupational Therapy   Prior Level of Function  Pre-Admission Screen  Post-Admission Evaluation   Eating   Independent Eating   Supervision Eating  Functional Level: 4   Grooming   Modified Independent Grooming   Supervision Grooming  Functional Level: 4   Upper Body Dressing   Modified Independent Upper Body Dressing   Supervision Upper Body Dressing  Functional Level: 5   Lower Body Dressing   Modified Independent Lower Body Dressing   Minimal Assist Lower Body Dressing      Bladder Management   Modified Independent Bladder Management   Minimal Assist Toileting  Functional Level: 0   Bowel Management   Modified Independent Bowel Management   Minimal Assist      Physical Therapy   Prior Level of Function  Pre-Admission Screen  Post-Admission Evaluation   Ambulation   Modified Independent Ambulation   Minimal Assist Gait  Amount of Assistance: 4 (Minimal assistance)  Distance (ft): 20 Feet (ft) (c 2 trials)  Assistive Device: Cane, quad   Bed Mobility   Independent Bed Mobility   Supervision Bed/Mat Mobility  Rolling Right : 5 (Supervision)  Rolling Left : 5 (Supervision)  Supine to Sit : 4 (Minimal assistance)  Sit to Supine : 3 (Moderate assistance)   Supine to Sit   Independent Supine to Sit   Minimal Assist Bed/Mat Mobility  Rolling Right : 5 (Supervision)  Rolling Left : 5 (Supervision)  Supine to Sit : 4 (Minimal assistance)  Sit to Supine : 3 (Moderate assistance)   Sit to Stand   Independent Sit to Stand   Minimal Assist Bed/Mat Mobility  Rolling Right : 5 (Supervision)  Rolling Left : 5 (Supervision)  Supine to Sit : 4 (Minimal assistance)  Sit to Supine : 3 (Moderate assistance)   Bed/Chair Transfers   Modified Independent Bed/Chair Transfers   Minimal Assist Transfers  Other: stand step without AD  Transfer Assistance : 4 (Minimal assistance)  Sit to Stand Assistance: Minimal assistance   Toilet Transfers   Modified Independent Toilet Transfers   Minimal Assist Toilet Transfers  Toilet Transfer Score: 4     Speech and Language Pathology  Post-Admission Evaluation     Comprehension (Native Language)  Primary Mode of Comprehension: Auditory     Expression (Native Language)  Primary Mode of Expression: Verbal     Social Interaction/Pragmatics  Score: 5     Problem Solving  Score: 4     Memory  Score: 5       Legend:   7 - Independent   6 - Modified Independent   5 - Standby Assistance / Supervision / Set-up   4 - Minimum Assistance / Contact Guard Assistance   3 - Moderate Assistance   2 - Maximum Assistance   1 - Total Assistance / Dependent       Labs on Admission:  Recent Results (from the past 24 hour(s))   CBC WITH AUTOMATED DIFF    Collection Time: 05/01/18  6:13 AM   Result Value Ref Range    WBC 13.0 4.6 - 13.2 K/uL    RBC 3.66 (L) 4.70 - 5.50 M/uL    HGB 9.7 (L) 13.0 - 16.0 g/dL    HCT 29.7 (L) 36.0 - 48.0 %    MCV 81.1 74.0 - 97.0 FL    MCH 26.5 24.0 - 34.0 PG    MCHC 32.7 31.0 - 37.0 g/dL    RDW 14.8 (H) 11.6 - 14.5 %    PLATELET 805 (H) 541 - 420 K/uL    MPV 10.0 9.2 - 11.8 FL    NEUTROPHILS 76 (H) 40 - 73 %    LYMPHOCYTES 15 (L) 21 - 52 %    MONOCYTES 8 3 - 10 %    EOSINOPHILS 1 0 - 5 %    BASOPHILS 0 0 - 2 %    ABS. NEUTROPHILS 9.9 (H) 1.8 - 8.0 K/UL    ABS. LYMPHOCYTES 2.0 0.9 - 3.6 K/UL    ABS. MONOCYTES 1.0 0.05 - 1.2 K/UL    ABS. EOSINOPHILS 0.1 0.0 - 0.4 K/UL    ABS.  BASOPHILS 0.0 0.0 - 0.1 K/UL    DF AUTOMATED     FERRITIN    Collection Time: 05/01/18  6:13 AM   Result Value Ref Range    Ferritin 356 8 - 388 NG/ML   IRON PROFILE    Collection Time: 05/01/18  6:13 AM   Result Value Ref Range    Iron 27 (L) 50 - 175 ug/dL    TIBC 233 (L) 250 - 450 ug/dL    Iron % saturation 50 %   MAGNESIUM    Collection Time: 05/01/18  6:13 AM   Result Value Ref Range    Magnesium 2.1 1.6 - 2.6 mg/dL   METABOLIC PANEL, BASIC    Collection Time: 05/01/18  6:13 AM   Result Value Ref Range    Sodium 139 136 - 145 mmol/L    Potassium 4.2 3.5 - 5.5 mmol/L    Chloride 106 100 - 108 mmol/L    CO2 27 21 - 32 mmol/L    Anion gap 6 3.0 - 18 mmol/L    Glucose 85 74 - 99 mg/dL    BUN 13 7.0 - 18 MG/DL    Creatinine 1.27 0.6 - 1.3 MG/DL    BUN/Creatinine ratio 10 (L) 12 - 20      GFR est AA >60 >60 ml/min/1.73m2    GFR est non-AA 56 (L) >60 ml/min/1.73m2    Calcium 9.4 8.5 - 10.1 MG/DL   RETICULOCYTE COUNT    Collection Time: 05/01/18  6:13 AM   Result Value Ref Range    Reticulocyte count 1.0 0.5 - 2.3 %   VITAMIN B12 & FOLATE    Collection Time: 05/01/18  6:13 AM   Result Value Ref Range    Vitamin B12 572 211 - 911 pg/mL    Folate 10.1 3.10 - 17.50 ng/mL   VITAMIN D, 25 HYDROXY    Collection Time: 05/01/18  6:13 AM   Result Value Ref Range    Vitamin D 25-Hydroxy 17.9 (L) 30 - 100 ng/mL       Estimated Glomerular Filtration Rate:  On admission, estimated GFR based on a Creatinine of 1.27 mg/dl:   Using CKD-EPI = 64.5 mL/min/1.73m2   Using MDRD = 71.5 mL/min/1.73m2      Assessment:     Primary Rehabilitation Diagnosis  1. Generalized Weakness with Impaired Mobility and ADLs  2. S/P Diagnostic laparoscopy followed by open appendectomy with drainage of appendiceal abscess (4/20/2018 - Dr. Damián Roe)  3. History of acute appendicitis with appendiceal abscess    Comorbidities   History of stroke with residual deficit I69.30    Hypertensive heart and kidney disease without heart failure and with chronic kidney disease stage III I13.10, A64.6    Diastolic dysfunction without heart failure I51.9    Dyslipidemia E78.5    CKD (chronic kidney disease) stage 3, GFR 30-59 ml/min N18.3    History of trichomonal urethritis Z86.19    Obesity, Class I, BMI 30-34.9 E66.9    History of noncompliance with medical treatment, presenting hazards to health Z91.19    History of tobacco use Z87.891    History of vitamin D deficiency Z86.39    Hemiparesis affecting left side as late effect of cerebrovascular accident (CVA)  I69.354    Dysphagia as late effect of cerebrovascular accident (CVA) I69.391    Postoperative atrial fibrillation (HCC) I97.89, I48.91    Overactive bladder N32.81    Benign prostatic hyperplasia N40.0    Postoperative urinary retention N99.89, R33.8    Postoperative confusion R41.0    Acute blood loss as cause of postoperative anemia D62    Vitamin D deficiency E55.9       Willingness to participate in the program: Good      Rehabilitation Potential: Good      Plan:     1. Medical Issues being followed closely:    [x]  Fall and safety precautions     [x]  Wound Care     [x]  Bowel and Bladder Function     [x]  Fluid Electrolyte and Nutrition Balance     [x]  Pain Control      2. Issues that 24 hour rehabilitation nursing is following:    [x]  Fall and safety precautions     [x]  Wound Care     [x]  Bowel and Bladder Function     [x]  Fluid Electrolyte and Nutrition Balance     [x]  Pain Control      [x]  Assistance with and education on in-room safety with transfers to and from the bed, wheelchair, toilet and shower. 3. Acute rehabilitation plan of care:    [x]  Patient to be evaluated and treated by:           [x]  Physical Therapy           [x]  Occupational Therapy           [x]  Speech Therapy     []  Hold Rehab until further notice     5. Medications:    [x]  MAR Reviewed     [x]  Continue Present Medications     6. DVT Prophylaxis:      []  Lovenox     []  Unfractionated Heparin     []  Coumadin     []  NOAC     []  MARYAM Stockings     [x]  Sequential Compression Device     []  None     7. Rehabilitation program and expectations from patient, as well as medical issues discussed with the patient. REHABILITATION PLAN:    1. The patient is being admitted to a comprehensive acute inpatient rehabilitation program consisting of at least 180 minutes a day, 5 out of 7 days a week of  combined physical and occupational therapy, and close supervision by a physician with special training and experience in rehabilitation medicine. 2. The patient's prognosis for significant practical improvement within a reasonable period of time appears to be good. 3. The estimated length of stay is 12 days. 4. The patient/family has a good understanding of our discharge process. The patient has potential to make improvement and is in need of at least two of the following multidisciplinary therapies including but not limited to physical, occupational, speech, nutritional services, wound care, prosthetics and orthotics. The patient is expected to be able to return to home with home health therapy and family support.   5. Given the patient's multiple co-morbidities and risk for further medical complications, rehabilitation services could not be safely provided at a lower level of care such as a skilled nursing facility. 6. Physical therapy for therapeutic exercise, progressive mobility, gait training, transfer training, bed mobility training, patient and family education, and wheelchair mobility training. Physical therapy goals to address - extremity function, range of motion, balance, safety awareness, independence in transfers, activity tolerance, independence in bed mobility, and independence in ambulation. 7. Occupational therapy for self-care home management, transfer training, therapeutic exercise, activity, wheelchair mobility training. Occupational therapy goals to address - extremity function, cognition, balance, activity tolerance, independence in functional transfers, range of motion, safety awareness, independence in ADL  and independence in home management skills. 8. Specialized 24 hour rehabilitation nursing care for bowel and bladder retraining, disease management, pain management, pressure ulcer prevention and management per policy, education on pressure relief techniques, embolism prevention, nutrition management, hydration management, transfer training and   medication distribution. 9. Nutrition and Dietary services will be obtained for assessment of adequate calorie needs, hydration and calorie counts as appropriate. 10. Therapeutic recreation for leisure skills. 6. Rehab psychology for coping skills. 12. Social work services for patient and family counseling and safe discharge planning. 13. I will be in charge of the inpatient rehab program. Full details of the inpatient rehab program will be outlined in the initial team conference. REHABILITATION GOALS:  Improve functional and activities of daily living skills in order to return back to independent living with family support.       MEDICAL PLAN:  > S/P Diagnostic laparoscopy followed by open appendectomy with drainage of appendiceal abscess (4/20/2018 - Dr. Maxim Villanueva); History of acute appendicitis with appendiceal abscess   > WBC count (5/1/2018, on admission) = 13.0   > STEVEN drain was removed this AM by Dr. Maxim Villanueva   > Floranex 2 tabs PO BID   > Fluconazole 400 mg PO once daily x 3 more days    > Acute Postoperative Blood Loss Anemia   > Hgb/Hct (5/1/2018, on admission) = 9.7/29.7   > Anemia work-up showed serum iron 27, TIBC 233, iron % saturation 50, ferritin 156, reticulocyte count 1.0   > FeSO4 325 mg PO once daily with breakfast    > Ascorbic Acid 250 mg PO once daily with breakfast (to enhance the absorption of the FeSO4)    > Benign prostatic hyperplasia / Overactive bladder / Postoperative urinary retention   > Tamsulosin 0.4 mg PO once daily   > Terazosin 1 mg PO q PM (6PM)    > Hypertensive heart and kidney disease without heart failure and with chronic kidney disease stage 3   > Discontinue Labetalol 100 mg PO q 12 hr (6AM, 6PM)   > Amlodipine 10 mg PO once daily (6AM)   > Terazosin 1 mg PO q PM (6PM)    > Postoperative confusion   > Will monitor    > Vitamin D Deficiency   > Vitamin D 25-Hydroxy (5/1/2018) = 17.9   > Cholecalciferol 50,000 units PO x 1 dose today   > Cholecalciferol 5,000 units PO once daily (starting tomorrow)      > Analgesia   > Acetaminophen 650 mg PO q 4 hr PRN for pain level less than 5/10   > Norco 5/325 1 tab PO q 4 hr PRN for pain level greater than 4/10       PRECAUTIONS:   1. Safety/fall precautions. 2. Deep venous thrombosis precautions. 3. Aspiration precautions. POTENTIAL BARRIERS TO DISCHARGE:   1. Risk for falls. 2. Family limited in ability to provide constant care. RELEVANT CHANGES SINCE PREADMISSION SCREENING: I have compared the patients medical and functional status at the time of the pre-admission screening and on this post-admission evaluation.  The preadmission screen and findings from therapy evaluations both support my post admission physician evaluation, deeming this patient to be an appropriate candidate for the IRF. The patient requires multidisciplinary treatment, physician oversight and intensive therapy not provided at a lower level of care. By signing this document, I acknowledge that I have personally performed a full physical examination on this patient within 24 hours of admission to this inpatient rehabilitation facility and have determined the patient to be able to tolerate the above course of treatment at an intensive level for a reasonable period of time. I will be completing a detailed individualized plan of care for this patient by day #4 of the patients stay based upon the Pre-Admission Screen, the Post-Admission Evaluation, and the therapy evaluations.       Signed:    Josh Barry MD    May 1, 2018

## 2018-05-01 NOTE — PROGRESS NOTES
[facility] - [facility]  [address] [place] [address]  [phone] fax    Speech Therapy Discharge Summary    Patient name: Juan Lui Start of Care: 2018   Referral source: Master Drummond MD : 1945   Medical/Treatment Diagnosis: Muscle weakness [M62.81] Onset Date: CVA 2017     Prior Hospitalization: see medical history Provider#: [id number]   Medications: Verified on Patient Summary List    Comorbidities: Kidney disease, Depression, HTN; cataracts  Prior Level of Function: Pt has hx of dysphagia, but pt's sister reports he is tolerating a regular diet. He previously had minimal cognitive impairment for STM which reported to be age appropriate. Pt reports his spee/language skills were WNLs. Lives with his sister and has an Aide 6/hrs-day to help with dressing and grooming. Currently smokes 10 cigarettes /day. Visits from Start of Care: 1   Missed Visits: 0    Reporting Period : 18 to 18    Summary of Care:  Goal: 1) Patient will perform assorted STM tasks such as 2-3 errand recall, 3 word recall, recall from conversational stories with intervening imposed delays with 80% acc when provided with min-mod A to increase safety/ independence and QOL. Status at last note/certification: Initiated at the time of eval on 18  Status at discharge: not met; Pt did not return to tx d/t medical complications resulting in hospitalization. Goal: 2). Patient will be oriented to the current date, day/wk, and daily schedule and to main temporal events  And respond to questions with 80% acc when provided with Polina and  use of  external compensatory strategies to increase safety/ independence and QOL. Status at last note/certification: Initiated at the time of eval on 18  Status at discharge: not met; Pt did not return to tx d/t medical complications resulting in hospitalization.     Goal: 3) Patient will respond to questions with 5+ word utterances with 90% acc when provided with min-mod cues to increase length of verbal utterance and communicative competence. Status at last note/certification: Initiated at the time of eval on 4/11/18  Status at discharge: not met; Pt did not return to tx d/t medical complications resulting in hospitalization. Goal: 4) Patient will utilize comp strategies (ie: phonemic awareness, long vs short vowel sounds, proofreading, rhyming words, etc) to increase correct spelling in structured writing tasks using 3-6 word utterances with 80% acc when provided with mod cues to improve written expression of thought. Status at last note/certification: Initiated at the time of eval on 4/11/18  Status at discharge: not met; Pt did not return to tx d/t medical complications resulting in hospitalization. Goal: 5) Patient will formulate and VERBALLY EXPRESS thoughts/sentences through various functional communication tasks (ie: to describe pictures, re-tell events, explain instructions, define word meanings, and sequencing of events with 90% acc when provided with min cues  in order to improve verbal expression/ word retrieval skills. Status at last note/certification: Initiated at the time of eval on 4/11/18  Status at discharge: not met; Pt did not return to tx d/t medical complications resulting in hospitalization. Goal:    6) Pt will complete additional dx testing, suchas the FDA-2 to further assess his deficits in order to establish appropriate goals to promote successful return to PLOF. Status at last note/certification: Initiated at the time of eval on 4/11/18  Status at discharge: not met; Pt did not return to tx d/t medical complications resulting in hospitalization.       ASSESSMENT:   Pt recommended for d/c at this time d/t unexpected hospitalization and unable to return to O/P skilled ST.     RECOMMENDATIONS:  [x]Discontinue therapy: []Patient has reached or is progressing toward set goals      []Patient is non-compliant or has abdicated      []Due to lack of appreciable progress towards set goals      Other: Pt was hospitalized    ALVA Garcia 5/1/2018 4:20 PM  MA, CCC-SLP  Speech-Language Pathologist      NOTE TO PHYSICIAN:  Please complete the following and fax to: [facility] at [facility] at [phone]  . Retain this original for your records. If you are unable to process this request in   24 hours, please contact our office.      [] I have read the above report and request that my patient continue therapy with the following changes/special instructions:  [] I have read the above report and request that my patient be discharged from therapy    Physician's Signature:_____________________ Date:___________Time:__________

## 2018-05-01 NOTE — PROGRESS NOTES
SHIFT CHANGE NOTE FOR [facility]    Bedside and Verbal shift change report given to Alireza Harvey RN (oncoming nurse) by Parth Deutsch Rn (offgoing nurse). Report included the following information SBAR, Kardex, MAR and Recent Results.     Situation:   Code Status: Full Code   Reason for Admission: [facility] Day: 1   Problem List:   Hospital Problems  Date Reviewed: 5/1/2018          Codes Class Noted POA    Vitamin D deficiency (Chronic) ICD-10-CM: E55.9  ICD-9-CM: 268.9  5/1/2018 Yes    Overview Signed 5/1/2018 10:01 AM by Sarthak Charles MD     Vitamin D 25-Hydroxy (5/1/2018) = 17.9              Overactive bladder (Chronic) ICD-10-CM: N32.81  ICD-9-CM: 596.51  Unknown Yes        Benign prostatic hyperplasia (Chronic) ICD-10-CM: N40.0  ICD-9-CM: 600.00  Unknown Yes        Postoperative urinary retention ICD-10-CM: N99.89, R33.8  ICD-9-CM: 997.5  4/22/2018 Yes        Postoperative confusion ICD-10-CM: R41.0  ICD-9-CM: 293.9  4/22/2018 Yes        Status post appendectomy ICD-10-CM: Z90.49  ICD-9-CM: V45.89  4/21/2018 Yes    Overview Addendum 4/30/2018  4:41 PM by Sarthak Charles MD     S/P Diagnostic laparoscopy followed by open appendectomy with drainage of appendiceal abscess (4/20/2018 - Dr. Damián Roe)             Acute blood loss as cause of postoperative anemia ICD-10-CM: D62  ICD-9-CM: 285.1  4/21/2018 Yes        Generalized weakness ICD-10-CM: R53.1  ICD-9-CM: 780.79  4/20/2018 Yes        * (Principal)Acute appendicitis with peritoneal abscess ICD-10-CM: K35.3  ICD-9-CM: 540.1  4/20/2018 Yes        Hypertensive heart and kidney disease without heart failure and with chronic kidney disease stage III (Chronic) ICD-10-CM: I13.10, N18.3  ICD-9-CM: 404.90, 585.3  9/2/2015 Yes              Background:   Past Medical History:   Past Medical History:   Diagnosis Date    Acute blood loss as cause of postoperative anemia 4/21/2018    Benign prostatic hyperplasia     CKD (chronic kidney disease) stage 3, GFR 30-59 ml/min 0/4/8710    Diastolic dysfunction without heart failure 9/2/2015    Grade 1 diastolic dysfunction on 2D ECHO done 9/02/2015    Dyslipidemia 9/2/2015    Dysphagia as late effect of cerebrovascular accident (CVA) 9/1/2015    Hemiparesis affecting left side as late effect of cerebrovascular accident [facility] 9/1/2015    History of noncompliance with medical treatment, presenting hazards to health 9/1/2015    History of stroke with residual deficit 9/1/2015    Acute Ischemic Stroke (early subacute infarct at the posterior right lentiform nucleus) with residual left hemiparesis and dysphagia     History of tobacco use 9/1/2015    History of trichomonal urethritis 9/1/2015    History of vitamin D deficiency 9/6/2015    Hypertensive heart and kidney disease without heart failure and with chronic kidney disease stage III 9/2/2015    Obesity, Class I, BMI 30-34.9 9/1/2015    Overactive bladder     Postoperative atrial fibrillation [facility] 4/21/2018    Resolved    Postoperative confusion 4/22/2018    Postoperative urinary retention 4/22/2018    Vitamin D deficiency 5/1/2018    Vitamin D 25-Hydroxy (5/1/2018) = 17.9       Patient taking anticoagulants no; SCD'S IN PLACE    Patient has a defibrillator: no     Assessment:   Changes in Assessment throughout shift: NONE     Patient has central line: no Reasons if yes: Insertion date: Last dressing date:   Patient has Chow Cath: no Reasons if yes:     Insertion date:     Last Vitals:     Vitals:    05/01/18 0527 05/01/18 0659 05/01/18 1600 05/01/18 1714   BP: 109/67 132/81 118/79    Pulse: 75 70 77    Resp:  18 18    Temp:  97.4 °F (36.3 °C) 97.7 °F (36.5 °C)    SpO2:  99% 98%    Weight:    99.1 kg (218 lb 7.6 oz)   Height:    6' 2\" (1.88 m)        PAIN    Pain Assessment    Pain Intensity 1: 0 (05/01/18 1600) Pain Intensity 1: 2 (12/29/14 1105)      Pain Location 1: Abdomen      Pain Intervention(s) 1: Medication (see MAR)  Patient Stated Pain Goal: 0 Patient Stated Pain Goal: 0  o Intervention effective: yes    o Other actions taken for pain: TURN REPOSITION REST     Skin Assessment  Skin color Skin Color: Appropriate for ethnicity  Condition/Temperature Skin Condition/Temp: Warm  Integrity Skin Integrity: Incision (comment)  Turgor Turgor: Epidermis thin w/ loss of subcut tissue  Weekly Pressure Ulcer Documentation  Pressure  Injury Documentation: No Pressure Injury Noted-Pressure Ulcer Prevention Initiated  Wound Prevention & Protection Methods  Orientation of wound Orientation of Wound Prevention: Posterior  Location of Prevention Location of Wound Prevention: Buttocks, Sacrum/Coccyx  Dressing Present Dressing Present : No  Dressing Status Dressing Status: Intact  Wound Offloading Wound Offloading (Prevention Methods): Bed, pressure reduction mattress, Pillows, Turning, Repositioning     INTAKE/OUPUT    Date 04/30/18 1900 - 05/01/18 0659 05/01/18 0700 - 05/02/18 0659   Shift 3508-5854 24 Hour Total 6270-0801 1224-5824 24 Hour Total   I  N  T  A  K  E   P.O. 480 480 720  720      P. O. 480 480 720  720    Shift Total  (mL/kg) 480 480 720  (7.3)  720  (7.3)   O  U  T  P  U  T   Urine 600 600         Urine Voided 600 600         Urine Occurrence(s) 200 x 200 x 0 x  0 x    Drains 10 10         Output (ml) ([REMOVED] [place] Drain 04/20/18 Right; Lower Abdomen) 10 10       Stool           Stool Occurrence(s) 0 x 0 x 0 x  0 x    Shift Total  (mL/kg) 610 610      NET -130 -130 720  720   Weight (kg)   99.1 99.1 99.1       Recommendations:  1. Patient needs and requests: EDUCATION    2. Diet: CARDIAC WITH THIN LIQUIDS    3. Pending tests/procedures: NONE     4. Functional Level/Equipment: 1 PERSON ASSIST, W/C    5. Estimated Discharge Date: TBD Posted on Whiteboard in Patients Room: yes     HEALS Safety Check    A safety check occurred in the patient's room between off going nurse and oncoming nurse listed above.     The safety check included the below items  Area Items   H  High Alert Medications - Verify all high alert medication drips (heparin, PCA, etc.)   E  Equipment - Suction is set up for ALL patients (with dipti)  - Red plugs utilized for all equipment (IV pumps, etc.)  - WOWs wiped down at end of shift.  - Room stocked with oxygen, suction, and other unit-specific supplies   A  Alarms - Bed alarm is set for fall risk patients  - Ensure chair alarm is in place and activated if patient is up in a chair   L  Lines - Check IV for any infiltration  - Chow bag is empty if patient has a Chow   - Tubing and IV bags are labeled   S  Safety   - Room is clean, patient is clean, and equipment is clean. - Hallways are clear from equipment besides carts. - Fall bracelet on for fall risk patients  - Ensure room is clear and free of clutter  - Suction is set up for ALL patients (with dipti)  - Hallways are clear from equipment besides carts.    - Isolation precautions followed, supplies available outside room, sign posted

## 2018-05-01 NOTE — PROGRESS NOTES
Riaz Samuels M.D. FACS  PROGRESS NOTE    Name: Jeramy Wang MRN: [id number]   : 1945 [facility]   Date: 2018 Admission Date: 2018  9:08 PM     Hospital Day: 2     Subjective:  Patient resting in bed this morning. No acute overnight events. Objective:  Vitals:    18 2141 18 2336 18 0527   BP: 113/71 124/80 109/67   Pulse: 73 82 75   Resp: 20     Temp: 97.4 °F (36.3 °C)         Date 18 - 18 0659 18 07 - 18 0659   Shift 4126-7715 8153-0832 24 Hour Total 4876-8596 8673-6751 24 Hour Total   I  N  T  A  K  E   P.O.  360 360         P. O.  360 360       Shift Total  360 360      O  U  T  P  U  T   Urine  600 600         Urine Voided  600 600         Urine Occurrence(s)  0 x 0 x       Drains  0 0         Output (ml) ([place] Drain 18 Right; Lower Abdomen)  0 0       Stool            Stool Occurrence(s)  0 x 0 x       Shift Total  600 600      NET  -240 -240      Weight (kg)               Physical Exam:    General: Awake and alert, no distress   Abdomen: abdomen is soft with midline incisional tenderness. Incision with small amount of serosanguineous drainage. STEVEN with serosanguineous drainage. No   masses, organomegaly or guarding    Labs:  No results found for this or any previous visit (from the past 24 hour(s)).   All Micro Results     None          Current Medications:  Current Facility-Administered Medications   Medication Dose Route Frequency Provider Last Rate Last Dose    acetaminophen (TYLENOL) tablet 650 mg  650 mg Oral Q4H PRN Roz Benitez MD        docusate sodium (COLACE) capsule 100 mg  100 mg Oral BID Roz Benitez MD   100 mg at 18    bisacodyl (DULCOLAX) tablet 10 mg  10 mg Oral Q48H PRN Roz Benitez MD        fluconazole (DIFLUCAN) tablet 400 mg  400 mg Oral DAILY Roz Benitez MD        Lactobacillus Acidoph & Bulgar [facility]) tablet 2 Tab  2 Tab Oral BID Von Hernandez Pramod Frias MD   2 Tab at 04/30/18 2222    HYDROcodone-acetaminophen (NORCO) 5-325 mg per tablet 1 Tab  1 Tab Oral Q4H PRN Laci Dan MD        tamsulosin [facility]) capsule 0.4 mg  0.4 mg Oral DAILY Laci Dan MD        ezetimibe (ZETIA) tablet 10 mg  10 mg Oral DAILY Laci Dan MD        labetalol (NORMODYNE) tablet 100 mg  100 mg Oral Q12H Laci Dan MD   Stopped at 05/01/18 0527    amLODIPine (NORVASC) tablet 10 mg  10 mg Oral DAILY Laci Dan MD        cyanocobalamin tablet 1,000 mcg  1,000 mcg Oral DAILY Laci Dan MD        aspirin chewable tablet 81 mg  81 mg Oral DAILY WITH Morris Ramos MD           IMPRESSION:   · Patient doing well      PLAN:/DISCUSION:   · STEVEN removed  · We will remove staples and 1 week        Lian Bowden MD

## 2018-05-01 NOTE — ROUTINE PROCESS
0800 Pt. Awake sitting up in bed alert and oriented x 4 no change in assessment no signs of distress pt. Reported to be feeling fine. 0930 Pt. Sitting up in chair eating breakfast.  1200 pt. With physical therapy. 1330 able to transfer by wheelchair. 1500 no change in assessment. 1800 Pt. Sitting up in chair eating dinner.

## 2018-05-01 NOTE — ROUTINE PROCESS
Patient arrived via w/c from [facility] CVT Stepdown by nurse Shantell Wiseman RN. No complaints of pain/discomforts. Call bell within reach. Bed alarm activated, bed in low position. Oriented to room and call bell/remote. Urinal provided. Advised to call when wanting to get OOB or to use the restroom.

## 2018-05-01 NOTE — PROGRESS NOTES
Problem: Mobility Impaired (Adult and Pediatric)  Goal: *Acute Goals and Plan of Care (Insert Text)  Physical Therapy Goals  Short Term Goals = Long Term Goals  Initiated 2018 and to be accomplished within 7-10 day(s)  1. Patient will move from scoot up and down and roll side to side in bed with modified independence. 2.  Patient will move from supine <> sit with supervision. 3.  Patient will transfer from bed to chair and chair to bed with supervision/set-up using the least restrictive device. 4.  Patient will perform sit to stand with supervision/set-up. 5.  Patient will ambulate with supervision/set-up for 50 feet with the least restrictive device. 6.  Patient will ascend/descend 2 curbs/thresholds with [facility] with contact guard assist.    physical Therapy EVALUATION    Patient: Son Bell (64 y.o. male)  Date: 2018  Diagnosis: Debility  Acute appendicitis with peritoneal abscess Acute appendicitis with peritoneal abscess  Precautions: Fall Risk Precautions  Chart, physical therapy assessment, plan of care and goals were reviewed. Pain:  Pt pain was reported no c/o pain. Pt pain was reported no c/o pain. Intervention: N/A    Patient identified with name and : yes    Patient seen for initial PT evaluation. Pt requires frequent rest breaks due to fatigue but participates to the best of his ability. SUBJECTIVE:     Patient notes that he had trouble sleeping overnight and is fatigued. Pt is agreeable to participation in skilled PT intervention and pleasant and cooperative throughout.     OBJECTIVE DATA SUMMARY:     Past Medical History:   Diagnosis Date    Acute blood loss as cause of postoperative anemia 2018    Benign prostatic hyperplasia     CKD (chronic kidney disease) stage 3, GFR 30-59 ml/min 8424    Diastolic dysfunction without heart failure 2015    Grade 1 diastolic dysfunction on 2D ECHO done 2015    Dyslipidemia 2015    Dysphagia as late effect of cerebrovascular accident (CVA) 9/1/2015    Hemiparesis affecting left side as late effect of cerebrovascular accident (CVA) ([facility] [address] 9/1/2015    History of noncompliance with medical treatment, presenting hazards to health 9/1/2015    History of stroke with residual deficit 9/1/2015    Acute Ischemic Stroke (early subacute infarct at the posterior right lentiform nucleus) with residual left hemiparesis and dysphagia     History of tobacco use 9/1/2015    History of trichomonal urethritis 9/1/2015    History of vitamin D deficiency 9/6/2015    Hypertensive heart and kidney disease without heart failure and with chronic kidney disease stage III 9/2/2015    Obesity, Class I, BMI 30-34.9 9/1/2015    Overactive bladder     Postoperative atrial fibrillation ([facility] [address] 4/21/2018    Resolved    Postoperative confusion 4/22/2018    Postoperative urinary retention 4/22/2018    Vitamin D deficiency 5/1/2018    Vitamin D 25-Hydroxy (5/1/2018) = 17.9      Past Surgical History:   Procedure Laterality Date    APPENDECTOMY,RUPT APPENDX+ABSCESS N/A 04/20/2018    Dr. Virginia Almonte    COLONOSCOPY N/A 4/3/2018    COLONOSCOPY,  w heated snared polypectomy performed by Supriya Virgen MD at [facility] ENDOSCOPY       Therapy Diagnosis:   Difficulty with bed mobility  [x]     Difficulty with functional transfers  [x]     Difficulty with ambulation  [x]     Difficulty with stair negotiations  [x]       Problem List:    Decreased strength B LE  [x]     Decreased strength trunk/core  [x]     Decreased AROM   [x]     Decreased PROM  [x]    Decreased endurance  [x]     Decreased balance sitting  [x]     Decreased balance standing  [x]     Pain   [x]     Slow ambulation velocity  [x]    Decreased coordination  [x]    Decreased safety awareness  [x]      Functional Limitations:   Decreased independence with bed mobility  [x]     Decreased independence with functional transfers  [x]     Decreased independence with ambulation  [x] Decreased independence with stair negotiation  [x]       Previous Functional Level:  Pt reports he mobilized within his home using a quad cane and had assistance for ADLs from a caregiver present from 1000 until 1600 Monday through Friday and assistance from his sister on the weekends    Home Environment: Home Environment: Private residence  # Steps to Enter:  (1 step up + 1 threshold)  Rails to [employer]: No  Wheelchair Ramp: No  One/Two Story Residence: One story  Living Alone: No  Support Systems: [employer] community, Family member(s), Friends \ neighbors  Patient Expects to be Discharged to[de-identified] Private residence  Current DME Used/Available at Home: Cane, quad    Barriers to Learning/Limitations: yes;  emotional, cognitive and physical  Compensate with: visual, verbal, tactile, kinesthetic cues/model         Outcome Measures: FIM/MMT     MMT Initial Assessment   Right Lower Extremity Left Lower Extremity   Hip Flexion 3+ 4-   Knee Extension 4 4   Knee Flexion 3+ 4-   Ankle Dorsiflexion 4 4-   0/5 No palpable muscle contraction  1/5 Palpable muscle contraction, no joint movement  2-/5 Less than full range of motion in gravity eliminated position  2/5 Able to complete full range of motion in gravity eliminated position  2+/5 Able to initiate movement against gravity  3-/5 More than half but not full range of motion against gravity  3/5 Able to complete full range of motion against gravity  3+/5 Completes full range of motion against gravity with minimal resistance  4-/5 Completes full range of motion against gravity with minimal-moderate resistance  4/5 Completes full range of motion against gravity with moderate resistance  4+/5 Completes full range of motion against gravity with moderate-maximum resistance  5/5 Completes full range of motion against gravity with maximum resistance     AROM: WFL    FIM SCORES Initial Assessment   Bed/Chair/Wheelchair Transfers Other: stand step without AD  Transfer Assistance : 4 (Minimal assistance)  Sit to Stand Assistance: Minimal assistance   Wheelchair Mobility Able to Propel (ft): 216 feet  Functional Level: 3  Curbs/Ramps Assist Required (FIM Score): 0 (Not tested)  Wheelchair Setup Assist Required : 3 (Moderate assistance)  Wheelchair Management: Manages left brake, Manages right brake   Walking [place] 4 (Minimal assistance)   Steps/Stairs Steps/Stairs Ambulated (#): 0  Level of Assist : 0 (Not tested) (2/2 pt fatigue)  Rail Use:  (N/A)   PRIMARY MODE OF LOCOMOTION: ambulation  Please see [facility] Interdisciplinary Eval: Coordination/Balance Section for details regarding FIM score description. BED/CHAIR/WHEELCHAIR TRANSFERS Initial Assessment   Rolling Right 5 (Supervision) for safety/spatial awareness   Rolling Left 5 (Supervision) for safety/spatial awareness   Supine to Sit 4 (Minimal assistance) for trunk management/balance   Sit to Stand Minimal assistance for balance and safety. Pt utilizes B UEs to assist with transition from sit to stand and stand to sit with exception of sit to stand from commode with B hands on distal third of B femurs and minimal assistance from PT. Sit to Supine 3 (Moderate assistance) for B LE management from dependent position fully onto bed. Transfer Assist Score Other: stand step without AD  Transfer Assistance : 4 (Minimal assistance)  Sit to Stand Assistance: Minimal assistance    Transfer Type Stand Step without AD   Comments Pt requires CGA to min assist for balance with stand step transfers with verbal cues for safety and B foot clearance. Car Transfer NT   Car Type N/A       WHEELCHAIR MOBILITY/MANAGEMENT Initial Assessment   Able to Propel 216 feet   Pt requires min to mod assist for straight path negotiation and negotiating turns secondary to left path deviations.    Functional Level 3   Curbs/ramps assistance required 0 (Not tested)   Wheelchair set up assistance required 3 (Moderate assistance)   Wheelchair management Manages left brake, Manages right brake         WALKING INDEPENDENCE Initial Assessment   Assistive device Cane, quad   Ambulation assistance - level surface 4 (Minimal assistance) for balance, safety, and RW management. Distance 20 Feet (ft) (c 2 trials)   Functional Level 1   Comments Pt ambulates with forward flexed posture with decreased B foot clearance and step length. Pt requires max verbal cues for sequencing LBQC from home. However, pt demonstrates minimal ability to correct with verbal and tactile cuing. Ambulation assistance - unlevel surface NT       STEPS/STAIRS Initial Assessment   Steps/Stairs ambulated Steps/Stairs Ambulated (#): 0  Level of Assist : 0 (Not tested) (2/2 pt fatigue)  Rail Use:  (N/A)   Rail Use  (N/A)   Functional Level 0     Pt presented after breakfast with pants soiled from episode of urinary incontinence. Pt appears unaware until his attention was drawn to this fact and pt was then assisted with toileting, hygiene, and dressing. ASSESSMENT :  Based on the objective data described below, the patient presents with impaired functional strength and balance, impaired impaired insight into deficits, and decreased endurance. Patient will benefit from skilled intervention to address the above impairments. Patients rehabilitation potential is considered to be Good  Factors which may influence rehabilitation potential include:   []         None noted  [x]         Mental ability/status  [x]         Medical condition  [x]         Home/family situation and support systems  [x]         Safety awareness  []         Pain tolerance/management  []         Other:      PLAN :  Refer to POC for details re POC    Order received from MD for physical therapy services and chart reviewed. Pt to be seen 5 times per week for 3 hours of total therapy per day for 7-10 days.   Thank you for the referral.    Pt would benefit from skilled physical therapy in order to improve independent functional mobility within the home. Interventions may include range of motion (AROM, PROM B LE/trunk), motor function (B LE/trunk strengthening/coordination), activity tolerance (vitals, oxygen saturation levels), bed mobility training, balance activities, gait training (progressive ambulation program), and functional transfer training. Discharge Recommendations: Home Health  Further Equipment Recommendations for Discharge: N/A       Activity Tolerance:   Fair    Please refer to the flowsheet for vital signs taken during this treatment. After treatment:   Patient left seated OOB in w/c with call bell in reach and CNA notified as pt appears fatigued but is scheduled to speak with unit psychologist following PT evaluation. COMMUNICATION/EDUCATION:   [x]         Fall prevention education was provided and the patient/caregiver indicated understanding. [x]         Patient/family have participated as able in goal setting and plan of care. [x]         Patient/family agree to work toward stated goals and plan of care. []         Patient understands intent and goals of therapy, but is neutral about his/her participation. []         Patient is unable to participate in goal setting and plan of care.     Thank you for this referral.  Beatrice Law PT, DPT  5/1/2018

## 2018-05-01 NOTE — ROUTINE PROCESS
TRANSFER - OUT REPORT:    Verbal report given to [employer], RN(name) on Harika Kelly  being transferred to Rehab(unit) for routine progression of care       Report consisted of patients Situation, Background, Assessment and   Recommendations(SBAR). Information from the following report(s) Kardex, Intake/Output, MAR and Recent Results was reviewed with the receiving nurse. Lines:   Peripheral IV 04/26/18 Right Forearm (Active)   Site Assessment Clean, dry, & intact 4/30/2018 12:00 PM   Phlebitis Assessment 0 4/30/2018 12:00 PM   Infiltration Assessment 0 4/30/2018 12:00 PM   Dressing Status Clean, dry, & intact 4/30/2018 12:00 PM   Dressing Type Transparent;Tape 4/30/2018 12:00 PM   Hub Color/Line Status Pink 4/30/2018 12:00 PM   Action Taken Tubing changed 4/30/2018  8:00 AM   Alcohol Cap Used Yes 4/30/2018 12:00 PM        Opportunity for questions and clarification was provided.       Patient transported with:   Registered Nurse

## 2018-05-01 NOTE — PROGRESS NOTES
SHIFT CHANGE NOTE FOR [facility]    Bedside and Verbal shift change report given to Pati Steiner RN (oncoming nurse) by Prateek Atkins Rn (offgoing nurse). Report included the following information SBAR, Kardex, MAR and Recent Results.     Situation:   Code Status: Full Code   Reason for Admission: [facility] Day: 1   Problem List:   Hospital Problems  Date Reviewed: 4/3/2018          Codes Class Noted POA    Overactive bladder (Chronic) ICD-10-CM: N32.81  ICD-9-CM: 596.51  Unknown Yes        Benign prostatic hyperplasia (Chronic) ICD-10-CM: N40.0  ICD-9-CM: 600.00  Unknown Yes        Postoperative urinary retention ICD-10-CM: N99.89, R33.8  ICD-9-CM: 997.5  4/22/2018 Yes        Postoperative confusion ICD-10-CM: R41.0  ICD-9-CM: 293.9  4/22/2018 Yes        Status post appendectomy ICD-10-CM: Z90.49  ICD-9-CM: V45.89  4/21/2018 Yes    Overview Addendum 4/30/2018  4:41 PM by Dixie Ash MD     S/P Diagnostic laparoscopy followed by open appendectomy with drainage of appendiceal abscess (4/20/2018 - Dr. Pedro Rodriguez)             Acute blood loss as cause of postoperative anemia ICD-10-CM: D62  ICD-9-CM: 285.1  4/21/2018 Yes        Generalized weakness ICD-10-CM: R53.1  ICD-9-CM: 780.79  4/20/2018 Yes        * (Principal)Acute appendicitis with peritoneal abscess ICD-10-CM: K35.3  ICD-9-CM: 540.1  4/20/2018 Yes        Hypertensive heart and kidney disease without heart failure and with chronic kidney disease stage III (Chronic) ICD-10-CM: I13.10, N18.3  ICD-9-CM: 404.90, 585.3  9/2/2015 Yes              Background:   Past Medical History:   Past Medical History:   Diagnosis Date    Acute blood loss as cause of postoperative anemia 4/21/2018    Benign prostatic hyperplasia     CKD (chronic kidney disease) stage 3, GFR 30-59 ml/min 2/6/0040    Diastolic dysfunction without heart failure 9/2/2015    Grade 1 diastolic dysfunction on 2D ECHO done 9/02/2015    Dyslipidemia 9/2/2015    Dysphagia as late effect of cerebrovascular accident (CVA) 9/1/2015    Hemiparesis affecting left side as late effect of cerebrovascular accident (CVA) ([facility] [address] 9/1/2015    History of noncompliance with medical treatment, presenting hazards to health 9/1/2015    History of stroke with residual deficit 9/1/2015    Acute Ischemic Stroke (early subacute infarct at the posterior right lentiform nucleus) with residual left hemiparesis and dysphagia     History of tobacco use 9/1/2015    History of trichomonal urethritis 9/1/2015    History of vitamin D deficiency 9/6/2015    Hypertensive heart and kidney disease without heart failure and with chronic kidney disease stage III 9/2/2015    Obesity, Class I, BMI 30-34.9 9/1/2015    Overactive bladder     Postoperative atrial fibrillation ([facility] [address] 4/21/2018    Resolved    Postoperative confusion 4/22/2018    Postoperative urinary retention 4/22/2018      Patient taking anticoagulants no; SCD'S IN PLACE    Patient has a defibrillator: no     Assessment:   Changes in Assessment throughout shift: NONE     Patient has central line: no Reasons if yes: Insertion date: Last dressing date:   Patient has Chow Cath: no Reasons if yes:     Insertion date:     Last Vitals:     Vitals:    04/30/18 2141 04/30/18 2336 05/01/18 0527   BP: 113/71 124/80 109/67   Pulse: 73 82 75   Resp: 20     Temp: 97.4 °F (36.3 °C)          PAIN    Pain Assessment    Pain Intensity 1: 0 (05/01/18 0530) Pain Intensity 1: 2 (12/29/14 1105)      Pain Location 1: Abdomen      Pain Intervention(s) 1: Medication (see MAR)  Patient Stated Pain Goal: 0 Patient Stated Pain Goal: 0  o Intervention effective: yes    o Other actions taken for pain: TURN REPOSITION REST     Skin Assessment  Skin color Skin Color: Appropriate for ethnicity  Condition/Temperature Skin Condition/Temp: Warm  Integrity Skin Integrity: Incision (comment) (STEVEN drain)  Turgor Turgor: Epidermis thin w/ loss of subcut tissue  Weekly Pressure Ulcer Documentation  Pressure  Injury Documentation: No Pressure Injury Noted-Pressure Ulcer Prevention Initiated  Wound Prevention & Protection Methods  Orientation of wound Orientation of Wound Prevention: Posterior  Location of Prevention Location of Wound Prevention: Buttocks, Sacrum/Coccyx  Dressing Present Dressing Present : No  Dressing Status Dressing Status: Intact  Wound Offloading Wound Offloading (Prevention Methods): Bed, pressure reduction mattress, Pillows, Turning, Repositioning     INTAKE/OUPUT    Date 04/30/18 0700 - 05/01/18 0659 05/01/18 0700 - 05/02/18 0659   Shift 0700-1859 1900-0659 24 Hour Total 0700-1859 1900-0659 24 Hour Total   I  N  T  A  K  E   P.O.  480 480         P. O.  480 480       Shift Total  480 480      O  U  T  P  U  T   Urine  600 600         Urine Voided  600 600         Urine Occurrence(s)  200 x 200 x       Drains  10 10         Output (ml) ([REMOVED] [place] Drain 04/20/18 Right; Lower Abdomen)  10 10       Stool            Stool Occurrence(s)  0 x 0 x       Shift Total  610 610      NET  -130 -130      Weight (kg)             Recommendations:  1. Patient needs and requests: EDUCATION    2. Diet: CARDIAC WITH THIN LIQUIDS    3. Pending tests/procedures: NONE     4. Functional Level/Equipment: 1 PERSON ASSIST, W/C    5. Estimated Discharge Date: TBD Posted on Whiteboard in Patients Room: yes     HEALS Safety Check    A safety check occurred in the patient's room between off going nurse and oncoming nurse listed above.     The safety check included the below items  Area Items   H  High Alert Medications - Verify all high alert medication drips (heparin, PCA, etc.)   E  Equipment - Suction is set up for ALL patients (with yanker)  - Red plugs utilized for all equipment (IV pumps, etc.)  - WOWs wiped down at end of shift.  - Room stocked with oxygen, suction, and other unit-specific supplies   A  Alarms - Bed alarm is set for fall risk patients  - Ensure chair alarm is in place and activated if patient is up in a chair   L  Lines - Check IV for any infiltration  - Chow bag is empty if patient has a Chow   - Tubing and IV bags are labeled   S  Safety   - Room is clean, patient is clean, and equipment is clean. - Hallways are clear from equipment besides carts. - Fall bracelet on for fall risk patients  - Ensure room is clear and free of clutter  - Suction is set up for ALL patients (with yanker)  - Hallways are clear from equipment besides carts.    - Isolation precautions followed, supplies available outside room, sign posted

## 2018-05-01 NOTE — INTERDISCIPLINARY ROUNDS
[facility] PHYSICAL REHABILITATION  [address] [place] [address], [address]    INPATIENT REHABILITATION  PRE-TEAM CONFERENCE SUMMARY     Date of Conference: 5/2/18    Name: Migel Gtz Age / Sex: 67 y.o. / male   CSN: [id number] MRN: [id number]   Admit Date: 4/30/2018 Length of Stay: 1 days     Primary Rehabilitation Diagnosis  1. Generalized Weakness with Impaired Mobility and ADLs  2. S/P Diagnostic laparoscopy followed by open appendectomy with drainage of appendiceal abscess (4/20/2018 - Dr. Jaskaran Gutiérrez)  3. History of acute appendicitis with appendiceal abscess     Comorbidities   History of stroke with residual deficit I69.30    Hypertensive heart and kidney disease without heart failure and with chronic kidney disease stage III I13.10, I56.3    Diastolic dysfunction without heart failure I51.9    Dyslipidemia E78.5    CKD (chronic kidney disease) stage 3, GFR 30-59 ml/min N18.3    History of trichomonal urethritis Z86.19    Obesity, Class I, BMI 30-34.9 E66.9    History of noncompliance with medical treatment, presenting hazards to health Z91.19    History of tobacco use Z87.891    History of vitamin D deficiency Z86.39    Hemiparesis affecting left side as late effect of cerebrovascular accident (CVA)  I69.354    Dysphagia as late effect of cerebrovascular accident (CVA) I69.391    Postoperative atrial fibrillation, resolved I97.89, I48.91    Overactive bladder N32.81    Benign prostatic hyperplasia N40.0    Postoperative urinary retention N99.89, R33.8    Postoperative confusion R41.0    Acute blood loss as cause of postoperative anemia D62    Vitamin D deficiency E55. 9          Therapy:     FIM SCORES Initial Assessment Weekly Progress Assessment 5/1/2018   Eating Functional Level: 4  Comments: Min A for cutting sandwhich and opening pop up lid drink contaiiner. Functional Level: 4  Comments: Min A for cutting sandwhich and opening pop up lid drink contaiiner. Swallowing     Grooming 4 4    Bathing   UB Bathin SBA  LB Bathin NT secondary to pt deferred. Upper Body Dressing Functional Level: 5  Items Applied/Steps Completed: Pullover (4 steps)  Comments: Setup to doff/don pullover shift seated. Functional Level: 5  Items Applied/Steps Completed: Pullover (4 steps)  Comments: Setup to doff/don pullover shift seated. Lower Body Dressing       Toileting Functional Level: 0  Comments: NT secondary to deferred session due to fatigue. Functional Level: 0  Comments: NT secondary to deferred session due to fatigue. Bladder - level of assist       Bladder - accident frequency score       Bowel - level of assist       Bowel - accident frequency score       Toilet Transfer [place] Toilet Transfer Score: 4  Comments: CGA with wide base of support. to and from [facility] bed. Toilet Transfer Score: 4  Comments: CGA with wide base of support. to and from [facility] / [facility]. Tub/Shower Transfer [place] Tub/Shower Transfer Score: 0  Comments: Pt reported he does not get into tub/shower and/or shower in home setting. He completed bathing at sink/vanity. Tub/Shower Transfer Score: 0  Comments: Pt reported he does not get into tub/shower and/or shower in home setting. He completed bathing at sink/vanity.     Comprehension Primary Mode of Comprehension: Auditory  Score: 5 Auditory  4   Expression Primary Mode of Expression: Verbal  Score: 5 Verbal  5   Social Interaction Score: 5 5   Problem Solving Score: 4 3   Memory Score: 5 3     FIM SCORES Initial Assessment Weekly Progress Assessment 2018   Bed/Chair/Wheelchair Transfers Other: stand step without AD  Transfer Assistance : 4 (Minimal assistance)  Sit to Stand Assistance: Minimal assistance Other: stand step without AD  Transfer Assistance : 4 (Minimal assistance)  Sit to Stand Assistance: Minimal assistance   Bed Mobility Rolling Right 5 (Supervision)   Rolling Left 5 (Supervision)   Supine to Sit 4 (Minimal assistance)   Sit to Stand Minimal assistance   Sit to Supine 4 (Minimal assistance)    Rolling Right   5 (Supervision)   Rolling Left   5 (Supervision)   Supine to Sit   4 (Minimal assistance)   Sit to Stand   Minimal assistance   Sit to Supine   3 (Moderate assistance)      Locomotion (W/C) Able to Propel (ft): 216 feet  Functional Level: 3  Curbs/Ramps Assist Required (FIM Score): 0 (Not tested)  Wheelchair Setup Assist Required : 3 (Moderate assistance)  Wheelchair Management: Manages left brake, Manages right brake Function 3  Setup Assistance  3 (Moderate assistance)      Locomotion (W/C distance) 216 Feet 216 feet   Locomotion (Walk) 4 (Minimal assistance) 4 (Minimal assistance)  Cane, quad   Locomotion (Walk dist.) 20 Feet (ft) (c 2 trials) 20 Feet (ft) (c 2 trials)   Steps/Stairs Steps/Stairs Ambulated (#): 0  Level of Assist : 0 (Not tested) (2/2 pt fatigue)  Rail Use:  (N/A)   Steps/Stairs Ambulated (#): 0  Level of Assist : 0 (Not tested) (2/2 pt fatigue)  Rail Use:  (N/A)         Nursing:     Neuro:   A&O x_4___                   Respiratory:   [x] WNL   [] O2   [] LPM ______   Other:  Peripheral Vascular:   [] TEDS present   [] Edema present ____ Grade   Cardiac:   [x] WNL   [] Other  Genitourinary:   [x] continent   [] incontinent   [] lew  Abdominal ___5/1____ LBM  GI: ___mech soft____ Diet __thin____ Liquids _____ tube feeds  Musculoskeletal: ____ ROM Transfers __0___ Assistive Device Used  __min__ Level of Assistance  Skin Integumentary:   [] Intact   [] Not Intact   __________Preventative Measures  Details______________________________________________________________  Pain: [x] Controlled   [] Not Controlled   Pain Meds:   [] Scheduled   [x] PRN        Registered Dietitian / Nutrition:   No data found. Pt reported fair/good appetite and meal intake since admission. Had fair meal intake PTA when in main hospital. Was received nutrition supplements.  Discussed resuming supplements; pt agreed with plan    Supplements:          [] Yes   [x] No      Amount of supplement consumed:  not applicable      Intake/Output Summary (Last 24 hours) at 05/01/18 1711  Last data filed at 05/01/18 1200   Gross per 24 hour   Intake              960 ml   Output              610 ml   Net              350 ml                                Last bowel movement: 4/30 - loose      Interdisciplinary Team Goals:     1. Goal  Encourage pt to ambulate with quad cane with CGA and minimal verbal cues for sequencing. Barrier  Impaired memory, Impaired balance, Impaired strength, Impaired gait    Intervention  Education, Balance and strength training, Gait training     2. Goal OTR/L: Encourage participation with bathing at sink/vanity to SBA to decrease burden of care. Barrier  Fatigue, balance, edema    Intervention  ADL training, TE, TA     3. Goal  Patient will tolerate advanced soft diet with thin liquids without overt s/s of aspiration or other difficulty    Barrier  mild oral-pharyngeal dysphagia, moderate cognitive linguistic deficits    Intervention  training in safe swallowing techniques, diet monitoring and modifications, language activities (word retrieval, improving responses to verbal instructions conversational skills     4. Goal  Wound will remain free of infection during rehab stay. Barrier  debility    Intervention  daily and prn dressing changes, monitor for s/s of infection     5. Goal      Barrier      Intervention       6. Goal  Po intake of meals will meet >75% of patient estimated nutritional needs within the next 7 days. Outcome:  [] Met/Ongoing    []  Not Met    [x] New/Initial Goal     Barrier  fair appetite    Intervention  Meals/snacks: modified composition; Add nutrition supplement: Ensure Enlive TID       Disposition / Discharge Planning:      Follow-up therapy services:  tbd   DME recommendations:  tbd   Estimated discharge date:  tbd   Discharge Location:  home         Electronic Signatures:      Signature Date Signed   Physical Therapist    Eric Tiwari PT, DPT 5/01/18   Occupational Therapist    Keila Osborne, OTR/L 5/2/2018   Speech Therapist    Malick Tellez, [address]  5/1/18   Recreational Therapist    Kaylah Freire, CTRS 5/2/18   Nursing    Dalton Diallo, RN  5/2/18   Dietitian    Irene Alegria, [address]  5/1/18   Clinical Psychologist    Agustín Gutierrez, Phd 5/1/18    Physician    Robby Guerrero MD   5/1/2018        Baljeet Mcqueen, MSW  5/1/18         The above information has been reviewed with the patient in a language that they can understand. Opportunity for comments and questions has been provided and a signed attestation has been scanned into the \"media tab\" of the EMR.       Patient Signature: ______________________________________________________    Date Signed: __________________________________________________________

## 2018-05-01 NOTE — PROGRESS NOTES
Problem: Self Care Deficits Care Plan (Adult)  Goal: *Therapy Goal (Edit Goal, Insert Text)  Occupational Therapy Goals   Long Term Goals  Initiated 2018 and to be accomplished within 1-2 week(s)  1. Pt will perform self-feeding with MI to . (I)  2. Pt will perform grooming with MI.  3. Pt will perform UB bathing with Setup. 4. Pt will perform LB bathing with Supervision. 5. Pt will perform tub/shower, shower stall, and/or self propel to sink/vanity Supervision. 6. Pt will perform UB dressing with Setup. 7. Pt will perform LB dressing with Supervision. 8. Pt will perform toileting task with Supervision. 9. Pt will perform toilet transfer with Supervision. Short Term Goals   Initiated 2018 and to be accomplished within 7 day(s) 2018  1. Pt will perform self-feeding with MI to (I). 2. Pt will perform grooming with MI.  3. Pt will perform UB bathing with Setup. 4. Pt will perform LB bathing with Min A with AE as needed. 5. Pt will perform tub/shower, shower stall, and/or self propel to sink/vanity Min A.   6. Pt will perform UB dressing with Setup. 7. Pt will perform LB dressing with Mod A.  8. Pt will perform toileting task with Mod A.  9. Pt will perform toilet transfer with SBA. Occupational Therapy EVALUATION    Patient: Brendan Bean 67 y.o.   Date: 2018  Primary Diagnosis: Dibility  Acute appendicitis with peritoneal abscess    Patient identified with name and : Yes    Past Medical History:   Past Medical History:   Diagnosis Date    Acute blood loss as cause of postoperative anemia 2018    Benign prostatic hyperplasia     CKD (chronic kidney disease) stage 3, GFR 30-59 ml/min 5794    Diastolic dysfunction without heart failure 2015    Grade 1 diastolic dysfunction on 2D ECHO done 2015    Dyslipidemia 2015    Dysphagia as late effect of cerebrovascular accident (CVA) 2015    Hemiparesis affecting left side as late effect of cerebrovascular accident (CVA) ([facility] [address] 9/1/2015    History of noncompliance with medical treatment, presenting hazards to health 9/1/2015    History of stroke with residual deficit 9/1/2015    Acute Ischemic Stroke (early subacute infarct at the posterior right lentiform nucleus) with residual left hemiparesis and dysphagia     History of tobacco use 9/1/2015    History of trichomonal urethritis 9/1/2015    History of vitamin D deficiency 9/6/2015    Hypertensive heart and kidney disease without heart failure and with chronic kidney disease stage III 9/2/2015    Obesity, Class I, BMI 30-34.9 9/1/2015    Overactive bladder     Postoperative atrial fibrillation ([facility] [address] 4/21/2018    Resolved    Postoperative confusion 4/22/2018    Postoperative urinary retention 4/22/2018    Vitamin D deficiency 5/1/2018    Vitamin D 25-Hydroxy (5/1/2018) = 17.9      Precautions: Fall risk; aspiration precautions; pressure injury risk    Time In: 1136  Time Out[de-identified] 1235    Pain:  Pt reports 0/10 pain or discomfort prior to treatment. Pt reports 0/10 pain or discomfort post treatment. Vitals@ start: /76, HR 69, sp02 99%  Vitals @end: /76, HR 70, sp02 100%    SUBJECTIVE:   Patient reported throughout eval/tx he just does not have enough energy. OBJECTIVE DATA SUMMARY:   Initiation of therapy pt asleep in wc in no distress. Pt was midly easily awaken from his slumber. OT provided introduction and purpose of skilled OT servies for evaluation as well as intervention. Pt presented as fatigued as start of session but required minimal encouragement to participate.      [x]  Right hand dominant   []  Left hand dominant    Therapy Diagnosis:   Difficulty with ADLs  [x]     Difficulty with functional transfers  []     Difficulty with ambulation  [x]     Difficulty with IADLs  [x]       Problem List:    Decreased strength B UE  []     Decreased strength trunk/core  []     Decreased AROM   [x]     Decreased endurance  [x]     Decreased balance sitting  [x]     Decreased balance standing  []     Pain   []     Decreased PROM  []       Functional Limitations:   Decreased independence with ADL  [x]     Decreased independence with functional transfers  []     Decreased independence with ambulation  [x]     Decreased independence with IADL  [x]       Previous Functional Level:      Home Environment: Home Situation  Home Environment: Private residence  # Steps to Enter:  2  Rails to Enter:   Wheelchair Ramp:   One/Two Story Residence: Two story  Living Alone: No - reside with sister  Support Systems:Family member(s)  Patient Expects to be Discharged to[de-identified] Private residence  Current DME Used/Available at Home: Cane, quad  Barriers to Learning/Limitations: yes;  sensory deficits-vision/hearing/speech and physical  Compensate with: visual, verbal, tactile, kinesthetic cues/model    Outcome Measures:      MMT Initial Assessment   Right Upper Extremity  Left Upper Extremity    UE AROM Limitations at shoulder girdle which affected distal GM mvts of right extremity Limitations at shoulder girdle which affected distal GM mvts of left extremity   Shoulder flexion 5/5 MMT 5/5 MMT   Shoulder extension 5/5 MMT 5/5 MMT   Shoulder ABDuction 5/5 MMT 5/5 MMT   Shoulder ADDUction 4+/5 MMT 4+/5 MMT   Elbow Flexion 5/5 MMT 5/5 MMT   Elbow Extension 5/5 MMT 5/5 MMT   Wrist Extension/Flexion [facility]    Intact Intact   0/5 No palpable muscle contraction  1/5 Palpable muscle contraction, no joint movement  2-/5 Less than full range of motion in gravity eliminated position  2/5 Able to complete full range of motion in gravity eliminated position  2+/5 Able to initiate movement against gravity  3-/5 More than half but not full range of motion against gravity  3/5 Able to complete full range of motion against gravity  3+/5 Completes full range of motion against gravity with minimal resistance  4-/5 Completes full range of motion against gravity with minimal resistance  4/5 Completes full range of motion against gravity with moderate resistance  5/5 Completes full range of motion against gravity with maximum resistance    Sensation: BUE intact - no limitations noted current date  Coordination: Left UE limitations with fine pinch and opposition. Left UE difficulty isolating fingers. Right UE intact. FIM SCORES Initial Assessment   Bladder - level of assist     Bladder - accident frequency score     Bowel - level of assist     Bowel - accident frequency score     Please see [facility] Interdisciplinary Eval: Coordination/Balance Section for details regarding FIM score description. COGNITION/PERCEPTION Initial Assessment   Premorbid Reading Status     Premorbid Writing Status     Arousal/Alertness  Generalized responses   Orientation Level Oriented X4   Visual Fields  Appears Intact for near and far    Praxis  Impaired - difficulty motor planning   Body Scheme  Appears Intact     COMPREHENSION MODE Initial Assessment   Primary Mode of Comprehension Auditory   Hearing Aide  None   Corrective Lenses  Reading glasses   Score  5     EXPRESSION Initial Assessment   Primary Mode of Expression Verbal   Score  4   Comments  Pt does not initiate conversation secondary to potential reside communication difficulty or unfamiliarity with staff members. Pt able to identify when he is fatigued but does not communicate a need for rest breaks. Pt does not appear to self advocate with concerns of medical needs. SOCIAL INTERACTION/PRAGMATICS Initial Assessment   Score  5   Comments  Appropriate with social interactions with no inappropriate comments/statements. Reserve demeanor. PROBLEM SOLVING Initial Assessment   Score  4   Comments  Require vcs for problem solving aspects of self care. MEMORY Initial Assessment   Score  4   Comments Demonstrates that he is not 100% consistent with answers about PLOF and other medical questions.       EATING Initial Assessment   Functional Level 4   Comments Min A for cutting sandwhich and opening pop up lid drink contaiiner. GROOMING Initial Assessment   Functional Level 4    Oral Hygiene FIM: 5   Tasks completed by patient Shaved/applied makeup (optional), Washed face. Oral care completed seated at sink. Comments Require min assist for shaving secondary to fatigue. Setup for face wash seated in wc. BATHING Initial Assessment   Functional Level UB bathin SBA  LB bathin NT secondary to pt deferred. Body parts patient bathed Abdomen, Arm, left, Arm, right, Chest   Comments UB bathing tested on current date as SBA. LB bathing deferred secondary to pt's fatigue. TUB/SHOWER TRANSFER INDEPENDENCE Initial Assessment   Score 0   Comments Pt reported he does not get into tub/shower and/or shower in home setting. He completed bathing at sink/vanity. UPPER BODY DRESSING/UNDRESSING Initial Assessment   Functional Level 5   Items applied/Steps completed Pullover (4 steps)   Comments Setup to doff/don pullover shift seated. LOWER BODY DRESSING/UNDRESSING Initial Assessment   Functional Level 0 NT     Sock and/or Shoe Management FIM: 1   Items applied/Steps completed  Right sock (1 step)   Comments   Doff and don of pants NT secondary to pt deferred from fatigue. TOILETING Initial Assessment   Functional Level 0   Comments NT secondary to deferred session due to fatigue. TOILET TRANSFER INDEPENDENCE Initial Assessment   Transfer score 4   Comments CGA with wide base of support. to and from [facility] bed.       INSTRUMENTAL ADL Initial Assessment (PLOF)   Meal preparation  Unknown   Homemaking  Unknown   Medicine Management  Unknown   Financial Management  Unknown        ASSESSMENT :  Based on the objective data described below, the patient presents with increase need for physical assistance for self care ADLs/IADLs, deficits in UE strength, deficits in endurance, impaired standing balance with therapeutic activity/self care, limitationed mobility and impaired cognition. Pt reside with his sister in a private home. Pt has good support system in the [place] area. Have a caregiver that attends to his self care needs 5 days per wk from 10 am to 4 pm. Sister assist with IADLs within/around the home. Pt reported he was ambulated with wide base cane prior to hospitalization. Pt is a risk for pressure ulcer at bony prominences so OT encourage Q2-4 hrs postural readjustments. Noted min-mod pitting edema in BLEs so recommendation to don compression stocking day/night, doff for self care bathing. Pt would benefit from skilled occupational therapy in order to improve independent functional mobility/ADLs,/IADLs within the home. Interventions may include range of motion (AROM, PROM B UE), motor function (B UE/ strengthening/coordination), activity tolerance (vitals, oxygen saturation levels), balance training, ADL/IADL training and functional transfer training. Please see [facility]; Interdisciplinary Eval, Care Plan, and Patient Education for further information regarding occupational therapy examination and plan of care. PLAN :  Recommendations and Planned Interventions:  [x]               Self Care Training                   [x]        Therapeutic Activities  [x]               Functional Mobility Training    [x]        Cognitive Retraining  [x]               Therapeutic Exercises            [x]        Endurance Activities  [x]               Balance Training                     []        Neuromuscular Re-Education  []               Visual/Perceptual Training      [x]   Home Safety Training  [x]               Patient Education                    [x]        Family Training/Education  []               Other (comment):    Frequency/Duration: Patient will be followed by occupational therapy 1-2 times per day/4-7 days per week to address goals. Discharge Recommendations:  To Be Determined  Further Equipment Recommendations for Discharge: N/A     Please refer to the flowsheet for vital signs taken during this treatment. After treatment:   [x] Patient left in no apparent distress sitting up in wheelchair in dinning room for lunch  [] Patient left in no apparent distress in bed  [] Call bell left within reach  [x] Nursing notified  [] Caregiver present  [] Bed alarm activated    COMMUNICATION/EDUCATION:   [] Home safety education was provided and the patient/caregiver indicated understanding. [] Patient/family have participated as able in goal setting and plan of care. [] Patient/family agree to work toward stated goals and plan of care. [x] Patient understands intent and goals of therapy, but is neutral about his/her participation. [] Patient is unable to participate in goal setting and plan of care. Order received from MD for occupational therapy services and chart reviewed. Pt to be seen 5 times per week for 3 hours of total therapy per day for 2 weeks.   Thank you for the referral.    Thank you for this referral.  Ketty Camara OTR/GARO

## 2018-05-02 LAB
ALBUMIN SERPL-MCNC: 2.4 G/DL (ref 3.4–5)
ALBUMIN/GLOB SERPL: 0.6 {RATIO} (ref 0.8–1.7)
ALP SERPL-CCNC: 76 U/L (ref 45–117)
ALT SERPL-CCNC: 90 U/L (ref 16–61)
AST SERPL-CCNC: 50 U/L (ref 15–37)
BILIRUB DIRECT SERPL-MCNC: 0.1 MG/DL (ref 0–0.2)
BILIRUB SERPL-MCNC: 0.3 MG/DL (ref 0.2–1)
CHOLEST SERPL-MCNC: 112 MG/DL
CK SERPL-CCNC: 103 U/L (ref 39–308)
GLOBULIN SER CALC-MCNC: 4.2 G/DL (ref 2–4)
HDLC SERPL-MCNC: 26 MG/DL (ref 40–60)
HDLC SERPL: 4.3 {RATIO} (ref 0–5)
LDLC SERPL CALC-MCNC: 59.4 MG/DL (ref 0–100)
LIPID PROFILE,FLP: ABNORMAL
PROT SERPL-MCNC: 6.6 G/DL (ref 6.4–8.2)
TRIGL SERPL-MCNC: 133 MG/DL (ref ?–150)
VLDLC SERPL CALC-MCNC: 26.6 MG/DL

## 2018-05-02 PROCEDURE — 80076 HEPATIC FUNCTION PANEL: CPT | Performed by: INTERNAL MEDICINE

## 2018-05-02 PROCEDURE — 82550 ASSAY OF CK (CPK): CPT | Performed by: INTERNAL MEDICINE

## 2018-05-02 PROCEDURE — 74011250637 HC RX REV CODE- 250/637: Performed by: INTERNAL MEDICINE

## 2018-05-02 PROCEDURE — 97542 WHEELCHAIR MNGMENT TRAINING: CPT

## 2018-05-02 PROCEDURE — 36415 COLL VENOUS BLD VENIPUNCTURE: CPT | Performed by: INTERNAL MEDICINE

## 2018-05-02 PROCEDURE — 97116 GAIT TRAINING THERAPY: CPT

## 2018-05-02 PROCEDURE — 97535 SELF CARE MNGMENT TRAINING: CPT

## 2018-05-02 PROCEDURE — 92610 EVALUATE SWALLOWING FUNCTION: CPT

## 2018-05-02 PROCEDURE — 80061 LIPID PANEL: CPT | Performed by: INTERNAL MEDICINE

## 2018-05-02 PROCEDURE — 65310000000 HC RM PRIVATE REHAB

## 2018-05-02 PROCEDURE — 97530 THERAPEUTIC ACTIVITIES: CPT

## 2018-05-02 RX ORDER — SIMVASTATIN 20 MG/1
20 TABLET, FILM COATED ORAL
Status: DISCONTINUED | OUTPATIENT
Start: 2018-05-02 | End: 2018-05-07

## 2018-05-02 RX ADMIN — TERAZOSIN HYDROCHLORIDE 1 MG: 1 CAPSULE ORAL at 18:41

## 2018-05-02 RX ADMIN — FERROUS SULFATE TAB 325 MG (65 MG ELEMENTAL FE) 325 MG: 325 (65 FE) TAB at 08:48

## 2018-05-02 RX ADMIN — AMLODIPINE BESYLATE 10 MG: 10 TABLET ORAL at 08:48

## 2018-05-02 RX ADMIN — FLUCONAZOLE 400 MG: 200 TABLET ORAL at 08:48

## 2018-05-02 RX ADMIN — EZETIMIBE 10 MG: 10 TABLET ORAL at 08:48

## 2018-05-02 RX ADMIN — DOCUSATE SODIUM 100 MG: 100 CAPSULE, LIQUID FILLED ORAL at 08:48

## 2018-05-02 RX ADMIN — CHOLECALCIFEROL CAP 125 MCG (5000 UNIT) 5000 UNITS: 125 CAP at 08:48

## 2018-05-02 RX ADMIN — Medication 250 MG: at 08:48

## 2018-05-02 RX ADMIN — LACTOBACILLUS TAB 2 TABLET: TAB at 08:48

## 2018-05-02 RX ADMIN — LACTOBACILLUS TAB 2 TABLET: TAB at 18:41

## 2018-05-02 RX ADMIN — TAMSULOSIN HYDROCHLORIDE 0.4 MG: 0.4 CAPSULE ORAL at 08:48

## 2018-05-02 RX ADMIN — ASPIRIN 81 MG 81 MG: 81 TABLET ORAL at 08:48

## 2018-05-02 RX ADMIN — SIMVASTATIN 20 MG: 20 TABLET, FILM COATED ORAL at 21:02

## 2018-05-02 RX ADMIN — CYANOCOBALAMIN TAB 1000 MCG 1000 MCG: 1000 TAB at 08:48

## 2018-05-02 NOTE — PROGRESS NOTES
Problem: Self Care Deficits Care Plan (Adult)  Goal: *Therapy Goal (Edit Goal, Insert Text)  Occupational Therapy Goals   Long Term Goals  Initiated 2018 and to be accomplished within 1-2 week(s)  1. Pt will perform self-feeding with MI to . (I)  2. Pt will perform grooming with MI.  3. Pt will perform UB bathing with Setup. 4. Pt will perform LB bathing with Supervision. 5. Pt will perform tub/shower, shower stall, and/or self propel to sink/vanity Supervision. 6. Pt will perform UB dressing with Setup. 7. Pt will perform LB dressing with Supervision. 8. Pt will perform toileting task with Supervision. 9. Pt will perform toilet transfer with Supervision. Short Term Goals   Initiated 2018 and to be accomplished within 7 day(s) 2018  1. Pt will perform self-feeding with MI to (I). 2. Pt will perform grooming with MI.  3. Pt will perform UB bathing with Setup. 4. Pt will perform LB bathing with Min A with AE as needed. 5. Pt will perform tub/shower, shower stall, and/or self propel to sink/vanity Min A.   6. Pt will perform UB dressing with Setup. 7. Pt will perform LB dressing with Mod A.  8. Pt will perform toileting task with Mod A.  9. Pt will perform toilet transfer with SBA. Occupational Therapy TREATMENT    Patient: Cate Montgomery   67 y.o. Patient identified with name and : yes    Date: 2018    First Tx Session  Time In: 0  Time Out[de-identified]     Diagnosis: Dibility  Acute appendicitis with peritoneal abscess   Precautions: Aspiration, Fall  Chart, occupational therapy assessment, plan of care, and goals were reviewed. Pain:  Pt reports 0/10 pain or discomfort prior to treatment. Pt reports 0/10 pain or discomfort post treatment. Intervention Provided: none      SUBJECTIVE:   Patient stated  I want to go godwin, I have been in the hospital since the 19 of last month.     OBJECTIVE DATA SUMMARY:   Pt seen supine in bed with nursing performing toileting tasks and breezy brief. OT took over from there having pt performing LB dressing, see below for functional dressing. THERAPEUTIC ACTIVITY Daily Assessment    Pt participated with 20 piece puzzle, req'd min/mod assist to assemble puzzle. GROOMING Daily Assessment    Grooming  Grooming Assistance : 5 (Supervision)  Comments: Pt cues to apply toothpaste to brush before brushing teeth. Then performed grooming independent. UPPER BODY DRESSING Daily Assessment    Upper Body Dressing   Dressing Assistance : 5 (Supervision)  Comments: Pt doff/angel shirt with supervision from wheel chair level     LOWER BODY DRESSING Daily Assessment    Lower Body Dressing   Dressing Assistance : 4 (Contact guard assistance)  Leg Crossed Method Used: No  Position Performed: Long sitting on bed  Comments: Pt bridged hips with cues donning pants in long sitting. pt then rolled to the right, cues to performers internal rotation with right hand to donned pants over buttocks. Pt crossed LE's doffing and donning sock from wheel chair level. MOBILITY/TRANSFERS Daily Assessment     Pt needed min assist with bed mobility from side line into upright sitting posture EOB. Min/CGA with transfer to wheel chair. ASSESSMENT: Pt demonstrates decrease independency with self care. Demonstrates cognitive deficit with problem solving. Progression toward goals:  []          Improving appropriately and progressing toward goals  [x]          Improving slowly and progressing toward goals  []          Not making progress toward goals and plan of care will be adjusted     PLAN:  Patient continues to benefit from skilled intervention to address the above impairments. Continue treatment per established plan of care. Discharge Recommendations:  TBD  Further Equipment Recommendations for Discharge:  TBD. Activity Tolerance:  fair    Estimated LOS: 5/15    Please refer to the flow sheet for vital signs taken during this treatment.   After treatment:   [x]  Patient left in no apparent distress sitting up in chair   []  Patient left in no apparent distress in bed  [x]  Call bell left within reach  []  Nursing notified  []  Caregiver present  []  Bed alarm activated    COMMUNICATION/EDUCATION:   [] Home safety education was provided and the patient/caregiver indicated understanding. [x] Patient/family have participated as able in goal setting and plan of care. [] Patient/family agree to work toward stated goals and plan of care. [] Patient understands intent and goals of therapy, but is neutral about his/her participation. [] Patient is unable to participate in goal setting and plan of care.       Cesar Orosco ROSE/L  5/02/2018

## 2018-05-02 NOTE — PROGRESS NOTES
conducted an initial consultation and Spiritual Assessment for Malathi Ruano, who is a 67 y. o.,male. Patients Primary Language is: [place]. According to the patients EMR Islam Affiliation is: [facility].     The reason the Patient came to the hospital is:   Patient Active Problem List    Diagnosis Date Noted    Vitamin D deficiency 05/01/2018    Overactive bladder     Benign prostatic hyperplasia     Postoperative urinary retention 04/22/2018    Postoperative confusion 04/22/2018    Status post appendectomy 04/21/2018    [facility] 04/21/2018    Acute blood loss as cause of postoperative anemia 04/21/2018    Impaired mobility and ADLs 04/20/2018    Generalized weakness 04/20/2018    Acute appendicitis with peritoneal abscess 04/20/2018    History of vitamin D deficiency 09/06/2015    Hypertensive heart and kidney disease without heart failure and with chronic kidney disease stage III 15/82/5578    Diastolic dysfunction without heart failure 09/02/2015    Dyslipidemia 09/02/2015    History of stroke with residual deficit 09/01/2015    CKD (chronic kidney disease) stage 3, GFR 30-59 ml/min 09/01/2015    History of trichomonal urethritis 09/01/2015    Obesity, Class I, BMI 30-34.9 09/01/2015    History of noncompliance with medical treatment, presenting hazards to health 09/01/2015    History of tobacco use 09/01/2015    Hemiparesis affecting left side as late effect of cerebrovascular accident (CVA) ([facility] 09/01/2015    Dysphagia as late effect of cerebrovascular accident (CVA) 09/01/2015        The  provided the following Interventions:  Initiated a relationship of care and support. Explored issues of matthew, spirituality and/or Shinto needs while hospitalized. Listened empathically. Provided chaplaincy education. Provided information about Spiritual Care Services. Offered prayer and assurance of continued prayers on patient's behalf. Chart reviewed. The following outcomes were achieved:  Patient shared some information about their medical narrative and spiritual journey/beliefs. Patient processed feeling about current hospitalization. Patient expressed gratitude for the 's visit. Assessment:  Patient did not indicate any spiritual or Alevism issues which require Spiritual Care Services interventions at this time. Patient does not have any Alevism/cultural needs that will affect patients preferences in health care. Plan:  Chaplains will continue to follow and will provide pastoral care on an as needed or requested basis.  recommends bedside caregivers page  on duty if patient shows signs of acute spiritual or emotional distress.   Ivelisse Latham, [address] [facility]  Spiritual Care  [phone]

## 2018-05-02 NOTE — CONSULTS
ARU PSYCHOLOGICAL SCREENING    Assessment Initiated:  May 1, 2018    Rehab Diagnosis:  General Debility secondary to Peritoneal Abscess/Appendectomy      Pertinent Physical/Psychiatric History:     Patient Active Problem List   Diagnosis Code    History of stroke with residual deficit I69.30    Hypertensive heart and kidney disease without heart failure and with chronic kidney disease stage III I13.10, C35.0    Diastolic dysfunction without heart failure I51.9    Dyslipidemia E78.5    CKD (chronic kidney disease) stage 3, GFR 30-59 ml/min N18.3    History of trichomonal urethritis Z86.19    Obesity, Class I, BMI 30-34.9 E66.9    History of noncompliance with medical treatment, presenting hazards to health Z91.19    History of tobacco use Z87.891    Impaired mobility and ADLs Z74.09    History of vitamin D deficiency Z86.39    Generalized weakness R53.1    Acute appendicitis with peritoneal abscess K35.3    Status post appendectomy Z90.49    Hemiparesis affecting left side as late effect of cerebrovascular accident (CVA) ([facility] Y92.563    Dysphagia as late effect of cerebrovascular accident (CVA) I69.391    Postoperative atrial fibrillation (HCC) I97.89, I48.91    Overactive bladder N32.81    Benign prostatic hyperplasia N40.0    Postoperative urinary retention N99.89, R33.8    Postoperative confusion R41.0    Acute blood loss as cause of postoperative anemia D62    Vitamin D deficiency E55.9       Patient denies current psychiatric services and is not Rx psychotropic medication on admit to ARU. Patient acknowledges smoking five cigarettes per day, denies alcohol and other substance use. There is report of some \"non-compliance\" with healthcare and he will be educated about healthcare compliance issues.       OBJECTIVE  GENERAL OBSERVATIONS  Willingness to participate in program: [] good   [x] fair [] indifferent [] poor    General Appearance:  Patient appears casually and appropriately dressed and groomed, lying supine in bed. Sensory Impairments:  Patient is s/p CVA and has very poor breath support impacting speech volume and intelligibility. Scientology Affiliation:  [facility]    Admission Assessment  Discharge Status   [x] alert  [] lethargic  [] difficult to arouse  [] fluctuating  [] other: Level of Consciousness [x] alert  [] lethargic  [] difficult to arouse  [] fluctuating  [] other:   [x] person  [x] place  [x] time  [x] situation Oriented [x] person  [x] place  [x] time  [x] situation   [x] within normal limits  [] impaired       [] mild        [] moderate        [] severe Attention [x] within normal limits  [x] impaired       [x] mild        [] moderate        [] severe   [] within normal limits  [x] impaired       [x] mild        [] moderate        [] severe Memory [] within normal limits  [x] impaired       [x] mild        [] moderate        [] severe   [] appropriate to situation  [x] depressed  [] anxious  [] angry   [] fearful  [] emotionally labile  [x] other: Frustrated with prolonged hospitalization Mood [] appropriate to situation  [x] depressed  [] anxious  [] angry   [] fearful  [] emotionally labile  [] other:   [] appropriate  [x] flat  [] inappropriate to content of speech Affect [] appropriate  [x] flat  [] inappropriate to content of speech   [] appropriate  [] aggressive/agitated  [x] withdrawn  [] inappropriate  [] other: Behavior [] appropriate  [] aggressive/agitated  [x] withdrawn  [] inappropriate  [] other:   [] good  [x] limited  [] denial  [] none Insight Into Illness [] good  [x] limited  [] denial  [] none   [x] intact  [x] impaired       [x] mild        [] moderate        [] severe       Describe: Patient will benefit from cues and prompts, especially for recall of novel and/or complex treatment information.  Cognition [x] intact  [x] impaired       [x] mild        [] moderate        [] severe       Describe:    [x] coping  [] demonstrates poor adjustment  [] undetermined       As evidenced by: But, he describes his frustration with prolonged recovery. Patient Adjustment to Disability [] coping  [x] demonstrates poor adjustment  [] undetermined       As evidenced by: Mood stabilizing medication was introduced. [] coping  [] demonstrates poor adjustment  [x] undetermined      As evidenced by: Not available on evaluation. Family Adjustment to Disability [] coping  [] demonstrates poor adjustment  [x] undetermined      As evidenced by:      ASSESSMENT  Clinical Impression:  Patient is a 67year old, , having no children, retired ( at [employer]), [address] gentleman. He resides with his sister in one level residence with one step to enter in [place]. Patient is s/p CVA in 2015 and insists that he had been managing himself independently for self care; he is eager to return to home and his baseline of daily activities. Patient seems motivated to participate in therapy program on ARU and will be further educated about the parameters of his recovery as well as encouraged to persevere. Emotionally, he presents relatively quietly and passively; he is not observed to be in acute distress but describes himself as feeling mildly depressed and frustrated with prolonged hospitalization. He indicated: \"I want to get home. \"  In fact, he seems to feel very comfortable living with his sister and broadly smiled when he referred to her; all of their other siblings are . Patient is not currently receiving any psychiatric services and is not requiring psychotropic medication on admit to ARU. Patient was encouraged to dialogue with therapists to best understand their recommendations for length of stay on ARU, and in order for him to have realistic expectations for himself in his recovery. He will be monitored for any emotional and/or behavioral difficulties while on ARU. Cognitively, patient is alert and oriented.   He is attentive during interview, but found to have some delay in recall for new and/or novel information. He will certainly benefit from cues and prompts to maximize his recall and for most effective treatment participation. Patient Strengths:  Alert, oriented, cooperative and motivated to improve and return to home. Patient Preferences:  Patient is expecting to return to home with his sibling. Rehab Potential:  Good, depending on endurance. Educational Needs: Under each heading list the specific items in which the patient or family will need education/training. Example: hip precautions, use of walker, ADL equipment, neglect, judgment, adjustment, etc.     Special considerations or accommodations for teaching:  [x] Yes     [] No     [] NA  If Yes, explain: Patient describes feeling frustrated about prolonged hospitalization. Discharge Status    Completed Demonstrated/ Verbalized Understanding    Yes No Yes No   Info regarding disability: Limited insight about recovery [x] [] [x] []   Adjustment: Increased dependency [x] [] [x] []   Cognition: Recall of new information [x] [] [x] []   Other: Self confidence and self esteem [x] [] [x] []   Other: Situational stress with feelings of \"frustration\" [x] [] [x] []   If education not completed, explain: [] [] [] []     PLAN  Problem: Limited insight about recovery  Long Term Goal: Increase insight about recovery  Intervention: Patient education  At Discharge - LTG Achieved: [x] Yes [] No If not achieved, explain:    Problem:  Forced dependency in recovery  Long Term Goal: Accept increased dependency  Intervention: patient education and support   At Discharge - LTG Achieved: [x] Yes [] No If not achieved, explain:    Problem: Recall  Long Term Goal: Maximize recall  Intervention: Cues, prompts and redirection  At Discharge - LTG Achieved: [x] Yes [] No If not achieved, explain:    Problem: Feelings of frustration  Long Term Goal: Minimize subjective distress  Intervention: Support  and cognitive re-framing  At Discharge - LTG Achieved: [] Yes [x] No If not achieved, explain: Patient Rx mood stabilizing medication    Problem: Self confidence  Long Term Goal: Maximize self confidence  Intervention: Positive reinforcement and support   At Discharge - LTG Achieved: [] Yes [x] No If not achieved, explain: Patient struggling with recovery issues on ARU    Kike Stovall, [facility]  5/2/2018 8:49 AM    DISCHARGE STATUS    Clinical Impressions: Patient was found to be depressed while in treatment on ARU and therefore Rx to initiate mood stabilizing medication by Dr. Akiko Barron. However, he made satisfactory progress so that he was able to return to home with his immediate family and he was satisfied with decision for same. No acute distress identified on discharge.     Follow-up Services Recommended Purpose                 Kike Stovall, PHD  Discharge Date/Time:

## 2018-05-02 NOTE — REHAB NOTE
[facility] PHYSICAL REHABILITATION  [address] [place] [address], [address]     [address]  OVERALL PLAN OF CARE    Name: Harika Kelly CSN: [id number]   Age: 67 y.o. MRN: [id number]   Sex: male Admit Date: 4/30/2018     Primary Rehabilitation Diagnosis  1. Generalized Weakness with Impaired Mobility and ADLs  2. S/P Diagnostic laparoscopy followed by open appendectomy with drainage of appendiceal abscess (4/20/2018 - Dr. Amilcar Argueta)  3. History of acute appendicitis with appendiceal abscess      Comorbidities   History of stroke with residual deficit I69.30    Hypertensive heart and kidney disease without heart failure and with chronic kidney disease stage III I13.10, G27.9    Diastolic dysfunction without heart failure I51.9    Dyslipidemia E78.5    CKD (chronic kidney disease) stage 3, GFR 30-59 ml/min N18.3    History of trichomonal urethritis Z86.19    Obesity, Class I, BMI 30-34.9 E66.9    History of noncompliance with medical treatment, presenting hazards to health Z91.19    History of tobacco use Z87.891    History of vitamin D deficiency Z86.39    Hemiparesis affecting left side as late effect of cerebrovascular accident (CVA)  I69.354    Dysphagia as late effect of cerebrovascular accident (CVA) I74.200    Postoperative atrial fibrillation (HCC) I97.89, I48.91    Overactive bladder N32.81    Benign prostatic hyperplasia N40.0    Postoperative urinary retention N99.89, R33.8    Postoperative confusion R41.0    Acute blood loss as cause of postoperative anemia D62    Vitamin D deficiency E55. 9        ANTICIPATED INTERVENTIONS THAT SUPPORT THE MEDICAL NECESSITY OF THIS ADMISSION:    I. Physical Therapy              O. Intensity: 1 hour per day              B. Frequency: 5 times per week              C. Duration: 7-10 days              D. Short/Long Term Goals:    1.   Patient will move from scoot up and down and roll side to side in bed with modified independence. 2.  Patient will move from supine <> sit with supervision. 3.  Patient will transfer from bed to chair and chair to bed with supervision/set-up using the least restrictive device. 4.  Patient will perform sit to stand with supervision/set-up. 5.  Patient will ambulate with supervision/set-up for 50 feet with the least restrictive device. 6.  Patient will ascend/descend 2 curbs/thresholds with [facility] with contact guard assist.   E. Interventions: Interventions may include range of motion (AROM, PROM B LE/trunk), motor function (B LE/trunk strengthening/coordination), activity tolerance (vitals, oxygen saturation levels), bed mobility training, balance activities, gait training (progressive ambulation program), and functional transfer training. II. Occupational Therapy  21 . Intensity: 1 hour per day              B. Frequency: 5 times per week              C. Duration: 2 weeks              D. Long Term Goals:    1. Pt will perform self-feeding with MI to . (I)    2. Pt will perform grooming with MI.    3. Pt will perform UB bathing with Setup. 4. Pt will perform LB bathing with Supervision. 5. Pt will perform tub/shower, shower stall, and/or self propel to sink/vanity Supervision. 6. Pt will perform UB dressing with Setup. 7. Pt will perform LB dressing with Supervision. 8. Pt will perform toileting task with Supervision. 9. Pt will perform toilet transfer with Supervision. E. Interventions: Interventions may include range of motion (AROM, PROM B UE), motor function (B UE/ strengthening/coordination), activity tolerance (vitals, oxygen saturation levels), balance training, ADL/IADL training and functional transfer training. III. Speech Therapy              A. Intensity: 1-2 times per day              B. Frequency: 4-7 times per week              C. Duration: 2 weeks              D. Long Term Goals:    1.  Patient will tolerate least restrictive diet without overt s/s of aspiration or other difficulty x 4/5 meals. .    2. Patient will utilize safe swallowing techniques with supervision. 3. Patient will follow moderately complex commands (manipulating objects) with 80-90% accuracy. 4. Patient will respond to treatment tasks in full sentences, supervision. 5. Patient will name 8-10 items within concrete categories. 6. Patient will be oriented x 3 and recall events of the day, min assist.    7. Patient will perform functional problem solving tasks with 80-90% accuracy. E. Interventions: SLP will follow daily to address language skills, cognitive retraining and dyspahgia. PHYSICIAN'S ASSESSMENT OF FINDINGS:    Are the established goals sufficient for achieving the optimal level of function? [x]  Yes      []  No    What changes would you recommend to the goals as written? None      Are the interventions noted sufficient for achieving the optimal level of function? [x]  Yes      []  No    What changes would you recommend to the interventions noted? If therapy staff is unable to provide 3 hr of total therapy per day in 5 days due to medical issues or decreased patient tolerance, may modify treatment schedule to 15 hr/week.       Estimated length of stay: 2 weeks      Medical rehabilitation prognosis:    []  Excellent     [x]  Good     []  Fair     []  Guarded       Discharge Destination:     [x]  Home     []  [address]     []  [place]     []  [address]     []  [place]     []  Other:       Signed:    Edwin Robin MD    May 2, 2018

## 2018-05-02 NOTE — PROGRESS NOTES
SHIFT CHANGE NOTE FOR [facility]    Bedside and Verbal shift change report given to Violetta ADKINS (oncoming nurse) by Giana Novoa (offgoing nurse). Report included the following information SBAR, Kardex, MAR and Recent Results.     Situation:   Code Status: Full Code   Reason for Admission: [facility] Day: 2   Problem List:   Hospital Problems  Date Reviewed: 5/1/2018          Codes Class Noted POA    Vitamin D deficiency (Chronic) ICD-10-CM: E55.9  ICD-9-CM: 268.9  5/1/2018 Yes    Overview Signed 5/1/2018 10:01 AM by Morena Vasquez MD     Vitamin D 25-Hydroxy (5/1/2018) = 17.9              Overactive bladder (Chronic) ICD-10-CM: N32.81  ICD-9-CM: 596.51  Unknown Yes        Benign prostatic hyperplasia (Chronic) ICD-10-CM: N40.0  ICD-9-CM: 600.00  Unknown Yes        Postoperative urinary retention ICD-10-CM: N99.89, R33.8  ICD-9-CM: 997.5  4/22/2018 Yes        Postoperative confusion ICD-10-CM: R41.0  ICD-9-CM: 293.9  4/22/2018 Yes        Status post appendectomy ICD-10-CM: Z90.49  ICD-9-CM: V45.89  4/21/2018 Yes    Overview Addendum 4/30/2018  4:41 PM by Morena Vasquez MD     S/P Diagnostic laparoscopy followed by open appendectomy with drainage of appendiceal abscess (4/20/2018 - Dr. Félix Aguilera)             Acute blood loss as cause of postoperative anemia ICD-10-CM: D62  ICD-9-CM: 285.1  4/21/2018 Yes        Generalized weakness ICD-10-CM: R53.1  ICD-9-CM: 780.79  4/20/2018 Yes        * (Principal)Acute appendicitis with peritoneal abscess ICD-10-CM: K35.3  ICD-9-CM: 540.1  4/20/2018 Yes        Hypertensive heart and kidney disease without heart failure and with chronic kidney disease stage III (Chronic) ICD-10-CM: I13.10, N18.3  ICD-9-CM: 404.90, 585.3  9/2/2015 Yes              Background:   Past Medical History:   Past Medical History:   Diagnosis Date    Acute blood loss as cause of postoperative anemia 4/21/2018    Benign prostatic hyperplasia     CKD (chronic kidney disease) stage 3, GFR 30-59 ml/min 8/5/3162    Diastolic dysfunction without heart failure 9/2/2015    Grade 1 diastolic dysfunction on 2D ECHO done 9/02/2015    Dyslipidemia 9/2/2015    Dysphagia as late effect of cerebrovascular accident (CVA) 9/1/2015    Hemiparesis affecting left side as late effect of cerebrovascular accident (CVA) ([facility] [address] 9/1/2015    History of noncompliance with medical treatment, presenting hazards to health 9/1/2015    History of stroke with residual deficit 9/1/2015    Acute Ischemic Stroke (early subacute infarct at the posterior right lentiform nucleus) with residual left hemiparesis and dysphagia     History of tobacco use 9/1/2015    History of trichomonal urethritis 9/1/2015    History of vitamin D deficiency 9/6/2015    Hypertensive heart and kidney disease without heart failure and with chronic kidney disease stage III 9/2/2015    Obesity, Class I, BMI 30-34.9 9/1/2015    Overactive bladder     [facility] 4/21/2018    Resolved    Postoperative confusion 4/22/2018    Postoperative urinary retention 4/22/2018    Vitamin D deficiency 5/1/2018    Vitamin D 25-Hydroxy (5/1/2018) = 17.9       Patient taking anticoagulants no; SCD'S IN PLACE    Patient has a defibrillator: no     Assessment:   Changes in Assessment throughout shift: NONE     Patient has central line: no Reasons if yes: Insertion date: Last dressing date:   Patient has Chow Cath: no Reasons if yes:     Insertion date:     Last Vitals:     Vitals:    05/01/18 1600 05/01/18 1714 05/01/18 2200 05/02/18 0717   BP: 118/79  126/82 122/81   Pulse: 77  71 70   Resp: 18  17 18   Temp: 97.7 °F (36.5 °C)  97.7 °F (36.5 °C) 97.3 °F (36.3 °C)   SpO2: 98%  97% 96%   Weight:  99.1 kg (218 lb 7.6 oz)     Height:  6' 2\" (1.88 m)          PAIN    Pain Assessment    Pain Intensity 1: 0 (05/02/18 0801) Pain Intensity 1: 2 (12/29/14 1105)      Pain Location 1: Abdomen      Pain Intervention(s) 1: Medication (see MAR)  Patient Stated Pain Goal: 0 Patient Stated Pain Goal: 0  o Intervention effective: yes    o Other actions taken for pain: TURN REPOSITION REST     Skin Assessment  Skin color Skin Color: Appropriate for ethnicity  Condition/Temperature Skin Condition/Temp: Dry, Warm  Integrity Skin Integrity: Incision (comment)  Turgor Turgor: Epidermis thin w/ loss of subcut tissue  Weekly Pressure Ulcer Documentation  Pressure  Injury Documentation: No Pressure Injury Noted-Pressure Ulcer Prevention Initiated  Wound Prevention & Protection Methods  Orientation of wound Orientation of Wound Prevention: Posterior  Location of Prevention Location of Wound Prevention: Buttocks, Sacrum/Coccyx  Dressing Present Dressing Present : No  Dressing Status Dressing Status: Intact  Wound Offloading Wound Offloading (Prevention Methods): Bed, pressure reduction mattress, Pillows, Turning, Repositioning     INTAKE/OUPUT    Date 05/01/18 0700 - 05/02/18 0659 05/02/18 0700 - 05/03/18 0659   Shift 9999-9119 5914-2745 24 Hour Total 0570-5507 4979-2007 24 Hour Total   I  N  T  A  K  E   P. O. 720  720         P. O. 720  720       Shift Total  (mL/kg) 720  (7.3)  720  (7.3)      O  U  T  P  U  T   Urine  (mL/kg/hr)            Urine Occurrence(s) 0 x 3 x 3 x 1 x  1 x    Stool            Stool Occurrence(s) 0 x 0 x 0 x 0 x  0 x    Shift Total  (mL/kg)           720      Weight (kg) 99.1 99.1 99.1 99.1 99.1 99.1       Recommendations:  1. Patient needs and requests: EDUCATION    2. Diet: CARDIAC WITH THIN LIQUIDS    3. Pending tests/procedures: NONE     4. Functional Level/Equipment: 1 PERSON ASSIST, W/C    5. Estimated Discharge Date: TBD Posted on Whiteboard in Patients Room: yes     HEALS Safety Check    A safety check occurred in the patient's room between off going nurse and oncoming nurse listed above.     The safety check included the below items  Area Items   H  High Alert Medications - Verify all high alert medication drips (heparin, PCA, etc.)   E  Equipment - Suction is set up for ALL patients (with dipti)  - Red plugs utilized for all equipment (IV pumps, etc.)  - WOWs wiped down at end of shift.  - Room stocked with oxygen, suction, and other unit-specific supplies   A  Alarms - Bed alarm is set for fall risk patients  - Ensure chair alarm is in place and activated if patient is up in a chair   L  Lines - Check IV for any infiltration  - Chow bag is empty if patient has a Chow   - Tubing and IV bags are labeled   S  Safety   - Room is clean, patient is clean, and equipment is clean. - Hallways are clear from equipment besides carts. - Fall bracelet on for fall risk patients  - Ensure room is clear and free of clutter  - Suction is set up for ALL patients (with dipti)  - Hallways are clear from equipment besides carts.    - Isolation precautions followed, supplies available outside room, sign posted

## 2018-05-02 NOTE — PROGRESS NOTES
Problem: Neurolinguistics Impaired (Adult)  Goal: *Speech Goal: (INSERT TEXT)  Long term goals:  1. Patient will tolerate least restrictive diet without overt s/s of aspiration or other difficulty x 4/5 meals. .  2. Patient will utilize safe swallowing techniques with supervision. 3. Patient will follow moderately complex commands (manipulating objects) with 80-90% accuracy. 4. Patient will respond to treatment tasks in full sentences, supervision. 5. Patient will name 8-10 items within concrete categories. 6. Patient will be oriented x 3 and recall events of the day, min assist.  7. Patient will perform functional problem solving tasks with 80-90% accuracy. Short term goals (by 5/8/18):  1. Patient will tolerate advanced soft diet/thin liquids without overt s/s of aspiration or other difficulty in 4/5 meals. 2. Patient will utilize safe swallowing techniques with mod-min. 3. Patient will follow moderately complex commands (manipulating objects) with 60% accuracy. 4. Patient will respond to treatment tasks in full sentences, min cues. 5. Patient will name 6-8 items within concrete categories. 6. Patient will be oriented x 3 and recall events of the day,modassist.  7. Patient will perform functional problem solving tasks with 80-90% accuracy. Speech language pathology treatment    Patient: Prerna Dominguez (33 y.o. male)  Date: 5/2/2018  Diagnosis: Dibility  Acute appendicitis with peritoneal abscess Acute appendicitis with peritoneal abscess       SUBJECTIVE:   Patient stated I feel bloated. OBJECTIVE:   Mental Status:  Mr. Debi Hardy was visibly tired this morning. With min cues, he was cooperative with treatment tasks presented. Treatment & Interventions: In this morning's session, the following treatment tasks were presented:  Language Comprehension and Expression:   Naming vegetables:   Patient provided only 1 prior to stating, \"i don't know\".     Neuro-Linguistics:   Orientation:    Min-mod assist  Recent memory:   Max assist  Problem solving (basic):  60% accuracy  Review of swallowing strategies    Response & Tolerance to Activities: Though fatigued, Mr. Madeline Mak did cooperate in the treatment session. He needed min verbal cues to stay alert. Pain:  Pain Scale 1: Numeric (0 - 10)  No report of pain     After treatment:   [x]       Patient left in no apparent distress sitting up in chair  []       Patient left in no apparent distress in bed  [x]       Call bell left within reach  [x]       Nursing notified  []       Caregiver present  []       Bed alarm activated    ASSESSMENT:   Progression toward goals:  []       Improving appropriately and progressing toward goals  []       Improving slowly and progressing toward goals  []       Not making progress toward goals and plan of care will be adjusted    PLAN:   Patient continues to benefit from skilled intervention to address the above impairments. Continue treatment per established plan of care. Discharge Recommendations:   To Be Determined    Estimated LOS: 2 weeks    [place] Screen, SLP  Time Calculation: 30 mins

## 2018-05-02 NOTE — PROGRESS NOTES
SHIFT CHANGE NOTE FOR [facility]    Bedside and Verbal shift change report given to bernabe Mejia LPN (oncoming nurse) by Jesus Hancock (offgoing nurse). Report included the following information SBAR, Kardex, MAR and Recent Results.     Situation:   Code Status: Full Code   Reason for Admission: [facility] Day: 2   Problem List:   Hospital Problems  Date Reviewed: 5/1/2018          Codes Class Noted POA    Vitamin D deficiency (Chronic) ICD-10-CM: E55.9  ICD-9-CM: 268.9  5/1/2018 Yes    Overview Signed 5/1/2018 10:01 AM by Ximena Ellison MD     Vitamin D 25-Hydroxy (5/1/2018) = 17.9              Overactive bladder (Chronic) ICD-10-CM: N32.81  ICD-9-CM: 596.51  Unknown Yes        Benign prostatic hyperplasia (Chronic) ICD-10-CM: N40.0  ICD-9-CM: 600.00  Unknown Yes        Postoperative urinary retention ICD-10-CM: N99.89, R33.8  ICD-9-CM: 997.5  4/22/2018 Yes        Postoperative confusion ICD-10-CM: R41.0  ICD-9-CM: 293.9  4/22/2018 Yes        Status post appendectomy ICD-10-CM: Z90.49  ICD-9-CM: V45.89  4/21/2018 Yes    Overview Addendum 4/30/2018  4:41 PM by Ximena Ellison MD     S/P Diagnostic laparoscopy followed by open appendectomy with drainage of appendiceal abscess (4/20/2018 - Dr. Bari Sandifer)             Acute blood loss as cause of postoperative anemia ICD-10-CM: D62  ICD-9-CM: 285.1  4/21/2018 Yes        Generalized weakness ICD-10-CM: R53.1  ICD-9-CM: 780.79  4/20/2018 Yes        * (Principal)Acute appendicitis with peritoneal abscess ICD-10-CM: K35.3  ICD-9-CM: 540.1  4/20/2018 Yes        Hypertensive heart and kidney disease without heart failure and with chronic kidney disease stage III (Chronic) ICD-10-CM: I13.10, N18.3  ICD-9-CM: 404.90, 585.3  9/2/2015 Yes              Background:   Past Medical History:   Past Medical History:   Diagnosis Date    Acute blood loss as cause of postoperative anemia 4/21/2018    Benign prostatic hyperplasia     CKD (chronic kidney disease) stage 3, GFR 30-59 ml/min 6/9/7422    Diastolic dysfunction without heart failure 9/2/2015    Grade 1 diastolic dysfunction on 2D ECHO done 9/02/2015    Dyslipidemia 9/2/2015    Dysphagia as late effect of cerebrovascular accident (CVA) 9/1/2015    Hemiparesis affecting left side as late effect of cerebrovascular accident (CVA) ([facility] [address] 9/1/2015    History of noncompliance with medical treatment, presenting hazards to health 9/1/2015    History of stroke with residual deficit 9/1/2015    Acute Ischemic Stroke (early subacute infarct at the posterior right lentiform nucleus) with residual left hemiparesis and dysphagia     History of tobacco use 9/1/2015    History of trichomonal urethritis 9/1/2015    History of vitamin D deficiency 9/6/2015    Hypertensive heart and kidney disease without heart failure and with chronic kidney disease stage III 9/2/2015    Obesity, Class I, BMI 30-34.9 9/1/2015    Overactive bladder     [facility] 4/21/2018    Resolved    Postoperative confusion 4/22/2018    Postoperative urinary retention 4/22/2018    Vitamin D deficiency 5/1/2018    Vitamin D 25-Hydroxy (5/1/2018) = 17.9       Patient taking anticoagulants no; SCD'S IN PLACE    Patient has a defibrillator: no     Assessment:   Changes in Assessment throughout shift: NONE     Patient has central line: no Reasons if yes: Insertion date: Last dressing date:   Patient has Chow Cath: no Reasons if yes:     Insertion date:     Last Vitals:     Vitals:    05/01/18 0659 05/01/18 1600 05/01/18 1714 05/01/18 2200   BP: 132/81 118/79  126/82   Pulse: 70 77  71   Resp: 18 18  17   Temp: 97.4 °F (36.3 °C) 97.7 °F (36.5 °C)  97.7 °F (36.5 °C)   SpO2: 99% 98%  97%   Weight:   99.1 kg (218 lb 7.6 oz)    Height:   6' 2\" (1.88 m)         PAIN    Pain Assessment    Pain Intensity 1: 0 (05/02/18 0400) Pain Intensity 1: 2 (12/29/14 1105)      Pain Location 1: Abdomen      Pain Intervention(s) 1: Medication (see MAR)  Patient Stated Pain Goal: 0 Patient Stated Pain Goal: 0  o Intervention effective: yes    o Other actions taken for pain: TURN REPOSITION REST     Skin Assessment  Skin color Skin Color: Appropriate for ethnicity  Condition/Temperature Skin Condition/Temp: Dry, Warm  Integrity Skin Integrity: Incision (comment)  Turgor Turgor: Epidermis thin w/ loss of subcut tissue  Weekly Pressure Ulcer Documentation  Pressure  Injury Documentation: No Pressure Injury Noted-Pressure Ulcer Prevention Initiated  Wound Prevention & Protection Methods  Orientation of wound Orientation of Wound Prevention: Posterior  Location of Prevention Location of Wound Prevention: Buttocks, Sacrum/Coccyx  Dressing Present Dressing Present : No  Dressing Status Dressing Status: Intact  Wound Offloading Wound Offloading (Prevention Methods): Bed, pressure reduction mattress, Pillows, Turning, Repositioning     INTAKE/OUPUT    Date 05/01/18 0700 - 05/02/18 0659 05/02/18 0700 - 05/03/18 0659   Shift 5525-4250 5502-2091 24 Hour Total 1802-1335 0169-9493 24 Hour Total   I  N  T  A  K  E   P. O. 720  720         P. O. 720  720       Shift Total  (mL/kg) 720  (7.3)  720  (7.3)      O  U  T  P  U  T   Urine  (mL/kg/hr)            Urine Occurrence(s) 0 x 1 x 1 x       Stool            Stool Occurrence(s) 0 x 0 x 0 x       Shift Total  (mL/kg)           720      Weight (kg) 99.1 99.1 99.1 99.1 99.1 99.1       Recommendations:  1. Patient needs and requests: EDUCATION    2. Diet: CARDIAC WITH THIN LIQUIDS    3. Pending tests/procedures: NONE     4. Functional Level/Equipment: 1 PERSON ASSIST, W/C    5. Estimated Discharge Date: TBD Posted on Whiteboard in Patients Room: yes     HEALS Safety Check    A safety check occurred in the patient's room between off going nurse and oncoming nurse listed above.     The safety check included the below items  Area Items   H  High Alert Medications - Verify all high alert medication drips (heparin, PCA, etc.)   E  Equipment - Suction is set up for ALL patients (with dipti)  - Red plugs utilized for all equipment (IV pumps, etc.)  - WOWs wiped down at end of shift.  - Room stocked with oxygen, suction, and other unit-specific supplies   A  Alarms - Bed alarm is set for fall risk patients  - Ensure chair alarm is in place and activated if patient is up in a chair   L  Lines - Check IV for any infiltration  - Chow bag is empty if patient has a Chow   - Tubing and IV bags are labeled   S  Safety   - Room is clean, patient is clean, and equipment is clean. - Hallways are clear from equipment besides carts. - Fall bracelet on for fall risk patients  - Ensure room is clear and free of clutter  - Suction is set up for ALL patients (with dipti)  - Hallways are clear from equipment besides carts.    - Isolation precautions followed, supplies available outside room, sign posted

## 2018-05-02 NOTE — PROGRESS NOTES
Problem: Mobility Impaired (Adult and Pediatric)  Goal: *Acute Goals and Plan of Care (Insert Text)  Physical Therapy Goals  Short Term Goals  Initiated 5/1/2018 and to be accomplished within 7-10 day(s)  1. Patient will move from scoot up and down and roll side to side in bed with modified independence. 2.  Patient will move from supine <> sit with supervision. 3.  Patient will transfer from bed to chair and chair to bed with supervision/set-up using the least restrictive device. 4.  Patient will perform sit to stand with supervision/set-up. 5.  Patient will ambulate with supervision/set-up for 50 feet with the least restrictive device. 6.  Patient will ascend/descend 2 curbs/thresholds with [facility] with contact guard assist.    Long Term Goals  Initiated 5/2/2018 and to be accomplished within 10-14 day(s) (5/16/2018)  1. Patient will move from supine to sit and sit to supine , scoot up and down and roll side to side in bed with independence. 2.  Patient will transfer from bed to chair and chair to bed with distant supervision/set-up using the least restrictive device. 3.  Patient will perform sit to stand with distant supervision/set-up. 4.  Patient will ambulate with distant supervision/set-up for 50 feet with the least restrictive device. 5.  Patient will ascend/descend 2 curbs/thresholds with [facility] with supervision/set-up. physical Therapy TREATMENT    Patient: Johny Gómez (67 y.o. male)  Date: 5/2/2018  Diagnosis: Dibility  Acute appendicitis with peritoneal abscess Acute appendicitis with peritoneal abscess  Precautions: Aspiration, Fall  Chart, physical therapy assessment, plan of care and goals were reviewed. Time In: 0930  Time Out: 1100  Patient Seen For: Balance activities;Gait training;Patient education; Therapeutic exercise;Transfer training; Wheelchair mobility  Pain:  Pt pain was reported as 0/10 pre-treatment. Pt pain was reported as 0/10 post-treatment.   Intervention: N/A    Patient identified with name and : yes    SUBJECTIVE:     Pt continues to appear fatigued throughout treatment session and reports, \"just tired,\" when he asks for frequent rest breaks. Pt notes, \"this is how I do it at home,\" re: curb negotiation. OBJECTIVE DATA SUMMARY:   Objective:     BED/MAT MOBILITY Daily Assessment    Supine to Sit : 4 (Minimal assistance)   Pt requires minimal assistance for trunk management with pt attempting to reach out for PT requiring max encouragement and education for performing with safe and efficient movement patterns. TRANSFERS Daily Assessment    Other: stand step without AD (Min assist for sit to stand, CGA for balance with stand step)  Transfer Assistance : 4 (Minimal assistance)  Sit to Stand Assistance: Minimal assistance   Pt requires minimal assistance for balance and safety with stand step transfers without AD. Pt performs sit <> stand with min assist and max manual cues for ant weight shift. Pt requires max cues to attend to B foot placement with sit to stand and max verbal cues for safety with stand to sit especially when fatigued to attend to feeling w/c behind LEs prior to initiating sit. Pt utilizes B UEs to assist with sit to stand and stand to sit. GAIT Daily Assessment    Amount of Assistance: 4 (Minimal assistance)  Distance (ft): 20 Feet (ft) x 1 trial, 2 x 4 Feet (ft) (to and from 6\" curb)  Assistive Device: Cane, quad [facility])   Amount of Assistance: 4 (CGA/Minimal assistance)  Distance (ft): 70 Feet (ft)  Assistive Device: RW    Pt requires increased time to ambulate with [facility] and max verbal cues throughout for [facility] placement/positioning and sequencing with LE advancement. Pt ambulates with forward flexed posture despite max manual cues for glut, abdominal, and spinal extensor engagement. Pt requires max verbal cues for forward gaze.   Pt demonstrates decreased ability to attend to verbal cues for sequencing with fatigue or approaching a surface to perform and stand to sit transfer. Pt educated re: use of RW to promote energy conservation due to fatigue and increased time needed to sequence gait with [facility]. Pt was able to ambulate 70 ft with CGA for balance and intermittent min assist for RW management with mod to max manual cues for glut engagement and spinal extensor engagement as well as mod verbal cues for normalized EDGARD. STEPS or STAIRS Daily Assessment    Pt negotiated one 6\" curb with [facility] with minimal assistance for balance and weight shift with ascending and descending curb. Prior to training, PT provided verbal and visual education/demonstration re: sequencing. Pt does not carry over education and ascends curb with [facility] leading followed by right LE and left LE. Pt descends curb with cane first and then right LE and left LE. Pt requires rest break after negotiating one curb. BALANCE Daily Assessment    Sitting - Static: Good (unsupported)  Sitting - Dynamic: Fair (occasional)  Standing - Static: Fair  Standing - Dynamic : Impaired       WHEELCHAIR MOBILITY Daily Assessment    Able to Propel (ft): 150 feet  Functional Level: 4   Pt requires intermittent min assist for safety with obstacle negotiation in narrow spaces with verbal cues to avoid pulling on doorframe or reaching into environment. ASSESSMENT:  Pt continues to appear limited by fatigue and decreased activity tolerance as well as impaired functional strength and postural dysfunction. Progression toward goals:  []      Improving appropriately and progressing toward goals  [x]      Improving slowly and progressing toward goals  []      Not making progress toward goals and plan of care will be adjusted     PLAN:  Patient continues to benefit from skilled intervention to address the above impairments. Continue treatment per established plan of care. Spoke with clinical coordinator re: rest schedule to promote improved tolerance to treatment.   Discharge Recommendations:  Home Health  Further Equipment Recommendations for Discharge:  TBD     Estimated LOS:5/15/2018    Activity Tolerance:   Fair - pt requires frequent rest breaks due to fatigue. Please refer to the flowsheet for vital signs taken during this treatment. After treatment:   Patient left seated OOB in w/c with call bell in reach.       Beatrice Law PT, DPT  5/2/2018

## 2018-05-02 NOTE — PROGRESS NOTES
[address]  [address] [place] [address], [address]     INPATIENT REHABILITATION  DAILY PROGRESS NOTE     Date: 5/2/2018    Name: Jaun Miller Age / Sex: 67 y.o. / male   CSN: [id number] MRN: [id number]   Admit Date: 4/30/2018 Length of Stay: 2 days     Primary Rehab Diagnosis: Impaired Mobility and ADLs secondary to:  1. S/P Diagnostic laparoscopy followed by open appendectomy with drainage of appendiceal abscess (4/20/2018 - Dr. Sneha Franklin)  2. History of acute appendicitis with appendiceal abscess      Subjective:     Patient seen and examined. Blood pressure controlled. Team conference was held at bedside this PM.     Patient's Complaint:   No significant medical complaints    Pain Control: no current joint or muscle symptoms, essentially pain-free      Objective:     Vital Signs:  Patient Vitals for the past 24 hrs:   BP Temp Pulse Resp SpO2 Height Weight   05/02/18 0717 122/81 97.3 °F (36.3 °C) 70 18 96 % - -   05/01/18 2200 126/82 97.7 °F (36.5 °C) 71 17 97 % - -   05/01/18 1714 - - - - - 6' 2\" (1.88 m) 99.1 kg (218 lb 7.6 oz)   05/01/18 1600 118/79 97.7 °F (36.5 °C) 77 18 98 % - -        Physical Examination:  GENERAL SURVEY: Patient is awake, alert, oriented x 3, sitting comfortably on the chair, not in acute respiratory distress. HEENT: pale palpebral conjunctivae, anicteric sclerae, no nasoaural discharge, moist oral mucosa  NECK: supple, no jugular venous distention, no palpable lymph nodes  CHEST/LUNGS: symmetrical chest expansion, good air entry, clear breath sounds  HEART: adynamic precordium, good S1 S2, no S3, regular rhythm, no murmurs  ABDOMEN: flat, bowel sounds appreciated, soft, non-tender  EXTREMITIES: pale nailbeds, (+) bipedal edema, full and equal pulses, no calf tenderness   NEUROLOGICAL EXAM: The patient is awake, alert and oriented x3, able to answer questions fairly appropriately, able to follow 1 and 2 step commands.   Able to tell time from the wall clock. Cranial nerves II-XII are grossly intact. No gross sensory deficit. Motor strength is 4+/5 on BUE, 4-/5 on the RLE, 4/5 on the LLE.     Incision(s): covered, clean, dry, and intact      Current Medications:  Current Facility-Administered Medications   Medication Dose Route Frequency    cholecalciferol (VITAMIN D3) capsule 5,000 Units  5,000 Units Oral DAILY    terazosin (HYTRIN) capsule 1 mg  1 mg Oral QPM    ferrous sulfate tablet 325 mg  1 Tab Oral DAILY WITH BREAKFAST    ascorbic acid (vitamin C) (VITAMIN C) tablet 250 mg  250 mg Oral DAILY WITH BREAKFAST    acetaminophen (TYLENOL) tablet 650 mg  650 mg Oral Q4H PRN    docusate sodium (COLACE) capsule 100 mg  100 mg Oral BID    bisacodyl (DULCOLAX) tablet 10 mg  10 mg Oral Q48H PRN    fluconazole (DIFLUCAN) tablet 400 mg  400 mg Oral DAILY    Lactobacillus Acidoph & Bulgar (FLORANEX) tablet 2 Tab  2 Tab Oral BID    HYDROcodone-acetaminophen (NORCO) 5-325 mg per tablet 1 Tab  1 Tab Oral Q4H PRN    tamsulosin (FLOMAX) capsule 0.4 mg  0.4 mg Oral DAILY    ezetimibe (ZETIA) tablet 10 mg  10 mg Oral DAILY    amLODIPine (NORVASC) tablet 10 mg  10 mg Oral DAILY    cyanocobalamin tablet 1,000 mcg  1,000 mcg Oral DAILY    aspirin chewable tablet 81 mg  81 mg Oral DAILY WITH BREAKFAST       Allergies: Allergies   Allergen Reactions    Lipitor [Atorvastatin] Other (comments)     Elevated CK level    Pt has no awareness of this allergy.        Functional Progress:    PHYSICAL THERAPY    ON ADMISSION MOST RECENT   Wheelchair Mobility/Management  Able to Propel (ft): 216 feet  Functional Level: 3  Curbs/Ramps Assist Required (FIM Score): 0 (Not tested)  Wheelchair Setup Assist Required : 3 (Moderate assistance)  Wheelchair Management: Manages left brake, Manages right brake Wheelchair Mobility/Management  Able to Propel (ft): 150 feet  Functional Level: 4  Curbs/Ramps Assist Required (FIM Score): 0 (Not tested)  Wheelchair Setup Assist Required : 3 (Moderate assistance)  Wheelchair Management: Manages left brake, Manages right brake     Gait  Amount of Assistance: 4 (Minimal assistance)  Distance (ft): 20 Feet (ft) (c 2 trials)  Assistive Device: Cane, quad Gait  Amount of Assistance: 4 (Minimal assistance)  Distance (ft): 20 Feet (ft)  Assistive Device: Cane, quad [facility])     Balance-Sitting/Standing  Sitting - Static: Good (unsupported)  Sitting - Dynamic: Fair (occasional)  Standing - Static: Fair  Standing - Dynamic : Impaired Balance-Sitting/Standing  Sitting - Static: Good (unsupported)  Sitting - Dynamic: Fair (occasional)  Standing - Static: Fair  Standing - Dynamic : Impaired     Bed/Mat Mobility  Rolling Right : 5 (Supervision)  Rolling Left : 5 (Supervision)  Supine to Sit : 4 (Minimal assistance)  Sit to Supine : 4 (Minimal assistance) Bed/Mat Mobility  Rolling Right : 5 (Supervision)  Rolling Left : 5 (Supervision)  Supine to Sit : 4 (Minimal assistance)  Sit to Supine : 3 (Moderate assistance)     Transfers  Other: stand step without AD  Transfer Assistance : 4 (Minimal assistance)  Sit to Stand Assistance: Minimal assistance Transfers  Other: stand step without AD (Min assist for sit to stand, CGA for balance with stand step)  Transfer Assistance : 4 (Minimal assistance)  Sit to Stand Assistance: Minimal assistance     Steps or Stairs  Steps/Stairs Ambulated (#): 0  Level of Assist : 0 (Not tested) (2/2 pt fatigue)  Rail Use:  (N/A) Steps or Stairs  Steps/Stairs Ambulated (#): 0  Level of Assist : 0 (Not tested) (2/2 pt fatigue)  Rail Use:  (N/A)         Lab/Data Review:  Recent Results (from the past 24 hour(s))   LIPID PANEL    Collection Time: 05/02/18  6:18 AM   Result Value Ref Range    LIPID PROFILE          Cholesterol, total 112 <200 MG/DL    Triglyceride 133 <150 MG/DL    HDL Cholesterol 26 (L) 40 - 60 MG/DL    LDL, calculated 59.4 0 - 100 MG/DL    VLDL, calculated 26.6 MG/DL    CHOL/HDL Ratio 4.3 0 - 5.0     HEPATIC FUNCTION PANEL    Collection Time: 05/02/18  6:18 AM   Result Value Ref Range    Protein, total 6.6 6.4 - 8.2 g/dL    Albumin 2.4 (L) 3.4 - 5.0 g/dL    Globulin 4.2 (H) 2.0 - 4.0 g/dL    A-G Ratio 0.6 (L) 0.8 - 1.7      Bilirubin, total 0.3 0.2 - 1.0 MG/DL    Bilirubin, direct 0.1 0.0 - 0.2 MG/DL    Alk. phosphatase 76 45 - 117 U/L    AST (SGOT) 50 (H) 15 - 37 U/L    ALT (SGPT) 90 (H) 16 - 61 U/L   CK    Collection Time: 05/02/18  6:18 AM   Result Value Ref Range     39 - 308 U/L         Estimated Glomerular Filtration Rate:  On admission, estimated GFR based on a Creatinine of 1.27 mg/dl:   Using CKD-EPI = 64.5 mL/min/1.73m2   Using MDRD = 71.5 mL/min/1.73m2      Assessment:     Primary Rehabilitation Diagnosis  1. Generalized Weakness with Impaired Mobility and ADLs  2. S/P Diagnostic laparoscopy followed by open appendectomy with drainage of appendiceal abscess (4/20/2018 - Dr. Vidal Citizen)  3.  History of acute appendicitis with appendiceal abscess     Comorbidities   History of stroke with residual deficit I69.30    Hypertensive heart and kidney disease without heart failure and with chronic kidney disease stage III I13.10, M04.1    Diastolic dysfunction without heart failure I51.9    Dyslipidemia E78.5    CKD (chronic kidney disease) stage 3, GFR 30-59 ml/min N18.3    History of trichomonal urethritis Z86.19    Obesity, Class I, BMI 30-34.9 E66.9    History of noncompliance with medical treatment, presenting hazards to health Z91.19    History of tobacco use Z87.891    History of vitamin D deficiency Z86.39    Hemiparesis affecting left side as late effect of cerebrovascular accident (CVA)  I69.354    Dysphagia as late effect of cerebrovascular accident (CVA) I74.200    Postoperative atrial fibrillation (HCC) I97.89, I48.91    Overactive bladder N32.81    Benign prostatic hyperplasia N40.0    Postoperative urinary retention N99.89, R33.8    Postoperative confusion R41.0    Acute blood loss as cause of postoperative anemia D62    Vitamin D deficiency E55. 9        Plan:     1. Justification for continued stay: Good progression towards established rehabilitation goals. 2. Medical Issues being followed closely:    [x]  Fall and safety precautions     [x]  Wound Care     [x]  Bowel and Bladder Function     [x]  Fluid Electrolyte and Nutrition Balance     [x]  Pain Control      3. Issues that 24 hour rehabilitation nursing is following:    [x]  Fall and safety precautions     [x]  Wound Care     [x]  Bowel and Bladder Function     [x]  Fluid Electrolyte and Nutrition Balance     [x]  Pain Control      [x]  Assistance with and education on in-room safety with transfers to and from the bed, wheelchair, toilet and shower. 4. Acute rehabilitation plan of care:    [x]  Continue current care and rehab. [x]  Physical Therapy           [x]  Occupational Therapy           [x]  Speech Therapy     []  Hold Rehab until further notice     5. Medications:    [x]  MAR Reviewed     [x]  Continue Present Medications     6. DVT Prophylaxis:      []  Lovenox     []  Unfractionated Heparin     []  Coumadin     []  NOAC     [x]  MARYAM Stockings     []  Sequential Compression Device     []  None     7. Orders:   > S/P Diagnostic laparoscopy followed by open appendectomy with drainage of appendiceal abscess (4/20/2018 - Dr. Lester Gomez);  History of acute appendicitis with appendiceal abscess   > WBC count (5/1/2018, on admission) = 13.0   > STEVEN drain was removed on the AM of 5/1/2018 by Dr. Lester Gomez   > Continue:    > Floranex 2 tabs PO BID    > Fluconazole 400 mg PO once daily x 3 more days    > Acute Postoperative Blood Loss Anemia   > Hgb/Hct (5/1/2018, on admission) = 9.7/29.7   > Anemia work-up showed serum iron 27, TIBC 233, iron % saturation 50, ferritin 156, reticulocyte count 1.0   > Continue:    > FeSO4 325 mg PO once daily with breakfast     > Ascorbic Acid 250 mg PO once daily with breakfast (to enhance the absorption of the FeSO4)    > Benign prostatic hyperplasia / Overactive bladder / Postoperative urinary retention   > On 5/1/2018, added Terazosin 1 mg PO q PM (6PM)   > Continue:    > Tamsulosin 0.4 mg PO once daily    > Terazosin 1 mg PO q PM (6PM)    > Hypertensive heart and kidney disease without heart failure and with chronic kidney disease stage 3   > On 5/1/2018:    > Discontinued Labetalol 100 mg PO q 12 hr (6AM, 6PM)    > Terazosin 1 mg PO q PM (6PM)   > Continue:    > Amlodipine 10 mg PO once daily (6AM)    > Terazosin 1 mg PO q PM (6PM)    > Postoperative confusion   > Will monitor    > Vitamin D Deficiency   > Vitamin D 25-Hydroxy (5/1/2018) = 17.9   > On 5/2/2018, patient was given Cholecalciferol 50,000 units PO x 1 dose    > Cholecalciferol 5,000 units PO once daily     > History of stroke with residual left hemiparesis and dysphagia    > Lipid profile (5/2/2018) showed , , HDL 26, LDL 59   > Baseline hepatic function tests and CK done today were normal    > Patient's record state Atorvastatin causes and elevated CK level - patient and sister not aware of this   > Patient had been on Ezetimibe for dyslipidemia   > Patient had not been on a statin most likely due to documentation of CK elevation due to Atorvastatin   > Will start patient on a trial of Simvastatin 20 mg PO q HS for secondary stroke prevention and plaque stabilization   > Discontinue Zetia   > Advance diet from NDD2 to NDD3   > Continue Aspirin 81 mg PO once daily with breakfast    > Analgesia   > Continue:    > Acetaminophen 650 mg PO q 4 hr PRN for pain level less than 5/10    > Norco 5/325 1 tab PO q 4 hr PRN for pain level greater than 4/10       8. Patient's progress in rehabilitation and medical issues discussed with the patient. All questions answered to the best of my ability. Care plan discussed with patient and nurse.       Signed:    Narciso Boo MD    May 2, 2018

## 2018-05-02 NOTE — PROGRESS NOTES
[x] Psychology  [] Social Work [] Recreational Therapy    INTERVENTION  UNITS/TIME OF SERVICE   Assessment May 2, 2018   Supportive Counseling    Orientation    Discharge Planning    Resource Linkage              Progress/Current Status    Patient seen for Psychological Evaluation as requested on admission to ARU by Dr. Shun Verma. Patient found lying supine in bed and dressed before breakfast.  Patient is superficially cooperative; he responds to inquiry with very poor breath support and low volume, making intelligibility sometimes difficult. Patient is acknowledging some feelings of mild depression and frustration because \"I want to be home. \"  He insists that he is motivated to improve his status and apparently feelings well supported by his sister, at home. Patient is not reporting any current psychiatric services and he is not requiring psychotropic medication on admit to ARU. Patient will be monitored for emotional and/or behavioral difficulties while in treatment on ARU and encouraged to persevere.     Krissy Bowen, [facility] 5/2/2018 8:46 AM

## 2018-05-03 PROCEDURE — 97535 SELF CARE MNGMENT TRAINING: CPT

## 2018-05-03 PROCEDURE — 92610 EVALUATE SWALLOWING FUNCTION: CPT

## 2018-05-03 PROCEDURE — 97116 GAIT TRAINING THERAPY: CPT

## 2018-05-03 PROCEDURE — 65310000000 HC RM PRIVATE REHAB

## 2018-05-03 PROCEDURE — 97110 THERAPEUTIC EXERCISES: CPT

## 2018-05-03 PROCEDURE — 74011250637 HC RX REV CODE- 250/637: Performed by: INTERNAL MEDICINE

## 2018-05-03 PROCEDURE — 92507 TX SP LANG VOICE COMM INDIV: CPT

## 2018-05-03 PROCEDURE — 97530 THERAPEUTIC ACTIVITIES: CPT

## 2018-05-03 PROCEDURE — 92526 ORAL FUNCTION THERAPY: CPT

## 2018-05-03 RX ORDER — CHOLECALCIFEROL (VITAMIN D3) 125 MCG
100 CAPSULE ORAL DAILY
Status: DISCONTINUED | OUTPATIENT
Start: 2018-05-04 | End: 2018-05-15 | Stop reason: HOSPADM

## 2018-05-03 RX ORDER — AMLODIPINE BESYLATE 5 MG/1
5 TABLET ORAL DAILY
Status: DISCONTINUED | OUTPATIENT
Start: 2018-05-04 | End: 2018-05-05

## 2018-05-03 RX ADMIN — AMLODIPINE BESYLATE 10 MG: 10 TABLET ORAL at 08:42

## 2018-05-03 RX ADMIN — DOCUSATE SODIUM 100 MG: 100 CAPSULE, LIQUID FILLED ORAL at 17:27

## 2018-05-03 RX ADMIN — CYANOCOBALAMIN TAB 1000 MCG 1000 MCG: 1000 TAB at 08:42

## 2018-05-03 RX ADMIN — ASPIRIN 81 MG 81 MG: 81 TABLET ORAL at 08:42

## 2018-05-03 RX ADMIN — FERROUS SULFATE TAB 325 MG (65 MG ELEMENTAL FE) 325 MG: 325 (65 FE) TAB at 08:42

## 2018-05-03 RX ADMIN — Medication 250 MG: at 08:42

## 2018-05-03 RX ADMIN — DOCUSATE SODIUM 100 MG: 100 CAPSULE, LIQUID FILLED ORAL at 08:42

## 2018-05-03 RX ADMIN — FLUCONAZOLE 400 MG: 200 TABLET ORAL at 08:42

## 2018-05-03 RX ADMIN — ACETAMINOPHEN 650 MG: 325 TABLET ORAL at 13:46

## 2018-05-03 RX ADMIN — SIMVASTATIN 20 MG: 20 TABLET, FILM COATED ORAL at 21:16

## 2018-05-03 RX ADMIN — LACTOBACILLUS TAB 2 TABLET: TAB at 08:42

## 2018-05-03 RX ADMIN — TAMSULOSIN HYDROCHLORIDE 0.4 MG: 0.4 CAPSULE ORAL at 08:42

## 2018-05-03 RX ADMIN — CHOLECALCIFEROL CAP 125 MCG (5000 UNIT) 5000 UNITS: 125 CAP at 08:42

## 2018-05-03 RX ADMIN — LACTOBACILLUS TAB 2 TABLET: TAB at 17:27

## 2018-05-03 NOTE — PROGRESS NOTES
Problem: Neurolinguistics Impaired (Adult)  Goal: *Speech Goal: (INSERT TEXT)  Long term goals:  1. Patient will tolerate least restrictive diet without overt s/s of aspiration or other difficulty x 4/5 meals. .  2. Patient will utilize safe swallowing techniques with supervision. 3. Patient will follow moderately complex commands (manipulating objects) with 80-90% accuracy. 4. Patient will respond to treatment tasks in full sentences, supervision. 5. Patient will name 8-10 items within concrete categories. 6. Patient will be oriented x 3 and recall events of the day, min assist.  7. Patient will perform functional problem solving tasks with 80-90% accuracy. Short term goals (by 5/8/18):  1. Patient will tolerate advanced soft diet/thin liquids without overt s/s of aspiration or other difficulty in 4/5 meals. 2. Patient will utilize safe swallowing techniques with mod-min. 3. Patient will follow moderately complex commands (manipulating objects) with 60% accuracy. 4. Patient will respond to treatment tasks in full sentences, min cues. 5. Patient will name 6-8 items within concrete categories. 6. Patient will be oriented x 3 and recall events of the day,modassist.  7. Patient will perform functional problem solving tasks with 80-90% accuracy. Speech language pathology treatment    Patient: Nelson Ngo (8 y.o. male)  Date: 5/3/2018  Diagnosis: Dibility  Acute appendicitis with peritoneal abscess Acute appendicitis with peritoneal abscess         SUBJECTIVE:   Patient stated I was not hungry. OBJECTIVE:   Mental Status:  Patient quite sleepy when seen by the SLP today. Unfortunately, both speech sessions were at his bedside and he had difficulty staying awake. Treatment & Interventions:   Mr. Asim Aguayo was seen in his room for two speech therapy sessions. The second session was abbreviated due to his somnolence.   The following treatment tasks were presented in today's sessions:  Language Comprehension and Expression: Three part instructions: 100% given repetition  Speaking in sentences: Max assist  Generative naming:   Models of cars: Patient listed [address]: 5 provided    Neuro-Linguistics:   Orientation:   Supervision  Recent memory:  Supervision  Review swallowing strats: Mod assist  Functional problems:  80% accuracy  Determining causes:  100% in limited presentations    Mr. Alicia Carson was also seen in the dining room with his lunch today. He needs mod cues to limit bolus size and clear his mouth between bites/sips. Patient tends to pocket material in his left cheek. Response & Tolerance to Activities:  Mr. Alicia Carson was quite fatigued in treatment sessions today. Participation this afternoon was limited at best.      Pain:  Pain Scale 1: Numeric (0 - 10)  Pain Orientation 1: Right  Pain Description 1: Aching  After treatment:   []       Patient left in no apparent distress sitting up in chair  [x]       Patient left in no apparent distress in bed  [x]       Call bell left within reach  [x]       Nursing notified  []       Caregiver present  []       Bed alarm activated    ASSESSMENT:   Progression toward goals:  []       Improving appropriately and progressing toward goals  [x]       Improving slowly and progressing toward goals  []       Not making progress toward goals and plan of care will be adjusted    PLAN:   Patient continues to benefit from skilled intervention to address the above impairments. Continue treatment per established plan of care. Discharge Recommendations:   To Be Determined    Estimated LOS: 2-3 Weeks  ALVA Oquendo  Time Calculation:  80 Minutes

## 2018-05-03 NOTE — ROUTINE PROCESS
[facility]    Bedside and Verbal shift change report given to Yuri Aviles RN (oncoming nurse) by Sonia Lu RN   (offgoing nurse). Report included the following information SBAR, Kardex, MAR and Recent Results.     Situation:   Code Status: Full Code   Reason for Admission: Ramos Lewis Day: 3   Problem List:   Hospital Problems  Date Reviewed: 5/2/2018          Codes Class Noted POA    Vitamin D deficiency (Chronic) ICD-10-CM: E55.9  ICD-9-CM: 268.9  5/1/2018 Yes    Overview Signed 5/1/2018 10:01 AM by Roz Benitez MD     Vitamin D 25-Hydroxy (5/1/2018) = 17.9              Overactive bladder (Chronic) ICD-10-CM: N32.81  ICD-9-CM: 596.51  Unknown Yes        Benign prostatic hyperplasia (Chronic) ICD-10-CM: N40.0  ICD-9-CM: 600.00  Unknown Yes        Postoperative urinary retention ICD-10-CM: N99.89, R33.8  ICD-9-CM: 997.5  4/22/2018 Yes        Postoperative confusion ICD-10-CM: R41.0  ICD-9-CM: 293.9  4/22/2018 Yes        Status post appendectomy ICD-10-CM: Z90.49  ICD-9-CM: V45.89  4/21/2018 Yes    Overview Addendum 4/30/2018  4:41 PM by Roz Benitez MD     S/P Diagnostic laparoscopy followed by open appendectomy with drainage of appendiceal abscess (4/20/2018 - Dr. Candelario Davis)             Acute blood loss as cause of postoperative anemia ICD-10-CM: D62  ICD-9-CM: 285.1  4/21/2018 Yes        Generalized weakness ICD-10-CM: R53.1  ICD-9-CM: 780.79  4/20/2018 Yes        * (Principal)Acute appendicitis with peritoneal abscess ICD-10-CM: K35.3  ICD-9-CM: 540.1  4/20/2018 Yes        Hypertensive heart and kidney disease without heart failure and with chronic kidney disease stage III (Chronic) ICD-10-CM: I13.10, N18.3  ICD-9-CM: 404.90, 585.3  9/2/2015 Yes              Background:   Past Medical History:   Past Medical History:   Diagnosis Date    Acute blood loss as cause of postoperative anemia 4/21/2018    Benign prostatic hyperplasia     CKD (chronic kidney disease) stage 3, GFR 30-59 ml/min 6/0/7322    Diastolic dysfunction without heart failure 9/2/2015    Grade 1 diastolic dysfunction on 2D ECHO done 9/02/2015    Dyslipidemia 9/2/2015    Dysphagia as late effect of cerebrovascular accident (CVA) 9/1/2015    Hemiparesis affecting left side as late effect of cerebrovascular accident ([facility]) [facility] 9/1/2015    History of noncompliance with medical treatment, presenting hazards to health 9/1/2015    History of stroke with residual deficit 9/1/2015    Acute Ischemic Stroke (early subacute infarct at the posterior right lentiform nucleus) with residual left hemiparesis and dysphagia     History of tobacco use 9/1/2015    History of trichomonal urethritis 9/1/2015    History of vitamin D deficiency 9/6/2015    Hypertensive heart and kidney disease without heart failure and with chronic kidney disease stage III 9/2/2015    Obesity, Class I, BMI 30-34.9 9/1/2015    Overactive bladder     Postoperative atrial fibrillation ([facility] 4/21/2018    Resolved    Postoperative confusion 4/22/2018    Postoperative urinary retention 4/22/2018    Vitamin D deficiency 5/1/2018    Vitamin D 25-Hydroxy (5/1/2018) = 17.9       Patient taking anticoagulants no    Patient has a defibrillator: no     Assessment:   Changes in Assessment throughout shift: no     Patient has central line: no Reasons if yes: Insertion date: Last dressing date:   Patient has Chow Cath: no Reasons if yes:     Insertion date:     Last Vitals:     Vitals:    05/01/18 2200 05/02/18 0717 05/02/18 1553 05/03/18 0800   BP: 126/82 122/81 120/80 116/67   Pulse: 71 70 83 71   Resp: 17 18 18 18   Temp: 97.7 °F (36.5 °C) 97.3 °F (36.3 °C) 98.4 °F (36.9 °C) 96.6 °F (35.9 °C)   SpO2: 97% 96% 99% 98%   Weight:       Height:            PAIN    Pain Assessment    Pain Intensity 1: 0 (05/03/18 1200) Pain Intensity 1: 2 (12/29/14 1105)      Pain Location 1: Abdomen      Pain Intervention(s) 1: Medication (see MAR)  Patient Stated Pain Goal: 0 Patient Stated Pain Goal: 0  o Intervention effective: yes    o Other actions taken for pain: no     Skin Assessment  Skin color Skin Color: Appropriate for ethnicity  Condition/Temperature Skin Condition/Temp: Dry, Warm  Integrity Skin Integrity: Incision (comment)  Turgor Turgor: Non-tenting  Weekly Pressure Ulcer Documentation  Pressure  Injury Documentation: No Pressure Injury Noted-Pressure Ulcer Prevention Initiated  Wound Prevention & Protection Methods  Orientation of wound Orientation of Wound Prevention: Posterior  Location of Prevention Location of Wound Prevention: Sacrum/Coccyx  Dressing Present Dressing Present : No  Dressing Status Dressing Status: Intact  Wound Offloading Wound Offloading (Prevention Methods): Bed, pressure redistribution/air, Chair cushion, Wheelchair     INTAKE/OUPUT    Date 05/02/18 0700 - 05/03/18 0659 05/03/18 0700 - 05/04/18 0659   Shift 9390-3336 7556-1819 24 Hour Total 6169-4855 4116-5403 24 Hour Total   I  N  T  A  K  E   P.O. 120 360 480 100  100      P. O. 120 360 480 100  100    Shift Total  (mL/kg) 120  (1.2) 360  (3.6) 480  (4.8) 100  (1)  100  (1)   O  U  T  P  U  T   Urine  (mL/kg/hr)            Urine Occurrence(s) 5 x 2 x 7 x 1 x  1 x    Stool            Stool Occurrence(s) 5 x 0 x 5 x 0 x  0 x    Shift Total  (mL/kg)          360 480 100  100   Weight (kg) 99.1 99.1 99.1 99.1 99.1 99.1       Recommendations:  1. Patient needs and requests: education    2. Diet: cardiac    3. Pending tests/procedures: no     4. Functional Level/Equipment: 1 x person assist    5. Estimated Discharge Date: TDB Posted on Whiteboard in Patients Room: no     HEALS Safety Check    A safety check occurred in the patient's room between off going nurse and oncoming nurse listed above.     The safety check included the below items  Area Items   H  High Alert Medications - Verify all high alert medication drips (heparin, PCA, etc.)   E  Equipment - Suction is set up for ALL patients (with yanker)  - Red plugs utilized for all equipment (IV pumps, etc.)  - WOWs wiped down at end of shift.  - Room stocked with oxygen, suction, and other unit-specific supplies   A  Alarms - Bed alarm is set for fall risk patients  - Ensure chair alarm is in place and activated if patient is up in a chair   L  Lines - Check IV for any infiltration  - Chow bag is empty if patient has a Chow   - Tubing and IV bags are labeled   S  Safety   - Room is clean, patient is clean, and equipment is clean. - Hallways are clear from equipment besides carts. - Fall bracelet on for fall risk patients  - Ensure room is clear and free of clutter  - Suction is set up for ALL patients (with dipti)  - Hallways are clear from equipment besides carts.    - Isolation precautions followed, supplies available outside room, sign posted

## 2018-05-03 NOTE — PROGRESS NOTES
[address]  [address] [place] [address] [address]     INPATIENT REHABILITATION  DAILY PROGRESS NOTE     Date: 5/3/2018    Name: Tonya Castellano Age / Sex: 67 y.o. / male   CSN: [id number] MRN: [id number]   Admit Date: 4/30/2018 Length of Stay: 3 days     Primary Rehab Diagnosis: Impaired Mobility and ADLs secondary to:  1. S/P Diagnostic laparoscopy followed by open appendectomy with drainage of appendiceal abscess (4/20/2018 - Dr. Mildred Zamarripa)  2. History of acute appendicitis with appendiceal abscess      Subjective:     Patient seen and examined. Blood pressure controlled. Patient's Complaint:   No significant medical complaints    Pain Control: no current joint or muscle symptoms, essentially pain-free      Objective:     Vital Signs:  Patient Vitals for the past 24 hrs:   BP Temp Pulse Resp SpO2   05/03/18 0800 116/67 96.6 °F (35.9 °C) 71 18 98 %   05/02/18 1553 120/80 98.4 °F (36.9 °C) 83 18 99 %        Physical Examination:  GENERAL SURVEY: Patient is awake, alert, oriented x 3, sitting comfortably on the chair, not in acute respiratory distress. HEENT: pale palpebral conjunctivae, anicteric sclerae, no nasoaural discharge, moist oral mucosa  NECK: supple, no jugular venous distention, no palpable lymph nodes  CHEST/LUNGS: symmetrical chest expansion, good air entry, clear breath sounds  HEART: adynamic precordium, good S1 S2, no S3, regular rhythm, no murmurs  ABDOMEN: flat, bowel sounds appreciated, soft, non-tender  EXTREMITIES: pale nailbeds, (+) bipedal edema, full and equal pulses, no calf tenderness   NEUROLOGICAL EXAM: The patient is awake, alert and oriented x3, able to answer questions fairly appropriately, able to follow 1 and 2 step commands. Able to tell time from the wall clock. Cranial nerves II-XII are grossly intact. No gross sensory deficit.   Motor strength is 4+/5 on BUE, 4-/5 on the RLE, 4/5 on the LLE.     Incision(s): covered, clean, dry, and intact      Current Medications:  Current Facility-Administered Medications   Medication Dose Route Frequency    simvastatin (ZOCOR) tablet 20 mg  20 mg Oral QHS    cholecalciferol (VITAMIN D3) capsule 5,000 Units  5,000 Units Oral DAILY    terazosin (HYTRIN) capsule 1 mg  1 mg Oral QPM    ferrous sulfate tablet 325 mg  1 Tab Oral DAILY WITH BREAKFAST    ascorbic acid (vitamin C) (VITAMIN C) tablet 250 mg  250 mg Oral DAILY WITH BREAKFAST    acetaminophen (TYLENOL) tablet 650 mg  650 mg Oral Q4H PRN    docusate sodium (COLACE) capsule 100 mg  100 mg Oral BID    bisacodyl (DULCOLAX) tablet 10 mg  10 mg Oral Q48H PRN    Lactobacillus Acidoph & Bulgar (FLORANEX) tablet 2 Tab  2 Tab Oral BID    HYDROcodone-acetaminophen (NORCO) 5-325 mg per tablet 1 Tab  1 Tab Oral Q4H PRN    tamsulosin (FLOMAX) capsule 0.4 mg  0.4 mg Oral DAILY    amLODIPine (NORVASC) tablet 10 mg  10 mg Oral DAILY    cyanocobalamin tablet 1,000 mcg  1,000 mcg Oral DAILY    aspirin chewable tablet 81 mg  81 mg Oral DAILY WITH BREAKFAST       Allergies: Allergies   Allergen Reactions    Lipitor [Atorvastatin] Other (comments)     Elevated CK level    Pt has no awareness of this allergy.        Functional Progress:    SPEECH AND LANGUAGE PATHOLOGY    ON ADMISSION MOST RECENT   Comprehension (Native Language)  Primary Mode of Comprehension: Auditory  Score: 5 Comprehension (Native Language)  Primary Mode of Comprehension: Auditory  Score: 4     Expression (Native Language)  Primary Mode of Expression: Verbal  Score: 5   Expression (Native Language)  Primary Mode of Expression: Verbal  Score: 5     Social Interaction/Pragmatics  Score: 5 Social Interaction/Pragmatics  Score: 5     Problem Solving  Score: 4   Problem Solving  Score: 3     Memory  Score: 5 Memory  Score: 3       Legend:   7 - Independent   6 - Modified Independent   5 - Standby Assistance / Supervision / Set-up   4 - Minimum Assistance / Contact Guard Assistance   3 - Moderate Assistance   2 - Maximum Assistance   1 - Total Assistance / Dependent       Lab/Data Review:  No results found for this or any previous visit (from the past 24 hour(s)). Estimated Glomerular Filtration Rate:  On admission, estimated GFR based on a Creatinine of 1.27 mg/dl:   Using CKD-EPI = 64.5 mL/min/1.73m2   Using MDRD = 71.5 mL/min/1.73m2      Assessment:     Primary Rehabilitation Diagnosis  1. Generalized Weakness with Impaired Mobility and ADLs  2. S/P Diagnostic laparoscopy followed by open appendectomy with drainage of appendiceal abscess (4/20/2018 - Dr. Sonia Barbour)  3. History of acute appendicitis with appendiceal abscess     Comorbidities   History of stroke with residual deficit I69.30    Hypertensive heart and kidney disease without heart failure and with chronic kidney disease stage III I13.10, A14.5    Diastolic dysfunction without heart failure I51.9    Dyslipidemia E78.5    CKD (chronic kidney disease) stage 3, GFR 30-59 ml/min N18.3    History of trichomonal urethritis Z86.19    Obesity, Class I, BMI 30-34.9 E66.9    History of noncompliance with medical treatment, presenting hazards to health Z91.19    History of tobacco use Z87.891    History of vitamin D deficiency Z86.39    Hemiparesis affecting left side as late effect of cerebrovascular accident (CVA)  I69.354    Dysphagia as late effect of cerebrovascular accident (CVA) I74.200    Postoperative atrial fibrillation (HCC) I97.89, I48.91    Overactive bladder N32.81    Benign prostatic hyperplasia N40.0    Postoperative urinary retention N99.89, R33.8    Postoperative confusion R41.0    Acute blood loss as cause of postoperative anemia D62    Vitamin D deficiency E55. 9        Plan:     1. Justification for continued stay: Good progression towards established rehabilitation goals.     2. Medical Issues being followed closely:    [x]  Fall and safety precautions     [x]  Wound Care     [x]  Bowel and Bladder Function     [x]  Fluid Electrolyte and Nutrition Balance     [x]  Pain Control      3. Issues that 24 hour rehabilitation nursing is following:    [x]  Fall and safety precautions     [x]  Wound Care     [x]  Bowel and Bladder Function     [x]  Fluid Electrolyte and Nutrition Balance     [x]  Pain Control      [x]  Assistance with and education on in-room safety with transfers to and from the bed, wheelchair, toilet and shower. 4. Acute rehabilitation plan of care:    [x]  Continue current care and rehab. [x]  Physical Therapy           [x]  Occupational Therapy           [x]  Speech Therapy     []  Hold Rehab until further notice     5. Medications:    [x]  MAR Reviewed     [x]  Continue Present Medications     6. DVT Prophylaxis:      []  Lovenox     []  Unfractionated Heparin     []  Coumadin     []  NOAC     [x]  MARYAM Stockings     []  Sequential Compression Device     []  None     7. Orders:   > S/P Diagnostic laparoscopy followed by open appendectomy with drainage of appendiceal abscess (4/20/2018 - Dr. Paul Solo);  History of acute appendicitis with appendiceal abscess   > WBC count (5/1/2018, on admission) = 13.0   > STVEEN drain was removed on the AM of 5/1/2018 by Dr. Paul Solo   > Continue:    > Floranex 2 tabs PO BID    > Fluconazole 400 mg PO once daily x 3 more days    > Acute Postoperative Blood Loss Anemia   > Hgb/Hct (5/1/2018, on admission) = 9.7/29.7   > Anemia work-up showed serum iron 27, TIBC 233, iron % saturation 50, ferritin 156, reticulocyte count 1.0   > Continue:    > FeSO4 325 mg PO once daily with breakfast     > Ascorbic Acid 250 mg PO once daily with breakfast (to enhance the absorption of the FeSO4)    > Benign prostatic hyperplasia / Overactive bladder / Postoperative urinary retention   > On 5/1/2018, added Terazosin 1 mg PO q PM (6PM)   > Continue:    > Tamsulosin 0.4 mg PO once daily    > Terazosin 1 mg PO q PM (6PM)    > Hypertensive heart and kidney disease without heart failure and with chronic kidney disease stage 3   > On 5/1/2018:    > Discontinued Labetalol 100 mg PO q 12 hr (6AM, 6PM)    > Terazosin 1 mg PO q PM (6PM)   > Continue:    > Decrease Amlodipine from 10 mg to 5 mg PO once daily (6AM)    > Terazosin 1 mg PO q PM (6PM)    > Postoperative confusion   > Will monitor    > Vitamin D Deficiency   > Vitamin D 25-Hydroxy (5/1/2018) = 17.9   > On 5/2/2018, patient was given Cholecalciferol 50,000 units PO x 1 dose    > On 5/3/2018, started Cholecalciferol 5,000 units PO once daily    > Continue Cholecalciferol 5,000 units PO once daily     > History of stroke with residual left hemiparesis and dysphagia    > Lipid profile (5/2/2018) showed , , HDL 26, LDL 59   > Baseline hepatic function tests and CK:    > On 4/22/2018:     > ALT = 45     > AST = 37     > CK = 453    > On 5/2/2018:     > ALT = 90     > AST = 50     > CK = 103   > Patient's record state Atorvastatin causes and elevated CK level - patient and sister not aware of this   > Patient had been on Ezetimibe for dyslipidemia   > Patient had not been on a statin most likely due to documentation of CK elevation due to Atorvastatin   > On 5/2/2018:    > Patient was started on a trial of Simvastatin 20 mg PO q HS for secondary stroke prevention and plaque stabilization    > Discontinued Zetia    > Will monitor LFTs    > Advanced diet from NDD2 to NDD3   > Add Coenzyme Q10 100 mg PO once daily   > Continue:    > Aspirin 81 mg PO once daily with breakfast    > Simvastatin 20 mg PO q HS    > Analgesia   > Continue:    > Acetaminophen 650 mg PO q 4 hr PRN for pain level less than 5/10    > Norco 5/325 1 tab PO q 4 hr PRN for pain level greater than 4/10       8. Patient's progress in rehabilitation and medical issues discussed with the patient. All questions answered to the best of my ability. Care plan discussed with patient and nurse.       Signed:    Glenn Kaur MD    May 3, 2018

## 2018-05-03 NOTE — PROGRESS NOTES
Problem: Mobility Impaired (Adult and Pediatric)  Goal: *Acute Goals and Plan of Care (Insert Text)  Physical Therapy Goals  Short Term Goals  Initiated 5/1/2018 and to be accomplished within 7-10 day(s)  1. Patient will move from scoot up and down and roll side to side in bed with modified independence. 2.  Patient will move from supine <> sit with supervision. 3.  Patient will transfer from bed to chair and chair to bed with supervision/set-up using the least restrictive device. 4.  Patient will perform sit to stand with supervision/set-up. 5.  Patient will ambulate with supervision/set-up for 50 feet with the least restrictive device. 6.  Patient will ascend/descend 2 curbs/thresholds with [facility] with contact guard assist.    Long Term Goals  Initiated 5/2/2018 and to be accomplished within 10-14 day(s) (5/16/2018)  1. Patient will move from supine to sit and sit to supine , scoot up and down and roll side to side in bed with independence. 2.  Patient will transfer from bed to chair and chair to bed with distant supervision/set-up using the least restrictive device. 3.  Patient will perform sit to stand with distant supervision/set-up. 4.  Patient will ambulate with distant supervision/set-up for 50 feet with the least restrictive device. 5.  Patient will ascend/descend 2 curbs/thresholds with [facility] with supervision/set-up. physical Therapy TREATMENT    Patient: Nelson Ngo (77 y.o. male)  Date: 5/3/2018  Diagnosis: Dibility  Acute appendicitis with peritoneal abscess Acute appendicitis with peritoneal abscess  Precautions: Aspiration, Fall  Chart, physical therapy assessment, plan of care and goals were reviewed. Time In: 0930  Time Out: 1030  Patient Seen For: Balance activities;Gait training;Patient education;Transfer training; Wheelchair mobility  Time In: 1334  Time Out: 1401  Patient Seen For: Balance activities; Patient education;Transfer training  Pain:  Pt pain was reported as no c/o pain pre-treatment. Pt pain was reported as no c/o pain post-treatment. Intervention: N/A    Patient identified with name and : yes    SUBJECTIVE:     Pt continues to report difficulty sleeping noting that he slept, \"a little,\" the night prior to treatment. Pt requests assistance to lay supine at end of first treatment session for rest and continues to require frequent rest breaks. During afternoon session, pt reports continued c/o \"just tired. \"  However, pt agreeable to participate in bed level exercises. OBJECTIVE DATA SUMMARY:   Objective:     BED/MAT MOBILITY Daily Assessment    AM session: Sit to Supine: Minimal assistance for left LE management with transition to supine through left side lying. PM session: Sit to Supine: Close supervision with verbal cues/encouragement for LE management       TRANSFERS Daily Assessment    Transfer Assistance : 4 (Minimal assistance) (stand step with RW)  Sit to Stand Assistance: Minimal assistance (CGA for ant weight shift, min A for slow controlled descent)   Pt participated in sit <> stand transfer w/c <> RW for 2 sets of 5 repetitions with mod manual cues for ant weight shift with sit to stand and mod to max manual cues for increased hip flexion for slow controlled descent to focus on increased functional B LE strength and weight bearing as pt tends of over-utilize UEs with decreased control through 1/4 stand to sit. Pt also performed stand step transfer w/c to bed to left without AD requiring CGA for balance and safety and max verbal cues to avoid reaching out into environment for support. GAIT Daily Assessment    Amount of Assistance: 4 (Minimal assistance) (CGA - balance, min assist for RW management with manual cues)  Distance (ft): 150 Feet (ft)  Assistive Device: Walker, rolling   Pt requires CGA for safety with ambulation with manual cues throughout to engage thoracic and lumbar extensors for decreased fwd flexed posture.   Pt requires intermittent assistance for RW management with approach to w/c or surface for safely remaining within RW. Pt ambulates with downward gaze, right hip add through swing phase with narrow EDGARD with increased right LE weight shift and weight bearing with right trendelenburg in left stance. BALANCE Daily Assessment    Sitting - Static: Good (unsupported)  Sitting - Dynamic: Good (unsupported)  Standing - Static: Fair  Standing - Dynamic : Impaired       WHEELCHAIR MOBILITY Daily Assessment    Able to Propel (ft): 150 feet  Functional Level: 5   Pt propels w/c with B UEs requiring intermittent verbal cues for safe path negotiation due to path deviations to left. THERAPEUTIC EXERCISES Daily Assessment    Pt educated on Glut Sets for HEP to address increased awareness and carryover to decrease Trendelenburg with ambulation. During PM session, pt performed 2 sets of 10 repetitions of the following:  B Glut Sets x 10 second holds  B Quad Sets x 10 second holds  Pt also performed 10 repetitions of bridging with max verbal cues for slow controlled movement       ASSESSMENT:  Pt appears more confident and demonstrates improved safety with use of RW versus LBQC. Pt also progressed with ambulation distance and demonstrates improved jazmyn with RW versus LBQC. However, pt continues to demonstrate decreased endurance and activity tolerance requiring frequent rest breaks and increased time to preform mobility. Progression toward goals:  []      Improving appropriately and progressing toward goals  [x]      Improving slowly and progressing toward goals  []      Not making progress toward goals and plan of care will be adjusted     PLAN:  Patient continues to benefit from skilled intervention to address the above impairments. Continue treatment per established plan of care.   Discharge Recommendations:  Home Health  Further Equipment Recommendations for Discharge:  rolling walker     Estimated LOS: 5/15/2018    Activity Tolerance:   Fair  Please refer to the flowsheet for vital signs taken during this treatment. After treatment:   Patient left supine in bed resting with call verma in reach.       Adelaide Us PT, DPT  5/3/2018

## 2018-05-03 NOTE — PROGRESS NOTES
Problem: Self Care Deficits Care Plan (Adult)  Goal: *Therapy Goal (Edit Goal, Insert Text)  Occupational Therapy Goals   Long Term Goals  Initiated 2018 and to be accomplished within 1-2 week(s)  1. Pt will perform self-feeding with MI to . (I)  2. Pt will perform grooming with MI.  3. Pt will perform UB bathing with Setup. 4. Pt will perform LB bathing with Supervision. 5. Pt will perform tub/shower, shower stall, and/or self propel to sink/vanity Supervision. 6. Pt will perform UB dressing with Setup. 7. Pt will perform LB dressing with Supervision. 8. Pt will perform toileting task with Supervision. 9. Pt will perform toilet transfer with Supervision. Short Term Goals   Initiated 2018 and to be accomplished within 7 day(s) 2018  1. Pt will perform self-feeding with MI to (I). 2. Pt will perform grooming with MI.  3. Pt will perform UB bathing with Setup. 4. Pt will perform LB bathing with Min A with AE as needed. 5. Pt will perform tub/shower, shower stall, and/or self propel to sink/vanity Min A.   6. Pt will perform UB dressing with Setup. 7. Pt will perform LB dressing with Mod A.  8. Pt will perform toileting task with Mod A.  9. Pt will perform toilet transfer with SBA. Occupational Therapy TREATMENT    Patient: Arthurine Blind   67 y.o. Patient identified with name and : yes    Date: 5/3/2018    First Tx Session  Time In: 0  Time Out[de-identified] 113      Diagnosis: Dibility  Acute appendicitis with peritoneal abscess   Precautions: Aspiration, Fall  Chart, occupational therapy assessment, plan of care, and goals were reviewed. Pain:  Pt reports 010 pain or discomfort prior to treatment. Pt reports 0/10 pain or discomfort post treatment. Intervention Provided: None      SUBJECTIVE:   Patient stated What should I do when I am eating .     OBJECTIVE DATA SUMMARY:     Pt seen supine in bed upon arrival in room for treatment. Pt wearing a shirt but not a pants.  See below for LB dressing. FEEDING/EATING Daily Assessment    Feeding/Eating  Feeding/Eating Assistance: 5 (Supervision/setup)  Comments: Pt cues to take small sips,small bites and swallow twice before taking another bite. GROOMING Daily Assessment    Grooming  Grooming Assistance : 7 (Independent)  Comments: Pt independent with oral care. Cues to brush hair . LOWER BODY DRESSING Daily Assessment    Lower Body Dressing   Dressing Assistance : 5 (Stand-by assistance)  Leg Crossed Method Used: No  Position Performed: Seated edge of bed  Adaptive Equipment Used: Sock aid  Comments: Pt thread LE's into pants legs without AD. Then use sock aid to angel sock. Pt stood without AD donning pants over hips/buttocks,SBA. MOBILITY/TRANSFERS Daily Assessment     Pt sit to stand from EOB, CGA        ASSESSMENT: Pt making progress with self care,demonstrating fair carryover with AD. Pt mild deficit hinder him from re -calling safety strategies with eating. Progression toward goals:  []          Improving appropriately and progressing toward goals  [x]          Improving slowly and progressing toward goals  []          Not making progress toward goals and plan of care will be adjusted     PLAN:  Patient continues to benefit from skilled intervention to address the above impairments. Continue treatment per established plan of care with problem solving skill, and independency with self care. Discharge Recommendations:  TBD  Further Equipment Recommendations for Discharge:  TBD     Activity Tolerance:  Fair    Estimated LOS:5/15    Please refer to the flowsheet for vital signs taken during this treatment.   After treatment:   []  Patient left in no apparent distress sitting up in chair   []  Patient left in no apparent distress in bed  []  Call bell left within reach  []  Nursing notified  []  Caregiver present  [x]  Pt left in dinning room     COMMUNICATION/EDUCATION:   [] Home safety education was provided and the patient/caregiver indicated understanding. [] Patient/family have participated as able in goal setting and plan of care. [x] Patient/family agree to work toward stated goals and plan of care. [] Patient understands intent and goals of therapy, but is neutral about his/her participation. [] Patient is unable to participate in goal setting and plan of care.       Levi Escoto  5/3/2018

## 2018-05-04 PROCEDURE — 92507 TX SP LANG VOICE COMM INDIV: CPT

## 2018-05-04 PROCEDURE — 74011250637 HC RX REV CODE- 250/637: Performed by: INTERNAL MEDICINE

## 2018-05-04 PROCEDURE — 92526 ORAL FUNCTION THERAPY: CPT

## 2018-05-04 PROCEDURE — 97116 GAIT TRAINING THERAPY: CPT

## 2018-05-04 PROCEDURE — 65310000000 HC RM PRIVATE REHAB

## 2018-05-04 PROCEDURE — 97530 THERAPEUTIC ACTIVITIES: CPT

## 2018-05-04 PROCEDURE — 97110 THERAPEUTIC EXERCISES: CPT

## 2018-05-04 RX ADMIN — Medication 250 MG: at 09:10

## 2018-05-04 RX ADMIN — ASPIRIN 81 MG 81 MG: 81 TABLET ORAL at 09:10

## 2018-05-04 RX ADMIN — FERROUS SULFATE TAB 325 MG (65 MG ELEMENTAL FE) 325 MG: 325 (65 FE) TAB at 09:10

## 2018-05-04 RX ADMIN — Medication 100 MG: at 09:10

## 2018-05-04 RX ADMIN — CYANOCOBALAMIN TAB 1000 MCG 1000 MCG: 1000 TAB at 09:10

## 2018-05-04 RX ADMIN — SIMVASTATIN 20 MG: 20 TABLET, FILM COATED ORAL at 21:34

## 2018-05-04 RX ADMIN — TAMSULOSIN HYDROCHLORIDE 0.4 MG: 0.4 CAPSULE ORAL at 09:10

## 2018-05-04 RX ADMIN — DOCUSATE SODIUM 100 MG: 100 CAPSULE, LIQUID FILLED ORAL at 09:10

## 2018-05-04 RX ADMIN — LACTOBACILLUS TAB 2 TABLET: TAB at 09:10

## 2018-05-04 RX ADMIN — CHOLECALCIFEROL CAP 125 MCG (5000 UNIT) 5000 UNITS: 125 CAP at 09:10

## 2018-05-04 RX ADMIN — AMLODIPINE BESYLATE 5 MG: 5 TABLET ORAL at 09:10

## 2018-05-04 RX ADMIN — LACTOBACILLUS TAB 2 TABLET: TAB at 18:11

## 2018-05-04 NOTE — PROGRESS NOTES
[id number]  [address] [place] [address], [address]     INPATIENT REHABILITATION  DAILY PROGRESS NOTE     Date: 5/4/2018    Name: Son Bell Age / Sex: 67 y.o. / male   CSN: [id number] MRN: [id number]   Admit Date: 4/30/2018 Length of Stay: 4 days     Primary Rehab Diagnosis: Impaired Mobility and ADLs secondary to:  1. S/P Diagnostic laparoscopy followed by open appendectomy with drainage of appendiceal abscess (4/20/2018 - Dr. Pedro Rodriguez)  2. History of acute appendicitis with appendiceal abscess      Subjective:     Patient seen and examined. Blood pressure controlled. Terazosin was not given last night due to BP precautions. Patient's Complaint:   No significant medical complaints    Pain Control: no current joint or muscle symptoms, essentially pain-free      Objective:     Vital Signs:  Patient Vitals for the past 24 hrs:   BP Temp Pulse Resp SpO2   05/04/18 0800 122/77 97.7 °F (36.5 °C) 76 17 98 %   05/03/18 2200 119/75 97.7 °F (36.5 °C) 84 18 99 %   05/03/18 1600 99/55 96.3 °F (35.7 °C) 72 19 98 %        Physical Examination:  GENERAL SURVEY: Patient is awake, alert, oriented x 3, sitting comfortably on the chair, not in acute respiratory distress. HEENT: pale palpebral conjunctivae, anicteric sclerae, no nasoaural discharge, moist oral mucosa  NECK: supple, no jugular venous distention, no palpable lymph nodes  CHEST/LUNGS: symmetrical chest expansion, good air entry, clear breath sounds  HEART: adynamic precordium, good S1 S2, no S3, regular rhythm, no murmurs  ABDOMEN: flat, bowel sounds appreciated, soft, non-tender  EXTREMITIES: pale nailbeds, (+) bipedal edema, full and equal pulses, no calf tenderness   NEUROLOGICAL EXAM: The patient is awake, alert and oriented x3, able to answer questions fairly appropriately, able to follow 1 and 2 step commands. Able to tell time from the wall clock. Cranial nerves II-XII are grossly intact.   No gross sensory deficit. Motor strength is 4+/5 on BUE, 4-/5 on the RLE, 4/5 on the LLE.     Incision(s): covered, clean, dry, and intact      Current Medications:  Current Facility-Administered Medications   Medication Dose Route Frequency    amLODIPine (NORVASC) tablet 5 mg  5 mg Oral DAILY    co-enzyme Q-10 (CO Q-10) capsule 100 mg  100 mg Oral DAILY    simvastatin (ZOCOR) tablet 20 mg  20 mg Oral QHS    cholecalciferol (VITAMIN D3) capsule 5,000 Units  5,000 Units Oral DAILY    terazosin (HYTRIN) capsule 1 mg  1 mg Oral QPM    ferrous sulfate tablet 325 mg  1 Tab Oral DAILY WITH BREAKFAST    ascorbic acid (vitamin C) (VITAMIN C) tablet 250 mg  250 mg Oral DAILY WITH BREAKFAST    acetaminophen (TYLENOL) tablet 650 mg  650 mg Oral Q4H PRN    docusate sodium (COLACE) capsule 100 mg  100 mg Oral BID    bisacodyl (DULCOLAX) tablet 10 mg  10 mg Oral Q48H PRN    Lactobacillus Acidoph & Bulgar (FLORANEX) tablet 2 Tab  2 Tab Oral BID    HYDROcodone-acetaminophen (NORCO) 5-325 mg per tablet 1 Tab  1 Tab Oral Q4H PRN    tamsulosin (FLOMAX) capsule 0.4 mg  0.4 mg Oral DAILY    cyanocobalamin tablet 1,000 mcg  1,000 mcg Oral DAILY    aspirin chewable tablet 81 mg  81 mg Oral DAILY WITH BREAKFAST       Allergies: Allergies   Allergen Reactions    Lipitor [Atorvastatin] Other (comments)     Elevated CK level    Pt has no awareness of this allergy. Lab/Data Review:  No results found for this or any previous visit (from the past 24 hour(s)). Estimated Glomerular Filtration Rate:  On admission, estimated GFR based on a Creatinine of 1.27 mg/dl:   Using CKD-EPI = 64.5 mL/min/1.73m2   Using MDRD = 71.5 mL/min/1.73m2      Assessment:     Primary Rehabilitation Diagnosis  1. Generalized Weakness with Impaired Mobility and ADLs  2. S/P Diagnostic laparoscopy followed by open appendectomy with drainage of appendiceal abscess (4/20/2018 - Dr. Bari Sandifer)  3.  History of acute appendicitis with appendiceal abscess     Comorbidities   History of stroke with residual deficit I69.30    Hypertensive heart and kidney disease without heart failure and with chronic kidney disease stage III I13.10, U59.9    Diastolic dysfunction without heart failure I51.9    Dyslipidemia E78.5    CKD (chronic kidney disease) stage 3, GFR 30-59 ml/min N18.3    History of trichomonal urethritis Z86.19    Obesity, Class I, BMI 30-34.9 E66.9    History of noncompliance with medical treatment, presenting hazards to health Z91.19    History of tobacco use Z87.891    History of vitamin D deficiency Z86.39    Hemiparesis affecting left side as late effect of cerebrovascular accident (CVA)  I69.354    Dysphagia as late effect of cerebrovascular accident (CVA) I74.200    Postoperative atrial fibrillation (HCC) I97.89, I48.91    Overactive bladder N32.81    Benign prostatic hyperplasia N40.0    Postoperative urinary retention N99.89, R33.8    Postoperative confusion R41.0    Acute blood loss as cause of postoperative anemia D62    Vitamin D deficiency E55. 9        Plan:     1. Justification for continued stay: Good progression towards established rehabilitation goals. 2. Medical Issues being followed closely:    [x]  Fall and safety precautions     [x]  Wound Care     [x]  Bowel and Bladder Function     [x]  Fluid Electrolyte and Nutrition Balance     [x]  Pain Control      3. Issues that 24 hour rehabilitation nursing is following:    [x]  Fall and safety precautions     [x]  Wound Care     [x]  Bowel and Bladder Function     [x]  Fluid Electrolyte and Nutrition Balance     [x]  Pain Control      [x]  Assistance with and education on in-room safety with transfers to and from the bed, wheelchair, toilet and shower. 4. Acute rehabilitation plan of care:    [x]  Continue current care and rehab.            [x]  Physical Therapy           [x]  Occupational Therapy           [x]  Speech Therapy     []  Hold Rehab until further notice     5. Medications:    [x]  MAR Reviewed     [x]  Continue Present Medications     6. DVT Prophylaxis:      []  Lovenox     []  Unfractionated Heparin     []  Coumadin     []  NOAC     [x]  MARYAM Stockings     []  Sequential Compression Device     []  None     7. Orders:   > S/P Diagnostic laparoscopy followed by open appendectomy with drainage of appendiceal abscess (4/20/2018 - Dr. Lorene Henson);  History of acute appendicitis with appendiceal abscess   > WBC count (5/1/2018, on admission) = 13.0   > STEVEN drain was removed on the AM of 5/1/2018 by Dr. Lorene Henson   > On 5/3/2018, patient had completed the recommended treatment course of Fluconazole 400 mg PO once daily    > Continue Floranex 2 tabs PO BID    > Acute Postoperative Blood Loss Anemia   > Hgb/Hct (5/1/2018, on admission) = 9.7/29.7   > Anemia work-up showed serum iron 27, TIBC 233, iron % saturation 50, ferritin 156, reticulocyte count 1.0   > Continue:    > FeSO4 325 mg PO once daily with breakfast     > Ascorbic Acid 250 mg PO once daily with breakfast (to enhance the absorption of the FeSO4)    > Benign prostatic hyperplasia / Overactive bladder / Postoperative urinary retention   > On 5/1/2018, added Terazosin 1 mg PO q PM (6PM)   > Continue:    > Tamsulosin 0.4 mg PO once daily    > Terazosin 1 mg PO q PM (6PM)    > Hypertensive heart and kidney disease without heart failure and with chronic kidney disease stage 3   > On 5/1/2018:    > Discontinued Labetalol 100 mg PO q 12 hr (6AM, 6PM)    > Terazosin 1 mg PO q PM (6PM)   > On 5/3/2018, decreased Amlodipine from 10 mg to 5 mg PO once daily (6AM)   > Continue:    > Amlodipine 5 mg PO once daily (6AM)    > Terazosin 1 mg PO q PM (6PM)    > Postoperative confusion   > Will monitor    > Vitamin D Deficiency   > Vitamin D 25-Hydroxy (5/1/2018) = 17.9   > On 5/2/2018, patient was given Cholecalciferol 50,000 units PO x 1 dose    > On 5/3/2018, started Cholecalciferol 5,000 units PO once daily    > Continue Cholecalciferol 5,000 units PO once daily     > History of stroke with residual left hemiparesis and dysphagia    > Lipid profile (5/2/2018) showed , , HDL 26, LDL 59   > Baseline hepatic function tests and CK:    > On 4/22/2018:     > ALT = 45     > AST = 37     > CK = 453    > On 5/2/2018:     > ALT = 90     > AST = 50     > CK = 103   > Patient's record state Atorvastatin causes and elevated CK level - patient and sister not aware of this   > Patient had been on Ezetimibe for dyslipidemia   > Patient had not been on a statin most likely due to documentation of CK elevation due to Atorvastatin   > On 5/2/2018:    > Patient was started on a trial of Simvastatin 20 mg PO q HS for secondary stroke prevention and plaque stabilization    > Discontinued Zetia    > Will monitor LFTs    > Advanced diet from NDD2 to NDD3   > On 5/3/2018, added Coenzyme Q10 100 mg PO once daily   > Continue:    > Aspirin 81 mg PO once daily with breakfast    > Simvastatin 20 mg PO q HS    > Coenzyme Q10 100 mg PO once daily    > Analgesia   > Continue:    > Acetaminophen 650 mg PO q 4 hr PRN for pain level less than 5/10    > Norco 5/325 1 tab PO q 4 hr PRN for pain level greater than 4/10       8. Patient's progress in rehabilitation and medical issues discussed with the patient. All questions answered to the best of my ability. Care plan discussed with patient and nurse.       Signed:    Edwin Robin MD    May 4, 2018

## 2018-05-04 NOTE — PROGRESS NOTES
conducted a Follow up consultation and Spiritual Assessment for Jaspal Klein, who is a 67 y. o.,male. The  provided the following Interventions:   attempted to conduct a staff referral visitation to continue the relationship of care and support. The patient was looking solemn. The patient's TV was on a game show. The patient listened as I introduced myself and asked if this was a good time for a visit. The patient declined but was open to visitation at another time. The patient's chart was reviewed. Assessment:  There mat be further spiritual or Mandaeism issues which require Spiritual Care Services interventions. Staff indicated that the patient was feeling sad. Plan:  Chaplains will continue to follow and will provide pastoral care on an as needed/requested basis.    recommends bedside caregivers page  on duty if patient shows signs of acute spiritual or emotional distress    [place] [id number]  [phone]

## 2018-05-04 NOTE — PROGRESS NOTES
Problem: Mobility Impaired (Adult and Pediatric)  Goal: *Acute Goals and Plan of Care (Insert Text)  Physical Therapy Goals  Short Term Goals  Initiated 5/1/2018 and to be accomplished within 7-10 day(s)  1. Patient will move from scoot up and down and roll side to side in bed with modified independence. 2.  Patient will move from supine <> sit with supervision. 3.  Patient will transfer from bed to chair and chair to bed with supervision/set-up using the least restrictive device. 4.  Patient will perform sit to stand with supervision/set-up. 5.  Patient will ambulate with supervision/set-up for 50 feet with the least restrictive device. 6.  Patient will ascend/descend 2 curbs/thresholds with [facility] with contact guard assist.    Long Term Goals  Initiated 5/2/2018 and to be accomplished within 10-14 day(s) (5/16/2018)  1. Patient will move from supine to sit and sit to supine , scoot up and down and roll side to side in bed with independence. 2.  Patient will transfer from bed to chair and chair to bed with distant supervision/set-up using the least restrictive device. 3.  Patient will perform sit to stand with distant supervision/set-up. 4.  Patient will ambulate with distant supervision/set-up for 50 feet with the least restrictive device. 5.  Patient will ascend/descend 2 curbs/thresholds with [facility] with supervision/set-up. physical Therapy TREATMENT    Patient: Johny Gómez (54 y.o. male)  Date: 5/4/2018  Diagnosis: Dibility  Acute appendicitis with peritoneal abscess Acute appendicitis with peritoneal abscess  Precautions: Aspiration, Fall  Chart, physical therapy assessment, plan of care and goals were reviewed. Time In: 0800  Time Out: 0900  Patient Seen For: Balance activities; Patient education;Transfer training;Gait training; Wheelchair mobility  Time In: 1430  Time Out: 1500  Patient Seen For: Patient education; Therapeutic exercises  Pain:  Pt pain was reported as 0/10 pre-treatment. Pt pain was reported as 0/10 post-treatment. Intervention: N/A    Patient identified with name and : yes    SUBJECTIVE:     Pt reports, \"I'm wet,\" when PT greets pt at start of session. Pt notes he is not always aware of when he has toileting needs but was aware he had been incontinent of urine and notes it had been an hour. Encouraged pt to utilize call bell for hygiene needs for skin protection. Pt appears distant and withdrawn at times throughout session and when approached after seated rest break, pt notes, \"just tired, and depressed. \"  Pt notes he has spoken with unit psychologist but is open to speaking with  re: feelings/concerns. During second treatment session, pt notes that unit  had introduced himself to the patient and was planning on returning to the pt's room to speak with him. Pt presents OOB in recliner with B LEs elevated and requests to be seen in his room for treatment. OBJECTIVE DATA SUMMARY:   Objective:     BED/MAT MOBILITY Daily Assessment    Rolling Left : 6 (Modified independent)  Supine to Sit : 4 (Minimal assistance) (for trunk, pt request HOB elevated requiring encouragement)   Pt initially requests assistance for supine to sit transfer but demonstrates increased effort when encouraged. TRANSFERS Daily Assessment    Other: stand step without AD  Transfer Assistance : 4 (Contact guard assistance)  Sit to Stand Assistance: Contact guard assistance   Pt requires CGA for balance with min to mod manual cues for ant weight shift with sit to stand and min manual cues for increased hip flexion for slow controlled descent with stand to sit. GAIT Daily Assessment    Amount of Assistance: 4 (Contact guard assistance)  Distance (ft): 150 Feet (ft)  Assistive Device: Walker, rolling   Pt ambulates with RW with CGA for safety requiring mod manual and verbal cues for decreased fwd flexed posture and max verbal cues for forward gaze.   Pt also requires max verbal and intermittent tactile cuing for left glut engagement as pt ambulates with fwd flexed posture and left Trendelenburg. STEPS or STAIRS Daily Assessment    Pt negotiated one 6\" curb with CGA for safety and balance with max verbal cues for safe RW management and education re: sequencing. BALANCE Daily Assessment    Sitting - Static: Good (unsupported)  Sitting - Dynamic: Fair (occasional)  Standing - Static: Fair  Standing - Dynamic : Impaired       WHEELCHAIR MOBILITY Daily Assessment    Able to Propel (ft): 150 feet  Functional Level: 5   Pt requires supervision with verbal cues for efficiency. THERAPEUTIC EXERCISES Daily Assessment    Long Sitting in recliner:  2 Sets of 10 Repetitions  Glut Sets x 10 second holds  Isometric Hip Add/pillow squeezes x 10 second holds  3 x 30 Seconds  B Ankle Pumps       ASSESSMENT:  While pt is limited by fatigue and self reported depression, pt did progress to participate in curb negotiation with RW today and continues to be agreeable to participate in skilled intervention. Pt self identified emotional barriers to progress and would benefit from support. Spoke with unit  re: patient's concerns. Progression toward goals:  []      Improving appropriately and progressing toward goals  [x]      Improving slowly and progressing toward goals  []      Not making progress toward goals and plan of care will be adjusted     PLAN:  Patient continues to benefit from skilled intervention to address the above impairments. Continue treatment per established plan of care. Spoke with  re: pt's reports of feeling \"depressed,\" and that he was receptive to speaking with someone. Discharge Recommendations:  Home Health  Further Equipment Recommendations for Discharge:  rolling walker     Estimated LOS: 5/15/2018    Activity Tolerance:   Fair  Please refer to the flowsheet for vital signs taken during this treatment.     After treatment: Patient left in dining room eating breakfast after first treatment session after PT assisted pt with set-up. After second session, pt was left seated OOB in recliner chair with LEs elevated and call verma in reach.       Karin Castaneda PT, DPT  5/4/2018

## 2018-05-04 NOTE — PROGRESS NOTES
Problem: Neurolinguistics Impaired (Adult)  Goal: *Speech Goal: (INSERT TEXT)  Long term goals:  1. Patient will tolerate least restrictive diet without overt s/s of aspiration or other difficulty x 4/5 meals. .  2. Patient will utilize safe swallowing techniques with supervision. 3. Patient will follow moderately complex commands (manipulating objects) with 80-90% accuracy. 4. Patient will respond to treatment tasks in full sentences, supervision. 5. Patient will name 8-10 items within concrete categories. 6. Patient will be oriented x 3 and recall events of the day, min assist.  7. Patient will perform functional problem solving tasks with 80-90% accuracy. Short term goals (by 5/8/18):  1. Patient will tolerate advanced soft diet/thin liquids without overt s/s of aspiration or other difficulty in 4/5 meals. 2. Patient will utilize safe swallowing techniques with mod-min. 3. Patient will follow moderately complex commands (manipulating objects) with 60% accuracy. 4. Patient will respond to treatment tasks in full sentences, min cues. 5. Patient will name 6-8 items within concrete categories. 6. Patient will be oriented x 3 and recall events of the day,modassist.  7. Patient will perform functional problem solving tasks with 80-90% accuracy. Speech language pathology treatment    Patient: Shraddha Hale (27 y.o. male)  Date: 5/4/2018  Diagnosis: Dibility  Acute appendicitis with peritoneal abscess Acute appendicitis with peritoneal abscess       SUBJECTIVE:   Patient stated It wasn't very good (his soup at lunch). OBJECTIVE:   Mental Status:  Patient awake and alert in the dining room. However, he had been assisted to the recliner after lunch and was quite drowsy during the afternoon session. Treatment & Interventions:   Mr. Jessy Bertrand was seen in the dining room at lunch time this afternoon. He continues to receive an advanced soft diet with thin liquids.   Patient continues to need cues to slow his rate of intake, take smaller bites and swallow each before adding more. Cues also needed for second swallow recommended from MBS. Patient was without overt s/s of aspiration during the meal.      Mr. Julian Lares was also seen for a thirty minute speech therapy session in the early afternoon. The following treatment tasks were presented:  Neuro-Linguistics:   Orientation:  Supervision  Recent memory: Min-mod assist  Basic level problems: Mod-max assist  Complex yes/no: 75% accuracy  ID with restrictions: 50% accuracy    Response & Tolerance to Activities:  Mrs. Julian Lares was very sleepy this afternoon when seen in his room. Responses not as good as in prior sessions. Pain:  Pain Scale 1: Numeric (0 - 10)  No report of pain     After treatment:   [x]       Patient left in no apparent distress sitting up in chair  []       Patient left in no apparent distress in bed  [x]       Call bell left within reach  []       Nursing notified  []       Caregiver present  []       Bed alarm activated    ASSESSMENT:   Progression toward goals:  []       Improving appropriately and progressing toward goals  [x]       Improving slowly and progressing toward goals  []       Not making progress toward goals and plan of care will be adjusted    PLAN:   Patient continues to benefit from skilled intervention to address the above impairments. Continue treatment per established plan of care. Discharge Recommendations:   To Be Determined    Estimated LOS: 2-3 weeks    ALVA Jacobsen  Time Calculation:  70 Minutes

## 2018-05-04 NOTE — ROUTINE PROCESS
SHIFT CHANGE NOTE FOR [facility]    Bedside and Verbal shift change report given to Veronika Ling, RN (oncoming nurse) by Luis F Lyman RN   (offgoing nurse). Report included the following information SBAR, Kardex, MAR and Recent Results.     Situation:   Code Status: Full Code   Reason for Admission: abscess  Hospital Day: 4   Problem List:   Hospital Problems  Date Reviewed: 5/3/2018          Codes Class Noted POA    Vitamin D deficiency (Chronic) ICD-10-CM: E55.9  ICD-9-CM: 268.9  5/1/2018 Yes    Overview Signed 5/1/2018 10:01 AM by Irma Ngo MD     Vitamin D 25-Hydroxy (5/1/2018) = 17.9              Overactive bladder (Chronic) ICD-10-CM: N32.81  ICD-9-CM: 596.51  Unknown Yes        Benign prostatic hyperplasia (Chronic) ICD-10-CM: N40.0  ICD-9-CM: 600.00  Unknown Yes        Postoperative urinary retention ICD-10-CM: N99.89, R33.8  ICD-9-CM: 997.5  4/22/2018 Yes        Postoperative confusion ICD-10-CM: R41.0  ICD-9-CM: 293.9  4/22/2018 Yes        Status post appendectomy ICD-10-CM: Z90.49  ICD-9-CM: V45.89  4/21/2018 Yes    Overview Addendum 4/30/2018  4:41 PM by Irma Ngo MD     S/P Diagnostic laparoscopy followed by open appendectomy with drainage of appendiceal abscess (4/20/2018 - Dr. Pat Jacobsen)             Acute blood loss as cause of postoperative anemia ICD-10-CM: D62  ICD-9-CM: 285.1  4/21/2018 Yes        Generalized weakness ICD-10-CM: R53.1  ICD-9-CM: 780.79  4/20/2018 Yes        * (Principal)Acute appendicitis with peritoneal abscess ICD-10-CM: K35.3  ICD-9-CM: 540.1  4/20/2018 Yes        Hypertensive heart and kidney disease without heart failure and with chronic kidney disease stage III (Chronic) ICD-10-CM: I13.10, N18.3  ICD-9-CM: 404.90, 585.3  9/2/2015 Yes              Background:   Past Medical History:   Past Medical History:   Diagnosis Date    Acute blood loss as cause of postoperative anemia 4/21/2018    Benign prostatic hyperplasia     CKD (chronic kidney disease) stage 3, GFR 30-59 ml/min 6/3/2694    Diastolic dysfunction without heart failure 9/2/2015    Grade 1 diastolic dysfunction on 2D ECHO done 9/02/2015    Dyslipidemia 9/2/2015    Dysphagia as late effect of cerebrovascular accident (CVA) 9/1/2015    Hemipares[facility] affecting left side [facility] late effect of [facility] 9/1/2015    History of noncompliance with medical treatment, presenting hazards to health 9/1/2015    History of stroke with residual deficit 9/1/2015    Acute Ischemic Stroke (early subacute infarct at the posterior right lentiform nucleus) with residual left hemiparesis and dysphagia     History of tobacco use 9/1/2015    History of trichomonal urethritis 9/1/2015    History of vitamin D deficiency 9/6/2015    Hypertensive heart and kidney disease without heart failure and with chronic kidney disease stage III 9/2/2015    Obesity, Class I, BMI 30-34.9 9/1/2015    Overactive bladder     [facility] 4/21/2018    Resolved    Postoperative confusion 4/22/2018    Postoperative urinary retention 4/22/2018    Vitamin D deficiency 5/1/2018    Vitamin D 25-Hydroxy (5/1/2018) = 17.9       Patient taking anticoagulants no    Patient has a defibrillator: no     Assessment:   Changes in Assessment throughout shift: no     Patient has central line: no Reasons if yes: Insertion date: Last dressing date:   Patient has Chow Cath: no Reasons if yes:     Insertion date:     Last Vitals:     Vitals:    05/03/18 1000 05/03/18 1600 05/03/18 2200 05/04/18 0800   BP: 119/75 99/55 119/75 122/77   Pulse: 84 72 84 76   Resp: 18 19 18 17   Temp: 97.7 °F (36.5 °C) 96.3 °F (35.7 °C) 97.7 °F (36.5 °C) 97.7 °F (36.5 °C)   SpO2: 99% 98% 99% 98%   Weight:       Height:            PAIN    Pain Assessment    Pain Intensity 1: 0 (05/04/18 0800) Pain Intensity 1: 2 (12/29/14 1105)    Pain Location 1: Abdomen Pain Location 1: Abdomen    Pain Intervention(s) 1: Medication (see MAR) Pain Intervention(s) 1: Medication (see MAR)  Patient Stated Pain Goal: 0 Patient Stated Pain Goal: 0  o Intervention effective: yes    o Other actions taken for pain: no     Skin Assessment  Skin color Skin Color: Appropriate for ethnicity  Condition/Temperature Skin Condition/Temp: Dry, Warm  Integrity Skin Integrity: Incision (comment)  Turgor Turgor: Non-tenting  Weekly Pressure Ulcer Documentation  Pressure  Injury Documentation: No Pressure Injury Noted-Pressure Ulcer Prevention Initiated  Wound Prevention & Protection Methods  Orientation of wound Orientation of Wound Prevention: Posterior  Location of Prevention Location of Wound Prevention: Sacrum/Coccyx  Dressing Present Dressing Present : No  Dressing Status Dressing Status: Intact  Wound Offloading Wound Offloading (Prevention Methods): Bed, pressure redistribution/air, Chair cushion, Wheelchair     INTAKE/OUPUT    Date 05/03/18 0700 - 05/04/18 0659 05/04/18 0700 - 05/05/18 0659   Shift 8244-2638 4639-8788 24 Hour Total 4274-3216 6359-4941 24 Hour Total   I  N  T  A  K  E   P.O. 330  330         P. O. 330  330       Shift Total  (mL/kg) 330  (3.3)  330  (3.3)      O  U  T  P  U  T   Urine  (mL/kg/hr)            Urine Occurrence(s) 4 x 1 x 5 x 1 x  1 x    Stool            Stool Occurrence(s) 2 x 1 x 3 x       Shift Total  (mL/kg)           330      Weight (kg) 99.1 99.1 99.1 99.1 99.1 99.1       Recommendations:  1. Patient needs and requests: education    2. Diet: cardiac    3. Pending tests/procedures: no     4. Functional Level/Equipment: 1 x person assist    5. Estimated Discharge Date: TDB Posted on Whiteboard in Patients Room: no     HEALS Safety Check    A safety check occurred in the patient's room between off going nurse and oncoming nurse listed above.     The safety check included the below items  Area Items   H  High Alert Medications - Verify all high alert medication drips (heparin, PCA, etc.)   E  Equipment - Suction is set up for ALL patients (with dipti)  - Red plugs utilized for all equipment (IV pumps, etc.)  - WOWs wiped down at end of shift.  - Room stocked with oxygen, suction, and other unit-specific supplies   A  Alarms - Bed alarm is set for fall risk patients  - Ensure chair alarm is in place and activated if patient is up in a chair   L  Lines - Check IV for any infiltration  - Chow bag is empty if patient has a Chow   - Tubing and IV bags are labeled   S  Safety   - Room is clean, patient is clean, and equipment is clean. - Hallways are clear from equipment besides carts. - Fall bracelet on for fall risk patients  - Ensure room is clear and free of clutter  - Suction is set up for ALL patients (with dipti)  - Hallways are clear from equipment besides carts.    - Isolation precautions followed, supplies available outside room, sign posted

## 2018-05-05 PROCEDURE — 74011250637 HC RX REV CODE- 250/637: Performed by: INTERNAL MEDICINE

## 2018-05-05 PROCEDURE — 92526 ORAL FUNCTION THERAPY: CPT

## 2018-05-05 PROCEDURE — 65310000000 HC RM PRIVATE REHAB

## 2018-05-05 PROCEDURE — 92507 TX SP LANG VOICE COMM INDIV: CPT

## 2018-05-05 RX ORDER — AMLODIPINE BESYLATE 5 MG/1
2.5 TABLET ORAL DAILY
Status: DISCONTINUED | OUTPATIENT
Start: 2018-05-06 | End: 2018-05-07

## 2018-05-05 RX ADMIN — Medication 100 MG: at 08:55

## 2018-05-05 RX ADMIN — Medication 250 MG: at 08:56

## 2018-05-05 RX ADMIN — CHOLECALCIFEROL CAP 125 MCG (5000 UNIT) 5000 UNITS: 125 CAP at 08:55

## 2018-05-05 RX ADMIN — ASPIRIN 81 MG 81 MG: 81 TABLET ORAL at 08:55

## 2018-05-05 RX ADMIN — DOCUSATE SODIUM 100 MG: 100 CAPSULE, LIQUID FILLED ORAL at 17:17

## 2018-05-05 RX ADMIN — SIMVASTATIN 20 MG: 20 TABLET, FILM COATED ORAL at 21:05

## 2018-05-05 RX ADMIN — DOCUSATE SODIUM 100 MG: 100 CAPSULE, LIQUID FILLED ORAL at 08:56

## 2018-05-05 RX ADMIN — CYANOCOBALAMIN TAB 1000 MCG 1000 MCG: 1000 TAB at 08:54

## 2018-05-05 RX ADMIN — FERROUS SULFATE TAB 325 MG (65 MG ELEMENTAL FE) 325 MG: 325 (65 FE) TAB at 08:55

## 2018-05-05 RX ADMIN — LACTOBACILLUS TAB 2 TABLET: TAB at 17:17

## 2018-05-05 RX ADMIN — LACTOBACILLUS TAB 2 TABLET: TAB at 08:55

## 2018-05-05 RX ADMIN — TAMSULOSIN HYDROCHLORIDE 0.4 MG: 0.4 CAPSULE ORAL at 08:55

## 2018-05-05 NOTE — ROUTINE PROCESS
Bedside and Verbal shift change report given to [facility] RN (oncoming nurse) by Ulices Rose LPN  RN   (offgoing nurse). Report included the following information SBAR, Kardex, MAR and Recent Results.     Situation:                        Code Status: Full Code                        Reason for Admission: abscess  Hospital Day: 4                        Problem List:   Hospital Problems  Date Reviewed: 5/3/2018                         Codes Class Noted POA                Vitamin D deficiency (Chronic) ICD-10-CM: E55.9  ICD-9-CM: 910. 9   5/1/2018 Yes      Overview Signed 5/1/2018 10:01 AM by Kamla Whitaker MD        Vitamin D 25-Hydroxy (5/1/2018) = 17.9                 Overactive bladder (Chronic) ICD-10-CM: N32.81  ICD-9-CM: 596.51   Unknown Yes             Benign prostatic hyperplasia (Chronic) ICD-10-CM: N40.0  ICD-9-CM: 600.00   Unknown Yes             Postoperative urinary retention ICD-10-CM: N99.89, R33.8  ICD-9-CM: 758. 5   4/22/2018 Yes             Postoperative confusion ICD-10-CM: R41.0  ICD-9-CM: 293.9   4/22/2018 Yes             Status post appendectomy ICD-10-CM: Z90.49  ICD-9-CM: V45.89   4/21/2018 Yes      Overview Addendum 4/30/2018  4:41 PM by Kamla Whitaker MD        S/P Diagnostic laparoscopy followed by open appendectomy with drainage of appendiceal abscess (4/20/2018 - Dr. Nelson Micthell)                Acute blood loss as cause of postoperative anemia ICD-10-CM: D62  ICD-9-CM: 285. 1   4/21/2018 Yes             Generalized weakness ICD-10-CM: R53.1  ICD-9-CM: 780.79   4/20/2018 Yes             * (Principal)Acute appendicitis with peritoneal abscess ICD-10-CM: K35.3  ICD-9-CM: 540. 1   4/20/2018 Yes             Hypertensive heart and kidney disease without heart failure and with chronic kidney disease stage III (Chronic) ICD-10-CM: I13.10, N18.3  ICD-9-CM: 404.90, 585.3   9/2/2015 Yes                    Background:                        Past Medical History:        Past Medical History:   Diagnosis Date    Acute blood loss as cause of postoperative anemia 4/21/2018    Benign prostatic hyperplasia      CKD (chronic kidney disease) stage 3, GFR 30-59 ml/min 7/2/5124    Diastolic dysfunction without heart failure 9/2/2015     Grade 1 diastolic dysfunction on 2D ECHO done 9/02/2015    Dyslipidemia 9/2/2015    Dysphagia as late effect of cerebrovascular accident (CVA) 9/1/2015    Hemiparesis affecting left side as late effect of cerebrovascular accident ([facility]A) [facility] 9/1/2015    History of noncompliance with medical treatment, presenting hazards to health 9/1/2015    History of stroke with residual deficit 9/1/2015     Acute Ischemic Stroke (early subacute infarct at the posterior right lentiform nucleus) with residual left hemiparesis and dysphagia     History of tobacco use 9/1/2015    History of trichomonal urethritis 9/1/2015    History of vitamin D deficiency 9/6/2015    Hypertensive heart and kidney disease without heart failure and with chronic kidney disease stage III 9/2/2015    Obesity, Class I, BMI 30-34.9 9/1/2015    Overactive bladder      Postoperative atrial fibrillation ([facility] 4/21/2018     Resolved    Postoperative confusion 4/22/2018    Postoperative urinary retention 4/22/2018    Vitamin D deficiency 5/1/2018     Vitamin D 25-Hydroxy (5/1/2018) = 17.9                             Patient taking anticoagulants no                         Patient has a defibrillator: no      Assessment:  ¨ Changes in Assessment throughout shift: no     ¨ Patient has central line: no Reasons if yes: Insertion date: Last dressing date:  ¨ Patient has Chow Cath: no Reasons if yes:     Insertion date:     ¨ Last Vitals:             Vitals:     05/03/18 1000 05/03/18 1600 05/03/18 2200 05/04/18 0800   BP: 119/75 99/55 119/75 122/77   Pulse: 84 72 84 76   Resp: 18 19 18 17   Temp: 97.7 °F (36.5 °C) 96.3 °F (35.7 °C) 97.7 °F (36.5 °C) 97.7 °F (36.5 °C)   SpO2: 99% 98% 99% 98%   Weight:           Height:                 · PAIN                                              Pain Assessment                                              Pain Intensity 1: 0 (05/04/18 0800) Pain Intensity 1: 2 (12/29/14 1105)                                              Pain Location 1: Abdomen Pain Location 1: Abdomen                                              Pain Intervention(s) 1: Medication (see MAR) Pain Intervention(s) 1: Medication (see MAR)  Patient Stated Pain Goal: 0 Patient Stated Pain Goal: 0  ¨ Intervention effective: yes    ¨ Other actions taken for pain: no     · Skin Assessment  Skin color Skin Color: Appropriate for ethnicity  Condition/Temperature Skin Condition/Temp: Dry, Warm  Integrity Skin Integrity: Incision (comment)  Turgor Turgor: Non-tenting  Weekly Pressure Ulcer Documentation  Pressure  Injury Documentation: No Pressure Injury Noted-Pressure Ulcer Prevention Initiated  Wound Prevention & Protection Methods  Orientation of wound Orientation of Wound Prevention: Posterior  Location of Prevention Location of Wound Prevention: Sacrum/Coccyx  Dressing Present Dressing Present : No  Dressing Status Dressing Status: Intact  Wound Offloading Wound Offloading (Prevention Methods): Bed, pressure redistribution/air, Chair cushion, Wheelchair     · INTAKE/OUPUT               Date 05/03/18 0700 - 05/04/18 0659 05/04/18 0700 - 05/05/18 0659   Shift 3156-3615 7814-7885 24 Hour Total 3370-1697 7892-3993 24 Hour Total   I  N  T  A  K  E  P.O. 330   330            P.O. 330   330          Shift Total  (mL/kg) 330  (3.3)   330  (3.3)         O  U  T  P  U  T  Urine  (mL/kg/hr)                  Urine Occurrence(s) 4 x 1 x 5 x 1 x   1 x    Stool                  Stool Occurrence(s) 2 x 1 x 3 x          Shift Total  (mL/kg)                  330         Weight (kg) 99.1 99.1 99.1 99.1 99.1 99.1         Recommendations:  1. Patient needs and requests: education     2. Diet: cardiac     3.  Pending tests/procedures: no      4. Functional Level/Equipment: 1 x person assist     5. Estimated Discharge Date: TDB Posted on Whiteboard in Patients Room: no      [facility] Safety Check     A safety check occurred in the patient's room between off going nurse and oncoming nurse listed above.     The safety check included the below items  Area Items   H  High Alert Medications § Verify all high alert medication drips (heparin, PCA, etc.)   E  Equipment § Suction is set up for ALL patients (with dipti)  § Red plugs utilized for all equipment (IV pumps, etc.)  § WOWs wiped down at end of shift. § Room stocked with oxygen, suction, and other unit-specific supplies   A  Alarms § Bed alarm is set for fall risk patients  § Ensure chair alarm is in place and activated if patient is up in a chair   L  Lines § Check IV for any infiltration  § Chow bag is empty if patient has a Chow   § Tubing and IV bags are labeled   S  Safety § Room is clean, patient is clean, and equipment is clean. § Hallways are clear from equipment besides carts. § Fall bracelet on for fall risk patients  § Ensure room is clear and free of clutter  § Suction is set up for ALL patients (with dipti)  § Hallways are clear from equipment besides carts.    § Isolation precautions followed, supplies available outside room, sign posted

## 2018-05-05 NOTE — ROUTINE PROCESS
Bedside and Verbal shift change report given to LUCILLE GUADALUPE (oncoming nurse) by Lillian Lowe RN   (offgoing nurse). Report included the following information SBAR, Kardex, MAR and Recent Results.     Situation:                        Code Status: Full Code                        Reason for Admission: abscess  Hospital Day: 4                        Problem List:   Hospital Problems  Date Reviewed: 5/3/2018                         Codes Class Noted POA                Vitamin D deficiency (Chronic) ICD-10-CM: E55.9  ICD-9-CM: 352. 9   5/1/2018 Yes      Overview Signed 5/1/2018 10:01 AM by Ximena Ellison MD        Vitamin D 25-Hydroxy (5/1/2018) = 17.9                 Overactive bladder (Chronic) ICD-10-CM: N32.81  ICD-9-CM: 596.51   Unknown Yes             Benign prostatic hyperplasia (Chronic) ICD-10-CM: N40.0  ICD-9-CM: 600.00   Unknown Yes             Postoperative urinary retention ICD-10-CM: N99.89, R33.8  ICD-9-CM: 068. 5   4/22/2018 Yes             Postoperative confusion ICD-10-CM: R41.0  ICD-9-CM: 293.9   4/22/2018 Yes             Status post appendectomy ICD-10-CM: Z90.49  ICD-9-CM: V45.89   4/21/2018 Yes      Overview Addendum 4/30/2018  4:41 PM by Ximena Ellison MD        S/P Diagnostic laparoscopy followed by open appendectomy with drainage of appendiceal abscess (4/20/2018 - Dr. Bari Sandifer)                Acute blood loss as cause of postoperative anemia ICD-10-CM: D62  ICD-9-CM: 285. 1   4/21/2018 Yes             Generalized weakness ICD-10-CM: R53.1  ICD-9-CM: 780.79   4/20/2018 Yes             * (Principal)Acute appendicitis with peritoneal abscess ICD-10-CM: K35.3  ICD-9-CM: 540. 1   4/20/2018 Yes             Hypertensive heart and kidney disease without heart failure and with chronic kidney disease stage III (Chronic) ICD-10-CM: I13.10, N18.3  ICD-9-CM: 404.90, 585.3   9/2/2015 Yes                    Background:                        Past Medical History:        Past Medical History:   Diagnosis Date    Acute blood loss as cause of postoperative anemia 4/21/2018    Benign prostatic hyperplasia      CKD (chronic kidney disease) stage 3, GFR 30-59 ml/min 5/0/2689    Diastolic dysfunction without heart failure 9/2/2015     Grade 1 diastolic dysfunction on 2D ECHO done 9/02/2015    Dyslipidemia 9/2/2015    Dysphagia as late effect of cerebrovascular accident (CVA) 9/1/2015    Hemiparesis affecting left side as late effect of cerebrovascular accident (CVA) ([facility] 9/1/2015    History of noncompliance with medical treatment, presenting hazards to health 9/1/2015    History of stroke with residual deficit 9/1/2015     Acute Ischemic Stroke (early subacute infarct at the posterior right lentiform nucleus) with residual left hemiparesis and dysphagia     History of tobacco use 9/1/2015    History of trichomonal urethritis 9/1/2015    History of vitamin D deficiency 9/6/2015    Hypertensive heart and kidney disease without heart failure and with chronic kidney disease stage III 9/2/2015    Obesity, Class I, BMI 30-34.9 9/1/2015    Overactive bladder      Postoperative atrial fibrillation ([facility] 4/21/2018     Resolved    Postoperative confusion 4/22/2018    Postoperative urinary retention 4/22/2018    Vitamin D deficiency 5/1/2018     Vitamin D 25-Hydroxy (5/1/2018) = 17.9                             Patient taking anticoagulants no                         Patient has a defibrillator: no      Assessment:  ¨ Changes in Assessment throughout shift: no     ¨ Patient has central line: no Reasons if yes: Insertion date: Last dressing date:  ¨ Patient has Chow Cath: no Reasons if yes:     Insertion date:     ¨ Last Vitals:             Vitals:     05/03/18 1000 05/03/18 1600 05/03/18 2200 05/04/18 0800   BP: 119/75 99/55 119/75 122/77   Pulse: 84 72 84 76   Resp: 18 19 18 17   Temp: 97.7 °F (36.5 °C) 96.3 °F (35.7 °C) 97.7 °F (36.5 °C) 97.7 °F (36.5 °C)   SpO2: 99% 98% 99% 98%   Weight:           Height:                 · PAIN                                              Pain Assessment                                              Pain Intensity 1: 0 (05/04/18 0800) Pain Intensity 1: 2 (12/29/14 1105)                                              Pain Location 1: Abdomen Pain Location 1: Abdomen                                              Pain Intervention(s) 1: Medication (see MAR) Pain Intervention(s) 1: Medication (see MAR)  Patient Stated Pain Goal: 0 Patient Stated Pain Goal: 0  ¨ Intervention effective: yes    ¨ Other actions taken for pain: no     · Skin Assessment  Skin color Skin Color: Appropriate for ethnicity  Condition/Temperature Skin Condition/Temp: Dry, Warm  Integrity Skin Integrity: Incision (comment)  Turgor Turgor: Non-tenting  Weekly Pressure Ulcer Documentation  Pressure  Injury Documentation: No Pressure Injury Noted-Pressure Ulcer Prevention Initiated  Wound Prevention & Protection Methods  Orientation of wound Orientation of Wound Prevention: Posterior  Location of Prevention Location of Wound Prevention: Sacrum/Coccyx  Dressing Present Dressing Present : No  Dressing Status Dressing Status: Intact  Wound Offloading Wound Offloading (Prevention Methods): Bed, pressure redistribution/air, Chair cushion, Wheelchair     · INTAKE/OUPUT               Date 05/03/18 0700 - 05/04/18 0659 05/04/18 0700 - 05/05/18 0659   Shift 8842-6230 2164-4599 24 Hour Total 6372-6481 6044-1360 24 Hour Total   I  N  T  A  K  E  P.O. 330   330            P.O. 330   330          Shift Total  (mL/kg) 330  (3.3)   330  (3.3)         O  U  T  P  U  T  Urine  (mL/kg/hr)                  Urine Occurrence(s) 4 x 1 x 5 x 1 x   1 x    Stool                  Stool Occurrence(s) 2 x 1 x 3 x          Shift Total  (mL/kg)                  330         Weight (kg) 99.1 99.1 99.1 99.1 99.1 99.1         Recommendations:  1. Patient needs and requests: education     2. Diet: cardiac     3.  Pending tests/procedures: no    4. Functional Level/Equipment: 1 x person assist     5. Estimated Discharge Date: TDB Posted on Whiteboard in Patients Room: no      [facility]ALS Safety Check     A safety check occurred in the patient's room between off going nurse and oncoming nurse listed above.     The safety check included the below items  Area Items   H  High Alert Medications § Verify all high alert medication drips (heparin, PCA, etc.)   E  Equipment § Suction is set up for ALL patients (with dipti)  § Red plugs utilized for all equipment (IV pumps, etc.)  § WOWs wiped down at end of shift. § Room stocked with oxygen, suction, and other unit-specific supplies   A  Alarms § Bed alarm is set for fall risk patients  § Ensure chair alarm is in place and activated if patient is up in a chair   L  Lines § Check IV for any infiltration  § Chow bag is empty if patient has a Chow   § Tubing and IV bags are labeled   S  Safety § Room is clean, patient is clean, and equipment is clean. § Hallways are clear from equipment besides carts. § Fall bracelet on for fall risk patients  § Ensure room is clear and free of clutter  § Suction is set up for ALL patients (with dipti)  § Hallways are clear from equipment besides carts.    § Isolation precautions followed, supplies available outside room, sign posted

## 2018-05-05 NOTE — PROGRESS NOTES
Problem: Neurolinguistics Impaired (Adult)  Goal: *Speech Goal: (INSERT TEXT)  Long term goals:  1. Patient will tolerate least restrictive diet without overt s/s of aspiration or other difficulty x 4/5 meals. .  2. Patient will utilize safe swallowing techniques with supervision. 3. Patient will follow moderately complex commands (manipulating objects) with 80-90% accuracy. 4. Patient will respond to treatment tasks in full sentences, supervision. 5. Patient will name 8-10 items within concrete categories. 6. Patient will be oriented x 3 and recall events of the day, min assist.  7. Patient will perform functional problem solving tasks with 80-90% accuracy. Short term goals (by 5/8/18):  1. Patient will tolerate advanced soft diet/thin liquids without overt s/s of aspiration or other difficulty in 4/5 meals. 2. Patient will utilize safe swallowing techniques with mod-min. 3. Patient will follow moderately complex commands (manipulating objects) with 60% accuracy. 4. Patient will respond to treatment tasks in full sentences, min cues. 5. Patient will name 6-8 items within concrete categories. 6. Patient will be oriented x 3 and recall events of the day,modassist.  7. Patient will perform functional problem solving tasks with 80-90% accuracy. Speech language pathology treatment    Patient: Jaspal Klein (61 y.o. male)  Date: 5/5/2018  Diagnosis: Dibility  Acute appendicitis with peritoneal abscess Acute appendicitis with peritoneal abscess       SUBJECTIVE:   Patient stated I'm cold. OBJECTIVE:   Mental Status:  Patient was seen in his room seated in the bedside chair. He was lethargic and wrapped in blankets (even covering his head). Treatment & Interventions:   Mr. Alessia Arguelles was seen for 15 minutes this morning. The session was abdreviated due to limited participation. The following tasks were presented:  Neuro-Linguistics:   Orientation:  Supervision  Recent memory:  Mod assist  Complex yes/no: 65% accuracy  Reasoning tasks: 50% accurate responses    Mr. Cassandra Benedict was also seen in the dining room at lunchtime. He ate very little, but was encouraged to drink his Ensure supplement. Patient continues to need cuing for small boluses. Response & Tolerance to Activities:  Patient very lethargic in speech session today. He was a bit more alert in the dining room. This was a limiting factor in treatment today. Pain:  Pain Scale 1: Numeric (0 - 10)  No report of pain. After treatment:   [x]       Patient left in no apparent distress sitting up in chair  []       Patient left in no apparent distress in bed  [x]       Call bell left within reach  []       Nursing notified  []       Caregiver present  []       Bed alarm activated    ASSESSMENT:   Progression toward goals:  []       Improving appropriately and progressing toward goals  [x]       Improving slowly and progressing toward goals  []       Not making progress toward goals and plan of care will be adjusted    PLAN:   Patient continues to benefit from skilled intervention to address the above impairments. Continue treatment per established plan of care. Discharge Recommendations:   To Be Determined    Estimated LOS: Through 5/15/18    ALVA Sosa  Time Calculation:  39 Minutes

## 2018-05-05 NOTE — ROUTINE PROCESS
Bedside and Verbal shift change report given to LUCILLE GUADALUPE (oncoming nurse) by Malcolm Enrique RN   (offgoing nurse). Report included the following information SBAR, Kardex, MAR and Recent Results.     Situation:                        Code Status: Full Code                        Reason for Admission: abscess  Hospital Day: 4                        Problem List:   Hospital Problems  Date Reviewed: 5/3/2018                         Codes Class Noted POA                Vitamin D deficiency (Chronic) ICD-10-CM: E55.9  ICD-9-CM: 102. 9   5/1/2018 Yes      Overview Signed 5/1/2018 10:01 AM by Jesus Patino MD        Vitamin D 25-Hydroxy (5/1/2018) = 17.9                 Overactive bladder (Chronic) ICD-10-CM: N32.81  ICD-9-CM: 596.51   Unknown Yes             Benign prostatic hyperplasia (Chronic) ICD-10-CM: N40.0  ICD-9-CM: 600.00   Unknown Yes             Postoperative urinary retention ICD-10-CM: N99.89, R33.8  ICD-9-CM: 413. 5   4/22/2018 Yes             Postoperative confusion ICD-10-CM: R41.0  ICD-9-CM: 293.9   4/22/2018 Yes             Status post appendectomy ICD-10-CM: Z90.49  ICD-9-CM: V45.89   4/21/2018 Yes      Overview Addendum 4/30/2018  4:41 PM by Jesus Patino MD        S/P Diagnostic laparoscopy followed by open appendectomy with drainage of appendiceal abscess (4/20/2018 - Dr. Mary Ann Rojas)                Acute blood loss as cause of postoperative anemia ICD-10-CM: D62  ICD-9-CM: 285. 1   4/21/2018 Yes             Generalized weakness ICD-10-CM: R53.1  ICD-9-CM: 780.79   4/20/2018 Yes             * (Principal)Acute appendicitis with peritoneal abscess ICD-10-CM: K35.3  ICD-9-CM: 540. 1   4/20/2018 Yes             Hypertensive heart and kidney disease without heart failure and with chronic kidney disease stage III (Chronic) ICD-10-CM: I13.10, N18.3  ICD-9-CM: 404.90, 585.3   9/2/2015 Yes                    Background:                        Past Medical History:        Past Medical History:   Diagnosis Date    Acute blood loss as cause of postoperative anemia 4/21/2018    Benign prostatic hyperplasia      CKD (chronic kidney disease) stage 3, GFR 30-59 ml/min 5/4/3755    Diastolic dysfunction without heart failure 9/2/2015     Grade 1 diastolic dysfunction on 2D ECHO done 9/02/2015    Dyslipidemia 9/2/2015    Dysphagia as late effect of cerebrovascular accident (CVA) 9/1/2015    Hemiparesis affecting left side as late effect of cerebrovascular accident (CVA) ([facility] [address] 9/1/2015    History of noncompliance with medical treatment, presenting hazards to health 9/1/2015    History of stroke with residual deficit 9/1/2015     Acute Ischemic Stroke (early subacute infarct at the posterior right lentiform nucleus) with residual left hemiparesis and dysphagia     History of tobacco use 9/1/2015    History of trichomonal urethritis 9/1/2015    History of vitamin D deficiency 9/6/2015    Hypertensive heart and kidney disease without heart failure and with chronic kidney disease stage III 9/2/2015    Obesity, Class I, BMI 30-34.9 9/1/2015    Overactive bladder      Postoperative atrial fibrillation ([facility] [address] 4/21/2018     Resolved    Postoperative confusion 4/22/2018    Postoperative urinary retention 4/22/2018    Vitamin D deficiency 5/1/2018     Vitamin D 25-Hydroxy (5/1/2018) = 17.9                             Patient taking anticoagulants no                         Patient has a defibrillator: no      Assessment:  ¨ Changes in Assessment throughout shift: no     ¨ Patient has central line: no Reasons if yes: Insertion date: Last dressing date:  ¨ Patient has Chow Cath: no Reasons if yes:     Insertion date:     ¨ Last Vitals:             Vitals:     05/03/18 1000 05/03/18 1600 05/03/18 2200 05/04/18 0800   BP: 119/75 99/55 119/75 122/77   Pulse: 84 72 84 76   Resp: 18 19 18 17   Temp: 97.7 °F (36.5 °C) 96.3 °F (35.7 °C) 97.7 °F (36.5 °C) 97.7 °F (36.5 °C)   SpO2: 99% 98% 99% 98%   Weight:           Height:                 · PAIN                                              Pain Assessment                                              Pain Intensity 1: 0 (05/04/18 0800) Pain Intensity 1: 2 (12/29/14 1105)                                              Pain Location 1: Abdomen Pain Location 1: Abdomen                                              Pain Intervention(s) 1: Medication (see MAR) Pain Intervention(s) 1: Medication (see MAR)  Patient Stated Pain Goal: 0 Patient Stated Pain Goal: 0  ¨ Intervention effective: yes    ¨ Other actions taken for pain: no     · Skin Assessment  Skin color Skin Color: Appropriate for ethnicity  Condition/Temperature Skin Condition/Temp: Dry, Warm  Integrity Skin Integrity: Incision (comment)  Turgor Turgor: Non-tenting  Weekly Pressure Ulcer Documentation  Pressure  Injury Documentation: No Pressure Injury Noted-Pressure Ulcer Prevention Initiated  Wound Prevention & Protection Methods  Orientation of wound Orientation of Wound Prevention: Posterior  Location of Prevention Location of Wound Prevention: Sacrum/Coccyx  Dressing Present Dressing Present : No  Dressing Status Dressing Status: Intact  Wound Offloading Wound Offloading (Prevention Methods): Bed, pressure redistribution/air, Chair cushion, Wheelchair     · INTAKE/OUPUT               Date 05/03/18 0700 - 05/04/18 0659 05/04/18 0700 - 05/05/18 0659   Shift 7313-4904 2581-0229 24 Hour Total 3825-0484 5411-8770 24 Hour Total   I  N  T  A  K  E  P.O. 330   330            P.O. 330   330          Shift Total  (mL/kg) 330  (3.3)   330  (3.3)         O  U  T  P  U  T  Urine  (mL/kg/hr)                  Urine Occurrence(s) 4 x 1 x 5 x 1 x   1 x    Stool                  Stool Occurrence(s) 2 x 1 x 3 x          Shift Total  (mL/kg)                  330         Weight (kg) 99.1 99.1 99.1 99.1 99.1 99.1         Recommendations:  1. Patient needs and requests: education     2. Diet: cardiac     3.  Pending tests/procedures: no    4. Functional Level/Equipment: 1 x person assist     5. Estimated Discharge Date: TDB Posted on Whiteboard in Patients Room: no      HEALS Safety Check     A safety check occurred in the patient's room between off going nurse and oncoming nurse listed above.     The safety check included the below items  Area Items   H  High Alert Medications § Verify all high alert medication drips (heparin, PCA, etc.)   E  Equipment § Suction is set up for ALL patients (with dipti)  § Red plugs utilized for all equipment (IV pumps, etc.)  § WOWs wiped down at end of shift. § Room stocked with oxygen, suction, and other unit-specific supplies   A  Alarms § Bed alarm is set for fall risk patients  § Ensure chair alarm is in place and activated if patient is up in a chair   L  Lines § Check IV for any infiltration  § Chow bag is empty if patient has a Chow   § Tubing and IV bags are labeled   S  Safety § Room is clean, patient is clean, and equipment is clean. § Hallways are clear from equipment besides carts. § Fall bracelet on for fall risk patients  § Ensure room is clear and free of clutter  § Suction is set up for ALL patients (with dipti)  § Hallways are clear from equipment besides carts.    § Isolation precautions followed, supplies available outside room, sign posted

## 2018-05-05 NOTE — PROGRESS NOTES
[address]  [address] [place] [address] [address]     INPATIENT REHABILITATION  DAILY PROGRESS NOTE     Date: 5/5/2018    Name: Diane Colunga Age / Sex: 67 y.o. / male   CSN: [id number] MRN: [id number]   Admit Date: 4/30/2018 Length of Stay: 5 days     Primary Rehab Diagnosis: Impaired Mobility and ADLs secondary to:  1. S/P Diagnostic laparoscopy followed by open appendectomy with drainage of appendiceal abscess (4/20/2018 - Dr. Paul Solo)  2. History of acute appendicitis with appendiceal abscess      Subjective:     No new issues or problems reported. Blood pressure controlled. Terazosin was not given last night due to BP precautions. Amlodipine was not given this AM due to BP precautions.       Objective:     Vital Signs:  Patient Vitals for the past 24 hrs:   BP Temp Pulse Resp SpO2   05/05/18 0743 110/69 96.7 °F (35.9 °C) 71 19 95 %   05/04/18 2208 109/74 97 °F (36.1 °C) 65 18 99 %   05/04/18 1600 106/70 98.6 °F (37 °C) 81 17 97 %        Current Medications:  Current Facility-Administered Medications   Medication Dose Route Frequency    amLODIPine (NORVASC) tablet 5 mg  5 mg Oral DAILY    co-enzyme Q-10 (CO Q-10) capsule 100 mg  100 mg Oral DAILY    simvastatin (ZOCOR) tablet 20 mg  20 mg Oral QHS    cholecalciferol (VITAMIN D3) capsule 5,000 Units  5,000 Units Oral DAILY    terazosin (HYTRIN) capsule 1 mg  1 mg Oral QPM    ferrous sulfate tablet 325 mg  1 Tab Oral DAILY WITH BREAKFAST    ascorbic acid (vitamin C) (VITAMIN C) tablet 250 mg  250 mg Oral DAILY WITH BREAKFAST    acetaminophen (TYLENOL) tablet 650 mg  650 mg Oral Q4H PRN    docusate sodium (COLACE) capsule 100 mg  100 mg Oral BID    bisacodyl (DULCOLAX) tablet 10 mg  10 mg Oral Q48H PRN    Lactobacillus Acidoph & Bulgar (FLORANEX) tablet 2 Tab  2 Tab Oral BID    HYDROcodone-acetaminophen (NORCO) 5-325 mg per tablet 1 Tab  1 Tab Oral Q4H PRN    tamsulosin (FLOMAX) capsule 0.4 mg  0.4 mg Oral DAILY    cyanocobalamin tablet 1,000 mcg  1,000 mcg Oral DAILY    aspirin chewable tablet 81 mg  81 mg Oral DAILY WITH BREAKFAST       Allergies: Allergies   Allergen Reactions    Lipitor [Atorvastatin] Other (comments)     Elevated CK level    Pt has no awareness of this allergy. Lab/Data Review:  No results found for this or any previous visit (from the past 24 hour(s)). Estimated Glomerular Filtration Rate:  On admission, estimated GFR based on a Creatinine of 1.27 mg/dl:   Using CKD-EPI = 64.5 mL/min/1.73m2   Using MDRD = 71.5 mL/min/1.73m2      Assessment:     Primary Rehabilitation Diagnosis  1. Generalized Weakness with Impaired Mobility and ADLs  2. S/P Diagnostic laparoscopy followed by open appendectomy with drainage of appendiceal abscess (4/20/2018 - Dr. Soledad Lopez)  3. History of acute appendicitis with appendiceal abscess     Comorbidities   History of stroke with residual deficit I69.30    Hypertensive heart and kidney disease without heart failure and with chronic kidney disease stage III I13.10, J07.0    Diastolic dysfunction without heart failure I51.9    Dyslipidemia E78.5    CKD (chronic kidney disease) stage 3, GFR 30-59 ml/min N18.3    History of trichomonal urethritis Z86.19    Obesity, Class I, BMI 30-34.9 E66.9    History of noncompliance with medical treatment, presenting hazards to health Z91.19    History of tobacco use Z87.891    History of vitamin D deficiency Z86.39    Hemiparesis affecting left side as late effect of cerebrovascular accident (CVA)  I69.354    Dysphagia as late effect of cerebrovascular accident (CVA) I74.200    Postoperative atrial fibrillation (HCC) I97.89, I48.91    Overactive bladder N32.81    Benign prostatic hyperplasia N40.0    Postoperative urinary retention N99.89, R33.8    Postoperative confusion R41.0    Acute blood loss as cause of postoperative anemia D62    Vitamin D deficiency E55. 9        Plan:     1. Justification for continued stay: Good progression towards established rehabilitation goals. 2. Medical Issues being followed closely:    [x]  Fall and safety precautions     [x]  Wound Care     [x]  Bowel and Bladder Function     [x]  Fluid Electrolyte and Nutrition Balance     [x]  Pain Control      3. Issues that 24 hour rehabilitation nursing is following:    [x]  Fall and safety precautions     [x]  Wound Care     [x]  Bowel and Bladder Function     [x]  Fluid Electrolyte and Nutrition Balance     [x]  Pain Control      [x]  Assistance with and education on in-room safety with transfers to and from the bed, wheelchair, toilet and shower. 4. Acute rehabilitation plan of care:    [x]  Continue current care and rehab. [x]  Physical Therapy           [x]  Occupational Therapy           [x]  Speech Therapy     []  Hold Rehab until further notice     5. Medications:    [x]  MAR Reviewed     [x]  Continue Present Medications     6. DVT Prophylaxis:      []  Lovenox     []  Unfractionated Heparin     []  Coumadin     []  NOAC     [x]  MARYAM Stockings     []  Sequential Compression Device     []  None     7. Orders:   > S/P Diagnostic laparoscopy followed by open appendectomy with drainage of appendiceal abscess (4/20/2018 - Dr. Saint Schwab);  History of acute appendicitis with appendiceal abscess   > WBC count (5/1/2018, on admission) = 13.0   > STEVEN drain was removed on the AM of 5/1/2018 by Dr. Saint Schwab   > On 5/3/2018, patient had completed the recommended treatment course of Fluconazole 400 mg PO once daily    > Continue Floranex 2 tabs PO BID    > Acute Postoperative Blood Loss Anemia   > Hgb/Hct (5/1/2018, on admission) = 9.7/29.7   > Anemia work-up showed serum iron 27, TIBC 233, iron % saturation 50, ferritin 156, reticulocyte count 1.0   > Continue:    > FeSO4 325 mg PO once daily with breakfast     > Ascorbic Acid 250 mg PO once daily with breakfast (to enhance the absorption of the FeSO4)    > Benign prostatic hyperplasia / Overactive bladder / Postoperative urinary retention   > On 5/1/2018, added Terazosin 1 mg PO q PM (6PM)   > Discontinue Terazosin 1 mg PO q PM (6PM)   > Continue Tamsulosin 0.4 mg PO once daily    > Hypertensive heart and kidney disease without heart failure and with chronic kidney disease stage 3   > On 5/1/2018:    > Discontinued Labetalol 100 mg PO q 12 hr (6AM, 6PM)    > Terazosin 1 mg PO q PM (6PM)   > On 5/3/2018, decreased Amlodipine from 10 mg to 5 mg PO once daily (6AM)   > Discontinue Terazosin 1 mg PO q PM (6PM)   > Decrease Amlodipine from 5 mg to 2.5 mg PO once daily (6AM)      > Postoperative confusion   > Will monitor    > Vitamin D Deficiency   > Vitamin D 25-Hydroxy (5/1/2018) = 17.9   > On 5/2/2018, patient was given Cholecalciferol 50,000 units PO x 1 dose    > On 5/3/2018, started Cholecalciferol 5,000 units PO once daily    > Continue Cholecalciferol 5,000 units PO once daily     > History of stroke with residual left hemiparesis and dysphagia    > Lipid profile (5/2/2018) showed , , HDL 26, LDL 59   > Baseline hepatic function tests and CK:    > On 4/22/2018:     > ALT = 45     > AST = 37     > CK = 453    > On 5/2/2018:     > ALT = 90     > AST = 50     > CK = 103   > Patient's record state Atorvastatin causes and elevated CK level - patient and sister not aware of this   > Patient had been on Ezetimibe for dyslipidemia   > Patient had not been on a statin most likely due to documentation of CK elevation due to Atorvastatin   > On 5/2/2018:    > Patient was started on a trial of Simvastatin 20 mg PO q HS for secondary stroke prevention and plaque stabilization    > Discontinued Zetia    > Will monitor LFTs    > Advanced diet from NDD2 to NDD3   > On 5/3/2018, added Coenzyme Q10 100 mg PO once daily   > Continue:    > Aspirin 81 mg PO once daily with breakfast    > Simvastatin 20 mg PO q HS    > Coenzyme Q10 100 mg PO once daily    > Analgesia   > Continue:    > Acetaminophen 650 mg PO q 4 hr PRN for pain level less than 5/10    > Norco 5/325 1 tab PO q 4 hr PRN for pain level greater than 4/10       Signed:    Edwin Robin MD    May 5, 2018

## 2018-05-06 PROCEDURE — 74011250637 HC RX REV CODE- 250/637: Performed by: INTERNAL MEDICINE

## 2018-05-06 PROCEDURE — 97535 SELF CARE MNGMENT TRAINING: CPT

## 2018-05-06 PROCEDURE — 65310000000 HC RM PRIVATE REHAB

## 2018-05-06 RX ADMIN — TAMSULOSIN HYDROCHLORIDE 0.4 MG: 0.4 CAPSULE ORAL at 09:03

## 2018-05-06 RX ADMIN — Medication 250 MG: at 09:03

## 2018-05-06 RX ADMIN — DOCUSATE SODIUM 100 MG: 100 CAPSULE, LIQUID FILLED ORAL at 09:02

## 2018-05-06 RX ADMIN — SIMVASTATIN 20 MG: 20 TABLET, FILM COATED ORAL at 21:23

## 2018-05-06 RX ADMIN — Medication 100 MG: at 09:02

## 2018-05-06 RX ADMIN — ASPIRIN 81 MG 81 MG: 81 TABLET ORAL at 09:02

## 2018-05-06 RX ADMIN — LACTOBACILLUS TAB 2 TABLET: TAB at 18:00

## 2018-05-06 RX ADMIN — LACTOBACILLUS TAB 2 TABLET: TAB at 09:02

## 2018-05-06 RX ADMIN — CYANOCOBALAMIN TAB 1000 MCG 1000 MCG: 1000 TAB at 09:02

## 2018-05-06 RX ADMIN — FERROUS SULFATE TAB 325 MG (65 MG ELEMENTAL FE) 325 MG: 325 (65 FE) TAB at 09:03

## 2018-05-06 RX ADMIN — DOCUSATE SODIUM 100 MG: 100 CAPSULE, LIQUID FILLED ORAL at 18:00

## 2018-05-06 RX ADMIN — CHOLECALCIFEROL CAP 125 MCG (5000 UNIT) 5000 UNITS: 125 CAP at 09:02

## 2018-05-06 NOTE — ROUTINE PROCESS
Bedside and Verbal shift change report given to Yvonne Cunha (oncoming nurse) by Shashank Mahmood RN   (offgoing nurse). Report included the following information SBAR, Kardex, MAR and Recent Results.     Situation:                        Code Status: Full Code                        Reason for Admission: abscess  Hospital Day: 4                        Problem List:   Hospital Problems  Date Reviewed: 5/3/2018                         Codes Class Noted POA                Vitamin D deficiency (Chronic) ICD-10-CM: E55.9  ICD-9-CM: 576. 9   5/1/2018 Yes      Overview Signed 5/1/2018 10:01 AM by Servando David MD        Vitamin D 25-Hydroxy (5/1/2018) = 17.9                 Overactive bladder (Chronic) ICD-10-CM: N32.81  ICD-9-CM: 596.51   Unknown Yes             Benign prostatic hyperplasia (Chronic) ICD-10-CM: N40.0  ICD-9-CM: 600.00   Unknown Yes             Postoperative urinary retention ICD-10-CM: N99.89, R33.8  ICD-9-CM: 382. 5   4/22/2018 Yes             Postoperative confusion ICD-10-CM: R41.0  ICD-9-CM: 293.9   4/22/2018 Yes             Status post appendectomy ICD-10-CM: Z90.49  ICD-9-CM: V45.89   4/21/2018 Yes      Overview Addendum 4/30/2018  4:41 PM by Servando David MD        S/P Diagnostic laparoscopy followed by open appendectomy with drainage of appendiceal abscess (4/20/2018 - Dr. Sonia Barbour)                Acute blood loss as cause of postoperative anemia ICD-10-CM: D62  ICD-9-CM: 285. 1   4/21/2018 Yes             Generalized weakness ICD-10-CM: R53.1  ICD-9-CM: 780.79   4/20/2018 Yes             * (Principal)Acute appendicitis with peritoneal abscess ICD-10-CM: K35.3  ICD-9-CM: 540. 1   4/20/2018 Yes             Hypertensive heart and kidney disease without heart failure and with chronic kidney disease stage III (Chronic) ICD-10-CM: I13.10, N18.3  ICD-9-CM: 404.90, 585.3   9/2/2015 Yes                    Background:                        Past Medical History:        Past Medical History: Diagnosis Date    Acute blood loss as cause of postoperative anemia 4/21/2018    Benign prostatic hyperplasia      CKD (chronic kidney disease) stage 3, GFR 30-59 ml/min 5/8/9419    Diastolic dysfunction without heart failure 9/2/2015     Grade 1 diastolic dysfunction on 2D ECHO done 9/02/2015    Dyslipidemia 9/2/2015    Dysphagia as late effect of cerebrovascular accident (CVA) 9/1/2015    Hemiparesis affecting left side as late effect of cerebrovascular accident (CVA) ([facility] [address] 9/1/2015    History of noncompliance with medical treatment, presenting hazards to health 9/1/2015    History of stroke with residual deficit 9/1/2015     Acute Ischemic Stroke (early subacute infarct at the posterior right lentiform nucleus) with residual left hemiparesis and dysphagia     History of tobacco use 9/1/2015    History of trichomonal urethritis 9/1/2015    History of vitamin D deficiency 9/6/2015    Hypertensive heart and kidney disease without heart failure and with chronic kidney disease stage III 9/2/2015    Obesity, Class I, BMI 30-34.9 9/1/2015    Overactive bladder      Postoperative atrial fibrillation ([facility] [address] 4/21/2018     Resolved    Postoperative confusion 4/22/2018    Postoperative urinary retention 4/22/2018    Vitamin D deficiency 5/1/2018     Vitamin D 25-Hydroxy (5/1/2018) = 17.9                             Patient taking anticoagulants no                         Patient has a defibrillator: no      Assessment:  ¨ Changes in Assessment throughout shift: no     ¨ Patient has central line: no Reasons if yes: Insertion date: Last dressing date:  ¨ Patient has Chow Cath: no Reasons if yes:     Insertion date:     ¨ Last Vitals:             Vitals:     05/03/18 1000 05/03/18 1600 05/03/18 2200 05/04/18 0800   BP: 119/75 99/55 119/75 122/77   Pulse: 84 72 84 76   Resp: 18 19 18 17   Temp: 97.7 °F (36.5 °C) 96.3 °F (35.7 °C) 97.7 °F (36.5 °C) 97.7 °F (36.5 °C)   SpO2: 99% 98% 99% 98%   Weight:         Height:                 · PAIN                                              Pain Assessment                                              Pain Intensity 1: 0 (05/04/18 0800) Pain Intensity 1: 2 (12/29/14 1105)                                              Pain Location 1: Abdomen Pain Location 1: Abdomen                                              Pain Intervention(s) 1: Medication (see MAR) Pain Intervention(s) 1: Medication (see MAR)  Patient Stated Pain Goal: 0 Patient Stated Pain Goal: 0  ¨ Intervention effective: yes    ¨ Other actions taken for pain: no     · Skin Assessment  Skin color Skin Color: Appropriate for ethnicity  Condition/Temperature Skin Condition/Temp: Dry, Warm  Integrity Skin Integrity: Incision (comment)  Turgor Turgor: Non-tenting  Weekly Pressure Ulcer Documentation  Pressure  Injury Documentation: No Pressure Injury Noted-Pressure Ulcer Prevention Initiated  Wound Prevention & Protection Methods  Orientation of wound Orientation of Wound Prevention: Posterior  Location of Prevention Location of Wound Prevention: Sacrum/Coccyx  Dressing Present Dressing Present : No  Dressing Status Dressing Status: Intact  Wound Offloading Wound Offloading (Prevention Methods): Bed, pressure redistribution/air, Chair cushion, Wheelchair     · INTAKE/OUPUT               Date 05/03/18 0700 - 05/04/18 0659 05/04/18 0700 - 05/05/18 0659   Shift 3791-6261 1054-0565 24 Hour Total 0393-2017 5750-3632 24 Hour Total   I  N  T  A  K  E  P.O. 330   330            P.O. 330   330          Shift Total  (mL/kg) 330  (3.3)   330  (3.3)         O  U  T  P  U  T  Urine  (mL/kg/hr)                  Urine Occurrence(s) 4 x 1 x 5 x 1 x   1 x    Stool                  Stool Occurrence(s) 2 x 1 x 3 x          Shift Total  (mL/kg)                  330         Weight (kg) 99.1 99.1 99.1 99.1 99.1 99.1         Recommendations:  1. Patient needs and requests: education     2. Diet: cardiac     3.  Pending tests/procedures: no    4. Functional Level/Equipment: 1 x person assist     5. Estimated Discharge Date: TDB Posted on Whiteboard in Patients Room: no      HEALS Safety Check     A safety check occurred in the patient's room between off going nurse and oncoming nurse listed above.     The safety check included the below items  Area Items   H  High Alert Medications § Verify all high alert medication drips (heparin, PCA, etc.)   E  Equipment § Suction is set up for ALL patients (with dipti)  § Red plugs utilized for all equipment (IV pumps, etc.)  § WOWs wiped down at end of shift. § Room stocked with oxygen, suction, and other unit-specific supplies   A  Alarms § Bed alarm is set for fall risk patients  § Ensure chair alarm is in place and activated if patient is up in a chair   L  Lines § Check IV for any infiltration  § Chow bag is empty if patient has a Chow   § Tubing and IV bags are labeled   S  Safety § Room is clean, patient is clean, and equipment is clean. § Hallways are clear from equipment besides carts. § Fall bracelet on for fall risk patients  § Ensure room is clear and free of clutter  § Suction is set up for ALL patients (with dipti)  § Hallways are clear from equipment besides carts.    § Isolation precautions followed, supplies available outside room, sign posted

## 2018-05-06 NOTE — PROGRESS NOTES
Problem: Self Care Deficits Care Plan (Adult)  Goal: *Therapy Goal (Edit Goal, Insert Text)  Occupational Therapy Goals   Long Term Goals  Initiated 2018 and to be accomplished within 1-2 week(s)  1. Pt will perform self-feeding with MI to . (I)  2. Pt will perform grooming with MI.  3. Pt will perform UB bathing with Setup. 4. Pt will perform LB bathing with Supervision. 5. Pt will perform tub/shower, shower stall, and/or self propel to sink/vanity Supervision. 6. Pt will perform UB dressing with Setup. 7. Pt will perform LB dressing with Supervision. 8. Pt will perform toileting task with Supervision. 9. Pt will perform toilet transfer with Supervision. Short Term Goals   Initiated 2018 and to be accomplished within 7 day(s) 2018  1. Pt will perform self-feeding with MI to (I). 2. Pt will perform grooming with MI.  3. Pt will perform UB bathing with Setup. 4. Pt will perform LB bathing with Min A with AE as needed. 5. Pt will perform tub/shower, shower stall, and/or self propel to sink/vanity Min A.   6. Pt will perform UB dressing with Setup. 7. Pt will perform LB dressing with Mod A.  8. Pt will perform toileting task with Mod A.  9. Pt will perform toilet transfer with SBA. Occupational Therapy TREATMENT    Patient: Getachew Fitch   67 y.o. Patient identified with name and : yes    Date: 2018    First Tx Session  Time In: 9:00am  Time Out[de-identified] 10:00am    Diagnosis: Dibility  Acute appendicitis with peritoneal abscess   Precautions: Aspiration, Fall  Chart, occupational therapy assessment, plan of care, and goals were reviewed. Pain:  Pt reports 0/10 pain or discomfort prior to treatment. Pt reports 0/10 pain or discomfort post treatment. Intervention Provided: na      SUBJECTIVE:   Patient stated Its cold in here.     OBJECTIVE DATA SUMMARY:       FEEDING/EATING Daily Assessment    Feeding/Eating  Feeding/Eating Assistance: 5 (Supervision/setup)  Comments: Tray setup in dining room. Min VC to take small sips & chew his foods. GROOMING Daily Assessment    Grooming  Grooming Assistance : 7 (Independent)  Comments: setup at the sink & pt seated in his wc for grooming. UPPER BODY DRESSING Daily Assessment    Upper Body Dressing   Dressing Assistance : 5 (Supervision)  Comments: Donned pullover shirt. LOWER BODY DRESSING Daily Assessment    Lower Body Dressing   Dressing Assistance : 5 (Stand-by assistance)  Leg Crossed Method Used: Yes  Position Performed: Seated edge of bed  Comments: Assistance to don pants over hips as he stood. MOBILITY/TRANSFERS Daily Assessment     SBA from bed to wc       ASSESSMENT:  Good tx participation noted this am.  Progression toward goals:  [x]          Improving appropriately and progressing toward goals  []          Improving slowly and progressing toward goals  []          Not making progress toward goals and plan of care will be adjusted     PLAN:  Patient continues to benefit from skilled intervention to address the above impairments. Continue treatment per established plan of care. Discharge Recommendations: To Be Determined  Further Equipment Recommendations for Discharge:  N/A     Activity Tolerance:  Completed all requested tasks slowly with min vc to direction. Estimated LOS:10 days    Please refer to the flowsheet for vital signs taken during this treatment. After treatment:   [x]  Patient left in no apparent distress sitting up in chair   []  Patient left in no apparent distress in bed  []  Call bell left within reach  []  Nursing notified  []  Caregiver present  []  Bed alarm activated    COMMUNICATION/EDUCATION:   [] Home safety education was provided and the patient/caregiver indicated understanding. [] Patient/family have participated as able in goal setting and plan of care. [x] Patient/family agree to work toward stated goals and plan of care.   [] Patient understands intent and goals of therapy, but is neutral about his/her participation. [] Patient is unable to participate in goal setting and plan of care.       Arnaldo Stringer, OTR/L

## 2018-05-06 NOTE — PROGRESS NOTES
[address]  [address] [place] [address], [address]     INPATIENT REHABILITATION  DAILY PROGRESS NOTE     Date: 5/6/2018    Name: Padmini Morales Age / Sex: 67 y.o. / male   CSN: [id number] MRN: [id number]   Admit Date: 4/30/2018 Length of Stay: 6 days     Primary Rehab Diagnosis: Impaired Mobility and ADLs secondary to:  1. S/P Diagnostic laparoscopy followed by open appendectomy with drainage of appendiceal abscess (4/20/2018 - Dr. Mary Ann Rojas)  2. History of acute appendicitis with appendiceal abscess      Subjective:     No new issues or problems reported. Blood pressure controlled. Amlodipine was not given this AM due to BP precautions.       Objective:     Vital Signs:  Patient Vitals for the past 24 hrs:   BP Temp Pulse Resp SpO2   05/06/18 0814 112/73 97.1 °F (36.2 °C) 82 19 99 %   05/05/18 2158 112/73 97.7 °F (36.5 °C) 63 17 97 %   05/05/18 1620 111/79 96.7 °F (35.9 °C) 81 20 98 %        Current Medications:  Current Facility-Administered Medications   Medication Dose Route Frequency    amLODIPine (NORVASC) tablet 2.5 mg  2.5 mg Oral DAILY    co-enzyme Q-10 (CO Q-10) capsule 100 mg  100 mg Oral DAILY    simvastatin (ZOCOR) tablet 20 mg  20 mg Oral QHS    cholecalciferol (VITAMIN D3) capsule 5,000 Units  5,000 Units Oral DAILY    ferrous sulfate tablet 325 mg  1 Tab Oral DAILY WITH BREAKFAST    ascorbic acid (vitamin C) (VITAMIN C) tablet 250 mg  250 mg Oral DAILY WITH BREAKFAST    acetaminophen (TYLENOL) tablet 650 mg  650 mg Oral Q4H PRN    docusate sodium (COLACE) capsule 100 mg  100 mg Oral BID    bisacodyl (DULCOLAX) tablet 10 mg  10 mg Oral Q48H PRN    Lactobacillus Acidoph & Bulgar (FLORANEX) tablet 2 Tab  2 Tab Oral BID    HYDROcodone-acetaminophen (NORCO) 5-325 mg per tablet 1 Tab  1 Tab Oral Q4H PRN    tamsulosin (FLOMAX) capsule 0.4 mg  0.4 mg Oral DAILY    cyanocobalamin tablet 1,000 mcg  1,000 mcg Oral DAILY    aspirin chewable tablet 81 mg  81 mg Oral DAILY WITH BREAKFAST       Allergies: Allergies   Allergen Reactions    Lipitor [Atorvastatin] Other (comments)     Elevated CK level    Pt has no awareness of this allergy. Lab/Data Review:  No results found for this or any previous visit (from the past 24 hour(s)). Estimated Glomerular Filtration Rate:  On admission, estimated GFR based on a Creatinine of 1.27 mg/dl:   Using CKD-EPI = 64.5 mL/min/1.73m2   Using MDRD = 71.5 mL/min/1.73m2      Assessment:     Primary Rehabilitation Diagnosis  1. Generalized Weakness with Impaired Mobility and ADLs  2. S/P Diagnostic laparoscopy followed by open appendectomy with drainage of appendiceal abscess (4/20/2018 - Dr. Lorene Henson)  3. History of acute appendicitis with appendiceal abscess     Comorbidities   History of stroke with residual deficit I69.30    Hypertensive heart and kidney disease without heart failure and with chronic kidney disease stage III I13.10, D57.5    Diastolic dysfunction without heart failure I51.9    Dyslipidemia E78.5    CKD (chronic kidney disease) stage 3, GFR 30-59 ml/min N18.3    History of trichomonal urethritis Z86.19    Obesity, Class I, BMI 30-34.9 E66.9    History of noncompliance with medical treatment, presenting hazards to health Z91.19    History of tobacco use Z87.891    History of vitamin D deficiency Z86.39    Hemiparesis affecting left side as late effect of cerebrovascular accident (CVA)  I69.354    Dysphagia as late effect of cerebrovascular accident (CVA) I74.200    Postoperative atrial fibrillation (HCC) I97.89, I48.91    Overactive bladder N32.81    Benign prostatic hyperplasia N40.0    Postoperative urinary retention N99.89, R33.8    Postoperative confusion R41.0    Acute blood loss as cause of postoperative anemia D62    Vitamin D deficiency E55. 9        Plan:     1. Justification for continued stay: Good progression towards established rehabilitation goals.     2. Medical Issues being followed closely:    [x]  Fall and safety precautions     [x]  Wound Care     [x]  Bowel and Bladder Function     [x]  Fluid Electrolyte and Nutrition Balance     [x]  Pain Control      3. Issues that 24 hour rehabilitation nursing is following:    [x]  Fall and safety precautions     [x]  Wound Care     [x]  Bowel and Bladder Function     [x]  Fluid Electrolyte and Nutrition Balance     [x]  Pain Control      [x]  Assistance with and education on in-room safety with transfers to and from the bed, wheelchair, toilet and shower. 4. Acute rehabilitation plan of care:    [x]  Continue current care and rehab. [x]  Physical Therapy           [x]  Occupational Therapy           [x]  Speech Therapy     []  Hold Rehab until further notice     5. Medications:    [x]  MAR Reviewed     [x]  Continue Present Medications     6. DVT Prophylaxis:      []  Lovenox     []  Unfractionated Heparin     []  Coumadin     []  NOAC     [x]  MARYAM Stockings     []  Sequential Compression Device     []  None     7. Orders:   > S/P Diagnostic laparoscopy followed by open appendectomy with drainage of appendiceal abscess (4/20/2018 - Dr. Kirill Reid);  History of acute appendicitis with appendiceal abscess   > WBC count (5/1/2018, on admission) = 13.0   > STEVEN drain was removed on the AM of 5/1/2018 by Dr. Kirill Reid   > On 5/3/2018, patient had completed the recommended treatment course of Fluconazole 400 mg PO once daily    > Continue Floranex 2 tabs PO BID    > Acute Postoperative Blood Loss Anemia   > Hgb/Hct (5/1/2018, on admission) = 9.7/29.7   > Anemia work-up showed serum iron 27, TIBC 233, iron % saturation 50, ferritin 156, reticulocyte count 1.0   > Continue:    > FeSO4 325 mg PO once daily with breakfast     > Ascorbic Acid 250 mg PO once daily with breakfast (to enhance the absorption of the FeSO4)    > Benign prostatic hyperplasia / Overactive bladder / Postoperative urinary retention   > On 5/1/2018, added Terazosin 1 mg PO q PM (6PM)   > On 5/5/2018, discontinued Terazosin 1 mg PO q PM (6PM)   > Continue Tamsulosin 0.4 mg PO once daily    > Hypertensive heart and kidney disease without heart failure and with chronic kidney disease stage 3   > On 5/1/2018:    > Discontinued Labetalol 100 mg PO q 12 hr (6AM, 6PM)    > Terazosin 1 mg PO q PM (6PM)   > On 5/3/2018, decreased Amlodipine from 10 mg to 5 mg PO once daily (6AM)   > On 5/5/2018:    > Discontinued Terazosin 1 mg PO q PM (6PM)    > Decreased Amlodipine from 5 mg to 2.5 mg PO once daily (6AM)   > Discontinue Amlodipine 2.5 mg PO once daily (6AM)     > Postoperative confusion   > Will monitor    > Vitamin D Deficiency   > Vitamin D 25-Hydroxy (5/1/2018) = 17.9   > On 5/2/2018, patient was given Cholecalciferol 50,000 units PO x 1 dose    > On 5/3/2018, started Cholecalciferol 5,000 units PO once daily    > Continue Cholecalciferol 5,000 units PO once daily     > History of stroke with residual left hemiparesis and dysphagia    > Lipid profile (5/2/2018) showed , , HDL 26, LDL 59   > Baseline hepatic function tests and CK:    > On 4/22/2018:     > ALT = 45     > AST = 37     > CK = 453    > On 5/2/2018:     > ALT = 90     > AST = 50     > CK = 103   > Patient's record state Atorvastatin causes and elevated CK level - patient and sister not aware of this   > Patient had been on Ezetimibe for dyslipidemia   > Patient had not been on a statin most likely due to documentation of CK elevation due to Atorvastatin   > On 5/2/2018:    > Patient was started on a trial of Simvastatin 20 mg PO q HS for secondary stroke prevention and plaque stabilization    > Discontinued Zetia    > Will monitor LFTs    > Advanced diet from NDD2 to NDD3   > On 5/3/2018, added Coenzyme Q10 100 mg PO once daily   > Continue:    > Aspirin 81 mg PO once daily with breakfast    > Simvastatin 20 mg PO q HS    > Coenzyme Q10 100 mg PO once daily    > Analgesia   > Continue:    > Acetaminophen 650 mg PO q 4 hr PRN for pain level less than 5/10    > Norco 5/325 1 tab PO q 4 hr PRN for pain level greater than 4/10       Signed:    Marisabel Doshi MD    May 6, 2018

## 2018-05-06 NOTE — ROUTINE PROCESS
Bedside and Verbal shift change report given to Archana Juan RN (oncoming nurse) by Janina Flores LPN   (offgoing nurse). Report included the following information SBAR, Kardex, MAR and Recent Results.     Situation:                        Code Status: Full Code                        Reason for Admission: abscess  Hospital Day: 4                        Problem List:   Hospital Problems  Date Reviewed: 5/3/2018                         Codes Class Noted POA                Vitamin D deficiency (Chronic) ICD-10-CM: E55.9  ICD-9-CM: 898. 9   5/1/2018 Yes      Overview Signed 5/1/2018 10:01 AM by Prateek Peres MD        Vitamin D 25-Hydroxy (5/1/2018) = 17.9                 Overactive bladder (Chronic) ICD-10-CM: N32.81  ICD-9-CM: 596.51   Unknown Yes             Benign prostatic hyperplasia (Chronic) ICD-10-CM: N40.0  ICD-9-CM: 600.00   Unknown Yes             Postoperative urinary retention ICD-10-CM: N99.89, R33.8  ICD-9-CM: 460. 5   4/22/2018 Yes             Postoperative confusion ICD-10-CM: R41.0  ICD-9-CM: 293.9   4/22/2018 Yes             Status post appendectomy ICD-10-CM: Z90.49  ICD-9-CM: V45.89   4/21/2018 Yes      Overview Addendum 4/30/2018  4:41 PM by Prateek Peres MD        S/P Diagnostic laparoscopy followed by open appendectomy with drainage of appendiceal abscess (4/20/2018 - Dr. Kirill Reid)                Acute blood loss as cause of postoperative anemia ICD-10-CM: D62  ICD-9-CM: 285. 1   4/21/2018 Yes             Generalized weakness ICD-10-CM: R53.1  ICD-9-CM: 780.79   4/20/2018 Yes             * (Principal)Acute appendicitis with peritoneal abscess ICD-10-CM: K35.3  ICD-9-CM: 540. 1   4/20/2018 Yes             Hypertensive heart and kidney disease without heart failure and with chronic kidney disease stage III (Chronic) ICD-10-CM: I13.10, N18.3  ICD-9-CM: 404.90, 585.3   9/2/2015 Yes                    Background:                        Past Medical History:        Past Medical History: Diagnosis Date    Acute blood loss as cause of postoperative anemia 4/21/2018    Benign prostatic hyperplasia      CKD (chronic kidney disease) stage 3, GFR 30-59 ml/min 3/6/9624    Diastolic dysfunction without heart failure 9/2/2015     Grade 1 diastolic dysfunction on 2D ECHO done 9/02/2015    Dyslipidemia 9/2/2015    Dysphagia as late effect of cerebrovascular accident (CVA) 9/1/2015    Hemiparesis affecting left side as late effect of cerebrovascular accident (CVA) ([facility] [address] 9/1/2015    History of noncompliance with medical treatment, presenting hazards to health 9/1/2015    History of stroke with residual deficit 9/1/2015     Acute Ischemic Stroke (early subacute infarct at the posterior right lentiform nucleus) with residual left hemiparesis and dysphagia     History of tobacco use 9/1/2015    History of trichomonal urethritis 9/1/2015    History of vitamin D deficiency 9/6/2015    Hypertensive heart and kidney disease without heart failure and with chronic kidney disease stage III 9/2/2015    Obesity, Class I, BMI 30-34.9 9/1/2015    Overactive bladder      Postoperative atrial fibrillation ([facility] [address] 4/21/2018     Resolved    Postoperative confusion 4/22/2018    Postoperative urinary retention 4/22/2018    Vitamin D deficiency 5/1/2018     Vitamin D 25-Hydroxy (5/1/2018) = 17.9                             Patient taking anticoagulants no                         Patient has a defibrillator: no      Assessment:  ¨ Changes in Assessment throughout shift: no     ¨ Patient has central line: no Reasons if yes: Insertion date: Last dressing date:  ¨ Patient has Chow Cath: no Reasons if yes:     Insertion date:     ¨ Last Vitals:             Vitals:     05/03/18 1000 05/03/18 1600 05/03/18 2200 05/04/18 0800   BP: 119/75 99/55 119/75 122/77   Pulse: 84 72 84 76   Resp: 18 19 18 17   Temp: 97.7 °F (36.5 °C) 96.3 °F (35.7 °C) 97.7 °F (36.5 °C) 97.7 °F (36.5 °C)   SpO2: 99% 98% 99% 98%   Weight:         Height:                 · PAIN                                              Pain Assessment                                              Pain Intensity 1: 0 (05/04/18 0800) Pain Intensity 1: 2 (12/29/14 1105)                                              Pain Location 1: Abdomen Pain Location 1: Abdomen                                              Pain Intervention(s) 1: Medication (see MAR) Pain Intervention(s) 1: Medication (see MAR)  Patient Stated Pain Goal: 0 Patient Stated Pain Goal: 0  ¨ Intervention effective: yes    ¨ Other actions taken for pain: no     · Skin Assessment  Skin color Skin Color: Appropriate for ethnicity  Condition/Temperature Skin Condition/Temp: Dry, Warm  Integrity Skin Integrity: Incision (comment)  Turgor Turgor: Non-tenting  Weekly Pressure Ulcer Documentation  Pressure  Injury Documentation: No Pressure Injury Noted-Pressure Ulcer Prevention Initiated  Wound Prevention & Protection Methods  Orientation of wound Orientation of Wound Prevention: Posterior  Location of Prevention Location of Wound Prevention: Sacrum/Coccyx  Dressing Present Dressing Present : No  Dressing Status Dressing Status: Intact  Wound Offloading Wound Offloading (Prevention Methods): Bed, pressure redistribution/air, Chair cushion, Wheelchair     · INTAKE/OUPUT               Date 05/03/18 0700 - 05/04/18 0659 05/04/18 0700 - 05/05/18 0659   Shift 3539-1639 2155-7505 24 Hour Total 1763-7722 7913-4217 24 Hour Total   I  N  T  A  K  E  P.O. 330   330            P.O. 330   330          Shift Total  (mL/kg) 330  (3.3)   330  (3.3)         O  U  T  P  U  T  Urine  (mL/kg/hr)                  Urine Occurrence(s) 4 x 1 x 5 x 1 x   1 x    Stool                  Stool Occurrence(s) 2 x 1 x 3 x          Shift Total  (mL/kg)                  330         Weight (kg) 99.1 99.1 99.1 99.1 99.1 99.1         Recommendations:  1. Patient needs and requests: education     2. Diet: cardiac     3.  Pending tests/procedures: no    4. Functional Level/Equipment: 1 x person assist     5. Estimated Discharge Date: TDB Posted on Whiteboard in Patients Room: no      HEALS Safety Check     A safety check occurred in the patient's room between off going nurse and oncoming nurse listed above.     The safety check included the below items  Area Items   H  High Alert Medications § Verify all high alert medication drips (heparin, PCA, etc.)   E  Equipment § Suction is set up for ALL patients (with dipti)  § Red plugs utilized for all equipment (IV pumps, etc.)  § WOWs wiped down at end of shift. § Room stocked with oxygen, suction, and other unit-specific supplies   A  Alarms § Bed alarm is set for fall risk patients  § Ensure chair alarm is in place and activated if patient is up in a chair   L  Lines § Check IV for any infiltration  § Chow bag is empty if patient has a Chow   § Tubing and IV bags are labeled   S  Safety § Room is clean, patient is clean, and equipment is clean. § Hallways are clear from equipment besides carts. § Fall bracelet on for fall risk patients  § Ensure room is clear and free of clutter  § Suction is set up for ALL patients (with dipti)  § Hallways are clear from equipment besides carts.    § Isolation precautions followed, supplies available outside room, sign posted

## 2018-05-07 PROBLEM — Z78.9 STATIN INTOLERANCE: Chronic | Status: ACTIVE | Noted: 2018-05-07

## 2018-05-07 PROBLEM — N17.9 ACUTE RENAL FAILURE SUPERIMPOSED ON STAGE 3 CHRONIC KIDNEY DISEASE (HCC): Status: ACTIVE | Noted: 2018-05-07

## 2018-05-07 PROBLEM — Z78.9 STATIN INTOLERANCE: Status: ACTIVE | Noted: 2018-05-07

## 2018-05-07 PROBLEM — N18.30 ACUTE RENAL FAILURE SUPERIMPOSED ON STAGE 3 CHRONIC KIDNEY DISEASE (HCC): Status: ACTIVE | Noted: 2018-05-07

## 2018-05-07 LAB
ALBUMIN SERPL-MCNC: 2.9 G/DL (ref 3.4–5)
ALBUMIN/GLOB SERPL: 0.7 {RATIO} (ref 0.8–1.7)
ALP SERPL-CCNC: 151 U/L (ref 45–117)
ALT SERPL-CCNC: 226 U/L (ref 16–61)
ANION GAP SERPL CALC-SCNC: 6 MMOL/L (ref 3–18)
AST SERPL-CCNC: 130 U/L (ref 15–37)
BILIRUB DIRECT SERPL-MCNC: 0.1 MG/DL (ref 0–0.2)
BILIRUB SERPL-MCNC: 0.4 MG/DL (ref 0.2–1)
BUN SERPL-MCNC: 30 MG/DL (ref 7–18)
BUN/CREAT SERPL: 19 (ref 12–20)
CALCIUM SERPL-MCNC: 9.6 MG/DL (ref 8.5–10.1)
CHLORIDE SERPL-SCNC: 104 MMOL/L (ref 100–108)
CK SERPL-CCNC: 91 U/L (ref 39–308)
CO2 SERPL-SCNC: 27 MMOL/L (ref 21–32)
CREAT SERPL-MCNC: 1.59 MG/DL (ref 0.6–1.3)
GLOBULIN SER CALC-MCNC: 4.2 G/DL (ref 2–4)
GLUCOSE SERPL-MCNC: 95 MG/DL (ref 74–99)
HCT VFR BLD AUTO: 29.8 % (ref 36–48)
HGB BLD-MCNC: 9.5 G/DL (ref 13–16)
IRON SATN MFR SERPL: 12 % (ref 20–50)
IRON SERPL-MCNC: 27 UG/DL (ref 50–175)
POTASSIUM SERPL-SCNC: 5.3 MMOL/L (ref 3.5–5.5)
PROT SERPL-MCNC: 7.1 G/DL (ref 6.4–8.2)
SODIUM SERPL-SCNC: 137 MMOL/L (ref 136–145)
TIBC SERPL-MCNC: 233 UG/DL (ref 250–450)

## 2018-05-07 PROCEDURE — 97530 THERAPEUTIC ACTIVITIES: CPT

## 2018-05-07 PROCEDURE — 97110 THERAPEUTIC EXERCISES: CPT

## 2018-05-07 PROCEDURE — 74011250637 HC RX REV CODE- 250/637: Performed by: INTERNAL MEDICINE

## 2018-05-07 PROCEDURE — 65310000000 HC RM PRIVATE REHAB

## 2018-05-07 PROCEDURE — 85018 HEMOGLOBIN: CPT | Performed by: INTERNAL MEDICINE

## 2018-05-07 PROCEDURE — 80076 HEPATIC FUNCTION PANEL: CPT | Performed by: INTERNAL MEDICINE

## 2018-05-07 PROCEDURE — 36415 COLL VENOUS BLD VENIPUNCTURE: CPT | Performed by: INTERNAL MEDICINE

## 2018-05-07 PROCEDURE — 97116 GAIT TRAINING THERAPY: CPT

## 2018-05-07 PROCEDURE — 74011250636 HC RX REV CODE- 250/636: Performed by: INTERNAL MEDICINE

## 2018-05-07 PROCEDURE — 92507 TX SP LANG VOICE COMM INDIV: CPT

## 2018-05-07 PROCEDURE — 92526 ORAL FUNCTION THERAPY: CPT

## 2018-05-07 PROCEDURE — 97112 NEUROMUSCULAR REEDUCATION: CPT

## 2018-05-07 PROCEDURE — 97542 WHEELCHAIR MNGMENT TRAINING: CPT

## 2018-05-07 PROCEDURE — 80048 BASIC METABOLIC PNL TOTAL CA: CPT | Performed by: INTERNAL MEDICINE

## 2018-05-07 PROCEDURE — 97535 SELF CARE MNGMENT TRAINING: CPT

## 2018-05-07 PROCEDURE — 82550 ASSAY OF CK (CPK): CPT | Performed by: INTERNAL MEDICINE

## 2018-05-07 RX ORDER — SODIUM CHLORIDE 9 MG/ML
1000 INJECTION, SOLUTION INTRAVENOUS ONCE
Status: COMPLETED | OUTPATIENT
Start: 2018-05-07 | End: 2018-05-07

## 2018-05-07 RX ORDER — EZETIMIBE 10 MG/1
10 TABLET ORAL DAILY
Status: DISCONTINUED | OUTPATIENT
Start: 2018-05-08 | End: 2018-05-15 | Stop reason: HOSPADM

## 2018-05-07 RX ADMIN — TAMSULOSIN HYDROCHLORIDE 0.4 MG: 0.4 CAPSULE ORAL at 08:43

## 2018-05-07 RX ADMIN — SODIUM CHLORIDE 1000 ML: 900 INJECTION, SOLUTION INTRAVENOUS at 17:09

## 2018-05-07 RX ADMIN — CYANOCOBALAMIN TAB 1000 MCG 1000 MCG: 1000 TAB at 08:43

## 2018-05-07 RX ADMIN — LACTOBACILLUS TAB 2 TABLET: TAB at 08:43

## 2018-05-07 RX ADMIN — Medication 100 MG: at 08:43

## 2018-05-07 RX ADMIN — DOCUSATE SODIUM 100 MG: 100 CAPSULE, LIQUID FILLED ORAL at 08:43

## 2018-05-07 RX ADMIN — FERROUS SULFATE TAB 325 MG (65 MG ELEMENTAL FE) 325 MG: 325 (65 FE) TAB at 08:43

## 2018-05-07 RX ADMIN — Medication 250 MG: at 08:43

## 2018-05-07 RX ADMIN — ASPIRIN 81 MG 81 MG: 81 TABLET ORAL at 08:43

## 2018-05-07 RX ADMIN — CHOLECALCIFEROL CAP 125 MCG (5000 UNIT) 5000 UNITS: 125 CAP at 08:43

## 2018-05-07 RX ADMIN — DOCUSATE SODIUM 100 MG: 100 CAPSULE, LIQUID FILLED ORAL at 18:08

## 2018-05-07 RX ADMIN — LACTOBACILLUS TAB 2 TABLET: TAB at 18:08

## 2018-05-07 NOTE — ROUTINE PROCESS
Bedside and Verbal shift change report given to Sbrandon 52) by Emily Gardner RN   (offgoing nurse). Report included the following information SBAR, Kardex, MAR and Recent Results.     Situation:                        Code Status: Full Code                        Reason for Admission: abscess  Hospital Day: 4                        Problem List:   Hospital Problems  Date Reviewed: 5/3/2018                         Codes Class Noted POA                Vitamin D deficiency (Chronic) ICD-10-CM: E55.9  ICD-9-CM: 143. 9   5/1/2018 Yes      Overview Signed 5/1/2018 10:01 AM by Josemanuel Cooney MD        Vitamin D 25-Hydroxy (5/1/2018) = 17.9                 Overactive bladder (Chronic) ICD-10-CM: N32.81  ICD-9-CM: 596.51   Unknown Yes             Benign prostatic hyperplasia (Chronic) ICD-10-CM: N40.0  ICD-9-CM: 600.00   Unknown Yes             Postoperative urinary retention ICD-10-CM: N99.89, R33.8  ICD-9-CM: 973. 5   4/22/2018 Yes             Postoperative confusion ICD-10-CM: R41.0  ICD-9-CM: 293.9   4/22/2018 Yes             Status post appendectomy ICD-10-CM: Z90.49  ICD-9-CM: V45.89   4/21/2018 Yes      Overview Addendum 4/30/2018  4:41 PM by Josemanuel Cooney MD        S/P Diagnostic laparoscopy followed by open appendectomy with drainage of appendiceal abscess (4/20/2018 - Dr. Jaskaran Gutiérrez)                Acute blood loss as cause of postoperative anemia ICD-10-CM: D62  ICD-9-CM: 285. 1   4/21/2018 Yes             Generalized weakness ICD-10-CM: R53.1  ICD-9-CM: 780.79   4/20/2018 Yes             * (Principal)Acute appendicitis with peritoneal abscess ICD-10-CM: K35.3  ICD-9-CM: 540. 1   4/20/2018 Yes             Hypertensive heart and kidney disease without heart failure and with chronic kidney disease stage III (Chronic) ICD-10-CM: I13.10, N18.3  ICD-9-CM: 404.90, 585.3   9/2/2015 Yes                    Background:                        Past Medical History:        Past Medical History: Diagnosis Date    Acute blood loss as cause of postoperative anemia 4/21/2018    Benign prostatic hyperplasia      CKD (chronic kidney disease) stage 3, GFR 30-59 ml/min 3/3/8090    Diastolic dysfunction without heart failure 9/2/2015     Grade 1 diastolic dysfunction on 2D ECHO done 9/02/2015    Dyslipidemia 9/2/2015    Dysphagia as late effect of cerebrovascular accident (CVA) 9/1/2015    Hemiparesis affecting left side as late effect of cerebrovascular accident (CVA) ([facility] [address] 9/1/2015    History of noncompliance with medical treatment, presenting hazards to health 9/1/2015    History of stroke with residual deficit 9/1/2015     Acute Ischemic Stroke (early subacute infarct at the posterior right lentiform nucleus) with residual left hemiparesis and dysphagia     History of tobacco use 9/1/2015    History of trichomonal urethritis 9/1/2015    History of vitamin D deficiency 9/6/2015    Hypertensive heart and kidney disease without heart failure and with chronic kidney disease stage III 9/2/2015    Obesity, Class I, BMI 30-34.9 9/1/2015    Overactive bladder      Postoperative atrial fibrillation ([facility] [address] 4/21/2018     Resolved    Postoperative confusion 4/22/2018    Postoperative urinary retention 4/22/2018    Vitamin D deficiency 5/1/2018     Vitamin D 25-Hydroxy (5/1/2018) = 17.9                             Patient taking anticoagulants no                         Patient has a defibrillator: no      Assessment:  ¨ Changes in Assessment throughout shift: no     ¨ Patient has central line: no Reasons if yes: Insertion date: Last dressing date:  ¨ Patient has Chow Cath: no Reasons if yes:     Insertion date:     ¨ Last Vitals:             Vitals:     05/03/18 1000 05/03/18 1600 05/03/18 2200 05/04/18 0800   BP: 119/75 99/55 119/75 122/77   Pulse: 84 72 84 76   Resp: 18 19 18 17   Temp: 97.7 °F (36.5 °C) 96.3 °F (35.7 °C) 97.7 °F (36.5 °C) 97.7 °F (36.5 °C)   SpO2: 99% 98% 99% 98%   Weight:         Height:                 · PAIN                                              Pain Assessment                                              Pain Intensity 1: 0 (05/04/18 0800) Pain Intensity 1: 2 (12/29/14 1105)                                              Pain Location 1: Abdomen Pain Location 1: Abdomen                                              Pain Intervention(s) 1: Medication (see MAR) Pain Intervention(s) 1: Medication (see MAR)  Patient Stated Pain Goal: 0 Patient Stated Pain Goal: 0  ¨ Intervention effective: yes    ¨ Other actions taken for pain: no     · Skin Assessment  Skin color Skin Color: Appropriate for ethnicity  Condition/Temperature Skin Condition/Temp: Dry, Warm  Integrity Skin Integrity: Incision (comment)  Turgor Turgor: Non-tenting  Weekly Pressure Ulcer Documentation  Pressure  Injury Documentation: No Pressure Injury Noted-Pressure Ulcer Prevention Initiated  Wound Prevention & Protection Methods  Orientation of wound Orientation of Wound Prevention: Posterior  Location of Prevention Location of Wound Prevention: Sacrum/Coccyx  Dressing Present Dressing Present : No  Dressing Status Dressing Status: Intact  Wound Offloading Wound Offloading (Prevention Methods): Bed, pressure redistribution/air, Chair cushion, Wheelchair     · INTAKE/OUPUT               Date 05/03/18 0700 - 05/04/18 0659 05/04/18 0700 - 05/05/18 0659   Shift 1241-9818 6841-1882 24 Hour Total 9099-5416 2995-8899 24 Hour Total   I  N  T  A  K  E  P.O. 330   330            P.O. 330   330          Shift Total  (mL/kg) 330  (3.3)   330  (3.3)         O  U  T  P  U  T  Urine  (mL/kg/hr)                  Urine Occurrence(s) 4 x 1 x 5 x 1 x   1 x    Stool                  Stool Occurrence(s) 2 x 1 x 3 x          Shift Total  (mL/kg)                  330         Weight (kg) 99.1 99.1 99.1 99.1 99.1 99.1         Recommendations:  1. Patient needs and requests: education     2. Diet: cardiac     3.  Pending tests/procedures: no    4. Functional Level/Equipment: 1 x person assist     5. Estimated Discharge Date: TDB Posted on Whiteboard in Patients Room: no      HEALS Safety Check     A safety check occurred in the patient's room between off going nurse and oncoming nurse listed above.     The safety check included the below items  Area Items   H  High Alert Medications § Verify all high alert medication drips (heparin, PCA, etc.)   E  Equipment § Suction is set up for ALL patients (with dipti)  § Red plugs utilized for all equipment (IV pumps, etc.)  § WOWs wiped down at end of shift. § Room stocked with oxygen, suction, and other unit-specific supplies   A  Alarms § Bed alarm is set for fall risk patients  § Ensure chair alarm is in place and activated if patient is up in a chair   L  Lines § Check IV for any infiltration  § Chow bag is empty if patient has a Chow   § Tubing and IV bags are labeled   S  Safety § Room is clean, patient is clean, and equipment is clean. § Hallways are clear from equipment besides carts. § Fall bracelet on for fall risk patients  § Ensure room is clear and free of clutter  § Suction is set up for ALL patients (with dipti)  § Hallways are clear from equipment besides carts.    § Isolation precautions followed, supplies available outside room, sign posted

## 2018-05-07 NOTE — PROGRESS NOTES
[address]  [address] [place] [address], [address]     INPATIENT REHABILITATION  DAILY PROGRESS NOTE     Date: 5/7/2018    Name: Arlen Gómez Age / Sex: 67 y.o. / male   CSN: [id number] MRN: [id number]   Admit Date: 4/30/2018 Length of Stay: 7 days     Primary Rehab Diagnosis: Impaired Mobility and ADLs secondary to:  1. S/P Diagnostic laparoscopy followed by open appendectomy with drainage of appendiceal abscess (4/20/2018 - Dr. Dion Ryder)  2. History of acute appendicitis with appendiceal abscess      Subjective:     Patient seen and examined. Blood pressure controlled. Amlodipine was not given due to BP precautions. Patient's Complaint:   No significant medical complaints    Pain Control: no current joint or muscle symptoms, essentially pain-free      Objective:     Vital Signs:  Patient Vitals for the past 24 hrs:   BP Temp Pulse Resp SpO2   05/07/18 0759 113/76 97 °F (36.1 °C) 78 18 99 %   05/06/18 2203 122/81 97.3 °F (36.3 °C) 80 18 98 %   05/06/18 1553 115/69 97.6 °F (36.4 °C) 71 19 98 %        Physical Examination:  GENERAL SURVEY: Patient is awake, alert, oriented x 3, sitting comfortably on the chair, not in acute respiratory distress. HEENT: pale palpebral conjunctivae, anicteric sclerae, no nasoaural discharge, moist oral mucosa  NECK: supple, no jugular venous distention, no palpable lymph nodes  CHEST/LUNGS: symmetrical chest expansion, good air entry, clear breath sounds  HEART: adynamic precordium, good S1 S2, no S3, regular rhythm, no murmurs  ABDOMEN: flat, bowel sounds appreciated, soft, non-tender  EXTREMITIES: pale nailbeds, (+) bipedal edema, full and equal pulses, no calf tenderness   NEUROLOGICAL EXAM: The patient is awake, alert and oriented x3, able to answer questions fairly appropriately, able to follow 1 and 2 step commands. Able to tell time from the wall clock. Cranial nerves II-XII are grossly intact. No gross sensory deficit. Motor strength is 4+/5 on BUE, 4-/5 on the RLE, 4/5 on the LLE.     Incision(s): covered, clean, dry, and intact      Current Medications:  Current Facility-Administered Medications   Medication Dose Route Frequency    amLODIPine (NORVASC) tablet 2.5 mg  2.5 mg Oral DAILY    co-enzyme Q-10 (CO Q-10) capsule 100 mg  100 mg Oral DAILY    simvastatin (ZOCOR) tablet 20 mg  20 mg Oral QHS    cholecalciferol (VITAMIN D3) capsule 5,000 Units  5,000 Units Oral DAILY    ferrous sulfate tablet 325 mg  1 Tab Oral DAILY WITH BREAKFAST    ascorbic acid (vitamin C) (VITAMIN C) tablet 250 mg  250 mg Oral DAILY WITH BREAKFAST    acetaminophen (TYLENOL) tablet 650 mg  650 mg Oral Q4H PRN    docusate sodium (COLACE) capsule 100 mg  100 mg Oral BID    bisacodyl (DULCOLAX) tablet 10 mg  10 mg Oral Q48H PRN    Lactobacillus Acidoph & Bulgar (FLORANEX) tablet 2 Tab  2 Tab Oral BID    HYDROcodone-acetaminophen (NORCO) 5-325 mg per tablet 1 Tab  1 Tab Oral Q4H PRN    tamsulosin (FLOMAX) capsule 0.4 mg  0.4 mg Oral DAILY    cyanocobalamin tablet 1,000 mcg  1,000 mcg Oral DAILY    aspirin chewable tablet 81 mg  81 mg Oral DAILY WITH BREAKFAST       Allergies: Allergies   Allergen Reactions    Lipitor [Atorvastatin] Other (comments)     Elevated CK level    Pt has no awareness of this allergy.     Simvastatin Other (comments)     Elevation in LFTs       Lab/Data Review:  Recent Results (from the past 24 hour(s))   METABOLIC PANEL, BASIC    Collection Time: 05/07/18  6:33 AM   Result Value Ref Range    Sodium 137 136 - 145 mmol/L    Potassium 5.3 3.5 - 5.5 mmol/L    Chloride 104 100 - 108 mmol/L    CO2 27 21 - 32 mmol/L    Anion gap 6 3.0 - 18 mmol/L    Glucose 95 74 - 99 mg/dL    BUN 30 (H) 7.0 - 18 MG/DL    Creatinine 1.59 (H) 0.6 - 1.3 MG/DL    BUN/Creatinine ratio 19 12 - 20      GFR est AA 52 (L) >60 ml/min/1.73m2    GFR est non-AA 43 (L) >60 ml/min/1.73m2    Calcium 9.6 8.5 - 10.1 MG/DL   HGB & HCT    Collection Time: 05/07/18  6:33 AM   Result Value Ref Range    HGB 9.5 (L) 13.0 - 16.0 g/dL    HCT 29.8 (L) 36.0 - 48.0 %   HEPATIC FUNCTION PANEL    Collection Time: 05/07/18  6:33 AM   Result Value Ref Range    Protein, total 7.1 6.4 - 8.2 g/dL    Albumin 2.9 (L) 3.4 - 5.0 g/dL    Globulin 4.2 (H) 2.0 - 4.0 g/dL    A-G Ratio 0.7 (L) 0.8 - 1.7      Bilirubin, total 0.4 0.2 - 1.0 MG/DL    Bilirubin, direct 0.1 0.0 - 0.2 MG/DL    Alk. phosphatase 151 (H) 45 - 117 U/L    AST (SGOT) 130 (H) 15 - 37 U/L    ALT (SGPT) 226 (H) 16 - 61 U/L   CK    Collection Time: 05/07/18  6:33 AM   Result Value Ref Range    CK 91 39 - 308 U/L       Estimated Glomerular Filtration Rate:  On admission, estimated GFR based on a Creatinine of 1.27 mg/dl:   Using CKD-EPI = 64.5 mL/min/1.73m2   Using MDRD = 71.5 mL/min/1.73m2      Assessment:     Primary Rehabilitation Diagnosis  1. Generalized Weakness with Impaired Mobility and ADLs  2. S/P Diagnostic laparoscopy followed by open appendectomy with drainage of appendiceal abscess (4/20/2018 - Dr. Lidia Carlos)  3.  History of acute appendicitis with appendiceal abscess     Comorbidities   History of stroke with residual deficit I69.30    Hypertensive heart and kidney disease without heart failure and with chronic kidney disease stage III I13.10, V70.9    Diastolic dysfunction without heart failure I51.9    Dyslipidemia E78.5    CKD (chronic kidney disease) stage 3, GFR 30-59 ml/min N18.3    History of trichomonal urethritis Z86.19    Obesity, Class I, BMI 30-34.9 E66.9    History of noncompliance with medical treatment, presenting hazards to health Z91.19    History of tobacco use Z87.891    History of vitamin D deficiency Z86.39    Hemiparesis affecting left side as late effect of cerebrovascular accident (CVA)  I69.354    Dysphagia as late effect of cerebrovascular accident (CVA) I74.200    Postoperative atrial fibrillation (HCC) I97.89, I48.91    Overactive bladder N32.81    Benign prostatic hyperplasia N40.0    Postoperative urinary retention N99.89, R33.8    Postoperative confusion R41.0    Acute blood loss as cause of postoperative anemia D62    Vitamin D deficiency E55.9    Acute renal failure superimposed on stage 3 chronic kidney disease  N17.9, N18.3        Plan:     1. Justification for continued stay: Good progression towards established rehabilitation goals. 2. Medical Issues being followed closely:    [x]  Fall and safety precautions     [x]  Wound Care     [x]  Bowel and Bladder Function     [x]  Fluid Electrolyte and Nutrition Balance     [x]  Pain Control      3. Issues that 24 hour rehabilitation nursing is following:    [x]  Fall and safety precautions     [x]  Wound Care     [x]  Bowel and Bladder Function     [x]  Fluid Electrolyte and Nutrition Balance     [x]  Pain Control      [x]  Assistance with and education on in-room safety with transfers to and from the bed, wheelchair, toilet and shower. 4. Acute rehabilitation plan of care:    [x]  Continue current care and rehab. [x]  Physical Therapy           [x]  Occupational Therapy           [x]  Speech Therapy     []  Hold Rehab until further notice     5. Medications:    [x]  MAR Reviewed     [x]  Continue Present Medications     6. DVT Prophylaxis:      []  Lovenox     []  Unfractionated Heparin     []  Coumadin     []  NOAC     [x]  MARYAM Stockings     []  Sequential Compression Device     []  None     7. Orders:   > S/P Diagnostic laparoscopy followed by open appendectomy with drainage of appendiceal abscess (4/20/2018 - Dr. Deonte Cummings);  History of acute appendicitis with appendiceal abscess   > WBC count (5/1/2018, on admission) = 13.0   > STEVEN drain was removed on the AM of 5/1/2018 by Dr. Deonte Cummings   > On 5/3/2018, patient had completed the recommended treatment course of Fluconazole 400 mg PO once daily    > Continue Floranex 2 tabs PO BID    > Acute Postoperative Blood Loss Anemia   > Hgb/Hct (5/1/2018, on admission) = 9.7/29.7   > Anemia work-up showed serum iron 27, TIBC 233, iron % saturation 12, ferritin 356, reticulocyte count 1.0   > Hgb/Hct (5/7/2018) = 9.5/29.8    > Continue:    > FeSO4 325 mg PO once daily with breakfast     > Ascorbic Acid 250 mg PO once daily with breakfast (to enhance the absorption of the FeSO4)    > Benign prostatic hyperplasia / Overactive bladder / Postoperative urinary retention   > On 5/1/2018, added Terazosin 1 mg PO q PM (6PM)   > On 5/5/2018, discontinued Terazosin 1 mg PO q PM (6PM)   > Continue Tamsulosin 0.4 mg PO once daily    > Hypertensive heart and kidney disease without heart failure and with chronic kidney disease stage 3   > On 5/1/2018:    > Discontinued Labetalol 100 mg PO q 12 hr (6AM, 6PM)    > Terazosin 1 mg PO q PM (6PM)   > On 5/3/2018, decreased Amlodipine from 10 mg to 5 mg PO once daily (6AM)   > On 5/5/2018:    > Discontinued Terazosin 1 mg PO q PM (6PM)    > Decreased Amlodipine from 5 mg to 2.5 mg PO once daily (6AM)   > Discontinue Amlodipine 2.5 mg PO once daily (6AM)     > Postoperative confusion   > Will monitor    > Vitamin D Deficiency   > Vitamin D 25-Hydroxy (5/1/2018) = 17.9   > On 5/2/2018, patient was given Cholecalciferol 50,000 units PO x 1 dose    > On 5/3/2018, started Cholecalciferol 5,000 units PO once daily    > Continue Cholecalciferol 5,000 units PO once daily     > History of stroke with residual left hemiparesis and dysphagia    > Lipid profile (5/2/2018) showed , , HDL 26, LDL 59   > Baseline hepatic function tests and CK:    > On 4/22/2018:     > ALT = 45     > AST = 37     > CK = 453    > On 5/2/2018:     > ALT = 90     > AST = 50     > CK = 103   > Patient's record state Atorvastatin causes and elevated CK level - patient and sister not aware of this   > Patient had been on Ezetimibe for dyslipidemia   > Patient had not been on a statin most likely due to documentation of CK elevation due to Atorvastatin   > On 5/2/2018:    > Patient was started on a trial of Simvastatin 20 mg PO q HS for secondary stroke prevention and plaque stabilization    > Discontinued Zetia    > Will monitor LFTs    > Advanced diet from NDD2 to NDD3   > On 5/3/2018, added Coenzyme Q10 100 mg PO once daily   > On 5/2/2018:    > ALT = 226    > AST = 130    > CK = 91   > Discontinue Simvastatin 20 mg PO q HS   > Resume Ezetimibe 10 mg PO once daily   > Continue:    > Aspirin 81 mg PO once daily with breakfast    > Coenzyme Q10 100 mg PO once daily    > Acute renal failure superimposed on stage 3 chronic kidney disease    > BUN/Creatinine (5/1/2018, on admission) = 13/1.27   > BUN/Creatinine (5/7/2018) = 30/1.59   > Start IVF: 0.9%  ml/hr x 1 liter   > Increase oral fluid intake    > Analgesia   > Continue:    > Acetaminophen 650 mg PO q 4 hr PRN for pain level less than 5/10    > Norco 5/325 1 tab PO q 4 hr PRN for pain level greater than 4/10       8. Patient's progress in rehabilitation and medical issues discussed with the patient. All questions answered to the best of my ability. Care plan discussed with patient and nurse.       Signed:    Eulice Dance, MD    May 7, 2018

## 2018-05-07 NOTE — ROUTINE PROCESS
SHIFT CHANGE NOTE FOR [facility]    Bedside and Verbal shift change report given to Violetta RN (oncoming nurse) by Goldy Shah RN   (offgoing nurse). Report included the following information SBAR, Kardex, MAR and Recent Results.     Situation:   Code Status: Full Code   Reason for Admission: abscess  Hospital Day: 7   Problem List:   Hospital Problems  Date Reviewed: 5/6/2018          Codes Class Noted POA    Vitamin D deficiency (Chronic) ICD-10-CM: E55.9  ICD-9-CM: 268.9  5/1/2018 Yes    Overview Signed 5/1/2018 10:01 AM by Ascencion Kaur MD     Vitamin D 25-Hydroxy (5/1/2018) = 17.9              Overactive bladder (Chronic) ICD-10-CM: N32.81  ICD-9-CM: 596.51  Unknown Yes        Benign prostatic hyperplasia (Chronic) ICD-10-CM: N40.0  ICD-9-CM: 600.00  Unknown Yes        Postoperative urinary retention ICD-10-CM: N99.89, R33.8  ICD-9-CM: 997.5  4/22/2018 Yes        Postoperative confusion ICD-10-CM: R41.0  ICD-9-CM: 293.9  4/22/2018 Yes        Status post appendectomy ICD-10-CM: Z90.49  ICD-9-CM: V45.89  4/21/2018 Yes    Overview Addendum 4/30/2018  4:41 PM by Ascencion Kaur MD     S/P Diagnostic laparoscopy followed by open appendectomy with drainage of appendiceal abscess (4/20/2018 - Dr. Mg Gore)             Acute blood loss as cause of postoperative anemia ICD-10-CM: D62  ICD-9-CM: 285.1  4/21/2018 Yes        Generalized weakness ICD-10-CM: R53.1  ICD-9-CM: 780.79  4/20/2018 Yes        * (Principal)Acute appendicitis with peritoneal abscess ICD-10-CM: K35.3  ICD-9-CM: 540.1  4/20/2018 Yes        Hypertensive heart and kidney disease without heart failure and with chronic kidney disease stage III (Chronic) ICD-10-CM: I13.10, N18.3  ICD-9-CM: 404.90, 585.3  9/2/2015 Yes              Background:   Past Medical History:   Past Medical History:   Diagnosis Date    Acute blood loss as cause of postoperative anemia 4/21/2018    Benign prostatic hyperplasia     CKD (chronic kidney disease) stage 3, GFR 30-59 ml/min 7/0/3568    Diastolic dysfunction without heart failure 9/2/2015    Grade 1 diastolic dysfunction on 2D ECHO done 9/02/2015    Dyslipidemia 9/2/2015    Dysphagia as late effect of cerebrovascular accident (CVA) 9/1/2015    Hemiparesis affecting left side as late effect of cerebrovascular accident ([facility]A) ([facility] 9/1/2015    History of noncompliance with medical treatment, presenting hazards to health 9/1/2015    History of stroke with residual deficit 9/1/2015    Acute Ischemic Stroke (early subacute infarct at the posterior right lentiform nucleus) with residual left hemiparesis and dysphagia     History of tobacco use 9/1/2015    History of trichomonal urethritis 9/1/2015    History of vitamin D deficiency 9/6/2015    Hypertensive heart and kidney disease without heart failure and with chronic kidney disease stage III 9/2/2015    Obesity, Class I, BMI 30-34.9 9/1/2015    Overactive bladder     Postoperative atrial fibrillation ([facility] 4/21/2018    Resolved    Postoperative confusion 4/22/2018    Postoperative urinary retention 4/22/2018    Vitamin D deficiency 5/1/2018    Vitamin D 25-Hydroxy (5/1/2018) = 17.9       Patient taking anticoagulants no    Patient has a defibrillator: no     Assessment:   Changes in Assessment throughout shift: no     Patient has central line: no Reasons if yes: Insertion date: Last dressing date:   Patient has Chow Cath: no Reasons if yes:     Insertion date:     Last Vitals:     Vitals:    05/06/18 0814 05/06/18 1553 05/06/18 2203 05/07/18 0759   BP: 112/73 115/69 122/81 113/76   Pulse: 82 71 80 78   Resp: 19 19 18 18   Temp: 97.1 °F (36.2 °C) 97.6 °F (36.4 °C) 97.3 °F (36.3 °C) 97 °F (36.1 °C)   SpO2: 99% 98% 98% 99%   Weight:       Height:            PAIN    Pain Assessment    Pain Intensity 1: 0 (05/07/18 0759) Pain Intensity 1: 2 (12/29/14 1105)    Pain Location 1: Abdomen Pain Location 1: Abdomen    Pain Intervention(s) 1: Medication (see MAR) Pain Intervention(s) 1: Medication (see MAR)  Patient Stated Pain Goal: 0 Patient Stated Pain Goal: 0  o Intervention effective: yes    o Other actions taken for pain: no     Skin Assessment  Skin color Skin Color: Appropriate for ethnicity  Condition/Temperature Skin Condition/Temp: Dry, Warm  Integrity Skin Integrity: Incision (comment)  Turgor Turgor: Non-tenting  Weekly Pressure Ulcer Documentation  Pressure  Injury Documentation: No Pressure Injury Noted-Pressure Ulcer Prevention Initiated  Wound Prevention & Protection Methods  Orientation of wound Orientation of Wound Prevention: Posterior  Location of Prevention Location of Wound Prevention: Sacrum/Coccyx  Dressing Present Dressing Present : No  Dressing Status Dressing Status: Intact  Wound Offloading Wound Offloading (Prevention Methods): Bed, pressure redistribution/air, Chair cushion, Wheelchair     INTAKE/OUPUT    Date 05/06/18 0700 - 05/07/18 0659 05/07/18 0700 - 05/08/18 0659   Shift 5225-2986 7379-6243 24 Hour Total 0160-3402 6632-1572 24 Hour Total   I  N  T  A  K  E   P.O. 480  480         P. O. 480  480       Shift Total  (mL/kg) 480  (4.8)  480  (4.8)      O  U  T  P  U  T   Urine  (mL/kg/hr)            Urine Occurrence(s) 4 x 1 x 5 x 0 x  0 x    Stool            Stool Occurrence(s) 2 x 1 x 3 x 0 x  0 x    Shift Total  (mL/kg)           480      Weight (kg) 99.1 99.1 99.1 99.1 99.1 99.1       Recommendations:  1. Patient needs and requests: education    2. Diet: cardiac    3. Pending tests/procedures: no     4. Functional Level/Equipment: 1 x person assist    5. Estimated Discharge Date: TDB Posted on Whiteboard in Patients Room: no     HEALS Safety Check    A safety check occurred in the patient's room between off going nurse and oncoming nurse listed above.     The safety check included the below items  Area Items   H  High Alert Medications - Verify all high alert medication drips (heparin, PCA, etc.)   E  Equipment - Suction is set up for ALL patients (with dipti)  - Red plugs utilized for all equipment (IV pumps, etc.)  - WOWs wiped down at end of shift.  - Room stocked with oxygen, suction, and other unit-specific supplies   A  Alarms - Bed alarm is set for fall risk patients  - Ensure chair alarm is in place and activated if patient is up in a chair   L  Lines - Check IV for any infiltration  - Chow bag is empty if patient has a Chow   - Tubing and IV bags are labeled   S  Safety   - Room is clean, patient is clean, and equipment is clean. - Hallways are clear from equipment besides carts. - Fall bracelet on for fall risk patients  - Ensure room is clear and free of clutter  - Suction is set up for ALL patients (with dipti)  - Hallways are clear from equipment besides carts.    - Isolation precautions followed, supplies available outside room, sign posted

## 2018-05-07 NOTE — PROGRESS NOTES
Alonzo submitted clinical updates to pt's insurance for auth extension. Alonzo received call from pt's  with insurance, [employer] ([phone]) and advised as to anticipated dc plans. Alonzo will follow. 5/8/18  Alonzo left a voicemail for pt's  to follow up regarding auth extension request. Alonzo will follow. Alonzo spoke was  who states that Haily Casanova has been extended with next update on 5/14 or dc on 5/15.

## 2018-05-07 NOTE — PROGRESS NOTES
Sw called pt's sister, listed as Faye Lopez but identifies herself as Janessa Peña. Sw advised sister of pt's dc date and need for additional support at home. No further needs are noted at this time.

## 2018-05-07 NOTE — PROGRESS NOTES
NUTRITION    Nutrition Consult: General Nutrition Management and Supplements     RECOMMENDATIONS / PLAN:     - Continue current nutrition interventions  - Continue RD inpatient monitoring and evaluation. NUTRITION INTERVENTIONS & DIAGNOSIS:     [x] Meals/snacks: modified composition  [x] Medical food supplement therapy: Ensure Enlive TID    Nutrition Diagnosis: Unintended weight loss related to decreased appetite, inadequate energy intake as evidenced by 8 lb, 3.5% weight loss x 1 month PTA    ASSESSMENT:     5/7: Pt unavailable at time of visit. Has poor/fair meal intake. Fair appetite per chart    5/1: Pt reported fair/good appetite and meal intake since admission. Had fair meal intake PTA when in main hospital. Was received nutrition supplements. Discussed resuming supplements; pt agreed with plan.     Average po intake adequate to meet patients estimated nutritional needs:   [] Yes     [x] No   [] Unable to determine at this time    Diet: DIET NUTRITIONAL SUPPLEMENTS Breakfast, Lunch, Dinner; ENSURE ENLIVE  DIET CARDIAC Soft Solids      Food Allergies: NKFA  Current Appetite:   [] Good     [x] Fair     [] Poor     [] Other:  Appetite/meal intake prior to admission:   [] Good     [x] Fair     [] Poor   [] Other:  Feeding Limitations:  [x] Swallowing difficulty: hx of stroke and dysphagia, followed by SLP in the past    [] Chewing difficulty    [] Other:  Current Meal Intake:   Patient Vitals for the past 100 hrs:   % Diet Eaten   05/07/18 1333 50 %   05/07/18 0900 50 %   05/06/18 1843 0 %   05/06/18 1349 50 %   05/06/18 0933 50 %   05/05/18 1855 25 %   05/05/18 1402 50 %   05/05/18 1000 25 %   05/04/18 1806 45 %   05/04/18 1302 50 %   05/04/18 0900 50 %   05/03/18 1830 50 %     BM: 5/7 - loose  Skin Integrity: abdominal surgical incision  Edema: 1+ generalized, UEs;  2+ LEs  Pertinent Medications: Reviewed: bowel regimen, cyanocobalamin    Recent Labs      05/07/18   0633   NA  137   K  5.3   CL  104   CO2 27   GLU  95   BUN  30*   CREA  1.59*   CA  9.6   ALB  2.9*   SGOT  130*   ALT  226*       Intake/Output Summary (Last 24 hours) at 05/07/18 1713  Last data filed at 05/07/18 1333   Gross per 24 hour   Intake              360 ml   Output                0 ml   Net              360 ml       Anthropometrics:  Ht Readings from Last 1 Encounters:   05/01/18 6' 2\" (1.88 m)     Last 3 Recorded Weights in this Encounter    05/01/18 1714   Weight: 99.1 kg (218 lb 7.6 oz)     Body mass index is 28.05 kg/(m^2). Weight History: patient reported unplanned wt loss PTA; noted weight loss of 8 lb (3.5%) x 1 month PTA    Weight Metrics 5/1/2018 4/30/2018 4/30/2018 4/3/2018 3/29/2018 5/25/2016 9/8/2015   Weight 218 lb 7.6 oz 218 lb 7.6 oz - 226 lb 10.1 oz - 228 lb 235 lb   BMI - 28.05 kg/m2 28.05 kg/m2 29.1 kg/m2 - 31.81 kg/m2 32.79 kg/m2        Admitting Diagnosis: Dibility  Acute appendicitis with peritoneal abscess  Pertinent PMHx: HTN, stroke, CKD stage III, dyslipidemia    Education Needs:        [x] None identified  [] Identified - Not appropriate at this time  []  Identified and addressed - refer to education log  Learning Limitations:   [x] None identified  [] Identified  Cultural, Oriental orthodox & ethnic food preferences:  [x] None identified    [] Identified and addressed     ESTIMATED NUTRITION NEEDS:     Calories: 3823-2869 kcal (25-30 kcal/kg) based on  [] Actual BW      [x] SBW 77 kg  Protein:  gm (0.8-1.2 gm/kg) based on  [x] Actual BW: 99 kg      [] SBW   Fluid: 1 mL/kcal     MONITORING & EVALUATION:     Nutrition Goal(s):   1. Po intake of meals will meet >75% of patient estimated nutritional needs within the next 7 days.   Outcome:  [] Met/Ongoing    [x]  Not Met/Progressing    [] New/Initial Goal      Monitoring:   [x] Food and beverage intake   [x] Diet order   [x] Nutrition-focused physical findings   [x] Treatment/therapy   [] Weight   [] Enteral nutrition intake        Previous Recommendations (for follow-up assessments only):     [x]   Implemented       []   Not Implemented (RD to address)    [] No Longer Appropriate     [] No Recommendation Made     Discharge Planning: cardiac diet, consistency as tolerated per SLP  [x] Participated in care planning, discharge planning, & interdisciplinary rounds as appropriate      Dawna Lara, [address] [place] [address]  Pager: [phone]

## 2018-05-07 NOTE — PROGRESS NOTES
Problem: Mobility Impaired (Adult and Pediatric)  Goal: *Acute Goals and Plan of Care (Insert Text)  Physical Therapy Goals  Short Term Goals  Initiated 5/1/2018 and to be accomplished within 7-10 day(s)  1. Patient will move from scoot up and down and roll side to side in bed with modified independence. 2.  Patient will move from supine <> sit with supervision. 3.  Patient will transfer from bed to chair and chair to bed with supervision/set-up using the least restrictive device. 4.  Patient will perform sit to stand with supervision/set-up. 5.  Patient will ambulate with supervision/set-up for 50 feet with the least restrictive device. 6.  Patient will ascend/descend 2 curbs/thresholds with [facility] with contact guard assist.    Long Term Goals  Initiated 5/2/2018 and to be accomplished within 10-14 day(s) (5/16/2018)  1. Patient will move from supine to sit and sit to supine , scoot up and down and roll side to side in bed with independence. 2.  Patient will transfer from bed to chair and chair to bed with distant supervision/set-up using the least restrictive device. 3.  Patient will perform sit to stand with distant supervision/set-up. 4.  Patient will ambulate with distant supervision/set-up for 50 feet with the least restrictive device. 5.  Patient will ascend/descend 2 curbs/thresholds with [facility] with supervision/set-up. physical Therapy TREATMENT    Patient: Padmini Morales (20 y.o. male)  Date: 5/7/2018  Diagnosis: Dibility  Acute appendicitis with peritoneal abscess Acute appendicitis with peritoneal abscess  Precautions: Aspiration, Fall  Chart, physical therapy assessment, plan of care and goals were reviewed. Time In: 1330  Time Out: 1430  Patient Seen For: Balance activities;Gait training;Patient education; Therapeutic exercise; Wheelchair mobility;Transfer training  Pain:  Pt pain was reported as  0/10 pre-treatment. Pt pain was reported as 0/10 post-treatment.   Intervention: none needed at this time    Patient identified with name and : yes    Pt seen for morning session from 12:05-12:35; see \"notes\" section for detailed daily note for this session. SUBJECTIVE:     Pt stated no c/o's initially and agreeable to participated in PT. Pt reported he had a \"spell earlier in morning with nursing while they helped me on the toilet where I got dizzy and hard to breath\". Pt noted several episodes of feeling \"a little light-headed\" while sitting EOM working on sitting balance training and had to lie down and it improved with supine position. OBJECTIVE DATA SUMMARY:   Objective: pt participated in w/c mobility training on level surfaces, gait training with RW, standing balance training and seated B LE therex. TRANSFERS Daily Assessment    Transfer Type:  Other  Other: stand step w/out AD  Transfer Assistance : 4 (Contact guard assistance)  Sit to Stand Assistance: Contact guard assistance  Car Transfers: Not tested  Car Type: n/a       GAIT Daily Assessment    Amount of Assistance: 4 (Contact guard assistance)  Distance (ft): 150 Feet (ft)  Assistive Device: Walker, rolling   Decreased step length, narrow EDGARD with B heels almost touching, decreased step clearance, decreased pace, and trendelenburg L hip        BALANCE Daily Assessment    Sitting - Static: Good (unsupported)  Sitting - Dynamic: Fair (occasional) (+)  Standing - Static: Fair (with RW support)  Standing - Dynamic : Impaired       WHEELCHAIR MOBILITY Daily Assessment    Able to Propel (ft): 150 feet  Functional Level: 4  Curbs/Ramps Assist Required (FIM Score): 0 (Not tested)  Wheelchair Setup Assist Required : 3 (Moderate assistance)  Wheelchair Management: Manages right brake;Manages left brake   Utilizes B UE's and/or LE's       THERAPEUTIC EXERCISES Daily Assessment    Extremity: Both  Exercise Type #1: Seated lower extremity strengthening (with 2# ankle weights: marches, LAQ's, heel-toe raises)  Sets Performed: 2  Reps Performed: 10  Level of Assist: Supervision       Neuro Re-Education:  Standing balance activities at RW with SBA, while using 1 UE for activity and 1 UE for support, then with B UE's doing activity and no UE support on RW for 1min, 15 secs; then 1 min, 30 secs; and 3 minutes during final activity. No LOB. Working on posture, wt shifting, reaching across mid-line, and reaching in multiple planes. ASSESSMENT:  Pt progressing toward goals. No c/o's noted during pm session. Pt able to demonstrate sit/stand transfers with SBA during this session after repetition of task. Pt continues to be cooperative, however has flat affect and does not say much during session. Pt activity tolerance is still impaired and needs rest breaks often during session. Progression toward goals:  [x]      Improving appropriately and progressing toward goals  []      Improving slowly and progressing toward goals  []      Not making progress toward goals and plan of care will be adjusted     PLAN:  Patient continues to benefit from skilled intervention to address the above impairments. Continue treatment per established plan of care. Discharge Recommendations:  Home Health  Further Equipment Recommendations for Discharge:  rolling walker     Estimated LOS: 5/15/18    Activity Tolerance:   Fair+, pt without c/o's of pain, nausea, SOB, or dizziness; however tires quickly and needs rest breaks. Please refer to the flowsheet for vital signs taken during this treatment. After treatment:   Patient left sitting up in w/c and with speech therapist in her office for his next therapy.        Kamron Roe, PT  5/7/2018

## 2018-05-07 NOTE — PROGRESS NOTES
Problem: Neurolinguistics Impaired (Adult)  Goal: *Speech Goal: (INSERT TEXT)  Long term goals:  1. Patient will tolerate least restrictive diet without overt s/s of aspiration or other difficulty x 4/5 meals. .  2. Patient will utilize safe swallowing techniques with supervision. 3. Patient will follow moderately complex commands (manipulating objects) with 80-90% accuracy. 4. Patient will respond to treatment tasks in full sentences, supervision. 5. Patient will name 8-10 items within concrete categories. 6. Patient will be oriented x 3 and recall events of the day, min assist.  7. Patient will perform functional problem solving tasks with 80-90% accuracy. Short term goals (by 5/8/18):  1. Patient will tolerate advanced soft diet/thin liquids without overt s/s of aspiration or other difficulty in 4/5 meals. 2. Patient will utilize safe swallowing techniques with mod-min. 3. Patient will follow moderately complex commands (manipulating objects) with 60% accuracy. 4. Patient will respond to treatment tasks in full sentences, min cues. 5. Patient will name 6-8 items within concrete categories. 6. Patient will be oriented x 3 and recall events of the day,modassist.  7. Patient will perform functional problem solving tasks with 80-90% accuracy. Speech language pathology treatment    Patient: Jeramy Wang (56 y.o. male)  Date: 5/7/2018  Diagnosis: Dibility  Acute appendicitis with peritoneal abscess Acute appendicitis with peritoneal abscess       SUBJECTIVE:   Patient stated I don't know in response to many tasks presented. OBJECTIVE:   Mental Status:  Mr. Debra Suarez was awake, but lethargic in sessions today. He was wearing a hoodie as well as a blanket over his head and shoulders stating that he was cold. Treatment & Interventions:   Patient was seen in the dining room at mealtime for breakfast and lunch.   He continues to receive an advanced soft diet, which appears to be his least restrictive diet at this time. Patient very slow to feed himself. He continues to need some cues to take smaller boluses and perform a repeat swallow to clear the material.  This morning he had significant difficulty when taking his medication with the nurse during the meal.  The nurse insisted upon giving h bryan two pills at a time (there were many to take) and thin liquid to clear them. Patient would hold both in his mouth for a protracted period of time prior to swallowing. SLP suggested (and retrieved) applesauce to swallow the pills and this was much more successful. Patient was also seen for two half hour speech therapy sessions, morning and afternoon. The following treatment tasks were presented:  Neuro-Linguistics:  Orientation:  Supervision  Recent memory: Max assist  Providing 2 solutions: 70% for a single solution; 10% for providing 2 possibilities  ID alternative uses: 50% accuracy  Complex yes/no: 80% accuracy  ID local sites:  90% accuracy   \"Favorites\":  Supervision (Using \"ChatPack\")  Inferences:  85% accuracy  Divergent naming: Things made of wood: Patient listed 2 then stated he could think of no more. 2 more elicited with cues   Musical instruments:  2 named; 3 more with cues  Wh- questions: 65% accuracy (providing basic information)    Response & Tolerance to Activities:  Patient was very subdued and slow in responding today. He frequently answered \"I don't know\". Occasionally SLP was able to have him persevere to find a response.       Pain:  Pain Scale 1: Numeric (0 - 10)  No report of pain     After treatment:   [x]       Patient left in no apparent distress sitting up in chair  []       Patient left in no apparent distress in bed  [x]       Call bell left within reach  []       Nursing notified  []       Caregiver present  []       Bed alarm activated    ASSESSMENT:   Progression toward goals:  []       Improving appropriately and progressing toward goals  [x]       Improving slowly and progressing toward goals  []       Not making progress toward goals and plan of care will be adjusted    PLAN:   Patient continues to benefit from skilled intervention to address the above impairments. Continue treatment per established plan of care. Discharge Recommendations:   To Be Determined    Estimated LOS: Through 5/15/18    Jenny Samayoa, SLP  Time Calculation:  145 Minutes

## 2018-05-07 NOTE — PROGRESS NOTES
physical Therapy TREATMENT    Patient: Brendan Bean (27 y.o. male)  Date: 2018  Diagnosis: Dibility  Acute appendicitis with peritoneal abscess Acute appendicitis with peritoneal abscess  Precautions: Aspiration, Fall  Chart, physical therapy assessment, plan of care and goals were reviewed. Time In: 1205  Time Out: 1235    Pain:  Pt pain was reported as  0/10 pre-treatment. Pt pain was reported as 0/10 post-treatment. Intervention: none needed at this time    Patient identified with name and : yes    SUBJECTIVE:     Pt brought to PT gym by staff member. Pt in w/c and verbalizes he's \"ready for therapy\"  No c/o's noted during session, but reported being \"tired\" and states that he feels \"weak still\". OBJECTIVE DATA SUMMARY:   Objective: pt participated in transfers training w/c <-> mat table, bed mobility training on mat table without siderails, supine therex AROM/AAROM, gait training with RW on level surfaces, and stair training on 4\" steps.     GROSS ASSESSMENT Daily Assessment     AROM: grossly decreased, functional (B LE's)  Strength: grossly decreased, functional (B LE's)  Tone: normal (B LE's)       BED/MAT MOBILITY Daily Assessment     rolling: CGA (to L and R without siderails to help with trunk rotation and LE management)  Supine to sit: min A (on flat mat table without siderails)  Sit to supine: CGA (for LE mgm't.)       TRANSFERS Daily Assessment     Sit to stand: CGA (with vc's for anterior wt shift to initiate lift off)  Stand to sit: CGA (with cues for increased hip flexion to improve bend at trunk for increased control during descent)  Bed <->wheelchair: stand step transfer without AD   Assistance needed: min A with gait belt (cues for hand placement, foot placement and safety; as well as tactile cues to improve wt shift for stepping)       GAIT Daily Assessment     Distance: 150 ft  Assistive device: Rolling walker, gait belt (w/c follow)  Assistance needed: CGA (and cues to improve gait pattern/posture)  Gait deviations: narrow EDGARD (with heels almost touching), slow pace, decreased step length, decreased step clearance, trendelenburg at L hip. Cues for head up and forward gaze posture. STEPS or STAIRS Daily Assessment     number of stairs: 3 (4\" steps)  Rails used: bilateral   Assistance needed: min A   Pt demonstrated a step-over-step reciprocal pattern when ascending the stairs, but used a step-to-pattern taking 1 step at a time when descending. BALANCE Daily Assessment     Sitting: static- Good  Sitting: dynamic- Fair (+)  Standing: static- Fair (+, with RW support)  Standing: dynamic- impaired       THERAPEUTIC EXERCISES Daily Assessment     Extremity exercised: Both LE's  Exercise  Type #1: Supine B LE strengthening exercises (AROM/AAROM on mat table: heel-slides, hip abd/add, bridges)  Number of Reps: 10  Number of sets: 1  Assistance needed: CGA       ASSESSMENT:  Pt tolerated morning session well without c/o's. Pt demonstrated increased ability with sit/stand transfers and stand step transfers w/out AD using UE's for support on bed and w/c. Pt continues to demonstrate overall weakness and decreased activity tolerance as well as impaired gait pattern with increased risk of falls. Pt will continue to benefit from skilled PT intervention. Progression toward goals:  []      Improving appropriately and progressing toward goals  [x]      Improving slowly and progressing toward goals  []      Not making progress toward goals and plan of care will be adjusted     PLAN:  Patient continues to benefit from skilled intervention to address the above impairments. Continue treatment per established plan of care.   Discharge Recommendations:  Home Health  Further Equipment Recommendations for Discharge:  rolling walker      Estimated LOS: 5/15/18    Activity Tolerance:  Fair +, pt without c/o's of pain, dizziness, nausea, or SOB; however, reported \"tired\" and needed frequent rest breaks  Please refer to the flowsheet for vital signs taken during this treatment.     After treatment:   Patient left sitting in w/c, in dining room, preparing to eat lunch, under nursing supervision      Nazia Galicia, PT  5/7/2018

## 2018-05-07 NOTE — PROGRESS NOTES
Pt is a 67year old male admitted to ARU for acute appendicitis with peritoneal abscess. Pt is alert and oriented, alone in the room. Pt states that he lives with his sister in a 1 level condo with 1 step to enter and a tub shower. Pt states that prior to admission he was able to self care with the use of a quad cane for mobility. Pt states that he has a shower chair and a rolling walker. Pt states that he has no history with home health, outpatient or SNF. Pt states that he has a personal care aide with [facility], Monday - Friday 10-4. Pt states that he does not have a POA and notes that his sister, Mina Serrano ([phone]) is his NOK contact. Pt confirms his insurance as [employer]. Pt states that his PCP is Dr Jonnathan Franklin and that he fills his medications at [facility].     Sw reviewed insurance updates, dc planning and team conference. Alonzo will follow.

## 2018-05-07 NOTE — PROGRESS NOTES
Problem: Self Care Deficits Care Plan (Adult)  Goal: *Therapy Goal (Edit Goal, Insert Text)  Occupational Therapy Goals   Long Term Goals  Initiated 2018 and to be accomplished within 1-2 week(s)  1. Pt will perform self-feeding with MI to . (I)  2. Pt will perform grooming with MI.  3. Pt will perform UB bathing with Setup. 4. Pt will perform LB bathing with Supervision. 5. Pt will perform tub/shower, shower stall, and/or self propel to sink/vanity Supervision. 6. Pt will perform UB dressing with Setup. 7. Pt will perform LB dressing with Supervision. 8. Pt will perform toileting task with Supervision. 9. Pt will perform toilet transfer with Supervision. Short Term Goals   Initiated 2018 and to be accomplished within 7 day(s) 2018  1. Pt will perform self-feeding with MI to (I). 2. Pt will perform grooming with MI.  3. Pt will perform UB bathing with Setup. 4. Pt will perform LB bathing with Min A with AE as needed. 5. Pt will perform tub/shower, shower stall, and/or self propel to sink/vanity Min A.   6. Pt will perform UB dressing with Setup. 7. Pt will perform LB dressing with Mod A.  8. Pt will perform toileting task with Mod A.  9. Pt will perform toilet transfer with SBA. Occupational Therapy TREATMENT    Patient: Fernanda Recio   67 y.o. Patient identified with name and : yes    Date: 2018    First Tx Session  Time In: 80  Time Out[de-identified] 56      Diagnosis: Dibility  Acute appendicitis with peritoneal abscess   Precautions: Aspiration, Fall  Chart, occupational therapy assessment, plan of care, and goals were reviewed. Pain:  Pt reports 0/10 pain or discomfort prior to treatment. Pt reports 0/10 pain or discomfort post treatment. Intervention Provided: None      SUBJECTIVE:   Patient stated  He had a good night sleep and slept in the chair because it was comfortable.     OBJECTIVE DATA SUMMARY:     THERAPEUTIC ACTIVITY Daily Assessment    Pt participated with clothes pin tree activity on raised table in dynamic stands with rolling walker for 2:30 minutes and 1:06 minutes,CGA. The demonstrates shoulder flexion ~95 deg's placing and retrieving (16) resistive pins of various weight from tree. Pt become fatigue after standing for2:30 minutes. Recover in ~ 45 second then stood again. GROOMING Daily Assessment    Grooming  Grooming Assistance : 4 (Minimal assistance)  Comments: Pt place in front of sink for grooming performing oral care independent then initiate shaving and req'd min assist for thoroughness. TOILETING Daily Assessment    Toileting  Toileting Assistance (FIM Score): 4 (Minimal assistance) Pt stated he had gone ( incontinent) when asked by OT if  he needed toileting after grooming. Cues: Verbal cues provided to Mr. Phillips to stand at sink and wash leslie area for cleanliness. Pt wash and dry leslie area and buttocks in standing with supervision. Pt req'd min assist doffing/donning hospital brief. LOWER BODY DRESSING Daily Assessment    Lower Body Dressing   Dressing Assistance : 5 (Supervision)  Leg Crossed Method Used: No  Position Performed: Seated in chair  Adaptive Equipment Used: Reacher;Sock aid  Comments: Pt doff sock with reacher, angel sock with sock aid, req'd  min cuing  to sequence steps with donning sock onto Sock aid. MOBILITY/TRANSFERS Daily Assessment     Pt sit to stand CGA  X3, twice with rolling,CGA with min cuing for proper hand placement. ASSESSMENT: Pt demonstrate increase activity standing tolerance with therapeutic activity and progressing with self care. Pt needed min cuing to re-called sequencing steps using AD for LB dressing. Decrease independency with grooming tasks.   Progression toward goals:  []          Improving appropriately and progressing toward goals  [x]          Improving slowly and progressing toward goals  []          Not making progress toward goals and plan of care will be adjusted PLAN:  Patient continues to benefit from skilled intervention to address the above impairments. Continue treatment per established plan of care for independency with self care and increase activity standing with therapeutic activities. Discharge Recommendations:  TBD  Further Equipment Recommendations for Discharge:  TBD     Activity Tolerance:  Fair    Please refer to the flow sheet for vital signs taken during this treatment. After treatment:   [x]  Patient left in no apparent distress sitting up in chair   []  Patient left in no apparent distress in bed  [x]  Call bell left within reach  []  Nursing notified  []  Caregiver present  []  Bed alarm activated    COMMUNICATION/EDUCATION:   [] Home safety education was provided and the patient/caregiver indicated understanding. [x] Patient/family have participated as able in goal setting and plan of care. [] Patient/family agree to work toward stated goals and plan of care. [] Patient understands intent and goals of therapy, but is neutral about his/her participation. [] Patient is unable to participate in goal setting and plan of care.       Felipe KYLE  05/7/2018

## 2018-05-08 PROBLEM — F32.A DEPRESSION: Status: ACTIVE | Noted: 2018-05-08

## 2018-05-08 PROCEDURE — 65310000000 HC RM PRIVATE REHAB

## 2018-05-08 PROCEDURE — 97542 WHEELCHAIR MNGMENT TRAINING: CPT

## 2018-05-08 PROCEDURE — 74011250637 HC RX REV CODE- 250/637: Performed by: INTERNAL MEDICINE

## 2018-05-08 PROCEDURE — 74011250636 HC RX REV CODE- 250/636: Performed by: INTERNAL MEDICINE

## 2018-05-08 PROCEDURE — 97530 THERAPEUTIC ACTIVITIES: CPT

## 2018-05-08 PROCEDURE — 97116 GAIT TRAINING THERAPY: CPT

## 2018-05-08 RX ORDER — ESCITALOPRAM OXALATE 10 MG/1
10 TABLET ORAL EVERY EVENING
Status: DISCONTINUED | OUTPATIENT
Start: 2018-05-08 | End: 2018-05-15 | Stop reason: HOSPADM

## 2018-05-08 RX ORDER — SODIUM CHLORIDE 9 MG/ML
1000 INJECTION, SOLUTION INTRAVENOUS ONCE
Status: COMPLETED | OUTPATIENT
Start: 2018-05-08 | End: 2018-05-08

## 2018-05-08 RX ADMIN — SODIUM CHLORIDE 1000 ML: 900 INJECTION, SOLUTION INTRAVENOUS at 14:47

## 2018-05-08 RX ADMIN — Medication 100 MG: at 08:46

## 2018-05-08 RX ADMIN — ASPIRIN 81 MG 81 MG: 81 TABLET ORAL at 08:46

## 2018-05-08 RX ADMIN — LACTOBACILLUS TAB 2 TABLET: TAB at 17:51

## 2018-05-08 RX ADMIN — TAMSULOSIN HYDROCHLORIDE 0.4 MG: 0.4 CAPSULE ORAL at 08:47

## 2018-05-08 RX ADMIN — ESCITALOPRAM OXALATE 10 MG: 10 TABLET ORAL at 17:51

## 2018-05-08 RX ADMIN — EZETIMIBE 10 MG: 10 TABLET ORAL at 08:47

## 2018-05-08 RX ADMIN — CHOLECALCIFEROL CAP 125 MCG (5000 UNIT) 5000 UNITS: 125 CAP at 08:47

## 2018-05-08 RX ADMIN — Medication 250 MG: at 08:47

## 2018-05-08 RX ADMIN — FERROUS SULFATE TAB 325 MG (65 MG ELEMENTAL FE) 325 MG: 325 (65 FE) TAB at 08:47

## 2018-05-08 RX ADMIN — DOCUSATE SODIUM 100 MG: 100 CAPSULE, LIQUID FILLED ORAL at 08:47

## 2018-05-08 RX ADMIN — CYANOCOBALAMIN TAB 1000 MCG 1000 MCG: 1000 TAB at 08:47

## 2018-05-08 RX ADMIN — LACTOBACILLUS TAB 2 TABLET: TAB at 08:46

## 2018-05-08 NOTE — ROUTINE PROCESS
SHIFT CHANGE NOTE FOR [facility]    Bedside and Verbal shift change report given to Chela Estevez, RN (oncoming nurse) by Thu Walters RN   (offgoing nurse). Report included the following information SBAR, Kardex, MAR and Recent Results.     Situation:   Code Status: Full Code   Reason for Admission: abscess  Hospital Day: 8   Problem List:   Hospital Problems  Date Reviewed: 5/7/2018          Codes Class Noted POA    Statin intolerance (Chronic) ICD-10-CM: Z78.9  ICD-9-CM: 995.27  5/7/2018 Yes    Overview Signed 5/7/2018  2:19 PM by Gerardo Fisher MD     Atorvastatin and Simvastatin             Acute renal failure superimposed on stage 3 chronic kidney disease ([facility] ICD-10-CM: N17.9, N18.3  ICD-9-CM: 584.9, 585.3  5/7/2018 No        Vitamin D deficiency (Chronic) ICD-10-CM: E55.9  ICD-9-CM: 268.9  5/1/2018 Yes    Overview Signed 5/1/2018 10:01 AM by Gerardo Fisher MD     Vitamin D 25-Hydroxy (5/1/2018) = 17.9              Overactive bladder (Chronic) ICD-10-CM: N32.81  ICD-9-CM: 596.51  Unknown Yes        Benign prostatic hyperplasia (Chronic) ICD-10-CM: N40.0  ICD-9-CM: 600.00  Unknown Yes        Postoperative urinary retention ICD-10-CM: N99.89, R33.8  ICD-9-CM: 997.5  4/22/2018 Yes        Postoperative confusion ICD-10-CM: R41.0  ICD-9-CM: 293.9  4/22/2018 Yes        Status post appendectomy ICD-10-CM: Z90.49  ICD-9-CM: V45.89  4/21/2018 Yes    Overview Addendum 4/30/2018  4:41 PM by Gerardo Fisher MD     S/P Diagnostic laparoscopy followed by open appendectomy with drainage of appendiceal abscess (4/20/2018 - Dr. Lorene Henson)             Acute blood loss as cause of postoperative anemia ICD-10-CM: D62  ICD-9-CM: 285.1  4/21/2018 Yes        Generalized weakness ICD-10-CM: R53.1  ICD-9-CM: 780.79  4/20/2018 Yes        * (Principal)Acute appendicitis with peritoneal abscess ICD-10-CM: K35.3  ICD-9-CM: 540.1  4/20/2018 Yes        Hypertensive heart and kidney disease without heart failure and with chronic kidney disease stage III (Chronic) ICD-10-CM: I13.10, N18.3  ICD-9-CM: 404.90, 585.3  9/2/2015 Yes              Background:   Past Medical History:   Past Medical History:   Diagnosis Date    Acute blood loss as cause of postoperative anemia 4/21/2018    Benign prostatic hyperplasia     CKD (chronic kidney disease) stage 3, GFR 30-59 ml/min 3/7/5449    Diastolic dysfunction without heart failure 9/2/2015    Grade 1 diastolic dysfunction on 2D ECHO done 9/02/2015    Dyslipidemia 9/2/2015    Dysphagia as late effect of cerebrovascular accident (CVA) 9/1/2015    Hemiparesis affecting left side as late effect of cerebrovascular accident (CVA) ([facility] 9/1/2015    History of noncompliance with medical treatment, presenting hazards to health 9/1/2015    History of stroke with residual deficit 9/1/2015    Acute Ischemic Stroke (early subacute infarct at the posterior right lentiform nucleus) with residual left hemiparesis and dysphagia     History of tobacco use 9/1/2015    History of trichomonal urethritis 9/1/2015    History of vitamin D deficiency 9/6/2015    Hypertensive heart and kidney disease without heart failure and with chronic kidney disease stage III 9/2/2015    Obesity, Class I, BMI 30-34.9 9/1/2015    Overactive bladder     Postoperative atrial fibrillation (HCC) 4/21/2018    Resolved    Postoperative confusion 4/22/2018    Postoperative urinary retention 4/22/2018    Statin intolerance 05/07/2018    Atorvastatin and Simvastatin    Vitamin D deficiency 5/1/2018    Vitamin D 25-Hydroxy (5/1/2018) = 17.9       Patient taking anticoagulants no    Patient has a defibrillator: no     Assessment:   Changes in Assessment throughout shift: no     Patient has central line: no Reasons if yes: Insertion date: Last dressing date:   Patient has Chow Cath: no Reasons if yes:     Insertion date:     Last Vitals:     Vitals:    05/07/18 0759 05/07/18 1600 05/07/18 2200 05/08/18 0749   BP: 113/76 130/84 123/79 113/75   Pulse: 78 93 84 76   Resp: 18 18 17 19   Temp: 97 °F (36.1 °C) 97.4 °F (36.3 °C) 97.7 °F (36.5 °C) 97.4 °F (36.3 °C)   SpO2: 99% 96% 98% 93%   Weight:       Height:            PAIN    Pain Assessment    Pain Intensity 1: 0 (05/08/18 0800) Pain Intensity 1: 2 (12/29/14 1105)    Pain Location 1: Abdomen Pain Location 1: Abdomen    Pain Intervention(s) 1: Medication (see MAR) Pain Intervention(s) 1: Medication (see MAR)  Patient Stated Pain Goal: 0 Patient Stated Pain Goal: 0  o Intervention effective: yes    o Other actions taken for pain: no     Skin Assessment  Skin color Skin Color: Appropriate for ethnicity  Condition/Temperature Skin Condition/Temp: Warm  Integrity Skin Integrity: Incision (comment)  Turgor Turgor: Non-tenting  Weekly Pressure Ulcer Documentation  Pressure  Injury Documentation: No Pressure Injury Noted-Pressure Ulcer Prevention Initiated  Wound Prevention & Protection Methods  Orientation of wound Orientation of Wound Prevention: Posterior  Location of Prevention Location of Wound Prevention: Sacrum/Coccyx  Dressing Present Dressing Present : No  Dressing Status Dressing Status: Intact  Wound Offloading Wound Offloading (Prevention Methods): Bed, pressure redistribution/air, Chair cushion, Wheelchair     INTAKE/OUPUT    Date 05/07/18 0700 - 05/08/18 0659 05/08/18 0700 - 05/09/18 0659   Shift 9972-0962 0524-2134 24 Hour Total 5613-2180 0088-8199 24 Hour Total   I  N  T  A  K  E   P.O. 360 240 600 240  240      P. O. 360 240 600 240  240    Shift Total  (mL/kg) 360  (3.6) 240  (2.4) 600  (6.1) 240  (2.4)  240  (2.4)   O  U  T  P  U  T   Urine  (mL/kg/hr)            Urine Occurrence(s) 4 x 3 x 7 x 0 x  0 x    Stool            Stool Occurrence(s) 2 x 1 x 3 x 0 x  0 x    Shift Total  (mL/kg)          240 600 240  240   Weight (kg) 99.1 99.1 99.1 99.1 99.1 99.1       Recommendations:  1. Patient needs and requests: education    2. Diet: cardiac    3.  Pending tests/procedures: no 4. Functional Level/Equipment: 1 x person assist    5. Estimated Discharge Date: TDB Posted on Whiteboard in Patients Room: no     HEALS Safety Check    A safety check occurred in the patient's room between off going nurse and oncoming nurse listed above. The safety check included the below items  Area Items   H  High Alert Medications - Verify all high alert medication drips (heparin, PCA, etc.)   E  Equipment - Suction is set up for ALL patients (with dipti)  - Red plugs utilized for all equipment (IV pumps, etc.)  - WOWs wiped down at end of shift.  - Room stocked with oxygen, suction, and other unit-specific supplies   A  Alarms - Bed alarm is set for fall risk patients  - Ensure chair alarm is in place and activated if patient is up in a chair   L  Lines - Check IV for any infiltration  - Chow bag is empty if patient has a Chow   - Tubing and IV bags are labeled   S  Safety   - Room is clean, patient is clean, and equipment is clean. - Hallways are clear from equipment besides carts. - Fall bracelet on for fall risk patients  - Ensure room is clear and free of clutter  - Suction is set up for ALL patients (with dipti)  - Hallways are clear from equipment besides carts.    - Isolation precautions followed, supplies available outside room, sign posted

## 2018-05-08 NOTE — INTERDISCIPLINARY ROUNDS
[facility] PHYSICAL REHABILITATION  [address] [place] [address], [address]    INPATIENT REHABILITATION  PRE-TEAM CONFERENCE SUMMARY     Date of Conference: 5/9/18    Name: Tesfaye Franklin Age / Sex: 67 y.o. / male   CSN: [id number] MRN: [id number]   Admit Date: 4/30/2018 Length of Stay: 8 days     Primary Rehabilitation Diagnosis  1. Generalized Weakness with Impaired Mobility and ADLs  2. S/P Diagnostic laparoscopy followed by open appendectomy with drainage of appendiceal abscess (4/20/2018 - Dr. Bailee Paiz)  3. History of acute appendicitis with appendiceal abscess      Comorbidities   History of stroke with residual deficit I69.30    Hypertensive heart and kidney disease without heart failure and with chronic kidney disease stage III I13.10, N55.4    Diastolic dysfunction without heart failure I51.9    Dyslipidemia E78.5    CKD (chronic kidney disease) stage 3, GFR 30-59 ml/min N18.3    History of trichomonal urethritis Z86.19    Obesity, Class I, BMI 30-34.9 E66.9    History of noncompliance with medical treatment, presenting hazards to health Z91.19    History of tobacco use Z87.891    History of vitamin D deficiency Z86.39    Hemiparesis affecting left side as late effect of cerebrovascular accident (CVA)  I69.354    Dysphagia as late effect of cerebrovascular accident (CVA) I69.391    Postoperative atrial fibrillation (HCC) I97.89, I48.91    Overactive bladder N32.81    Benign prostatic hyperplasia N40.0    Postoperative urinary retention N99.89, R33.8    Postoperative confusion R41.0    Acute blood loss as cause of postoperative anemia D62    Vitamin D deficiency E55.9    Acute renal failure superimposed on stage 3 chronic kidney disease  N17.9, N18.3          Therapy:     FIM SCORES Initial Assessment Weekly Progress Assessment 5/8/2018   Eating Functional Level: 4  Comments: Min A for cutting sandwhich and opening pop up lid drink contaiiner.  Supervision Swallowing     Grooming 4 Min Assist   Bathing  Not tested   Upper Body Dressing Functional Level: 5  Items Applied/Steps Completed: Pullover (4 steps)  Comments: Setup to doff/don pullover shift seated. Supervision   Lower Body Dressing   Supervision   Toileting Functional Level: 0  Comments: NT secondary to deferred session due to fatigue. Min Assist   Bladder - level of assist 2 2   Bladder - accident frequency score 2     Bowel - level of assist 2 2   Bowel - accident frequency score       Toilet Transfer [place] Toilet Transfer Score: 4  Comments: CGA with wide base of support. to and from [facility] bed. Not tested   Tub/Shower Transfer [place] Tub/Shower Transfer Score: 0  Comments: Pt reported he does not get into tub/shower and/or shower in home setting. He completed bathing at sink/vanity. Not tested   Comprehension Primary Mode of Comprehension: Auditory  Score: 5     5   Expression Primary Mode of Expression: Verbal  Score: 5     4   Social Interaction Score: 5  4   Problem Solving Score: 4  3   Memory Score: 5  4     FIM SCORES Initial Assessment Weekly Progress Assessment 5/8/2018   Bed/Chair/Wheelchair Transfers Transfer Type:  Other  Other: stand step without AD  Transfer Assistance : 4 (Minimal assistance)  Sit to Stand Assistance: Minimal assistance  Car Transfers: Not tested  Car Type: n/a Other: stand step with RW  Transfer Assistance : 4 (Contact guard assistance)  Sit to Stand Assistance: Contact guard assistance   Bed Mobility Rolling Right 5 (Supervision)   Rolling Left 5 (Supervision)   Supine to Sit 4 (Minimal assistance)   Sit to Stand Minimal assistance   Sit to Supine 4 (Minimal assistance)    Rolling Right   5 (Supervision)   Rolling Left   5 (Supervision)   Supine to Sit   5 (Supervision)   Sit to Stand   Contact guard assistance   Sit to Supine   5 (Supervision)      Locomotion (W/C) Able to Propel (ft): 216 feet  Functional Level: 3  Curbs/Ramps Assist Required (FIM Score): 0 (Not tested)  Wheelchair Setup Assist Required : 3 (Moderate assistance)  Wheelchair Management: Manages left brake, Manages right brake Function 5  Setup Assistance         Locomotion (W/C distance) 216 Feet 175 feet   Locomotion (Walk) 4 (Minimal assistance) 4 (Minimal assistance)      Locomotion (Walk dist.) 20 Feet (ft) (c 2 trials) 150 Feet (ft)   Steps/Stairs Steps/Stairs Ambulated (#): 0  Level of Assist : 0 (Not tested) (2/2 pt fatigue)  Rail Use:  (N/A) Pt negotiated 1 (6\") curb with RW with CGA         Nursing:     Neuro:   A&O x_4___                   Respiratory:   [x] WNL   [] O2   [] LPM ______   Other:  Peripheral Vascular:   [x] TEDS present   [] Edema present ____ Grade   Cardiac:   [x] WNL   [] Other  Genitourinary:   [x] continent   [] incontinent   [] lew  Abdominal _5/7/18______ LBM  GI: __cardiac soft_____ Diet __thin____ Liquids _no____ tube feeds  Musculoskeletal: _active___ ROM Transfers _wheelchair____ Assistive Device Used  1 x person assist____ Level of Assistance  Skin Integumentary:   [] Intact   [x] Not Intact   ___incision_______Preventative Measures  Details_____dressing change_________________________________________________________  Pain: [x] Controlled   [] Not Controlled   Pain Meds:   [] Scheduled   [x] PRN        Registered Dietitian / Nutrition:   Patient Vitals for the past 100 hrs:   % Diet Eaten   05/08/18 0850 50 %   05/07/18 1809 50 %   05/07/18 1333 50 %   05/07/18 0900 50 %   05/06/18 1843 0 %   05/06/18 1349 50 %   05/06/18 0933 50 %   05/05/18 1855 25 %   05/05/18 1402 50 %   05/05/18 1000 25 %   05/04/18 1806 45 %   05/04/18 1302 50 %   05/04/18 0900 50 %     Pt reported fair appetite, poor/fair (mostly fair) meal intake due to feeling full fast. Likes and consuming nutrition supplements. Po intake encouraged.     Supplements:          [x] Yes: Ensure Enlive TID   [] No      Amount of supplement consumed: 100%       Intake/Output Summary (Last 24 hours) at 05/08/18 1115  Last data filed at 05/08/18 0850   Gross per 24 hour   Intake              720 ml   Output                0 ml   Net              720 ml                                Last bowel movement: 5/8      Interdisciplinary Team Goals:     1. Goal  Encourage pt to mobilize in room with RW with supervision    Barrier  Impaired balance, Impaired strength, Impaired mobility, Emotional status    Intervention  Balance and strength training, Mobility training, Encouragement and emotional support     2. Goal  Pt will perform UB ADLs w/set-up and LB ADLs w/Mod Assist.    Barrier  Depression and lack of interest in ADLs    Intervention  Therapeutic activity to engage pt and increase activity tolerance     3. Goal  Patient will perform functional problem solving tasks with 80% accuracy. Barrier  Dysphagia, cognitive-linguistic impairment    Intervention  Training in safe swallowing techniques, diet modification and monitoring, training in safe swallowing techniques, cognitive retraining     4. Goal Nursing:Current incision /wound will remain free of infection by discharge and patient/family will demonstrate correct technique for wound care by discharge     Barrier  pressure,edema, non viable tissue,poor blood supply    Intervention Daily wound check/care, s/s of infection,     5. Goal  Improve mood stability    Barrier  Situational stress and depression    Intervention  Rx and support ; behavioral redirection     6. Goal  Po intake of meals will meet >75% of patient estimated nutritional needs within the next 7 days. Outcome:  [] Met/Ongoing      [x]  Not Met/Progressing    [] New/Initial Goal     Barrier  fair appetite, feels full quick    Intervention  Meals/snacks: modified composition;  Nutrition supplements, continue       Disposition / Discharge Planning:      Follow-up therapy services:  tbd   DME recommendations:  tbd   Estimated discharge date:  5/15/18   Discharge Location:  home         Electronic Signatures:      Signature Date Signed   Physical Therapist    Trudy Gamino PT, DPT  5/8/2018   Occupational Therapist    Diane Oliver, ROSE/L  Shikha Boehringer, OTR/L  5/8/18 5/8/2018   Speech Therapist    Malika Raman, [id number] [facility]  5/8/18   Recreational Therapist    Enrico Greene, CTRS 5/9/18   Nursing    Marcio Reina LPN/Trixie Lu, LUCILLE  5/9/2018   Dietitian    Dawna Lara, [address] [place] [address]  5/8/18   Clinical Psychologist    Chandrakant Valdez, Phd  5/9/18   Physician    Ximena Ellison MD   5/8/2018        Elena Brunner, MSW  5/9/18         The above information has been reviewed with the patient in a language that they can understand. Opportunity for comments and questions has been provided and a signed attestation has been scanned into the \"media tab\" of the EMR.       Patient Signature: ______________________________________________________    Date Signed: __________________________________________________________

## 2018-05-08 NOTE — ROUTINE PROCESS
SHIFT CHANGE NOTE FOR [facility]     Bedside and Verbal shift change report given to Niesha Andrews, LUCILLE (oncoming nurse) by Jacinto Maher RN   (offgoing nurse). Report included the following information SBAR, Kardex, MAR and Recent Results.     Situation:                        Code Status: Full Code                        Reason for Admission: abscess  Hospital Day: 7                        Problem List:   Hospital Problems  Date Reviewed: 5/6/2018                         Codes Class Noted POA                Vitamin D deficiency (Chronic) ICD-10-CM: E55.9  ICD-9-CM: 437. 9   5/1/2018 Yes      Overview Signed 5/1/2018 10:01 AM by Belén Louis MD        Vitamin D 25-Hydroxy (5/1/2018) = 17.9                 Overactive bladder (Chronic) ICD-10-CM: N32.81  ICD-9-CM: 596.51   Unknown Yes             Benign prostatic hyperplasia (Chronic) ICD-10-CM: N40.0  ICD-9-CM: 600.00   Unknown Yes             Postoperative urinary retention ICD-10-CM: N99.89, R33.8  ICD-9-CM: 514. 5   4/22/2018 Yes             Postoperative confusion ICD-10-CM: R41.0  ICD-9-CM: 293.9   4/22/2018 Yes             Status post appendectomy ICD-10-CM: Z90.49  ICD-9-CM: V45.89   4/21/2018 Yes      Overview Addendum 4/30/2018  4:41 PM by Belén Louis MD        S/P Diagnostic laparoscopy followed by open appendectomy with drainage of appendiceal abscess (4/20/2018 - Dr. Radhika Serrano)                Acute blood loss as cause of postoperative anemia ICD-10-CM: D62  ICD-9-CM: 285. 1   4/21/2018 Yes             Generalized weakness ICD-10-CM: R53.1  ICD-9-CM: 780.79   4/20/2018 Yes             * (Principal)Acute appendicitis with peritoneal abscess ICD-10-CM: K35.3  ICD-9-CM: 540. 1   4/20/2018 Yes             Hypertensive heart and kidney disease without heart failure and with chronic kidney disease stage III (Chronic) ICD-10-CM: I13.10, N18.3  ICD-9-CM: 404.90, 585.3   9/2/2015 Yes                    Background:                        Past Medical History: Past Medical History:   Diagnosis Date    Acute blood loss as cause of postoperative anemia 4/21/2018    Benign prostatic hyperplasia      CKD (chronic kidney disease) stage 3, GFR 30-59 ml/min 1/8/1167    Diastolic dysfunction without heart failure 9/2/2015     Grade 1 diastolic dysfunction on 2D ECHO done 9/02/2015    Dyslipidemia 9/2/2015    Dysphagia as late effect of cerebrovascular accident (CVA) 9/1/2015    Hemiparesis affecting left side as late effect of cerebrovascular accident ([facility]A) ([facility] 9/1/2015    History of noncompliance with medical treatment, presenting hazards to health 9/1/2015    History of stroke with residual deficit 9/1/2015     Acute Ischemic Stroke (early subacute infarct at the posterior right lentiform nucleus) with residual left hemiparesis and dysphagia     History of tobacco use 9/1/2015    History of trichomonal urethritis 9/1/2015    History of vitamin D deficiency 9/6/2015    Hypertensive heart and kidney disease without heart failure and with chronic kidney disease stage III 9/2/2015    Obesity, Class I, BMI 30-34.9 9/1/2015    Overactive bladder      Postoperative atrial fibrillation ([facility] 4/21/2018     Resolved    Postoperative confusion 4/22/2018    Postoperative urinary retention 4/22/2018    Vitamin D deficiency 5/1/2018     Vitamin D 25-Hydroxy (5/1/2018) = 17.9                             Patient taking anticoagulants no                         Patient has a defibrillator: no      Assessment:  ¨ Changes in Assessment throughout shift: no     ¨ Patient has central line: no Reasons if yes: Insertion date: Last dressing date:  ¨ Patient has Chow Cath: no Reasons if yes:     Insertion date:     ¨ Last Vitals:             Vitals:     05/06/18 0814 05/06/18 1553 05/06/18 2203 05/07/18 0759   BP: 112/73 115/69 122/81 113/76   Pulse: 82 71 80 78   Resp: 19 19 18 18   Temp: 97.1 °F (36.2 °C) 97.6 °F (36.4 °C) 97.3 °F (36.3 °C) 97 °F (36.1 °C)   SpO2: 99% 98% 98% 99%   Weight:           Height:                 · PAIN                                              Pain Assessment                                              Pain Intensity 1: 0 (05/07/18 0759) Pain Intensity 1: 2 (12/29/14 1105)                                              Pain Location 1: Abdomen Pain Location 1: Abdomen                                              Pain Intervention(s) 1: Medication (see MAR) Pain Intervention(s) 1: Medication (see MAR)  Patient Stated Pain Goal: 0 Patient Stated Pain Goal: 0  ¨ Intervention effective: yes    ¨ Other actions taken for pain: no     · Skin Assessment  Skin color Skin Color: Appropriate for ethnicity  Condition/Temperature Skin Condition/Temp: Dry, Warm  Integrity Skin Integrity: Incision (comment)  Turgor Turgor: Non-tenting  Weekly Pressure Ulcer Documentation  Pressure  Injury Documentation: No Pressure Injury Noted-Pressure Ulcer Prevention Initiated  Wound Prevention & Protection Methods  Orientation of wound Orientation of Wound Prevention: Posterior  Location of Prevention Location of Wound Prevention: Sacrum/Coccyx  Dressing Present Dressing Present : No  Dressing Status Dressing Status: Intact  Wound Offloading Wound Offloading (Prevention Methods): Bed, pressure redistribution/air, Chair cushion, Wheelchair     · INTAKE/OUPUT               Date 05/06/18 0700 - 05/07/18 0659 05/07/18 0700 - 05/08/18 0659   Shift 2699-7643 1955-0222 24 Hour Total 4221-9319 6842-1288 24 Hour Total   I  N  T  A  K  E  P.O. 480   480            P. O. 480   480          Shift Total  (mL/kg) 480  (4.8)   480  (4.8)         O  U  T  P  U  T  Urine  (mL/kg/hr)                  Urine Occurrence(s) 4 x 1 x 5 x 0 x   0 x    Stool                  Stool Occurrence(s) 2 x 1 x 3 x 0 x   0 x    Shift Total  (mL/kg)                  480         Weight (kg) 99.1 99.1 99.1 99.1 99.1 99.1         Recommendations:  1. Patient needs and requests: education     2.  Diet: cardiac     3. Pending tests/procedures: no      4. Functional Level/Equipment: 1 x person assist     5. Estimated Discharge Date: TDB Posted on Whiteboard in Patients Room: no      [facility]     A safety check occurred in the patient's room between off going nurse and oncoming nurse listed above.     The safety check included the below items  Area Items   H  High Alert Medications § Verify all high alert medication drips (heparin, PCA, etc.)   E  Equipment § Suction is set up for ALL patients (with dipti)  § Red plugs utilized for all equipment (IV pumps, etc.)  § WOWs wiped down at end of shift. § Room stocked with oxygen, suction, and other unit-specific supplies   A  Alarms § Bed alarm is set for fall risk patients  § Ensure chair alarm is in place and activated if patient is up in a chair   L  Lines § Check IV for any infiltration  § Chow bag is empty if patient has a Chow   § Tubing and IV bags are labeled   S  Safety § Room is clean, patient is clean, and equipment is clean. § Hallways are clear from equipment besides carts. § Fall bracelet on for fall risk patients  § Ensure room is clear and free of clutter  § Suction is set up for ALL patients (with dipti)  § Hallways are clear from equipment besides carts.    § Isolation precautions followed, supplies available outside room, sign posted

## 2018-05-08 NOTE — PROGRESS NOTES
Problem: Mobility Impaired (Adult and Pediatric)  Goal: *Acute Goals and Plan of Care (Insert Text)  Physical Therapy Goals  Short Term Goals  Initiated 5/1/2018 and to be accomplished within 7-10 day(s)  1. Patient will move from scoot up and down and roll side to side in bed with modified independence. 2.  Patient will move from supine <> sit with supervision. 3.  Patient will transfer from bed to chair and chair to bed with supervision/set-up using the least restrictive device. 4.  Patient will perform sit to stand with supervision/set-up. 5.  Patient will ambulate with supervision/set-up for 50 feet with the least restrictive device. 6.  Patient will ascend/descend 2 curbs/thresholds with [facility] with contact guard assist.    Long Term Goals  Initiated 5/2/2018 and to be accomplished within 10-14 day(s) (5/16/2018)  1. Patient will move from supine to sit and sit to supine , scoot up and down and roll side to side in bed with independence. 2.  Patient will transfer from bed to chair and chair to bed with distant supervision/set-up using the least restrictive device. 3.  Patient will perform sit to stand with distant supervision/set-up. 4.  Patient will ambulate with distant supervision/set-up for 50 feet with the least restrictive device. 5.  Patient will ascend/descend 2 curbs/thresholds with [facility] with supervision/set-up. physical Therapy weekly Progress note    Patient: Jack Cui (47 y.o. male)  Date: 5/8/2018  Diagnosis: Dibility  Acute appendicitis with peritoneal abscess Acute appendicitis with peritoneal abscess  Precautions: Aspiration, Fall  Chart, physical therapy assessment, plan of care and goals were reviewed. Time In: 0933  Time Out: 1030  Patient seen for functional mobility training and assessment. Pt presents seated at dining room table following breakfast appearing solemn.   Pt fully covered wearing a hooded sweatshirt with romano over his head almost covering his eyes and a blanket around his shoulders. Pt's SLP notes he has been cold. Pt is agreeable to participation in skilled PT intervention. However, pt appears subdued throughout treatment making infrequent eye contact with conversation and minimally acknowledges verbal cues for quality of movement with mobility training. When asked how he is feeling or if PT can be of any assistance to him, pt shakes his head. Pain:  Pt pain was reported as 0/10 pre-treatment. Pt c/o his stomach hurting during session but does not rate. Pt notes he is not nauseous but states it is a \"cramping\" pain. Pt declines PT offer to assist to bathroom and declines offer for PT to speak with nursing staff. Intervention: Comforted pt and offered assistance. Patient identified with name and : yes    SUBJECTIVE:     Pt appears subdued and initially declines participation in curb negotiation reporting stomach pain. However, when PT steps away to retrieve weights for therapeutic exercises, pt stops PT and notes, \"I'll do it; the curb. \"    OBJECTIVE DATA SUMMARY:   AROM: WFL    FIM SCORES Initial Assessment Weekly Progress Assessment 2018   Bed/Chair/Wheelchair Transfers 3 4   Wheelchair Mobility 3 6   Walking Kings 1 4   Steps/Stairs 0 1   Please see [facility] Interdisciplinary Eval: Coordination/Balance Section for details regarding FIM score description. BED/CHAIR/WHEELCHAIR TRANSFERS Initial Assessment Weekly Progress Assessment 2018   Rolling Right 5 (Supervision) 5 (Supervision) for safety   Rolling Left 5 (Supervision) 5 (Supervision) for safety   Supine to Sit 4 (Minimal assistance) 5 (Supervision) for safety and increased time to perform   Sit to Stand Minimal assistance Contact guard assistance for balance with verbal and manual cues for ant weight shift and mod verbal cues. Pt utilizes B UEs for assistance with sit to stand and stand to sit.    Sit to Supine 4 (Minimal assistance) 5 (Supervision) for safety and increased time to perform   Transfer Type Other Stand Step without AD   Comments  Pt requires CGA for balance and safety with stand step transfer with RW with verbal cues to remain within RW. Car Transfer Not tested NT   Car Type n/a N/A     GROSS ASSESSMENT Weekly Progress Assessment 5/8/2018   AROM Generally decreased, functional   Strength Generally decreased, functional   Coordination Generally decreased, functional   Tone Normal   Sensation Impaired   PROM Generally decreased, functional     POSTURE Weekly Progress Assessment 5/8/2018   Posture (WDL) Exceptions to WDL   Posture Assessment Pt presents with forward flexed posture and downward gaze. WHEELCHAIR MOBILITY/MANAGEMENT Initial Assessment Weekly Progress Assessment 5/8/2018   Able to Propel 216 feet 175 feet with modified independence but requiring max encouragement. Curbs/ramps assistance required 0 (Not tested) NT   Wheelchair set up assistance required 3 (Moderate assistance) Supervision with verbal cues for brake management. Wheelchair management Manages left brake, Manages right brake Manages left brake;Manages right brake     WALKING INDEPENDENCE Initial Assessment Weekly Progress Assessment 5/8/2018   Assistive device Cane, quad RW   Ambulation assistance - level surface Minimal assistance CGA   Distance 20 Feet (ft) (x 2 trials) 150 Feet (ft), 10 ft, 5 ft   Comments   Pt ambulates with fwd flexed posture and consistent downward gaze requiring max verbal cues for forward gaze and to remain within RW. Pt ambulates with variable gait speed and does not appear to attend to verbal or tactile cuing for quality of gait and glut engagement.      GAIT Weekly Progress Assessment 5/8/2018   Gait Description (WDL) Exceptions to WDL   Gait Abnormalities Decreased step clearance;Trendelenburg     STEPS/STAIRS Initial Assessment Weekly Progress Assessment 5/8/2018   Steps/Stairs ambulated 0 1 (6\" curb with RW)   Rail Use  (N/A) N/A   Comments NT Pt negotiates curbs with CGA for balance and mod verbal cues for safety and sequencing. Curbs/Ramps NT CGA       ASSESSMENT:  Pt is slowly progressing towards established goals but has not yet achieved goals for functional mobility to perform transfers or ambulate with supervision secondary to continued impaired functional strength and decreased ability to fully engage in interventions due to emotional status:  []      Improving appropriately and progressing toward goals  [x]      Improving slowly and progressing toward goals  []      Not making progress toward goals and plan of care will be adjusted     PLAN:  Patient continues to benefit from skilled intervention to address the above impairments. Continue treatment per established plan of care. Spoke with unit , unit psychologist and therapy clinical coordinator re: concerns due to pt's self reported \"depression,\" and apparent decreased ability to fully engaged in skilled PT intervention. Discharge Recommendations:  Home Health versus Outpatient  Further Equipment Recommendations for Discharge:  rolling walker     Estimated LOS: 5/10-5/15/18    Activity Tolerance:   Fair  Please refer to the flowsheet for vital signs taken during this treatment.   After treatment:   [] Patient left in no apparent distress in bed  [] Patient left in no apparent distress sitting up in chair  [] Patient left in no apparent distress in w/c mobilizing under own power  [] Patient left in no apparent distress dining area  [x] Patient left in no apparent distress mobilizing under own power (pt rested in w/c in gym for 30 minutes prior to self-propelling to SLP treatment session)  [] Call bell left within reach  [] Nursing notified  [] Caregiver present  [] Bed alarm activated       Doris Iraheta PT, DPT  5/8/2018

## 2018-05-08 NOTE — PROGRESS NOTES
[address]  [address] [place] [address], [address]     INPATIENT REHABILITATION  DAILY PROGRESS NOTE     Date: 5/8/2018    Name: Shraddha Hale Age / Sex: 67 y.o. / male   CSN: [id number] MRN: [id number]   Admit Date: 4/30/2018 Length of Stay: 8 days     Primary Rehab Diagnosis: Impaired Mobility and ADLs secondary to:  1. S/P Diagnostic laparoscopy followed by open appendectomy with drainage of appendiceal abscess (4/20/2018 - Dr. Lester Gomez)  2. History of acute appendicitis with appendiceal abscess      Subjective:     Patient seen and examined. Blood pressure controlled. Patient's Complaint:   Depressed mood    Pain Control: no current joint or muscle symptoms, essentially pain-free      Objective:     Vital Signs:  Patient Vitals for the past 24 hrs:   BP Temp Pulse Resp SpO2   05/08/18 0749 113/75 97.4 °F (36.3 °C) 76 19 93 %   05/07/18 2200 123/79 97.7 °F (36.5 °C) 84 17 98 %   05/07/18 1600 130/84 97.4 °F (36.3 °C) 93 18 96 %        Physical Examination:  GENERAL SURVEY: Patient is awake, alert, oriented x 3, sitting comfortably on the chair, not in acute respiratory distress. HEENT: pale palpebral conjunctivae, anicteric sclerae, no nasoaural discharge, moist oral mucosa  NECK: supple, no jugular venous distention, no palpable lymph nodes  CHEST/LUNGS: symmetrical chest expansion, good air entry, clear breath sounds  HEART: adynamic precordium, good S1 S2, no S3, regular rhythm, no murmurs  ABDOMEN: flat, bowel sounds appreciated, soft, non-tender  EXTREMITIES: pale nailbeds, (+) bipedal edema, full and equal pulses, no calf tenderness   NEUROLOGICAL EXAM: The patient is awake, alert and oriented x3, able to answer questions fairly appropriately, able to follow 1 and 2 step commands. Able to tell time from the wall clock. Cranial nerves II-XII are grossly intact. No gross sensory deficit.   Motor strength is 4+/5 on BUE, 4-/5 on the RLE, 4/5 on the STEVEN.     Incision(s): covered, clean, dry, and intact      Current Medications:  Current Facility-Administered Medications   Medication Dose Route Frequency    ezetimibe (ZETIA) tablet 10 mg  10 mg Oral DAILY    co-enzyme Q-10 (CO Q-10) capsule 100 mg  100 mg Oral DAILY    cholecalciferol (VITAMIN D3) capsule 5,000 Units  5,000 Units Oral DAILY    ferrous sulfate tablet 325 mg  1 Tab Oral DAILY WITH BREAKFAST    ascorbic acid (vitamin C) (VITAMIN C) tablet 250 mg  250 mg Oral DAILY WITH BREAKFAST    acetaminophen (TYLENOL) tablet 650 mg  650 mg Oral Q4H PRN    docusate sodium (COLACE) capsule 100 mg  100 mg Oral BID    bisacodyl (DULCOLAX) tablet 10 mg  10 mg Oral Q48H PRN    Lactobacillus Acidoph & Bulgar (FLORANEX) tablet 2 Tab  2 Tab Oral BID    HYDROcodone-acetaminophen (NORCO) 5-325 mg per tablet 1 Tab  1 Tab Oral Q4H PRN    tamsulosin (FLOMAX) capsule 0.4 mg  0.4 mg Oral DAILY    cyanocobalamin tablet 1,000 mcg  1,000 mcg Oral DAILY    aspirin chewable tablet 81 mg  81 mg Oral DAILY WITH BREAKFAST       Allergies: Allergies   Allergen Reactions    Lipitor [Atorvastatin] Other (comments)     Elevated CK level    Pt has no awareness of this allergy.     Simvastatin Other (comments)     Elevation in LFTs       Functional Progress:    OCCUPATIONAL THERAPY    ON ADMISSION MOST RECENT   Eating  Functional Level: 4   Eating  Functional Level: 4     Grooming  Functional Level: 4   Grooming  Functional Level: 4     Bathing      Bathing        Upper Body Dressing  Functional Level: 5   Upper Body Dressing  Functional Level: 5     Lower Body Dressing      Lower Body Dressing        Toileting  Functional Level: 0   Toileting  Functional Level: 2     Toilet Transfers  Toilet Transfer Score: 4   Toilet Transfers  Toilet Transfer Score: 4     Tub /Shower Transfers  Tub/Shower Transfer Score: 0   Tub/Shower Transfers  Tub/Shower Transfer Score: 0       Legend:   7 - Independent   6 - Modified Independent   5 - Standby Assistance / Supervision / Set-up   4 - Minimum Assistance / Contact Guard Assistance   3 - Moderate Assistance   2 - Maximum Assistance   1 - Total Assistance / Dependent       Lab/Data Review:  No results found for this or any previous visit (from the past 24 hour(s)). Estimated Glomerular Filtration Rate:  On admission, estimated GFR based on a Creatinine of 1.27 mg/dl:   Using CKD-EPI = 64.5 mL/min/1.73m2   Using MDRD = 71.5 mL/min/1.73m2      Assessment:     Primary Rehabilitation Diagnosis  1. Generalized Weakness with Impaired Mobility and ADLs  2. S/P Diagnostic laparoscopy followed by open appendectomy with drainage of appendiceal abscess (4/20/2018 - Dr. Sofía Werner)  3. History of acute appendicitis with appendiceal abscess     Comorbidities   History of stroke with residual deficit I69.30    Hypertensive heart and kidney disease without heart failure and with chronic kidney disease stage III I13.10, P85.6    Diastolic dysfunction without heart failure I51.9    Dyslipidemia E78.5    CKD (chronic kidney disease) stage 3, GFR 30-59 ml/min N18.3    History of trichomonal urethritis Z86.19    Obesity, Class I, BMI 30-34.9 E66.9    History of noncompliance with medical treatment, presenting hazards to health Z91.19    History of tobacco use Z87.891    History of vitamin D deficiency Z86.39    Hemiparesis affecting left side as late effect of cerebrovascular accident (CVA)  I69.354    Dysphagia as late effect of cerebrovascular accident (CVA) I69.391    Postoperative atrial fibrillation (HCC) I97.89, I48.91    Overactive bladder N32.81    Benign prostatic hyperplasia N40.0    Postoperative urinary retention N99.89, R33.8    Postoperative confusion R41.0    Acute blood loss as cause of postoperative anemia D62    Vitamin D deficiency E55.9    Acute renal failure superimposed on stage 3 chronic kidney disease  N17.9, N18.3    Depression F32.9        Plan:     1. Justification for continued stay: Good progression towards established rehabilitation goals. 2. Medical Issues being followed closely:    [x]  Fall and safety precautions     [x]  Wound Care     [x]  Bowel and Bladder Function     [x]  Fluid Electrolyte and Nutrition Balance     [x]  Pain Control      3. Issues that 24 hour rehabilitation nursing is following:    [x]  Fall and safety precautions     [x]  Wound Care     [x]  Bowel and Bladder Function     [x]  Fluid Electrolyte and Nutrition Balance     [x]  Pain Control      [x]  Assistance with and education on in-room safety with transfers to and from the bed, wheelchair, toilet and shower. 4. Acute rehabilitation plan of care:    [x]  Continue current care and rehab. [x]  Physical Therapy           [x]  Occupational Therapy           [x]  Speech Therapy     []  Hold Rehab until further notice     5. Medications:    [x]  MAR Reviewed     [x]  Continue Present Medications     6. DVT Prophylaxis:      []  Lovenox     []  Unfractionated Heparin     []  Coumadin     []  NOAC     [x]  MARYAM Stockings     []  Sequential Compression Device     []  None     7. Orders:   > S/P Diagnostic laparoscopy followed by open appendectomy with drainage of appendiceal abscess (4/20/2018 - Dr. Paul Solo);  History of acute appendicitis with appendiceal abscess   > WBC count (5/1/2018, on admission) = 13.0   > STEVEN drain was removed on the AM of 5/1/2018 by Dr. Paul Solo   > On 5/3/2018, patient had completed the recommended treatment course of Fluconazole 400 mg PO once daily    > Continue Floranex 2 tabs PO BID    > Acute Postoperative Blood Loss Anemia   > Hgb/Hct (5/1/2018, on admission) = 9.7/29.7   > Anemia work-up showed serum iron 27, TIBC 233, iron % saturation 12, ferritin 356, reticulocyte count 1.0   > Hgb/Hct (5/7/2018) = 9.5/29.8    > Continue:    > FeSO4 325 mg PO once daily with breakfast     > Ascorbic Acid 250 mg PO once daily with breakfast (to enhance the absorption of the FeSO4)    > Benign prostatic hyperplasia / Overactive bladder / Postoperative urinary retention   > On 5/1/2018, added Terazosin 1 mg PO q PM (6PM)   > On 5/5/2018, discontinued Terazosin 1 mg PO q PM (6PM)   > Continue Tamsulosin 0.4 mg PO once daily    > Hypertensive heart and kidney disease without heart failure and with chronic kidney disease stage 3   > On 5/1/2018:    > Discontinued Labetalol 100 mg PO q 12 hr (6AM, 6PM)    > Terazosin 1 mg PO q PM (6PM)   > On 5/3/2018, decreased Amlodipine from 10 mg to 5 mg PO once daily (6AM)   > On 5/5/2018:    > Discontinued Terazosin 1 mg PO q PM (6PM)    > Decreased Amlodipine from 5 mg to 2.5 mg PO once daily (6AM)   > On 5/7/2018, discontinued Amlodipine 2.5 mg PO once daily (6AM)     > Postoperative confusion   > Will monitor    > Vitamin D Deficiency   > Vitamin D 25-Hydroxy (5/1/2018) = 17.9   > On 5/2/2018, patient was given Cholecalciferol 50,000 units PO x 1 dose    > On 5/3/2018, started Cholecalciferol 5,000 units PO once daily    > Continue Cholecalciferol 5,000 units PO once daily     > History of stroke with residual left hemiparesis and dysphagia    > Lipid profile (5/2/2018) showed , , HDL 26, LDL 59   > Baseline hepatic function tests and CK:    > On 4/22/2018:     > ALT = 45     > AST = 37     > CK = 453    > On 5/2/2018:     > ALT = 90     > AST = 50     > CK = 103   > Patient's record state Atorvastatin causes and elevated CK level - patient and sister not aware of this   > Patient had been on Ezetimibe for dyslipidemia   > Patient had not been on a statin most likely due to documentation of CK elevation due to Atorvastatin   > On 5/2/2018:    > Patient was started on a trial of Simvastatin 20 mg PO q HS for secondary stroke prevention and plaque stabilization    > Discontinued Zetia    > Will monitor LFTs    > Advanced diet from NDD2 to NDD3   > On 5/3/2018, added Coenzyme Q10 100 mg PO once daily   > On 5/7/2018:    > ALT = 226    > AST = 130    > CK = 91    > Discontinued Simvastatin 20 mg PO q HS    > Resumed Ezetimibe 10 mg PO once daily   > Continue:    > Aspirin 81 mg PO once daily with breakfast    > Ezetimibe 10 mg PO once daily    > Coenzyme Q10 100 mg PO once daily    > Acute renal failure superimposed on stage 3 chronic kidney disease    > BUN/Creatinine (5/1/2018, on admission) = 13/1.27   > BUN/Creatinine (5/7/2018) = 30/1.59   > On 5/7/2018, patient was given IVF: 0.9%  ml/hr x 1 liter   > Resume IVF: 0.9%  ml/hr x 1 liter   > Increase oral fluid intake    > Depression   > Patient had been seen and evaluated by our Clinical Psychologist (Dr. Aditi Caceres) and patient was noted to have a depressed mood which, after discussion with the therapy staff, appears to be affecting the patient's progress in rehabilitation   > Start Escitalopram 10 mg PO q PM    > Analgesia   > Continue:    > Acetaminophen 650 mg PO q 4 hr PRN for pain level less than 5/10    > Norco 5/325 1 tab PO q 4 hr PRN for pain level greater than 4/10       8. Patient's progress in rehabilitation and medical issues discussed with the patient. All questions answered to the best of my ability. Care plan discussed with patient and nurse.       Signed:    Marium Olson MD    May 8, 2018

## 2018-05-08 NOTE — PROGRESS NOTES
Problem: Neurolinguistics Impaired (Adult)  Goal: *Speech Goal: (INSERT TEXT)  Long term goals:  1. Patient will tolerate least restrictive diet without overt s/s of aspiration or other difficulty x 4/5 meals. .  2. Patient will utilize safe swallowing techniques with supervision. 3. Patient will follow moderately complex commands (manipulating objects) with 80-90% accuracy. 4. Patient will respond to treatment tasks in full sentences, supervision. 5. Patient will name 8-10 items within concrete categories. 6. Patient will be oriented x 3 and recall events of the day, min assist.  7. Patient will perform functional problem solving tasks with 80-90% accuracy. Short term goals (by 5/15/18):  1. Patient will tolerate advanced soft diet/thin liquids without overt s/s of aspiration or other difficulty in 4/5 meals. 2. Patient will utilize safe swallowing techniques with min cues  3. Patient will follow moderately complex commands (manipulating objects) with 80% accuracy. 4. Patient will respond to treatment tasks in full sentences, mod cues. 5. Patient will name 7-9 items within concrete categories. 6. Patient will be oriented x 3 and recall events of the day, min assist.  7. Patient will perform functional problem solving tasks with 80-90% accuracy. Speech language pathology treatment    Patient: Jasmina Garsia (89 y.o. male)  Date: 5/8/2018  Diagnosis: Dibility  Acute appendicitis with peritoneal abscess Acute appendicitis with peritoneal abscess       SUBJECTIVE:   Patient stated I feel stuff lady. OBJECTIVE:   Mental Status:  Mr. Malik Gaston was detatched and somewhat lethargic in today's interactions. Treatment & Interventions:   Patient was seen in the dining room at breakfast time this morning. He ate relatively little, but drank his Ensure supplement. Patient with protracted oral propulsion and delayed swallow when taking his morning medications with applesauce.   He is very slow overall at mealtime, both with getting the food to his mouth and chewing and swallowing. He continues to need min-mod cues to perform second swallow to clear boluses. Mr. Jessy Bertrand was also seen for a thirty minute speech therapy session this morning. An afternoon session was scheduled but missed due to nursing care. The following treatment tasks were presented this morning:  Language Comprehension and Expression:   Following complex commands: 50% accuracy    Neuro-Linguistics:   Orientation:  Min assist  Recent memory: Min-mod assist  ID antecedent event: 70% accuracy  Review of swallowing strategies    Response & Tolerance to Activities:  Mr. Jessy Bertrand demonstrated limited interaction in today's session. He rarely responded in full sentences and was very brief in all responses. He appeared uncomfortable, but could not or would not describe the discomfort with the SLP. Pain:  Pain Scale 1: Numeric (0 - 10)  No report of pain     After treatment:   [x]       Patient left in no apparent distress sitting up in chair  []       Patient left in no apparent distress in bed  [x]       Call bell left within reach  [x]       Nursing notified  []       Caregiver present  []       Bed alarm activated    ASSESSMENT:   Progression toward goals:  []       Improving appropriately and progressing toward goals  [x]       Improving slowly and progressing toward goals  []       Not making progress toward goals and plan of care will be adjusted    PLAN:   Patient continues to benefit from skilled intervention to address the above impairments. Continue treatment per established plan of care. Discharge Recommendations:   To Be Determined    Estimated LOS: Through 5/15/18    ALVA Hart  Time Calculation:  79 Minutes

## 2018-05-08 NOTE — ACP (ADVANCE CARE PLANNING)
Assisted patient with the execution of Advance Medical Directive. Placed the copy into patient's \"like paper chart. \"  See Flow Sheet for other pastoral interventions. Chaplains will continue to follow and provide pastoral care on an as needed/requested basis.     [address] [facility] Certified [address] [place] [address]   [phone]

## 2018-05-08 NOTE — PROGRESS NOTES
Assisted patient with the execution of Advance Medical Directive. Placed the copy into patient's \"like paper chart. \"  See Flow Sheet for other pastoral interventions. Chaplains will continue to follow and provide pastoral care on an as needed/requested basis.

## 2018-05-08 NOTE — PROGRESS NOTES
[x] Psychology  [] Social Work [] Recreational Therapy    INTERVENTION  UNITS/TIME OF SERVICE   Assessment    Supportive Counseling May 7, 2018/May 8, 2018   Orientation    Discharge Planning    Resource Linkage              Progress/Current Status    Effort was made to provide patient with support , yesterday, on ARU; however, upon my contact, it was immediately evident that he was very upset and visibly stressed by the room temperature on his bedroom, in spite of the fact that he was wearing a sweatshirt and covered with a blanket. Nursing was asked to try and remedy situation by possibly putting in another work order for correction or to consider moving his bedroom. Early this next morning, patient is reporting feeling much relieved that heating was corrected in his bedroom and he did much better through the night. He was able to smile appropriately though, as typical, he never really initiates conversation. Instead, patient is typically self absorbed and withholding, unless directly questioned. When seen later in the morning, patient acknowledges that he is feeling depressed. He actually acknowledged that he has previously felt the same so he understands how it feels to be depressed. He acknowledged that it is difficult to get up in the morning and does not always want to start his work on ARU. Dr. Jerrod Travis was informed about patient's mood state and given that patient still has approximately another week on ARU, there may be consideration to initiate an anti-depressant for him. Patient is entirely amenable to the possibility.           Bhupinder Echevarria, THE [facility] 5/8/2018 10:58 AM

## 2018-05-09 PROCEDURE — 92507 TX SP LANG VOICE COMM INDIV: CPT

## 2018-05-09 PROCEDURE — 92526 ORAL FUNCTION THERAPY: CPT

## 2018-05-09 PROCEDURE — 97542 WHEELCHAIR MNGMENT TRAINING: CPT

## 2018-05-09 PROCEDURE — 97110 THERAPEUTIC EXERCISES: CPT

## 2018-05-09 PROCEDURE — 74011250637 HC RX REV CODE- 250/637: Performed by: INTERNAL MEDICINE

## 2018-05-09 PROCEDURE — 97530 THERAPEUTIC ACTIVITIES: CPT

## 2018-05-09 PROCEDURE — 97116 GAIT TRAINING THERAPY: CPT

## 2018-05-09 PROCEDURE — 97535 SELF CARE MNGMENT TRAINING: CPT

## 2018-05-09 PROCEDURE — 77030012891

## 2018-05-09 PROCEDURE — 65310000000 HC RM PRIVATE REHAB

## 2018-05-09 RX ADMIN — TAMSULOSIN HYDROCHLORIDE 0.4 MG: 0.4 CAPSULE ORAL at 09:27

## 2018-05-09 RX ADMIN — ASPIRIN 81 MG 81 MG: 81 TABLET ORAL at 09:27

## 2018-05-09 RX ADMIN — FERROUS SULFATE TAB 325 MG (65 MG ELEMENTAL FE) 325 MG: 325 (65 FE) TAB at 09:27

## 2018-05-09 RX ADMIN — LACTOBACILLUS TAB 2 TABLET: TAB at 09:26

## 2018-05-09 RX ADMIN — ESCITALOPRAM OXALATE 10 MG: 10 TABLET ORAL at 17:05

## 2018-05-09 RX ADMIN — Medication 100 MG: at 09:26

## 2018-05-09 RX ADMIN — Medication 250 MG: at 09:26

## 2018-05-09 RX ADMIN — EZETIMIBE 10 MG: 10 TABLET ORAL at 09:27

## 2018-05-09 RX ADMIN — DOCUSATE SODIUM 100 MG: 100 CAPSULE, LIQUID FILLED ORAL at 09:27

## 2018-05-09 RX ADMIN — CHOLECALCIFEROL CAP 125 MCG (5000 UNIT) 5000 UNITS: 125 CAP at 09:28

## 2018-05-09 RX ADMIN — CYANOCOBALAMIN TAB 1000 MCG 1000 MCG: 1000 TAB at 09:26

## 2018-05-09 RX ADMIN — LACTOBACILLUS TAB 2 TABLET: TAB at 17:05

## 2018-05-09 NOTE — PROGRESS NOTES
Problem: Mobility Impaired (Adult and Pediatric)  Goal: *Acute Goals and Plan of Care (Insert Text)  Physical Therapy Goals  Short Term = Long Term Goals  Initiated 2018, updated 2018, and to be accomplished within 10-14 day(s) (2018)  1. Patient will move from supine to sit and sit to supine , scoot up and down and roll side to side in bed with modified independence. 2.  Patient will transfer from bed to chair and chair to bed with distant supervision/set-up using the least restrictive device. 3.  Patient will perform sit to stand with distant supervision/set-up. 4.  Patient will ambulate with distant supervision/set-up for 50 feet with the least restrictive device. 5.  Patient will ascend/descend 2 curbs/thresholds with least restrictive assistive device with supervision/set-up. physical Therapy TREATMENT    Patient: Suellen Lisa (56 y.o. male)  Date: 2018  Diagnosis: Dibility  Acute appendicitis with peritoneal abscess Acute appendicitis with peritoneal abscess  Precautions: Aspiration, Fall  Chart, physical therapy assessment, plan of care and goals were reviewed. Time In: 1330  Time Out: 1500  Patient Seen For: Balance activities;Gait training;Patient education; Therapeutic exercise;Transfer training; Wheelchair mobility  Pain:  Pt pain was reported as  0/10 pre-treatment. Pt pain was reported as 0/10 post-treatment. Intervention: none indicated at this time    Patient identified with name and :yes    SUBJECTIVE:     Pt agreeable to participate in PT. Pt w/out c/o's during session, but notes being \"tired\" and asked a couple times to rest, stating \"I need a break. \"  When asked about what his goals for therapy would be, or what he would like to work on, pt stated \"I don't know. \" Pt continues to present with flat affect and seems subdued during session, but is cooperative. OBJECTIVE DATA SUMMARY:   Objective: pt up in w/c, leaving dining area, upon arrival of PT.   Pt worked on w/c mobility using B UE's and LE's to propel to rehab gym. Pt then participated in curb training with RW, stair training, gait training for short distances with focus on quality of gait pattern, pre-gait activities to address hip/trunk rotation/wt shift/and increased EDGARD; then ended session with FRANCIS Soto in standing and sitting. TRANSFERS Daily Assessment    Other: stand step w/RW  Transfer Assistance : 5 (Stand-by assistance)  Sit to Stand Assistance: Stand-by assistance (from w/c and 18\" mat table, with good control, x 8 trials)  Car Transfers: Not tested  Car Type: n/a       GAIT Daily Assessment    Amount of Assistance: 4 (Contact guard assistance)  Distance (ft): 30 Feet (ft) (x4 trials in PT gym to simulate household gait)  Assistive Device: Walker, rolling;Gait belt   SBA for balance with RW support, but CGA during gait due to need for tactile cues to increase hip abd during swing phase and trunk rotation to increase EDGARD, as well as to improve posture due to downward gaze frequently. STEPS or STAIRS Daily Assessment    Steps/Stairs Ambulated (#): 5 (combo of 4\" and 6\" steps)  Level of Assist : 4 (Contact guard assistance)  Rail Use: Both   Pt utilized a reciprocating step-over-step pattern when ascending, and a step-to pattern when descending.         BALANCE Daily Assessment    Sitting - Static: Good (unsupported)  Sitting - Dynamic: Fair (occasional)  Standing - Static: Fair (with RW support)  Standing - Dynamic : Impaired       WHEELCHAIR MOBILITY Daily Assessment    Able to Propel (ft): 160 feet  Functional Level: 5  Curbs/Ramps Assist Required (FIM Score): 0 (Not tested)  Wheelchair Setup Assist Required : 3 (Moderate assistance)  Wheelchair Management: Manages left brake;Manages right brake       THERAPEUTIC EXERCISES Daily Assessment    Extremity: Both  Exercise Type #1: Standing lower extremity strengthening (AROM at RW: marches, hip abd)  Sets Performed: 2  Reps Performed: 10  Level of Assist: Stand-by assistance  Exercise Type #2:  (2# ankle weights & green t-band: LAQ's, HS curls)  Sets Performed: 2  Reps Performed: 10  Level of Assist: Stand-by assistance       Neuro Re-Education:  Seated balance training activities at EOM to work on trunk rotation/reaching/wt shifting, as pt tends to be stiff with movement. ASSESSMENT:  Pt progressing with sit/stand and stand step transfers with RW, improving to a SBA level. Pt remains cooperative, but continues to be subdued during sessions and stated \"I don't know\" when asked about his goals/desires for therapy. Progression toward goals:  []      Improving appropriately and progressing toward goals  [x]      Improving slowly and progressing toward goals  []      Not making progress toward goals and plan of care will be adjusted     PLAN:  Patient continues to benefit from skilled intervention to address the above impairments. Continue treatment per established plan of care. Discharge Recommendations:  Home Health vs Outpatient  Further Equipment Recommendations for Discharge:  rolling walker     Estimated LOS: 5/10/18-5/15/18    Activity Tolerance:   Fair +, pt without c/o's of SOB, pain, nausea, or dizziness during session, but fatigues with short periods of activity and requests rest breaks often. Please refer to the flowsheet for vital signs taken during this treatment. After treatment:   Patient left sitting up in w/c, awaiting speech therapy session.        Julien Gonzalez, PT  5/9/2018

## 2018-05-09 NOTE — PROGRESS NOTES
[address]  [address] [place] [address], [address]     INPATIENT REHABILITATION  DAILY PROGRESS NOTE     Date: 5/9/2018    Name: Cate Montgomery Age / Sex: 67 y.o. / male   CSN: [id number] MRN: [id number]   Admit Date: 4/30/2018 Length of Stay: 9 days     Primary Rehab Diagnosis: Impaired Mobility and ADLs secondary to:  1. S/P Diagnostic laparoscopy followed by open appendectomy with drainage of appendiceal abscess (4/20/2018 - Dr. Saint Schwab)  2. History of acute appendicitis with appendiceal abscess      Subjective:     Patient seen and examined. Blood pressure controlled. Team conference was held at bedside this PM.     Patient's Complaint:   No significant medical complaints    Pain Control: no current joint or muscle symptoms, essentially pain-free      Objective:     Vital Signs:  Patient Vitals for the past 24 hrs:   BP Temp Pulse Resp SpO2   05/09/18 0809 121/69 96.9 °F (36.1 °C) 64 17 97 %   05/08/18 2203 129/84 97.5 °F (36.4 °C) 76 20 96 %   05/08/18 1600 124/86 98.5 °F (36.9 °C) 76 18 99 %        Physical Examination:  GENERAL SURVEY: Patient is awake, alert, oriented x 3, sitting comfortably on the chair, not in acute respiratory distress. HEENT: pale palpebral conjunctivae, anicteric sclerae, no nasoaural discharge, moist oral mucosa  NECK: supple, no jugular venous distention, no palpable lymph nodes  CHEST/LUNGS: symmetrical chest expansion, good air entry, clear breath sounds  HEART: adynamic precordium, good S1 S2, no S3, regular rhythm, no murmurs  ABDOMEN: flat, bowel sounds appreciated, soft, non-tender  EXTREMITIES: pale nailbeds, (+) bipedal edema, full and equal pulses, no calf tenderness   NEUROLOGICAL EXAM: The patient is awake, alert and oriented x3, able to answer questions fairly appropriately, able to follow 1 and 2 step commands. Able to tell time from the wall clock. Cranial nerves II-XII are grossly intact. No gross sensory deficit. Motor strength is 4+/5 on BUE, 4-/5 on the RLE, 4/5 on the LLE.     Incision(s): covered, clean, dry, and intact      Current Medications:  Current Facility-Administered Medications   Medication Dose Route Frequency    escitalopram oxalate (LEXAPRO) tablet 10 mg  10 mg Oral QPM    ezetimibe (ZETIA) tablet 10 mg  10 mg Oral DAILY    co-enzyme Q-10 (CO Q-10) capsule 100 mg  100 mg Oral DAILY    cholecalciferol (VITAMIN D3) capsule 5,000 Units  5,000 Units Oral DAILY    ferrous sulfate tablet 325 mg  1 Tab Oral DAILY WITH BREAKFAST    ascorbic acid (vitamin C) (VITAMIN C) tablet 250 mg  250 mg Oral DAILY WITH BREAKFAST    acetaminophen (TYLENOL) tablet 650 mg  650 mg Oral Q4H PRN    docusate sodium (COLACE) capsule 100 mg  100 mg Oral BID    bisacodyl (DULCOLAX) tablet 10 mg  10 mg Oral Q48H PRN    Lactobacillus Acidoph & Bulgar (FLORANEX) tablet 2 Tab  2 Tab Oral BID    HYDROcodone-acetaminophen (NORCO) 5-325 mg per tablet 1 Tab  1 Tab Oral Q4H PRN    tamsulosin (FLOMAX) capsule 0.4 mg  0.4 mg Oral DAILY    cyanocobalamin tablet 1,000 mcg  1,000 mcg Oral DAILY    aspirin chewable tablet 81 mg  81 mg Oral DAILY WITH BREAKFAST       Allergies: Allergies   Allergen Reactions    Lipitor [Atorvastatin] Other (comments)     Elevated CK level    Pt has no awareness of this allergy.     Simvastatin Other (comments)     Elevation in LFTs       Functional Progress:    PHYSICAL THERAPY    ON ADMISSION MOST RECENT   Wheelchair Mobility/Management  Able to Propel (ft): 216 feet  Functional Level: 3  Curbs/Ramps Assist Required (FIM Score): 0 (Not tested)  Wheelchair Setup Assist Required : 3 (Moderate assistance)  Wheelchair Management: Manages left brake, Manages right brake Wheelchair Mobility/Management  Able to Propel (ft): 175 feet  Functional Level: 5  Curbs/Ramps Assist Required (FIM Score): 0 (Not tested)  Wheelchair Setup Assist Required : 3 (Moderate assistance)  Wheelchair Management: Manages left brake, Manages right brake     Gait  Amount of Assistance: 4 (Minimal assistance)  Distance (ft): 20 Feet (ft) (c 2 trials)  Assistive Device: Cane, quad Gait  Amount of Assistance: 4 (Minimal assistance)  Distance (ft): 150 Feet (ft)  Assistive Device: Walker, rolling     Balance-Sitting/Standing  Sitting - Static: Good (unsupported)  Sitting - Dynamic: Fair (occasional)  Standing - Static: Fair  Standing - Dynamic : Impaired Balance-Sitting/Standing  Sitting - Static: Good (unsupported)  Sitting - Dynamic: Fair (occasional)  Standing - Static: Poor  Standing - Dynamic : Impaired     Bed/Mat Mobility  Rolling Right : 5 (Supervision)  Rolling Left : 5 (Supervision)  Supine to Sit : 4 (Minimal assistance)  Sit to Supine : 4 (Minimal assistance) Bed/Mat Mobility  Rolling Right : 5 (Supervision)  Rolling Left : 5 (Supervision)  Supine to Sit : 5 (Supervision)  Sit to Supine : 5 (Supervision)     Transfers  Transfer Type: Other  Other: stand step without AD  Transfer Assistance : 4 (Minimal assistance)  Sit to Stand Assistance: Minimal assistance  Car Transfers: Not tested  Car Type: n/a Transfers  Transfer Type: Other  Other: stand step with RW  Transfer Assistance : 4 (Contact guard assistance)  Sit to Stand Assistance: Contact guard assistance  Car Transfers: Not tested  Car Type: n/a     Steps or Stairs  Steps/Stairs Ambulated (#): 0  Level of Assist : 0 (Not tested) (2/2 pt fatigue)  Rail Use:  (N/A) Steps or Stairs  Steps/Stairs Ambulated (#): 1 (6\" curb with RW)  Level of Assist : 4 (Contact guard assistance)  Rail Use: Both         Lab/Data Review:  No results found for this or any previous visit (from the past 24 hour(s)). Estimated Glomerular Filtration Rate:  On admission, estimated GFR based on a Creatinine of 1.27 mg/dl:   Using CKD-EPI = 64.5 mL/min/1.73m2   Using MDRD = 71.5 mL/min/1.73m2      Assessment:     Primary Rehabilitation Diagnosis  1. Generalized Weakness with Impaired Mobility and ADLs  2.  S/P Diagnostic laparoscopy followed by open appendectomy with drainage of appendiceal abscess (4/20/2018 - Dr. Soledad Lopez)  3. History of acute appendicitis with appendiceal abscess     Comorbidities   History of stroke with residual deficit I69.30    Hypertensive heart and kidney disease without heart failure and with chronic kidney disease stage III I13.10, N24.1    Diastolic dysfunction without heart failure I51.9    Dyslipidemia E78.5    CKD (chronic kidney disease) stage 3, GFR 30-59 ml/min N18.3    History of trichomonal urethritis Z86.19    Obesity, Class I, BMI 30-34.9 E66.9    History of noncompliance with medical treatment, presenting hazards to health Z91.19    History of tobacco use Z87.891    History of vitamin D deficiency Z86.39    Hemiparesis affecting left side as late effect of cerebrovascular accident (CVA)  I69.354    Dysphagia as late effect of cerebrovascular accident (CVA) I69.391    Postoperative atrial fibrillation (HCC) I97.89, I48.91    Overactive bladder N32.81    Benign prostatic hyperplasia N40.0    Postoperative urinary retention N99.89, R33.8    Postoperative confusion R41.0    Acute blood loss as cause of postoperative anemia D62    Vitamin D deficiency E55.9    Acute renal failure superimposed on stage 3 chronic kidney disease  N17.9, N18.3    Depression F32.9        Plan:     1. Justification for continued stay: Good progression towards established rehabilitation goals. 2. Medical Issues being followed closely:    [x]  Fall and safety precautions     [x]  Wound Care     [x]  Bowel and Bladder Function     [x]  Fluid Electrolyte and Nutrition Balance     [x]  Pain Control      3.  Issues that 24 hour rehabilitation nursing is following:    [x]  Fall and safety precautions     [x]  Wound Care     [x]  Bowel and Bladder Function     [x]  Fluid Electrolyte and Nutrition Balance     [x]  Pain Control      [x]  Assistance with and education on in-room safety with transfers to and from the bed, wheelchair, toilet and shower. 4. Acute rehabilitation plan of care:    [x]  Continue current care and rehab. [x]  Physical Therapy           [x]  Occupational Therapy           [x]  Speech Therapy     []  Hold Rehab until further notice     5. Medications:    [x]  MAR Reviewed     [x]  Continue Present Medications     6. DVT Prophylaxis:      []  Lovenox     []  Unfractionated Heparin     []  Coumadin     []  NOAC     [x]  MARYAM Stockings     []  Sequential Compression Device     []  None     7. Orders:   > S/P Diagnostic laparoscopy followed by open appendectomy with drainage of appendiceal abscess (4/20/2018 - Dr. Jaskaran Gutiérrez);  History of acute appendicitis with appendiceal abscess   > WBC count (5/1/2018, on admission) = 13.0   > STEVEN drain was removed on the AM of 5/1/2018 by Dr. Jaskaran Gutiérrez   > On 5/3/2018, patient had completed the recommended treatment course of Fluconazole 400 mg PO once daily    > Continue Floranex 2 tabs PO BID    > Acute Postoperative Blood Loss Anemia   > Hgb/Hct (5/1/2018, on admission) = 9.7/29.7   > Anemia work-up showed serum iron 27, TIBC 233, iron % saturation 12, ferritin 356, reticulocyte count 1.0   > Hgb/Hct (5/7/2018) = 9.5/29.8    > Continue:    > FeSO4 325 mg PO once daily with breakfast     > Ascorbic Acid 250 mg PO once daily with breakfast (to enhance the absorption of the FeSO4)    > Benign prostatic hyperplasia / Overactive bladder / Postoperative urinary retention   > On 5/1/2018, added Terazosin 1 mg PO q PM (6PM)   > On 5/5/2018, discontinued Terazosin 1 mg PO q PM (6PM)   > Continue Tamsulosin 0.4 mg PO once daily    > Hypertensive heart and kidney disease without heart failure and with chronic kidney disease stage 3   > On 5/1/2018:    > Discontinued Labetalol 100 mg PO q 12 hr (6AM, 6PM)    > Terazosin 1 mg PO q PM (6PM)   > On 5/3/2018, decreased Amlodipine from 10 mg to 5 mg PO once daily (6AM)   > On 5/5/2018:    > Discontinued Terazosin 1 mg PO q PM (6PM)    > Decreased Amlodipine from 5 mg to 2.5 mg PO once daily (6AM)   > On 5/7/2018, discontinued Amlodipine 2.5 mg PO once daily (6AM)     > Postoperative confusion   > Will monitor    > Vitamin D Deficiency   > Vitamin D 25-Hydroxy (5/1/2018) = 17.9   > On 5/2/2018, patient was given Cholecalciferol 50,000 units PO x 1 dose    > On 5/3/2018, started Cholecalciferol 5,000 units PO once daily    > Continue Cholecalciferol 5,000 units PO once daily     > History of stroke with residual left hemiparesis and dysphagia    > Lipid profile (5/2/2018) showed , , HDL 26, LDL 59   > Baseline hepatic function tests and CK:    > On 4/22/2018:     > ALT = 45     > AST = 37     > CK = 453    > On 5/2/2018:     > ALT = 90     > AST = 50     > CK = 103   > Patient's record state Atorvastatin causes and elevated CK level - patient and sister not aware of this   > Patient had been on Ezetimibe for dyslipidemia   > Patient had not been on a statin most likely due to documentation of CK elevation due to Atorvastatin   > On 5/2/2018:    > Patient was started on a trial of Simvastatin 20 mg PO q HS for secondary stroke prevention and plaque stabilization    > Discontinued Zetia    > Will monitor LFTs    > Advanced diet from NDD2 to NDD3   > On 5/3/2018, added Coenzyme Q10 100 mg PO once daily   > On 5/7/2018:    > ALT = 226    > AST = 130    > CK = 91    > Discontinued Simvastatin 20 mg PO q HS    > Resumed Ezetimibe 10 mg PO once daily   > Continue:    > Aspirin 81 mg PO once daily with breakfast    > Ezetimibe 10 mg PO once daily    > Coenzyme Q10 100 mg PO once daily    > Acute renal failure superimposed on stage 3 chronic kidney disease    > BUN/Creatinine (5/1/2018, on admission) = 13/1.27   > BUN/Creatinine (5/7/2018) = 30/1.59   > On 5/7/2018, patient was given IVF: 0.9%  ml/hr x 1 liter   > Resume IVF: 0.9%  ml/hr x 1 liter   > Increase oral fluid intake    > Depression   > Patient had been seen and evaluated by our Clinical Psychologist (Dr. Ivonne Brandt) and patient was noted to have a depressed mood which, after discussion with the therapy staff, appears to be affecting the patient's progress in rehabilitation   > On 5/8/2018, started Escitalopram 10 mg PO q PM   > Continue Escitalopram 10 mg PO q PM    > Analgesia   > Continue:    > Acetaminophen 650 mg PO q 4 hr PRN for pain level less than 5/10    > Norco 5/325 1 tab PO q 4 hr PRN for pain level greater than 4/10       8. Patient's progress in rehabilitation and medical issues discussed with the patient. All questions answered to the best of my ability. Care plan discussed with patient and nurse.       Signed:    Shandra Huang MD    May 9, 2018

## 2018-05-09 NOTE — ROUTINE PROCESS
SHIFT CHANGE NOTE FOR [facility]    Bedside and Verbal shift change report given to Evelyn Paiz (oncoming nurse) by Leandra Rod RN   (offgoing nurse). Report included the following information SBAR, Kardex, MAR and Recent Results.     Situation:   Code Status: Full Code   Reason for Admission: abscess  Hospital Day: 9   Problem List:   Hospital Problems  Date Reviewed: 5/8/2018          Codes Class Noted POA    Depression ICD-10-CM: F32.9  ICD-9-CM: 987  5/8/2018 Yes        Statin intolerance (Chronic) ICD-10-CM: Z78.9  ICD-9-CM: 995.27  5/7/2018 Yes    Overview Signed 5/7/2018  2:19 PM by Fernando Lai MD     Atorvastatin and Simvastatin             Acute renal failure superimposed on stage 3 chronic kidney disease ([facility] [address] ICD-10-CM: N17.9, N18.3  ICD-9-CM: 584.9, 585.3  5/7/2018 No        Vitamin D deficiency (Chronic) ICD-10-CM: E55.9  ICD-9-CM: 268.9  5/1/2018 Yes    Overview Signed 5/1/2018 10:01 AM by Fernando Lai MD     Vitamin D 25-Hydroxy (5/1/2018) = 17.9              Overactive bladder (Chronic) ICD-10-CM: N32.81  ICD-9-CM: 596.51  Unknown Yes        Benign prostatic hyperplasia (Chronic) ICD-10-CM: N40.0  ICD-9-CM: 600.00  Unknown Yes        Postoperative urinary retention ICD-10-CM: N99.89, R33.8  ICD-9-CM: 997.5  4/22/2018 Yes        Postoperative confusion ICD-10-CM: R41.0  ICD-9-CM: 293.9  4/22/2018 Yes        Status post appendectomy ICD-10-CM: Z90.49  ICD-9-CM: V45.89  4/21/2018 Yes    Overview Addendum 4/30/2018  4:41 PM by Fernando Lai MD     S/P Diagnostic laparoscopy followed by open appendectomy with drainage of appendiceal abscess (4/20/2018 - Dr. Vidal Citizen)             Acute blood loss as cause of postoperative anemia ICD-10-CM: D62  ICD-9-CM: 285.1  4/21/2018 Yes        Generalized weakness ICD-10-CM: R53.1  ICD-9-CM: 780.79  4/20/2018 Yes        * (Principal)Acute appendicitis with peritoneal abscess ICD-10-CM: K35.3  ICD-9-CM: 540.1  4/20/2018 Yes        Hypertensive heart and kidney disease without heart failure and with chronic kidney disease stage III (Chronic) ICD-10-CM: I13.10, N18.3  ICD-9-CM: 404.90, 585.3  9/2/2015 Yes              Background:   Past Medical History:   Past Medical History:   Diagnosis Date    Acute blood loss as cause of postoperative anemia 4/21/2018    Benign prostatic hyperplasia     CKD (chronic kidney disease) stage 3, GFR 30-59 ml/min 9/1/2015    Depression 4/5/3844    Diastolic dysfunction without heart failure 9/2/2015    Grade 1 diastolic dysfunction on 2D ECHO done 9/02/2015    Dyslipidemia 9/2/2015    Dysphagia as late effect of cerebrovascular accident (CVA) 9/1/2015    Hemiparesis affecting left side as late effect of cerebrovascular accident (CVA) ([facility] [address] 9/1/2015    History of noncompliance with medical treatment, presenting hazards to health 9/1/2015    History of stroke with residual deficit 9/1/2015    Acute Ischemic Stroke (early subacute infarct at the posterior right lentiform nucleus) with residual left hemiparesis and dysphagia     History of tobacco use 9/1/2015    History of trichomonal urethritis 9/1/2015    History of vitamin D deficiency 9/6/2015    Hypertensive heart and kidney disease without heart failure and with chronic kidney disease stage III 9/2/2015    Obesity, Class I, BMI 30-34.9 9/1/2015    Overactive bladder     Postoperative atrial fibrillation (HCC) 4/21/2018    Resolved    Postoperative confusion 4/22/2018    Postoperative urinary retention 4/22/2018    Statin intolerance 05/07/2018    Atorvastatin and Simvastatin    Vitamin D deficiency 5/1/2018    Vitamin D 25-Hydroxy (5/1/2018) = 17.9       Patient taking anticoagulants no    Patient has a defibrillator: no     Assessment:   Changes in Assessment throughout shift: no     Patient has central line: no Reasons if yes: Insertion date: Last dressing date:   Patient has Chow Cath: no Reasons if yes:     Insertion date:     Last Vitals:     Vitals:    05/07/18 2200 05/08/18 0749 05/08/18 1600 05/08/18 2203   BP: 123/79 113/75 124/86 129/84   Pulse: 84 76 76 76   Resp: 17 19 18 20   Temp: 97.7 °F (36.5 °C) 97.4 °F (36.3 °C) 98.5 °F (36.9 °C) 97.5 °F (36.4 °C)   SpO2: 98% 93% 99% 96%   Weight:       Height:            PAIN    Pain Assessment    Pain Intensity 1: 0 (05/09/18 0800) Pain Intensity 1: 2 (12/29/14 1105)    Pain Location 1: Abdomen Pain Location 1: Abdomen    Pain Intervention(s) 1: Medication (see MAR) Pain Intervention(s) 1: Medication (see MAR)  Patient Stated Pain Goal: 0 Patient Stated Pain Goal: 0  o Intervention effective: yes    o Other actions taken for pain: no     Skin Assessment  Skin color Skin Color: Appropriate for ethnicity  Condition/Temperature Skin Condition/Temp: Dry, Warm  Integrity Skin Integrity: Incision (comment)  Turgor Turgor: Non-tenting  Weekly Pressure Ulcer Documentation  Pressure  Injury Documentation: No Pressure Injury Noted-Pressure Ulcer Prevention Initiated  Wound Prevention & Protection Methods  Orientation of wound Orientation of Wound Prevention: Posterior  Location of Prevention Location of Wound Prevention: Buttocks, Sacrum/Coccyx  Dressing Present Dressing Present : No  Dressing Status Dressing Status: Intact  Wound Offloading Wound Offloading (Prevention Methods): Bed, pressure redistribution/air     INTAKE/OUPUT    Date 05/08/18 0700 - 05/09/18 0659 05/09/18 0700 - 05/10/18 0659   Shift 0700-1859 1688-0337 24 Hour Total 4114-4545 1397-0128 24 Hour Total   I  N  T  A  K  E   P.O. 420  420         P. O. 420  420       Shift Total  (mL/kg) 420  (4.2)  420  (4.2)      O  U  T  P  U  T   Urine  (mL/kg/hr)  0  (0) 0  (0)         Urine Voided  0 0         Urine Occurrence(s) 1 x 3 x 4 x 0 x  0 x    Emesis/NG output  0 0         Emesis  0 0         Emesis Occurrence(s)  0 x 0 x       Other  0 0         Other Output  0 0       Stool  0 0         Stool Occurrence(s) 1 x 0 x 1 x 0 x  0 x Stool  0 0       Blood  0 0         Estimated Blood Loss  0 0         Blood  0 0       Shift Total  (mL/kg)  0  (0) 0  (0)       0 420      Weight (kg) 99.1 99.1 99.1 99.1 99.1 99.1       Recommendations:  1. Patient needs and requests: education    2. Diet: cardiac    3. Pending tests/procedures: no     4. Functional Level/Equipment: 1 x person assist    5. Estimated Discharge Date: TDB Posted on Whiteboard in Patients Room: [facility] Safety Check    A safety check occurred in the patient's room between off going nurse and oncoming nurse listed above. The safety check included the below items  Area Items   H  High Alert Medications - Verify all high alert medication drips (heparin, PCA, etc.)   E  Equipment - Suction is set up for ALL patients (with dipti)  - Red plugs utilized for all equipment (IV pumps, etc.)  - WOWs wiped down at end of shift.  - Room stocked with oxygen, suction, and other unit-specific supplies   A  Alarms - Bed alarm is set for fall risk patients  - Ensure chair alarm is in place and activated if patient is up in a chair   L  Lines - Check IV for any infiltration  - Chow bag is empty if patient has a Chow   - Tubing and IV bags are labeled   S  Safety   - Room is clean, patient is clean, and equipment is clean. - Hallways are clear from equipment besides carts. - Fall bracelet on for fall risk patients  - Ensure room is clear and free of clutter  - Suction is set up for ALL patients (with dipti)  - Hallways are clear from equipment besides carts.    - Isolation precautions followed, supplies available outside room, sign posted

## 2018-05-09 NOTE — INTERDISCIPLINARY ROUNDS
[facility] PHYSICAL REHABILITATION  [address] [place] [address], [address]    INPATIENT REHABILITATION  TEAM CONFERENCE SUMMARY     Date of Conference: 5/9/2018    Name: Cate Montgomery Age / Sex: 67 y.o. / male   CSN: [id number] MRN: [id number]   Admit Date: 4/30/2018 Length of Stay: 9 days     Primary Rehabilitation Diagnosis  1. Generalized Weakness with Impaired Mobility and ADLs  2. S/P Diagnostic laparoscopy followed by open appendectomy with drainage of appendiceal abscess (4/20/2018 - Dr. Saint Schwab)  3. History of acute appendicitis with appendiceal abscess      Comorbidities   History of stroke with residual deficit I69.30    Hypertensive heart and kidney disease without heart failure and with chronic kidney disease stage III I13.10, J35.4    Diastolic dysfunction without heart failure I51.9    Dyslipidemia E78.5    CKD (chronic kidney disease) stage 3, GFR 30-59 ml/min N18.3    History of trichomonal urethritis Z86.19    Obesity, Class I, BMI 30-34.9 E66.9    History of noncompliance with medical treatment, presenting hazards to health Z91.19    History of tobacco use Z87.891    History of vitamin D deficiency Z86.39    Hemiparesis affecting left side as late effect of cerebrovascular accident (CVA)  I69.354    Dysphagia as late effect of cerebrovascular accident (CVA) I69.391    Postoperative atrial fibrillation (HCC) I97.89, I48.91    Overactive bladder N32.81    Benign prostatic hyperplasia N40.0    Postoperative urinary retention N99.89, R33.8    Postoperative confusion R41.0    Acute blood loss as cause of postoperative anemia D62    Vitamin D deficiency E55.9    Acute renal failure superimposed on stage 3 chronic kidney disease  N17.9, N18.3          Therapy:     FIM SCORES Initial Assessment Weekly Progress Assessment 5/9/2018   Eating Functional Level: 4  Comments: Min A for cutting sandwhich and opening pop up lid drink contaiiner.  Supervision   Swallowing Grooming 4 Min Assist   Bathing  Not tested   Upper Body Dressing Functional Level: 5  Items Applied/Steps Completed: Pullover (4 steps)  Comments: Setup to doff/don pullover shift seated. Supervision   Lower Body Dressing   Supervision   Toileting Functional Level: 0  Comments: NT secondary to deferred session due to fatigue. Min Assist   Bladder - level of assist 2 2   Bladder - accident frequency score 2     Bowel - level of assist 2 2   Bowel - accident frequency score 6     Toilet Transfer Sharkey Toilet Transfer Score: 4  Comments: CGA with wide base of support. to and from [facility] bed. Not tested   Tub/Shower Transfer Sharkey Tub/Shower Transfer Score: 0  Comments: Pt reported he does not get into tub/shower and/or shower in home setting. He completed bathing at sink/vanity. Not tested   Comprehension Primary Mode of Comprehension: Auditory  Score: 5 Auditory  55   Expression Primary Mode of Expression: Verbal  Score: 5 Verbal  54   Social Interaction Score: 5 54   Problem Solving Score: 4 43   Memory Score: 5 54     FIM SCORES Initial Assessment Weekly Progress Assessment 5/9/2018   Bed/Chair/Wheelchair Transfers Transfer Type:  Other  Other: stand step without AD  Transfer Assistance : 4 (Minimal assistance)  Sit to Stand Assistance: Minimal assistance  Car Transfers: Not tested  Car Type: n/a Other: stand step w/RW  Transfer Assistance : 5 (Stand-by assistance)  Sit to Stand Assistance: Stand-by assistance (from w/c and 18\" mat table, with good control, x 8 trials)  Car Transfers: Not tested  Car Type: n/a   Bed Mobility Rolling Right 5 (Supervision)   Rolling Left 5 (Supervision)   Supine to Sit 4 (Minimal assistance)   Sit to Stand Minimal assistance   Sit to Supine 4 (Minimal assistance)    Rolling Right       Rolling Left       Supine to Sit       Sit to Stand   Stand-by assistance (from w/c and 18\" mat table, with good control, x 8 trials)   Sit to Supine          Locomotion (W/C) Able to Propel (ft): 216 feet  Functional Level: 3  Curbs/Ramps Assist Required (FIM Score): 0 (Not tested)  Wheelchair Setup Assist Required : 3 (Moderate assistance)  Wheelchair Management: Manages left brake, Manages right brake Function 5  Setup Assistance  3 (Moderate assistance)      Locomotion (W/C distance) 216 Feet 160 feet   Locomotion (Walk) 4 (Minimal assistance) 4 (Contact guard assistance)  Walker, rolling;Gait belt   Locomotion (Walk dist.) 20 Feet (ft) (c 2 trials) 30 Feet (ft) (x4 trials in PT gym to simulate household gait)   Steps/Stairs Steps/Stairs Ambulated (#): 0  Level of Assist : 0 (Not tested) (2/2 pt fatigue)  Rail Use:  (N/A) Pt negotiated 1 (6\") curb with RW with CGA         Nursing:     Neuro:   A&O x_4___                   Respiratory:   [x] WNL   [] O2   [] LPM ______   Other:  Peripheral Vascular:   [x] TEDS present   [] Edema present ____ Grade   Cardiac:   [x] WNL   [] Other  Genitourinary:   [x] continent   [] incontinent   [] lew  Abdominal _5/7/18______ LBM  GI: __cardiac soft_____ Diet __thin____ Liquids _no____ tube feeds  Musculoskeletal: _active___ ROM Transfers _wheelchair____ Assistive Device Used  1 x person assist____ Level of Assistance  Skin Integumentary:   [] Intact   [x] Not Intact   ___incision_______Preventative Measures  Details_____dressing change_________________________________________________________  Pain: [x] Controlled   [] Not Controlled   Pain Meds:   [] Scheduled   [x] PRN        Registered Dietitian / Nutrition:     Patient Vitals for the past 100 hrs:   % Diet Eaten   05/08/18 1300 50 %   05/08/18 0850 50 %   05/07/18 1809 50 %   05/07/18 1333 50 %   05/07/18 0900 50 %   05/06/18 1843 0 %   05/06/18 1349 50 %   05/06/18 0933 50 %   05/05/18 1855 25 %   05/05/18 1402 50 %     Pt reported fair appetite, poor/fair (mostly fair) meal intake due to feeling full fast. Likes and consuming nutrition supplements. Po intake encouraged.     Supplements:          [x] Yes: Ensure Enlive TID   [] No      Amount of supplement consumed: 100%       Intake/Output Summary (Last 24 hours) at 05/09/18 1719  Last data filed at 05/09/18 0643   Gross per 24 hour   Intake                0 ml   Output                0 ml   Net                0 ml                                Last bowel movement: 5/8      Interdisciplinary Team Goals:     1. Goal  Encourage pt to mobilize in room with RW with supervision    Barrier  Impaired balance, Impaired strength, Impaired mobility, Emotional status    Intervention  Balance and strength training, Mobility training, Encouragement and emotional support     2. Goal  Pt will perform UB ADLs w/set-up and LB ADLs w/Mod Assist.    Barrier  Depression and lack of interest in ADLs    Intervention  Therapeutic activity to engage pt and increase activity tolerance     3. Goal  Patient will perform functional problem solving tasks with 80% accuracy. Barrier  Dysphagia, cognitive-linguistic impairment    Intervention  Training in safe swallowing techniques, diet modification and monitoring, training in safe swallowing techniques, cognitive retraining     4. Goal Nursing:Current incision /wound will remain free of infection by discharge and patient/family will demonstrate correct technique for wound care by discharge     Barrier  pressure,edema, non viable tissue,poor blood supply    Intervention Daily wound check/care, s/s of infection,     5. Goal  Improve mood stability    Barrier  Situational stress and depression    Intervention  Rx and support ; behavioral redirection     6. Goal  Po intake of meals will meet >75% of patient estimated nutritional needs within the next 7 days. Outcome:  [] Met/Ongoing      [x]  Not Met/Progressing    [] New/Initial Goal     Barrier  fair appetite, feels full quick    Intervention  Meals/snacks: modified composition;  Nutrition supplements, continue       Disposition / Discharge Planning:      Follow-up therapy services:  tbd   DME recommendations:  tbd   Estimated discharge date:  5/15/18   Discharge Location:  home         Interdisciplinary team rounds were held this PM with the following team members:       Name   Physical Therapist    Jeanne Jaeger PT, DPT   Occupational Therapist    Angel Manzo, OTR/L   Speech Therapist    Terrance Fried, [address] [facility] [place]   Recreational Therapist    Hanny Levy, CTRS   Nursing    Jesus Blum, RN   Dietitian    Carisa Hayes, [address]   Clinical Psychologist       Physician    Anitra Aranda MD        ASHLEIGH Moore       Signed:  Anitra Aranda MD    May 9, 2018

## 2018-05-09 NOTE — PROGRESS NOTES
Problem: Neurolinguistics Impaired (Adult)  Goal: *Speech Goal: (INSERT TEXT)  Long term goals:  1. Patient will tolerate least restrictive diet without overt s/s of aspiration or other difficulty x 4/5 meals. .  2. Patient will utilize safe swallowing techniques with supervision. 3. Patient will follow moderately complex commands (manipulating objects) with 80-90% accuracy. 4. Patient will respond to treatment tasks in full sentences, supervision. 5. Patient will name 8-10 items within concrete categories. 6. Patient will be oriented x 3 and recall events of the day, min assist.  7. Patient will perform functional problem solving tasks with 80-90% accuracy. Short term goals (by 5/15/18):  1. Patient will tolerate advanced soft diet/thin liquids without overt s/s of aspiration or other difficulty in 4/5 meals. 2. Patient will utilize safe swallowing techniques with min cues  3. Patient will follow moderately complex commands (manipulating objects) with 80% accuracy. 4. Patient will respond to treatment tasks in full sentences, mod cues. 5. Patient will name 7-9 items within concrete categories. 6. Patient will be oriented x 3 and recall events of the day, min assist.  7. Patient will perform functional problem solving tasks with 80-90% accuracy. Speech language pathology treatment    Patient: Malathi Ruano (77 y.o. male)  Date: 5/9/2018  Diagnosis: Dibility  Acute appendicitis with peritoneal abscess Acute appendicitis with peritoneal abscess       SUBJECTIVE:   Patient stated I don't want any more. OBJECTIVE:   Mental Status: At mealtime and in this afternoon's session, patient was quite subdued. Minimal eye contact and love volume were noted by the SLP. Treatment & Interventions:  Mr. Jeanine Hillman was seen in the dining room at mealtime for breakfast and lunch today. He continues to receive an advanced soft diet which appears to be appropriate at this time.   He continues to take some very large boluses which necessitate 2-3 swallows to clear. Larger boluses of liquids yield quite sonorous swallows. Nevertheless, he demonstrates no overt s/s of aspiration. Occasional rhinorrhea is noted after meals. Patient was also seen for a thirty minute therapy session after PT this afternoon. The following treatment tasks were presented:  Neuro-Linguistics:   Orientation:   Supervision  Recent memory:   Min assist  ID items from 3 descriptors: 65% accuracy  Provide antonyms:  85% accuracy  Complex yes/no questions: 100% accuracy  Determining causes:  70% accuracy    Response & Tolerance to Activities:  Mr. Edward Rolle demonstrated minimal affect in today's treatment sessions. He continues to need encouragement for participation in tasks presented. Pain:  Pain Scale 1: Numeric (0 - 10)  No report of pain     After treatment:   [x]       Patient left in no apparent distress sitting up in chair  []       Patient left in no apparent distress in bed  [x]       Call bell left within reach  [x]       Nursing notified  []       Caregiver present  []       Bed alarm activated    ASSESSMENT:   Progression toward goals:  []       Improving appropriately and progressing toward goals  [x]       Improving slowly and progressing toward goals  []       Not making progress toward goals and plan of care will be adjusted    PLAN:   Patient continues to benefit from skilled intervention to address the above impairments. Continue treatment per established plan of care. Discharge Recommendations:   To Be Determined    Estimated LOS: Through 5/15/18    ALVA Griffith  Time Calculation:  105 Minutes

## 2018-05-09 NOTE — PROGRESS NOTES
Problem: Self Care Deficits Care Plan (Adult)  Goal: *Therapy Goal (Edit Goal, Insert Text)  Occupational Therapy Goals   Long Term Goals  Initiated 2018 and to be accomplished within 1-2 week(s)  1. Pt will perform self-feeding with MI to . (I)  2. Pt will perform grooming with MI.  3. Pt will perform UB bathing with Setup. 4. Pt will perform LB bathing with Supervision. 5. Pt will perform tub/shower, shower stall, and/or self propel to sink/vanity Supervision. 6. Pt will perform UB dressing with Setup. 7. Pt will perform LB dressing with Supervision. 8. Pt will perform toileting task with Supervision. 9. Pt will perform toilet transfer with Supervision. Short Term Goals   Initiated 2018 and to be accomplished within 7 day(s) 2018  1. Pt will perform self-feeding with MI to (I). 2. Pt will perform grooming with MI.  3. Pt will perform UB bathing with Setup. 4. Pt will perform LB bathing with Min A with AE as needed. 5. Pt will perform tub/shower, shower stall, and/or self propel to sink/vanity Min A.   6. Pt will perform UB dressing with Setup. 7. Pt will perform LB dressing with Mod A.  8. Pt will perform toileting task with Mod A.  9. Pt will perform toilet transfer with SBA. OT WEEKLY PROGRESS NOTE  Patient Name:Loki Phillips   Time Spent With Patient  Time In: 1100  Time Out: 18      Medical Diagnosis:  Dibility  Acute appendicitis with peritoneal abscess Acute appendicitis with peritoneal abscess     Pain at start of tx:0/10  Pain at stop of tx:0/10    Patient identified with name and :yes  Subjective:Pt reported he slept in the bed and well. Objective: Pt seen in recliner with feet up upon arrival in room for treatment. Pt seems disconnected and flat, notify nursing of pt demeanor. Pt was not challenge to donned Gregory stocking but challenge to angel sock, pt attempted to use reacher to angel sock. Re-ed pt on using sock aid on donning sock.  Pt performs task with supervision and min verbal cues for sequencing. Followed with UE strengthening, pt performs 2x10 rep in all plane, pt dozing off between set 2/2 fatigue. Pt seems visible fatigues although reported having a good night sleep. Outcome Measures: To d/c home     AROM: [facility] WFL      COGNITION/PERCEPTION Initial Assessment Weekly Progress Assessment 5/9/2018   Premorbid Reading Status Unknown/unable to assess    Premorbid Writing Status Unknown    Arousal/Alertness   Generalized responses   Orientation Level Oriented X4 Oriented X4   Visual Fields   Appears Intact for near and far    Praxis      Body Scheme   Impaired - difficulty motor planning   COMPREHENSION MODE Initial Assessment Weekly Progress Assessment 5/9/2018   Primary Mode of Comprehension Auditory Auditory   Hearing Aide None    Corrective Lenses      Score 4 5     EXPRESSION Initial Assessment Weekly Progress Assessment 5/9/2018   Primary Mode of Expression Verbal Verbal   Score 4 5   Comments   Pt does not initiate conversation secondary to potential reside communication difficulty or unfamiliarity with staff members. Pt able to identify when he is fatigued but does not communicate a need for rest breaks. Pt does not appear to self advocate with concerns of medical needs. SOCIAL INTERACTION/ PRAGMATICS Initial Assessment Weekly Progress Assessment 5/9/2018   Score 5 5   Comments         PROBLEM SOLVING Initial Assessment Weekly Progress Assessment 5/9/2018   Score 4 4   Comments        MEMORY Initial Assessment Weekly Progress Assessment 5/9/2018   Score 4 4   Comments         EATING Initial Assessment Weekly Progress Assessment 5/9/2018   Functional Level 4  5   Comments Min A for cutting sandwhich and opening pop up lid drink contaiiner. Feeding/Eating  Feeding/Eating Assistance: 5 (Supervision/setup)  Comments: Tray setup in dining room. Min VC to take small sips & chew his foods.      GROOMING Initial Assessment Weekly Progress Assessment 5/9/2018   Functional Level 4  4   Tasks completed by patient Shaved/applied makeup (optional), Washed face     Comments Require min assist for shaving secondary to fatigue. Setup for face wash seated in wc. Grooming  Grooming Assistance : 4 (Minimal assistance)  Comments: Pt place in front of sink for grooming performing oral care independent then initiate shaving and req'd min assist for thoroughness. BATHING Initial Assessment Weekly Progress Assessment 5/9/2018   Functional Level   5   Body parts patient bathed Abdomen, Arm, left, Arm, right, Chest    Comments UB bathing tested on current date as SBA. LB bathing deferred secondary to pt's fatigue. Supervision     TUB/SHOWER TRANSFER INDEPENDENCE Initial Assessment Weekly Progress Assessment 5/9/2018   Score 0  4   Comments Pt reported he does not get into tub/shower and/or shower in home setting. He completed bathing at sink/vanity. shower transfer CGA     UPPER BODY DRESSING/UNDRESSING Initial Assessment Weekly Progress Assessment 5/9/2018   Functional Level 5  5   Items applied/Steps completed Pullover (4 steps)     Comments Setup to doff/don pullover shift seated. Upper Body Dressing   Dressing Assistance : 5 (Supervision)  Comments: Pt doff/angel shirt with supervision from wheel chair level     LOWER BODY DRESSING/UNDRESSING Initial Assessment Weekly Progress Assessment 5/9/2018   Functional Level      Items applied/Steps completed       Comments   0 NT      Sock and/or Shoe Management FIM: 1Right sock (1 step)  Doff and don of pants NT secondary to pt deferred from fatigue. Lower Body Dressing   Dressing Assistance : 5 (Supervision)  Leg Crossed Method Used: No  Position Performed: Seated in chair  Don/Doff Anti-Embolic Stockings: 1 (Total assistance)  Comments: Upon donning Gregory stocking pt sat up in recliner donning sock with sick aid. Pt attempted to angel sock with reacher req'd re-education on using sock aid donning sock.      TOILETING Initial Assessment Weekly Progress Assessment 5/9/2018   Functional Level 0     Comments NT secondary to deferred session due to fatigue. Toileting  Toileting Assistance (FIM Score): 4 (Minimal assistance) Pt stated he had gone ( incontinent) when asked by OT if  he needed toileting after grooming. Cues: Verbal cues provided to Mr. Phillips to stand at sink and wash leslie area for cleanliness. Pt wash and dry leslie area and buttocks in standing with supervision. Pt req'd min assist doing/[facility] brief     TOILET TRANSFER INDEPENDENCE Initial Assessment Weekly Progress Assessment 5/9/2018   Transfer score 4  4   Comments CGA with wide base of support. to and from [facility]. Toilet transfer CGA               ASSESSMENT: Pt seems more fatigue than usual and req'd increase time with LB dressing. Progression toward goals: meet 6/9 STG's  []          Improving appropriately and progressing toward goals  [x]          Improving slowly and progressing toward goals  []          Not making progress toward goals and plan of care will be adjusted     PLAN:  Patient continues to benefit from skilled intervention to address the above impairments. Continue treatment per established plan of care. Discharge Recommendations:  TBD  Further Equipment Recommendations for Discharge:  TBD       Please refer to the flowsheet for vital signs taken during this treatment. After treatment:   []  Patient left in no apparent distress sitting up in chair  [x]  Patient left in no apparent distress in bed  []  Call bell left within reach  []  Nursing notified  []  Caregiver present  []  Bed alarm activated    COMMUNICATION/EDUCATION:   [] Home safety education was provided and the patient/caregiver indicated understanding. [] Patient/family have participated as able in goal setting and plan of care. [] Patient/family agree to work toward stated goals and plan of care.   [] Patient understands intent and goals of therapy, but is neutral about his/her participation. [] Patient is unable to participate in goal setting and plan of care. Plan of Care: Please see Care Plan for updated LTGs.    Family Training: TBD  Estimated LOS: TBVICK KYLE  5/9/2018

## 2018-05-09 NOTE — ROUTINE PROCESS
SHIFT CHANGE NOTE FOR [facility]    Bedside and Verbal shift change report given to Golden Palumbo RN (oncoming nurse) by Sherif Black LPN   (offgoing nurse). Report included the following information SBAR, Kardex, MAR and Recent Results.     Situation:   Code Status: Full Code   Reason for Admission: abscess  Hospital Day: 9   Problem List:   Hospital Problems  Date Reviewed: 5/9/2018          Codes Class Noted POA    Depression ICD-10-CM: F32.9  ICD-9-CM: 367  5/8/2018 Yes        Statin intolerance (Chronic) ICD-10-CM: Z78.9  ICD-9-CM: 995.27  5/7/2018 Yes    Overview Signed 5/7/2018  2:19 PM by Isabella Chris MD     Atorvastatin and Simvastatin             Acute renal failure superimposed on stage 3 chronic kidney disease ([facility] [address] ICD-10-CM: N17.9, N18.3  ICD-9-CM: 584.9, 585.3  5/7/2018 No        Vitamin D deficiency (Chronic) ICD-10-CM: E55.9  ICD-9-CM: 268.9  5/1/2018 Yes    Overview Signed 5/1/2018 10:01 AM by Isabella Chris MD     Vitamin D 25-Hydroxy (5/1/2018) = 17.9              Overactive bladder (Chronic) ICD-10-CM: N32.81  ICD-9-CM: 596.51  Unknown Yes        Benign prostatic hyperplasia (Chronic) ICD-10-CM: N40.0  ICD-9-CM: 600.00  Unknown Yes        Postoperative urinary retention ICD-10-CM: N99.89, R33.8  ICD-9-CM: 997.5  4/22/2018 Yes        Postoperative confusion ICD-10-CM: R41.0  ICD-9-CM: 293.9  4/22/2018 Yes        Status post appendectomy ICD-10-CM: Z90.49  ICD-9-CM: V45.89  4/21/2018 Yes    Overview Addendum 4/30/2018  4:41 PM by Isabella Chris MD     S/P Diagnostic laparoscopy followed by open appendectomy with drainage of appendiceal abscess (4/20/2018 - Dr. Lacey Lee)             Acute blood loss as cause of postoperative anemia ICD-10-CM: D62  ICD-9-CM: 285.1  4/21/2018 Yes        Generalized weakness ICD-10-CM: R53.1  ICD-9-CM: 780.79  4/20/2018 Yes        * (Principal)Acute appendicitis with peritoneal abscess ICD-10-CM: K35.3  ICD-9-CM: 540.1  4/20/2018 Yes        Hypertensive heart and kidney disease without heart failure and with chronic kidney disease stage III (Chronic) ICD-10-CM: I13.10, N18.3  ICD-9-CM: 404.90, 585.3  9/2/2015 Yes              Background:   Past Medical History:   Past Medical History:   Diagnosis Date    Acute blood loss as cause of postoperative anemia 4/21/2018    Benign prostatic hyperplasia     CKD (chronic kidney disease) stage 3, GFR 30-59 ml/min 9/1/2015    Depression 2/4/6930    Diastolic dysfunction without heart failure 9/2/2015    Grade 1 diastolic dysfunction on 2D ECHO done 9/02/2015    Dyslipidemia 9/2/2015    Dysphagia as late effect of cerebrovascular accident (CVA) 9/1/2015    Hemiparesis affecting left side as late effect of cerebrovascular accident (CVA) ([facility] [address] 9/1/2015    History of noncompliance with medical treatment, presenting hazards to health 9/1/2015    History of stroke with residual deficit 9/1/2015    Acute Ischemic Stroke (early subacute infarct at the posterior right lentiform nucleus) with residual left hemiparesis and dysphagia     History of tobacco use 9/1/2015    History of trichomonal urethritis 9/1/2015    History of vitamin D deficiency 9/6/2015    Hypertensive heart and kidney disease without heart failure and with chronic kidney disease stage III 9/2/2015    Obesity, Class I, BMI 30-34.9 9/1/2015    Overactive bladder     Postoperative atrial fibrillation (HCC) 4/21/2018    Resolved    Postoperative confusion 4/22/2018    Postoperative urinary retention 4/22/2018    Statin intolerance 05/07/2018    Atorvastatin and Simvastatin    Vitamin D deficiency 5/1/2018    Vitamin D 25-Hydroxy (5/1/2018) = 17.9       Patient taking anticoagulants no    Patient has a defibrillator: no     Assessment:   Changes in Assessment throughout shift: no     Patient has central line: no Reasons if yes: Insertion date: Last dressing date:   Patient has Chow Cath: no Reasons if yes:     Insertion date:     Last Vitals:     Vitals:    05/08/18 1600 05/08/18 2203 05/09/18 0809 05/09/18 1600   BP: 124/86 129/84 121/69 122/74   Pulse: 76 76 64 75   Resp: 18 20 17 16   Temp: 98.5 °F (36.9 °C) 97.5 °F (36.4 °C) 96.9 °F (36.1 °C) 97.5 °F (36.4 °C)   SpO2: 99% 96% 97% 98%   Weight:       Height:            PAIN    Pain Assessment    Pain Intensity 1: 0 (05/09/18 1600) Pain Intensity 1: 2 (12/29/14 1105)    Pain Location 1: Abdomen Pain Location 1: Abdomen    Pain Intervention(s) 1: Medication (see MAR) Pain Intervention(s) 1: Medication (see MAR)  Patient Stated Pain Goal: 0 Patient Stated Pain Goal: 0  o Intervention effective: yes    o Other actions taken for pain: no     Skin Assessment  Skin color Skin Color: Appropriate for ethnicity  Condition/Temperature Skin Condition/Temp: Dry, Warm  Integrity Skin Integrity: Incision (comment)  Turgor Turgor: Non-tenting  Weekly Pressure Ulcer Documentation  Pressure  Injury Documentation: No Pressure Injury Noted-Pressure Ulcer Prevention Initiated  Wound Prevention & Protection Methods  Orientation of wound Orientation of Wound Prevention: Posterior  Location of Prevention Location of Wound Prevention: Buttocks, Sacrum/Coccyx  Dressing Present Dressing Present : No  Dressing Status Dressing Status: Intact  Wound Offloading Wound Offloading (Prevention Methods): Bed, pressure redistribution/air     INTAKE/OUPUT    Date 05/08/18 0700 - 05/09/18 0659 05/09/18 0700 - 05/10/18 0659   Shift 0700-1859 4954-9244 24 Hour Total 0700-1859 9934-6486 24 Hour Total   I  N  T  A  K  E   P.O. 420  420 600  600      P. O. 420  420 600  600    Shift Total  (mL/kg) 420  (4.2)  420  (4.2) 600  (6.1)  600  (6.1)   O  U  T  P  U  T   Urine  (mL/kg/hr)  0  (0) 0  (0)         Urine Voided  0 0         Urine Occurrence(s) 1 x 3 x 4 x 5 x  5 x    Emesis/NG output  0 0         Emesis  0 0         Emesis Occurrence(s)  0 x 0 x       Other  0 0         Other Output  0 0       Stool  0 0         Stool Occurrence(s) 1 x 0 x 1 x 2 x  2 x      Stool  0 0       Blood  0 0         Estimated Blood Loss  0 0         Blood  0 0       Shift Total  (mL/kg)  0  (0) 0  (0)       0 420 600  600   Weight (kg) 99.1 99.1 99.1 99.1 99.1 99.1       Recommendations:  1. Patient needs and requests: education    2. Diet: cardiac    3. Pending tests/procedures: no     4. Functional Level/Equipment: 1 x person assist    5. Estimated Discharge Date: TDB Posted on Whiteboard in Patients Room: no     [facility] [place] Safety Check    A safety check occurred in the patient's room between off going nurse and oncoming nurse listed above. The safety check included the below items  Area Items   H  High Alert Medications - Verify all high alert medication drips (heparin, PCA, etc.)   E  Equipment - Suction is set up for ALL patients (with dipti)  - Red plugs utilized for all equipment (IV pumps, etc.)  - WOWs wiped down at end of shift.  - Room stocked with oxygen, suction, and other unit-specific supplies   A  Alarms - Bed alarm is set for fall risk patients  - Ensure chair alarm is in place and activated if patient is up in a chair   L  Lines - Check IV for any infiltration  - Chow bag is empty if patient has a Chow   - Tubing and IV bags are labeled   S  Safety   - Room is clean, patient is clean, and equipment is clean. - Hallways are clear from equipment besides carts. - Fall bracelet on for fall risk patients  - Ensure room is clear and free of clutter  - Suction is set up for ALL patients (with dipti)  - Hallways are clear from equipment besides carts.    - Isolation precautions followed, supplies available outside room, sign posted

## 2018-05-10 LAB
ALBUMIN SERPL-MCNC: 3 G/DL (ref 3.4–5)
ALBUMIN/GLOB SERPL: 0.7 {RATIO} (ref 0.8–1.7)
ALP SERPL-CCNC: 165 U/L (ref 45–117)
ALT SERPL-CCNC: 155 U/L (ref 16–61)
ANION GAP SERPL CALC-SCNC: 5 MMOL/L (ref 3–18)
AST SERPL-CCNC: 51 U/L (ref 15–37)
BILIRUB DIRECT SERPL-MCNC: 0.1 MG/DL (ref 0–0.2)
BILIRUB SERPL-MCNC: 0.3 MG/DL (ref 0.2–1)
BUN SERPL-MCNC: 23 MG/DL (ref 7–18)
BUN/CREAT SERPL: 16 (ref 12–20)
CALCIUM SERPL-MCNC: 9.3 MG/DL (ref 8.5–10.1)
CHLORIDE SERPL-SCNC: 102 MMOL/L (ref 100–108)
CO2 SERPL-SCNC: 27 MMOL/L (ref 21–32)
CREAT SERPL-MCNC: 1.48 MG/DL (ref 0.6–1.3)
GLOBULIN SER CALC-MCNC: 4.1 G/DL (ref 2–4)
GLUCOSE SERPL-MCNC: 89 MG/DL (ref 74–99)
POTASSIUM SERPL-SCNC: 5.2 MMOL/L (ref 3.5–5.5)
PROT SERPL-MCNC: 7.1 G/DL (ref 6.4–8.2)
SODIUM SERPL-SCNC: 134 MMOL/L (ref 136–145)

## 2018-05-10 PROCEDURE — 97110 THERAPEUTIC EXERCISES: CPT

## 2018-05-10 PROCEDURE — 80076 HEPATIC FUNCTION PANEL: CPT | Performed by: INTERNAL MEDICINE

## 2018-05-10 PROCEDURE — 92526 ORAL FUNCTION THERAPY: CPT

## 2018-05-10 PROCEDURE — 97530 THERAPEUTIC ACTIVITIES: CPT

## 2018-05-10 PROCEDURE — 74011250637 HC RX REV CODE- 250/637: Performed by: INTERNAL MEDICINE

## 2018-05-10 PROCEDURE — 97116 GAIT TRAINING THERAPY: CPT

## 2018-05-10 PROCEDURE — 65310000000 HC RM PRIVATE REHAB

## 2018-05-10 PROCEDURE — 74011250636 HC RX REV CODE- 250/636: Performed by: INTERNAL MEDICINE

## 2018-05-10 PROCEDURE — 36415 COLL VENOUS BLD VENIPUNCTURE: CPT | Performed by: INTERNAL MEDICINE

## 2018-05-10 PROCEDURE — 92507 TX SP LANG VOICE COMM INDIV: CPT

## 2018-05-10 PROCEDURE — 80048 BASIC METABOLIC PNL TOTAL CA: CPT | Performed by: INTERNAL MEDICINE

## 2018-05-10 PROCEDURE — 97535 SELF CARE MNGMENT TRAINING: CPT

## 2018-05-10 RX ORDER — SODIUM CHLORIDE 9 MG/ML
1000 INJECTION, SOLUTION INTRAVENOUS ONCE
Status: COMPLETED | OUTPATIENT
Start: 2018-05-10 | End: 2018-05-11

## 2018-05-10 RX ADMIN — CYANOCOBALAMIN TAB 1000 MCG 1000 MCG: 1000 TAB at 08:55

## 2018-05-10 RX ADMIN — ACETAMINOPHEN 650 MG: 325 TABLET ORAL at 18:07

## 2018-05-10 RX ADMIN — CHOLECALCIFEROL CAP 125 MCG (5000 UNIT) 5000 UNITS: 125 CAP at 08:55

## 2018-05-10 RX ADMIN — DOCUSATE SODIUM 100 MG: 100 CAPSULE, LIQUID FILLED ORAL at 08:55

## 2018-05-10 RX ADMIN — ASPIRIN 81 MG 81 MG: 81 TABLET ORAL at 08:55

## 2018-05-10 RX ADMIN — LACTOBACILLUS TAB 2 TABLET: TAB at 08:55

## 2018-05-10 RX ADMIN — Medication 250 MG: at 08:55

## 2018-05-10 RX ADMIN — EZETIMIBE 10 MG: 10 TABLET ORAL at 08:55

## 2018-05-10 RX ADMIN — SODIUM CHLORIDE 1000 ML: 900 INJECTION, SOLUTION INTRAVENOUS at 18:33

## 2018-05-10 RX ADMIN — ESCITALOPRAM OXALATE 10 MG: 10 TABLET ORAL at 18:06

## 2018-05-10 RX ADMIN — Medication 100 MG: at 08:55

## 2018-05-10 RX ADMIN — TAMSULOSIN HYDROCHLORIDE 0.4 MG: 0.4 CAPSULE ORAL at 08:56

## 2018-05-10 RX ADMIN — LACTOBACILLUS TAB 2 TABLET: TAB at 18:06

## 2018-05-10 RX ADMIN — DOCUSATE SODIUM 100 MG: 100 CAPSULE, LIQUID FILLED ORAL at 18:07

## 2018-05-10 RX ADMIN — FERROUS SULFATE TAB 325 MG (65 MG ELEMENTAL FE) 325 MG: 325 (65 FE) TAB at 08:55

## 2018-05-10 NOTE — PROGRESS NOTES
Problem: Falls - Risk of  Goal: *Absence of Falls  Document Leyla Fall Risk and appropriate interventions in the flowsheet.    Outcome: Progressing Towards Goal  Fall Risk Interventions:  Mobility Interventions: Bed/chair exit alarm, Patient to call before getting OOB    Mentation Interventions: Bed/chair exit alarm    Medication Interventions: Bed/chair exit alarm, Patient to call before getting OOB    Elimination Interventions: Bed/chair exit alarm, Call light in reach, Patient to call for help with toileting needs    History of Falls Interventions: Bed/chair exit alarm

## 2018-05-10 NOTE — PROGRESS NOTES
Problem: Self Care Deficits Care Plan (Adult)  Goal: *Therapy Goal (Edit Goal, Insert Text)  Occupational Therapy Goals   Long Term Goals  Initiated 2018 and to be accomplished within 1-2 week(s)  1. Pt will perform self-feeding with MI to . (I)  2. Pt will perform grooming with MI.  3. Pt will perform UB bathing with Setup. 4. Pt will perform LB bathing with Supervision. 5. Pt will perform tub/shower, shower stall, and/or self propel to sink/vanity Supervision. 6. Pt will perform UB dressing with Setup. 7. Pt will perform LB dressing with Supervision. 8. Pt will perform toileting task with Supervision. 9. Pt will perform toilet transfer with Supervision. Short Term Goals   Initiated 2018 and to be accomplished within 7 day(s) 2018  1. Pt will perform self-feeding with MI to (I). 2. Pt will perform grooming with MI.  3. Pt will perform UB bathing with mod independent. 4. Pt will perform LB bathing with supervision  5. Pt will perform tub/shower, shower stall, and/or self propel to sink/vanity Min A.   6. Pt will perform UB dressing with mod independent. 7. Pt will perform LB dressing with Min A.  8. Pt will perform toileting task with Mod A.  9. Pt will perform toilet transfer with Supervision      Occupational Therapy TREATMENT    Patient: Tesfaye Franklin   67 y.o. Patient identified with name and : yes    Date: 5/10/2018    First Tx Session  Time In: 80  Time Out[de-identified] 56    Second Tx Session  Time In: 1200  Time Out[de-identified] 200    Diagnosis: Dibility  Acute appendicitis with peritoneal abscess   Precautions: Aspiration, Fall  Chart, occupational therapy assessment, plan of care, and goals were reviewed. Pain:  Pt reports 0/10 pain or discomfort prior to treatment. Pt reports 0/10 pain or discomfort post treatment. Intervention Provided: NA      SUBJECTIVE:   Patient stated Should I keep the mustache or shave it? \".     OBJECTIVE DATA SUMMARY:     THERAPEUTIC ACTIVITY Daily Assessment    Pt provided worksheet in order to follow multiple directions. Pt was required to follow step-by-step directions (12) and perform the task. Pt reread each question multiple times before performing the task. Pt answered 11/12 correctly. For the missed question, pt was asked to \"place an X on the number 3\". Pt required 3 attempts to do correctly. THERAPEUTIC EXERCISE Daily Assessment    Pt participated in B hand strengthening exercise for carryover to ADLs and functional tasks. Pt utilized digiflex (green, blue) 2 sets x 20 reps each hand, progressing through the colors. Pt used therabar to perform supination/pronation (x10 each direction)     GROOMING Daily Assessment    Grooming  Grooming Assistance : 5 (Supervision)  Comments: Pt sat w/c level at sink to shave and comb hair. UPPER BODY BATHING Daily Assessment    Upper Body Bathing  Bathing Assistance, Upper: 5 (Supervision)  Position Performed: Seated in chair  Adaptive Equipment: Tub bench  Comments: Pt washed all UB areas. Minimal verbal cues for thoroughness. LOWER BODY BATHING Daily Assessment    Lower Body Bathing  Bathing Assistance, Lower : 5 (Supervision)  Position Performed: Seated in chair;Standing  Adaptive Equipment: Tub bench;Grab bar  Comments: Pt bent over to wash lower legs. Pt placed feet on knee to wash bottoms of feet. Pt stood to wash buttocls and periarea. UPPER BODY DRESSING Daily Assessment    Upper Body Dressing   Dressing Assistance : 6 (Modified independent)  Comments: Pt doffed/donned pullover shirt and open front jacket. LOWER BODY DRESSING Daily Assessment    Lower Body Dressing   Dressing Assistance : 5 (Supervision)  Leg Crossed Method Used: Yes  Position Performed: Seated in chair;Standing  Don/Doff Anti-Embolic Stockings: 1 (Total assistance)  Comments: Pt donned pants seated in chair. Pt stood to pull up over hips and buttocks. Pt placed feet on knee to angel socks.      MOBILITY/TRANSFERS Daily Assessment    Functional Transfers  Tub or Shower Type: Shower  Amount of Assistance Required: 5 (Stand-by assistance)  Adaptive Equipment: Tub transfer bench       ASSESSMENT:  During first session, pt was engaged in self-care tasks and answered questions when asked. During second session, pt seemed less engaged and required cuing to follow directions and complete activity provided. Progression toward goals:  []          Improving appropriately and progressing toward goals  [x]          Improving slowly and progressing toward goals  []          Not making progress toward goals and plan of care will be adjusted     PLAN:  Patient continues to benefit from skilled intervention to address the above impairments. Continue treatment per established plan of care. Discharge Recommendations: To Be Determined  Further Equipment Recommendations for Discharge:  TBD     Activity Tolerance:  Fair      Estimated LOS:10 days    Please refer to the flowsheet for vital signs taken during this treatment.   After treatment:   [x]  Patient left in no apparent distress sitting up in chair   []  Patient left in no apparent distress in bed  [x]  Call bell left within reach  []  Nursing notified  []  Caregiver present  []  Bed alarm activated      ANABELLA Figueroa

## 2018-05-10 NOTE — REHAB NOTE
SHIFT CHANGE NOTE FOR [facility]    Bedside and Verbal shift change report given to Kevin Wilcox  (oncoming nurse) by Elodia Pace RN   (offgoing nurse). Report included the following information SBAR, Kardex, MAR and Recent Results.     Situation:   Code Status: Full Code   Reason for Admission: abscess  Hospital Day: 10   Problem List:   Hospital Problems  Date Reviewed: 5/9/2018          Codes Class Noted POA    Depression ICD-10-CM: F32.9  ICD-9-CM: 520  5/8/2018 Yes        Statin intolerance (Chronic) ICD-10-CM: Z78.9  ICD-9-CM: 995.27  5/7/2018 Yes    Overview Signed 5/7/2018  2:19 PM by Anitra Aranda MD     Atorvastatin and Simvastatin             Acute renal failure superimposed on stage 3 chronic kidney disease ([facility] ICD-10-CM: N17.9, N18.3  ICD-9-CM: 584.9, 585.3  5/7/2018 No        Vitamin D deficiency (Chronic) ICD-10-CM: E55.9  ICD-9-CM: 268.9  5/1/2018 Yes    Overview Signed 5/1/2018 10:01 AM by Anitra Aranda MD     Vitamin D 25-Hydroxy (5/1/2018) = 17.9              Overactive bladder (Chronic) ICD-10-CM: N32.81  ICD-9-CM: 596.51  Unknown Yes        Benign prostatic hyperplasia (Chronic) ICD-10-CM: N40.0  ICD-9-CM: 600.00  Unknown Yes        Postoperative urinary retention ICD-10-CM: N99.89, R33.8  ICD-9-CM: 997.5  4/22/2018 Yes        Postoperative confusion ICD-10-CM: R41.0  ICD-9-CM: 293.9  4/22/2018 Yes        Status post appendectomy ICD-10-CM: Z90.49  ICD-9-CM: V45.89  4/21/2018 Yes    Overview Addendum 4/30/2018  4:41 PM by Anitra Aranda MD     S/P Diagnostic laparoscopy followed by open appendectomy with drainage of appendiceal abscess (4/20/2018 - Dr. Rossana Henson)             Acute blood loss as cause of postoperative anemia ICD-10-CM: D62  ICD-9-CM: 285.1  4/21/2018 Yes        Generalized weakness ICD-10-CM: R53.1  ICD-9-CM: 780.79  4/20/2018 Yes        * (Principal)Acute appendicitis with peritoneal abscess ICD-10-CM: K35.3  ICD-9-CM: 540.1  4/20/2018 Yes Hypertensive heart and kidney disease without heart failure and with chronic kidney disease stage III (Chronic) ICD-10-CM: I13.10, N18.3  ICD-9-CM: 404.90, 585.3  9/2/2015 Yes              Background:   Past Medical History:   Past Medical History:   Diagnosis Date    Acute blood loss as cause of postoperative anemia 4/21/2018    Benign prostatic hyperplasia     CKD (chronic kidney disease) stage 3, GFR 30-59 ml/min 9/1/2015    Depression 6/8/5800    Diastolic dysfunction without heart failure 9/2/2015    Grade 1 diastolic dysfunction on 2D ECHO done 9/02/2015    Dyslipidemia 9/2/2015    Dysphagia as late effect of cerebrovascular accident (CVA) 9/1/2015    Hemiparesis affecting left side as late effect of cerebrovascular accident (CVA) ([facility] 9/1/2015    History of noncompliance with medical treatment, presenting hazards to health 9/1/2015    History of stroke with residual deficit 9/1/2015    Acute Ischemic Stroke (early subacute infarct at the posterior right lentiform nucleus) with residual left hemiparesis and dysphagia     History of tobacco use 9/1/2015    History of trichomonal urethritis 9/1/2015    History of vitamin D deficiency 9/6/2015    Hypertensive heart and kidney disease without heart failure and with chronic kidney disease stage III 9/2/2015    Obesity, Class I, BMI 30-34.9 9/1/2015    Overactive bladder     Postoperative atrial fibrillation (HCC) 4/21/2018    Resolved    Postoperative confusion 4/22/2018    Postoperative urinary retention 4/22/2018    Statin intolerance 05/07/2018    Atorvastatin and Simvastatin    Vitamin D deficiency 5/1/2018    Vitamin D 25-Hydroxy (5/1/2018) = 17.9       Patient taking anticoagulants no    Patient has a defibrillator: no     Assessment:   Changes in Assessment throughout shift: no     Patient has central line: no Reasons if yes: Insertion date: Last dressing date:   Patient has Chow Cath: no Reasons if yes:     Insertion date:     Last Vitals:     Vitals:    05/08/18 2203 05/09/18 0809 05/09/18 1600 05/09/18 2200   BP: 129/84 121/69 122/74 130/80   Pulse: 76 64 75 71   Resp: 20 17 16 18   Temp: 97.5 °F (36.4 °C) 96.9 °F (36.1 °C) 97.5 °F (36.4 °C) 97.1 °F (36.2 °C)   SpO2: 96% 97% 98% 99%   Weight:       Height:            PAIN    Pain Assessment    Pain Intensity 1: 0 (05/10/18 0000) Pain Intensity 1: 2 (12/29/14 1105)    Pain Location 1: Abdomen Pain Location 1: Abdomen    Pain Intervention(s) 1: Medication (see MAR) Pain Intervention(s) 1: Medication (see MAR)  Patient Stated Pain Goal: 0 Patient Stated Pain Goal: 0  o Intervention effective: yes    o Other actions taken for pain: no     Skin Assessment  Skin color Skin Color: Appropriate for ethnicity  Condition/Temperature Skin Condition/Temp: Dry, Warm  Integrity Skin Integrity: Incision (comment)  Turgor Turgor: Non-tenting  Weekly Pressure Ulcer Documentation  Pressure  Injury Documentation: No Pressure Injury Noted-Pressure Ulcer Prevention Initiated  Wound Prevention & Protection Methods  Orientation of wound Orientation of Wound Prevention: Posterior  Location of Prevention Location of Wound Prevention: Buttocks, Sacrum/Coccyx  Dressing Present Dressing Present : No  Dressing Status Dressing Status: Intact  Wound Offloading Wound Offloading (Prevention Methods): Bed, pressure redistribution/air     INTAKE/OUPUT    Date 05/09/18 0700 - 05/10/18 0659 05/10/18 0700 - 05/11/18 0659   Shift 0700-1859 4020-4464 24 Hour Total 9966-4783 8356-8235 24 Hour Total   I  N  T  A  K  E   P. O. 600  600         P. O. 600  600       Shift Total  (mL/kg) 600  (6.1)  600  (6.1)      O  U  T  P  U  T   Urine  (mL/kg/hr)            Urine Occurrence(s) 5 x 1 x 6 x       Stool            Stool Occurrence(s) 2 x 1 x 3 x       Shift Total  (mL/kg)           600      Weight (kg) 99.1 99.1 99.1 99.1 99.1 99.1       Recommendations:  1. Patient needs and requests: education    2.  Diet: cardiac    3. Pending tests/procedures: no     4. Functional Level/Equipment: 1 x person assist    5. Estimated Discharge Date: TDB Posted on Whiteboard in Patients Room: no     [facility] Safety Check    A safety check occurred in the patient's room between off going nurse and oncoming nurse listed above. The safety check included the below items  Area Items   H  High Alert Medications - Verify all high alert medication drips (heparin, PCA, etc.)   E  Equipment - Suction is set up for ALL patients (with dipti)  - Red plugs utilized for all equipment (IV pumps, etc.)  - WOWs wiped down at end of shift.  - Room stocked with oxygen, suction, and other unit-specific supplies   A  Alarms - Bed alarm is set for fall risk patients  - Ensure chair alarm is in place and activated if patient is up in a chair   L  Lines - Check IV for any infiltration  - Chow bag is empty if patient has a Chow   - Tubing and IV bags are labeled   S  Safety   - Room is clean, patient is clean, and equipment is clean. - Hallways are clear from equipment besides carts. - Fall bracelet on for fall risk patients  - Ensure room is clear and free of clutter  - Suction is set up for ALL patients (with dipti)  - Hallways are clear from equipment besides carts.    - Isolation precautions followed, supplies available outside room, sign posted

## 2018-05-10 NOTE — REHAB NOTE
SHIFT CHANGE NOTE FOR [facility]  Bedside and Verbal shift change report given to Reva RN  (oncoming nurse) by Heidi Romero LPN   (offgoing nurse). Report included the following information SBAR, Kardex, MAR and Recent Results.     Situation:   Code Status: Full Code   Reason for Admission: abscess  Hospital Day: 10   Problem List:   Hospital Problems  Date Reviewed: 5/9/2018          Codes Class Noted POA    Depression ICD-10-CM: F32.9  ICD-9-CM: 041  5/8/2018 Yes        Statin intolerance (Chronic) ICD-10-CM: Z78.9  ICD-9-CM: 995.27  5/7/2018 Yes    Overview Signed 5/7/2018  2:19 PM by Lynne Riojas MD     Atorvastatin and Simvastatin             Acute renal failure superimposed on stage 3 chronic kidney disease ([facility] ICD-10-CM: N17.9, N18.3  ICD-9-CM: 584.9, 585.3  5/7/2018 No        Vitamin D deficiency (Chronic) ICD-10-CM: E55.9  ICD-9-CM: 268.9  5/1/2018 Yes    Overview Signed 5/1/2018 10:01 AM by Lynne Riojas MD     Vitamin D 25-Hydroxy (5/1/2018) = 17.9              Overactive bladder (Chronic) ICD-10-CM: N32.81  ICD-9-CM: 596.51  Unknown Yes        Benign prostatic hyperplasia (Chronic) ICD-10-CM: N40.0  ICD-9-CM: 600.00  Unknown Yes        Postoperative urinary retention ICD-10-CM: N99.89, R33.8  ICD-9-CM: 997.5  4/22/2018 Yes        Postoperative confusion ICD-10-CM: R41.0  ICD-9-CM: 293.9  4/22/2018 Yes        Status post appendectomy ICD-10-CM: Z90.49  ICD-9-CM: V45.89  4/21/2018 Yes    Overview Addendum 4/30/2018  4:41 PM by Lynne Riojas MD     S/P Diagnostic laparoscopy followed by open appendectomy with drainage of appendiceal abscess (4/20/2018 - Dr. Deonte Cummings)             Acute blood loss as cause of postoperative anemia ICD-10-CM: D62  ICD-9-CM: 285.1  4/21/2018 Yes        Generalized weakness ICD-10-CM: R53.1  ICD-9-CM: 780.79  4/20/2018 Yes        * (Principal)Acute appendicitis with peritoneal abscess ICD-10-CM: K35.3  ICD-9-CM: 540.1  4/20/2018 Yes        Hypertensive heart and kidney disease without heart failure and with chronic kidney disease stage III (Chronic) ICD-10-CM: I13.10, N18.3  ICD-9-CM: 404.90, 585.3  9/2/2015 Yes              Background:   Past Medical History:   Past Medical History:   Diagnosis Date    Acute blood loss as cause of postoperative anemia 4/21/2018    Benign prostatic hyperplasia     CKD (chronic kidney disease) stage 3, GFR 30-59 ml/min 9/1/2015    Depression 3/2/6037    Diastolic dysfunction without heart failure 9/2/2015    Grade 1 diastolic dysfunction on 2D ECHO done 9/02/2015    Dyslipidemia 9/2/2015    Dysphagia as late effect of cerebrovascular accident (CVA) 9/1/2015    Hemiparesis affecting left side as late effect of cerebrovascular accident (CVA) ([facility] 9/1/2015    History of noncompliance with medical treatment, presenting hazards to health 9/1/2015    History of stroke with residual deficit 9/1/2015    Acute Ischemic Stroke (early subacute infarct at the posterior right lentiform nucleus) with residual left hemiparesis and dysphagia     History of tobacco use 9/1/2015    History of trichomonal urethritis 9/1/2015    History of vitamin D deficiency 9/6/2015    Hypertensive heart and kidney disease without heart failure and with chronic kidney disease stage III 9/2/2015    Obesity, Class I, BMI 30-34.9 9/1/2015    Overactive bladder     Postoperative atrial fibrillation (HCC) 4/21/2018    Resolved    Postoperative confusion 4/22/2018    Postoperative urinary retention 4/22/2018    Statin intolerance 05/07/2018    Atorvastatin and Simvastatin    Vitamin D deficiency 5/1/2018    Vitamin D 25-Hydroxy (5/1/2018) = 17.9       Patient taking anticoagulants no    Patient has a defibrillator: no     Assessment:   Changes in Assessment throughout shift: no     Patient has central line: no Reasons if yes: Insertion date: Last dressing date:   Patient has Chow Cath: no Reasons if yes:     Insertion date:     Last Vitals:     Vitals:    05/08/18 2203 05/09/18 0809 05/09/18 1600 05/09/18 2200   BP: 129/84 121/69 122/74 130/80   Pulse: 76 64 75 71   Resp: 20 17 16 18   Temp: 97.5 °F (36.4 °C) 96.9 °F (36.1 °C) 97.5 °F (36.4 °C) 97.1 °F (36.2 °C)   SpO2: 96% 97% 98% 99%   Weight:       Height:            PAIN    Pain Assessment    Pain Intensity 1: 0 (05/10/18 0805) Pain Intensity 1: 2 (12/29/14 1105)    Pain Location 1: Abdomen Pain Location 1: Abdomen    Pain Intervention(s) 1: Medication (see MAR) Pain Intervention(s) 1: Medication (see MAR)  Patient Stated Pain Goal: 0 Patient Stated Pain Goal: 0  o Intervention effective: yes    o Other actions taken for pain: no     Skin Assessment  Skin color Skin Color: Appropriate for ethnicity  Condition/Temperature Skin Condition/Temp: Dry, Warm  Integrity Skin Integrity: Incision (comment)  Turgor Turgor: Non-tenting  Weekly Pressure Ulcer Documentation  Pressure  Injury Documentation: No Pressure Injury Noted-Pressure Ulcer Prevention Initiated  Wound Prevention & Protection Methods  Orientation of wound Orientation of Wound Prevention: Posterior  Location of Prevention Location of Wound Prevention: Buttocks, Sacrum/Coccyx  Dressing Present Dressing Present : No  Dressing Status Dressing Status: Intact  Wound Offloading Wound Offloading (Prevention Methods): Bed, pressure redistribution/air     INTAKE/OUPUT    Date 05/09/18 0700 - 05/10/18 0659 05/10/18 0700 - 05/11/18 0659   Shift 0700-1859 3617-5160 24 Hour Total 7229-5101 0313-6216 24 Hour Total   I  N  T  A  K  E   P. O. 600  600         P. O. 600  600       Shift Total  (mL/kg) 600  (6.1)  600  (6.1)      O  U  T  P  U  T   Urine  (mL/kg/hr)            Urine Occurrence(s) 5 x 2 x 7 x       Stool            Stool Occurrence(s) 2 x 1 x 3 x       Shift Total  (mL/kg)           600      Weight (kg) 99.1 99.1 99.1 99.1 99.1 99.1       Recommendations:  1. Patient needs and requests: education    2.  Diet: cardiac    3. Pending tests/procedures: no     4. Functional Level/Equipment: 1 x person assist    5. Estimated Discharge Date: TDB Posted on Whiteboard in Patients Room: no     [facility] Safety Check    A safety check occurred in the patient's room between off going nurse and oncoming nurse listed above. The safety check included the below items  Area Items   H  High Alert Medications - Verify all high alert medication drips (heparin, PCA, etc.)   E  Equipment - Suction is set up for ALL patients (with dipti)  - Red plugs utilized for all equipment (IV pumps, etc.)  - WOWs wiped down at end of shift.  - Room stocked with oxygen, suction, and other unit-specific supplies   A  Alarms - Bed alarm is set for fall risk patients  - Ensure chair alarm is in place and activated if patient is up in a chair   L  Lines - Check IV for any infiltration  - Chow bag is empty if patient has a Chow   - Tubing and IV bags are labeled   S  Safety   - Room is clean, patient is clean, and equipment is clean. - Hallways are clear from equipment besides carts. - Fall bracelet on for fall risk patients  - Ensure room is clear and free of clutter  - Suction is set up for ALL patients (with dipti)  - Hallways are clear from equipment besides carts.    - Isolation precautions followed, supplies available outside room, sign posted

## 2018-05-10 NOTE — PROGRESS NOTES
Problem: Pressure Injury - Risk of  Goal: *Prevention of pressure injury  Document Juno Scale and appropriate interventions in the flowsheet.    Outcome: Progressing Towards Goal  Pressure Injury Interventions:  Sensory Interventions: Assess changes in LOC, Chair cushion    Moisture Interventions: Absorbent underpads, Maintain skin hydration (lotion/cream)    Activity Interventions: Chair cushion, Pressure redistribution bed/mattress(bed type)    Mobility Interventions: Float heels, HOB 30 degrees or less, Pressure redistribution bed/mattress (bed type)    Nutrition Interventions: Document food/fluid/supplement intake    Friction and Shear Interventions: Feet elevated on foot rest, Apply protective barrier, creams and emollients

## 2018-05-10 NOTE — PROGRESS NOTES
Problem: Mobility Impaired (Adult and Pediatric)  Goal: *Acute Goals and Plan of Care (Insert Text)  Physical Therapy Goals  Short Term = Long Term Goals  Initiated 2018, updated 2018, and to be accomplished within 10-14 day(s) (2018)  1. Patient will move from supine to sit and sit to supine , scoot up and down and roll side to side in bed with modified independence. 2.  Patient will transfer from bed to chair and chair to bed with distant supervision/set-up using the least restrictive device. 3.  Patient will perform sit to stand with distant supervision/set-up. 4.  Patient will ambulate with distant supervision/set-up for 50 feet with the least restrictive device. 5.  Patient will ascend/descend 2 curbs/thresholds with least restrictive assistive device with supervision/set-up. physical Therapy TREATMENT    Patient: Tesfaye Franklin (83 y.o. male)  Date: 5/10/2018  Diagnosis: Dibility  Acute appendicitis with peritoneal abscess Acute appendicitis with peritoneal abscess  Precautions: Aspiration, Fall  Chart, physical therapy assessment, plan of care and goals were reviewed. Time In: 1130  Time Out: 1200  Patient Seen For: Balance activities;Gait training;Patient education;Transfer training   Time In: 1330  Time Out: 1430  Patient Seen For: Balance activities;Gait training;Patient education;Transfer training; Therapeutic exercise  (Minus 2 units of skilled PT due to pt's decreased engagement. Pain:  Pt pain was reported as 0/10 pre-treatment. Pt pain was reported as 0/10 post-treatment. Intervention: N/A    Patient identified with name and : Yes    SUBJECTIVE:     Pt appears very subdued throughout first treatment session making minimal eye contact, minimally conversant, and appearing disengaged/distracted at times. Pt reports he is \"fine. \"    At start of second treatment session, pt's sister presents with pt in dining room.   Pt's sister receptive to being present for portion of pt's treatment session for education and observation of pt's current functional level. Pt continues to make minimal eye contact and appears subdued. During treatment session, pt was encouraged to participate and engage in POC but states, \"whatever you want,\" when encouraged to choose intervention of therapeutic exercise or further mobility training. During attempt at standing therapeutic exercise, pt appeared to disregard PT instruction for quality of movement. However, when asked if he was able to hear PT, he acknowledges that he is able to do so. OBJECTIVE DATA SUMMARY:   Objective:       TRANSFERS Daily Assessment    Other: stand step without AD  Transfer Assistance : 5 (Supervision/setup) (close supervision) for safety  Sit to Stand Assistance: Supervision for safety   Pt performed stand step transfer from recliner to w/c without AD with close supervision from PT and mod verbal cues for safety. Pt requires max verbal cues for ant weight shift to maintain B foot flat with sit to stand and max verbal cues for increased hip flexion for slow controlled descent RW <> w/c to increase B LE weight bearing for functional strengthening. Pt utilizes B UEs to push up from w/c for sit to stand and B UEs to slow descent with stand to sit. GAIT Daily Assessment    Amount of Assistance: 5 (Supervision/setup) (close supervision)  Distance (ft): 150 Feet (ft)  Assistive Device: Walker, rolling   Pt ambulates with downward gaze, narrow EDGARD, Left Trendelenburg with Right LE adducting from hip in swing phase and increased right stance time. Pt requires max verbal cues to attend to quality of gait. Pt occasionally maintains forward gaze for a few steps prior to returning to downward gaze/forward flexed posture. STEPS or STAIRS Daily Assessment    During second treatment session, pt negotiated one (6\") curb with R with close supervision from PT for safety and verbal cuing for sequencing.        BALANCE Daily Assessment    Standing - Static: Fair  Standing - Dynamic : Impaired       WHEELCHAIR MOBILITY Daily Assessment    Pt propels w/c from room to gym with modified independence over level surfaces. Pt demonstrates inefficient propulsion benefiting from intermittent verbal cues for efficiency. THERAPEUTIC EXERCISES Daily Assessment    Attempted standing exercise right and left SLS + contralateral hip extension with B UE support on RW for balance. Pt performed 10 repetitions on each with PT providing close supervision for safety and maximal verbal cues for quality of movement. Despite visual demonstration and maximal verbal cuing, pt does not demonstrate ability to perform therapeutic exercise with proper form and therefor, the attempt was discontinued. Seated LE Therapeutic Exercise:  3 Sets of 10 Repetitions:  Alt Hip Flex  Right and Left LAQ       ASSESSMENT:  Pt has progressed to perform functional mobility at a close supervision level from [address]. However, pt appears disengaged during both treatment sessions demonstrating minimal ability to attend to cuing for quality and safety of movement despite max encouragement. Pt is limited in ability to participate in skilled intervention due to disengagement and missed 2 billable units of treatment due to disengagement and decreased ability to be an active participant in skilled intervention. Progression toward goals:  []      Improving appropriately and progressing toward goals  [x]      Improving slowly and progressing toward goals  []      Not making progress toward goals and plan of care will be adjusted     PLAN:  Patient continues to benefit from skilled intervention to address the above impairments. Continue treatment per established plan of care. During second treatment session, pt's sister was present to observe and was scheduled to attend family training on Monday 5/14/2018 to discuss d/c recommendations.   Discharge Recommendations:  Outpatient versus Home Health pending progress  Further Equipment Recommendations for Discharge:  rolling walker (sister reports pt owns RW but that this is in storage - educated pt's sister to bring RW out of storage). Estimated LOS: 5/15/208    Activity Tolerance:   Fair  Please refer to the flowsheet for vital signs taken during this treatment. After treatment:   Patient left seated in w/c in gym at end of first treatment session awaiting OT treatment. At end of second treatment session, pt was given the option to sit in w/c and rest in gym prior to SLP treatment scheduled one half hour later or to propel w/c to S:P office. Pt remained resting in w/c in therapy gym.       Damaris Lyn PT, DPT  5/10/2018

## 2018-05-10 NOTE — PROGRESS NOTES
Problem: Neurolinguistics Impaired (Adult)  Goal: *Speech Goal: (INSERT TEXT)  Long term goals:  1. Patient will tolerate least restrictive diet without overt s/s of aspiration or other difficulty x 4/5 meals. .  2. Patient will utilize safe swallowing techniques with supervision. 3. Patient will follow moderately complex commands (manipulating objects) with 80-90% accuracy. 4. Patient will respond to treatment tasks in full sentences, supervision. 5. Patient will name 8-10 items within concrete categories. 6. Patient will be oriented x 3 and recall events of the day, min assist.  7. Patient will perform functional problem solving tasks with 80-90% accuracy. Short term goals (by 5/15/18):  1. Patient will tolerate advanced soft diet/thin liquids without overt s/s of aspiration or other difficulty in 4/5 meals. 2. Patient will utilize safe swallowing techniques with min cues  3. Patient will follow moderately complex commands (manipulating objects) with 80% accuracy. 4. Patient will respond to treatment tasks in full sentences, mod cues. 5. Patient will name 7-9 items within concrete categories. 6. Patient will be oriented x 3 and recall events of the day, min assist.  7. Patient will perform functional problem solving tasks with 80-90% accuracy. Speech language pathology treatment    Patient: Migel Gtz (30 y.o. male)  Date: 5/10/2018  Diagnosis: Dibility  Acute appendicitis with peritoneal abscess Acute appendicitis with peritoneal abscess       SUBJECTIVE:   Patient stated This is my sister. OBJECTIVE:   Mental Status:  Mr. Lanre Brewster was more alert and interactive today than seen in recent sessions. Treatment & Interventions:   Patient was seen in the dining room at lunchtime today. He ate relatively well, receiving a sandwich and soup. Mr. Lanre Brewster continues to need min cues for malou of safe swallowing techniques.   There were no overt s/s of aspiration or other difficulty at the afternoon meal.    Mr. Lanre Brewster was also seen for a thirty minute speech therapy session this afternoon. The following treatment tasks were presented:  Neuro-Linguistics:   Orientation:   Supervision  Recent memory:  Supervision  Sentence length responses: 50% accuracy (information content consistently correct)  Discuss advantages:  80% accuracy  Discuss disadvantages: 50% accuracy (same topics)    Response & Tolerance to Activities:  Mr. Lanre Brewster was more alert and responsive today. He continues to need much encouragement to respond in sentences to tasks and in conversation. Pain:  Pain Scale 1: Numeric (0 - 10)  No report of pain     After treatment:   [x]       Patient left in no apparent distress sitting up in chair  []       Patient left in no apparent distress in bed  [x]       Call bell left within reach  [x]       Nursing notified  []       Caregiver present  []       Bed alarm activated    ASSESSMENT:   Progression toward goals:  []       Improving appropriately and progressing toward goals  [x]       Improving slowly and progressing toward goals  []       Not making progress toward goals and plan of care will be adjusted    PLAN:   Patient continues to benefit from skilled intervention to address the above impairments. Continue treatment per established plan of care. Discharge Recommendations:   To Be Determined    Estimated LOS: Through 5/15/18    ALVA Perdomo  Time Calculation:  70 minutes

## 2018-05-10 NOTE — PROGRESS NOTES
[id number]  [address] [place] [address], [address]     INPATIENT REHABILITATION  DAILY PROGRESS NOTE     Date: 5/10/2018    Name: Buzz Thao Age / Sex: 67 y.o. / male   CSN: [id number] MRN: [id number]   Admit Date: 4/30/2018 Length of Stay: 10 days     Primary Rehab Diagnosis: Impaired Mobility and ADLs secondary to:  1. S/P Diagnostic laparoscopy followed by open appendectomy with drainage of appendiceal abscess (4/20/2018 - Dr. Pat Jacobsen)  2. History of acute appendicitis with appendiceal abscess      Subjective:     Patient seen and examined. Blood pressure controlled. Patient's Complaint:   No significant medical complaints    Pain Control: no current joint or muscle symptoms, essentially pain-free      Objective:     Vital Signs:  Patient Vitals for the past 24 hrs:   BP Temp Pulse Resp SpO2   05/10/18 0805 117/69 97 °F (36.1 °C) 89 17 99 %   05/09/18 2200 130/80 97.1 °F (36.2 °C) 71 18 99 %   05/09/18 1600 122/74 97.5 °F (36.4 °C) 75 16 98 %        Physical Examination:  GENERAL SURVEY: Patient is awake, alert, oriented x 3, sitting comfortably on the chair, not in acute respiratory distress. HEENT: pale palpebral conjunctivae, anicteric sclerae, no nasoaural discharge, moist oral mucosa  NECK: supple, no jugular venous distention, no palpable lymph nodes  CHEST/LUNGS: symmetrical chest expansion, good air entry, clear breath sounds  HEART: adynamic precordium, good S1 S2, no S3, regular rhythm, no murmurs  ABDOMEN: flat, bowel sounds appreciated, soft, non-tender  EXTREMITIES: pale nailbeds, (+) bipedal edema, full and equal pulses, no calf tenderness   NEUROLOGICAL EXAM: The patient is awake, alert and oriented x3, able to answer questions fairly appropriately, able to follow 1 and 2 step commands. Able to tell time from the wall clock. Cranial nerves II-XII are grossly intact. No gross sensory deficit.   Motor strength is 4+/5 on BUE, 4-/5 on the RLE, 4/5 on the LLE.     Incision(s): covered, clean, dry, and intact      Current Medications:  Current Facility-Administered Medications   Medication Dose Route Frequency    escitalopram oxalate (LEXAPRO) tablet 10 mg  10 mg Oral QPM    ezetimibe (ZETIA) tablet 10 mg  10 mg Oral DAILY    co-enzyme Q-10 (CO Q-10) capsule 100 mg  100 mg Oral DAILY    cholecalciferol (VITAMIN D3) capsule 5,000 Units  5,000 Units Oral DAILY    ferrous sulfate tablet 325 mg  1 Tab Oral DAILY WITH BREAKFAST    ascorbic acid (vitamin C) (VITAMIN C) tablet 250 mg  250 mg Oral DAILY WITH BREAKFAST    acetaminophen (TYLENOL) tablet 650 mg  650 mg Oral Q4H PRN    docusate sodium (COLACE) capsule 100 mg  100 mg Oral BID    bisacodyl (DULCOLAX) tablet 10 mg  10 mg Oral Q48H PRN    Lactobacillus Acidoph & Bulgar (FLORANEX) tablet 2 Tab  2 Tab Oral BID    HYDROcodone-acetaminophen (NORCO) 5-325 mg per tablet 1 Tab  1 Tab Oral Q4H PRN    tamsulosin (FLOMAX) capsule 0.4 mg  0.4 mg Oral DAILY    cyanocobalamin tablet 1,000 mcg  1,000 mcg Oral DAILY    aspirin chewable tablet 81 mg  81 mg Oral DAILY WITH BREAKFAST       Allergies: Allergies   Allergen Reactions    Lipitor [Atorvastatin] Other (comments)     Elevated CK level    Pt has no awareness of this allergy.     Simvastatin Other (comments)     Elevation in LFTs       Functional Progress:    SPEECH AND LANGUAGE PATHOLOGY    ON ADMISSION MOST RECENT   Comprehension (Native Language)  Primary Mode of Comprehension: Auditory  Score: 5 Comprehension (Native Language)  Primary Mode of Comprehension: Auditory  Score: 5     Expression (Native Language)  Primary Mode of Expression: Verbal  Score: 5   Expression (Native Language)  Primary Mode of Expression: Verbal  Score: 5     Social Interaction/Pragmatics  Score: 5 Social Interaction/Pragmatics  Score: 5     Problem Solving  Score: 4   Problem Solving  Score: 4     Memory  Score: 5 Memory  Score: 5       Legend:   7 - Independent   6 - Modified Independent   5 - Standby Assistance / Supervision / Set-up   4 - Minimum Assistance / Contact Guard Assistance   3 - Moderate Assistance   2 - Maximum Assistance   1 - Total Assistance / Dependent       Lab/Data Review:  Recent Results (from the past 24 hour(s))   HEPATIC FUNCTION PANEL    Collection Time: 05/10/18  6:09 AM   Result Value Ref Range    Protein, total 7.1 6.4 - 8.2 g/dL    Albumin 3.0 (L) 3.4 - 5.0 g/dL    Globulin 4.1 (H) 2.0 - 4.0 g/dL    A-G Ratio 0.7 (L) 0.8 - 1.7      Bilirubin, total 0.3 0.2 - 1.0 MG/DL    Bilirubin, direct 0.1 0.0 - 0.2 MG/DL    Alk. phosphatase 165 (H) 45 - 117 U/L    AST (SGOT) 51 (H) 15 - 37 U/L    ALT (SGPT) 155 (H) 16 - 61 U/L   METABOLIC PANEL, BASIC    Collection Time: 05/10/18  6:09 AM   Result Value Ref Range    Sodium 134 (L) 136 - 145 mmol/L    Potassium 5.2 3.5 - 5.5 mmol/L    Chloride 102 100 - 108 mmol/L    CO2 27 21 - 32 mmol/L    Anion gap 5 3.0 - 18 mmol/L    Glucose 89 74 - 99 mg/dL    BUN 23 (H) 7.0 - 18 MG/DL    Creatinine 1.48 (H) 0.6 - 1.3 MG/DL    BUN/Creatinine ratio 16 12 - 20      GFR est AA 56 (L) >60 ml/min/1.73m2    GFR est non-AA 47 (L) >60 ml/min/1.73m2    Calcium 9.3 8.5 - 10.1 MG/DL       Estimated Glomerular Filtration Rate:  On admission, estimated GFR based on a Creatinine of 1.27 mg/dl:   Using CKD-EPI = 64.5 mL/min/1.73m2   Using MDRD = 71.5 mL/min/1.73m2  Most recent estimated GFR, based on a Creatinine of 1.48 mg/dl on 5/10/2018:   Using CKD-EPI = 53.6 mL/min/1.73m2   Using MDRD = 59.9 mL/min/1.73m2       Assessment:     Primary Rehabilitation Diagnosis  1. Generalized Weakness with Impaired Mobility and ADLs  2. S/P Diagnostic laparoscopy followed by open appendectomy with drainage of appendiceal abscess (4/20/2018 - Dr. oSfía Werner)  3.  History of acute appendicitis with appendiceal abscess     Comorbidities   History of stroke with residual deficit I69.30    Hypertensive heart and kidney disease without heart failure and with chronic kidney disease stage III I13.10, T38.8    Diastolic dysfunction without heart failure I51.9    Dyslipidemia E78.5    CKD (chronic kidney disease) stage 3, GFR 30-59 ml/min N18.3    History of trichomonal urethritis Z86.19    Obesity, Class I, BMI 30-34.9 E66.9    History of noncompliance with medical treatment, presenting hazards to health Z91.19    History of tobacco use Z87.891    History of vitamin D deficiency Z86.39    Hemiparesis affecting left side as late effect of cerebrovascular accident (CVA)  I69.354    Dysphagia as late effect of cerebrovascular accident (CVA) I69.391    Postoperative atrial fibrillation (HCC) I97.89, I48.91    Overactive bladder N32.81    Benign prostatic hyperplasia N40.0    Postoperative urinary retention N99.89, R33.8    Postoperative confusion R41.0    Acute blood loss as cause of postoperative anemia D62    Vitamin D deficiency E55.9    Acute renal failure superimposed on stage 3 chronic kidney disease  N17.9, N18.3    Depression F32.9        Plan:     1. Justification for continued stay: Good progression towards established rehabilitation goals. 2. Medical Issues being followed closely:    [x]  Fall and safety precautions     [x]  Wound Care     [x]  Bowel and Bladder Function     [x]  Fluid Electrolyte and Nutrition Balance     [x]  Pain Control      3. Issues that 24 hour rehabilitation nursing is following:    [x]  Fall and safety precautions     [x]  Wound Care     [x]  Bowel and Bladder Function     [x]  Fluid Electrolyte and Nutrition Balance     [x]  Pain Control      [x]  Assistance with and education on in-room safety with transfers to and from the bed, wheelchair, toilet and shower. 4. Acute rehabilitation plan of care:    [x]  Continue current care and rehab. [x]  Physical Therapy           [x]  Occupational Therapy           [x]  Speech Therapy     []  Hold Rehab until further notice     5.  Medications:    [x]  MAR Reviewed     [x]  Continue Present Medications     6. DVT Prophylaxis:      []  Lovenox     []  Unfractionated Heparin     []  Coumadin     []  NOAC     [x]  MARYAM Stockings     []  Sequential Compression Device     []  None     7. Orders:   > S/P Diagnostic laparoscopy followed by open appendectomy with drainage of appendiceal abscess (4/20/2018 - Dr. Radhika Serrano);  History of acute appendicitis with appendiceal abscess   > WBC count (5/1/2018, on admission) = 13.0   > STEVEN drain was removed on the AM of 5/1/2018 by Dr. Radhika Serrano   > On 5/3/2018, patient had completed the recommended treatment course of Fluconazole 400 mg PO once daily    > Continue Floranex 2 tabs PO BID    > Acute Postoperative Blood Loss Anemia   > Hgb/Hct (5/1/2018, on admission) = 9.7/29.7   > Anemia work-up showed serum iron 27, TIBC 233, iron % saturation 12, ferritin 356, reticulocyte count 1.0   > Hgb/Hct (5/7/2018) = 9.5/29.8    > Continue:    > FeSO4 325 mg PO once daily with breakfast     > Ascorbic Acid 250 mg PO once daily with breakfast (to enhance the absorption of the FeSO4)    > Benign prostatic hyperplasia / Overactive bladder / Postoperative urinary retention   > On 5/1/2018, added Terazosin 1 mg PO q PM (6PM)   > On 5/5/2018, discontinued Terazosin 1 mg PO q PM (6PM)   > Continue Tamsulosin 0.4 mg PO once daily    > Hypertensive heart and kidney disease without heart failure and with chronic kidney disease stage 3   > On 5/1/2018:    > Discontinued Labetalol 100 mg PO q 12 hr (6AM, 6PM)    > Terazosin 1 mg PO q PM (6PM)   > On 5/3/2018, decreased Amlodipine from 10 mg to 5 mg PO once daily (6AM)   > On 5/5/2018:    > Discontinued Terazosin 1 mg PO q PM (6PM)    > Decreased Amlodipine from 5 mg to 2.5 mg PO once daily (6AM)   > On 5/7/2018, discontinued Amlodipine 2.5 mg PO once daily (6AM)     > Postoperative confusion   > Will monitor    > Vitamin D Deficiency   > Vitamin D 25-Hydroxy (5/1/2018) = 17.9   > On 5/2/2018, patient was given Cholecalciferol 50,000 units PO x 1 dose    > On 5/3/2018, started Cholecalciferol 5,000 units PO once daily    > Continue Cholecalciferol 5,000 units PO once daily     > History of stroke with residual left hemiparesis and dysphagia    > Lipid profile (5/2/2018) showed , , HDL 26, LDL 59   > Baseline hepatic function tests and CK:    > On 4/22/2018:     > ALT = 45     > AST = 37     > CK = 453    > On 5/2/2018:     > ALT = 90     > AST = 50     > CK = 103   > Patient's record state Atorvastatin causes and elevated CK level - patient and sister not aware of this   > Patient had been on Ezetimibe for dyslipidemia   > Patient had not been on a statin most likely due to documentation of CK elevation due to Atorvastatin   > On 5/2/2018:    > Patient was started on a trial of Simvastatin 20 mg PO q HS for secondary stroke prevention and plaque stabilization    > Discontinued Zetia    > Will monitor LFTs    > Advanced diet from NDD2 to NDD3   > On 5/3/2018, added Coenzyme Q10 100 mg PO once daily   > On 5/7/2018:    > ALT = 226    > AST = 130    > CK = 91    > Discontinued Simvastatin 20 mg PO q HS    > Resumed Ezetimibe 10 mg PO once daily   > On 5/10/2018:    > ALT = 155    > AST = 51   > Continue:    > Aspirin 81 mg PO once daily with breakfast    > Ezetimibe 10 mg PO once daily    > Coenzyme Q10 100 mg PO once daily    > Acute renal failure superimposed on stage 3 chronic kidney disease    > BUN/Creatinine (5/1/2018, on admission) = 13/1.27   > BUN/Creatinine (5/7/2018) = 30/1.59   > Patient was advised to increase oral fluid intake   > On 5/7/2018, patient was given IVF: 0.9%  ml/hr x 1 liter   > On 5/8/2018, patient was given IVF: 0.9%  ml/hr x 1 liter   > BUN/Creatinine (5/10/2018) = 23/1.48   > Resume IVF: 0.9%  ml/hr x 1 liter    > Increase oral fluid intake    > Depression   > Patient had been seen and evaluated by our Clinical Psychologist (Dr. Anayeli Snider) and patient was noted to have a depressed mood which, after discussion with the therapy staff, appears to be affecting the patient's progress in rehabilitation   > On 5/8/2018, started Escitalopram 10 mg PO q PM   > Continue Escitalopram 10 mg PO q PM    > Analgesia   > Continue:    > Acetaminophen 650 mg PO q 4 hr PRN for pain level less than 5/10    > Norco 5/325 1 tab PO q 4 hr PRN for pain level greater than 4/10       8. Patient's progress in rehabilitation and medical issues discussed with the patient. All questions answered to the best of my ability. Care plan discussed with patient and nurse.       Signed:    Leland Roe MD    May 10, 2018

## 2018-05-11 PROCEDURE — 97110 THERAPEUTIC EXERCISES: CPT

## 2018-05-11 PROCEDURE — 97530 THERAPEUTIC ACTIVITIES: CPT

## 2018-05-11 PROCEDURE — 97535 SELF CARE MNGMENT TRAINING: CPT

## 2018-05-11 PROCEDURE — 74011250637 HC RX REV CODE- 250/637: Performed by: INTERNAL MEDICINE

## 2018-05-11 PROCEDURE — 97116 GAIT TRAINING THERAPY: CPT

## 2018-05-11 PROCEDURE — 92526 ORAL FUNCTION THERAPY: CPT

## 2018-05-11 PROCEDURE — 65310000000 HC RM PRIVATE REHAB

## 2018-05-11 PROCEDURE — 92507 TX SP LANG VOICE COMM INDIV: CPT

## 2018-05-11 RX ADMIN — EZETIMIBE 10 MG: 10 TABLET ORAL at 09:13

## 2018-05-11 RX ADMIN — LACTOBACILLUS TAB 2 TABLET: TAB at 09:13

## 2018-05-11 RX ADMIN — CYANOCOBALAMIN TAB 1000 MCG 1000 MCG: 1000 TAB at 09:13

## 2018-05-11 RX ADMIN — Medication 100 MG: at 09:13

## 2018-05-11 RX ADMIN — TAMSULOSIN HYDROCHLORIDE 0.4 MG: 0.4 CAPSULE ORAL at 09:13

## 2018-05-11 RX ADMIN — DOCUSATE SODIUM 100 MG: 100 CAPSULE, LIQUID FILLED ORAL at 09:13

## 2018-05-11 RX ADMIN — FERROUS SULFATE TAB 325 MG (65 MG ELEMENTAL FE) 325 MG: 325 (65 FE) TAB at 09:13

## 2018-05-11 RX ADMIN — Medication 250 MG: at 09:14

## 2018-05-11 RX ADMIN — LACTOBACILLUS TAB 2 TABLET: TAB at 18:28

## 2018-05-11 RX ADMIN — ASPIRIN 81 MG 81 MG: 81 TABLET ORAL at 09:13

## 2018-05-11 RX ADMIN — ESCITALOPRAM OXALATE 10 MG: 10 TABLET ORAL at 18:28

## 2018-05-11 RX ADMIN — CHOLECALCIFEROL CAP 125 MCG (5000 UNIT) 5000 UNITS: 125 CAP at 09:13

## 2018-05-11 RX ADMIN — DOCUSATE SODIUM 100 MG: 100 CAPSULE, LIQUID FILLED ORAL at 18:28

## 2018-05-11 NOTE — PROGRESS NOTES
Problem: Neurolinguistics Impaired (Adult)  Goal: *Speech Goal: (INSERT TEXT)  Long term goals:  1. Patient will tolerate least restrictive diet without overt s/s of aspiration or other difficulty x 4/5 meals. .  2. Patient will utilize safe swallowing techniques with supervision. 3. Patient will follow moderately complex commands (manipulating objects) with 80-90% accuracy. 4. Patient will respond to treatment tasks in full sentences, supervision. 5. Patient will name 8-10 items within concrete categories. 6. Patient will be oriented x 3 and recall events of the day, min assist.  7. Patient will perform functional problem solving tasks with 80-90% accuracy. Short term goals (by 5/15/18):  1. Patient will tolerate advanced soft diet/thin liquids without overt s/s of aspiration or other difficulty in 4/5 meals. 2. Patient will utilize safe swallowing techniques with min cues  3. Patient will follow moderately complex commands (manipulating objects) with 80% accuracy. 4. Patient will respond to treatment tasks in full sentences, mod cues. 5. Patient will name 7-9 items within concrete categories. 6. Patient will be oriented x 3 and recall events of the day, min assist.  7. Patient will perform functional problem solving tasks with 80-90% accuracy. Speech language pathology treatment    Patient: Destiny Sims (89 y.o. male)  Date: 5/11/2018  Diagnosis: Dibility  Acute appendicitis with peritoneal abscess Acute appendicitis with peritoneal abscess       SUBJECTIVE:   Patient stated Today is Thursday. OBJECTIVE:   Mental Status:  Mr. Cassandra Benedict was awake, but lethargic in sessions with the SLP. Treatment & Interventions:   Patient was seen in the dining room at mealtime for breakfast and lunch. He continues to receive an advanced soft diet with thin liquids.   Patient needs cues to limit bolus size and perform a second swallow to clear material.  He eats well for breakfast, but not so much at the noon meal.  H did not demonstrate any overt s/s of aspiration or other significant difficulty with his meals. Neuro-Linguistics:   Orientation:   Mod assist  Recent memory: Mod-max assist  Convergent namin% accuracy (given 3 exemplars)  ID problem from solution: 50% accuracy  Things in a tool box:  Patient named 8 items with sufficient time    Response & Tolerance to Activities:   [facility] continues to respond briefly (rare sentence length response) with very low volume. He is making very slow progress toward meeting treatment goals. Pain:  Pain Scale 1: Numeric (0 - 10)  No report of pain     After treatment:   [x]       Patient left in no apparent distress sitting up in chair  []       Patient left in no apparent distress in bed  [x]       Call bell left within reach  [x]       Nursing notified  []       Caregiver present  []       Bed alarm activated    ASSESSMENT:   Progression toward goals:  []       Improving appropriately and progressing toward goals  [x]       Improving slowly and progressing toward goals  []       Not making progress toward goals and plan of care will be adjusted    PLAN:   Patient continues to benefit from skilled intervention to address the above impairments. Continue treatment per established plan of care.   Discharge Recommendations:  Home Health    Estimated LOS: Through 5/15/18    ALVA Verdugo  Time Calculation:  105 Minutes

## 2018-05-11 NOTE — PROGRESS NOTES
[address]  [address] [place] [address], [address]     INPATIENT REHABILITATION  DAILY PROGRESS NOTE     Date: 5/11/2018    Name: Shraddha Hale Age / Sex: 67 y.o. / male   CSN: [id number] MRN: [id number]   Admit Date: 4/30/2018 Length of Stay: 11 days     Primary Rehab Diagnosis: Impaired Mobility and ADLs secondary to:  1. S/P Diagnostic laparoscopy followed by open appendectomy with drainage of appendiceal abscess (4/20/2018 - Dr. Lester Gomez)  2.  History of acute appendicitis with appendiceal abscess      Subjective:     Patient in NAD, awake, follows commands    Objective:     Vital Signs:  Patient Vitals for the past 24 hrs:   BP Temp Pulse Resp SpO2   05/11/18 0737 118/75 97.8 °F (36.6 °C) 64 18 98 %   05/10/18 2216 116/77 98.1 °F (36.7 °C) 63 18 98 %   05/10/18 1600 123/77 98.7 °F (37.1 °C) 80 17 95 %        Physical Examination:  General:  Awake, follows commands  Cardiovascular:  S1S2+, RRR  Pulmonary:  CTA b/l  GI:  Soft, BS+, NT, ND, + midline incision  Extremities:  trace edema        Current Medications:  Current Facility-Administered Medications   Medication Dose Route Frequency    escitalopram oxalate (LEXAPRO) tablet 10 mg  10 mg Oral QPM    ezetimibe (ZETIA) tablet 10 mg  10 mg Oral DAILY    co-enzyme Q-10 (CO Q-10) capsule 100 mg  100 mg Oral DAILY    cholecalciferol (VITAMIN D3) capsule 5,000 Units  5,000 Units Oral DAILY    ferrous sulfate tablet 325 mg  1 Tab Oral DAILY WITH BREAKFAST    ascorbic acid (vitamin C) (VITAMIN C) tablet 250 mg  250 mg Oral DAILY WITH BREAKFAST    acetaminophen (TYLENOL) tablet 650 mg  650 mg Oral Q4H PRN    docusate sodium (COLACE) capsule 100 mg  100 mg Oral BID    bisacodyl (DULCOLAX) tablet 10 mg  10 mg Oral Q48H PRN    Lactobacillus Acidoph & Bulgar (FLORANEX) tablet 2 Tab  2 Tab Oral BID    HYDROcodone-acetaminophen (NORCO) 5-325 mg per tablet 1 Tab  1 Tab Oral Q4H PRN    tamsulosin (FLOMAX) capsule 0.4 mg  0.4 mg Oral DAILY    cyanocobalamin tablet 1,000 mcg  1,000 mcg Oral DAILY    aspirin chewable tablet 81 mg  81 mg Oral DAILY WITH BREAKFAST       Allergies: Allergies   Allergen Reactions    Lipitor [Atorvastatin] Other (comments)     Elevated CK level    Pt has no awareness of this allergy.  Simvastatin Other (comments)     Elevation in LFTs       Functional Progress:    SPEECH AND LANGUAGE PATHOLOGY    ON ADMISSION MOST RECENT   Comprehension (Native Language)  Primary Mode of Comprehension: Auditory  Score: 5 Comprehension (Native Language)  Primary Mode of Comprehension: Auditory  Score: 5     Expression (Native Language)  Primary Mode of Expression: Verbal  Score: 5   Expression (Native Language)  Primary Mode of Expression: Verbal  Score: 5     Social Interaction/Pragmatics  Score: 5 Social Interaction/Pragmatics  Score: 5     Problem Solving  Score: 4   Problem Solving  Score: 4     Memory  Score: 5 Memory  Score: 5       Legend:   7 - Independent   6 - Modified Independent   5 - Standby Assistance / Supervision / Set-up   4 - Minimum Assistance / Contact Guard Assistance   3 - Moderate Assistance   2 - Maximum Assistance   1 - Total Assistance / Dependent       Lab/Data Review:  No results found for this or any previous visit (from the past 24 hour(s)). Assessment:     Primary Rehabilitation Diagnosis  1. Generalized Weakness with Impaired Mobility and ADLs  2. S/P Diagnostic laparoscopy followed by open appendectomy with drainage of appendiceal abscess (4/20/2018 - Dr. Bailee Paiz)  3.  History of acute appendicitis with appendiceal abscess     Comorbidities   History of stroke with residual deficit I69.30    Hypertensive heart and kidney disease without heart failure and with chronic kidney disease stage III I13.10, N69.5    Diastolic dysfunction without heart failure I51.9    Dyslipidemia E78.5    CKD (chronic kidney disease) stage 3, GFR 30-59 ml/min N18.3    History of trichomonal urethritis Z86.19    Obesity, Class I, BMI 30-34.9 E66.9    History of noncompliance with medical treatment, presenting hazards to health Z91.19    History of tobacco use Z87.891    History of vitamin D deficiency Z86.39    Hemiparesis affecting left side as late effect of cerebrovascular accident (CVA)  I69.354    Dysphagia as late effect of cerebrovascular accident (CVA) I69.391    Postoperative atrial fibrillation (HCC) I97.89, I48.91    Overactive bladder N32.81    Benign prostatic hyperplasia N40.0    Postoperative urinary retention N99.89, R33.8    Postoperative confusion R41.0    Acute blood loss as cause of postoperative anemia D62    Vitamin D deficiency E55.9    Acute renal failure superimposed on stage 3 chronic kidney disease  N17.9, N18.3    Depression F32.9        Plan:     1. Justification for continued stay: Good progression towards established rehabilitation goals. 2. Medical Issues being followed closely:    [x]  Fall and safety precautions     [x]  Wound Care     [x]  Bowel and Bladder Function     [x]  Fluid Electrolyte and Nutrition Balance     [x]  Pain Control      3. Issues that 24 hour rehabilitation nursing is following:    [x]  Fall and safety precautions     [x]  Wound Care     [x]  Bowel and Bladder Function     [x]  Fluid Electrolyte and Nutrition Balance     [x]  Pain Control      [x]  Assistance with and education on in-room safety with transfers to and from the bed, wheelchair, toilet and shower. 4. Acute rehabilitation plan of care:    [x]  Continue current care and rehab. [x]  Physical Therapy           [x]  Occupational Therapy           [x]  Speech Therapy     []  Hold Rehab until further notice     5. Medications:    [x]  MAR Reviewed     [x]  Continue Present Medications     6.  DVT Prophylaxis:      []  Lovenox     []  Unfractionated Heparin     []  Coumadin     []  NOAC     [x]  MARYAM Stockings     []  Sequential Compression Device []  None     7. Orders:   > S/P Diagnostic laparoscopy followed by open appendectomy with drainage of appendiceal abscess (4/20/2018 - Dr. Mitchell Rivera);  History of acute appendicitis with appendiceal abscess   > WBC count (5/1/2018, on admission) = 13.0   > STEVEN drain was removed on the AM of 5/1/2018 by Dr. Mitchell Rivera   > On 5/3/2018, patient had completed the recommended treatment course of Fluconazole 400 mg PO once daily    > Continue Floranex 2 tabs PO BID    > Acute Postoperative Blood Loss Anemia   > Hgb/Hct (5/1/2018, on admission) = 9.7/29.7   > Anemia work-up showed serum iron 27, TIBC 233, iron % saturation 12, ferritin 356, reticulocyte count 1.0   > Hgb/Hct (5/7/2018) = 9.5/29.8    > Continue:    > FeSO4 325 mg PO once daily with breakfast     > Ascorbic Acid 250 mg PO once daily with breakfast (to enhance the absorption of the FeSO4)    > Benign prostatic hyperplasia / Overactive bladder / Postoperative urinary retention   > On 5/1/2018, added Terazosin 1 mg PO q PM (6PM)   > On 5/5/2018, discontinued Terazosin 1 mg PO q PM (6PM)   > Continue Tamsulosin 0.4 mg PO once daily    > Hypertensive heart and kidney disease without heart failure and with chronic kidney disease stage 3   > On 5/1/2018:    > Discontinued Labetalol 100 mg PO q 12 hr (6AM, 6PM)    > Terazosin 1 mg PO q PM (6PM)   > On 5/3/2018, decreased Amlodipine from 10 mg to 5 mg PO once daily (6AM)   > On 5/5/2018:    > Discontinued Terazosin 1 mg PO q PM (6PM)    > Decreased Amlodipine from 5 mg to 2.5 mg PO once daily (6AM)   > On 5/7/2018, discontinued Amlodipine 2.5 mg PO once daily (6AM)     > Vitamin D Deficiency   > Vitamin D 25-Hydroxy (5/1/2018) = 17.9   > On 5/2/2018, patient was given Cholecalciferol 50,000 units PO x 1 dose    > On 5/3/2018, started Cholecalciferol 5,000 units PO once daily    > Continue Cholecalciferol 5,000 units PO once daily     > History of stroke with residual left hemiparesis and dysphagia    > Lipid profile (5/2/2018) showed , , HDL 26, LDL 59   > Baseline hepatic function tests and CK:    > On 4/22/2018:     > ALT = 45     > AST = 37     > CK = 453    > On 5/2/2018:     > ALT = 90     > AST = 50     > CK = 103   > Patient's record state Atorvastatin causes and elevated CK level - patient and sister not aware of this   > Patient had been on Ezetimibe for dyslipidemia   > Patient had not been on a statin most likely due to documentation of CK elevation due to Atorvastatin   > On 5/2/2018:    > Patient was started on a trial of Simvastatin 20 mg PO q HS for secondary stroke prevention and plaque stabilization    > Discontinued Zetia    > Will monitor LFTs    > Advanced diet from NDD2 to NDD3   > On 5/3/2018, added Coenzyme Q10 100 mg PO once daily   > On 5/7/2018:    > ALT = 226    > AST = 130    > CK = 91    > Discontinued Simvastatin 20 mg PO q HS    > Resumed Ezetimibe 10 mg PO once daily   > On 5/10/2018:    > ALT = 155    > AST = 51   > Continue:    > Aspirin 81 mg PO once daily with breakfast    > Ezetimibe 10 mg PO once daily    > Coenzyme Q10 100 mg PO once daily    > Acute renal failure superimposed on stage 3 chronic kidney disease    > BUN/Creatinine (5/1/2018, on admission) = 13/1.27   > BUN/Creatinine (5/7/2018) = 30/1.59   > Patient was advised to increase oral fluid intake   > On 5/7/2018, patient was given IVF: 0.9%  ml/hr x 1 liter   > On 5/8/2018, patient was given IVF: 0.9%  ml/hr x 1 liter   > BUN/Creatinine (5/10/2018) = 23/1.48   > Resume IVF: 0.9%  ml/hr x 1 liter    > Increase oral fluid intake    > Depression     > Continue Escitalopram 10 mg PO q PM    > Analgesia   > Continue:    > Acetaminophen 650 mg PO q 4 hr PRN for pain level less than 5/10    > Norco 5/325 1 tab PO q 4 hr PRN for pain level greater than 4/10       D/w patient      Signed:    Cassell Boxer, MD      May 11, 2018

## 2018-05-11 NOTE — PROGRESS NOTES
Problem: Mobility Impaired (Adult and Pediatric)  Goal: *Acute Goals and Plan of Care (Insert Text)  Physical Therapy Goals  Short Term = Long Term Goals  Initiated 2018, updated 2018, and to be accomplished within 10-14 day(s) (2018)  1. Patient will move from supine to sit and sit to supine , scoot up and down and roll side to side in bed with modified independence. 2.  Patient will transfer from bed to chair and chair to bed with distant supervision/set-up using the least restrictive device. 3.  Patient will perform sit to stand with distant supervision/set-up. 4.  Patient will ambulate with distant supervision/set-up for 50 feet with the least restrictive device. 5.  Patient will ascend/descend 2 curbs/thresholds with least restrictive assistive device with supervision/set-up. physical Therapy TREATMENT    Patient: Zhang Maria (79 y.o. male)  Date: 2018  Diagnosis: Dibility  Acute appendicitis with peritoneal abscess Acute appendicitis with peritoneal abscess  Precautions: Aspiration, Fall  Chart, physical therapy assessment, plan of care and goals were reviewed. Time In: 1330  Time Out: 1430  Patient Seen For: Balance activities; Patient education;Transfer training; Wheelchair mobility;Gait training  Pain:  Pt pain was reported as 0/10 pre-treatment. Pt pain was reported as 0/10 post-treatment. Intervention: N/A    Patient identified with name and : yes    SUBJECTIVE:     Pt continues to appear subdued occasionally smiling within treatment session during interactions. However, pt remains minimally conversant and requires frequent rest breaks with pt appearing despondent during car transfer training sighing and shaking his head. When PT asked pt if he was feeling unwell, he shakes his head and states that he is \"fine. \"    OBJECTIVE DATA SUMMARY:   Objective:     TRANSFERS Daily Assessment    Other: Stand Step with RW  Transfer Assistance : 5 (Supervision/setup)  Sit to Stand: Supervision for safety with max verbal cuing for ant weight shift with sit to stand and for increased hip flexion for slow controlled descent with stand to sit to increase B LE weight bearing and avoid excessive reliance on B UEs for support. Car Transfers: CGA  Car Type: Car Transfer Trainer  Pt requires CGA for balance and safety with RW management with stand step transfer to car transfer  with max verbal cues for safe hand placement with transition sit <> stand. GAIT Daily Assessment    Amount of Assistance: 5 (Supervision/setup)  Assistive Device: Walker, rolling   100 Feet x 2 trials, 170 Feet x 1 trial  Pt ambulated to/from car transfer  with supervision from PT for safety and max verbal cues for forward gaze. Afterward, pt was challenged to ambulate in busy hallway with distractions and attend to environment for safety. Pt required moderate to maximal verbal cues and reminders for safety with second gait trial  Pt ambulates with forward flexed rounded shoulder posture, downward gaze with left Trendelenburg and narrow EDGARD. BALANCE Daily Assessment    Sitting - Static: Good (unsupported)  Sitting - Dynamic: Good (unsupported)  Standing - Static: Fair;Poor  Standing - Dynamic : Impaired   Attempted to challenge static standing balance with bimanual activity. Pt initially stood for one trial of two minutes and then stood for two subsequent trials of 30 to 60 seconds with close supervision to Marie Estrada. Pt was asked to avoid resting UEs on table and provided verbal and manual cues for decreased compensatory weight shift to right. However, pt did not follow verbal cues for safety and did not tolerate prolonged activity in standing without UE support self selecting to step back to w/c to sit with last two attempts at static standing. WHEELCHAIR MOBILITY Daily Assessment    Functional Level: 6   Pt propels w/c on unit with modified independence over level surfaces. ASSESSMENT:  Pt continues to appear limited by mood and decreased ability to attend to education provided and incorporate into mobility. Progression toward goals:  []      Improving appropriately and progressing toward goals  []      Improving slowly and progressing toward goals  [x]      Not making progress toward goals and plan of care will be adjusted - pt continues to appear disengaged during treatment sessions with minimal ability to apply constructive instruction/feedback noted. PLAN:  Patient continues to benefit from skilled intervention to address the above impairments. Continue treatment per established plan of care. Pt's sister scheduled to attend formal family training on 5/14/2018 to promote safe d/c home. Discharge Recommendations:  Outpatient  Further Equipment Recommendations for Discharge:  Per pt's sister's report, pt owns a RW. Estimated LOS: 5/15/2018    Activity Tolerance:   Fair  Please refer to the flowsheet for vital signs taken during this treatment. After treatment:   Patient left propelling w/c on unit under his own power.       Radha Mojica PT, DPT  5/11/2018

## 2018-05-11 NOTE — PROGRESS NOTES
Problem: Self Care Deficits Care Plan (Adult)  Goal: *Therapy Goal (Edit Goal, Insert Text)  Occupational Therapy Goals   Long Term Goals  Initiated 2018 and to be accomplished within 1-2 week(s)  1. Pt will perform self-feeding with MI to . (I)  2. Pt will perform grooming with MI.  3. Pt will perform UB bathing with Setup. 4. Pt will perform LB bathing with Supervision. 5. Pt will perform tub/shower, shower stall, and/or self propel to sink/vanity Supervision. 6. Pt will perform UB dressing with Setup. 7. Pt will perform LB dressing with Supervision. 8. Pt will perform toileting task with Supervision. 9. Pt will perform toilet transfer with Supervision. Short Term Goals   Initiated 2018 and to be accomplished within 7 day(s) 2018  1. Pt will perform self-feeding with MI to (I). 2. Pt will perform grooming with MI.  3. Pt will perform UB bathing with mod independent. 4. Pt will perform LB bathing with supervision  5. Pt will perform tub/shower, shower stall, and/or self propel to sink/vanity Min A.   6. Pt will perform UB dressing with mod independent. 7. Pt will perform LB dressing with Min A.  8. Pt will perform toileting task with Mod A.  9. Pt will perform toilet transfer with Supervision      Outcome: Progressing Towards Goal  OCCUPATIONAL THERAPY DAILY NOTE  Patient Name:Loki SEBLE Phillips  Time Spent With Patient  Time In: 930  Time Out: 1030    Medical Diagnosis:  Dibility  Acute appendicitis with peritoneal abscess Acute appendicitis with peritoneal abscess     Pain at start of tx:0930  Pain at stop of tx:1030    Patient identified with name and : yes  Subjective: \"I'm better today\"    Objective:    THERAPEUTIC ACTIVITY Daily Assessment    Pt tolerates standing w/RW for support participating in therapeutic reaching activity. Pt alternates reaching L/R for item retrieval and toss, requiring CGA to maintain dynamic standing balance.  Pt w/1 LOB > L requiring Min Assist to recover. Pt performs functional mobility w/RW and floor level item retrieval w/AE and no LOB, demonstrating good safety. Pt tolerates standing ~ 3 minutes x 2 w/tabletop activity using RW for support. Pt requires seated rest break between trials. THERAPEUTIC EXERCISE Daily Assessment    BUE TherEx w/2# dowel in multiple planes 15x4 at w/c level. GROOMING Daily Assessment    Grooming  Grooming Assistance : 6 (Modified independent)  Comments:  (Pt performs ADL grooming task, seated sinkside)     MOBILITY/TRANSFERS Daily Assessment    Pt demonstrates good safety awareness w/fucntional transfers to standing and chair. Pt requires CGA to maintain dynamic standing balance. Assessment: Pt appears brighter today. Pt participates in therapeutic activities to increase activity tolerance w/ADLs and dynamic standing balance activity for ADL carryover w/LB ADLs. Pt w/1 LOB w/dynamic standing balance activity requiring Min Assist to correct. Pt continues to require multiple rest breaks w/activity and will benefit from continued therapy to maximize activity tolerance in preparation for discharge home. Pt motivated for discharge home. Plan of Care: Continue current POC in preparation for discharge home 5/15/18  Equipment recommendations: Pt reports he has DME.     ROSE Gillespie  5/11/2018

## 2018-05-11 NOTE — REHAB NOTE
[facility]    Bedside and Verbal shift change report given to Adrian Brennan  (oncoming nurse) by Radha Jaime RN (offgoing nurse). Report included the following information SBAR, Kardex, MAR and Recent Results.     Situation:   Code Status: Full Code   Reason for Admission: abscess  Hospital Day: 11   Problem List:   Hospital Problems  Date Reviewed: 5/10/2018          Codes Class Noted POA    Depression ICD-10-CM: F32.9  ICD-9-CM: 485  5/8/2018 Yes        Statin intolerance (Chronic) ICD-10-CM: Z78.9  ICD-9-CM: 995.27  5/7/2018 Yes    Overview Signed 5/7/2018  2:19 PM by Roz Benitez MD     Atorvastatin and Simvastatin             Acute renal failure superimposed on stage 3 chronic kidney disease ([facility] [address] ICD-10-CM: N17.9, N18.3  ICD-9-CM: 584.9, 585.3  5/7/2018 No        Vitamin D deficiency (Chronic) ICD-10-CM: E55.9  ICD-9-CM: 268.9  5/1/2018 Yes    Overview Signed 5/1/2018 10:01 AM by Roz Benitez MD     Vitamin D 25-Hydroxy (5/1/2018) = 17.9              Overactive bladder (Chronic) ICD-10-CM: N32.81  ICD-9-CM: 596.51  Unknown Yes        Benign prostatic hyperplasia (Chronic) ICD-10-CM: N40.0  ICD-9-CM: 600.00  Unknown Yes        Postoperative urinary retention ICD-10-CM: N99.89, R33.8  ICD-9-CM: 997.5  4/22/2018 Yes        Postoperative confusion ICD-10-CM: R41.0  ICD-9-CM: 293.9  4/22/2018 Yes        Status post appendectomy ICD-10-CM: Z90.49  ICD-9-CM: V45.89  4/21/2018 Yes    Overview Addendum 4/30/2018  4:41 PM by Roz Benitez MD     S/P Diagnostic laparoscopy followed by open appendectomy with drainage of appendiceal abscess (4/20/2018 - Dr. Candelario Davis)             Acute blood loss as cause of postoperative anemia ICD-10-CM: D62  ICD-9-CM: 285.1  4/21/2018 Yes        Generalized weakness ICD-10-CM: R53.1  ICD-9-CM: 780.79  4/20/2018 Yes        * (Principal)Acute appendicitis with peritoneal abscess ICD-10-CM: K35.3  ICD-9-CM: 540.1  4/20/2018 Yes Hypertensive heart and kidney disease without heart failure and with chronic kidney disease stage III (Chronic) ICD-10-CM: I13.10, N18.3  ICD-9-CM: 404.90, 585.3  9/2/2015 Yes              Background:   Past Medical History:   Past Medical History:   Diagnosis Date    Acute blood loss as cause of postoperative anemia 4/21/2018    Benign prostatic hyperplasia     CKD (chronic kidney disease) stage 3, GFR 30-59 ml/min 9/1/2015    Depression 9/3/0302    Diastolic dysfunction without heart failure 9/2/2015    Grade 1 diastolic dysfunction on 2D ECHO done 9/02/2015    Dyslipidemia 9/2/2015    Dysphagia as late effect of cerebrovascular accident (CVA) 9/1/2015    Hemiparesis affecting left side as late effect of cerebrovascular accident (CVA) ([facility] [address] 9/1/2015    History of noncompliance with medical treatment, presenting hazards to health 9/1/2015    History of stroke with residual deficit 9/1/2015    Acute Ischemic Stroke (early subacute infarct at the posterior right lentiform nucleus) with residual left hemiparesis and dysphagia     History of tobacco use 9/1/2015    History of trichomonal urethritis 9/1/2015    History of vitamin D deficiency 9/6/2015    Hypertensive heart and kidney disease without heart failure and with chronic kidney disease stage III 9/2/2015    Obesity, Class I, BMI 30-34.9 9/1/2015    Overactive bladder     Postoperative atrial fibrillation (HCC) 4/21/2018    Resolved    Postoperative confusion 4/22/2018    Postoperative urinary retention 4/22/2018    Statin intolerance 05/07/2018    Atorvastatin and Simvastatin    Vitamin D deficiency 5/1/2018    Vitamin D 25-Hydroxy (5/1/2018) = 17.9       Patient taking anticoagulants no    Patient has a defibrillator: no     Assessment:   Changes in Assessment throughout shift: no     Patient has central line: no Reasons if yes: Insertion date: Last dressing date:   Patient has Chow Cath: no Reasons if yes:     Insertion date:     Last Vitals:     Vitals:    05/09/18 2200 05/10/18 0805 05/10/18 1600 05/10/18 2216   BP: 130/80 117/69 123/77 116/77   Pulse: 71 89 80 63   Resp: 18 17 17 18   Temp: 97.1 °F (36.2 °C) 97 °F (36.1 °C) 98.7 °F (37.1 °C) 98.1 °F (36.7 °C)   SpO2: 99% 99% 95% 98%   Weight:       Height:            PAIN    Pain Assessment    Pain Intensity 1: 0 (05/11/18 0051) Pain Intensity 1: 2 (12/29/14 1105)    Pain Location 1: Generalized Pain Location 1: Abdomen    Pain Intervention(s) 1: Medication (see MAR) Pain Intervention(s) 1: Medication (see MAR)  Patient Stated Pain Goal: 0 Patient Stated Pain Goal: 0  o Intervention effective: yes    o Other actions taken for pain: no     Skin Assessment  Skin color Skin Color: Appropriate for ethnicity  Condition/Temperature Skin Condition/Temp: Dry, Warm  Integrity Skin Integrity: Incision (comment)  Turgor Turgor: Non-tenting  Weekly Pressure Ulcer Documentation  Pressure  Injury Documentation: No Pressure Injury Noted-Pressure Ulcer Prevention Initiated  Wound Prevention & Protection Methods  Orientation of wound Orientation of Wound Prevention: Posterior  Location of Prevention Location of Wound Prevention: Buttocks, Sacrum/Coccyx  Dressing Present Dressing Present : No  Dressing Status Dressing Status: Intact  Wound Offloading Wound Offloading (Prevention Methods): Bed, pressure redistribution/air     INTAKE/OUPUT    Date 05/10/18 0700 - 05/11/18 0659 05/11/18 0700 - 05/12/18 0659   Shift 0700-1859 7842-3924 24 Hour Total 0700-1859 1430-8389 24 Hour Total   I  N  T  A  K  E   P.O. 630  630         P. O. 630  630       Shift Total  (mL/kg) 630  (6.4)  630  (6.4)      O  U  T  P  U  T   Urine  (mL/kg/hr)            Urine Occurrence(s) 3 x 0 x 3 x       Stool            Stool Occurrence(s) 1 x 0 x 1 x       Shift Total  (mL/kg)           630      Weight (kg) 99.1 99.1 99.1 99.1 99.1 99.1       Recommendations:  1. Patient needs and requests: education    2.  Diet: cardiac    3. Pending tests/procedures: no     4. Functional Level/Equipment: 1 x person assist    5. Estimated Discharge Date: TDB Posted on Whiteboard in Patients Room: no     [facility] Safety Check    A safety check occurred in the patient's room between off going nurse and oncoming nurse listed above. The safety check included the below items  Area Items   H  High Alert Medications - Verify all high alert medication drips (heparin, PCA, etc.)   E  Equipment - Suction is set up for ALL patients (with dipti)  - Red plugs utilized for all equipment (IV pumps, etc.)  - WOWs wiped down at end of shift.  - Room stocked with oxygen, suction, and other unit-specific supplies   A  Alarms - Bed alarm is set for fall risk patients  - Ensure chair alarm is in place and activated if patient is up in a chair   L  Lines - Check IV for any infiltration  - Chow bag is empty if patient has a Chow   - Tubing and IV bags are labeled   S  Safety   - Room is clean, patient is clean, and equipment is clean. - Hallways are clear from equipment besides carts. - Fall bracelet on for fall risk patients  - Ensure room is clear and free of clutter  - Suction is set up for ALL patients (with dipti)  - Hallways are clear from equipment besides carts.    - Isolation precautions followed, supplies available outside room, sign posted

## 2018-05-11 NOTE — PROGRESS NOTES
[x] Psychology  [] Social Work [] Recreational Therapy    INTERVENTION  UNITS/TIME OF SERVICE   Assessment    Supportive Counseling May 10, 2018   Orientation    Discharge Planning    Resource Linkage              Progress/Current Status    Patient seen for individual  and follow up this morning on ARU. He is found sitting upright in reclining chair in room; while initially appearing to be asleep, he immediately refocused to attend to me and conversation. Patient presents intermittent displays of engagement and even smile; however, overall, he both appears and acknowledges that he feels depressed. He has recently been started on a mood stabilizing medication by Dr. Lusia March and it was explained to patient that it would take some time before he reached therapeutic benefit. Unfortunately, he has difficulty explaining his feelings but admits that he wants to be home. He confirmed that he has been in hospital \"too long. \"  He suggested that he is typically quiet and passive at home, as well, and may have had predisposing emotional and behavioral tendencies for withdrawal and even isolation. Regardless, he confirmed that he is trying to work to the best of his ability in therapy, in spite of not always wanting to do so. If anything, it appears that patient will not suddenly begin to actively reinforce any therapeutic intervention with him by verbal nor even facial recognition; it seems it is up to staff to determine what he is feeling or thinking, because he certainly does not elaborate. Until discharge, it will remain important to reinforce to patient that he will do best with maximum effort in therapy and that he can \"rest\" when he is not scheduled for same. He is aware of his pending discharge next week and to go home with sister.       Alysia Reyes, PHD 5/11/2018 8:45 AM

## 2018-05-12 PROCEDURE — 74011250637 HC RX REV CODE- 250/637: Performed by: INTERNAL MEDICINE

## 2018-05-12 PROCEDURE — 65310000000 HC RM PRIVATE REHAB

## 2018-05-12 RX ADMIN — FERROUS SULFATE TAB 325 MG (65 MG ELEMENTAL FE) 325 MG: 325 (65 FE) TAB at 09:01

## 2018-05-12 RX ADMIN — ASPIRIN 81 MG 81 MG: 81 TABLET ORAL at 09:01

## 2018-05-12 RX ADMIN — CYANOCOBALAMIN TAB 1000 MCG 1000 MCG: 1000 TAB at 09:01

## 2018-05-12 RX ADMIN — DOCUSATE SODIUM 100 MG: 100 CAPSULE, LIQUID FILLED ORAL at 17:53

## 2018-05-12 RX ADMIN — Medication 250 MG: at 09:01

## 2018-05-12 RX ADMIN — CHOLECALCIFEROL CAP 125 MCG (5000 UNIT) 5000 UNITS: 125 CAP at 09:01

## 2018-05-12 RX ADMIN — ESCITALOPRAM OXALATE 10 MG: 10 TABLET ORAL at 17:53

## 2018-05-12 RX ADMIN — DOCUSATE SODIUM 100 MG: 100 CAPSULE, LIQUID FILLED ORAL at 09:01

## 2018-05-12 RX ADMIN — LACTOBACILLUS TAB 2 TABLET: TAB at 17:53

## 2018-05-12 RX ADMIN — Medication 100 MG: at 09:01

## 2018-05-12 RX ADMIN — EZETIMIBE 10 MG: 10 TABLET ORAL at 09:01

## 2018-05-12 RX ADMIN — TAMSULOSIN HYDROCHLORIDE 0.4 MG: 0.4 CAPSULE ORAL at 09:01

## 2018-05-12 RX ADMIN — LACTOBACILLUS TAB 2 TABLET: TAB at 09:01

## 2018-05-12 NOTE — ROUTINE PROCESS
SHIFT CHANGE NOTE FOR [facility]    Bedside and Verbal shift change report given to Ivet Robert, RN (oncoming nurse) by Ciara Jeronimo RN   (offgoing nurse). Report included the following information SBAR, Kardex, MAR and Recent Results.     Situation:   Code Status: Full Code   Reason for Admission: appendectomy  Hospital Day: 12   Problem List:   Hospital Problems  Date Reviewed: 5/10/2018          Codes Class Noted POA    Depression ICD-10-CM: F32.9  ICD-9-CM: 490  5/8/2018 Yes        Statin intolerance (Chronic) ICD-10-CM: Z78.9  ICD-9-CM: 995.27  5/7/2018 Yes    Overview Signed 5/7/2018  2:19 PM by Colton Ludwig MD     Atorvastatin and Simvastatin             Acute renal failure superimposed on stage 3 chronic kidney disease ([facility] [address] ICD-10-CM: N17.9, N18.3  ICD-9-CM: 584.9, 585.3  5/7/2018 No        Vitamin D deficiency (Chronic) ICD-10-CM: E55.9  ICD-9-CM: 268.9  5/1/2018 Yes    Overview Signed 5/1/2018 10:01 AM by Colton Ludwig MD     Vitamin D 25-Hydroxy (5/1/2018) = 17.9              Overactive bladder (Chronic) ICD-10-CM: N32.81  ICD-9-CM: 596.51  Unknown Yes        Benign prostatic hyperplasia (Chronic) ICD-10-CM: N40.0  ICD-9-CM: 600.00  Unknown Yes        Postoperative urinary retention ICD-10-CM: N99.89, R33.8  ICD-9-CM: 997.5  4/22/2018 Yes        Postoperative confusion ICD-10-CM: R41.0  ICD-9-CM: 293.9  4/22/2018 Yes        Status post appendectomy ICD-10-CM: Z90.49  ICD-9-CM: V45.89  4/21/2018 Yes    Overview Addendum 4/30/2018  4:41 PM by Colton Ludwig MD     S/P Diagnostic laparoscopy followed by open appendectomy with drainage of appendiceal abscess (4/20/2018 - Dr. Kemar Arnold)             Acute blood loss as cause of postoperative anemia ICD-10-CM: D62  ICD-9-CM: 285.1  4/21/2018 Yes        Generalized weakness ICD-10-CM: R53.1  ICD-9-CM: 780.79  4/20/2018 Yes        * (Principal)Acute appendicitis with peritoneal abscess ICD-10-CM: K35.3  ICD-9-CM: 540.1  4/20/2018 Yes        Hypertensive heart and kidney disease without heart failure and with chronic kidney disease stage III (Chronic) ICD-10-CM: I13.10, N18.3  ICD-9-CM: 404.90, 585.3  9/2/2015 Yes              Background:   Past Medical History:   Past Medical History:   Diagnosis Date    Acute blood loss as cause of postoperative anemia 4/21/2018    Benign prostatic hyperplasia     CKD (chronic kidney disease) stage 3, GFR 30-59 ml/min 9/1/2015    Depression 6/7/3330    Diastolic dysfunction without heart failure 9/2/2015    Grade 1 diastolic dysfunction on 2D ECHO done 9/02/2015    Dyslipidemia 9/2/2015    Dysphagia as late effect of cerebrovascular accident (CVA) 9/1/2015    Hemiparesis affecting left side as late effect of cerebrovascular accident (CVA) ([facility] [address] 9/1/2015    History of noncompliance with medical treatment, presenting hazards to health 9/1/2015    History of stroke with residual deficit 9/1/2015    Acute Ischemic Stroke (early subacute infarct at the posterior right lentiform nucleus) with residual left hemiparesis and dysphagia     History of tobacco use 9/1/2015    History of trichomonal urethritis 9/1/2015    History of vitamin D deficiency 9/6/2015    Hypertensive heart and kidney disease without heart failure and with chronic kidney disease stage III 9/2/2015    Obesity, Class I, BMI 30-34.9 9/1/2015    Overactive bladder     Postoperative atrial fibrillation (HCC) 4/21/2018    Resolved    Postoperative confusion 4/22/2018    Postoperative urinary retention 4/22/2018    Statin intolerance 05/07/2018    Atorvastatin and Simvastatin    Vitamin D deficiency 5/1/2018    Vitamin D 25-Hydroxy (5/1/2018) = 17.9       Patient taking anticoagulants no    Patient has a defibrillator: no     Assessment:   Changes in Assessment throughout shift: no     Patient has central line: no Reasons if yes: Insertion date: Last dressing date:   Patient has Chow Cath: no Reasons if yes:     Insertion date:     Last Vitals:     Vitals:    05/11/18 1613 05/11/18 2200 05/12/18 0800 05/12/18 1554   BP: 116/78 124/74 101/65 119/75   Pulse: 78 76 69 66   Resp: 18 18 18 18   Temp: 98.5 °F (36.9 °C) 97.1 °F (36.2 °C) 97.1 °F (36.2 °C) 96.7 °F (35.9 °C)   SpO2: 96% 98% 99% 98%   Weight:       Height:            PAIN    Pain Assessment    Pain Intensity 1: 0 (05/12/18 1600) Pain Intensity 1: 2 (12/29/14 1105)    Pain Location 1: Generalized Pain Location 1: Abdomen    Pain Intervention(s) 1: Medication (see MAR) Pain Intervention(s) 1: Medication (see MAR)  Patient Stated Pain Goal: 0 Patient Stated Pain Goal: 0  o Intervention effective: yes    o Other actions taken for pain: no     Skin Assessment  Skin color Skin Color: Appropriate for ethnicity  Condition/Temperature Skin Condition/Temp: Warm  Integrity Skin Integrity: Incision (comment)  Turgor Turgor: Non-tenting  Weekly Pressure Ulcer Documentation  Pressure  Injury Documentation: No Pressure Injury Noted-Pressure Ulcer Prevention Initiated  Wound Prevention & Protection Methods  Orientation of wound Orientation of Wound Prevention: Posterior  Location of Prevention Location of Wound Prevention: Sacrum/Coccyx  Dressing Present Dressing Present : No  Dressing Status Dressing Status: Intact  Wound Offloading Wound Offloading (Prevention Methods): Bed, pressure redistribution/air, Chair cushion, Wheelchair     INTAKE/OUPUT    Date 05/11/18 0700 - 05/12/18 0659 05/12/18 0700 - 05/13/18 0659   Shift 4111-3837 7731-3061 24 Hour Total 5105-3282 2799-3367 24 Hour Total   I  N  T  A  K  E   P. O. 547 240 787 580  580      P. O. 547 240 787 580  580    Shift Total  (mL/kg) 547  (5.5) 240  (2.4) 787  (7.9) 580  (5.9)  580  (5.9)   O  U  T  P  U  T   Urine  (mL/kg/hr)            Urine Occurrence(s) 4 x 2 x 6 x 3 x  3 x    Stool            Stool Occurrence(s) 2 x 0 x 2 x 1 x  1 x    Shift Total  (mL/kg)          240 787 580  580   Weight (kg) 99.1 99.1 99.1 99.1 99.1 99.1 Recommendations:  1. Patient needs and requests: education    2. Diet: cardiac soft    3. Pending tests/procedures: no     4. Functional Level/Equipment: 1 x person assist    5. Estimated Discharge Date: TDB Posted on Whiteboard in Patients Room: no     [facility] Safety Check    A safety check occurred in the patient's room between off going nurse and oncoming nurse listed above. The safety check included the below items  Area Items   H  High Alert Medications - Verify all high alert medication drips (heparin, PCA, etc.)   E  Equipment - Suction is set up for ALL patients (with dipti)  - Red plugs utilized for all equipment (IV pumps, etc.)  - WOWs wiped down at end of shift.  - Room stocked with oxygen, suction, and other unit-specific supplies   A  Alarms - Bed alarm is set for fall risk patients  - Ensure chair alarm is in place and activated if patient is up in a chair   L  Lines - Check IV for any infiltration  - Chow bag is empty if patient has a Chow   - Tubing and IV bags are labeled   S  Safety   - Room is clean, patient is clean, and equipment is clean. - Hallways are clear from equipment besides carts. - Fall bracelet on for fall risk patients  - Ensure room is clear and free of clutter  - Suction is set up for ALL patients (with dipti)  - Hallways are clear from equipment besides carts.    - Isolation precautions followed, supplies available outside room, sign posted

## 2018-05-12 NOTE — ROUTINE PROCESS
SHIFT CHANGE NOTE FOR [facility]    Bedside and Verbal shift change report given to Marie Banuelos Rn (oncoming nurse) by Kierra Art Rn (offgoing nurse). Report included the following information SBAR, Kardex, MAR and Recent Results.     Situation:   Code Status: Full Code   Reason for Admission: [facility] Day: 12   Problem List:   Hospital Problems  Date Reviewed: 5/10/2018          Codes Class Noted POA    Depression ICD-10-CM: F32.9  ICD-9-CM: 136  5/8/2018 Yes        Statin intolerance (Chronic) ICD-10-CM: Z78.9  ICD-9-CM: 995.27  5/7/2018 Yes    Overview Signed 5/7/2018  2:19 PM by Fabián Randolph MD     Atorvastatin and Simvastatin             Acute renal failure superimposed on stage 3 chronic kidney disease ([facility] [address] ICD-10-CM: N17.9, N18.3  ICD-9-CM: 584.9, 585.3  5/7/2018 No        Vitamin D deficiency (Chronic) ICD-10-CM: E55.9  ICD-9-CM: 268.9  5/1/2018 Yes    Overview Signed 5/1/2018 10:01 AM by Fabián Randolph MD     Vitamin D 25-Hydroxy (5/1/2018) = 17.9              Overactive bladder (Chronic) ICD-10-CM: N32.81  ICD-9-CM: 596.51  Unknown Yes        Benign prostatic hyperplasia (Chronic) ICD-10-CM: N40.0  ICD-9-CM: 600.00  Unknown Yes        Postoperative urinary retention ICD-10-CM: N99.89, R33.8  ICD-9-CM: 997.5  4/22/2018 Yes        Postoperative confusion ICD-10-CM: R41.0  ICD-9-CM: 293.9  4/22/2018 Yes        Status post appendectomy ICD-10-CM: Z90.49  ICD-9-CM: V45.89  4/21/2018 Yes    Overview Addendum 4/30/2018  4:41 PM by Fabián Randolph MD     S/P Diagnostic laparoscopy followed by open appendectomy with drainage of appendiceal abscess (4/20/2018 - Dr. Low Montejo)             Acute blood loss as cause of postoperative anemia ICD-10-CM: D62  ICD-9-CM: 285.1  4/21/2018 Yes        Generalized weakness ICD-10-CM: R53.1  ICD-9-CM: 780.79  4/20/2018 Yes        * (Principal)Acute appendicitis with peritoneal abscess ICD-10-CM: K35.3  ICD-9-CM: 540.1  4/20/2018 Yes        Hypertensive heart and kidney disease without heart failure and with chronic kidney disease stage III (Chronic) ICD-10-CM: I13.10, N18.3  ICD-9-CM: 404.90, 585.3  9/2/2015 Yes              Background:   Past Medical History:   Past Medical History:   Diagnosis Date    Acute blood loss as cause of postoperative anemia 4/21/2018    Benign prostatic hyperplasia     CKD (chronic kidney disease) stage 3, GFR 30-59 ml/min 9/1/2015    Depression 0/9/8410    Diastolic dysfunction without heart failure 9/2/2015    Grade 1 diastolic dysfunction on 2D ECHO done 9/02/2015    Dyslipidemia 9/2/2015    Dysphagia as late effect of cerebrovascular accident (CVA) 9/1/2015    Hemiparesis affecting left side as late effect of cerebrovascular accident (CVA) ([facility] [address] 9/1/2015    History of noncompliance with medical treatment, presenting hazards to health 9/1/2015    History of stroke with residual deficit 9/1/2015    Acute Ischemic Stroke (early subacute infarct at the posterior right lentiform nucleus) with residual left hemiparesis and dysphagia     History of tobacco use 9/1/2015    History of trichomonal urethritis 9/1/2015    History of vitamin D deficiency 9/6/2015    Hypertensive heart and kidney disease without heart failure and with chronic kidney disease stage III 9/2/2015    Obesity, Class I, BMI 30-34.9 9/1/2015    Overactive bladder     Postoperative atrial fibrillation (HCC) 4/21/2018    Resolved    Postoperative confusion 4/22/2018    Postoperative urinary retention 4/22/2018    Statin intolerance 05/07/2018    Atorvastatin and Simvastatin    Vitamin D deficiency 5/1/2018    Vitamin D 25-Hydroxy (5/1/2018) = 17.9       Patient taking anticoagulants no    Patient has a defibrillator: no     Assessment:   Changes in Assessment throughout shift: none     Patient has central line: no Reasons if yes: Insertion date: Last dressing date:   Patient has Chow Cath: no Reasons if yes:     Insertion date:     Last Vitals:     Vitals:    05/10/18 2216 05/11/18 0737 05/11/18 1613 05/11/18 2200   BP: 116/77 118/75 116/78 124/74   Pulse: 63 64 78 76   Resp: 18 18 18 18   Temp: 98.1 °F (36.7 °C) 97.8 °F (36.6 °C) 98.5 °F (36.9 °C) 97.1 °F (36.2 °C)   SpO2: 98% 98% 96% 98%   Weight:       Height:            PAIN    Pain Assessment    Pain Intensity 1: 0 (05/12/18 0000) Pain Intensity 1: 2 (12/29/14 1105)    Pain Location 1: Generalized Pain Location 1: Abdomen    Pain Intervention(s) 1: Medication (see MAR) Pain Intervention(s) 1: Medication (see MAR)  Patient Stated Pain Goal: 0 Patient Stated Pain Goal: 0  o Intervention effective: no    o Other actions taken for pain: turn rest reposition     Skin Assessment  Skin color Skin Color: Appropriate for ethnicity  Condition/Temperature Skin Condition/Temp: Warm, Dry  Integrity Skin Integrity: Incision (comment)  Turgor Turgor: Non-tenting  Weekly Pressure Ulcer Documentation  Pressure  Injury Documentation: No Pressure Injury Noted-Pressure Ulcer Prevention Initiated  Wound Prevention & Protection Methods  Orientation of wound Orientation of Wound Prevention: Posterior  Location of Prevention Location of Wound Prevention: Buttocks, Sacrum/Coccyx  Dressing Present Dressing Present : No  Dressing Status Dressing Status: Intact  Wound Offloading Wound Offloading (Prevention Methods): Bed, pressure reduction mattress, Turning, Repositioning     INTAKE/OUPUT    Date 05/11/18 0700 - 05/12/18 0659 05/12/18 0700 - 05/13/18 0659   Shift 0556-5774 5558-3177 24 Hour Total 4689-7764 1672-5821 24 Hour Total   I  N  T  A  K  E   P. O. 547 240 787         P. O. 547 240 787       Shift Total  (mL/kg) 547  (5.5) 240  (2.4) 787  (7.9)      O  U  T  P  U  T   Urine  (mL/kg/hr)            Urine Occurrence(s) 4 x 0 x 4 x       Stool            Stool Occurrence(s) 2 x 0 x 2 x       Shift Total  (mL/kg)          240 787      Weight (kg) 99.1 99.1 99.1 99.1 99.1 99.1 Recommendations:  1. Patient needs and requests: education, rest    2. Diet: Cardiac soft with thin liquids    3. Pending tests/procedures: none     4. Functional Level/Equipment: wheelchair    5. Estimated Discharge Date: 5/16 Posted on Whiteboard in Patients Room: yes     HEALS Safety Check    A safety check occurred in the patient's room between off going nurse and oncoming nurse listed above. The safety check included the below items  Area Items   H  High Alert Medications - Verify all high alert medication drips (heparin, PCA, etc.)   E  Equipment - Suction is set up for ALL patients (with dipti)  - Red plugs utilized for all equipment (IV pumps, etc.)  - WOWs wiped down at end of shift.  - Room stocked with oxygen, suction, and other unit-specific supplies   A  Alarms - Bed alarm is set for fall risk patients  - Ensure chair alarm is in place and activated if patient is up in a chair   L  Lines - Check IV for any infiltration  - Chow bag is empty if patient has a Chow   - Tubing and IV bags are labeled   S  Safety   - Room is clean, patient is clean, and equipment is clean. - Hallways are clear from equipment besides carts. - Fall bracelet on for fall risk patients  - Ensure room is clear and free of clutter  - Suction is set up for ALL patients (with dipti)  - Hallways are clear from equipment besides carts.    - Isolation precautions followed, supplies available outside room, sign posted

## 2018-05-12 NOTE — PROGRESS NOTES
Progress Note    Patient: Sally Carrillo MRN: [id number]  CSN: [id number]    YOB: 1945  Age: 67 y.o. Sex: male    DOA: 4/30/2018 LOS:  LOS: 12 days                    Subjective:     Primary Rehab Diagnosis: Impaired Mobility and ADLs secondary to:  1. S/P Diagnostic laparoscopy followed by open appendectomy with drainage of appendiceal abscess (4/20/2018 - Dr. Hunter Figueroa)  2. History of acute appendicitis with appendiceal abscess         Review of systems  General: No fevers or chills. Cardiovascular: No chest pain or pressure. No palpitations. Pulmonary: No shortness of breath, cough or wheeze. Gastrointestinal: No abdominal pain, nausea, vomiting or diarrhea. Genitourinary: No urinary frequency, urgency, hesitancy or dysuria. Musculoskeletal: No joint or muscle pain, no back pain, no recent trauma. Neurologic: No headache, numbness, generalized weakness    Objective:     Physical Exam:  Visit Vitals    /75    Pulse 66    Temp 96.7 °F (35.9 °C)    Resp 18    Ht 6' 2\" (1.88 m)    Wt 99.1 kg (218 lb 7.6 oz)    SpO2 98%    BMI 28.05 kg/m2        General:         Alert, cooperative, no acute distress    HEENT: NC, Atraumatic. PERRLA, anicteric sclerae. Lungs: CTA Bilaterally. No Wheezing/Rhonchi/Rales. Heart:  Regular  rhythm,  No murmur, No Rubs, No Gallops  Abdomen: Soft, Non distended, Non tender.  +Bowel sounds, no HSM scr of previous surgery  Extremities: No lower limb edema  Psych:   Not anxious or agitated. Neurologic:  CN 2-12 grossly intact, Alert and oriented X 3.   No acute neurological                                 Deficits,     Intake and Output:  Current Shift:  05/12 0701 - 05/12 1900  In: 580 [P.O.:580]  Out: -   Last three shifts:  05/10 1901 - 05/12 0700  In: 787 [P.O.:787]  Out: -     Labs: Results:       Chemistry Recent Labs      05/10/18   0609   GLU  89   NA  134*   K  5.2   CL  102   CO2  27   BUN  23*   CREA  1.48*   CA  9.3   AGAP  5 BUCR  16   AP  165*   TP  7.1   ALB  3.0*   GLOB  4.1*   AGRAT  0.7*      CBC w/Diff No results for input(s): WBC, RBC, HGB, HCT, PLT, GRANS, LYMPH, EOS, HGBEXT, HCTEXT, PLTEXT in the last 72 hours. Cardiac Enzymes No results for input(s): CPK, CKND1, VISHAL in the last 72 hours. No lab exists for component: CKRMB, TROIP   Coagulation No results for input(s): PTP, INR, APTT in the last 72 hours. No lab exists for component: INREXT    Lipid Panel Lab Results   Component Value Date/Time    Cholesterol, total 112 05/02/2018 06:18 AM    HDL Cholesterol 26 (L) 05/02/2018 06:18 AM    LDL, calculated 59.4 05/02/2018 06:18 AM    VLDL, calculated 26.6 05/02/2018 06:18 AM    Triglyceride 133 05/02/2018 06:18 AM    CHOL/HDL Ratio 4.3 05/02/2018 06:18 AM      BNP No results for input(s): BNPP in the last 72 hours.    Liver Enzymes Recent Labs      05/10/18   0609   TP  7.1   ALB  3.0*   AP  165*   SGOT  51*      Thyroid Studies Lab Results   Component Value Date/Time    TSH 0.65 04/22/2018 03:14 AM          Procedures/imaging: see electronic medical records for all procedures/Xrays and details which were not copied into this note but were reviewed prior to creation of Plan    Medications:   Current Facility-Administered Medications   Medication Dose Route Frequency    escitalopram oxalate (LEXAPRO) tablet 10 mg  10 mg Oral QPM    ezetimibe (ZETIA) tablet 10 mg  10 mg Oral DAILY    co-enzyme Q-10 (CO Q-10) capsule 100 mg  100 mg Oral DAILY    cholecalciferol (VITAMIN D3) capsule 5,000 Units  5,000 Units Oral DAILY    ferrous sulfate tablet 325 mg  1 Tab Oral DAILY WITH BREAKFAST    ascorbic acid (vitamin C) (VITAMIN C) tablet 250 mg  250 mg Oral DAILY WITH BREAKFAST    acetaminophen (TYLENOL) tablet 650 mg  650 mg Oral Q4H PRN    docusate sodium (COLACE) capsule 100 mg  100 mg Oral BID    bisacodyl (DULCOLAX) tablet 10 mg  10 mg Oral Q48H PRN    Lactobacillus Acidoph & Bulgar (FLORANEX) tablet 2 Tab  2 Tab Oral BID    HYDROcodone-acetaminophen (NORCO) 5-325 mg per tablet 1 Tab  1 Tab Oral Q4H PRN    tamsulosin (FLOMAX) capsule 0.4 mg  0.4 mg Oral DAILY    cyanocobalamin tablet 1,000 mcg  1,000 mcg Oral DAILY    aspirin chewable tablet 81 mg  81 mg Oral DAILY WITH BREAKFAST       Assessment/Plan     Principal Problem:    Acute appendicitis with peritoneal abscess (4/20/2018)    Active Problems:    Hypertensive heart and kidney disease without heart failure and with chronic kidney disease stage III (9/2/2015)      Generalized weakness (4/20/2018)      Status post appendectomy (4/21/2018)      Overview: S/P Diagnostic laparoscopy followed by open appendectomy with drainage of       appendiceal abscess (4/20/2018 - Dr. Kemar Arnold)      Overactive bladder ()      Benign prostatic hyperplasia ()      Postoperative urinary retention (4/22/2018)      Postoperative confusion (4/22/2018)      Acute blood loss as cause of postoperative anemia (4/21/2018)      Vitamin D deficiency (5/1/2018)      Overview: Vitamin D 25-Hydroxy (5/1/2018) = 17.9       Statin intolerance (5/7/2018)      Overview: Atorvastatin and Simvastatin      Acute renal failure superimposed on stage 3 chronic kidney disease ([address] (5/7/2018)      Depression (5/8/2018)    Plan  Monitor lab on iron and Vit c  Monitor urine out put on flomax, monitor kidney function  ASA , Zetia Q 10  Pt off Bp medication BP has been stable  Monitor anxiety on Lexapro  Cbc, cmp, mag in AM    Claudean Shields, MD  5/12/2018 4:50 PM

## 2018-05-13 LAB
ALBUMIN SERPL-MCNC: 3 G/DL (ref 3.4–5)
ALBUMIN/GLOB SERPL: 0.7 {RATIO} (ref 0.8–1.7)
ALP SERPL-CCNC: 147 U/L (ref 45–117)
ALT SERPL-CCNC: 121 U/L (ref 16–61)
ANION GAP SERPL CALC-SCNC: 5 MMOL/L (ref 3–18)
AST SERPL-CCNC: 46 U/L (ref 15–37)
BASOPHILS # BLD: 0 K/UL (ref 0–0.1)
BASOPHILS NFR BLD: 1 % (ref 0–2)
BILIRUB SERPL-MCNC: 0.3 MG/DL (ref 0.2–1)
BUN SERPL-MCNC: 24 MG/DL (ref 7–18)
BUN/CREAT SERPL: 15 (ref 12–20)
CALCIUM SERPL-MCNC: 9.4 MG/DL (ref 8.5–10.1)
CHLORIDE SERPL-SCNC: 103 MMOL/L (ref 100–108)
CO2 SERPL-SCNC: 27 MMOL/L (ref 21–32)
CREAT SERPL-MCNC: 1.6 MG/DL (ref 0.6–1.3)
DIFFERENTIAL METHOD BLD: ABNORMAL
EOSINOPHIL # BLD: 0.2 K/UL (ref 0–0.4)
EOSINOPHIL NFR BLD: 2 % (ref 0–5)
ERYTHROCYTE [DISTWIDTH] IN BLOOD BY AUTOMATED COUNT: 14.1 % (ref 11.6–14.5)
GLOBULIN SER CALC-MCNC: 4.4 G/DL (ref 2–4)
GLUCOSE SERPL-MCNC: 93 MG/DL (ref 74–99)
HCT VFR BLD AUTO: 31.1 % (ref 36–48)
HGB BLD-MCNC: 10.2 G/DL (ref 13–16)
LYMPHOCYTES # BLD: 2.4 K/UL (ref 0.9–3.6)
LYMPHOCYTES NFR BLD: 28 % (ref 21–52)
MAGNESIUM SERPL-MCNC: 2.1 MG/DL (ref 1.6–2.6)
MCH RBC QN AUTO: 26.4 PG (ref 24–34)
MCHC RBC AUTO-ENTMCNC: 32.8 G/DL (ref 31–37)
MCV RBC AUTO: 80.4 FL (ref 74–97)
MONOCYTES # BLD: 0.5 K/UL (ref 0.05–1.2)
MONOCYTES NFR BLD: 6 % (ref 3–10)
NEUTS SEG # BLD: 5.4 K/UL (ref 1.8–8)
NEUTS SEG NFR BLD: 63 % (ref 40–73)
PLATELET # BLD AUTO: 389 K/UL (ref 135–420)
PMV BLD AUTO: 9.7 FL (ref 9.2–11.8)
POTASSIUM SERPL-SCNC: 5.3 MMOL/L (ref 3.5–5.5)
PROT SERPL-MCNC: 7.4 G/DL (ref 6.4–8.2)
RBC # BLD AUTO: 3.87 M/UL (ref 4.7–5.5)
SODIUM SERPL-SCNC: 135 MMOL/L (ref 136–145)
WBC # BLD AUTO: 8.5 K/UL (ref 4.6–13.2)

## 2018-05-13 PROCEDURE — 80053 COMPREHEN METABOLIC PANEL: CPT | Performed by: FAMILY MEDICINE

## 2018-05-13 PROCEDURE — 74011250637 HC RX REV CODE- 250/637: Performed by: INTERNAL MEDICINE

## 2018-05-13 PROCEDURE — 85025 COMPLETE CBC W/AUTO DIFF WBC: CPT | Performed by: FAMILY MEDICINE

## 2018-05-13 PROCEDURE — 65310000000 HC RM PRIVATE REHAB

## 2018-05-13 PROCEDURE — 36415 COLL VENOUS BLD VENIPUNCTURE: CPT | Performed by: FAMILY MEDICINE

## 2018-05-13 PROCEDURE — 83735 ASSAY OF MAGNESIUM: CPT | Performed by: FAMILY MEDICINE

## 2018-05-13 RX ADMIN — CYANOCOBALAMIN TAB 1000 MCG 1000 MCG: 1000 TAB at 09:10

## 2018-05-13 RX ADMIN — TAMSULOSIN HYDROCHLORIDE 0.4 MG: 0.4 CAPSULE ORAL at 09:10

## 2018-05-13 RX ADMIN — LACTOBACILLUS TAB 2 TABLET: TAB at 17:58

## 2018-05-13 RX ADMIN — ASPIRIN 81 MG 81 MG: 81 TABLET ORAL at 09:10

## 2018-05-13 RX ADMIN — ESCITALOPRAM OXALATE 10 MG: 10 TABLET ORAL at 17:58

## 2018-05-13 RX ADMIN — CHOLECALCIFEROL CAP 125 MCG (5000 UNIT) 5000 UNITS: 125 CAP at 10:10

## 2018-05-13 RX ADMIN — LACTOBACILLUS TAB 2 TABLET: TAB at 09:10

## 2018-05-13 RX ADMIN — EZETIMIBE 10 MG: 10 TABLET ORAL at 09:10

## 2018-05-13 RX ADMIN — Medication 100 MG: at 09:10

## 2018-05-13 RX ADMIN — FERROUS SULFATE TAB 325 MG (65 MG ELEMENTAL FE) 325 MG: 325 (65 FE) TAB at 09:10

## 2018-05-13 RX ADMIN — DOCUSATE SODIUM 100 MG: 100 CAPSULE, LIQUID FILLED ORAL at 09:10

## 2018-05-13 RX ADMIN — Medication 250 MG: at 09:10

## 2018-05-13 RX ADMIN — DOCUSATE SODIUM 100 MG: 100 CAPSULE, LIQUID FILLED ORAL at 17:58

## 2018-05-13 NOTE — ROUTINE PROCESS
SHIFT CHANGE NOTE FOR [facility]    Bedside and Verbal shift change report given to VINITA RN (oncoming nurse) by Patricio St RN (offgoing nurse). Report included the following information SBAR, Kardex, MAR and Recent Results.     Situation:   Code Status: Full Code   Reason for Admission: [address] Day: 13   Problem List:   Hospital Problems  Date Reviewed: 5/10/2018          Codes Class Noted POA    Depression ICD-10-CM: F32.9  ICD-9-CM: 981  5/8/2018 Yes        Statin intolerance (Chronic) ICD-10-CM: Z78.9  ICD-9-CM: 995.27  5/7/2018 Yes    Overview Signed 5/7/2018  2:19 PM by Italia Oneill MD     Atorvastatin and Simvastatin             Acute renal failure superimposed on stage 3 chronic kidney disease ([facility] [address] ICD-10-CM: N17.9, N18.3  ICD-9-CM: 584.9, 585.3  5/7/2018 No        Vitamin D deficiency (Chronic) ICD-10-CM: E55.9  ICD-9-CM: 268.9  5/1/2018 Yes    Overview Signed 5/1/2018 10:01 AM by Italia Oneill MD     Vitamin D 25-Hydroxy (5/1/2018) = 17.9              Overactive bladder (Chronic) ICD-10-CM: N32.81  ICD-9-CM: 596.51  Unknown Yes        Benign prostatic hyperplasia (Chronic) ICD-10-CM: N40.0  ICD-9-CM: 600.00  Unknown Yes        Postoperative urinary retention ICD-10-CM: N99.89, R33.8  ICD-9-CM: 997.5  4/22/2018 Yes        Postoperative confusion ICD-10-CM: R41.0  ICD-9-CM: 293.9  4/22/2018 Yes        Status post appendectomy ICD-10-CM: Z90.49  ICD-9-CM: V45.89  4/21/2018 Yes    Overview Addendum 4/30/2018  4:41 PM by Italia Oneill MD     S/P Diagnostic laparoscopy followed by open appendectomy with drainage of appendiceal abscess (4/20/2018 - Dr. 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 heart failure and with chronic kidney disease stage III (Chronic) ICD-10-CM: I13.10, N18.3  ICD-9-CM: 404.90, 585.3  9/2/2015 Yes              Background:   Past Medical History:   Past Medical History:   Diagnosis Date    Acute blood loss as cause of postoperative anemia 4/21/2018    Benign prostatic hyperplasia     CKD (chronic kidney disease) stage 3, GFR 30-59 ml/min 9/1/2015    Depression 1/0/3837    Diastolic dysfunction without heart failure 9/2/2015    Grade 1 diastolic dysfunction on 2D ECHO done 9/02/2015    Dyslipidemia 9/2/2015    Dysphagia as late effect of cerebrovascular accident (CVA) 9/1/2015    Hemiparesis affecting left side as late effect of cerebrovascular accident (CVA) ([facility] [address] 9/1/2015    History of noncompliance with medical treatment, presenting hazards to health 9/1/2015    History of stroke with residual deficit 9/1/2015    Acute Ischemic Stroke (early subacute infarct at the posterior right lentiform nucleus) with residual left hemiparesis and dysphagia     History of tobacco use 9/1/2015    History of trichomonal urethritis 9/1/2015    History of vitamin D deficiency 9/6/2015    Hypertensive heart and kidney disease without heart failure and with chronic kidney disease stage III 9/2/2015    Obesity, Class I, BMI 30-34.9 9/1/2015    Overactive bladder     Postoperative atrial fibrillation (HCC) 4/21/2018    Resolved    Postoperative confusion 4/22/2018    Postoperative urinary retention 4/22/2018    Statin intolerance 05/07/2018    Atorvastatin and Simvastatin    Vitamin D deficiency 5/1/2018    Vitamin D 25-Hydroxy (5/1/2018) = 17.9       Patient taking anticoagulants NO   Patient has a defibrillator: no     Assessment:   Changes in Assessment throughout shift: NONE     Patient has central line: no Reasons if yes: Insertion date: Last dressing date:   Patient has Chow Cath: no Reasons if yes:     Insertion date:     Last Vitals:     Vitals: 05/11/18 2200 05/12/18 0800 05/12/18 1554 05/12/18 2200   BP: 124/74 101/65 119/75 115/75   Pulse: 76 69 66 78   Resp: 18 18 18 19   Temp: 97.1 °F (36.2 °C) 97.1 °F (36.2 °C) 96.7 °F (35.9 °C) 98.5 °F (36.9 °C)   SpO2: 98% 99% 98% 95%   Weight:       Height:            PAIN    Pain Assessment    Pain Intensity 1: 0 (05/13/18 0000) Pain Intensity 1: 2 (12/29/14 1105)    Pain Location 1: Generalized Pain Location 1: Abdomen    Pain Intervention(s) 1: Medication (see MAR) Pain Intervention(s) 1: Medication (see MAR)  Patient Stated Pain Goal: 0 Patient Stated Pain Goal: 0  o Intervention effective: yes    o Other actions taken for pain: TURN AND REPOSITION REST     Skin Assessment  Skin color Skin Color: Appropriate for ethnicity  Condition/Temperature Skin Condition/Temp: Warm  Integrity Skin Integrity: Incision (comment)  Turgor Turgor: Non-tenting  Weekly Pressure Ulcer Documentation  Pressure  Injury Documentation: No Pressure Injury Noted-Pressure Ulcer Prevention Initiated  Wound Prevention & Protection Methods  Orientation of wound Orientation of Wound Prevention: Posterior  Location of Prevention Location of Wound Prevention: Buttocks, Sacrum/Coccyx  Dressing Present Dressing Present : No  Dressing Status Dressing Status: Intact  Wound Offloading Wound Offloading (Prevention Methods): Bed, pressure reduction mattress, Turning, Repositioning     INTAKE/OUPUT    Date 05/12/18 0700 - 05/13/18 0659 05/13/18 0700 - 05/14/18 0659   Shift 3886-5824 5294-9211 24 Hour Total 2349-1319 4096-4742 24 Hour Total   I  N  T  A  K  E   P. O.          P. O.        Shift Total  (mL/kg) 780  (7.9) 240  (2.4) 1020  (10.3)      O  U  T  P  U  T   Urine  (mL/kg/hr)            Urine Occurrence(s) 3 x 1 x 4 x       Stool            Stool Occurrence(s) 1 x 0 x 1 x       Shift Total  (mL/kg)          284 4847      Weight (kg) 99.1 99.1 99.1 99.1 99.1 99.1       Recommendations:  1.  Patient needs and requests: HELP WITH ADL'S, INCONTINENCE CARE    2. Diet: CARDIAC SOFT WITH THIN LIQUIDS    3. Pending tests/procedures: LABS     4. Functional Level/Equipment: WHEELCHAIR,  PERSON/ STANDBY ASSIST    5. Estimated Discharge Date: 5/15/18 Posted on Whiteboard in Patients Room: yes     HEALS Safety Check    A safety check occurred in the patient's room between off going nurse and oncoming nurse listed above. The safety check included the below items  Area Items   H  High Alert Medications - Verify all high alert medication drips (heparin, PCA, etc.)   E  Equipment - Suction is set up for ALL patients (with dipti)  - Red plugs utilized for all equipment (IV pumps, etc.)  - WOWs wiped down at end of shift.  - Room stocked with oxygen, suction, and other unit-specific supplies   A  Alarms - Bed alarm is set for fall risk patients  - Ensure chair alarm is in place and activated if patient is up in a chair   L  Lines - Check IV for any infiltration  - Chow bag is empty if patient has a Chow   - Tubing and IV bags are labeled   S  Safety   - Room is clean, patient is clean, and equipment is clean. - Hallways are clear from equipment besides carts. - Fall bracelet on for fall risk patients  - Ensure room is clear and free of clutter  - Suction is set up for ALL patients (with dipti)  - Hallways are clear from equipment besides carts.    - Isolation precautions followed, supplies available outside room, sign posted

## 2018-05-13 NOTE — PROGRESS NOTES
Problem: Falls - Risk of  Goal: *Absence of Falls  Document Leyla Fall Risk and appropriate interventions in the flowsheet. Outcome: Progressing Towards Goal  Fall Risk Interventions:  Mobility Interventions: Bed/chair exit alarm, Patient to call before getting OOB    Mentation Interventions: Adequate sleep, hydration, pain control, Bed/chair exit alarm, Update white board    Medication Interventions: Bed/chair exit alarm, Patient to call before getting OOB, Teach patient to arise slowly    Elimination Interventions: Bed/chair exit alarm, Call light in reach, Patient to call for help with toileting needs, Urinal in reach    History of Falls Interventions: Bed/chair exit alarm, Door open when patient unattended, Room close to nurse's station        Problem: Pressure Injury - Risk of  Goal: *Prevention of pressure injury  Document Juno Scale and appropriate interventions in the flowsheet.    Outcome: Progressing Towards Goal  Pressure Injury Interventions:  Sensory Interventions: Assess changes in LOC, Chair cushion    Moisture Interventions: Absorbent underpads    Activity Interventions: Chair cushion, Pressure redistribution bed/mattress(bed type)    Mobility Interventions: Chair cushion, Pressure redistribution bed/mattress (bed type)    Nutrition Interventions: Document food/fluid/supplement intake    Friction and Shear Interventions: Apply protective barrier, creams and emollients

## 2018-05-13 NOTE — PROGRESS NOTES
Progress Note    Patient: Trav Horvath MRN: [id number]  CSN: [id number]    YOB: 1945  Age: 67 y.o. Sex: male    DOA: 4/30/2018 LOS:  LOS: 13 days                    Subjective:     Primary Rehab Diagnosis: Impaired Mobility and ADLs secondary to:  1. S/P Diagnostic laparoscopy followed by open appendectomy with drainage of appendiceal abscess (4/20/2018 - Dr. Kemar Arnold)  2. History of acute appendicitis with appendiceal abscess         Review of systems  General: No fevers or chills. Cardiovascular: No chest pain or pressure. No palpitations. Pulmonary: No shortness of breath, cough or wheeze. Gastrointestinal: No abdominal pain, nausea, vomiting or diarrhea. Genitourinary: No urinary frequency, urgency, hesitancy or dysuria. Musculoskeletal: No joint or muscle pain, no back pain, no recent trauma. Neurologic: No headache, numbness, generalized weakness    Objective:     Physical Exam:  Visit Vitals    /67    Pulse 64    Temp 97.3 °F (36.3 °C)    Resp 18    Ht 6' 2\" (1.88 m)    Wt 99.1 kg (218 lb 7.6 oz)    SpO2 98%    BMI 28.05 kg/m2        General:         Alert, cooperative, no acute distress. Sleepy this morning but denied any c/o  HEENT: NC, Atraumatic. PERRLA, anicteric sclerae. Lungs: CTA Bilaterally. No Wheezing/Rhonchi/Rales. Heart:  Regular  rhythm,  No murmur, No Rubs, No Gallops  Abdomen: Soft, Non distended, Non tender.  +Bowel sounds, no HSM scar of previous surgery  Extremities: No lower limb edema  Psych:   Not anxious or agitated. Neurologic:  CN 2-12 grossly intact, Alert and oriented X 2.   No acute neurological                                 Deficits,     Intake and Output:  Current Shift:  05/13 0701 - 05/13 1900  In: 420 [P.O.:420]  Out: -   Last three shifts:  05/11 1901 - 05/13 0700  In: 1260 [P.O.:1260]  Out: -     Labs: Results:       Chemistry Recent Labs      05/13/18   0619   GLU  93   NA  135*   K  5.3   CL  103   CO2  27 BUN  24*   CREA  1.60*   CA  9.4   AGAP  5   BUCR  15   AP  147*   TP  7.4   ALB  3.0*   GLOB  4.4*   AGRAT  0.7*      CBC w/Diff Recent Labs      05/13/18 0619   WBC  8.5   RBC  3.87*   HGB  10.2*   HCT  31.1*   PLT  389   GRANS  63   LYMPH  28   EOS  2      Cardiac Enzymes No results for input(s): CPK, CKND1, VISHAL in the last 72 hours. No lab exists for component: CKRMB, TROIP   Coagulation No results for input(s): PTP, INR, APTT in the last 72 hours. No lab exists for component: INREXT, INREXT    Lipid Panel Lab Results   Component Value Date/Time    Cholesterol, total 112 05/02/2018 06:18 AM    HDL Cholesterol 26 (L) 05/02/2018 06:18 AM    LDL, calculated 59.4 05/02/2018 06:18 AM    VLDL, calculated 26.6 05/02/2018 06:18 AM    Triglyceride 133 05/02/2018 06:18 AM    CHOL/HDL Ratio 4.3 05/02/2018 06:18 AM      BNP No results for input(s): BNPP in the last 72 hours.    Liver Enzymes Recent Labs      05/13/18 0619   TP  7.4   ALB  3.0*   AP  147*   SGOT  46*      Thyroid Studies Lab Results   Component Value Date/Time    TSH 0.65 04/22/2018 03:14 AM          Procedures/imaging: see electronic medical records for all procedures/Xrays and details which were not copied into this note but were reviewed prior to creation of Plan    Medications:   Current Facility-Administered Medications   Medication Dose Route Frequency    escitalopram oxalate (LEXAPRO) tablet 10 mg  10 mg Oral QPM    ezetimibe (ZETIA) tablet 10 mg  10 mg Oral DAILY    co-enzyme Q-10 (CO Q-10) capsule 100 mg  100 mg Oral DAILY    cholecalciferol (VITAMIN D3) capsule 5,000 Units  5,000 Units Oral DAILY    ferrous sulfate tablet 325 mg  1 Tab Oral DAILY WITH BREAKFAST    ascorbic acid (vitamin C) (VITAMIN C) tablet 250 mg  250 mg Oral DAILY WITH BREAKFAST    acetaminophen (TYLENOL) tablet 650 mg  650 mg Oral Q4H PRN    docusate sodium (COLACE) capsule 100 mg  100 mg Oral BID    bisacodyl (DULCOLAX) tablet 10 mg  10 mg Oral Q48H PRN    Lactobacillus Acidbrie & [facility]) tablet 2 Tab  2 Tab Oral BID    HYDROcodone-acetaminophen (NORCO) 5-325 mg per tablet 1 Tab  1 Tab Oral Q4H PRN    tamsulosin (FLOMAX) capsule 0.4 mg  0.4 mg Oral DAILY    cyanocobalamin tablet 1,000 mcg  1,000 mcg Oral DAILY    aspirin chewable tablet 81 mg  81 mg Oral DAILY WITH BREAKFAST       Assessment/Plan     Principal Problem:    Acute appendicitis with peritoneal abscess (4/20/2018)    Active Problems:    Hypertensive heart and kidney disease without heart failure and with chronic kidney disease stage III (9/2/2015)      Generalized weakness (4/20/2018)      Status post appendectomy (4/21/2018)      Overview: S/P Diagnostic laparoscopy followed by open appendectomy with drainage of       appendiceal abscess (4/20/2018 - Dr. Bailee Paiz)      Overactive bladder ()      Benign prostatic hyperplasia ()      Postoperative urinary retention (4/22/2018)      Postoperative confusion (4/22/2018)      Acute blood loss as cause of postoperative anemia (4/21/2018)      Vitamin D deficiency (5/1/2018)      Overview: Vitamin D 25-Hydroxy (5/1/2018) = 17.9       Statin intolerance (5/7/2018)      Overview: Atorvastatin and Simvastatin      Acute renal failure superimposed on stage 3 chronic kidney disease ([address] (5/7/2018)      Depression (5/8/2018)    Plan  Monitor lab on iron and Vit c H&H improved  Monitor urine out put on flomax, monitor kidney function cr slightly up will recheck lab in AM CMP liver function elevated but stable  ASA , Zetia Q 10  Pt off Bp medication BP has been stable  Monitor anxiety on Lexapro  Cbc, cmp,    Megan Jimenez MD  5/13/2018 1:00 PM

## 2018-05-13 NOTE — ROUTINE PROCESS
SHIFT CHANGE NOTE FOR [facility]    Bedside and Verbal shift change report given to  Tammie Sharp, LUCILLE (oncoming nurse) by Sabino Romero RN   (offgoing nurse). Report included the following information SBAR, Kardex, MAR and Recent Results.     Situation:   Code Status: Full Code   Reason for Admission: appendectomy  Hospital Day: 13   Problem List:   Hospital Problems  Date Reviewed: 5/10/2018          Codes Class Noted POA    Depression ICD-10-CM: F32.9  ICD-9-CM: 892  5/8/2018 Yes        Statin intolerance (Chronic) ICD-10-CM: Z78.9  ICD-9-CM: 995.27  5/7/2018 Yes    Overview Signed 5/7/2018  2:19 PM by Mayco East MD     Atorvastatin and Simvastatin             Acute renal failure superimposed on stage 3 chronic kidney disease ([facility] [address] ICD-10-CM: N17.9, N18.3  ICD-9-CM: 584.9, 585.3  5/7/2018 No        Vitamin D deficiency (Chronic) ICD-10-CM: E55.9  ICD-9-CM: 268.9  5/1/2018 Yes    Overview Signed 5/1/2018 10:01 AM by Mayco East MD     Vitamin D 25-Hydroxy (5/1/2018) = 17.9              Overactive bladder (Chronic) ICD-10-CM: N32.81  ICD-9-CM: 596.51  Unknown Yes        Benign prostatic hyperplasia (Chronic) ICD-10-CM: N40.0  ICD-9-CM: 600.00  Unknown Yes        Postoperative urinary retention ICD-10-CM: N99.89, R33.8  ICD-9-CM: 997.5  4/22/2018 Yes        Postoperative confusion ICD-10-CM: R41.0  ICD-9-CM: 293.9  4/22/2018 Yes        Status post appendectomy ICD-10-CM: Z90.49  ICD-9-CM: V45.89  4/21/2018 Yes    Overview Addendum 4/30/2018  4:41 PM by Mayco East MD     S/P Diagnostic laparoscopy followed by open appendectomy with drainage of appendiceal abscess (4/20/2018 - Dr. Cruzito Childers)             Acute blood loss as cause of postoperative anemia ICD-10-CM: D62  ICD-9-CM: 285.1  4/21/2018 Yes        Generalized weakness ICD-10-CM: R53.1  ICD-9-CM: 780.79  4/20/2018 Yes        * (Principal)Acute appendicitis with peritoneal abscess ICD-10-CM: K35.3  ICD-9-CM: 540.1  4/20/2018 Yes        Hypertensive heart and kidney disease without heart failure and with chronic kidney disease stage III (Chronic) ICD-10-CM: I13.10, N18.3  ICD-9-CM: 404.90, 585.3  9/2/2015 Yes              Background:   Past Medical History:   Past Medical History:   Diagnosis Date    Acute blood loss as cause of postoperative anemia 4/21/2018    Benign prostatic hyperplasia     CKD (chronic kidney disease) stage 3, GFR 30-59 ml/min 9/1/2015    Depression 5/9/1449    Diastolic dysfunction without heart failure 9/2/2015    Grade 1 diastolic dysfunction on 2D ECHO done 9/02/2015    Dyslipidemia 9/2/2015    Dysphagia as late effect of cerebrovascular accident (CVA) 9/1/2015    Hemiparesis affecting left side as late effect of cerebrovascular accident (CVA) ([facility] [address] 9/1/2015    History of noncompliance with medical treatment, presenting hazards to health 9/1/2015    History of stroke with residual deficit 9/1/2015    Acute Ischemic Stroke (early subacute infarct at the posterior right lentiform nucleus) with residual left hemiparesis and dysphagia     History of tobacco use 9/1/2015    History of trichomonal urethritis 9/1/2015    History of vitamin D deficiency 9/6/2015    Hypertensive heart and kidney disease without heart failure and with chronic kidney disease stage III 9/2/2015    Obesity, Class I, BMI 30-34.9 9/1/2015    Overactive bladder     Postoperative atrial fibrillation (HCC) 4/21/2018    Resolved    Postoperative confusion 4/22/2018    Postoperative urinary retention 4/22/2018    Statin intolerance 05/07/2018    Atorvastatin and Simvastatin    Vitamin D deficiency 5/1/2018    Vitamin D 25-Hydroxy (5/1/2018) = 17.9       Patient taking anticoagulants no    Patient has a defibrillator: no     Assessment:   Changes in Assessment throughout shift: no     Patient has central line: no Reasons if yes: Insertion date: Last dressing date:   Patient has Chow Cath: yes Reasons if yes:     Insertion date:     Last Vitals:     Vitals:    05/12/18 0800 05/12/18 1554 05/12/18 2200 05/13/18 0803   BP: 101/65 119/75 115/75 111/67   Pulse: 69 66 78 64   Resp: 18 18 19 18   Temp: 97.1 °F (36.2 °C) 96.7 °F (35.9 °C) 98.5 °F (36.9 °C) 97.3 °F (36.3 °C)   SpO2: 99% 98% 95% 98%   Weight:       Height:            PAIN    Pain Assessment    Pain Intensity 1: 0 (05/13/18 0803) Pain Intensity 1: 2 (12/29/14 1105)    Pain Location 1: Generalized Pain Location 1: Abdomen    Pain Intervention(s) 1: Medication (see MAR) Pain Intervention(s) 1: Medication (see MAR)  Patient Stated Pain Goal: 0 Patient Stated Pain Goal: 0  o Intervention effective: yes    o Other actions taken for pain: no     Skin Assessment  Skin color Skin Color: Appropriate for ethnicity  Condition/Temperature Skin Condition/Temp: Warm  Integrity Skin Integrity: Incision (comment)  Turgor Turgor: Non-tenting  Weekly Pressure Ulcer Documentation  Pressure  Injury Documentation: No Pressure Injury Noted-Pressure Ulcer Prevention Initiated  Wound Prevention & Protection Methods  Orientation of wound Orientation of Wound Prevention: Posterior  Location of Prevention Location of Wound Prevention: Sacrum/Coccyx  Dressing Present Dressing Present : No  Dressing Status Dressing Status: Intact  Wound Offloading Wound Offloading (Prevention Methods): Bed, pressure redistribution/air, Chair cushion, Wheelchair     INTAKE/OUPUT    Date 05/12/18 0700 - 05/13/18 0659 05/13/18 0700 - 05/14/18 0659   Shift 0093-0574 9451-0552 24 Hour Total 4885-8427 0010-4565 24 Hour Total   I  N  T  A  K  E   P. O.  420  420      P. O.  420  420    Shift Total  (mL/kg) 780  (7.9) 240  (2.4) 1020  (10.3) 420  (4.2)  420  (4.2)   O  U  T  P  U  T   Urine  (mL/kg/hr)            Urine Occurrence(s) 3 x 3 x 6 x 1 x  1 x    Stool            Stool Occurrence(s) 1 x 0 x 1 x 0 x  0 x    Shift Total  (mL/kg)          001 6186 420  420   Weight (kg) 99.1 99.1 99.1 99.1 99.1 99.1 Recommendations:  1. Patient needs and requests: education    2. Diet: cardiac soft    3. Pending tests/procedures: no     4. Functional Level/Equipment: 1 x person asssit    5. Estimated Discharge Date: TDB Posted on Whiteboard in Patients Room: no     [facility] Safety Check    A safety check occurred in the patient's room between off going nurse and oncoming nurse listed above. The safety check included the below items  Area Items   H  High Alert Medications - Verify all high alert medication drips (heparin, PCA, etc.)   E  Equipment - Suction is set up for ALL patients (with dipti)  - Red plugs utilized for all equipment (IV pumps, etc.)  - WOWs wiped down at end of shift.  - Room stocked with oxygen, suction, and other unit-specific supplies   A  Alarms - Bed alarm is set for fall risk patients  - Ensure chair alarm is in place and activated if patient is up in a chair   L  Lines - Check IV for any infiltration  - Chow bag is empty if patient has a Chow   - Tubing and IV bags are labeled   S  Safety   - Room is clean, patient is clean, and equipment is clean. - Hallways are clear from equipment besides carts. - Fall bracelet on for fall risk patients  - Ensure room is clear and free of clutter  - Suction is set up for ALL patients (with dipti)  - Hallways are clear from equipment besides carts.    - Isolation precautions followed, supplies available outside room, sign posted

## 2018-05-14 LAB
ALBUMIN SERPL-MCNC: 3.1 G/DL (ref 3.4–5)
ALBUMIN/GLOB SERPL: 0.8 {RATIO} (ref 0.8–1.7)
ALP SERPL-CCNC: 139 U/L (ref 45–117)
ALT SERPL-CCNC: 104 U/L (ref 16–61)
ANION GAP SERPL CALC-SCNC: 7 MMOL/L (ref 3–18)
AST SERPL-CCNC: 35 U/L (ref 15–37)
BASOPHILS # BLD: 0.1 K/UL (ref 0–0.06)
BASOPHILS NFR BLD: 1 % (ref 0–2)
BILIRUB DIRECT SERPL-MCNC: 0.1 MG/DL (ref 0–0.2)
BILIRUB SERPL-MCNC: 0.3 MG/DL (ref 0.2–1)
BUN SERPL-MCNC: 29 MG/DL (ref 7–18)
BUN/CREAT SERPL: 17 (ref 12–20)
CALCIUM SERPL-MCNC: 9.2 MG/DL (ref 8.5–10.1)
CHLORIDE SERPL-SCNC: 102 MMOL/L (ref 100–108)
CO2 SERPL-SCNC: 26 MMOL/L (ref 21–32)
CREAT SERPL-MCNC: 1.74 MG/DL (ref 0.6–1.3)
DIFFERENTIAL METHOD BLD: ABNORMAL
EOSINOPHIL # BLD: 0.2 K/UL (ref 0–0.4)
EOSINOPHIL NFR BLD: 2 % (ref 0–5)
ERYTHROCYTE [DISTWIDTH] IN BLOOD BY AUTOMATED COUNT: 14.3 % (ref 11.6–14.5)
GLOBULIN SER CALC-MCNC: 4 G/DL (ref 2–4)
GLUCOSE SERPL-MCNC: 92 MG/DL (ref 74–99)
HCT VFR BLD AUTO: 31.4 % (ref 36–48)
HGB BLD-MCNC: 10.2 G/DL (ref 13–16)
LYMPHOCYTES # BLD: 2.4 K/UL (ref 0.9–3.6)
LYMPHOCYTES NFR BLD: 26 % (ref 21–52)
MCH RBC QN AUTO: 26.4 PG (ref 24–34)
MCHC RBC AUTO-ENTMCNC: 32.5 G/DL (ref 31–37)
MCV RBC AUTO: 81.1 FL (ref 74–97)
MONOCYTES # BLD: 0.7 K/UL (ref 0.05–1.2)
MONOCYTES NFR BLD: 7 % (ref 3–10)
NEUTS SEG # BLD: 6 K/UL (ref 1.8–8)
NEUTS SEG NFR BLD: 64 % (ref 40–73)
PLATELET # BLD AUTO: 382 K/UL (ref 135–420)
PMV BLD AUTO: 10.4 FL (ref 9.2–11.8)
POTASSIUM SERPL-SCNC: 4.9 MMOL/L (ref 3.5–5.5)
POTASSIUM SERPL-SCNC: 5.8 MMOL/L (ref 3.5–5.5)
PROT SERPL-MCNC: 7.1 G/DL (ref 6.4–8.2)
RBC # BLD AUTO: 3.87 M/UL (ref 4.7–5.5)
SODIUM SERPL-SCNC: 135 MMOL/L (ref 136–145)
WBC # BLD AUTO: 9.3 K/UL (ref 4.6–13.2)

## 2018-05-14 PROCEDURE — 85025 COMPLETE CBC W/AUTO DIFF WBC: CPT | Performed by: FAMILY MEDICINE

## 2018-05-14 PROCEDURE — 92526 ORAL FUNCTION THERAPY: CPT

## 2018-05-14 PROCEDURE — 84132 ASSAY OF SERUM POTASSIUM: CPT | Performed by: FAMILY MEDICINE

## 2018-05-14 PROCEDURE — 92507 TX SP LANG VOICE COMM INDIV: CPT

## 2018-05-14 PROCEDURE — 80076 HEPATIC FUNCTION PANEL: CPT | Performed by: FAMILY MEDICINE

## 2018-05-14 PROCEDURE — 97530 THERAPEUTIC ACTIVITIES: CPT

## 2018-05-14 PROCEDURE — 74011250636 HC RX REV CODE- 250/636: Performed by: INTERNAL MEDICINE

## 2018-05-14 PROCEDURE — 74011250637 HC RX REV CODE- 250/637: Performed by: INTERNAL MEDICINE

## 2018-05-14 PROCEDURE — 97535 SELF CARE MNGMENT TRAINING: CPT

## 2018-05-14 PROCEDURE — 97116 GAIT TRAINING THERAPY: CPT

## 2018-05-14 PROCEDURE — 36415 COLL VENOUS BLD VENIPUNCTURE: CPT | Performed by: FAMILY MEDICINE

## 2018-05-14 PROCEDURE — 97110 THERAPEUTIC EXERCISES: CPT

## 2018-05-14 PROCEDURE — 65310000000 HC RM PRIVATE REHAB

## 2018-05-14 RX ORDER — SODIUM POLYSTYRENE SULFONATE 15 G/60ML
30 SUSPENSION ORAL; RECTAL
Status: COMPLETED | OUTPATIENT
Start: 2018-05-14 | End: 2018-05-14

## 2018-05-14 RX ORDER — SODIUM CHLORIDE 9 MG/ML
1000 INJECTION, SOLUTION INTRAVENOUS ONCE
Status: COMPLETED | OUTPATIENT
Start: 2018-05-14 | End: 2018-05-14

## 2018-05-14 RX ADMIN — LACTOBACILLUS TAB 2 TABLET: TAB at 17:54

## 2018-05-14 RX ADMIN — SODIUM POLYSTYRENE SULFONATE 30 G: 15 SUSPENSION ORAL; RECTAL at 14:29

## 2018-05-14 RX ADMIN — ASPIRIN 81 MG 81 MG: 81 TABLET ORAL at 09:46

## 2018-05-14 RX ADMIN — DOCUSATE SODIUM 100 MG: 100 CAPSULE, LIQUID FILLED ORAL at 17:54

## 2018-05-14 RX ADMIN — EZETIMIBE 10 MG: 10 TABLET ORAL at 09:46

## 2018-05-14 RX ADMIN — Medication 250 MG: at 09:45

## 2018-05-14 RX ADMIN — CHOLECALCIFEROL CAP 125 MCG (5000 UNIT) 5000 UNITS: 125 CAP at 09:47

## 2018-05-14 RX ADMIN — LACTOBACILLUS TAB 2 TABLET: TAB at 09:47

## 2018-05-14 RX ADMIN — FERROUS SULFATE TAB 325 MG (65 MG ELEMENTAL FE) 325 MG: 325 (65 FE) TAB at 09:46

## 2018-05-14 RX ADMIN — ESCITALOPRAM OXALATE 10 MG: 10 TABLET ORAL at 17:54

## 2018-05-14 RX ADMIN — SODIUM CHLORIDE 1000 ML: 900 INJECTION, SOLUTION INTRAVENOUS at 14:42

## 2018-05-14 RX ADMIN — TAMSULOSIN HYDROCHLORIDE 0.4 MG: 0.4 CAPSULE ORAL at 09:47

## 2018-05-14 RX ADMIN — CYANOCOBALAMIN TAB 1000 MCG 1000 MCG: 1000 TAB at 09:47

## 2018-05-14 RX ADMIN — Medication 100 MG: at 09:46

## 2018-05-14 NOTE — ROUTINE PROCESS
SHIFT CHANGE NOTE FOR [facility]     Bedside and Verbal shift change report given to  Daniel Carpio RN (oncoming nurse) by Aditya Dailey RN   (offgoing nurse). Report included the following information SBAR, Kardex, MAR and Recent Results.     Situation:                        Code Status: Full Code                        Reason for Admission: appendectomy  Hospital Day: 13                        Problem List:   Hospital Problems  Date Reviewed: 5/10/2018                         Codes Class Noted POA                Depression ICD-10-CM: F32.9  ICD-9-CM: 984   5/8/2018 Yes             Statin intolerance (Chronic) ICD-10-CM: Z78.9  ICD-9-CM: 995.27   5/7/2018 Yes      Overview Signed 5/7/2018  2:19 PM by Isabella Chris MD        Atorvastatin and Simvastatin                Acute renal failure superimposed on stage 3 chronic kidney disease ([facility] [address] ICD-10-CM: N17.9, N18.3  ICD-9-CM: 584.9, 585. 3   5/7/2018 No             Vitamin D deficiency (Chronic) ICD-10-CM: E55.9  ICD-9-CM: 268. 9   5/1/2018 Yes      Overview Signed 5/1/2018 10:01 AM by Isabella Chris MD        Vitamin D 25-Hydroxy (5/1/2018) = 17.9                 Overactive bladder (Chronic) ICD-10-CM: N32.81  ICD-9-CM: 596.51   Unknown Yes             Benign prostatic hyperplasia (Chronic) ICD-10-CM: N40.0  ICD-9-CM: 600.00   Unknown Yes             Postoperative urinary retention ICD-10-CM: N99.89, R33.8  ICD-9-CM: 124. 5   4/22/2018 Yes             Postoperative confusion ICD-10-CM: R41.0  ICD-9-CM: 293.9   4/22/2018 Yes             Status post appendectomy ICD-10-CM: Z90.49  ICD-9-CM: V45.89   4/21/2018 Yes      Overview Addendum 4/30/2018  4:41 PM by Isabella Chris MD        S/P Diagnostic laparoscopy followed by open appendectomy with drainage of appendiceal abscess (4/20/2018 - Dr. Lacey Lee)                Acute blood loss as cause of postoperative anemia ICD-10-CM: D62  ICD-9-CM: 285. 1   4/21/2018 Yes             Generalized weakness ICD-10-CM: R53.1  ICD-9-CM: 780.79   4/20/2018 Yes             * (Principal)Acute appendicitis with peritoneal abscess ICD-10-CM: K35.3  ICD-9-CM: 540. 1   4/20/2018 Yes             Hypertensive heart and kidney disease without heart failure and with chronic kidney disease stage III (Chronic) ICD-10-CM: I13.10, N18.3  ICD-9-CM: 404.90, 585.3   9/2/2015 Yes                    Background:                        Past Medical History:        Past Medical History:   Diagnosis Date    Acute blood loss as cause of postoperative anemia 4/21/2018    Benign prostatic hyperplasia      CKD (chronic kidney disease) stage 3, GFR 30-59 ml/min 9/1/2015    Depression 2/9/2118    Diastolic dysfunction without heart failure 9/2/2015     Grade 1 diastolic dysfunction on 2D ECHO done 9/02/2015    Dyslipidemia 9/2/2015    Dysphagia as late effect of cerebrovascular accident (CVA) 9/1/2015    Hemiparesis affecting left side as late effect of cerebrovascular accident (CVA) ([facility] [address] 9/1/2015    History of noncompliance with medical treatment, presenting hazards to health 9/1/2015    History of stroke with residual deficit 9/1/2015     Acute Ischemic Stroke (early subacute infarct at the posterior right lentiform nucleus) with residual left hemiparesis and dysphagia     History of tobacco use 9/1/2015    History of trichomonal urethritis 9/1/2015    History of vitamin D deficiency 9/6/2015    Hypertensive heart and kidney disease without heart failure and with chronic kidney disease stage III 9/2/2015    Obesity, Class I, BMI 30-34.9 9/1/2015    Overactive bladder      Postoperative atrial fibrillation ([facility] [address] 4/21/2018     Resolved    Postoperative confusion 4/22/2018    Postoperative urinary retention 4/22/2018    Statin intolerance 05/07/2018     Atorvastatin and Simvastatin    Vitamin D deficiency 5/1/2018     Vitamin D 25-Hydroxy (5/1/2018) = 17.9                             Patient taking anticoagulants no Patient has a defibrillator: no      Assessment:  ¨ Changes in Assessment throughout shift: no     ¨ Patient has central line: no Reasons if yes: Insertion date: Last dressing date:  ¨ Patient has Chow Cath: yes Reasons if yes:     Insertion date:     ¨ Last Vitals:             Vitals:     05/12/18 0800 05/12/18 1554 05/12/18 2200 05/13/18 0803   BP: 101/65 119/75 115/75 111/67   Pulse: 69 66 78 64   Resp: 18 18 19 18   Temp: 97.1 °F (36.2 °C) 96.7 °F (35.9 °C) 98.5 °F (36.9 °C) 97.3 °F (36.3 °C)   SpO2: 99% 98% 95% 98%   Weight:           Height:                 · PAIN                                              Pain Assessment                                              Pain Intensity 1: 0 (05/13/18 0803) Pain Intensity 1: 2 (12/29/14 1105)                                              Pain Location 1: Generalized Pain Location 1: Abdomen                                              Pain Intervention(s) 1: Medication (see MAR) Pain Intervention(s) 1: Medication (see MAR)  Patient Stated Pain Goal: 0 Patient Stated Pain Goal: 0  ¨ Intervention effective: yes    ¨ Other actions taken for pain: no     · Skin Assessment  Skin color Skin Color: Appropriate for ethnicity  Condition/Temperature Skin Condition/Temp: Warm  Integrity Skin Integrity: Incision (comment)  Turgor Turgor: Non-tenting  Weekly Pressure Ulcer Documentation  Pressure  Injury Documentation: No Pressure Injury Noted-Pressure Ulcer Prevention Initiated  Wound Prevention & Protection Methods  Orientation of wound Orientation of Wound Prevention: Posterior  Location of Prevention Location of Wound Prevention: Sacrum/Coccyx  Dressing Present Dressing Present : No  Dressing Status Dressing Status: Intact  Wound Offloading Wound Offloading (Prevention Methods): Bed, pressure redistribution/air, Chair cushion, Wheelchair     · INTAKE/OUPUT     Date 05/12/18 0700 - 05/13/18 0659 05/13/18 0700 - 05/14/18 0659   Shift 6483-0640 9126-3790 24 Hour Total [id number] 3319-0324 24 Hour Total   I  N  T  A  K  E  P. O.  420   420      P. O.  420   420    Shift Total  (mL/kg) 780  (7.9) 240  (2.4) 1020  (10.3) 420  (4.2)   420  (4.2)   O  U  T  P  U  T  Urine  (mL/kg/hr)                  Urine Occurrence(s) 3 x 3 x 6 x 1 x   1 x    Stool                  Stool Occurrence(s) 1 x 0 x 1 x 0 x   0 x    Shift Total  (mL/kg)                823 3045 420   420   Weight (kg) 99.1 99.1 99.1 99.1 99.1 99.1         Recommendations:  1. Patient needs and requests: education     2. Diet: cardiac soft     3. Pending tests/procedures: no      4. Functional Level/Equipment: 1 x person asssit     5. Estimated Discharge Date: TDB Posted on Whiteboard in Patients Room: [facility] Safety Check     A safety check occurred in the patient's room between off going nurse and oncoming nurse listed above.     The safety check included the below items  Area Items   H  High Alert Medications § Verify all high alert medication drips (heparin, PCA, etc.)   E  Equipment § Suction is set up for ALL patients (with dipti)  § Red plugs utilized for all equipment (IV pumps, etc.)  § WOWs wiped down at end of shift. § Room stocked with oxygen, suction, and other unit-specific supplies   A  Alarms § Bed alarm is set for fall risk patients  § Ensure chair alarm is in place and activated if patient is up in a chair   L  Lines § Check IV for any infiltration  § Chow bag is empty if patient has a Chow   § Tubing and IV bags are labeled   S  Safety § Room is clean, patient is clean, and equipment is clean. § Hallways are clear from equipment besides carts. § Fall bracelet on for fall risk patients  § Ensure room is clear and free of clutter  § Suction is set up for ALL patients (with dipti)  § Hallways are clear from equipment besides carts.    § Isolation precautions followed, supplies available outside room, sign posted

## 2018-05-14 NOTE — PROGRESS NOTES
Problem: Self Care Deficits Care Plan (Adult)  Goal: *Therapy Goal (Edit Goal, Insert Text)  Occupational Therapy Goals   Long Term Goals  Initiated 2018 and to be accomplished within 1-2 week(s)  1. Pt will perform self-feeding with MI to . (I)  2. Pt will perform grooming with MI.  3. Pt will perform UB bathing with Setup. 4. Pt will perform LB bathing with Supervision. 5. Pt will perform tub/shower, shower stall, and/or self propel to sink/vanity Supervision. 6. Pt will perform UB dressing with Setup. 7. Pt will perform LB dressing with Supervision. 8. Pt will perform toileting task with Supervision. 9. Pt will perform toilet transfer with Supervision. Short Term Goals   Initiated 2018 and to be accomplished within 7 day(s) 2018  1. Pt will perform self-feeding with MI to (I). 2. Pt will perform grooming with MI.  3. Pt will perform UB bathing with mod independent. 4. Pt will perform LB bathing with supervision  5. Pt will perform tub/shower, shower stall, and/or self propel to sink/vanity Min A.   6. Pt will perform UB dressing with mod independent. 7. Pt will perform LB dressing with Min A.  8. Pt will perform toileting task with Mod A.  9. Pt will perform toilet transfer with Supervision      Occupational Therapy discharge    Patient: Son Bell (12 y.o. male)  Date: 2018    First Tx Session  Time In: 808  Time Out[de-identified] 36    Second Tx Session  Time In:1140  Time Out[de-identified] 1210    Primary Diagnosis: Dibility  Acute appendicitis with peritoneal abscess Acute appendicitis with peritoneal abscess    Precautions:   Aspiration, Fall    Barriers to Learning/Limitations: {barriers to learning/limitations:  Compensate with: visual, verbal, tactile, kinesthetic cues/model     Patient identified with name and :yes      SUBJECTIVE:   Patient stated  He was not ready to take a shower, decline to have hair wash during shower.     OBJECTIVE DATA SUMMARY:   Pt seen supine in bed upon arrival in room for AM shower. Pt decline shower but later agreed and participated with shower. See below for  ADL's functional levels. Second session: Family training with pt and pt's sister. Pt performed tub shower transfer min verbal cuing for proper hand placement with sit to stand from chair and from shower bench. Pt is able to performs self care with supervision for sequencing or safety.  Pt's sister acknowledge understanding        Past Medical History:   Diagnosis Date    Acute blood loss as cause of postoperative anemia 4/21/2018    Benign prostatic hyperplasia     CKD (chronic kidney disease) stage 3, GFR 30-59 ml/min 9/1/2015    Depression 4/9/6127    Diastolic dysfunction without heart failure 9/2/2015    Grade 1 diastolic dysfunction on 2D ECHO done 9/02/2015    Dyslipidemia 9/2/2015    Dysphagia as late effect of cerebrovascular accident (CVA) 9/1/2015    Hemiparesis affecting left side as late effect of cerebrovascular accident (CVA) ([facility] [address] 9/1/2015    History of noncompliance with medical treatment, presenting hazards to health 9/1/2015    History of stroke with residual deficit 9/1/2015    Acute Ischemic Stroke (early subacute infarct at the posterior right lentiform nucleus) with residual left hemiparesis and dysphagia     History of tobacco use 9/1/2015    History of trichomonal urethritis 9/1/2015    History of vitamin D deficiency 9/6/2015    Hypertensive heart and kidney disease without heart failure and with chronic kidney disease stage III 9/2/2015    Obesity, Class I, BMI 30-34.9 9/1/2015    Overactive bladder     Postoperative atrial fibrillation (HCC) 4/21/2018    Resolved    Postoperative confusion 4/22/2018    Postoperative urinary retention 4/22/2018    Statin intolerance 05/07/2018    Atorvastatin and Simvastatin    Vitamin D deficiency 5/1/2018    Vitamin D 25-Hydroxy (5/1/2018) = 17.9      Past Surgical History:   Procedure Laterality Date    APPENDECTOMY,RUPT APPENDX+ABSCESS N/A 04/20/2018    Dr. Keshawn Graff    COLONOSCOPY N/A 4/3/2018    COLONOSCOPY,  w heated snared polypectomy performed by Jocelyn Yan MD at [facility] ENDOSCOPY     Prior Level of Function/Home [address]uation[address] Environment: Private residence  # Steps to Enter:  (1 step up + 1 threshold)  Rails to Enter: No  Wheelchair Ramp: No  One/Two Story Residence: One story  Living Alone: No  Support Systems: Family member(s)  Patient Expects to be Discharged to[de-identified] Private residence  Current DME Used/Available at Home: Cane, quad  Tub or Shower Type: Tub/Shower combination  [x]     Right hand dominant   []     Left hand dominant      Pain:  Pt reports 0/10 pain or discomfort prior to treatment. Pt reports 0/10 pain or discomfort post treatment.    Problem List:    Decreased strength B UE  []     Decreased strength trunk/core  []     Decreased AROM   []     Decreased PROM  []     Decreased balance sitting  []     Decreased balance standing  []     Decreased endurance  []     Pain  []       Functional Limitations:   Decreased independence with ADL  [x]     Decreased independence with functional transfers  [x]     Decreased independence with ambulation  [x]     Decreased independence with IADL  [x]       Outcome Measures: Pt to d/c home with sister      MMT Initial Assessment      Right Upper Extremity  Left Upper Extremity     UE AROM Limitations at shoulder girdle which affected distal GM mvts of right extremity Limitations at shoulder girdle which affected distal GM mvts of left extremity   Shoulder flexion 5/5 MMT 5/5 MMT   Shoulder extension 5/5 MMT 5/5 MMT   Shoulder ABDuction 5/5 MMT 5/5 MMT   Shoulder ADDUction 4+/5 MMT 4+/5 MMT   Elbow Flexion 5/5 MMT 5/5 MMT   Elbow Extension 5/5 MMT 5/5 MMT   Wrist Extension/Flexion [facility] WFL    Intact Intact         MMT Discharge Assessment   Right Upper Extremity  Left Upper Extermity    UE AROM WFL ;Grossly   WFL ;Grossly    Shoulder flexion 5/5 5/5   Shoulder extension 5/5 5/5   Shoulder ABDuction 5/5 5/5   Shoulder ADDUction 5/5 5/5   Elbow Flexion 5/5 5/5   Elbow Extension 5/5 5/5   Wrist Extension/Flexion 5/5 5/5    5/5 5/5       0/5 No palpable muscle contraction  1/5 Palpable muscle contraction, no joint movement  2-/5 Less than full range of motion in gravity eliminated position  2/5 Able to complete full range of motion in gravity eliminated position  2+/5 Able to initiate movement against gravity  3-/5 More than half but not full range of motion against gravity  3/5 Able to complete full range of motion against gravity  3+/5 Completes full range of motion against gravity with minimal resistance  4-/5 Completes full range of motion against gravity with minimal resistance  4/5 Completes full range of motion against gravity with moderate resistance  5/5 Completes full range of motion against gravity with maximum resistance    Coordination: B UE Intact  Sensation: B UE Intact    FIM SCORES Initial Assessment Discharge Assessment   Eating 4 Feeding/Eating  Feeding/Eating Assistance: 5 (Supervision/setup)  Comments: Pt cues to eat slowly    Grooming 4 Grooming  Grooming Assistance : 7 (Independent)  Oral Hygiene FIM: 7 independent    Bathing  Upper Body Bathing  Bathing Assistance, Upper: 5 (Supervision)  Position Performed: Seated in chair  Adaptive Equipment: Tub bench  Comments: Pt req'd  min verbal cuing to sequence bathing tasks. Lower Body Bathing  Bathing Assistance, Lower : 5 (Supervision)  Position Performed: Seated in chair;Standing  Adaptive Equipment: Grab bar;Tub bench  Comments: Pt bending forward washing LE then stood with grab bars and used grab bars for support in standing while washing buttocks and leslie area.      Upper Body Dressing 5 Upper Body Dressing   Dressing Assistance : 6 (Modified independent)  Comments: Pt doffed/ donned shirt from seated position   Lower Body Dressing  0 NT  Sock and/or Shoe Management FIM: 1Right sock (1 step)  Doff and don of pants NT secondary to pt deferred from fatigue Lower Body Dressing   Dressing Assistance : 5 (Supervision)  Leg Crossed Method Used: No  Position Performed: Seated in chair;Standing  Adaptive Equipment Used: Sock aid; Walker  Don/Doff Anti-Embolic Stockings: 1 (Total assistance)  Comments: Pt doffed pants and sock with reacher, donned sock with sock aid with min verbal cueing sequencing tasks. Pt stood with walker donning  brief and pants over hips/ buttocks with supervision. Sock and/or Shoe Management FIM: 5 supervision   Toileting 0   Toileting  Toileting Assistance (FIM Score): 5 (supervision with CM and min cuing for  Hygiene. Tub/Shower Transfer 0 Functional Transfers x2 with am shower and family  training with sister. Tub or Shower Type: Tub/Shower combination  Amount of Assistance Required: 5 (Supervision/setup)  Adaptive Equipment: Shower chair with back. Pt cues to exit tub safety. Toilet Transfer 4   CGA with wide base of support. to and from [place] bed Functional Transfers  Toilet: Amount of Assistance Required: 5 (Supervision)  Adaptive Equipment:Rolling rogers. Comprehension 5 5   Expression 5 5   Social Interaction 5 5   Problem Solving 4 4   Memory 5 4   Please see C Interdisciplinary Eval: Coordination/Balance Section for details regarding FIM score description. Activity Tolerance:   Fair    ASSESSMENT: Pt made progress during his rehab stay meet 9/9 LTG's    Progression toward goals:  [x]      Improving appropriately and progressing toward goals  []      Improving slowly and progressing toward goals  []      Not making progress toward goals and plan of care will be adjusted     PLAN:  Pt would benefit from continued skilled physical therapy in order to improve independent functional mobility within the home with use of least restrictive device.  Interventions may include range of motion (AROM, PROM B LE/trunk), motor function (B UE/trunk strengthening/coordination), activity tolerance (vitals, oxygen saturation levels), bed mobility training, balance activities, gait training (progressive ambulation program), and functional transfer training. Discharge Recommendations: Pt to d/c home with family and continue with Out Patient treatment to maximize functional activities with ADL's and UE strength. Further Equipment Recommendations for Discharge:Pt has tub bench and 3:1 commode       Please refer to the flow sheet for vital signs taken during this treatment. After treatment:   []  Patient left in no apparent distress sitting up in chair  []  Patient left in no apparent distress in bed  []  Call bell left within reach  []  Nursing notified  []  Caregiver present  [x]  Pt taken to dinning for breakfast hand off to the ST     COMMUNICATION/EDUCATION:   Communication/Collaboration:  [x]      Home safety education was provided and the patient/caregiver indicated understanding. []      Patient/family have participated as able and agree with findings and recommendations. []      Patient is unable to participate in plan of care at this time.     River KYLE  5/14/2018

## 2018-05-14 NOTE — PROGRESS NOTES
[id number]  [address] [place] [address], [address]     INPATIENT REHABILITATION  DAILY PROGRESS NOTE     Date: 5/14/2018    Name: Buzz Thao Age / Sex: 67 y.o. / male   CSN: [id number] MRN: [id number]   [facility] Date: 4/30/2018 Length of Stay: 14 days     Primary Rehab Diagnosis: Impaired Mobility and ADLs secondary to:  1. S/P Diagnostic laparoscopy followed by open appendectomy with drainage of appendiceal abscess (4/20/2018 - Dr. Pat Jacobsen)  2. History of acute appendicitis with appendiceal abscess      Subjective:     Patient seen and examined. Blood pressure controlled. Patient's Complaint:   No significant medical complaints    Pain Control: no current joint or muscle symptoms, essentially pain-free      Objective:     Vital Signs:  Patient Vitals for the past 24 hrs:   BP Temp Pulse Resp SpO2   05/14/18 0812 129/76 97.8 °F (36.6 °C) 71 19 97 %   05/13/18 2200 115/79 98.8 °F (37.1 °C) 72 18 96 %   05/13/18 1600 105/78 99.2 °F (37.3 °C) 75 18 100 %        Physical Examination:  GENERAL SURVEY: Patient is awake, alert, oriented x 3, sitting comfortably on the chair, not in acute respiratory distress. HEENT: pale palpebral conjunctivae, anicteric sclerae, no nasoaural discharge, moist oral mucosa  NECK: supple, no jugular venous distention, no palpable lymph nodes  CHEST/LUNGS: symmetrical chest expansion, good air entry, clear breath sounds  HEART: adynamic precordium, good S1 S2, no S3, regular rhythm, no murmurs  ABDOMEN: flat, bowel sounds appreciated, soft, non-tender  EXTREMITIES: pale nailbeds, (+) bipedal edema, full and equal pulses, no calf tenderness   NEUROLOGICAL EXAM: The patient is awake, alert and oriented x3, able to answer questions fairly appropriately, able to follow 1 and 2 step commands. Able to tell time from the wall clock. Cranial nerves II-XII are grossly intact. No gross sensory deficit.   Motor strength is 4+/5 on BUE, 4-/5 on the RLE, 4/5 on the LLE.     Incision(s): covered, clean, dry, and intact      Current Medications:  Current Facility-Administered Medications   Medication Dose Route Frequency    escitalopram oxalate (LEXAPRO) tablet 10 mg  10 mg Oral QPM    ezetimibe (ZETIA) tablet 10 mg  10 mg Oral DAILY    co-enzyme Q-10 (CO Q-10) capsule 100 mg  100 mg Oral DAILY    cholecalciferol (VITAMIN D3) capsule 5,000 Units  5,000 Units Oral DAILY    ferrous sulfate tablet 325 mg  1 Tab Oral DAILY WITH BREAKFAST    ascorbic acid (vitamin C) (VITAMIN C) tablet 250 mg  250 mg Oral DAILY WITH BREAKFAST    acetaminophen (TYLENOL) tablet 650 mg  650 mg Oral Q4H PRN    docusate sodium (COLACE) capsule 100 mg  100 mg Oral BID    bisacodyl (DULCOLAX) tablet 10 mg  10 mg Oral Q48H PRN    Lactobacillus Acidoph & Bulgar (FLORANEX) tablet 2 Tab  2 Tab Oral BID    HYDROcodone-acetaminophen (NORCO) 5-325 mg per tablet 1 Tab  1 Tab Oral Q4H PRN    tamsulosin (FLOMAX) capsule 0.4 mg  0.4 mg Oral DAILY    cyanocobalamin tablet 1,000 mcg  1,000 mcg Oral DAILY    aspirin chewable tablet 81 mg  81 mg Oral DAILY WITH BREAKFAST       Allergies: Allergies   Allergen Reactions    Lipitor [Atorvastatin] Other (comments)     Elevated CK level    Pt has no awareness of this allergy.     Simvastatin Other (comments)     Elevation in LFTs       Lab/Data Review:  Recent Results (from the past 24 hour(s))   METABOLIC PANEL, BASIC    Collection Time: 05/14/18  6:28 AM   Result Value Ref Range    Sodium 135 (L) 136 - 145 mmol/L    Potassium 5.8 (H) 3.5 - 5.5 mmol/L    Chloride 102 100 - 108 mmol/L    CO2 26 21 - 32 mmol/L    Anion gap 7 3.0 - 18 mmol/L    Glucose 92 74 - 99 mg/dL    BUN 29 (H) 7.0 - 18 MG/DL    Creatinine 1.74 (H) 0.6 - 1.3 MG/DL    BUN/Creatinine ratio 17 12 - 20      GFR est AA 47 (L) >60 ml/min/1.73m2    GFR est non-AA 39 (L) >60 ml/min/1.73m2    Calcium 9.2 8.5 - 10.1 MG/DL   HEPATIC FUNCTION PANEL    Collection Time: 05/14/18  6:28 AM Result Value Ref Range    Protein, total 7.1 6.4 - 8.2 g/dL    Albumin 3.1 (L) 3.4 - 5.0 g/dL    Globulin 4.0 2.0 - 4.0 g/dL    A-G Ratio 0.8 0.8 - 1.7      Bilirubin, total 0.3 0.2 - 1.0 MG/DL    Bilirubin, direct 0.1 0.0 - 0.2 MG/DL    Alk. phosphatase 139 (H) 45 - 117 U/L    AST (SGOT) 35 15 - 37 U/L    ALT (SGPT) 104 (H) 16 - 61 U/L   CBC WITH AUTOMATED DIFF    Collection Time: 05/14/18  6:28 AM   Result Value Ref Range    WBC 9.3 4.6 - 13.2 K/uL    RBC 3.87 (L) 4.70 - 5.50 M/uL    HGB 10.2 (L) 13.0 - 16.0 g/dL    HCT 31.4 (L) 36.0 - 48.0 %    MCV 81.1 74.0 - 97.0 FL    MCH 26.4 24.0 - 34.0 PG    MCHC 32.5 31.0 - 37.0 g/dL    RDW 14.3 11.6 - 14.5 %    PLATELET 926 918 - 242 K/uL    MPV 10.4 9.2 - 11.8 FL    NEUTROPHILS 64 40 - 73 %    LYMPHOCYTES 26 21 - 52 %    MONOCYTES 7 3 - 10 %    EOSINOPHILS 2 0 - 5 %    BASOPHILS 1 0 - 2 %    ABS. NEUTROPHILS 6.0 1.8 - 8.0 K/UL    ABS. LYMPHOCYTES 2.4 0.9 - 3.6 K/UL    ABS. MONOCYTES 0.7 0.05 - 1.2 K/UL    ABS. EOSINOPHILS 0.2 0.0 - 0.4 K/UL    ABS. BASOPHILS 0.1 (H) 0.0 - 0.06 K/UL    DF AUTOMATED         Estimated Glomerular Filtration Rate:  On admission, estimated GFR based on a Creatinine of 1.27 mg/dl:   Using CKD-EPI = 64.5 mL/min/1.73m2   Using MDRD = 71.5 mL/min/1.73m2  Most recent estimated GFR, based on a Creatinine of 1.74 mg/dl on 5/14/2018:   Using CKD-EPI = 44.1 mL/min/1.73m2   Using MDRD = 49.7 mL/min/1.73m2       Assessment:     Primary Rehabilitation Diagnosis  1. Generalized Weakness with Impaired Mobility and ADLs  2. S/P Diagnostic laparoscopy followed by open appendectomy with drainage of appendiceal abscess (4/20/2018 - Dr. Bailee Paiz)  3.  History of acute appendicitis with appendiceal abscess     Comorbidities   History of stroke with residual deficit I69.30    Hypertensive heart and kidney disease without heart failure and with chronic kidney disease stage III I13.10, L93.2    Diastolic dysfunction without heart failure I51.9    Dyslipidemia E78.5    CKD (chronic kidney disease) stage 3, GFR 30-59 ml/min N18.3    History of trichomonal urethritis Z86.19    Obesity, Class I, BMI 30-34.9 E66.9    History of noncompliance with medical treatment, presenting hazards to health Z91.19    History of tobacco use Z87.891    History of vitamin D deficiency Z86.39    Hemiparesis affecting left side as late effect of cerebrovascular accident (CVA)  I69.354    Dysphagia as late effect of cerebrovascular accident (CVA) I69.391    Postoperative atrial fibrillation (HCC) I97.89, I48.91    Overactive bladder N32.81    Benign prostatic hyperplasia N40.0    Postoperative urinary retention N99.89, R33.8    Postoperative confusion R41.0    Acute blood loss as cause of postoperative anemia D62    Vitamin D deficiency E55.9    Acute renal failure superimposed on stage 3 chronic kidney disease  N17.9, N18.3    Depression F32.9        Plan:     1. Justification for continued stay: Good progression towards established rehabilitation goals. 2. Medical Issues being followed closely:    [x]  Fall and safety precautions     [x]  Wound Care     [x]  Bowel and Bladder Function     [x]  Fluid Electrolyte and Nutrition Balance     [x]  Pain Control      3. Issues that 24 hour rehabilitation nursing is following:    [x]  Fall and safety precautions     [x]  Wound Care     [x]  Bowel and Bladder Function     [x]  Fluid Electrolyte and Nutrition Balance     [x]  Pain Control      [x]  Assistance with and education on in-room safety with transfers to and from the bed, wheelchair, toilet and shower. 4. Acute rehabilitation plan of care:    [x]  Continue current care and rehab. [x]  Physical Therapy           [x]  Occupational Therapy           [x]  Speech Therapy     []  Hold Rehab until further notice     5. Medications:    [x]  MAR Reviewed     [x]  Continue Present Medications     6.  DVT Prophylaxis:      []  Lovenox     []  Unfractionated Heparin     []  Coumadin     []  NOAC     [x]  MARYAM Stockings     []  Sequential Compression Device     []  None     7. Orders:   > S/P Diagnostic laparoscopy followed by open appendectomy with drainage of appendiceal abscess (4/20/2018 - Dr. Amilcar Argueta);  History of acute appendicitis with appendiceal abscess   > WBC count (5/1/2018, on admission) = 13.0   > STEVEN drain was removed on the AM of 5/1/2018 by Dr. Amilcar Argueta   > On 5/3/2018, patient had completed the recommended treatment course of Fluconazole 400 mg PO once daily   > WBC count (5/13/2018) = 8.5   > WBC count (5/14/2018) = 9.3   > Continue Floranex 2 tabs PO BID    > Acute Postoperative Blood Loss Anemia   > Hgb/Hct (5/1/2018, on admission) = 9.7/29.7   > Anemia work-up showed serum iron 27, TIBC 233, iron % saturation 12, ferritin 356, reticulocyte count 1.0   > Hgb/Hct (5/7/2018) = 9.5/29.8    > Continue:    > FeSO4 325 mg PO once daily with breakfast     > Ascorbic Acid 250 mg PO once daily with breakfast (to enhance the absorption of the FeSO4)    > Benign prostatic hyperplasia / Overactive bladder / Postoperative urinary retention   > On 5/1/2018, added Terazosin 1 mg PO q PM (6PM)   > On 5/5/2018, discontinued Terazosin 1 mg PO q PM (6PM)   > Continue Tamsulosin 0.4 mg PO once daily    > Hypertensive heart and kidney disease without heart failure and with chronic kidney disease stage 3   > On 5/1/2018:    > Discontinued Labetalol 100 mg PO q 12 hr (6AM, 6PM)    > Terazosin 1 mg PO q PM (6PM)   > On 5/3/2018, decreased Amlodipine from 10 mg to 5 mg PO once daily (6AM)   > On 5/5/2018:    > Discontinued Terazosin 1 mg PO q PM (6PM)    > Decreased Amlodipine from 5 mg to 2.5 mg PO once daily (6AM)   > On 5/7/2018, discontinued Amlodipine 2.5 mg PO once daily (6AM)     > Postoperative confusion, resolved   > Will monitor    > Vitamin D Deficiency   > Vitamin D 25-Hydroxy (5/1/2018) = 17.9   > On 5/2/2018, patient was given Cholecalciferol 50,000 units PO x 1 dose    > On 5/3/2018, started Cholecalciferol 5,000 units PO once daily    > Continue Cholecalciferol 5,000 units PO once daily     > History of stroke with residual left hemiparesis and dysphagia    > Lipid profile (5/2/2018) showed , , HDL 26, LDL 59   > Baseline hepatic function tests and CK:    > On 4/22/2018:     > ALT = 45     > AST = 37     > CK = 453    > On 5/2/2018:     > ALT = 90     > AST = 50     > CK = 103   > Patient's record state Atorvastatin causes and elevated CK level - patient and sister not aware of this   > Patient had been on Ezetimibe for dyslipidemia   > Patient had not been on a statin most likely due to documentation of CK elevation due to Atorvastatin   > On 5/2/2018:    > Patient was started on a trial of Simvastatin 20 mg PO q HS for secondary stroke prevention and plaque stabilization    > Discontinued Zetia    > Will monitor LFTs    > Advanced diet from NDD2 to NDD3   > On 5/3/2018, added Coenzyme Q10 100 mg PO once daily   > On 5/7/2018:    > ALT = 226    > AST = 130    > CK = 91    > Discontinued Simvastatin 20 mg PO q HS    > Resumed Ezetimibe 10 mg PO once daily   > On 5/10/2018:    > ALT = 155    > AST = 51   > On 5/14/2018:    > ALT = 104    > AST = 35   > Continue:    > Aspirin 81 mg PO once daily with breakfast    > Ezetimibe 10 mg PO once daily    > Coenzyme Q10 100 mg PO once daily    > Acute renal failure superimposed on stage 3 chronic kidney disease    > BUN/Creatinine (5/1/2018, on admission) = 13/1.27   > BUN/Creatinine (5/7/2018) = 30/1.59   > Patient was advised to increase oral fluid intake   > On 5/7/2018, patient was given IVF: 0.9%  ml/hr x 1 liter   > On 5/8/2018, patient was given IVF: 0.9%  ml/hr x 1 liter   > BUN/Creatinine (5/10/2018) = 23/1.48   > On 5/10/2018, patient was given IVF: 0.9%  ml/hr x 1 liter    > BUN/Creatinine (5/13/2018) = 24/1.60    > BUN/Creatinine (5/14/2018) = 29/1.74    > Resume IVF: 0.9%  ml/hr x 1 liter     > Depression   > Patient had been seen and evaluated by our Clinical Psychologist (Dr. Darwin Cabral) and patient was noted to have a depressed mood which, after discussion with the therapy staff, appears to be affecting the patient's progress in rehabilitation   > On 5/8/2018, started Escitalopram 10 mg PO q PM   > Continue Escitalopram 10 mg PO q PM    > Hyperkalemia   > K (5/13/2018) = 5.3   > K (5/14/2018) = 5.8   > Kayexalate 30 grams PO x 1 dose today    > Analgesia   > Continue:    > Acetaminophen 650 mg PO q 4 hr PRN for pain level less than 5/10    > Norco 5/325 1 tab PO q 4 hr PRN for pain level greater than 4/10       8. Patient's progress in rehabilitation and medical issues discussed with the patient. All questions answered to the best of my ability. Care plan discussed with patient and nurse. 9. Discharge Planning:   > For discharge to home tomorrow   > Outpatient physical therapy, occupational therapy and speech therapy   > F/U: 1. PCP (Dr. Kristie Meredith)    2.  General Surgery (Dr. Maxim Villanueva)     Nephrology (Dr. Alfaro Enzo)      Signed:    Amber Reyes MD    May 14, 2018

## 2018-05-14 NOTE — PROGRESS NOTES
Problem: Mobility Impaired (Adult and Pediatric)  Goal: *Acute Goals and Plan of Care (Insert Text)  Physical Therapy Goals  Short Term = Long Term Goals  Initiated 2018, updated 2018, and to be accomplished within 10-14 day(s) (2018)  1. Patient will move from supine to sit and sit to supine , scoot up and down and roll side to side in bed with modified independence. 2.  Patient will transfer from bed to chair and chair to bed with distant supervision/set-up using the least restrictive device. 3.  Patient will perform sit to stand with distant supervision/set-up. 4.  Patient will ambulate with distant supervision/set-up for 50 feet with the least restrictive device. 5.  Patient will ascend/descend 2 curbs/thresholds with least restrictive assistive device with supervision/set-up. physical Therapy Discharge note    Patient: Radha Morton (86 y.o. male)  Date: 2018  Diagnosis: Dibility  Acute appendicitis with peritoneal abscess Acute appendicitis with peritoneal abscess  Precautions: Aspiration, Fall  Chart, physical therapy assessment, plan of care and goals were reviewed. Time In: 1120  Time Out: 1140  Patient Seen For: Balance activities;Gait training;Patient education; Wheelchair mobility; Family training;Transfer training   Time In: 1400  Time Out: 1430  Patient Seen For: Balance activities;Gait training;Patient education; Transfer training  Time In: 1506  Time Out: 1526  Patient Seen For: Therapeutic exercises  Pain:  Pt pain was reported as 0/10 pre-treatment. Pt pain was reported as 0/10 post-treatment. Intervention: N/A    Patient identified with name and : yes    SUBJECTIVE:     Patient stated: that he has no concerns upon leaving ARU. However, pt's sister at start of session expressed concerns re: \"I don't know how I'm going to get him in the house,\" despite observing previous physical therapy treatment session.   However, at end of treatment session and after participating in training, pt's sister expresses no concerns for safe d/c home. Patient appears disinterested during final two treatment sessions making minimal eye contact and requiring max encouragement. OBJECTIVE DATA SUMMARY:   AROM: Generally decreased, WFL    FIM SCORES Initial Assessment Discharge Assessment   Bed/Chair/Wheelchair Transfers 3 5   Wheelchair Mobility 3 6   Walking [place] 1 5   Steps/Stairs 0 1   PRIMARY MODE OF LOCOMOTION: ambulation  Please see [facility] Interdisciplinary Eval: Coordination/Balance Section for details regarding FIM score description. BED/CHAIR/WHEELCHAIR TRANSFERS Initial Assessment Discharge Assessment   Rolling Right 5 (Supervision) Modified independent with increased time to perform   Rolling Left 5 (Supervision) Modified independent with increased time to perform   Supine to Sit 4 (Minimal assistance) Modified independent with increased time to perform   Sit to Stand Minimal assistance Supervision for safety with verbal cues for weight shift with sit <> stand to RW   Sit to Supine 4 (Minimal assistance) Modified independent with increased time to perform   Transfer Assist Score 3 5   Transfer Type Other Stand Step with RW   Comments  Pt requires supervision for safety with RW management.    Car Transfer Not tested NT   Car Type n/a N/A       WHEELCHAIR MOBILITY/MANAGEMENT Initial Assessment Discharge Assessment   Able to Propel 216 feet 216 feet   Functional Level 3 6 - Modified independence over level surfaces   Curbs/ramps assistance required 0 (Not tested) NT   Wheelchair set up assistance required 3 (Moderate assistance) Supervision   Wheelchair management Manages left brake, Manages right brake Manages B brakes       WALKING INDEPENDENCE Initial Assessment Discharge Assessment   Assistive device Cane, quad Walker, rolling   Ambulation assistance - level surface Moderate assistance Supervision   Distance 20 Feet (ft) (x 2 trials)  15 feet x 2 with PT guarding pt and 15 feet x 2 with pt's sister providing supervision. Pt's sister required mod verbal cues for safe guarding techniques  During second treatment session, pt ambulated 2 x 100 Feet and 1 x 165 Feet with PT supervision. Functional Level 1 5   Comments Pt ambulates with impaired sequencing requiring max verbal and manual cuea fro weight shifts  Pt ambulates with downward gaze and forward flexed posture with decreased B foot clearance and left Trendelenburg. Ambulation assistance - unlevel surface NT NT       STEPS/STAIRS Initial Assessment Discharge Assessment   Steps/Stairs ambulated 0 1 (6\" curb)   Rail Use  (N/A) None (RW)   Functional Level 0 1   Comments NT Pt negotiates 6\" curb once with PT providing close supervision for safety and min verbal cues for sequencing. Pt's sister then performed return demonstration with mod cuing from PT for remaining safe distance from patient to be able to provide assistance if needed and educated re: safe guarding techniques. Curbs/Ramps NT Close supervision     Patient's sister was present and participated in family training to perform return demonstration for safe guarding techniques with curb negotiation and household ambulation. Therapeutic Exercises:   Seated Therapeutic Exercises:  B LE:  3 Sets of 10 Repetitions  Isometric Hip Add x 5 second holds  30 Repetitions x 3  Ankle DF and PF    LTGs: Pt has progressed to a supervision level during his stay on the ARU. However, pt has been limited by decreased engagement and attention during therapy sessions due to self reported depressed mood. Pt has achieved goals for bed mobility and curb negotiation. However, pt has not achieved goal for distant supervision with ambulation and transfers secondary to functional weakness and poor attention to task/engagement. ASSESSMENT:  Pt has progressed as documented above but was limited by depressed mood as well asf functional weakness.      PLAN:  Pt would benefit from continued skilled physical therapy in order to improve independent functional mobility at outpatient level. Interventions may include range of motion (AROM, PROM B LE/trunk), motor function (B LE/trunk strengthening/coordination), activity tolerance (vitals, oxygen saturation levels), bed mobility training, balance activities, gait training (progressive ambulation program), and functional transfer training. Discharge Recommendations:  Outpatient  Further Equipment Recommendations for Discharge:  N/A       Activity Tolerance:   Fair  Please refer to the flowsheet for vital signs taken during this treatment. After treatment:   [x] Patient left in no apparent distress sitting up in chair with wife present in gym awaiting OT treatment session after first treatment. Patient left in care of nurse after second treatment session. Pt left seated OOB in recliner chair with call bell in reach after final treatment session.   [] Patient left in no apparent distress in bed  [] Call bell left within reach  [] Nursing notified  [] Caregiver present  [] Bed alarm activated      Damaris Lyn PT, DPT  5/14/2018

## 2018-05-14 NOTE — PROGRESS NOTES
Problem: Neurolinguistics Impaired (Adult)  Goal: *Speech Goal: (INSERT TEXT)  Long term goals:  1. Patient will tolerate least restrictive diet without overt s/s of aspiration or other difficulty x 4/5 meals. .  2. Patient will utilize safe swallowing techniques with supervision. 3. Patient will follow moderately complex commands (manipulating objects) with 80-90% accuracy. 4. Patient will respond to treatment tasks in full sentences, supervision. 5. Patient will name 8-10 items within concrete categories. 6. Patient will be oriented x 3 and recall events of the day, min assist.  7. Patient will perform functional problem solving tasks with 80-90% accuracy. Short term goals (by 5/15/18):  1. Patient will tolerate advanced soft diet/thin liquids without overt s/s of aspiration or other difficulty in 4/5 meals. 2. Patient will utilize safe swallowing techniques with min cues  3. Patient will follow moderately complex commands (manipulating objects) with 80% accuracy. 4. Patient will respond to treatment tasks in full sentences, mod cues. 5. Patient will name 7-9 items within concrete categories. 6. Patient will be oriented x 3 and recall events of the day, min assist.  7. Patient will perform functional problem solving tasks with 80-90% accuracy. Outcome: Resolved/Met Date Met: 05/14/18  Speech language pathology treatment    Patient: Getachew Fitch (62 y.o. male)  Date: 5/14/2018  Diagnosis: Dibility  Acute appendicitis with peritoneal abscess Acute appendicitis with peritoneal abscess       SUBJECTIVE:   Patient stated I don't talk much. OBJECTIVE:   Mental Status:  Mr. Mame Jeronimo was awake and alert, smiling during family training with his sister. Treatment & Interventions:   Mr. Mame Jeronimo was seen with his sister this morning for family training. SLP discussed with his sister the s/s of his mild dysphagia, the MBS, and the importance of using safe swallowing techniques. Estelle Salas were reviewed with Mr. James Heaton and his family. Patient was seen at lunchtime and continues to need some cuing for use of safe swallowing strategies. Also discussed were treatment goals and patient's inconsistent performance. In response to the SLP's observations, his sister reported that he very rarely spoke in more than phrases to answer the question. He does not initiate interactions and will frequently respond, \"I don't know\" just to avoid providing an explanation. At home, sometimes interaction consists of yes/no questions and responses. This is consistent with what the SLP has seen in sessions. Mr. Jesus Chaudhry sister reports that with her, he is at his functional baseline. Patient was a participant in this conversation and agrees that his sister's observations are correct. SLP has recommended follow up post discharge. This may not be necessary of fruitful, given the information provided by family. Response & Tolerance to Activities:  Patient more an active participant today, though verbal responses still limited in length. He appears happy to be going home. Pain:  Pain Scale 1: Numeric (0 - 10)  No report of pain     After treatment:   [x]       Patient left in no apparent distress sitting up in chair  []       Patient left in no apparent distress in bed  []       Call bell left within reach  []       Nursing notified  [x]       Caregiver present  []       Bed alarm activated    ASSESSMENT:   Progression toward goals:  []       Improving appropriately and progressing toward goals  [x]       Improving slowly and progressing toward goals  []       Not making progress toward goals and plan of care will be adjusted    PLAN:   Patient continues to benefit from skilled intervention to address the above impairments. Continue treatment per established plan of care. Discharge Recommendations:  Outpatient therapy for OT/PT.     Estimated LOS: Through 5/15/18    ALVA Sosa  Time Calculation:  70 Shelia

## 2018-05-14 NOTE — ANCILLARY DISCHARGE INSTRUCTIONS
[facility] - [place]  [address]  [phone] fax    Patient name: Marlen Ruiz Start of Care: 2018   Referral source: Velvet Connor MD : [id number]                         Medical Diagnosis: CVA (cerebrovascular accident) [facility]) [I63.9] Onset Date:CVA 18                         Treatment Diagnosis: CVA with Left Danny   Prior Hospitalization: see medical history Provider#: [id number]   Medications: Verified on Patient summary List    Comorbidities: Kidney disease, Depression, HTN. Prior Level of Function: Used to be an active community ambulator. Ambulates with a [employer] currently. Lives with his sister and has an [address] [facility] 6/hrs-day to help with dressing and grooming. Currently smokes 10 cigarettes /day.      Physical Therapy Discharge Summary  Visits from Start of Care: 1    Missed Visits: 0        Summary of Care:  Goals not assessed.   Pt was Hospitalized and in [address] [facility] 28    ASSESSMENT/RECOMMENDATIONS:  [x]Discontinue therapy: []Patient has reached or is progressing toward set goals      []Patient is non-compliant or has abdicated      []Due to lack of appreciable progress towards set goals    Shantel Dominguez, PT 2018 2:25 PM

## 2018-05-14 NOTE — PROGRESS NOTES
Alonzo spoke with pt who states that he wants to go back to [facility] [address] for outpt therapy. Sw set the eval appointments. Sw discussed tub transfer bench and bedside commode with the pt and he was unable to recall if he had these items. Pt consented to calling his sister. Alonzo spoke with the pt's sister who states that she has a bedside commode and tub transfer bench at home. Sw described each item to ensure sister understood. Pt will dc to home tomorrow at 1130.

## 2018-05-15 VITALS
DIASTOLIC BLOOD PRESSURE: 60 MMHG | OXYGEN SATURATION: 96 % | RESPIRATION RATE: 18 BRPM | HEART RATE: 69 BPM | WEIGHT: 218.48 LBS | HEIGHT: 74 IN | TEMPERATURE: 97.3 F | SYSTOLIC BLOOD PRESSURE: 132 MMHG | BODY MASS INDEX: 28.04 KG/M2

## 2018-05-15 PROBLEM — E87.5 HYPERKALEMIA: Status: ACTIVE | Noted: 2018-05-14

## 2018-05-15 LAB — POTASSIUM SERPL-SCNC: 5.2 MMOL/L (ref 3.5–5.5)

## 2018-05-15 PROCEDURE — 84132 ASSAY OF SERUM POTASSIUM: CPT | Performed by: INTERNAL MEDICINE

## 2018-05-15 PROCEDURE — 74011250637 HC RX REV CODE- 250/637: Performed by: INTERNAL MEDICINE

## 2018-05-15 PROCEDURE — 36415 COLL VENOUS BLD VENIPUNCTURE: CPT | Performed by: INTERNAL MEDICINE

## 2018-05-15 RX ORDER — TOLTERODINE 4 MG/1
4 CAPSULE, EXTENDED RELEASE ORAL DAILY
COMMUNITY
End: 2020-12-04

## 2018-05-15 RX ORDER — HYDROCORTISONE 1 %
CREAM (GRAM) TOPICAL 2 TIMES DAILY
COMMUNITY
End: 2018-05-15

## 2018-05-15 RX ORDER — LANOLIN ALCOHOL/MO/W.PET/CERES
325 CREAM (GRAM) TOPICAL
Qty: 15 TAB | Refills: 0 | Status: SHIPPED | OUTPATIENT
Start: 2018-05-16 | End: 2020-12-04

## 2018-05-15 RX ORDER — GUAIFENESIN 100 MG/5ML
81 LIQUID (ML) ORAL DAILY
COMMUNITY

## 2018-05-15 RX ORDER — CHOLECALCIFEROL (VITAMIN D3) 125 MCG
100 CAPSULE ORAL DAILY
Qty: 15 CAP | Refills: 0 | Status: SHIPPED | OUTPATIENT
Start: 2018-05-16 | End: 2020-12-04

## 2018-05-15 RX ORDER — DICYCLOMINE HYDROCHLORIDE 20 MG/1
20 TABLET ORAL 2 TIMES DAILY
COMMUNITY
End: 2018-05-15

## 2018-05-15 RX ORDER — CHLORPHENIRAMINE MALEATE 4 MG
TABLET ORAL 2 TIMES DAILY
COMMUNITY
End: 2018-05-15

## 2018-05-15 RX ORDER — CHOLECALCIFEROL (VITAMIN D3) 125 MCG
1 CAPSULE ORAL AS NEEDED
COMMUNITY
End: 2020-12-04

## 2018-05-15 RX ORDER — LOSARTAN POTASSIUM 100 MG/1
100 TABLET ORAL DAILY
COMMUNITY
End: 2018-05-15

## 2018-05-15 RX ORDER — ASCORBIC ACID 250 MG
250 TABLET ORAL
Qty: 15 TAB | Refills: 0 | Status: SHIPPED | OUTPATIENT
Start: 2018-05-16 | End: 2020-12-04

## 2018-05-15 RX ORDER — FUROSEMIDE 20 MG/1
20 TABLET ORAL DAILY
COMMUNITY
End: 2018-05-15

## 2018-05-15 RX ORDER — HYDRALAZINE HYDROCHLORIDE 25 MG/1
25 TABLET, FILM COATED ORAL AS NEEDED
COMMUNITY
End: 2018-05-15

## 2018-05-15 RX ORDER — CHOLECALCIFEROL TAB 125 MCG (5000 UNIT) 125 MCG
5000 TAB ORAL DAILY
COMMUNITY
End: 2020-12-04

## 2018-05-15 RX ORDER — ESCITALOPRAM OXALATE 10 MG/1
10 TABLET ORAL EVERY EVENING
Qty: 15 TAB | Refills: 0 | Status: SHIPPED | OUTPATIENT
Start: 2018-05-15 | End: 2020-12-04

## 2018-05-15 RX ADMIN — CYANOCOBALAMIN TAB 1000 MCG 1000 MCG: 1000 TAB at 09:09

## 2018-05-15 RX ADMIN — CHOLECALCIFEROL CAP 125 MCG (5000 UNIT) 5000 UNITS: 125 CAP at 09:08

## 2018-05-15 RX ADMIN — DOCUSATE SODIUM 100 MG: 100 CAPSULE, LIQUID FILLED ORAL at 09:09

## 2018-05-15 RX ADMIN — EZETIMIBE 10 MG: 10 TABLET ORAL at 09:08

## 2018-05-15 RX ADMIN — Medication 100 MG: at 09:10

## 2018-05-15 RX ADMIN — FERROUS SULFATE TAB 325 MG (65 MG ELEMENTAL FE) 325 MG: 325 (65 FE) TAB at 09:08

## 2018-05-15 RX ADMIN — Medication 250 MG: at 09:09

## 2018-05-15 RX ADMIN — LACTOBACILLUS TAB 2 TABLET: TAB at 09:07

## 2018-05-15 RX ADMIN — ASPIRIN 81 MG 81 MG: 81 TABLET ORAL at 09:10

## 2018-05-15 RX ADMIN — TAMSULOSIN HYDROCHLORIDE 0.4 MG: 0.4 CAPSULE ORAL at 09:09

## 2018-05-15 NOTE — DISCHARGE SUMMARY
[facility] PHYSICAL REHABILITATION  [address] [place] [address], [address]     INPATIENT REHABILITATION  DISCHARGE SUMMARY    Name: Marcia Wei MRN: [id number]   Age / Sex: 67 y.o. / male CSN: [id number]   YOB: 1945 Length of Stay: 15 days   Admit Date: 4/30/2018 Discharge Date: 5/15/2018       PRIMARY CARE PHYSICIAN: MAGUE Verde      DISCHARGE DIAGNOSES:    Primary Rehabilitation Diagnosis  1. Generalized Weakness with Impaired Mobility and ADLs  2. S/P Diagnostic laparoscopy followed by open appendectomy with drainage of appendiceal abscess (4/20/2018 - Dr. Cruzito Childers)  3.  History of acute appendicitis with appendiceal abscess      Comorbidities   History of stroke with residual deficit I69.30    Hypertensive heart and kidney disease without heart failure and with chronic kidney disease stage III I13.10, Z58.0    Diastolic dysfunction without heart failure I51.9    Dyslipidemia E78.5    CKD (chronic kidney disease) stage 3, GFR 30-59 ml/min N18.3    History of trichomonal urethritis Z86.19    Obesity, Class I, BMI 30-34.9 E66.9    History of noncompliance with medical treatment, presenting hazards to health Z91.19    History of tobacco use Z87.891    History of vitamin D deficiency Z86.39    Hemiparesis affecting left side as late effect of cerebrovascular accident (CVA)  I69.354    Dysphagia as late effect of cerebrovascular accident (CVA) I69.391    Postoperative atrial fibrillation (HCC) I97.89, I48.91    Overactive bladder N32.81    Benign prostatic hyperplasia N40.0    Postoperative urinary retention N99.89, R33.8    Postoperative confusion R41.0    Acute blood loss as cause of postoperative anemia D62    Vitamin D deficiency E55.9    Acute renal failure superimposed on stage 3 chronic kidney disease  N17.9, N18.3    Depression F32.9        CONSULTS CALLED: None      PROCEDURES DONE: None      BRIEF HISTORY: The patient is a 59-year-old Black male with multiple medical comorbidities who was admitted to [facility] on 4/19/2018 due to abdominal pain. The patient was apparently well until 9 days prior to admission, the patient had been having generalized abdominal pain and anorexia. On the morning of admission, the patient was brought to the office of his PCP where labs were done. Patient went home and the patient ws called due to abnormal labs but no one picked up so the police was dispatched to his house and he was brought to the [facility] Emergency Department for further evaluation.     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     WBC count was elevated at 24. 6.     Patient was empirically started on Cefepime IV and Levofloxacin IV.     The patient was admitted under the service of [address] [place] [address] (Dr. Hallie Pollard).     General Surgery consult (Dr. Jaskaran Gutiérrez) was called for evaluation and comanagement.     The patient underwent a Diagnostic laparoscopy followed by open appendectomy with drainage of appendiceal abscess on 4/20/2018 done by Dr. Jaskaran Gutiérrez. The patient tolerated the procedure well with no intraoperative complications. The patient developed acute postoperative blood loss anemia but no blood transfusion was given. Pain was controlled with oral opioids. Sequential compression devices were used for DVT prophylaxis.     Urology consult (Dr. Darya Coy) was called for evaluation and comanagement of postoperative urinary retention and failed multiple attempts at placing an indwelling lew catheter. A 16 Kinyarwanda lew catheter was placed on 4/20/2018. Patient was started on Tamsulosin 0.4 mg PO once daily.     Postoperatively, the patient was noted with confusion. A sitter was provided.      On 4/22/2018, due to persistent leukocytosis, Cefepime IV and Levofloxacin IV were discontinued and the patient was started on Vancomycin IV, Piperacillin-Tazobactam IV and Fluconazole IV.     Infectious Diseases consult (Dr. Solange Sharma) was called for evaluation and comanagement of antibiotics. On 4/23/2018, Piperacillin-Tazobactam IV and Vancomycin IV were discontinued, Fluconazole IV dose was increased, and added Meropenem IV and Levofloxacin IV.     CT scan of the abdomen and pelvis (4/24/2018) showed an interval appendectomy and drainage of right lower quadrant abscess; satisfactory postoperative appearance for recent surgery; no residual sizable or drainable collection; some distention of small bowel, fluid-filled - this may reflect postoperative ileus; some third spacing of fluid with small volume pleural effusions.     On 4/25/2018, Levofloxacin IV was discontinued.     On 4/26/2018, Meropenem IV was discontinued.     Modified barium swallow (4/27/2018) showed there is moderate amount of vallecular and piriform sinus residual as well as oral delay with thin barium; premature spillage and oral delay with solid or cracker; mild oral delay with pudding; the patient demonstrated no laryngeal penetration or aspiration.       On 4/30/2018, WBC count (12.5) had normalized and Piperacillin-Tazobactam IV was discontinued.     The patient had remained hemodynamically stable but due to the above events, the patient was noted to be generally weak and with impaired mobility and ADLs. Patient was felt to be a good candidate for acute inpatient rehabilitation. Upon evaluation by Physical Therapy and Occupational Therapy, the patient was recommended for acute inpatient rehabilitation.  The patient was discharged and was subsequently admitted to the [facility] for intensive rehabilitation to help recover strength, function and mobility. COURSE IN THE HOSPITAL: Upon admission to the [facility], the patient underwent physical therapy, occupational therapy and speech therapy. The patient was able to actively participate in the rehabilitation activities and progressed well. On discharge, the patient was able to perform the following activities:    1. Occupational Therapy    ON ADMISSION ON DISCHARGE   Eating  Functional Level: 4   Eating  Functional Level: 4     Grooming  Functional Level: 4   Grooming  Functional Level: 7     Bathing  Functional Level: 5   Bathing  Functional Level: 5     Upper Body Dressing  Functional Level: 5   Upper Body Dressing  Functional Level: 5     Lower Body Dressing  Functional Level: 6   Lower Body Dressing  Functional Level: 6     Toileting  Functional Level: 0   Toileting  Functional Level: 3     Toilet Transfers  Toilet Transfer Score: 4   Toilet Transfers  Toilet Transfer Score: 5     Tub /Shower Transfers  Tub/Shower Transfer Score: 0   Tub/Shower Transfers  Tub/Shower Transfer Score: 5       2.  Physical Therapy    ON ADMISSION ON DISCHARGE   Wheelchair Mobility/Management  Able to Propel (ft): 216 feet  Functional Level: 3  Curbs/Ramps Assist Required (FIM Score): 0 (Not tested)  Wheelchair Setup Assist Required : 3 (Moderate assistance)  Wheelchair Management: Manages left brake, Manages right brake Wheelchair Mobility/Management  Able to Propel (ft): 160 feet  Functional Level: 6  Curbs/Ramps Assist Required (FIM Score): 0 (Not tested)  Wheelchair Setup Assist Required : 3 (Moderate assistance)  Wheelchair Management: Manages left brake, Manages right brake     Gait  Amount of Assistance: 4 (Minimal assistance)  Distance (ft): 20 Feet (ft) (c 2 trials)  Assistive Device: Cane, quad Gait  Amount of Assistance: 5 (Supervision/setup)  Distance (ft): 150 Feet (ft)  Assistive Device: Walker, rolling     Balance-Sitting/Standing  Sitting - Static: Good (unsupported)  Sitting - Dynamic: Fair (occasional)  Standing - Static: Fair  Standing - Dynamic : Impaired Balance-Sitting/Standing  Sitting - Static: Good (unsupported)  Sitting - Dynamic: Fair (occasional)  Standing - Static: Poor  Standing - Dynamic : Impaired     Bed/Mat Mobility  Rolling Right : 5 (Supervision)  Rolling Left : 5 (Supervision)  Supine to Sit : 4 (Minimal assistance)  Sit to Supine : 4 (Minimal assistance) Bed/Mat Mobility  Rolling Right : 6 (Modified independent)  Rolling Left : 6 (Modified independent)  Supine to Sit : 6 (Modified independent)  Sit to Supine : 6 (Modified independent)     Transfers  Transfer Type: Other  Other: stand step without AD  Transfer Assistance : 4 (Minimal assistance)  Sit to Stand Assistance: Minimal assistance  Car Transfers: Not tested  Car Type: n/a Transfers  Transfer Type:  Other  Other: Stand Step with RW  Transfer Assistance : 5 (Supervision/setup)  Sit to Stand Assistance: Supervision  Car Transfers:  (CGA)  Car Type: Car Transfer Trainer     Steps or Stairs  Steps/Stairs Ambulated (#): 0  Level of Assist : 0 (Not tested) (2/2 pt fatigue)  Rail Use:  (N/A) Steps or Stairs  Steps/Stairs Ambulated (#): 1 (6\" curb)  Level of Assist : 4 (Contact guard assistance)  Rail Use: None (RW)       3. Speech and Language Pathology    ON ADMISSION ON DISCHARGE   Comprehension (Native Language)  Primary Mode of Comprehension: Auditory  Score: 5 Comprehension (Native Language)  Primary Mode of Comprehension: Auditory  Score: 5     Expression (Native Language)  Primary Mode of Expression: Verbal  Score: 5   Expression (Native Language)  Primary Mode of Expression: Verbal  Score: 5     Social Interaction/Pragmatics  Score: 5 Social Interaction/Pragmatics  Score: 5     Problem Solving  Score: 4   Problem Solving  Score: 4     Memory  Score: 5 Memory  Score: 5       Legend:   7 - Independent   6 - Modified Independent   5 - Standby Assistance / Supervision / Set-up   4 - Minimum Assistance / Contact Guard Assistance   3 - Moderate Assistance   2 - Maximum Assistance   1 - Total Assistance / Dependent       ACUTE MEDICAL ISSUES ADDRESSED IN INPATIENT REHABILITATION FACILITY:     > S/P Diagnostic laparoscopy followed by open appendectomy with drainage of appendiceal abscess (4/20/2018 - Dr. Mary Ann Rojas);  History of acute appendicitis with appendiceal abscess   > WBC count (5/1/2018, on admission) = 13.0   > STEVEN drain was removed on the AM of 5/1/2018 by Dr. Mary Ann Rojas   > On 5/3/2018, patient had completed the recommended treatment course of Fluconazole 400 mg PO once daily   > WBC count (5/13/2018) = 8.5   > WBC count (5/14/2018) = 9.3              > During the patient's stay at the ARU, the patient was given Floranex 2 tabs PO BID    > Acute Postoperative Blood Loss Anemia   > Hgb/Hct (5/1/2018, on admission) = 9.7/29.7   > Anemia work-up showed serum iron 27, TIBC 233, iron % saturation 12, ferritin 356, reticulocyte count 1.0   > Hgb/Hct (5/7/2018) = 9.5/29.8    > Continue:    > FeSO4 325 mg PO once daily with breakfast     > Ascorbic Acid 250 mg PO once daily with breakfast (to enhance the absorption of the FeSO4)    > Benign prostatic hyperplasia / Overactive bladder / Postoperative urinary retention   > On 5/1/2018, added Terazosin 1 mg PO q PM (6PM)   > On 5/5/2018, discontinued Terazosin 1 mg PO q PM (6PM)   > Continue Tamsulosin 0.4 mg PO once daily    > Hypertensive heart and kidney disease without heart failure and with chronic kidney disease stage 3   > On 5/1/2018:    > Discontinued Labetalol 100 mg PO q 12 hr (6AM, 6PM)    > Terazosin 1 mg PO q PM (6PM)   > On 5/3/2018, decreased Amlodipine from 10 mg to 5 mg PO once daily (6AM)   > On 5/5/2018:    > Discontinued Terazosin 1 mg PO q PM (6PM)    > Decreased Amlodipine from 5 mg to 2.5 mg PO once daily (6AM)   > On 5/7/2018, discontinued Amlodipine 2.5 mg PO once daily (6AM)     > Postoperative confusion, resolved   > Will monitor    > Vitamin D Deficiency   > Vitamin D 25-Hydroxy (5/1/2018) = 17.9   > On 5/2/2018, patient was given Cholecalciferol 50,000 units PO x 1 dose    > On 5/3/2018, started Cholecalciferol 5,000 units PO once daily    > Continue Cholecalciferol 5,000 units PO once daily     > History of stroke with residual left hemiparesis and dysphagia    > Lipid profile (5/2/2018) showed , , HDL 26, LDL 59   > Baseline hepatic function tests and CK:    > On 4/22/2018:     > ALT = 45     > AST = 37     > CK = 453    > On 5/2/2018:     > ALT = 90     > AST = 50     > CK = 103   > Patient's record state Atorvastatin causes and elevated CK level - patient and sister not aware of this   > Patient had been on Ezetimibe for dyslipidemia   > Patient had not been on a statin most likely due to documentation of CK elevation due to Atorvastatin   > On 5/2/2018:    > Patient was started on a trial of Simvastatin 20 mg PO q HS for secondary stroke prevention and plaque stabilization    > Discontinued Zetia    > Will monitor LFTs    > Advanced diet from NDD2 to NDD3   > On 5/3/2018, added Coenzyme Q10 100 mg PO once daily   > On 5/7/2018:    > ALT = 226    > AST = 130    > CK = 91    > Discontinued Simvastatin 20 mg PO q HS    > Resumed Ezetimibe 10 mg PO once daily   > On 5/10/2018:    > ALT = 155    > AST = 51   > On 5/14/2018:    > ALT = 104    > AST = 35   > Continue:    > Aspirin 81 mg PO once daily with breakfast    > Ezetimibe 10 mg PO once daily    > Coenzyme Q10 100 mg PO once daily    > Acute renal failure superimposed on stage 3 chronic kidney disease    > BUN/Creatinine (5/1/2018, on admission) = 13/1.27   > BUN/Creatinine (5/7/2018) = 30/1.59   > Patient was advised to increase oral fluid intake   > On 5/7/2018, patient was given IVF: 0.9%  ml/hr x 1 liter   > On 5/8/2018, patient was given IVF: 0.9%  ml/hr x 1 liter   > BUN/Creatinine (5/10/2018) = 23/1.48   > On 5/10/2018, patient was given IVF: 0.9%  ml/hr x 1 liter    > BUN/Creatinine (5/13/2018) = 24/1.60    > BUN/Creatinine (5/14/2018) = 29/1.74    > On 5/14/2018, patient was given IVF: 0.9%  ml/hr x 1 liter     > Depression   > Patient had been seen and evaluated by our Clinical Psychologist (Dr. Alysia Reyes) and patient was noted to have a depressed mood which, after discussion with the therapy staff, appears to be affecting the patient's progress in rehabilitation   > On 5/8/2018, started Escitalopram 10 mg PO q PM   > Continue Escitalopram 10 mg PO q PM    > Hyperkalemia   > K (5/13/2018) = 5.3   > K (5/14/2018, 6AM) = 5.8   > On 5/14/2018, patient was given Kayexalate 30 grams PO x 1 dose    > K (5/14/2018, 6PM) = 4.9   > K (5/15/2018, 6AM) = 5.2    > Analgesia              > During the patient's stay at the ARU, the patient was given:     > Acetaminophen 650 mg PO q 4 hr PRN for pain level less than 5/10    > Norco 5/325 1 tab PO q 4 hr PRN for pain level greater than 4/10       MEDICATIONS ON DISCHARGE:    Current Discharge Medication List      START taking these medications    Details   co-enzyme Q-10 (CO Q-10) 100 mg capsule Take 1 Cap by mouth daily. Qty: 15 Cap, Refills: 0    Associated Diagnoses: History of stroke with residual deficit      ferrous sulfate 325 mg (65 mg iron) tablet Take 1 Tab by mouth daily (with breakfast). Qty: 15 Tab, Refills: 0    Associated Diagnoses: Acute blood loss as cause of postoperative anemia      escitalopram oxalate (LEXAPRO) 10 mg tablet Take 1 Tab by mouth every evening. Indications: Depression  Qty: 15 Tab, Refills: 0    Associated Diagnoses: Depression, unspecified depression type      ascorbic acid, vitamin C, (VITAMIN C) 250 mg tablet Take 1 Tab by mouth daily (with breakfast).   Qty: 15 Tab, Refills: 0    Associated Diagnoses: Acute blood loss as cause of postoperative anemia         CONTINUE these medications which have NOT CHANGED    Details   tolterodine ER (DETROL LA) 4 mg ER capsule Take 4 mg by mouth daily. lactase (LACTAID) 3,000 unit tablet Take 1 Tab by mouth as needed. aspirin 81 mg chewable tablet Take 81 mg by mouth daily. cholecalciferol, VITAMIN D3, (VITAMIN D3) 5,000 unit tab tablet Take 5,000 Units by mouth daily. tamsulosin (FLOMAX) 0.4 mg capsule Take 1 Cap by mouth daily. Qty: 10 Cap, Refills: 0      docusate sodium (COLACE) 100 mg capsule Take 1 Cap by mouth daily for 90 days. Qty: 30 Cap, Refills: 0      cyanocobalamin (VITAMIN B-12) 1,000 mcg tablet Take 1,000 mcg by mouth daily. ezetimibe (ZETIA) 10 mg tablet Take 10 mg by mouth daily.          STOP taking these medications       losartan (COZAAR) 100 mg tablet Comments:   Reason for Stopping:         hydrALAZINE (APRESOLINE) 25 mg tablet Comments:   Reason for Stopping:         furosemide (LASIX) 20 mg tablet Comments:   Reason for Stopping:         dicyclomine (BENTYL) 20 mg tablet Comments:   Reason for Stopping:         clotrimazole (LOTRIMIN AF, CLOTRIMAZOLE,) 1 % topical cream Comments:   Reason for Stopping:         hydrocortisone (CORTAID) 1 % topical cream Comments:   Reason for Stopping:         labetalol (NORMODYNE) 200 mg tablet Comments:   Reason for Stopping:         amLODIPine (NORVASC) 10 mg tablet Comments:   Reason for Stopping:           Estimated Glomerular Filtration Rate:  On admission, estimated GFR based on a Creatinine of 1.27 mg/dl:   Using CKD-EPI = 64.5 mL/min/1.73m2   Using MDRD = 71.5 mL/min/1.73m2  Most recent estimated GFR, based on a Creatinine of 1.74 mg/dl on 5/14/2018:   Using CKD-EPI = 44.1 mL/min/1.73m2   Using MDRD = 49.7 mL/min/1.73m2       DISCHARGE VITAL SIGNS:  Visit Vitals    /60    Pulse 69    Temp 97.3 °F (36.3 °C)    Resp 18    Ht 6' 2\" (1.88 m)    Wt 99.1 kg (218 lb 7.6 oz)    SpO2 96%    BMI 28.05 kg/m2       DISCHARGE PHYSICAL EXAMINATION:  GENERAL SURVEY: Patient is awake, alert, oriented x 3, sitting comfortably on the chair, not in acute respiratory distress. HEENT: pale palpebral conjunctivae, anicteric sclerae, no nasoaural discharge, moist oral mucosa  NECK: supple, no jugular venous distention, no palpable lymph nodes  CHEST/LUNGS: symmetrical chest expansion, good air entry, clear breath sounds  HEART: adynamic precordium, good S1 S2, no S3, regular rhythm, no murmurs  ABDOMEN: flat, bowel sounds appreciated, soft, non-tender  EXTREMITIES: pale nailbeds, (+) bipedal edema, full and equal pulses, no calf tenderness   NEUROLOGICAL EXAM: The patient is awake, alert and oriented x3, able to answer questions fairly appropriately, able to follow 1 and 2 step commands.  Able to tell time from the wall clock.  Cranial nerves II-XII are grossly intact.  No gross sensory deficit.  Motor strength is 4+/5 on BUE, 4-/5 on the RLE, 4/5 on the LLE.      Incision(s): covered, clean, dry, and intact      CONDITION ON DISCHARGE: Stable. DISPOSITION: Patient clinically improved and was discharged home with outpatient physical therapy, occupational therapy and speech therapy. FOLLOW-UP RECOMMENDATIONS:   Follow-up Information     Follow up With Details Comments [id number] On 5/21/2018 Patient has an appointment with PCP Dr. Geovani Webb on May 21, 2018 @ 11:00am. [address]  [facility] [address] [place] [address]      Sherryle Freund, MD On 5/23/2018 Patient has an appointment with Surgeon Dr. Young Espinosa on May 23, 2018 @ 11:00am. [address] [facility]  [address] [place] [address]  [phone]      [facility] On 5/16/2018 Arrive at 80 for an appointment with SPEECH THERAPY at 1000am [address]  [phone]    IN MOTION [facility].  On 5/17/2018 430pm appointment with [address] [place] [address] [address] [facility]  [phone]    IN MOTION [facility]. On 5/21/2018 1000am appointment with PHYSICAL THERAPY [address] [address] [address] Zach Hayward MD On 5/24/2018 Patient has an appointment scheduled with Nephrology Dr. Derrick Chacon. Johan Juan on May 24, 2017 @ 10:30am. Critical access hospital  [phone]            OTHER INSTRUCTIONS:  1. Diet. Dysphagia 3 (soft solids-consistency), low saturated fat diet with thin liquids. 2. Activity. As tolerated. 3. Safety / fall precautions. 4. Aspiration precautions. TIME SPENT ON DISCHARGE ACTIVITIES: More than 30 minutes.       Signed:  Eulice Dance, MD    5/15/2018

## 2018-05-15 NOTE — PROGRESS NOTES
Bedside verbal report given to Desi Barbosa RN (oncoming) by Debbie Ayala RN (offgoing). Report includes SBAR, Kardex, MAR, and recent results.

## 2018-05-15 NOTE — ROUTINE PROCESS
SHIFT CHANGE NOTE FOR [facility]     Bedside and Verbal shift change report given to  Lalito Lindsay, RN (oncoming nurse) by Yara Ceballos, LUCILLE   (offgoing nurse). Report included the following information SBAR, Kardex, MAR and Recent Results.     Situation:                        Code Status: Full Code                        Reason for Admission: appendectomy  Hospital Day: 13                        Problem List:   Hospital Problems  Date Reviewed: 5/10/2018                         Codes Class Noted POA                Depression ICD-10-CM: F32.9  ICD-9-CM: 587   5/8/2018 Yes             Statin intolerance (Chronic) ICD-10-CM: Z78.9  ICD-9-CM: 995.27   5/7/2018 Yes      Overview Signed 5/7/2018  2:19 PM by Fabián Randolph MD        Atorvastatin and Simvastatin                Acute renal failure superimposed on stage 3 chronic kidney disease ([facility] [address] ICD-10-CM: N17.9, N18.3  ICD-9-CM: 584.9, 585. 3   5/7/2018 No             Vitamin D deficiency (Chronic) ICD-10-CM: E55.9  ICD-9-CM: 268. 9   5/1/2018 Yes      Overview Signed 5/1/2018 10:01 AM by Fabián Randolph MD        Vitamin D 25-Hydroxy (5/1/2018) = 17.9                 Overactive bladder (Chronic) ICD-10-CM: N32.81  ICD-9-CM: 596.51   Unknown Yes             Benign prostatic hyperplasia (Chronic) ICD-10-CM: N40.0  ICD-9-CM: 600.00   Unknown Yes             Postoperative urinary retention ICD-10-CM: N99.89, R33.8  ICD-9-CM: 568. 5   4/22/2018 Yes             Postoperative confusion ICD-10-CM: R41.0  ICD-9-CM: 293.9   4/22/2018 Yes             Status post appendectomy ICD-10-CM: Z90.49  ICD-9-CM: V45.89   4/21/2018 Yes      Overview Addendum 4/30/2018  4:41 PM by Fabián Randolph MD        S/P Diagnostic laparoscopy followed by open appendectomy with drainage of appendiceal abscess (4/20/2018 - Dr. Low Montejo)                Acute blood loss as cause of postoperative anemia ICD-10-CM: D62  ICD-9-CM: 285. 1   4/21/2018 Yes             Generalized weakness ICD-10-CM: R53.1  ICD-9-CM: 780.79   4/20/2018 Yes             * (Principal)Acute appendicitis with peritoneal abscess ICD-10-CM: K35.3  ICD-9-CM: 540. 1   4/20/2018 Yes             Hypertensive heart and kidney disease without heart failure and with chronic kidney disease stage III (Chronic) ICD-10-CM: I13.10, N18.3  ICD-9-CM: 404.90, 585.3   9/2/2015 Yes                    Background:                        Past Medical History:        Past Medical History:   Diagnosis Date    Acute blood loss as cause of postoperative anemia 4/21/2018    Benign prostatic hyperplasia      CKD (chronic kidney disease) stage 3, GFR 30-59 ml/min 9/1/2015    Depression 9/8/9630    Diastolic dysfunction without heart failure 9/2/2015     Grade 1 diastolic dysfunction on 2D ECHO done 9/02/2015    Dyslipidemia 9/2/2015    Dysphagia as late effect of cerebrovascular accident (CVA) 9/1/2015    Hemiparesis affecting left side as late effect of cerebrovascular accident (CVA) ([facility] [address] 9/1/2015    History of noncompliance with medical treatment, presenting hazards to health 9/1/2015    History of stroke with residual deficit 9/1/2015     Acute Ischemic Stroke (early subacute infarct at the posterior right lentiform nucleus) with residual left hemiparesis and dysphagia     History of tobacco use 9/1/2015    History of trichomonal urethritis 9/1/2015    History of vitamin D deficiency 9/6/2015    Hypertensive heart and kidney disease without heart failure and with chronic kidney disease stage III 9/2/2015    Obesity, Class I, BMI 30-34.9 9/1/2015    Overactive bladder      Postoperative atrial fibrillation ([facility] [address] 4/21/2018     Resolved    Postoperative confusion 4/22/2018    Postoperative urinary retention 4/22/2018    Statin intolerance 05/07/2018     Atorvastatin and Simvastatin    Vitamin D deficiency 5/1/2018     Vitamin D 25-Hydroxy (5/1/2018) = 17.9                             Patient taking anticoagulants no Patient has a defibrillator: no      Assessment:  ¨ Changes in Assessment throughout shift: no     ¨ Patient has central line: no Reasons if yes: Insertion date: Last dressing date:  ¨ Patient has Chow Cath: yes Reasons if yes:     Insertion date:     ¨ Last Vitals:             Vitals:     05/12/18 0800 05/12/18 1554 05/12/18 2200 05/13/18 0803   BP: 101/65 119/75 115/75 111/67   Pulse: 69 66 78 64   Resp: 18 18 19 18   Temp: 97.1 °F (36.2 °C) 96.7 °F (35.9 °C) 98.5 °F (36.9 °C) 97.3 °F (36.3 °C)   SpO2: 99% 98% 95% 98%   Weight:           Height:                 · PAIN                                              Pain Assessment                                              Pain Intensity 1: 0 (05/13/18 0803) Pain Intensity 1: 2 (12/29/14 1105)                                              Pain Location 1: Generalized Pain Location 1: Abdomen                                              Pain Intervention(s) 1: Medication (see MAR) Pain Intervention(s) 1: Medication (see MAR)  Patient Stated Pain Goal: 0 Patient Stated Pain Goal: 0  ¨ Intervention effective: yes    ¨ Other actions taken for pain: no     · Skin Assessment  Skin color Skin Color: Appropriate for ethnicity  Condition/Temperature Skin Condition/Temp: Warm  Integrity Skin Integrity: Incision (comment)  Turgor Turgor: Non-tenting  Weekly Pressure Ulcer Documentation  Pressure  Injury Documentation: No Pressure Injury Noted-Pressure Ulcer Prevention Initiated  Wound Prevention & Protection Methods  Orientation of wound Orientation of Wound Prevention: Posterior  Location of Prevention Location of Wound Prevention: Sacrum/Coccyx  Dressing Present Dressing Present : No  Dressing Status Dressing Status: Intact  Wound Offloading Wound Offloading (Prevention Methods): Bed, pressure redistribution/air, Chair cushion, Wheelchair     · INTAKE/OUPUT     Date 05/12/18 0700 - 05/13/18 0659 05/13/18 0700 - 05/14/18 0659   Shift 1075-8667 2069-8544 24 Hour Total 4722-3994 1910-4673 24 Hour Total   I  N  T  A  K  E  P. O.  420   420      P. O.  420   420    Shift Total  (mL/kg) 780  (7.9) 240  (2.4) 1020  (10.3) 420  (4.2)   420  (4.2)   O  U  T  P  U  T  Urine  (mL/kg/hr)                  Urine Occurrence(s) 3 x 3 x 6 x 1 x   1 x    Stool                  Stool Occurrence(s) 1 x 0 x 1 x 0 x   0 x    Shift Total  (mL/kg)                928 1161 420   420   Weight (kg) 99.1 99.1 99.1 99.1 99.1 99.1         Recommendations:  1. Patient needs and requests: education     2. Diet: cardiac soft     3. Pending tests/procedures: no      4. Functional Level/Equipment: 1 x person asssit     5. Estimated Discharge Date: TDB Posted on Whiteboard in Patients Room: [facility] Safety Check     A safety check occurred in the patient's room between off going nurse and oncoming nurse listed above.     The safety check included the below items  Area Items   H  High Alert Medications § Verify all high alert medication drips (heparin, PCA, etc.)   E  Equipment § Suction is set up for ALL patients (with idpti)  § Red plugs utilized for all equipment (IV pumps, etc.)  § WOWs wiped down at end of shift. § Room stocked with oxygen, suction, and other unit-specific supplies   A  Alarms § Bed alarm is set for fall risk patients  § Ensure chair alarm is in place and activated if patient is up in a chair   L  Lines § Check IV for any infiltration  § Chow bag is empty if patient has a Chow   § Tubing and IV bags are labeled   S  Safety § Room is clean, patient is clean, and equipment is clean. § Hallways are clear from equipment besides carts. § Fall bracelet on for fall risk patients  § Ensure room is clear and free of clutter  § Suction is set up for ALL patients (with dipti)  § Hallways are clear from equipment besides carts.    § Isolation precautions followed, supplies available outside room, sign posted

## 2018-05-15 NOTE — DISCHARGE INSTRUCTIONS
OUTPATIENT INFUSION CENTER                                                             GENERAL DISCHARGE INSTRUCTIONS           1. If you have any signs or symptoms of infection, or you have recently been diagnosed and treated for an infection,   2. Contact your doctor right away if you have signs of infection, such as fever, chills, sore throat, flu symptoms. 3. Contact your doctor right away if you have easy bruising or bleeding, unusually pale skin, or unusual weakness. 4.  Check with your physician before receiving a \"live\" vaccine during therapy due to a possible decrease in ability to fight infections. 5.  Avoid contact with individuals with known infections. 6.   Wash hands frequently. 7.  All medications can potentially cause side effects. Possible general side effects may include    · Mild abdominal pain, fatigue or headache;  ·  Stuffy nose, sinus pain or mild skin rash;  ·  Mild joint pain or swelling, malaise/fatigue;  ·  Mild hair loss. 6.  Signs/Symptoms of an allergic reaction may require immediate medical attention:     ·  Skin - Redness, itching, swelling, blistering, weeping, crusting, rash,   eruptions, or hives;  ·  Lungs - Wheezing, cough, or shortness of breath;  ·  Cardiac - changes in blood pressure, swelling of extremities,   unexplained weakness, chest pain or tightness;  ·  Swelling of the face, eyelids, lips, tongue, or throat;  severe headache;  ·  Stuffy nose, runny nose (clear, thin discharge), sneezing;   ·  Red (bloodshot), itchy, swollen, or watery eyes;  ·  Stomach  pain, nausea, vomiting, or bloody diarrhea. Contact your physician's office with questions or concerns or if you experience any of the above symptoms.                   Destiny Sims, Signature                                                                           5/15/2018  Lin Marti RN ------------------------------------------------------------------------------------------------------------    DISCHARGE INSTRUCTIONS    1. Make sure that when you request refills at the pharmacy that the refill requests are sent to your PCP (NOT to the prescriber at the [facility]) to avoid any delays in getting your medication refills. The physician at the [facility] [address] will not able to order medications or refills after discharge -- Please understand that though we would like to help, it is simply not safe for our physician to order you a medication that we cannot monitor. 2. If any of the prescribed medications require a prior authorization, contact your Primary Care Physician or specialist to EITHER complete prior authorizations and paperwork on your behalf OR prescribe an alternative medication. -- Please understand that though we would like to help, it is simply not safe for our physician to order you a medication that we cannot monitor. 3. Over-the-counter (OTC) medications will NOT be prescribed. You need to buy these medications over-the-counter. -------------------------------------------------------------------------------------------------------------------  Patient armband removed and shredded  [employer] Activation    Thank you for requesting access to [facility]. Please follow the instructions below to securely access and download your online medical record. [facility] allows you to send messages to your doctor, view your test results, renew your prescriptions, schedule appointments, and more. How Do I Sign Up? 1. In your internet browser, go to [email or web address]  2. Click on the First Time User? Click Here link in the Sign In box. You will be redirect to the New Member Sign Up page. 3. Enter your [facility] Access Code exactly as it appears below.  You will not need to use this code after youve completed the sign-up process. If you do not sign up before the expiration date, you must request a new code. [employer] Access Code: [email or web address]  Expires: 2018 11:32 AM (This is the date your [employer] access code will )    4. Enter the last four digits of your Social Security Number (xxxx) and Date of Birth (mm/dd/yyyy) as indicated and click Submit. You will be taken to the next sign-up page. 5. Create a [employer] ID. This will be your [employer] login ID and cannot be changed, so think of one that is secure and easy to remember. 6. Create a [employer] password. You can change your password at any time. 7. Enter your Password Reset Question and Answer. This can be used at a later time if you forget your password. 8. Enter your e-mail address. You will receive e-mail notification when new information is available in [address] 9. Click Sign Up. You can now view and download portions of your medical record. 10. Click the Download Summary menu link to download a portable copy of your medical information. Additional Information    If you have questions, please visit the Frequently Asked Questions section of the [employer] website at [email or web address] Remember, [employer] is NOT to be used for urgent needs. For medical emergencies, dial 911.

## 2018-05-15 NOTE — PROGRESS NOTES
[x] Psychology  [] Social Work [] Recreational Therapy    INTERVENTION  UNITS/TIME OF SERVICE   Assessment    Supportive Counseling May 14, 2018   Orientation    Discharge Planning    Resource Linkage              Progress/Current Status    Patient seen for individual support  on ARU this morning. He is found sitting passively in bedroom, appearing much more sullen and depressed than recently. He is very self-absorbed. Yet, he indicated that he is glad to be discharged to home, shortly. Patient admits that he continues to feel depressed but does not harbor any feelings of wanting to hurt himself. He has difficulty describing his depressed thoughts and will minimally engage if pressed, but he certainly does not want to initiate much if any conversation about himself. Patient encouraged to persevere in his effort to help himself on discharge, and utilize what he has learned in therapy to manage effectively at home, as home. He is aware that his sister is scheduled to come in for family teaching.     Kike Stovall, [facility] 5/15/2018 9:52 AM

## 2018-05-17 ENCOUNTER — APPOINTMENT (OUTPATIENT)
Dept: PHYSICAL THERAPY | Age: 73
End: 2018-05-17

## 2018-05-18 NOTE — PROGRESS NOTES
Alonzo spoke with pt's  to advise of pt's dc to home on 5/15.  asked for dc clinicals. Alonzo faxed dc summary. No further needs are anticipated.

## 2018-05-21 ENCOUNTER — APPOINTMENT (OUTPATIENT)
Dept: PHYSICAL THERAPY | Age: 73
End: 2018-05-21

## 2018-05-23 ENCOUNTER — OFFICE VISIT (OUTPATIENT)
Dept: SURGERY | Age: 73
End: 2018-05-23

## 2018-05-23 VITALS
TEMPERATURE: 98.5 F | WEIGHT: 215 LBS | HEART RATE: 72 BPM | DIASTOLIC BLOOD PRESSURE: 78 MMHG | OXYGEN SATURATION: 98 % | HEIGHT: 71 IN | SYSTOLIC BLOOD PRESSURE: 132 MMHG | BODY MASS INDEX: 30.1 KG/M2 | RESPIRATION RATE: 16 BRPM

## 2018-05-23 DIAGNOSIS — Z09 POSTOPERATIVE EXAMINATION: Primary | ICD-10-CM

## 2018-05-30 NOTE — PROGRESS NOTES
[address] [place] [address] [facility]  General Surgery    Name: Дмитрий Byrnes MRN: [id number]   : 1945 [facility]   Date: 2018 Admission Date: No admission date for patient encounter. Subjective:  Patient returns today without complaints. Objective:  Vitals:    18 1127   BP: 132/78   Pulse: 72   Resp: 16   Temp: 98.5 °F (36.9 °C)   TempSrc: Oral   SpO2: 98%   Weight: 97.5 kg (215 lb)   Height: 5' 11\" (1.803 m)       Physical Exam:    General: Awake and alert, oriented ×4, no apparent distress   Abdomen: abdomen is soft with minimal generalized tenderness. Incision(s) are  C/D/I. No masses, organomegaly or guarding    Current Medications:  Current Outpatient Prescriptions   Medication Sig Dispense Refill    tolterodine ER (DETROL LA) 4 mg ER capsule Take 4 mg by mouth daily.  lactase (LACTAID) 3,000 unit tablet Take 1 Tab by mouth as needed.  aspirin 81 mg chewable tablet Take 81 mg by mouth daily.  cholecalciferol, VITAMIN D3, (VITAMIN D3) 5,000 unit tab tablet Take 5,000 Units by mouth daily.  co-enzyme Q-10 (CO Q-10) 100 mg capsule Take 1 Cap by mouth daily. 15 Cap 0    ferrous sulfate 325 mg (65 mg iron) tablet Take 1 Tab by mouth daily (with breakfast). 15 Tab 0    escitalopram oxalate (LEXAPRO) 10 mg tablet Take 1 Tab by mouth every evening. Indications: Depression 15 Tab 0    ascorbic acid, vitamin C, (VITAMIN C) 250 mg tablet Take 1 Tab by mouth daily (with breakfast). 15 Tab 0    tamsulosin (FLOMAX) 0.4 mg capsule Take 1 Cap by mouth daily. 10 Cap 0    docusate sodium (COLACE) 100 mg capsule Take 1 Cap by mouth daily for 90 days. 30 Cap 0    cyanocobalamin (VITAMIN B-12) 1,000 mcg tablet Take 1,000 mcg by mouth daily.  ezetimibe (ZETIA) 10 mg tablet Take 10 mg by mouth daily. Chart and notes reviewed. Data reviewed. I have evaluated and examined the patient.         IMPRESSION:   · Patient is 1 month out from laparoscopy open appendectomy.       PLAN:/DISCUSION:   · Patient may resume all normal activities  · Follow-up as needed        Tobias Grace MD

## 2018-06-28 ENCOUNTER — HOSPITAL ENCOUNTER (OUTPATIENT)
Dept: LAB | Age: 73
Discharge: HOME | End: 2018-06-28

## 2018-06-28 PROCEDURE — 99001 SPECIMEN HANDLING PT-LAB: CPT | Performed by: INTERNAL MEDICINE

## 2018-07-02 ENCOUNTER — HOSPITAL ENCOUNTER (OUTPATIENT)
Dept: PHYSICAL THERAPY | Age: 73
Discharge: HOME | End: 2018-07-02
Payer: MEDICARE

## 2018-07-02 PROCEDURE — 97161 PT EVAL LOW COMPLEX 20 MIN: CPT

## 2018-07-02 PROCEDURE — 97110 THERAPEUTIC EXERCISES: CPT

## 2018-07-02 PROCEDURE — G8978 MOBILITY CURRENT STATUS: HCPCS

## 2018-07-02 PROCEDURE — G8979 MOBILITY GOAL STATUS: HCPCS

## 2018-07-02 NOTE — PROGRESS NOTES
[facility] [employer]  JANI  [address] [place] [address]  [phone] fax    Plan of Care/ Statement of Necessity for Physical Therapy Services  Patient name: Kayden Nance of Care: 2018   Referral source: Amber Rebolledo MD : 1945    Medical Diagnosis: CVA (cerebral vascular accident) [facility]) [I63.9]   Onset Date:    Treatment Diagnosis: Impaired gait/balance s/p CVA   Prior Hospitalization: see medical history Provider#: [id number]   Medications: Verified on Patient summary List    Comorbidities: tobacco use, HTN, kidney disease, depression   Prior Level of Function: Independent with ambulation, ADLs before onset in . The Plan of Care and following information is based on the information from the initial evaluation. Assessment/ key information:   Pt is a 67year old male who presents to therapy today with impaired gait/balance s/p CVA in . Per [facility], the pt was seen for an evaluation on 2018 but did not attend therapy follow up visits secondary to being hospitalized for appendectomy per [facility]. Pt reports having no new CVAs since  and denies any recent falls. Pt reports he has an aide that comes to his home 5 days a week to help with bathing/dressing. Pt states he lives with his sister currently. Pt presents to the clinic with a LBQC. Pt demonstrated impaired balance/gait, decreased strength, and impaired mobility overall. TUG time 39 seconds with LBQC. Good stability noted with EO rhomberg on floor. Mild instability noted with EC rhomberg and EO MSR on floor. Pt would benefit from physical therapy to improve the above impairments to help improve the pt's fall risk and increase ease of functional tasks.      Evaluation Complexity History HIGH Complexity :3+ comorbidities / personal factors will impact the outcome/ POC ; Examination MEDIUM Complexity : 3 Standardized tests and measures addressing body structure, function, activity limitation and / or participation in recreation  ;Presentation LOW Complexity : Stable, uncomplicated  ;Clinical Decision Making MEDIUM Complexity : FOTO score of 26-74  Overall Complexity Rating: LOW   Problem List: pain affecting function, decrease ROM, decrease strength, impaired gait/ balance, decrease ADL/ functional abilitiies, decrease activity tolerance, decrease flexibility/ joint mobility and decrease transfer abilities   Treatment Plan may include any combination of the following: Therapeutic exercise, Therapeutic activities, Neuromuscular re-education, Physical agent/modality, Gait/balance training, Manual therapy, Patient education, Self Care training, Functional mobility training, Home safety training and Stair training  Patient / Family readiness to learn indicated by: asking questions, trying to perform skills and interest  Persons(s) to be included in education: patient (P) and family support person (FSP);list pt's sister  Barriers to Learning/Limitations: yes;  sensory deficits-vision/hearing/speech  Patient Goal (s): walk better  Patient Self Reported Health Status: fair  Rehabilitation Potential: fair    Short Term Goals: To be accomplished in 2 weeks:  1. Pt will report compliance and independence to [facility] to help the pt manage their pain and symptoms. Long Term Goals: To be accomplished in 8 weeks:  1. Pt will increase FOTO score to 62 points to improve ability to perform ADLs. 2. Pt will increase MMT left hip flex to 4/5, B hip IR/ER to 4/5 to improve ability to ambulate with more ease. 3. Pt will improve TUG time to <30 seconds with LBQC to improve the pt's fall risk. 4. Pt will perform EC rhomberg on foam for 30 seconds with no LOB and mild to no instability to improve stability with gait. Frequency / Duration: Patient to be seen 2 times per week for 8 weeks.     Patient/ Caregiver education and instruction: Diagnosis, prognosis, self care, activity modification and exercises   [x]  Plan of care has been reviewed with PTA    G-Codes (GP)  Mobility   Current  CK= 40-59%   Goal  CJ= 20-39%    The severity rating is based on clinical judgment and the FOTO score. Certification Period: 7/2/2018 - 8/30/2018  Anila Diallo, PT 7/2/2018 2:59 PM  _____________________________________________________________________  I certify that the above Therapy Services are being furnished while the patient is under my care. I agree with the treatment plan and certify that this therapy is necessary.     Physician's Signature:____________________  Date:__________Time:______    Please sign and return to [facility] [employer]  [address] [place] [address]  [phone] fax

## 2018-07-02 NOTE — PROGRESS NOTES
PT DAILY TREATMENT NOTE - [facility]     Patient Name: Ashly Gomez  Date:2018  : 1945  [x]  Patient  Verified  Payor: [address] / Plan: [address] / Product Type: Managed Care Medicare /    In time:2:15  Out time:2:58  Total Treatment Time (min): 43  Total Timed Codes (min): 10  1:1 Treatment Time ( [address] only): 37   Visit #: 1  16    Treatment [facility]: [facility] [I63.9]    SUBJECTIVE  Pain Level (0-10 scale): 0  Any medication changes, allergies to medications, adverse drug reactions, diagnosis change, or new procedure performed?: [x] No    [] Yes (see summary sheet for update)  Subjective functional status/changes:   [] No changes reported  See POC    OBJECTIVE    33 min [x]Eval                  []Re-Eval     10 min Therapeutic Exercise:  [] See flow sheet : HEP instruction and demonstration, pt education regarding anatomy and physiology of the LEs and how it relates to the pt's condition. Rationale: increase ROM and increase strength to improve the patients ability to tolerate ADLs          With   [] TE   [] TA   [] neuro   [] other: Patient Education: [x] Review HEP    [] Progressed/Changed HEP based on:   [] positioning   [] body mechanics   [] transfers   [] heat/ice application    [] other:      Other Objective/Functional Measures: See evaluation. Pain Level (0-10 scale) post treatment: 0    ASSESSMENT/Changes in Function: Pt given HEP handout to perform. Pt understood exercises in HEP handout. Pt demonstrated impaired balance/gait, decreased strength, and impaired mobility overall. TUG time 39 seconds with LBQC. Good stability noted with EO rhomberg on floor. Mild instability noted with EC rhomberg and EO MSR on floor. Pt would benefit from physical therapy to improve the above impairments to help improve the pt's fall risk and increase ease of functional tasks.      Patient will continue to benefit from skilled PT services to modify and progress therapeutic interventions, address functional mobility deficits, address ROM deficits, address strength deficits, analyze and address soft tissue restrictions, analyze and cue movement patterns, analyze and modify body mechanics/ergonomics, address imbalance/dizziness and instruct in home and community integration to attain remaining goals. [x]  See Plan of Care  []  See progress note/recertification  []  See Discharge Summary         Progress towards goals / Updated goals:  See POC.      PLAN  [x]  Upgrade activities as tolerated     [x]  Continue plan of care  [x]  Update interventions per flow sheet       []  Discharge due to:_  []  Other:_      Joe Reveles PT 7/2/2018  2:57 PM    Future Appointments  Date Time Provider Clarice Jasmina   7/2/2018 2:00 PM Joe Reveles PT [id number]

## 2018-07-09 ENCOUNTER — APPOINTMENT (OUTPATIENT)
Dept: PHYSICAL THERAPY | Age: 73
End: 2018-07-09
Payer: MEDICARE

## 2018-07-13 ENCOUNTER — APPOINTMENT (OUTPATIENT)
Dept: PHYSICAL THERAPY | Age: 73
End: 2018-07-13
Payer: MEDICARE

## 2018-07-16 ENCOUNTER — APPOINTMENT (OUTPATIENT)
Dept: PHYSICAL THERAPY | Age: 73
End: 2018-07-16
Payer: MEDICARE

## 2018-07-19 ENCOUNTER — HOSPITAL ENCOUNTER (OUTPATIENT)
Dept: PHYSICAL THERAPY | Age: 73
Discharge: HOME | End: 2018-07-19
Payer: MEDICARE

## 2018-07-19 PROCEDURE — 97110 THERAPEUTIC EXERCISES: CPT

## 2018-07-19 PROCEDURE — 97530 THERAPEUTIC ACTIVITIES: CPT

## 2018-07-19 PROCEDURE — 97112 NEUROMUSCULAR REEDUCATION: CPT

## 2018-07-19 NOTE — PROGRESS NOTES
PT DAILY TREATMENT NOTE - [facility]     Patient Name: Padmini Morales  Date:2018  : 1945  [x]  Patient  Verified  Payor: [address] / Plan: [address] / Product Type: Managed Care Medicare /    In time:11:05  Out time:11:45  Total Treatment Time (min): 40  Total Timed Codes (min): 40  Total 1:1 Treatment Time (min): 38  Visit #: 2 of 16    Treatment Area: [facility]cerebral vascular [facility]) [facility] [I63.9]    SUBJECTIVE  Pain Level (0-10 scale): 0/10  Any medication changes, allergies to medications, adverse drug reactions, diagnosis change, or new procedure performed?: [x] No    [] Yes (see summary sheet for update)  Subjective functional status/changes:   [] No changes reported  Pt reports that he has not been in therapy because he's been sick. He takes his BP around the same time every day (in the morning). He's not sure when he took it this morning - maybe an hour ago? He smokes 8 cigarettes per day. Denies any dizziness, blurred vision, nausea, headache, SOB, or any abnormal sx. OBJECTIVE      20 min Therapeutic Exercise:  [x] See flow sheet :   Rationale: increase strength, improve coordination, improve balance and increase proprioception to improve the patients ability to improve ease/safety with ambulation and ADLs    12 min Therapeutic Activity:  []  See flow sheet : monitoring pt, educating pt regarding BP    Rationale:  To ensure patient safety     8 min Neuromuscular Reeducation:  [] See flow sheet :   Rationale: increase strength, improve coordination, improve balance and increase proprioception to improve the patients ability to reduce fall risk and improve safety with ADLs/daily tasks        With   [] TE   [] TA   [] neuro   [] other: Patient Education: [x] Review HEP    [] Progressed/Changed HEP based on:   [] positioning   [] body mechanics   [] transfers   [] heat/ice application    [x] other: educated patient regarding BP norms, danger of elevated BP, advised stopping smoking as smoking can increase BP, advised purchasing electronic BP cuff and keeping a log 3-4x per day and following up with his MD, educated patient on signs/sx of heart attack/stroke and told pt to go to emergency room immediately if any signs/sx  - pt verbalized compliance on all education except ceasing smoking      Other Objective/Functional Measures:   /105, HR 72 bpm taken electronically prior to therapy  /100 taken manually after 3 minute rest break  /96 taken manually after 2 minute rest break    Cues to keep trunk up, avoid swinging leg, and maintain knee extension during hip x 3 - unable to achieve correct form despite verbal/visual cuing    Cues to maintain knees behind toes with mini squats - correct form with BLE but continued to demonstrate increased lumbar flexion    No LOB with Romberg Foam EO/EC  Hand held assist 1x on parallel bars to maintain balance with MSR foam with left LE posterior      B 178/106 taken manually following session  /104 after 2 minute rest break taken manually after session     Pain Level (0-10 scale) post treatment: 0/10    ASSESSMENT/Changes in Function:     Initiated treatment per POC. Pt was motivated in therapy and put forth good effort with interventions. BP was elevated this visit and pt was educated extensively regarding importance of monitoring and lowering BP. Called MD and left message regarding elevated BP. Will continue to monitor. Will continue to address strength, coordination, and balance deficits for improved ease of ambulation/daily tasks and improved QOL.          Patient will continue to benefit from skilled PT services to modify and progress therapeutic interventions, address functional mobility deficits, address ROM deficits, address strength deficits, analyze and address soft tissue restrictions, analyze and cue movement patterns, analyze and modify body mechanics/ergonomics, assess and modify postural abnormalities, address imbalance/dizziness and instruct in home and community integration to attain remaining goals. Progress towards goals / Updated goals:  Short Term Goals: To be accomplished in 2 weeks:  1. Pt will report compliance and independence to HEP to help the pt manage their pain and symptoms. Long Term Goals: To be accomplished in 8 weeks:  1. Pt will increase FOTO score to 62 points to improve ability to perform ADLs. 2. Pt will increase MMT left hip flex to 4/5, B hip IR/ER to 4/5 to improve ability to ambulate with more ease. 3. Pt will improve TUG time to <30 seconds with LBQC to improve the pt's fall risk. 4. Pt will perform EC rhomberg on foam for 30 seconds with no LOB and mild to no instability to improve stability with gait.      PLAN  []  Upgrade activities as tolerated     [x]  Continue plan of care  []  Update interventions per flow sheet       []  Discharge due to:_  []  Other:_      Angelica Romero, PT 7/19/2018  11:03 AM    Future Appointments  Date Time Provider Clarice Freedman   7/24/2018 1:30 PM Juan Spurling, PT [facility]   7/27/2018 11:00 AM Juancal Spurling, PT [id number] [facility]   7/31/2018 1:00 PM Juan Spurling, PT [facility]   8/3/2018 10:30 AM Juancal Spurling, PT [facility]

## 2018-07-24 ENCOUNTER — HOSPITAL ENCOUNTER (OUTPATIENT)
Dept: PHYSICAL THERAPY | Age: 73
Discharge: HOME | End: 2018-07-24
Payer: MEDICARE

## 2018-07-24 NOTE — PROGRESS NOTES
PT DAILY TREATMENT NOTE - [facility]     Patient Name: Malathi Ruano  Date:2018  : 1945  [x]  Patient  Verified  Payor: [address] / Plan: [address] / Product Type: Managed Care Medicare /    In time: 1:27   Out time: 1:51  Total Treatment Time (min): 24   Total Timed Codes (min): 24  1:1 Treatment Time ([facility] only): 24 (NO CHARGE)   Visit #: - of -    Treatment [facility]: [facility] [I63.[facility]    SUBJECTIVE  Pain Level (0-10 scale): 0  Any medication changes, allergies to medications, adverse drug reactions, diagnosis change, or new procedure performed?: [x] No    [] Yes (see summary sheet for update)  Subjective functional status/changes:   [] No changes reported  No changes reported today. OBJECTIVE    24 min Therapeutic Activity:  [x]  See flow sheet : monitoring the pt's BP, educating pt regarding BP, educated pt on signs/symptoms of a CVA and heart attack; NO CHARGE   Rationale: To ensure patient safety         With   [] TE   [] TA   [] neuro   [] other: Patient Education: [x] Review HEP    [] Progressed/Changed HEP based on:   [] positioning   [] body mechanics   [] transfers   [] heat/ice application    [] other:      Other Objective/Functional Measures: BP: 168/110 mmHg at beginning of session, 167/114 mmHg at 1:35pm (both taken with electronic BP monitor). Took BP manually at 1:40pm which was 160/100 mmHg. Pain Level (0-10 scale) post treatment: 0    ASSESSMENT/Changes in Function: Held therapy today secondary to elevated BP. Spoke to pt's sister on the pt's cell phone (with pt's verbal permission) and let her know the pt's BP numbers. She also states that the pt is not taking any BP medications. She states she called the pt's MD last week regarding BP medications and has not heard back from them. Educated her to call the MD's office again regarding the pt's elevated BP.  Educated pt on signs/symptoms of a CVA and heart attack and to go to the ER if he demonstrates any of these symptoms. Pt denies having any of these symptoms currently. Pt reports that he does have a BP cuff but it is in storage. Educated pt on importance of checking BP at home secondary to his elevated BP levels in the clinic. We will continue to monitor the pt's BP in the clinic. Patient will continue to benefit from skilled PT services to modify and progress therapeutic interventions, address functional mobility deficits, address ROM deficits, address strength deficits, analyze and address soft tissue restrictions, analyze and cue movement patterns, analyze and modify body mechanics/ergonomics, assess and modify postural abnormalities, address imbalance/dizziness and instruct in home and community integration to attain remaining goals. Progress towards goals / Updated goals:  Short Term Goals: To be accomplished in 2 weeks:  1. Pt will report compliance and independence to HEP to help the pt manage their pain and symptoms. Long Term Goals: To be accomplished in 8 weeks:  1. Pt will increase FOTO score to 62 points to improve ability to perform ADLs. 2. Pt will increase MMT left hip flex to 4/5, B hip IR/ER to 4/5 to improve ability to ambulate with more ease. 3. Pt will improve TUG time to <30 seconds with LBQC to improve the pt's fall risk. 4. Pt will perform EC rhomberg on foam for 30 seconds with no LOB and mild to no instability to improve stability with gait.      PLAN  [x]  Upgrade activities as tolerated     [x]  Continue plan of care  [x]  Update interventions per flow sheet       []  Discharge due to:_  []  Other:_      Jordin Nguyen PT 7/24/2018  1:39 PM    Future Appointments  Date Time Provider Clarice Freedman   7/24/2018 1:30 PM Jordin Nguyen PT [id number]   7/27/2018 11:00 AM Jordin Nguyen PT [id number] [facility]   7/31/2018 1:00 PM Jordin Nguyen PT [id number]   8/3/2018 10:30 AM Jordin Nguyen PT [id number] [facility]

## 2018-07-27 ENCOUNTER — HOSPITAL ENCOUNTER (OUTPATIENT)
Dept: PHYSICAL THERAPY | Age: 73
Discharge: HOME | End: 2018-07-27
Payer: MEDICARE

## 2018-07-27 PROCEDURE — 97110 THERAPEUTIC EXERCISES: CPT

## 2018-07-27 PROCEDURE — 97530 THERAPEUTIC ACTIVITIES: CPT

## 2018-07-27 NOTE — PROGRESS NOTES
PT DAILY TREATMENT NOTE - [facility]     Patient Name: Cate Montgomery  Date:2018  : 1945  [x]  Patient  Verified  Payor: [address] / Plan: [address] / Product Type: Managed Care Medicare /    In time: 11:02   Out time: 11:53  Total Treatment Time (min): 51   Total Timed Codes (min): 51  1:1 Treatment Time ( [address] only): 43   Visit #: 3 of 16    Treatment Area: CVA (cerebral vascular accident) ([facility] [I63.9]    SUBJECTIVE  Pain Level (0-10 scale): 0  Any medication changes, allergies to medications, adverse drug reactions, diagnosis change, or new procedure performed?: [x] No    [] Yes (see summary sheet for update)  Subjective functional status/changes:   [] No changes reported  Pt reports he went to the MD yesterday. Spoke to pt's sister on the  Phone (with pt's permission) and she stated that he started his BP meds this morning. OBJECTIVE     26 min Therapeutic Exercise:  [x] See flow sheet :   Rationale: increase ROM and increase strength to improve the patients ability to improve activity tolerance. 25 min Therapeutic Activity:  [x]  See flow sheet : monitoring the pt's BP, educating pt regarding BP (see assessment below)   Rationale: To ensure patient safety         With   [] TE   [] TA   [] neuro   [] other: Patient Education: [x] Review HEP    [] Progressed/Changed HEP based on:   [] positioning   [] body mechanics   [] transfers   [] heat/ice application    [] other:      Other Objective/Functional Measures: BP: 165/103 mmHg at beginning of session, 154/107 mmHg a few mins later (both taken with electronic BP monitor). Took BP manually a few mins later which was 148/96 mmHg, and then again a few mins later and it was 150/96 mmHg. Took BP post session manually and it was 158/96 mmHg.     Pain Level (0-10 scale) post treatment: 0    ASSESSMENT/Changes in Function: Held standing exercises today secondary to pt's elevated BP and his BP increasing after doing standing activities during his other therapy session (daily note dated 7/19/2018). Educated pt to monitor his BP at home and to monitor for any CVA/heart attack (pt denied these symptoms in the clinic). Pt states that he still has not found the BP cuff at home and educated pt that he can get a BP cuff OTC at a store. We will continue to monitor the pt's BP over the next few sessions. Continue POC as tolerated. Patient will continue to benefit from skilled PT services to modify and progress therapeutic interventions, address functional mobility deficits, address ROM deficits, address strength deficits, analyze and address soft tissue restrictions, analyze and cue movement patterns, analyze and modify body mechanics/ergonomics, assess and modify postural abnormalities, address imbalance/dizziness and instruct in home and community integration to attain remaining goals. Progress towards goals / Updated goals:  Short Term Goals: To be accomplished in 2 weeks:  1. Pt will report compliance and independence to [facility] to help the pt manage their pain and symptoms. Long Term Goals: To be accomplished in 8 weeks:  1. Pt will increase FOTO score to 62 points to improve ability to perform ADLs. 2. Pt will increase MMT left hip flex to 4/5, B hip IR/ER to 4/5 to improve ability to ambulate with more ease. 3. Pt will improve TUG time to <30 seconds with LBQC to improve the pt's fall risk. Have not performed secondary to pt's elevated BP levels 7/27/2018  4. Pt will perform EC rhomberg on foam for 30 seconds with no LOB and mild to no instability to improve stability with gait.      PLAN  [x]  Upgrade activities as tolerated     [x]  Continue plan of care  [x]  Update interventions per flow sheet       []  Discharge due to:_  []  Other:_      Lucas Sharp, PT 7/27/2018  11:22 AM    Future Appointments  Date Time Provider Clarice Freedman   7/31/2018 1:00 PM Lucas Sharp PT [id number] [facility]   8/3/2018 10:30 AM Ulices Wiggins PT [id number]

## 2018-07-27 NOTE — PROGRESS NOTES
PT DAILY TREATMENT NOTE - [facility]  Patient Name: Tesfaye Franklin Date:2018 : 1945 [x]  Patient  Verified Payor: [address] / Plan: [address] / Product Type: Managed Care Medicare / In time: :   Out time: 1:51 Total Treatment Time (min): 24 Total Timed Codes (min): 24 
1:1 Treatment Time ([facility] only): 24 (NO CHARGE) Visit #: - of - Treatment Area: [facility] [I63.9] SUBJECTIVE Pain Level (0-10 scale): 0 Any medication changes, allergies to medications, adverse drug reactions, diagnosis change, or new procedure performed?: [x] No    [] Yes (see summary sheet for update) Subjective functional status/changes:   [] No changes reported No changes reported today. OBJECTIVE 24 min Therapeutic Activity:  [x]  See flow sheet : monitoring the pt's BP, educating pt regarding BP, educated pt on signs/symptoms of a CVA and heart attack; NO CHARGE Rationale: To ensure patient safety With 
 [] TE 
 [] TA 
 [] neuro 
 [] other: Patient Education: [x] Review HEP [] Progressed/Changed HEP based on:  
[] positioning   [] body mechanics   [] transfers   [] heat/ice application   
[] other:   
 
Other Objective/Functional Measures: BP: 168/110 mmHg at beginning of session, 167/114 mmHg at 1:35pm (both taken with electronic BP monitor). Took BP manually at 1:40pm which was 160/100 mmHg. Pain Level (0-10 scale) post treatment: 0 
 
ASSESSMENT/Changes in Function: Held therapy today secondary to elevated BP. Spoke to pt's sister on the pt's cell phone (with pt's verbal permission) and let her know the pt's BP numbers. She also states that the pt is not taking any BP medications. She states she called the pt's MD last week regarding BP medications and has not heard back from them. Educated her to call the MD's office again regarding the pt's elevated BP.  Educated pt on signs/symptoms of a CVA and heart attack and to go to the ER if he demonstrates any of these symptoms. Pt denies having any of these symptoms currently. Pt reports that he does have a BP cuff but it is in storage. Educated pt on importance of checking BP at home secondary to his elevated BP levels in the clinic. We will continue to monitor the pt's BP in the clinic. Patient will continue to benefit from skilled PT services to modify and progress therapeutic interventions, address functional mobility deficits, address ROM deficits, address strength deficits, analyze and address soft tissue restrictions, analyze and cue movement patterns, analyze and modify body mechanics/ergonomics, assess and modify postural abnormalities, address imbalance/dizziness and instruct in home and community integration to attain remaining goals. Progress towards goals / Updated goals: 
Short Term Goals: To be accomplished in 2 weeks: 1. Pt will report compliance and independence to HEP to help the pt manage their pain and symptoms. Long Term Goals: To be accomplished in 8 weeks: 1. Pt will increase FOTO score to 62 points to improve ability to perform ADLs. 2. Pt will increase MMT left hip flex to 4/5, B hip IR/ER to 4/5 to improve ability to ambulate with more ease. 3. Pt will improve TUG time to <30 seconds with LBQC to improve the pt's fall risk. 4. Pt will perform EC rhomberg on foam for 30 seconds with no LOB and mild to no instability to improve stability with gait. PLAN [x]  Upgrade activities as tolerated     [x]  Continue plan of care [x]  Update interventions per flow sheet      
[]  Discharge due to:_ 
[]  Other:_ Sana Hernandez PT 7/27/2018  1:39 PM 
 
Future Appointments Date Time Provider Clarice Freedman 7/31/2018 1:00 PM Sana Hernandez PT [facility]  
8/3/2018 10:30 AM Sana Hernandez PT [facility]

## 2018-07-31 ENCOUNTER — HOSPITAL ENCOUNTER (OUTPATIENT)
Dept: PHYSICAL THERAPY | Age: 73
Discharge: HOME | End: 2018-07-31
Payer: MEDICARE

## 2018-07-31 PROCEDURE — G8978 MOBILITY CURRENT STATUS: HCPCS

## 2018-07-31 PROCEDURE — G8979 MOBILITY GOAL STATUS: HCPCS

## 2018-07-31 NOTE — PROGRESS NOTES
[facility] [place] [facility]  [address] [place] [address]  [phone] fax    Continued Plan of Care/ Re-certification for Physical Therapy Services    Patient name: Maria D Delvalle of Care: 2018   Referral source: Jin Montero MD : 1945                         Medical Diagnosis: CVA (cerebral vascular accident) [facility]) [I63.9] Onset Date:                         Treatment Diagnosis: Impaired gait/balance s/p CVA   Prior Hospitalization: see medical history Provider#: [id number]   Medications: Verified on Patient summary List    Comorbidities: tobacco use, HTN, kidney disease, depression   Prior Level of Function: Independent with ambulation, ADLs before onset in . Visits from Start of Care: 3    Missed Visits: 2    The Plan of Care and following information is based on the patient's current status:  Goal: Pt will report compliance and independence to [facility] to help the pt manage their pain and symptoms  Status at last note/certification: Not assessed   Current Status: not met    Goal:Pt will increase FOTO score to 62 points to improve ability to perform ADLs. Status at last note/certification:Not assessed secondary to elevated BP  Current Status: not met    Goal:Pt will increase MMT left hip flex to 4/5, B hip IR/ER to 4/5 to improve ability to ambulate with more ease. Status at last note/certification:Not assessed secondary to elevated BP  Current Status: not met    Goal:Pt will improve TUG time to <30 seconds with LBQC to improve the pt's fall risk. Status at last note/certification:Not assessed secondary to elevated BP  Current Status: not met    Goal:Pt will perform EC rhomberg on foam for 30 seconds with no LOB and mild to no instability to improve stability with gait.   Status at last note/certification:Not assessed secondary to elevated BP  Current Status: not met    Key functional changes: limited improvements/gains with therapy interventions secondary to pt's elevated BP. Problems/ barriers to goal attainment: elevated BP     Problem List: pain affecting function, decrease ROM, decrease strength, impaired gait/ balance, decrease ADL/ functional abilitiies, decrease activity tolerance, decrease flexibility/ joint mobility and decrease transfer abilities    Treatment Plan: Therapeutic exercise, Therapeutic activities, Neuromuscular re-education, Physical agent/modality, Gait/balance training, Manual therapy, Patient education, Self Care training, Functional mobility training, Home safety training and Stair training     Patient Goal (s) has been updated and includes: walk better     Goals for this certification period to be accomplished in 5 weeks if continuing therapy after 2 week hold:  1. Pt will increase FOTO score to 62 points to improve ability to perform ADLs. 2. Pt will increase MMT left hip flex to 4/5, B hip IR/ER to 4/5 to improve ability to ambulate with more ease. 3. Pt will improve TUG time to <30 seconds with LBQC to improve the pt's fall risk. 4. Pt will perform EC rhomberg on foam for 30 seconds with no LOB and mild to no instability to improve stability with gait. Frequency / Duration: Patient to be seen 2 times per week for 5 weeks if continuing therapy after 2 week hold:    Assessment / Recommendations:  Pt has demonstrated limited improvements with therapy interventions secondary to pt's elevated BP levels, which has limited his participation in therapy. Did not perform therapy today and did not check goals secondary to pt's elevated BP. Pt reports that he started his BP medication last week and has been taking it. Educated pt to take the BP as prescribed by the MD. We will plan on placing pt on hold for 2 weeks secondary to elevated BP and will plan on following up with the pt regarding his BP at that time.      G-Codes (GP)  Mobility  [id number] Current  CK= 40-59%  [id number] Goal  CJ= 20-39%    The severity rating is based on clinical judgment and the FOTO score. Certification Period: 7/31/2018 - 9/28/2018    Shun Rahel, PT 7/31/2018 1:39 PM    ________________________________________________________________________  I certify that the above Therapy Services are being furnished while the patient is under my care. I agree with the treatment plan and certify that this therapy is necessary. [] I have read the above and request that my patient continue as recommended.   [] I have read the above report and request that my patient continue therapy with the following changes/special instructions: _______________________________________  [] I have read the above report and request that my patient be discharged from therapy    Physician's Signature:________________________________Date:___________Time:__________    Please sign and return to [facility] [facility]  [address] [place] [address]  [phone] fax

## 2018-07-31 NOTE — PROGRESS NOTES
PT DAILY TREATMENT NOTE - [facility]     Patient Name: Sabino Schuster  Date:2018  : 1945  [x]  Patient  Verified  Payor: [address] / Plan: [address] / Product Type: Managed Care Medicare /    In time: 1:00   Out time: 1:26  Total Treatment Time (min): 26  Total Timed Codes (min): 26  1:1 Treatment Time (MC only): 26 (NO CHARGE)  Visit #: - of 0    Treatment Area: [facility] [I63.9]    SUBJECTIVE  Pain Level (0-10 scale): 0  Any medication changes, allergies to medications, adverse drug reactions, diagnosis change, or new procedure performed?: [x] No    [] Yes (see summary sheet for update)  Subjective functional status/changes:   [] No changes reported  Pt brought in his home electronic BP cuff today. Pt reports he has been taking his BP medications in the morning. OBJECTIVE    26 min Therapeutic Activity:  [x]  See flow sheet : monitoring the pt's BP, educating pt regarding BP, education and demonstration on how to apply the pt's BP cuff and how to use it (NO CHARGE)   Rationale: To ensure patient safety         With   [] TE   [] TA   [] neuro   [] other: Patient Education: [x] Review HEP    [] Progressed/Changed HEP based on:   [] positioning   [] body mechanics   [] transfers   [] heat/ice application    [] other:      Other Objective/Functional Measures: See goals below. BP measurements (all taken manually):   150/100 mmHg at beginning of the session. Took it again a few mins later and it was 148/100 mmHg. Took it again a few mins later and it was 154/102 mmHg. Pain Level (0-10 scale) post treatment: 0    ASSESSMENT/Changes in Function: See re-certification. Educated and demonstrated to pt how to apply his BP cuff and how to use his electric BP machine for home use. Educated pt to monitor his BP several times a day.  Pt has demonstrated limited improvements with therapy interventions secondary to pt's elevated BP levels, which has limited his participation in therapy. Did not perform therapy today and did not check goals secondary to pt's elevated BP. Pt reports that he started his BP medication last week and has been taking it. Educated pt to take the BP as prescribed by the MD. We will plan on placing pt on hold for 2 weeks secondary to elevated BP and will plan on following up with the pt regarding his BP. Patient will continue to benefit from skilled PT services to modify and progress therapeutic interventions, address functional mobility deficits, address ROM deficits, address strength deficits, analyze and address soft tissue restrictions, analyze and cue movement patterns, analyze and modify body mechanics/ergonomics, assess and modify postural abnormalities, address imbalance/dizziness and instruct in home and community integration to attain remaining goals. Progress towards goals / Updated goals:  Short Term Goals: To be accomplished in 2 weeks:  1. Pt will report compliance and independence to [facility] to help the pt manage their pain and symptoms. not assessed 7/31/2018                   Long Term Goals: To be accomplished in 8 weeks:  1. Pt will increase FOTO score to 62 points to improve ability to perform ADLs. Not assessed secondary to elevated BP 7/31/2018  2. Pt will increase MMT left hip flex to 4/5, B hip IR/ER to 4/5 to improve ability to ambulate with more ease. Not assessed secondary to elevated BP 7/31/2018  3. Pt will improve TUG time to <30 seconds with LBQC to improve the pt's fall risk. Not assessed secondary to elevated BP 7/31/2018  4. Pt will perform EC rhomberg on foam for 30 seconds with no LOB and mild to no instability to improve stability with gait. Not assessed secondary to elevated BP 7/31/2018    PLAN  [x]  Upgrade activities as tolerated     [x]  Continue plan of care  [x]  Update interventions per flow sheet       []  Discharge due to:_  []  Other:_      Jessica Nair Becky Wise, PT 7/31/2018  1:38 PM    Future Appointments  Date Time Provider Clarice Freedman   7/31/2018 1:00 PM Amy Frances, PT [facility]   8/3/2018 10:30 AM Amy Frances, PT [facility]

## 2018-08-02 ENCOUNTER — APPOINTMENT (OUTPATIENT)
Dept: CT IMAGING | Age: 73
DRG: 389 | End: 2018-08-02
Attending: EMERGENCY MEDICINE
Payer: MEDICARE

## 2018-08-02 ENCOUNTER — HOSPITAL ENCOUNTER (INPATIENT)
Age: 73
LOS: 6 days | Discharge: HOME | DRG: 389 | End: 2018-08-08
Attending: EMERGENCY MEDICINE | Admitting: FAMILY MEDICINE
Payer: MEDICARE

## 2018-08-02 ENCOUNTER — APPOINTMENT (OUTPATIENT)
Dept: GENERAL RADIOLOGY | Age: 73
DRG: 389 | End: 2018-08-02
Attending: PHYSICIAN ASSISTANT
Payer: MEDICARE

## 2018-08-02 DIAGNOSIS — K56.609 SBO (SMALL BOWEL OBSTRUCTION) (HCC): Primary | ICD-10-CM

## 2018-08-02 LAB
ALBUMIN SERPL-MCNC: 4 G/DL (ref 3.4–5)
ALBUMIN/GLOB SERPL: 1 {RATIO} (ref 0.8–1.7)
ALP SERPL-CCNC: 81 U/L (ref 45–117)
ALT SERPL-CCNC: 24 U/L (ref 16–61)
ANION GAP SERPL CALC-SCNC: 7 MMOL/L (ref 3–18)
AST SERPL-CCNC: 12 U/L (ref 15–37)
BASOPHILS # BLD: 0 K/UL (ref 0–0.1)
BASOPHILS NFR BLD: 0 % (ref 0–2)
BILIRUB SERPL-MCNC: 0.5 MG/DL (ref 0.2–1)
BUN SERPL-MCNC: 16 MG/DL (ref 7–18)
BUN/CREAT SERPL: 9 (ref 12–20)
CALCIUM SERPL-MCNC: 9.9 MG/DL (ref 8.5–10.1)
CHLORIDE SERPL-SCNC: 106 MMOL/L (ref 100–108)
CO2 SERPL-SCNC: 29 MMOL/L (ref 21–32)
CREAT SERPL-MCNC: 1.77 MG/DL (ref 0.6–1.3)
DIFFERENTIAL METHOD BLD: ABNORMAL
EOSINOPHIL # BLD: 0 K/UL (ref 0–0.4)
EOSINOPHIL NFR BLD: 0 % (ref 0–5)
ERYTHROCYTE [DISTWIDTH] IN BLOOD BY AUTOMATED COUNT: 14.9 % (ref 11.6–14.5)
GLOBULIN SER CALC-MCNC: 4.1 G/DL (ref 2–4)
GLUCOSE SERPL-MCNC: 120 MG/DL (ref 74–99)
HCT VFR BLD AUTO: 40.9 % (ref 36–48)
HGB BLD-MCNC: 13.4 G/DL (ref 13–16)
LIPASE SERPL-CCNC: 87 U/L (ref 73–393)
LYMPHOCYTES # BLD: 1.1 K/UL (ref 0.9–3.6)
LYMPHOCYTES NFR BLD: 7 % (ref 21–52)
MCH RBC QN AUTO: 26.6 PG (ref 24–34)
MCHC RBC AUTO-ENTMCNC: 32.8 G/DL (ref 31–37)
MCV RBC AUTO: 81.3 FL (ref 74–97)
MONOCYTES # BLD: 0.6 K/UL (ref 0.05–1.2)
MONOCYTES NFR BLD: 4 % (ref 3–10)
NEUTS SEG # BLD: 13.6 K/UL (ref 1.8–8)
NEUTS SEG NFR BLD: 89 % (ref 40–73)
PLATELET # BLD AUTO: 317 K/UL (ref 135–420)
PMV BLD AUTO: 10.5 FL (ref 9.2–11.8)
POTASSIUM SERPL-SCNC: 4 MMOL/L (ref 3.5–5.5)
PROT SERPL-MCNC: 8.1 G/DL (ref 6.4–8.2)
RBC # BLD AUTO: 5.03 M/UL (ref 4.7–5.5)
SODIUM SERPL-SCNC: 142 MMOL/L (ref 136–145)
WBC # BLD AUTO: 15.3 K/UL (ref 4.6–13.2)

## 2018-08-02 PROCEDURE — 96375 TX/PRO/DX INJ NEW DRUG ADDON: CPT

## 2018-08-02 PROCEDURE — 74011000258 HC RX REV CODE- 258: Performed by: EMERGENCY MEDICINE

## 2018-08-02 PROCEDURE — 74176 CT ABD & PELVIS W/O CONTRAST: CPT

## 2018-08-02 PROCEDURE — 85025 COMPLETE CBC W/AUTO DIFF WBC: CPT | Performed by: PHYSICIAN ASSISTANT

## 2018-08-02 PROCEDURE — 83690 ASSAY OF LIPASE: CPT | Performed by: PHYSICIAN ASSISTANT

## 2018-08-02 PROCEDURE — 74011250636 HC RX REV CODE- 250/636: Performed by: EMERGENCY MEDICINE

## 2018-08-02 PROCEDURE — 96374 THER/PROPH/DIAG INJ IV PUSH: CPT

## 2018-08-02 PROCEDURE — 80053 COMPREHEN METABOLIC PANEL: CPT | Performed by: PHYSICIAN ASSISTANT

## 2018-08-02 PROCEDURE — 65270000029 HC RM PRIVATE

## 2018-08-02 PROCEDURE — 99283 EMERGENCY DEPT VISIT LOW MDM: CPT

## 2018-08-02 PROCEDURE — 93005 ELECTROCARDIOGRAM TRACING: CPT

## 2018-08-02 PROCEDURE — 74022 RADEX COMPL AQT ABD SERIES: CPT

## 2018-08-02 PROCEDURE — 81001 URINALYSIS AUTO W/SCOPE: CPT | Performed by: PHYSICIAN ASSISTANT

## 2018-08-02 PROCEDURE — 74011636320 HC RX REV CODE- 636/320: Performed by: EMERGENCY MEDICINE

## 2018-08-02 RX ORDER — MORPHINE SULFATE 4 MG/ML
4 INJECTION INTRAVENOUS
Status: COMPLETED | OUTPATIENT
Start: 2018-08-02 | End: 2018-08-02

## 2018-08-02 RX ORDER — SODIUM CHLORIDE 0.9 % (FLUSH) 0.9 %
5-10 SYRINGE (ML) INJECTION AS NEEDED
Status: DISCONTINUED | OUTPATIENT
Start: 2018-08-02 | End: 2018-08-08 | Stop reason: HOSPADM

## 2018-08-02 RX ORDER — SODIUM CHLORIDE 900 MG/100ML
INJECTION INTRAVENOUS
Status: DISPENSED
Start: 2018-08-02 | End: 2018-08-03

## 2018-08-02 RX ORDER — ONDANSETRON 2 MG/ML
4 INJECTION INTRAMUSCULAR; INTRAVENOUS
Status: COMPLETED | OUTPATIENT
Start: 2018-08-02 | End: 2018-08-02

## 2018-08-02 RX ORDER — AMLODIPINE BESYLATE 10 MG/1
10 TABLET ORAL
Status: DISCONTINUED | OUTPATIENT
Start: 2018-08-02 | End: 2018-08-03

## 2018-08-02 RX ADMIN — PIPERACILLIN SODIUM,TAZOBACTAM SODIUM 3.38 G: 3; .375 INJECTION, POWDER, FOR SOLUTION INTRAVENOUS at 22:35

## 2018-08-02 RX ADMIN — DIATRIZOATE MEGLUMINE AND DIATRIZOATE SODIUM 30 ML: 600; 100 SOLUTION ORAL; RECTAL at 22:34

## 2018-08-02 RX ADMIN — ONDANSETRON 4 MG: 2 INJECTION, SOLUTION INTRAMUSCULAR; INTRAVENOUS at 19:16

## 2018-08-02 RX ADMIN — MORPHINE SULFATE 4 MG: 4 INJECTION INTRAVENOUS at 19:16

## 2018-08-02 RX ADMIN — SODIUM CHLORIDE 1000 ML: 900 INJECTION, SOLUTION INTRAVENOUS at 22:35

## 2018-08-02 NOTE — Clinical Note
Status[de-identified] Inpatient [101] Type of Bed: Surgical [18] Inpatient Hospitalization Certified Necessary for the Following Reasons: 3. Patient receiving treatment that can only be provided in an inpatient setting (further clarification in H&P documentation) Admitting Diagnosis: [facility] [[id number]] Admitting Physician: Gissell Burrows [[id number]] Attending Physician: Gissell Burrows [[id number]] Estimated Length of Stay: 3-4 Midnights Discharge Plan[de-identified] 2003 [facility]

## 2018-08-02 NOTE — ED TRIAGE NOTES
Patient reportedly sent over from Dr. Ninoska Tucker for concern of abdominal pain. Denies vomiting or diarrhea, but has had some nausea.

## 2018-08-02 NOTE — ED PROVIDER NOTES
EMERGENCY DEPARTMENT HISTORY AND PHYSICAL EXAM 
 
6:41 PM 
 
 
Date: 8/2/2018 Patient Name: Shraddha Hale History of Presenting Illness Chief Complaint Patient presents with  Abdominal Pain History Provided By: Patient and Patient's Sister Chief Complaint: Abdominal Pain Duration: 10 Hours Timing:  Constant Location: Suprapubic Quality: Anoop Estrada Severity: Moderate Modifying Factors: Appendectomy April 20th, 2018, [place] Associated Symptoms: constipation, difficulties in speech, nausea. Denies dysuria, diarrhea, fever, chills, LE edema or pain, HA, back pain, genital pain, or syncope. Additional History (Context): Shraddha Hale is a 67 y.o. male with hx of HTN, CVA, HLD, and Chronic kidney disease who presents with c/o constant moderate sharp suprapubic abdominal pain onset 10 hours. Pt had an appendectomy 4/20/2018 (4 months ago). Pt reports associated sx of constipation, difficulties in speech, dark urine, and nausea. Pt states sx do not change with eating. Sister reports giving pt Pepto Bismol PTA. Denies dysuria, diarrhea, fever, chills, LE edema or pain, HA, back pain, genital pain or syncope. Sister reports pt has increased HTN over the last 2-3 weeks as pt was taken off HTN medication after surgery but that Dr. Simran Penny gave another HTN medication 2 weeks ago that pt has been taking. No other current complaints or symptoms reported. PCP: MAGUE Pedromo Current Facility-Administered Medications Medication Dose Route Frequency Provider Last Rate Last Dose  amLODIPine (NORVASC) tablet 10 mg  10 mg Oral NOW Kasandra Vivas MD      
 sodium chloride (NS) flush 5-10 mL  5-10 mL IntraVENous PRN Kasandra Vivas MD      
 sodium chloride 0.9 % bolus infusion 1,000 mL  1,000 mL IntraVENous Jericho Sims MD      
 piperacillin-tazobactam (ZOSYN) 3.375 g in 0.9% sodium chloride (MBP/ADV) 100 mL ADV  3.375 g IntraVENous Chiquis Rivero MD      
 
Current Outpatient Prescriptions Medication Sig Dispense Refill  tolterodine ER (DETROL LA) 4 mg ER capsule Take 4 mg by mouth daily.  lactase (LACTAID) 3,000 unit tablet Take 1 Tab by mouth as needed.  aspirin 81 mg chewable tablet Take 81 mg by mouth daily.  cholecalciferol, VITAMIN D3, (VITAMIN D3) 5,000 unit tab tablet Take 5,000 Units by mouth daily.  co-enzyme Q-10 (CO Q-10) 100 mg capsule Take 1 Cap by mouth daily. 15 Cap 0  
 ferrous sulfate 325 mg (65 mg iron) tablet Take 1 Tab by mouth daily (with breakfast). 15 Tab 0  
 escitalopram oxalate (LEXAPRO) 10 mg tablet Take 1 Tab by mouth every evening. Indications: Depression 15 Tab 0  
 ascorbic acid, vitamin C, (VITAMIN C) 250 mg tablet Take 1 Tab by mouth daily (with breakfast). 15 Tab 0  
 tamsulosin (FLOMAX) 0.4 mg capsule Take 1 Cap by mouth daily. 10 Cap 0  
 cyanocobalamin (VITAMIN B-12) 1,000 mcg tablet Take 1,000 mcg by mouth daily.  ezetimibe (ZETIA) 10 mg tablet Take 10 mg by mouth daily. Past History Past Medical History: 
Past Medical History:  
Diagnosis Date  Acute blood loss as cause of postoperative anemia 4/21/2018  Benign prostatic hyperplasia  CKD (chronic kidney disease) stage 3, GFR 30-59 ml/min 9/1/2015  Depression 5/8/2018  Diastolic dysfunction without heart failure 9/2/2015 Grade 1 diastolic dysfunction on 2D ECHO done 9/02/2015  Dyslipidemia 9/2/2015  Dysphagia as late effect of cerebrovascular accident (CVA) 9/1/2015  Hemiparesis affecting left side as late effect of cerebrovascular accident (CVA) ([facility] 9/1/2015  History of noncompliance with medical treatment, presenting hazards to health 9/1/2015  History of stroke with residual deficit 9/1/2015 Acute Ischemic Stroke (early subacute infarct at the posterior right lentiform nucleus) with residual left hemiparesis and dysphagia  History of tobacco use 9/1/2015  History of trichomonal urethritis 9/1/2015  History of vitamin D deficiency 9/6/2015  Hypertensive heart and kidney disease without heart failure and with chronic kidney disease stage III 9/2/2015  Obesity, Class I, BMI 30-34.9 9/1/2015  Overactive bladder  Postoperative atrial fibrillation ([facility] 4/21/2018 Resolved  Postoperative confusion 4/22/2018  Postoperative urinary retention 4/22/2018  Statin intolerance 05/07/2018 Atorvastatin and Simvastatin  Vitamin D deficiency 5/1/2018 Vitamin D 25-Hydroxy (5/1/2018) = 17.9 Past Surgical History: 
Past Surgical History:  
Procedure Laterality Date  APPENDECTOMY,RUPT APPENDX+ABSCESS N/A 04/20/2018 Dr. Adarsh Luis  COLONOSCOPY N/A 4/3/2018 COLONOSCOPY,  w heated snared polypectomy performed by Erik Gee MD at [facility] ENDOSCOPY Family History: 
Family History Problem Relation Age of Onset  Diabetes Mother  Stroke Mother  Heart Disease Father  Hypertension Father  Diabetes Brother  Hypertension Brother Social History: 
Social History Substance Use Topics  Smoking status: Current Every Day Smoker Last attempt to quit: 12/1/2014  Smokeless tobacco: Never Used  Alcohol use Yes Allergies: Allergies Allergen Reactions  Lipitor [Atorvastatin] Other (comments) Elevated CK level Pt has no awareness of this allergy.  Simvastatin Other (comments) Elevation in LFTs Review of Systems Review of Systems Constitutional: Negative for chills and fever. Cardiovascular: Negative for leg swelling. Gastrointestinal: Positive for abdominal pain, constipation and nausea. Negative for diarrhea. Genitourinary: Negative for dysuria, penile pain and testicular pain. Musculoskeletal: Negative for back pain. Neurological: Positive for speech difficulty. Negative for syncope and headaches. All other systems reviewed and are negative.  
 
 
 
Physical Exam  
 
Patient Vitals for the past 12 hrs: 
 Temp Pulse BP SpO2  
08/02/18 1738 98.1 °F (36.7 °C) 78 (!) 206/119 97 % Physical Exam  
Constitutional: He is oriented to person, place, and time. He appears well-developed. HENT:  
Head: Normocephalic and atraumatic. Eyes: Conjunctivae and EOM are normal.  
Neck: Normal range of motion. Cardiovascular: Normal heart sounds. Exam reveals no gallop and no friction rub. No murmur heard. Pulmonary/Chest: Effort normal and breath sounds normal. No stridor. Abdominal: Soft. There is tenderness (suprapubic extending to right and left side. No flank tenderness). Hernia confirmed negative in the right inguinal area and confirmed negative in the left inguinal area. Genitourinary: Testes normal and penis normal.  
Musculoskeletal: Normal range of motion. He exhibits no tenderness. Neurological: He is alert and oriented to person, place, and time. Skin: Skin is warm and dry. He is not diaphoretic. Psychiatric: He has a normal mood and affect. His behavior is normal.  
Nursing note and vitals reviewed. Diagnostic Study Results Labs - Recent Results (from the past 12 hour(s)) EKG, 12 LEAD, INITIAL Collection Time: 08/02/18  5:46 PM  
Result Value Ref Range Ventricular Rate 81 BPM  
 Atrial Rate 81 BPM  
 P-R Interval 176 ms QRS Duration 96 ms  
 Q-T Interval 390 ms QTC Calculation (Bezet) 453 ms Calculated P Axis 67 degrees Calculated R Axis -65 degrees Calculated T Axis 4 degrees Diagnosis Sinus rhythm with occasional and consecutive premature ventricular complexes Possible Left atrial enlargement Left axis deviation Incomplete right bundle branch block Inferior infarct (cited on or before 19-APR-2018) Abnormal ECG When compared with ECG of 22-APR-2018 00:12, Significant changes have occurred CBC WITH AUTOMATED DIFF Collection Time: 08/02/18  6:10 PM  
Result Value Ref Range  WBC 15.3 (H) 4.6 - 13.2 K/uL  
 RBC 5.03 4.70 - 5.50 M/uL  
 HGB 13.4 13.0 - 16.0 g/dL HCT 40.9 36.0 - 48.0 % MCV 81.3 74.0 - 97.0 FL  
 MCH 26.6 24.0 - 34.0 PG  
 MCHC 32.8 31.0 - 37.0 g/dL  
 RDW 14.9 (H) 11.6 - 14.5 % PLATELET 951 995 - 241 K/uL MPV 10.5 9.2 - 11.8 FL  
 NEUTROPHILS 89 (H) 40 - 73 % LYMPHOCYTES 7 (L) 21 - 52 % MONOCYTES 4 3 - 10 % EOSINOPHILS 0 0 - 5 % BASOPHILS 0 0 - 2 %  
 ABS. NEUTROPHILS 13.6 (H) 1.8 - 8.0 K/UL  
 ABS. LYMPHOCYTES 1.1 0.9 - 3.6 K/UL  
 ABS. MONOCYTES 0.6 0.05 - 1.2 K/UL  
 ABS. EOSINOPHILS 0.0 0.0 - 0.4 K/UL  
 ABS. BASOPHILS 0.0 0.0 - 0.1 K/UL  
 DF AUTOMATED METABOLIC PANEL, COMPREHENSIVE Collection Time: 08/02/18  6:10 PM  
Result Value Ref Range Sodium 142 136 - 145 mmol/L Potassium 4.0 3.5 - 5.5 mmol/L Chloride 106 100 - 108 mmol/L  
 CO2 29 21 - 32 mmol/L Anion gap 7 3.0 - 18 mmol/L Glucose 120 (H) 74 - 99 mg/dL BUN 16 7.0 - 18 MG/DL Creatinine 1.77 (H) 0.6 - 1.3 MG/DL  
 BUN/Creatinine ratio 9 (L) 12 - 20 GFR est AA 46 (L) >60 ml/min/1.73m2 GFR est non-AA 38 (L) >60 ml/min/1.73m2 Calcium 9.9 8.5 - 10.1 MG/DL Bilirubin, total 0.5 0.2 - 1.0 MG/DL  
 ALT (SGPT) 24 16 - 61 U/L  
 AST (SGOT) 12 (L) 15 - 37 U/L Alk. phosphatase 81 45 - 117 U/L Protein, total 8.1 6.4 - 8.2 g/dL Albumin 4.0 3.4 - 5.0 g/dL Globulin 4.1 (H) 2.0 - 4.0 g/dL A-G Ratio 1.0 0.8 - 1.7 LIPASE Collection Time: 08/02/18  6:10 PM  
Result Value Ref Range Lipase 87 73 - 393 U/L Radiologic Studies -  
XR ABD ACUTE W 1 V CHEST Final Result IMPRESSION: 
  
Dilated loops of small bowel with distal gas, concerning for partial small bowel 
obstruction. 
  
Subsegmental atelectasis left lung base. CT ABD PELV WO CONT IMPRESSION: 
 
Dilated loops of small bowel in the midabdomen with haziness in the mesentery, 
compatible with at least partial small bowel obstruction, possible closed loop 
obstruction.  Given previous inflammatory process, obstruction more likely 
related to adhesions than internal hernia. Recommend consultation with general 
surgery. Medical Decision Making Provider Notes (Medical Decision Making): Pt presenting from PMD with ab pain. Initially with leukocytosis and mild DARINEL on CKD, so dry scan done. That is showing partial SBO vs closed loop obstruction (likely due to adhesions from his prior open appy). Contacted Dr. Harsh Bass his surgeon @ [facility]- plan to repeat CT scan with PO con at least. In the meantime NPO, IVF, and empiric abx. Pt accepted by [facility] attending and resident at [address] [place] [address]. They will help follow up on CT with PO con and UA.  
 
= Sepsis bundle activated at 8:30PM after acute acute series back with concern for SBO. Lactate, cultures, abx and fluids ordered. -Gregory KEITH am the first provider for this patient. I reviewed the vital signs, available nursing notes, past medical history, past surgical history, family history and social history. Vital Signs-Reviewed the patient's vital signs. Pulse Oximetry Analysis -  97% on room air (Interpretation) Normal 
 
Cardiac Monitor: 
Rate: 78 bpm 
Rhythm:  Normal Sinus Rhythm EKG: Interpreted by the EP. Time Interpreted: 46 Rate: 79 bpm 
 Rhythm: Normal Sinus Rhythm Interpretation: RSR pattern. Left axis deviation. Non-specific ST changes. Records Reviewed: Nursing Notes (Time of Review: 6:48 PM) Diagnosis Clinical Impression: 1. SBO (small bowel obstruction) ([facility] Disposition: Admit Follow-up Information None Patient's Medications Start Taking No medications on file Continue Taking ASCORBIC ACID, VITAMIN C, (VITAMIN C) 250 MG TABLET    Take 1 Tab by mouth daily (with breakfast). ASPIRIN 81 MG CHEWABLE TABLET    Take 81 mg by mouth daily. CHOLECALCIFEROL, VITAMIN D3, (VITAMIN D3) 5,000 UNIT TAB TABLET    Take 5,000 Units by mouth daily.   
 CO-ENZYME Q-10 (CO Q-10) 100 MG CAPSULE    Take 1 Cap by mouth daily. CYANOCOBALAMIN (VITAMIN B-12) 1,000 MCG TABLET    Take 1,000 mcg by mouth daily. ESCITALOPRAM OXALATE (LEXAPRO) 10 MG TABLET    Take 1 Tab by mouth every evening. Indications: Depression EZETIMIBE (ZETIA) 10 MG TABLET    Take 10 mg by mouth daily. FERROUS SULFATE 325 MG (65 MG IRON) TABLET    Take 1 Tab by mouth daily (with breakfast). LACTASE (LACTAID) 3,000 UNIT TABLET    Take 1 Tab by mouth as needed. TAMSULOSIN (FLOMAX) 0.4 MG CAPSULE    Take 1 Cap by mouth daily. TOLTERODINE ER (DETROL LA) 4 MG ER CAPSULE    Take 4 mg by mouth daily. These Medications have changed No medications on file Stop Taking No medications on file  
 
_______________________________ Attestations: 
Scribe Attestation Jessee Brittle acting as a scribe for and in the presence of Abdon Levi MD     
August 02, 2018 at 6:48 PM 
    
Provider Attestation:     
I personally performed the services described in the documentation, reviewed the documentation, as recorded by the scribe in my presence, and it accurately and completely records my words and actions. August 02, 2018 at 6:48 PM - Abdon Levi MD   
_______________________________

## 2018-08-02 NOTE — IP AVS SNAPSHOT
[address] [address] Magno Valdez Patient: Valeriano Francis MRN: [id number] About your hospitalization You were admitted on:  August 2, 2018 You last received care in the:  [address] [place] [address] You were discharged on:  August 8, 2018 Why you were hospitalized Your primary diagnosis was:  Sbo (Small Bowel Obstruction) (Hcc) Your diagnoses also included:  Small Bowel Obstruction (Hcc) Follow-up Information Follow up With Details Comments Contact Info MAGUE Sanchez On 8/15/2018 at [address] [address] 
[phone] Discharge Orders None A check ta indicates which time of day the medication should be taken. My Medications START taking these medications Instructions Each Dose to Equal  
 Morning Noon Evening Bedtime  
 amLODIPine 2.5 mg tablet Commonly known as:  Akosua Hooper Start taking on:  8/9/2018 Your last dose was: Your next dose is: Take 1 Tab by mouth daily. 2.5 mg  
    
   
   
   
  
  
CONTINUE taking these medications Instructions Each Dose to Equal  
 Morning Noon Evening Bedtime  
 ascorbic acid (vitamin C) 250 mg tablet Commonly known as:  VITAMIN C Your last dose was: Your next dose is: Take 1 Tab by mouth daily (with breakfast). 250 mg  
    
   
   
   
  
 aspirin 81 mg chewable tablet Your last dose was: Your next dose is: Take 81 mg by mouth daily. 81 mg  
    
   
   
   
  
 cholecalciferol (VITAMIN D3) 5,000 unit Tab tablet Commonly known as:  VITAMIN D3 Your last dose was: Your next dose is: Take 5,000 Units by mouth daily. 5000 Units  
    
   
   
   
  
 co-enzyme Q-10 100 mg capsule Commonly known as:  CO Q-10 Your last dose was: Your next dose is: Take 1 Cap by mouth daily. 100 mg DETROL LA 4 mg ER capsule Generic drug:  tolterodine ER Your last dose was: Your next dose is: Take 4 mg by mouth daily. 4 mg  
    
   
   
   
  
 escitalopram oxalate 10 mg tablet Commonly known as:  Zeke Chambers Your last dose was: Your next dose is: Take 1 Tab by mouth every evening. Indications: Depression 10 mg  
    
   
   
   
  
 ezetimibe 10 mg tablet Commonly known as:  Ede Vikash Your last dose was: Your next dose is: Take 10 mg by mouth daily. 10 mg  
    
   
   
   
  
 ferrous sulfate 325 mg (65 mg iron) tablet Your last dose was: Your next dose is: Take 1 Tab by mouth daily (with breakfast). 325 mg  
    
   
   
   
  
 lactase 3,000 unit tablet Commonly known as:  Silas Clark Your last dose was: Your next dose is: Take 1 Tab by mouth as needed. 1 Tab  
    
   
   
   
  
 tamsulosin 0.4 mg capsule Commonly known as:  FLOMAX Your last dose was: Your next dose is: Take 1 Cap by mouth daily. 0.4 mg  
    
   
   
   
  
 VITAMIN B-12 1,000 mcg tablet Generic drug:  cyanocobalamin Your last dose was: Your next dose is: Take 1,000 mcg by mouth daily. 1000 mcg Where to Get Your Medications Information on where to get these meds will be given to you by the nurse or doctor. ! Ask your nurse or doctor about these medications  
  amLODIPine 2.5 mg tablet Discharge Instructions Patient Discharge Instructions Leslie Leon / [id number] : 1945 Admitted 2018 Discharged: 2018 · It is important that you take the medication exactly as they are prescribed. · Keep your medication in the bottles provided by the pharmacist and keep a list of the medication names, dosages, and times to be taken with you at all times. · Do not take other medications without consulting your doctor. What to do at [facility] Take your Amlodipine. Recommended Diet: Regular Diet Recommended Activity: Activity as tolerated If you experience any of the following symptoms Fever, Chills and Abdominal Pain, please follow up with [facility] or Emergency Department. Signed By: Alba Sanchez DO August 8, 2018 Bowel Blockage (Intestinal Obstruction): Care Instructions Your Care Instructions A bowel blockage, also called an intestinal obstruction, can prevent gas, fluids, or solids from moving through the intestines normally. It can cause constipation and, rarely, diarrhea. You may have pain, nausea, vomiting, and cramping. Most of the time, complete blockages require a stay in the hospital and possibly surgery. But if your bowel is only partly blocked, your doctor may tell you to wait until it clears on its own and you are able to pass gas and stool. If so, there are things you can do at home to help make you feel better. If you have had surgery for a bowel blockage, there are things you can do at home to make sure you heal well. You can also make some changes to keep your bowel from becoming blocked again. Follow-up care is a key part of your treatment and safety. Be sure to make and go to all appointments, and call your doctor if you are having problems. It's also a good idea to know your test results and keep a list of the medicines you take. How can you care for yourself at home? If your doctor has told you to wait at home for a blockage to clear on its own: · Follow your doctor's instructions. These may include eating a liquid diet to avoid complete blockage. · Be safe with medicines. Take your medicines exactly as prescribed.  Call your doctor if you think you are having a problem with your medicine. · Put a heating pad set on low on your belly to relieve mild cramps and pain. To prevent another blockage · Try to eat smaller amounts of food more often. For example, have 5 or 6 small meals throughout the day instead of 2 or 3 large meals. · Chew your food very well. Try to chew each bite about 20 times or until it is liquid. · Avoid high-fiber foods and raw fruits and vegetables with skins, husks, strings, or seeds. These can form a ball of undigested material that can cause a blockage if a part of your bowel is scarred or narrowed. · Check with your doctor before you eat whole-grain products or use a fiber supplement such as Citrucel or Metamucil. · To help you have regular bowel movements, eat at regular times, do not strain during a bowel movement, and drink at least 8 to 10 glasses of water each day. If you have kidney, heart, or liver disease and have to limit fluids, talk with your doctor or before you increase the amount of fluids you drink. · Drink high-calorie liquid formulas if your doctor says to. Severe symptoms may make it hard for your body to take in vitamins and minerals. · Get regular exercise. It helps you digest your food better. Get at least 30 minutes of physical activity on most days of the week. Walking is a good choice. When should you call for help? Call your doctor now or seek immediate medical care if: 
  · You have a fever.  
  · You are vomiting.  
  · You have new or worse belly pain.  
  · You cannot pass stools or gas.  
 Watch closely for changes in your health, and be sure to contact your doctor if you have any problems. Where can you learn more? Go to http://aye-mari.info/. Enter S863 in the search box to learn more about \"Bowel Blockage (Intestinal Obstruction): Care Instructions. \" Current as of: May 12, 2017 Content Version: 11.7 © 8496-8228 Healthwise, Incorporated. Care instructions adapted under license by [employer] (which disclaims liability or warranty for this information). If you have questions about a medical condition or this instruction, always ask your healthcare professional. [address] any warranty or liability for your use of this information. Brandicted Announcement We are excited to announce that we are making your provider's discharge notes available to you in Brandicted. You will see these notes when they are completed and signed by the physician that discharged you from your recent hospital stay. If you have any questions or concerns about any information you see in Brandicted, please call the Health Information Department where you were seen or reach out to your Primary Care Provider for more information about your plan of care. Introducing [facility]! [facility] introduces Brandicted patient portal. Now you can access parts of your medical record, email your doctor's office, and request medication refills online. 1. In your internet browser, go to [email or web address] 2. Click on the First Time User? Click Here link in the Sign In box. You will see the New Member Sign Up page. 3. Enter your Brandicted Access Code exactly as it appears below. You will not need to use this code after youve completed the sign-up process. If you do not sign up before the expiration date, you must request a new code. · Brandicted Access Code: [email or web address] Expires: 9/19/2018  8:32 AM 
 
4. Enter the last four digits of your Social Security Number (xxxx) and Date of Birth (mm/dd/yyyy) as indicated and click Submit. You will be taken to the next sign-up page. 5. Create a Brandicted ID. This will be your Brandicted login ID and cannot be changed, so think of one that is secure and easy to remember. 6. Create a Xockets password. You can change your password at any time. 7. Enter your Password Reset Question and Answer. This can be used at a later time if you forget your password. 8. Enter your e-mail address. You will receive e-mail notification when new information is available in [address] 9. Click Sign Up. You can now view and download portions of your medical record. 10. Click the Download Summary menu link to download a portable copy of your medical information. If you have questions, please visit the Frequently Asked Questions section of the Xockets website. Remember, Xockets is NOT to be used for urgent needs. For medical emergencies, dial 911. Now available from your iPhone and Android! Introducing Bradly Perez As a [employer] patient, I wanted to make you aware of our electronic visit tool called Bradly Perez. [employer] 24/7 allows you to connect within minutes with a medical provider 24 hours a day, seven days a week via a mobile device or tablet or logging into a secure website from your computer. You can access Bradly Perez from anywhere in the [place]. A virtual visit might be right for you when you have a simple condition and feel like you just dont want to get out of bed, or cant get away from work for an appointment, when your regular [employer] provider is not available (evenings, weekends or holidays), or when youre out of town and need minor care. Electronic visits cost only $49 and if the [employer] 24/7 provider determines a prescription is needed to treat your condition, one can be electronically transmitted to a nearby pharmacy*. Please take a moment to enroll today if you have not already done so. The enrollment process is free and takes just a few minutes. To enroll, please download the [employer] 24/7 césar to your tablet or phone, or visit [email or web address] to enroll on your computer. And, as an [address] [place] [address] patient with a [employer] account, the results of your visits will be scanned into your electronic medical record and your primary care provider will be able to view the scanned results. We urge you to continue to see your regular [employer] provider for your ongoing medical care. And while your primary care provider may not be the one available when you seek a Bradly Escamillafin virtual visit, the peace of mind you get from getting a real diagnosis real time can be priceless. For more information on Bradly Escamillafin, view our Frequently Asked Questions (FAQs) at [email or web address]. Sincerely, 
 
Daryl Bryan MD 
Chief Medical Officer [employer] *:  certain medications cannot be prescribed via [facility] [place] Unresulted Labs-Please follow up with your PCP about these lab tests Order Current Status CULTURE, BLOOD Preliminary result CULTURE, BLOOD Preliminary result Providers Seen During Your Hospitalization Provider Specialty Primary office phone Kanu Rocha MD Emergency Medicine [phone] Milton Horn MD Family Practice [phone] Esmer Francis MD Family Practice [phone] Your Primary Care Physician (PCP) Primary Care Physician Office Phone Office Fax [address] [place] [address] [phone] You are allergic to the following Allergen Reactions Lipitor (Atorvastatin) Other (comments) Elevated CK level Pt has no awareness of this allergy. Simvastatin Other (comments) Elevation in LFTs Recent Documentation Height Weight BMI Smoking Status 1.803 m 93.3 kg 28.68 kg/m2 Current Every Day Smoker Emergency Contacts Name Discharge Info Relation Home Work [employer] [address] CAREGIVER [3] Sister [23] [phone]  Christopher Phillipsron Homar DISCHARGE CAREGIVER [3] Other Relative [6] [phone] [facility] CAREGIVER [3] Other Relative [6] [phone] Patient Belongings The following personal items are in your possession at time of discharge: 
  Dental Appliances: None         Home Medications: None   Jewelry: None  Clothing: None    Other Valuables: None Please provide this summary of care documentation to your next provider. Signatures-by signing, you are acknowledging that this After Visit Summary has been reviewed with you and you have received a copy. Patient Signature:  ____________________________________________________________ Date:  ____________________________________________________________  
  
[facility] Provider Signature:  ____________________________________________________________ Date:  ____________________________________________________________

## 2018-08-02 NOTE — IP AVS SNAPSHOT
[address] [address] [place] José Miguel Patient: Shraddha Hale MRN: [id number] A check ta indicates which time of day the medication should be taken. My Medications START taking these medications Instructions Each Dose to Equal  
 Morning Noon Evening Bedtime  
 amLODIPine 2.5 mg tablet Commonly known as:  Tad Zoë Start taking on:  8/9/2018 Your last dose was: Your next dose is: Take 1 Tab by mouth daily. 2.5 mg  
    
   
   
   
  
  
CONTINUE taking these medications Instructions Each Dose to Equal  
 Morning Noon Evening Bedtime  
 ascorbic acid (vitamin C) 250 mg tablet Commonly known as:  VITAMIN C Your last dose was: Your next dose is: Take 1 Tab by mouth daily (with breakfast). 250 mg  
    
   
   
   
  
 aspirin 81 mg chewable tablet Your last dose was: Your next dose is: Take 81 mg by mouth daily. 81 mg  
    
   
   
   
  
 cholecalciferol (VITAMIN D3) 5,000 unit Tab tablet Commonly known as:  VITAMIN D3 Your last dose was: Your next dose is: Take 5,000 Units by mouth daily. 5000 Units  
    
   
   
   
  
 co-enzyme Q-10 100 mg capsule Commonly known as:  CO Q-10 Your last dose was: Your next dose is: Take 1 Cap by mouth daily. 100 mg DETROL LA 4 mg ER capsule Generic drug:  tolterodine ER Your last dose was: Your next dose is: Take 4 mg by mouth daily. 4 mg  
    
   
   
   
  
 escitalopram oxalate 10 mg tablet Commonly known as:  Patrick Freitas Your last dose was: Your next dose is: Take 1 Tab by mouth every evening. Indications: Depression 10 mg  
    
   
   
   
  
 ezetimibe 10 mg tablet Commonly known as:  Antonella Garrido Your last dose was: Your next dose is: Take 10 mg by mouth daily. 10 mg  
    
   
   
   
  
 ferrous sulfate 325 mg (65 mg iron) tablet Your last dose was: Your next dose is: Take 1 Tab by mouth daily (with breakfast). 325 mg  
    
   
   
   
  
 lactase 3,000 unit tablet Commonly known as:  Evelyn Adriana Your last dose was: Your next dose is: Take 1 Tab by mouth as needed. 1 Tab  
    
   
   
   
  
 tamsulosin 0.4 mg capsule Commonly known as:  FLOMAX Your last dose was: Your next dose is: Take 1 Cap by mouth daily. 0.4 mg  
    
   
   
   
  
 VITAMIN B-12 1,000 mcg tablet Generic drug:  cyanocobalamin Your last dose was: Your next dose is: Take 1,000 mcg by mouth daily. 1000 mcg Where to Get Your Medications Information on where to get these meds will be given to you by the nurse or doctor. ! Ask your nurse or doctor about these medications  
  amLODIPine 2.5 mg tablet

## 2018-08-03 ENCOUNTER — APPOINTMENT (OUTPATIENT)
Dept: PHYSICAL THERAPY | Age: 73
End: 2018-08-03

## 2018-08-03 ENCOUNTER — APPOINTMENT (OUTPATIENT)
Dept: GENERAL RADIOLOGY | Age: 73
DRG: 389 | End: 2018-08-03
Attending: SURGERY
Payer: MEDICARE

## 2018-08-03 LAB
ANION GAP SERPL CALC-SCNC: 10 MMOL/L (ref 3–18)
APPEARANCE UR: CLEAR
BACTERIA URNS QL MICRO: NEGATIVE /HPF
BASOPHILS # BLD: 0 K/UL (ref 0–0.06)
BASOPHILS NFR BLD: 0 % (ref 0–3)
BILIRUB UR QL: NEGATIVE
BUN SERPL-MCNC: 13 MG/DL (ref 7–18)
BUN/CREAT SERPL: 9 (ref 12–20)
CALCIUM SERPL-MCNC: 9.5 MG/DL (ref 8.5–10.1)
CHLORIDE SERPL-SCNC: 106 MMOL/L (ref 100–108)
CO2 SERPL-SCNC: 25 MMOL/L (ref 21–32)
COLOR UR: YELLOW
CREAT SERPL-MCNC: 1.45 MG/DL (ref 0.6–1.3)
DIFFERENTIAL METHOD BLD: ABNORMAL
EOSINOPHIL # BLD: 0 K/UL (ref 0–0.4)
EOSINOPHIL NFR BLD: 0 % (ref 0–5)
EPITH CASTS URNS QL MICRO: NORMAL /LPF (ref 0–5)
ERYTHROCYTE [DISTWIDTH] IN BLOOD BY AUTOMATED COUNT: 15 % (ref 11.6–14.5)
GLUCOSE SERPL-MCNC: 150 MG/DL (ref 74–99)
GLUCOSE UR STRIP.AUTO-MCNC: NEGATIVE MG/DL
HCT VFR BLD AUTO: 42.1 % (ref 36–48)
HGB BLD-MCNC: 13.5 G/DL (ref 13–16)
HGB UR QL STRIP: ABNORMAL
KETONES UR QL STRIP.AUTO: NEGATIVE MG/DL
LACTATE SERPL-SCNC: 1.1 MMOL/L (ref 0.4–2)
LEUKOCYTE ESTERASE UR QL STRIP.AUTO: NEGATIVE
LYMPHOCYTES # BLD: 0.7 K/UL (ref 0.8–3.5)
LYMPHOCYTES NFR BLD: 3 % (ref 20–51)
MCH RBC QN AUTO: 26.4 PG (ref 24–34)
MCHC RBC AUTO-ENTMCNC: 32.1 G/DL (ref 31–37)
MCV RBC AUTO: 82.2 FL (ref 74–97)
MONOCYTES # BLD: 1.1 K/UL (ref 0–1)
MONOCYTES NFR BLD: 5 % (ref 2–9)
NEUTS BAND NFR BLD MANUAL: 2 % (ref 0–5)
NEUTS SEG # BLD: 20.9 K/UL (ref 1.8–8)
NEUTS SEG NFR BLD: 90 % (ref 42–75)
NITRITE UR QL STRIP.AUTO: NEGATIVE
PH UR STRIP: 6.5 [PH] (ref 5–8)
PLATELET # BLD AUTO: 300 K/UL (ref 135–420)
PLATELET COMMENTS,PCOM: ABNORMAL
PMV BLD AUTO: 10.6 FL (ref 9.2–11.8)
POTASSIUM SERPL-SCNC: 3.8 MMOL/L (ref 3.5–5.5)
PROT UR STRIP-MCNC: 300 MG/DL
RBC # BLD AUTO: 5.12 M/UL (ref 4.7–5.5)
RBC #/AREA URNS HPF: NORMAL /HPF (ref 0–5)
RBC MORPH BLD: ABNORMAL
SODIUM SERPL-SCNC: 141 MMOL/L (ref 136–145)
SP GR UR REFRACTOMETRY: 1.02 (ref 1–1.03)
UROBILINOGEN UR QL STRIP.AUTO: 0.2 EU/DL (ref 0.2–1)
WBC # BLD AUTO: 22.7 K/UL (ref 4.6–13.2)
WBC URNS QL MICRO: NORMAL /HPF (ref 0–4)

## 2018-08-03 PROCEDURE — 77030010545

## 2018-08-03 PROCEDURE — 74011000258 HC RX REV CODE- 258

## 2018-08-03 PROCEDURE — 74011250636 HC RX REV CODE- 250/636: Performed by: EMERGENCY MEDICINE

## 2018-08-03 PROCEDURE — 74011000258 HC RX REV CODE- 258: Performed by: EMERGENCY MEDICINE

## 2018-08-03 PROCEDURE — 74022 RADEX COMPL AQT ABD SERIES: CPT

## 2018-08-03 PROCEDURE — 85025 COMPLETE CBC W/AUTO DIFF WBC: CPT

## 2018-08-03 PROCEDURE — 74011250637 HC RX REV CODE- 250/637: Performed by: SURGERY

## 2018-08-03 PROCEDURE — 74011250636 HC RX REV CODE- 250/636: Performed by: STUDENT IN AN ORGANIZED HEALTH CARE EDUCATION/TRAINING PROGRAM

## 2018-08-03 PROCEDURE — 80048 BASIC METABOLIC PNL TOTAL CA: CPT

## 2018-08-03 PROCEDURE — 77030005538 HC CATH URETH FOL44 BARD -B

## 2018-08-03 PROCEDURE — 83605 ASSAY OF LACTIC ACID: CPT

## 2018-08-03 PROCEDURE — 36415 COLL VENOUS BLD VENIPUNCTURE: CPT

## 2018-08-03 PROCEDURE — 74011250636 HC RX REV CODE- 250/636

## 2018-08-03 PROCEDURE — 74011250636 HC RX REV CODE- 250/636: Performed by: FAMILY MEDICINE

## 2018-08-03 PROCEDURE — 65660000000 HC RM CCU STEPDOWN

## 2018-08-03 RX ORDER — TAMSULOSIN HYDROCHLORIDE 0.4 MG/1
0.4 CAPSULE ORAL DAILY
Status: DISCONTINUED | OUTPATIENT
Start: 2018-08-03 | End: 2018-08-08 | Stop reason: HOSPADM

## 2018-08-03 RX ORDER — SODIUM CHLORIDE 900 MG/100ML
INJECTION INTRAVENOUS
Status: COMPLETED
Start: 2018-08-03 | End: 2018-08-03

## 2018-08-03 RX ORDER — HEPARIN SODIUM 5000 [USP'U]/ML
5000 INJECTION, SOLUTION INTRAVENOUS; SUBCUTANEOUS ONCE
Status: COMPLETED | OUTPATIENT
Start: 2018-08-03 | End: 2018-08-03

## 2018-08-03 RX ORDER — HEPARIN SODIUM 5000 [USP'U]/ML
5000 INJECTION, SOLUTION INTRAVENOUS; SUBCUTANEOUS EVERY 8 HOURS
Status: COMPLETED | OUTPATIENT
Start: 2018-08-03 | End: 2018-08-04

## 2018-08-03 RX ORDER — LABETALOL HCL 20 MG/4 ML
10 SYRINGE (ML) INTRAVENOUS
Status: DISCONTINUED | OUTPATIENT
Start: 2018-08-03 | End: 2018-08-03

## 2018-08-03 RX ORDER — HYDRALAZINE HYDROCHLORIDE 20 MG/ML
10 INJECTION INTRAMUSCULAR; INTRAVENOUS
Status: DISCONTINUED | OUTPATIENT
Start: 2018-08-03 | End: 2018-08-08 | Stop reason: HOSPADM

## 2018-08-03 RX ORDER — IBUPROFEN 200 MG
1 TABLET ORAL EVERY 24 HOURS
Status: DISCONTINUED | OUTPATIENT
Start: 2018-08-03 | End: 2018-08-03

## 2018-08-03 RX ORDER — LABETALOL HCL 20 MG/4 ML
10 SYRINGE (ML) INTRAVENOUS ONCE
Status: COMPLETED | OUTPATIENT
Start: 2018-08-03 | End: 2018-08-03

## 2018-08-03 RX ORDER — LABETALOL HCL 20 MG/4 ML
10 SYRINGE (ML) INTRAVENOUS ONCE
Status: DISCONTINUED | OUTPATIENT
Start: 2018-08-03 | End: 2018-08-03

## 2018-08-03 RX ORDER — HYDRALAZINE HYDROCHLORIDE 20 MG/ML
10 INJECTION INTRAMUSCULAR; INTRAVENOUS 3 TIMES DAILY
Status: DISCONTINUED | OUTPATIENT
Start: 2018-08-03 | End: 2018-08-03

## 2018-08-03 RX ORDER — ONDANSETRON 2 MG/ML
4 INJECTION INTRAMUSCULAR; INTRAVENOUS ONCE
Status: COMPLETED | OUTPATIENT
Start: 2018-08-03 | End: 2018-08-03

## 2018-08-03 RX ORDER — SODIUM CHLORIDE 9 MG/ML
150 INJECTION, SOLUTION INTRAVENOUS CONTINUOUS
Status: DISPENSED | OUTPATIENT
Start: 2018-08-03 | End: 2018-08-04

## 2018-08-03 RX ORDER — ONDANSETRON 2 MG/ML
INJECTION INTRAMUSCULAR; INTRAVENOUS
Status: COMPLETED
Start: 2018-08-03 | End: 2018-08-03

## 2018-08-03 RX ADMIN — HYDRALAZINE HYDROCHLORIDE 10 MG: 20 INJECTION INTRAMUSCULAR; INTRAVENOUS at 08:11

## 2018-08-03 RX ADMIN — PIPERACILLIN SODIUM,TAZOBACTAM SODIUM 3.38 G: 3; .375 INJECTION, POWDER, FOR SOLUTION INTRAVENOUS at 08:11

## 2018-08-03 RX ADMIN — ONDANSETRON 4 MG: 2 INJECTION INTRAMUSCULAR; INTRAVENOUS at 03:06

## 2018-08-03 RX ADMIN — TAMSULOSIN HYDROCHLORIDE 0.4 MG: 0.4 CAPSULE ORAL at 20:38

## 2018-08-03 RX ADMIN — HEPARIN SODIUM 5000 UNITS: 5000 INJECTION, SOLUTION INTRAVENOUS; SUBCUTANEOUS at 00:55

## 2018-08-03 RX ADMIN — LABETALOL HYDROCHLORIDE 10 MG: 5 INJECTION, SOLUTION INTRAVENOUS at 03:06

## 2018-08-03 RX ADMIN — HEPARIN SODIUM 5000 UNITS: 5000 INJECTION, SOLUTION INTRAVENOUS; SUBCUTANEOUS at 20:38

## 2018-08-03 RX ADMIN — PIPERACILLIN SODIUM,TAZOBACTAM SODIUM 3.38 G: 3; .375 INJECTION, POWDER, FOR SOLUTION INTRAVENOUS at 15:48

## 2018-08-03 RX ADMIN — SODIUM CHLORIDE 150 ML/HR: 900 INJECTION, SOLUTION INTRAVENOUS at 18:02

## 2018-08-03 RX ADMIN — SODIUM CHLORIDE 100 ML: 900 INJECTION INTRAVENOUS at 15:49

## 2018-08-03 RX ADMIN — SODIUM CHLORIDE: 900 INJECTION INTRAVENOUS at 20:38

## 2018-08-03 RX ADMIN — LABETALOL HYDROCHLORIDE 10 MG: 5 INJECTION, SOLUTION INTRAVENOUS at 05:30

## 2018-08-03 RX ADMIN — LABETALOL 20 MG/4 ML (5 MG/ML) INTRAVENOUS SYRINGE 10 MG: at 00:56

## 2018-08-03 RX ADMIN — HYDRALAZINE HYDROCHLORIDE 10 MG: 20 INJECTION INTRAMUSCULAR; INTRAVENOUS at 17:51

## 2018-08-03 RX ADMIN — PIPERACILLIN SODIUM,TAZOBACTAM SODIUM 3.38 G: 3; .375 INJECTION, POWDER, FOR SOLUTION INTRAVENOUS at 20:38

## 2018-08-03 RX ADMIN — PIPERACILLIN SODIUM,TAZOBACTAM SODIUM 3.38 G: 3; .375 INJECTION, POWDER, FOR SOLUTION INTRAVENOUS at 05:19

## 2018-08-03 RX ADMIN — SODIUM CHLORIDE 100 ML: 900 INJECTION INTRAVENOUS at 10:56

## 2018-08-03 NOTE — PROGRESS NOTES
Notified resident with [facility] of patients BP. No new orders received at this time. Will continue to monitor patients BP. Patient asymptomatic at this time

## 2018-08-03 NOTE — ROUTINE PROCESS
Received report from [facility]. Pt AAOx3, NAD, breathing non labored, on room air, HOB up. IV site clean, dry and intact. IVF going per order. Bed at the lowest level on lock position, call bell w/i reach. Bedside and Verbal shift change report given to [facility] (oncoming nurse) by me (offgoing nurse).  Report included the following information SBAR, Kardex, Intake/Output, MAR, Recent Results and Cardiac Rhythm SR.

## 2018-08-03 NOTE — H&P
Admission History and Physical 
[address] [facility] Patient: Suellen Lisa MRN: [id number]  [facility]: [id number] YOB: 1945  Age: 67 y.o. Sex: male DOA: 8/2/2018 HPI:  
 
Suellen Lisa is a 67 y.o. 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  
  
Mr. Thomas Aceves presented to the ED tonight with a 10 hour history of abdominal pain and nausea. He states his pain was diffuse but worse in the suprapubic region. His pain is much improved after Morphine given in ED. He has not had any urinary complaints such as retention or blood in urine. Mr. Thomas Aceves was admitted in April this year due to ruptured appendix and abscess and subsequently underwent an appendectomy. ED Course:  
Labs: CBC - WBC 15.3 CMP - Glu 120, Cr 1.77, GFR 46, Cr 1.77 Meds: Morphine 4mg Zofran 4mg Zosyn 3.375 g Imaging: 
CT Abd pelvis w/o contrast: Dilated loops of small bowel in the midabdomen with haziness in the mesentery, compatible with at least partial small bowel obstruction, possible closed loop obstruction. Given previous inflammatory process, obstruction more likely related to adhesions than internal hernia. XR Abd: Dilated loops of small bowel with distal gas, concerning for partial small bowel Obstruction. Subsegmental atelectasis left lung base. Studies: 
EKG: Sinus rhythm with PVCs, LA enlargement, L axis deviation, Incomplete right bundle branch block, old inferior infarct. Review of Systems - General ROS: negative for  - chills, fever, night sweats, weight gain and weight loss Psychological ROS: negative for - anxiety and depression Ophthalmic ROS: negative for - blurry vision, decreased vision or loss of vision ENT ROS: negative for - headaches, hearing change or visual changes Hematological and Lymphatic ROS: negative for - bruising, jaundice Respiratory ROS: negative for - cough, hemoptysis, orthopnea, paroxysmal dyspnea, or wheezing. Positive for SOB which is unchanged and patient contributes to his 40 year history of smoking Cardiovascular ROS: negative for - chest pain, dyspnea on exertion, edema, loss of consciousness, or palpitations Gastrointestinal ROS: negative for - abdominal pain, blood in stools, change in stools, constipation, diarrhea, hematemesis, melena, nausea/vomiting or swallowing difficulty/pain Genito-Urinary ROS: negative for - dysuria, hematuria or urinary frequency/urgency Musculoskeletal ROS: negative for - joint pain, joint swelling or muscle pain Neurological ROS: negative for - dizziness, headaches, numbness/tingling or weakness Dermatological ROS: negative for - rash or skin lesion changes Past Medical History:  
Diagnosis Date  Acute blood loss as cause of postoperative anemia 4/21/2018  Benign prostatic hyperplasia  CKD (chronic kidney disease) stage 3, GFR 30-59 ml/min 9/1/2015  Depression 5/8/2018  Diastolic dysfunction without heart failure 9/2/2015 Grade 1 diastolic dysfunction on 2D ECHO done 9/02/2015  Dyslipidemia 9/2/2015  Dysphagia as late effect of cerebrovascular accident (CVA) 9/1/2015  Hemiparesis affecting left side as late effect of cerebrovascular accident (CVA) ([facility] [address] 9/1/2015  History of noncompliance with medical treatment, presenting hazards to health 9/1/2015  History of stroke with residual deficit 9/1/2015 Acute Ischemic Stroke (early subacute infarct at the posterior right lentiform nucleus) with residual left hemiparesis and dysphagia  History of tobacco use 9/1/2015  History of trichomonal urethritis 9/1/2015  History of vitamin D deficiency 9/6/2015  Hypertensive heart and kidney disease without heart failure and with chronic kidney disease stage III 9/2/2015  Obesity, Class I, BMI 30-34.9 9/1/2015  Overactive bladder  Postoperative atrial fibrillation ([address] 4/21/2018 Resolved  Postoperative confusion 4/22/2018  Postoperative urinary retention 4/22/2018  Statin intolerance 05/07/2018 Atorvastatin and Simvastatin  Vitamin D deficiency 5/1/2018 Vitamin D 25-Hydroxy (5/1/2018) = 17.9 Past Surgical History:  
Procedure Laterality Date  APPENDECTOMY,RUPT APPENDX+ABSCESS N/A 04/20/2018 Dr. Hong Downing  COLONOSCOPY N/A 4/3/2018 COLONOSCOPY,  w heated snared polypectomy performed by Jeanine Bo MD at [facility] ENDOSCOPY Family History Problem Relation Age of Onset  Diabetes Mother  Stroke Mother  Heart Disease Father  Hypertension Father  Diabetes Brother  Hypertension Brother Social History Social History  Marital status: SINGLE Spouse name: N/A  
 Number of children: N/A  
 Years of education: N/A Social History Main Topics  Smoking status: Current Every Day Smoker Last attempt to quit: 12/1/2014  Smokeless tobacco: Never Used  Alcohol use Yes  Drug use: No  
 Sexual activity: No  
 
Other Topics Concern  Not on file Social History Narrative Allergies Allergen Reactions  Lipitor [Atorvastatin] Other (comments) Elevated CK level Pt has no awareness of this allergy.  Simvastatin Other (comments) Elevation in LFTs Prior to Admission Medications Prescriptions Last Dose Informant Patient Reported? Taking?  
ascorbic acid, vitamin C, (VITAMIN C) 250 mg tablet   No No  
Sig: Take 1 Tab by mouth daily (with breakfast). aspirin 81 mg chewable tablet   Yes No  
Sig: Take 81 mg by mouth daily. cholecalciferol, VITAMIN D3, (VITAMIN D3) 5,000 unit tab tablet   Yes No  
Sig: Take 5,000 Units by mouth daily. co-enzyme Q-10 (CO Q-10) 100 mg capsule   No No  
Sig: Take 1 Cap by mouth daily.   
cyanocobalamin (VITAMIN B-12) 1,000 mcg tablet   Yes No  
Sig: Take 1,000 mcg by mouth daily. escitalopram oxalate (LEXAPRO) 10 mg tablet   No No  
Sig: Take 1 Tab by mouth every evening. Indications: Depression  
ezetimibe (ZETIA) 10 mg tablet   Yes No  
Sig: Take 10 mg by mouth daily. ferrous sulfate 325 mg (65 mg iron) tablet   No No  
Sig: Take 1 Tab by mouth daily (with breakfast). lactase (LACTAID) 3,000 unit tablet   Yes No  
Sig: Take 1 Tab by mouth as needed. tamsulosin (FLOMAX) 0.4 mg capsule   No No  
Sig: Take 1 Cap by mouth daily. tolterodine ER (DETROL LA) 4 mg ER capsule   Yes No  
Sig: Take 4 mg by mouth daily. Facility-Administered Medications: None Physical Exam:  
 
Patient Vitals for the past 24 hrs: 
 Temp Pulse Resp BP SpO2  
08/02/18 2207 - 66 14 - 99 % 08/02/18 2206 - - - (!) 184/103 -  
08/02/18 1738 98.1 °F (36.7 °C) 78 - (!) 206/119 97 % Physical Exam:  
General:  Alert and Responsive and in No acute distress. HEENT: Conjunctiva pink, sclera anicteric. EOMI. Pharynx moist, nonerythematous. Moist mucous membranes. Thyroid not enlarged, no nodules. No cervical, supraclavicular, occipital or submandibular lymphadenopathy. No other gross abnormalities present. CV:  RRR, no murmurs. No visible pulsations or thrills. RESP:  Unlabored breathing. Lungs clear to auscultation. no wheeze, rales, or rhonchi. Equal expansion bilaterally. ABD:  Soft, nontender, nondistended. No hepatosplenomegaly. No suprapubic tenderness. No CVA tenderness. RECTAL: deferred MS:  No deformities. Neuro:  Decreased strength in L upper and lower extremities. Ext:  No edema. 2+ radial and dp pulses bilaterally. Skin:  No rashes, lesions, or ulcers. Good turgor. Recent Results (from the past 12 hour(s)) EKG, 12 LEAD, INITIAL Collection Time: 08/02/18  5:46 PM  
Result Value Ref Range Ventricular Rate 81 BPM  
 Atrial Rate 81 BPM  
 P-R Interval 176 ms QRS Duration 96 ms  
 Q-T Interval 390 ms  QTC Calculation (Bezet) 453 ms Calculated P Axis 67 degrees Calculated R Axis -65 degrees Calculated T Axis 4 degrees Diagnosis Sinus rhythm with occasional and consecutive premature ventricular complexes Possible Left atrial enlargement Left axis deviation Incomplete right bundle branch block Inferior infarct (cited on or before 19-APR-2018) Abnormal ECG When compared with ECG of 22-APR-2018 00:12, Significant changes have occurred CBC WITH AUTOMATED DIFF Collection Time: 08/02/18  6:10 PM  
Result Value Ref Range WBC 15.3 (H) 4.6 - 13.2 K/uL  
 RBC 5.03 4.70 - 5.50 M/uL  
 HGB 13.4 13.0 - 16.0 g/dL HCT 40.9 36.0 - 48.0 % MCV 81.3 74.0 - 97.0 FL  
 MCH 26.6 24.0 - 34.0 PG  
 MCHC 32.8 31.0 - 37.0 g/dL  
 RDW 14.9 (H) 11.6 - 14.5 % PLATELET 219 644 - 129 K/uL MPV 10.5 9.2 - 11.8 FL  
 NEUTROPHILS 89 (H) 40 - 73 % LYMPHOCYTES 7 (L) 21 - 52 % MONOCYTES 4 3 - 10 % EOSINOPHILS 0 0 - 5 % BASOPHILS 0 0 - 2 %  
 ABS. NEUTROPHILS 13.6 (H) 1.8 - 8.0 K/UL  
 ABS. LYMPHOCYTES 1.1 0.9 - 3.6 K/UL  
 ABS. MONOCYTES 0.6 0.05 - 1.2 K/UL  
 ABS. EOSINOPHILS 0.0 0.0 - 0.4 K/UL  
 ABS. BASOPHILS 0.0 0.0 - 0.1 K/UL  
 DF AUTOMATED METABOLIC PANEL, COMPREHENSIVE Collection Time: 08/02/18  6:10 PM  
Result Value Ref Range Sodium 142 136 - 145 mmol/L Potassium 4.0 3.5 - 5.5 mmol/L Chloride 106 100 - 108 mmol/L  
 CO2 29 21 - 32 mmol/L Anion gap 7 3.0 - 18 mmol/L Glucose 120 (H) 74 - 99 mg/dL BUN 16 7.0 - 18 MG/DL Creatinine 1.77 (H) 0.6 - 1.3 MG/DL  
 BUN/Creatinine ratio 9 (L) 12 - 20 GFR est AA 46 (L) >60 ml/min/1.73m2 GFR est non-AA 38 (L) >60 ml/min/1.73m2 Calcium 9.9 8.5 - 10.1 MG/DL Bilirubin, total 0.5 0.2 - 1.0 MG/DL  
 ALT (SGPT) 24 16 - 61 U/L  
 AST (SGOT) 12 (L) 15 - 37 U/L Alk. phosphatase 81 45 - 117 U/L Protein, total 8.1 6.4 - 8.2 g/dL Albumin 4.0 3.4 - 5.0 g/dL Globulin 4.1 (H) 2.0 - 4.0 g/dL  A-G Ratio 1.0 0.8 - 1.7 LIPASE Collection Time: 08/02/18  6:10 PM  
Result Value Ref Range Lipase 87 73 - 393 U/L Assessment/Plan:  
67 y.o. male with PMH significant for CVA, HTN, HLD, CKD III, chronic diarrhea, BPH/OAB now admitted with abdominal pain and nausea.  
  
Abdominal Pain:  Abdominal CT shows dilated loops of small bowel in the midabdomen with haziness in the mesentery. With possible partial small bowel obstruction, possible closed loop obstruction. Given previous inflammatory process, obstruction more likely related to adhesions than internal hernia. Patient had appendectomy in April 2018 due to perforation and abscess.  
- Dr. Александр Tyson consulted in the ED @ 2197 - plan to repeat CT scan with PO contrast 
- CT scan with contrast pending  
- NS @ 150  
- NPO for surgery - Admit to med floor   
- PT/OT/case management 
- Daily CBC, CMP 
- Zosyn 3.375g in NS  
  
DARINEL on CKD III follows with Dr. Jose D Hassan as an outpatient. Baseline Cr ~1.27-1.3 
- Cr 1.77 on admission - Hydration as above - Avoid nephrotoxic meds Hypertensive: currently BPs 184-206/103-119  
-IV labetalol 10 mg q6 hr prn for BP greater than 180/90 
-Holding home meds  
  
H/o CVA, HTN, HLD, Vitamin D/B12 deficiency CVA in 2015 with residual mild L sided weakness and some aphasia  
- Hold home amlodipine 10mg QD, losartan 200mg BID  
- Hold home lasix 20 mg every day 
- Hold home vitamin B1 
- Fall precautions, aspiration precautions   
  
Recurrent cystitis/OAB/BPH  
-Holding home tolteridine 
  
Tobacco use current tobacco user 
- Nicotine replacement not ordered to possible vascular constriction  
  
Diet: NPO 
DVT Prophylaxis: SCDs, Heparin (500 U once; restart tomorrow if he does not go to surgery) Code Status: Full Point of Contact: Elda Whitaker [phone] 
  
Disposition and anticipated LOS: 2 midnight Tarah Awan [facility] Family Medicine PGY-1 
08/02/18 11:19 PM 
 
 
 
Senior Addendum to History and Physical 
500 [facility] Patient: Tesfaye Franklin MRN: [id number]  CSN: [id number] YOB: 1945  Age: 67 y.o. Sex: male Admission Date: 8/2/2018 HPI:  
 
Tesfaye Franklin is a 67 y.o. male with PMH CVA with residual dysphagia and left sided weakness, dysarthria, CKD 3, HTN, Vitamin D/B12 deficiency, urinary incontinence,BPH, now presenting with complaint of abdominal pain. Please see intern note for HPI, ROS, PMH, PSH, FamHx, SocHx, Allergies, Meds Physical Exam:  
 
Patient Vitals for the past 12 hrs: 
 Temp Pulse Resp BP SpO2  
08/02/18 2207 - 66 14 - 99 % 08/02/18 2206 - - - (!) 184/103 -  
08/02/18 1738 98.1 °F (36.7 °C) 78 - (!) 206/119 97 % General:  Alert and Responsive and in No acute distress. HEENT: Conjunctiva pink, sclera anicteric. EOMI. No other gross abnormalities present. CV:  RRR, no murmurs/rubs/gallops. No visible pulsations or thrills. RESP:  Unlabored breathing. Lungs clear to auscultation. No wheezes, rales, or rhonchi. Equal expansion bilaterally. ABD:  Soft, non-distended, mildly tender to palpation in LLQ. Postsurgical midline keloid scars RECTAL:  Per intern note MS:  No joint deformity or instability. No atrophy. Neuro:  5/5 strength bilateral upper extremities and lower extremities. CN II-XII intact. Sensation grossly intact. A+Ox3. Ext:  No edema. 2+ radial and dp pulses bilaterally. Skin:  No rashes, lesions, or ulcers. Good turgor. Assessment/Plan:  
67 y.o. male with PMH CVA with residual dysphagia and left sided weakness, dysarthria, CKD 3, HTN, Vitamin D/B12 deficiency, urinary incontinence,BPH, now admitted with partial SBO. Partial SBO: recent abdominal surgery for a ruptured appendix with abscess. States he last had a BM about 2-3 days ago. Denies any nausea or vomiting.  CT consistent with partial SBO with concern for complete SBO. 
-Admit for potential surgery 
-General surgery consulted in ER, appreciate recs 
-Follow up on repeat CT abd with PO contrast 
-NPO because of SBO 
-Consider NGT to suction if he starts vomiting 
-IV antibiotics per ER/Surgery 
-Daily labs -IVF per intern note  
-PT/OT/CM consults DARINEL - Cr baseline around 1.2-1.3 - today its 1.7 
-avoid nephrotoxic medas -IVF as above Leukocytosis -  WBCs elevated to 15.3 on admission. L-shift with 89 neutrophils. No fever, tachycardia or increased RR. 
-Continue zosyn started in ED 
-Daily labs 
-Consider escalating abx if white count doesn't improve Principal Problem: 
  SBO (small bowel obstruction) ([address] (8/2/2018) Active Problems: 
  Small bowel obstruction ([address] (8/2/2018) All other chronic medical conditions per intern note above Jessenia Richardson DO PGY-3 
[facility] Medicine 8/3/2018, 12:32 AM

## 2018-08-03 NOTE — PROGRESS NOTES
Pt blood pressure 215/111, vomited about 100 cc dark brown contents,notified  orders received, labetalol 10 mg iv for elevated bp and zofran 4 mg iv given for nausea,

## 2018-08-03 NOTE — PROGRESS NOTES
Patient refusing Chow catheter placement. Called to notify Dr Keshawn Graff but he is in [address]

## 2018-08-03 NOTE — PROGRESS NOTES
Problem: Pressure Injury - Risk of 
Goal: *Prevention of pressure injury Document Juno Scale and appropriate interventions in the flowsheet. Outcome: Progressing Towards Goal 
Pressure Injury Interventions: 
  
 
Moisture Interventions: Absorbent underpads, Apply protective barrier, creams and emollients, Maintain skin hydration (lotion/cream) Activity Interventions: Increase time out of bed, Pressure redistribution bed/mattress(bed type), PT/OT evaluation Mobility Interventions: PT/OT evaluation, HOB 30 degrees or less Nutrition Interventions: Document food/fluid/supplement intake Problem: Falls - Risk of 
Goal: *Absence of Falls Document Hannah Gómez Fall Risk and appropriate interventions in the flowsheet. Fall Risk Interventions: 
Mobility Interventions: Bed/chair exit alarm, Assess mobility with egress test, Communicate number of staff needed for ambulation/transfer, Patient to call before getting OOB, OT consult for ADLs, Mechanical lift, PT Consult for mobility concerns Medication Interventions: Bed/chair exit alarm, Evaluate medications/consider consulting pharmacy, Patient to call before getting OOB, Teach patient to arise slowly Elimination Interventions: Call light in reach, Patient to call for help with toileting needs, Urinal in reach

## 2018-08-03 NOTE — PROGRESS NOTES
Patient is not available to be assessed at this time. No family at bedside. Heather Eduardo Board Certified [employer]  
[phone]

## 2018-08-03 NOTE — PROGRESS NOTES
Notified pt sister MsTrevor Sri Candys of pt transfer to [facility] and status through out the night.

## 2018-08-03 NOTE — ED NOTES
TRANSFER - OUT REPORT: 
 
Verbal report given to Tesfaye Avendaño RN (name) on Ethyl Ko  being transferred to [address] [facility] (unit) for routine progression of care Report consisted of patients Situation, Background, Assessment and  
Recommendations(SBAR). Information from the following report(s) SBAR, Kardex, ED Summary, Intake/Output, MAR and Recent Results was reviewed with the receiving nurse. Lines:  
Peripheral IV Left Antecubital (Active) Site Assessment Clean, dry, & intact 8/2/2018  6:21 PM  
Phlebitis Assessment 0 8/2/2018  6:21 PM  
Infiltration Assessment 0 8/2/2018  6:21 PM  
Dressing Status Clean, dry, & intact 8/2/2018  6:21 PM  
Dressing Type 4 X 4;Transparent 8/2/2018  6:21 PM  
Hub Color/Line Status Patent 8/2/2018  6:21 PM  
  
 
Opportunity for questions and clarification was provided.    
 
Patient transported with:

## 2018-08-03 NOTE — PROGRESS NOTES
Bladder scan showed 262 in bladder. Patient to hit call bell when able to void so sample can be obtained

## 2018-08-03 NOTE — ROUTINE PROCESS
TRANSFER - IN REPORT: 
 
Verbal report received from Bijal(name) on Harika Kelly  being received from [facility](unit) for routine progression of care Report consisted of patients Situation, Background, Assessment and  
Recommendations(SBAR). Information from the following report(s) Kardex, MAR and Recent Results was reviewed with the receiving nurse. Opportunity for questions and clarification was provided. Assessment completed upon patients arrival to unit and care assumed.

## 2018-08-03 NOTE — CONSULTS
[facility]  General Surgery    Subjective:      HPI:  Pt is a very pleasant 66 yo male with a PMHx of htn, hypertensive heart and kidney, dyslipidemia, h/o CVA with residual deficit and acute perforated appendicitis. He presented to the ED last night with abdominal pain, distention and nausea with vomiting. The CT abd/pel which I did review independently on the monitor. I agree with the finding of partial small bowel obstruction vs ileus. We need to obtain a urinalysis.       Patient Active Problem List    Diagnosis Date Noted    SBO (small bowel obstruction) ([address] 08/02/2018    Small bowel obstruction ([address] 08/02/2018    Hyperkalemia 05/14/2018    Depression 05/08/2018    Statin intolerance 05/07/2018    Acute renal failure superimposed on stage 3 chronic kidney disease ([address] 05/07/2018    Vitamin D deficiency 05/01/2018    Overactive bladder     Benign prostatic hyperplasia     Postoperative urinary retention 04/22/2018    Postoperative confusion 04/22/2018    Status post appendectomy 04/21/2018    Postoperative atrial fibrillation ([address] 04/21/2018    Acute blood loss as cause of postoperative anemia 04/21/2018    Impaired mobility and ADLs 04/20/2018    Generalized weakness 04/20/2018    Acute appendicitis with peritoneal abscess 04/20/2018    History of vitamin D deficiency 09/06/2015    Hypertensive heart and kidney disease without heart failure and with chronic kidney disease stage III 46/73/4962    Diastolic dysfunction without heart failure 09/02/2015    Dyslipidemia 09/02/2015    History of stroke with residual deficit 09/01/2015    CKD (chronic kidney disease) stage 3, GFR 30-59 ml/min 09/01/2015    History of trichomonal urethritis 09/01/2015    Obesity, Class I, BMI 30-34.9 09/01/2015    History of noncompliance with medical treatment, presenting hazards to health 09/01/2015    History of tobacco use 09/01/2015    Hemiparesis affecting left side as late effect of cerebrovascular accident (CVA) ([address] 09/01/2015    Dysphagia as late effect of cerebrovascular accident (CVA) 09/01/2015     Past Medical History:   Diagnosis Date    Acute blood loss as cause of postoperative anemia 4/21/2018    Benign prostatic hyperplasia     CKD (chronic kidney disease) stage 3, GFR 30-59 ml/min 9/1/2015    Depression 4/6/6559    Diastolic dysfunction without heart failure 9/2/2015    Grade 1 diastolic dysfunction on 2D ECHO done 9/02/2015    Dyslipidemia 9/2/2015    Dysphagia as late effect of cerebrovascular accident (CVA) 9/1/2015    Hemiparesis affecting left side as late effect of cerebrovascular accident (CVA) ([address] 9/1/2015    History of noncompliance with medical treatment, presenting hazards to health 9/1/2015    History of stroke with residual deficit 9/1/2015    Acute Ischemic Stroke (early subacute infarct at the posterior right lentiform nucleus) with residual left hemiparesis and dysphagia     History of tobacco use 9/1/2015    History of trichomonal urethritis 9/1/2015    History of vitamin D deficiency 9/6/2015    Hypertensive heart and kidney disease without heart failure and with chronic kidney disease stage III 9/2/2015    Obesity, Class I, BMI 30-34.9 9/1/2015    Overactive bladder     Postoperative atrial fibrillation (HCC) 4/21/2018    Resolved    Postoperative confusion 4/22/2018    Postoperative urinary retention 4/22/2018    Statin intolerance 05/07/2018    Atorvastatin and Simvastatin    Vitamin D deficiency 5/1/2018    Vitamin D 25-Hydroxy (5/1/2018) = 17.9       Past Surgical History:   Procedure Laterality Date    APPENDECTOMY,RUPT APPENDX+ABSCESS N/A 04/20/2018    Dr. Moncada [facility] COLONOSCOPY N/A 4/3/2018    COLONOSCOPY,  w heated snared polypectomy performed by Megan Spears MD at [facility] ENDOSCOPY      Family History   Problem Relation Age of Onset    Diabetes Mother     Stroke Mother     Heart Disease Father    Lidia Peterson Hypertension Father     Diabetes Brother     Hypertension Brother       Social History   Substance Use Topics    Smoking status: Current Every Day Smoker     Last attempt to quit: 12/1/2014    Smokeless tobacco: Never Used    Alcohol use Yes      Allergies   Allergen Reactions    Lipitor [Atorvastatin] Other (comments)     Elevated CK level    Pt has no awareness of this allergy.  Simvastatin Other (comments)     Elevation in LFTs       Prior to Admission medications    Medication Sig Start Date End Date Taking? Authorizing Provider   tolterodine ER (DETROL LA) 4 mg ER capsule Take 4 mg by mouth daily. Yes Historical Provider   cholecalciferol, VITAMIN D3, (VITAMIN D3) 5,000 unit tab tablet Take 5,000 Units by mouth daily. Yes Historical Provider   co-enzyme Q-10 (CO Q-10) 100 mg capsule Take 1 Cap by mouth daily. 5/16/18  Yes Anitra Aranda MD   tamsulosin [facility]) 0.4 mg capsule Take 1 Cap by mouth daily. 4/28/18  Yes Valentino Holly MD   lactase (LACTAID) 3,000 unit tablet Take 1 Tab by mouth as needed. Historical Provider   aspirin 81 mg chewable tablet Take 81 mg by mouth daily. Historical Provider   ferrous sulfate 325 mg (65 mg iron) tablet Take 1 Tab by mouth daily (with breakfast). 5/16/18   Anitra Aranda MD   escitalopram oxalate (LEXAPRO) 10 mg tablet Take 1 Tab by mouth every evening. Indications: Depression 5/15/18   Anitra Aranda MD   ascorbic acid, vitamin C, (VITAMIN C) 250 mg tablet Take 1 Tab by mouth daily (with breakfast). 5/16/18   Anitra Aranda MD   cyanocobalamin (VITAMIN B-12) 1,000 mcg tablet Take 1,000 mcg by mouth daily. Historical Provider   ezetimibe (ZETIA) 10 mg tablet Take 10 mg by mouth daily. Historical Provider       Review of Systems:    14 systems were reviewed. The results are as above in the HPI and otherwise negative.      Objective:     Vitals:    08/03/18 0646 08/03/18 0807 08/03/18 1152 08/03/18 1602   BP: (!) 191/103 (!) 190/113 (!) 190/103 (!) 198/129   Pulse: (!) 50 (!) 51 80 100   Resp: 18 18 18 20   Temp: 97 °F (36.1 °C) 98.4 °F (36.9 °C) 98.1 °F (36.7 °C) 98.1 °F (36.7 °C)   SpO2: 97% 96% 93% 97%   Weight:       Height:           Physical Exam:  GENERAL: alert, cooperative, no distress, appears stated age,   EYE: conjunctivae/corneas clear. PERRL, EOM's intact. THROAT & NECK: normal and no erythema or exudates noted. ,    LYMPHATIC: Cervical, supraclavicular, and axillary nodes normal. ,   LUNG: clear to auscultation bilaterally,   HEART: regular rate and rhythm, S1, S2 normal, no murmur, click, rub or gallop,   ABDOMEN: soft, minimal distention, non-tender. Bowel sounds normal. No masses,  no organomegaly,   EXTREMITIES:  extremities normal, atraumatic, no cyanosis or edema,   SKIN: Normal.,   NEUROLOGIC: AOx3. Cranial nerves 2-12 and sensation grossly intact. ,     Data Review:    Recent Results (from the past 24 hour(s))   EKG, 12 LEAD, INITIAL    Collection Time: 08/02/18  5:46 PM   Result Value Ref Range    Ventricular Rate 79 BPM    Atrial Rate 79 BPM    P-R Interval 176 ms    QRS Duration 92 ms    Q-T Interval 382 ms    QTC Calculation (Bezet) 438 ms    Calculated P Axis 57 degrees    Calculated R Axis -65 degrees    Calculated T Axis 46 degrees    Diagnosis       Normal sinus rhythm  Possible Left atrial enlargement  Left axis deviation  RSR' or QR pattern in V1 suggests right ventricular conduction delay  Inferior infarct (cited on or before 19-APR-2018)  Abnormal ECG  When compared with ECG of 22-APR-2018 00:12,  Significant changes have occurred     CBC WITH AUTOMATED DIFF    Collection Time: 08/02/18  6:10 PM   Result Value Ref Range    WBC 15.3 (H) 4.6 - 13.2 K/uL    RBC 5.03 4.70 - 5.50 M/uL    HGB 13.4 13.0 - 16.0 g/dL    HCT 40.9 36.0 - 48.0 %    MCV 81.3 74.0 - 97.0 FL    MCH 26.6 24.0 - 34.0 PG    MCHC 32.8 31.0 - 37.0 g/dL    RDW 14.9 (H) 11.6 - 14.5 %    PLATELET 286 951 - 149 K/uL    MPV 10.5 9.2 - 11.8 FL    NEUTROPHILS 89 (H) 40 - 73 %    LYMPHOCYTES 7 (L) 21 - 52 %    MONOCYTES 4 3 - 10 %    EOSINOPHILS 0 0 - 5 %    BASOPHILS 0 0 - 2 %    ABS. NEUTROPHILS 13.6 (H) 1.8 - 8.0 K/UL    ABS. LYMPHOCYTES 1.1 0.9 - 3.6 K/UL    ABS. MONOCYTES 0.6 0.05 - 1.2 K/UL    ABS. EOSINOPHILS 0.0 0.0 - 0.4 K/UL    ABS. BASOPHILS 0.0 0.0 - 0.1 K/UL    DF AUTOMATED     METABOLIC PANEL, COMPREHENSIVE    Collection Time: 08/02/18  6:10 PM   Result Value Ref Range    Sodium 142 136 - 145 mmol/L    Potassium 4.0 3.5 - 5.5 mmol/L    Chloride 106 100 - 108 mmol/L    CO2 29 21 - 32 mmol/L    Anion gap 7 3.0 - 18 mmol/L    Glucose 120 (H) 74 - 99 mg/dL    BUN 16 7.0 - 18 MG/DL    Creatinine 1.77 (H) 0.6 - 1.3 MG/DL    BUN/Creatinine ratio 9 (L) 12 - 20      GFR est AA 46 (L) >60 ml/min/1.73m2    GFR est non-AA 38 (L) >60 ml/min/1.73m2    Calcium 9.9 8.5 - 10.1 MG/DL    Bilirubin, total 0.5 0.2 - 1.0 MG/DL    ALT (SGPT) 24 16 - 61 U/L    AST (SGOT) 12 (L) 15 - 37 U/L    Alk.  phosphatase 81 45 - 117 U/L    Protein, total 8.1 6.4 - 8.2 g/dL    Albumin 4.0 3.4 - 5.0 g/dL    Globulin 4.1 (H) 2.0 - 4.0 g/dL    A-G Ratio 1.0 0.8 - 1.7     LIPASE    Collection Time: 08/02/18  6:10 PM   Result Value Ref Range    Lipase 87 73 - 626 U/L   METABOLIC PANEL, BASIC    Collection Time: 08/03/18  3:25 AM   Result Value Ref Range    Sodium 141 136 - 145 mmol/L    Potassium 3.8 3.5 - 5.5 mmol/L    Chloride 106 100 - 108 mmol/L    CO2 25 21 - 32 mmol/L    Anion gap 10 3.0 - 18 mmol/L    Glucose 150 (H) 74 - 99 mg/dL    BUN 13 7.0 - 18 MG/DL    Creatinine 1.45 (H) 0.6 - 1.3 MG/DL    BUN/Creatinine ratio 9 (L) 12 - 20      GFR est AA 58 (L) >60 ml/min/1.73m2    GFR est non-AA 48 (L) >60 ml/min/1.73m2    Calcium 9.5 8.5 - 10.1 MG/DL   CBC WITH AUTOMATED DIFF    Collection Time: 08/03/18  3:25 AM   Result Value Ref Range    WBC 22.7 (H) 4.6 - 13.2 K/uL    RBC 5.12 4.70 - 5.50 M/uL    HGB 13.5 13.0 - 16.0 g/dL    HCT 42.1 36.0 - 48.0 %    MCV 82.2 74.0 - 97.0 FL    MCH 26.4 24.0 - 34.0 PG MCHC 32.1 31.0 - 37.0 g/dL    RDW 15.0 (H) 11.6 - 14.5 %    PLATELET 009 609 - 273 K/uL    MPV 10.6 9.2 - 11.8 FL    NEUTROPHILS 90 (H) 42 - 75 %    BAND NEUTROPHILS 2 0 - 5 %    LYMPHOCYTES 3 (L) 20 - 51 %    MONOCYTES 5 2 - 9 %    EOSINOPHILS 0 0 - 5 %    BASOPHILS 0 0 - 3 %    ABS. NEUTROPHILS 20.9 (H) 1.8 - 8.0 K/UL    ABS. LYMPHOCYTES 0.7 (L) 0.8 - 3.5 K/UL    ABS. MONOCYTES 1.1 (H) 0 - 1.0 K/UL    ABS. EOSINOPHILS 0.0 0.0 - 0.4 K/UL    ABS. BASOPHILS 0.0 0.0 - 0.06 K/UL    DF AUTOMATED      PLATELET COMMENTS ADEQUATE PLATELETS      RBC COMMENTS NORMOCYTIC, NORMOCHROMIC     LACTIC ACID    Collection Time: 08/03/18  5:40 AM   Result Value Ref Range    Lactic acid 1.1 0.4 - 2.0 MMOL/L         Impression:     · Pt with partial small bowel obstruction/ileus which is possibly from adhesions vs UTI. Plan:     · UTI - WBC increased to 22 K. Lactic acid is 1.1. Pt refusing lew earlier. Urinary retention with bladder scan 262 mL and overflow incontinence. Will treat with flomax 0.4 mg daily. Will revisit lew with the patient. · NPO x ice and meds.    · AM labs  · OOB to chair    Signed By: Mario Ramirez MD     August 3, 2018

## 2018-08-04 ENCOUNTER — APPOINTMENT (OUTPATIENT)
Dept: GENERAL RADIOLOGY | Age: 73
DRG: 389 | End: 2018-08-04
Attending: STUDENT IN AN ORGANIZED HEALTH CARE EDUCATION/TRAINING PROGRAM
Payer: MEDICARE

## 2018-08-04 LAB
ANION GAP SERPL CALC-SCNC: 9 MMOL/L (ref 3–18)
ATRIAL RATE: 79 BPM
BASOPHILS # BLD: 0.2 K/UL (ref 0–0.06)
BASOPHILS NFR BLD: 1 % (ref 0–3)
BUN SERPL-MCNC: 16 MG/DL (ref 7–18)
BUN/CREAT SERPL: 10 (ref 12–20)
CALCIUM SERPL-MCNC: 10.2 MG/DL (ref 8.5–10.1)
CALCULATED P AXIS, ECG09: 57 DEGREES
CALCULATED R AXIS, ECG10: -65 DEGREES
CALCULATED T AXIS, ECG11: 46 DEGREES
CHLORIDE SERPL-SCNC: 102 MMOL/L (ref 100–108)
CO2 SERPL-SCNC: 26 MMOL/L (ref 21–32)
CREAT SERPL-MCNC: 1.57 MG/DL (ref 0.6–1.3)
DIAGNOSIS, 93000: NORMAL
DIFFERENTIAL METHOD BLD: ABNORMAL
EOSINOPHIL # BLD: 0 K/UL (ref 0–0.4)
EOSINOPHIL NFR BLD: 0 % (ref 0–5)
ERYTHROCYTE [DISTWIDTH] IN BLOOD BY AUTOMATED COUNT: 14.9 % (ref 11.6–14.5)
GLUCOSE SERPL-MCNC: 140 MG/DL (ref 74–99)
HCT VFR BLD AUTO: 43 % (ref 36–48)
HGB BLD-MCNC: 14.2 G/DL (ref 13–16)
LYMPHOCYTES # BLD: 1.1 K/UL (ref 0.8–3.5)
LYMPHOCYTES NFR BLD: 5 % (ref 20–51)
MCH RBC QN AUTO: 26.5 PG (ref 24–34)
MCHC RBC AUTO-ENTMCNC: 33 G/DL (ref 31–37)
MCV RBC AUTO: 80.4 FL (ref 74–97)
MONOCYTES # BLD: 1.1 K/UL (ref 0–1)
MONOCYTES NFR BLD: 5 % (ref 2–9)
NEUTS BAND NFR BLD MANUAL: 3 % (ref 0–5)
NEUTS SEG # BLD: 19.6 K/UL (ref 1.8–8)
NEUTS SEG NFR BLD: 86 % (ref 42–75)
P-R INTERVAL, ECG05: 176 MS
PLATELET # BLD AUTO: 310 K/UL (ref 135–420)
PLATELET COMMENTS,PCOM: ABNORMAL
PMV BLD AUTO: 11.4 FL (ref 9.2–11.8)
POTASSIUM SERPL-SCNC: 3.8 MMOL/L (ref 3.5–5.5)
Q-T INTERVAL, ECG07: 382 MS
QRS DURATION, ECG06: 92 MS
QTC CALCULATION (BEZET), ECG08: 438 MS
RBC # BLD AUTO: 5.35 M/UL (ref 4.7–5.5)
RBC MORPH BLD: ABNORMAL
SODIUM SERPL-SCNC: 137 MMOL/L (ref 136–145)
VENTRICULAR RATE, ECG03: 79 BPM
WBC # BLD AUTO: 22 K/UL (ref 4.6–13.2)

## 2018-08-04 PROCEDURE — 36415 COLL VENOUS BLD VENIPUNCTURE: CPT

## 2018-08-04 PROCEDURE — 74011250636 HC RX REV CODE- 250/636: Performed by: EMERGENCY MEDICINE

## 2018-08-04 PROCEDURE — 80048 BASIC METABOLIC PNL TOTAL CA: CPT

## 2018-08-04 PROCEDURE — 87040 BLOOD CULTURE FOR BACTERIA: CPT | Performed by: FAMILY MEDICINE

## 2018-08-04 PROCEDURE — 74011000258 HC RX REV CODE- 258

## 2018-08-04 PROCEDURE — 85025 COMPLETE CBC W/AUTO DIFF WBC: CPT

## 2018-08-04 PROCEDURE — 74011250636 HC RX REV CODE- 250/636: Performed by: STUDENT IN AN ORGANIZED HEALTH CARE EDUCATION/TRAINING PROGRAM

## 2018-08-04 PROCEDURE — 74011250636 HC RX REV CODE- 250/636: Performed by: FAMILY MEDICINE

## 2018-08-04 PROCEDURE — 65660000000 HC RM CCU STEPDOWN

## 2018-08-04 PROCEDURE — 74019 RADEX ABDOMEN 2 VIEWS: CPT

## 2018-08-04 PROCEDURE — 74011000258 HC RX REV CODE- 258: Performed by: EMERGENCY MEDICINE

## 2018-08-04 RX ORDER — MORPHINE SULFATE 2 MG/ML
1 INJECTION, SOLUTION INTRAMUSCULAR; INTRAVENOUS
Status: DISCONTINUED | OUTPATIENT
Start: 2018-08-04 | End: 2018-08-04

## 2018-08-04 RX ORDER — SODIUM CHLORIDE 900 MG/100ML
INJECTION INTRAVENOUS
Status: COMPLETED
Start: 2018-08-04 | End: 2018-08-04

## 2018-08-04 RX ORDER — SODIUM CHLORIDE 9 MG/ML
150 INJECTION, SOLUTION INTRAVENOUS CONTINUOUS
Status: DISCONTINUED | OUTPATIENT
Start: 2018-08-04 | End: 2018-08-07

## 2018-08-04 RX ORDER — ONDANSETRON 2 MG/ML
8 INJECTION INTRAMUSCULAR; INTRAVENOUS
Status: DISCONTINUED | OUTPATIENT
Start: 2018-08-04 | End: 2018-08-08 | Stop reason: HOSPADM

## 2018-08-04 RX ORDER — MORPHINE SULFATE 2 MG/ML
2 INJECTION, SOLUTION INTRAMUSCULAR; INTRAVENOUS
Status: DISCONTINUED | OUTPATIENT
Start: 2018-08-04 | End: 2018-08-08 | Stop reason: HOSPADM

## 2018-08-04 RX ORDER — HEPARIN SODIUM 5000 [USP'U]/ML
5000 INJECTION, SOLUTION INTRAVENOUS; SUBCUTANEOUS EVERY 8 HOURS
Status: DISCONTINUED | OUTPATIENT
Start: 2018-08-04 | End: 2018-08-08 | Stop reason: HOSPADM

## 2018-08-04 RX ORDER — CLONIDINE 0.2 MG/24H
1 PATCH, EXTENDED RELEASE TRANSDERMAL
Status: DISCONTINUED | OUTPATIENT
Start: 2018-08-04 | End: 2018-08-07

## 2018-08-04 RX ADMIN — HEPARIN SODIUM 5000 UNITS: 5000 INJECTION, SOLUTION INTRAVENOUS; SUBCUTANEOUS at 16:55

## 2018-08-04 RX ADMIN — PIPERACILLIN SODIUM,TAZOBACTAM SODIUM 3.38 G: 3; .375 INJECTION, POWDER, FOR SOLUTION INTRAVENOUS at 14:27

## 2018-08-04 RX ADMIN — HEPARIN SODIUM 5000 UNITS: 5000 INJECTION, SOLUTION INTRAVENOUS; SUBCUTANEOUS at 04:18

## 2018-08-04 RX ADMIN — Medication 2 MG: at 16:47

## 2018-08-04 RX ADMIN — ONDANSETRON 8 MG: 2 INJECTION, SOLUTION INTRAMUSCULAR; INTRAVENOUS at 16:47

## 2018-08-04 RX ADMIN — PIPERACILLIN SODIUM,TAZOBACTAM SODIUM 3.38 G: 3; .375 INJECTION, POWDER, FOR SOLUTION INTRAVENOUS at 04:18

## 2018-08-04 RX ADMIN — PIPERACILLIN SODIUM,TAZOBACTAM SODIUM 3.38 G: 3; .375 INJECTION, POWDER, FOR SOLUTION INTRAVENOUS at 09:43

## 2018-08-04 RX ADMIN — PIPERACILLIN SODIUM,TAZOBACTAM SODIUM 3.38 G: 3; .375 INJECTION, POWDER, FOR SOLUTION INTRAVENOUS at 22:03

## 2018-08-04 RX ADMIN — HYDRALAZINE HYDROCHLORIDE 10 MG: 20 INJECTION INTRAMUSCULAR; INTRAVENOUS at 12:41

## 2018-08-04 RX ADMIN — Medication 1 MG: at 14:26

## 2018-08-04 RX ADMIN — SODIUM CHLORIDE: 900 INJECTION INTRAVENOUS at 15:00

## 2018-08-04 RX ADMIN — SODIUM CHLORIDE 150 ML/HR: 900 INJECTION, SOLUTION INTRAVENOUS at 09:42

## 2018-08-04 RX ADMIN — HEPARIN SODIUM 5000 UNITS: 5000 INJECTION, SOLUTION INTRAVENOUS; SUBCUTANEOUS at 09:42

## 2018-08-04 RX ADMIN — SODIUM CHLORIDE 100 ML: 900 INJECTION INTRAVENOUS at 04:18

## 2018-08-04 NOTE — PROGRESS NOTES
Intern Progress Note [address] [facility] Patient: Adrianne Avendaño MRN: [id number]  CSN: [id number] YOB: 1945  Age: 67 y.o. Sex: male DOA: 8/2/2018 LOS:  LOS: 2 days Subjective: No acute events overnight. [place] [id number] 13 48 83 and was found to be comfortable. This morning, patient is lying on his side. He reports mild abdominal pain but is nontender to palpation. Patient was able to urinate 100 cc last night for lab sample; he had another ~250 cc urine this morning. He denies any suprapubic pain or pain with urination. Review of Systems Constitutional: Negative for chills and fever. Respiratory: Negative for cough and wheezing. Cardiovascular: Negative for chest pain and palpitations. Gastrointestinal: Positive for abdominal pain and nausea. Negative for heartburn. Genitourinary: Negative for dysuria, flank pain, frequency, hematuria and urgency. Neurological: Negative for weakness. Objective:  
  
Patient Vitals for the past 24 hrs: 
 Temp Pulse Resp BP SpO2  
08/04/18 0427 97.5 °F (36.4 °C) 100 20 (!) 169/106 96 % 08/03/18 2327 - 96 20 (!) 188/122 98 % 08/03/18 2259 - - - (!) 178/118 -  
08/03/18 1937 98.2 °F (36.8 °C) 99 20 (!) 191/121 98 % 08/03/18 1751 - 96 - (!) 175/114 -  
08/03/18 1602 98.1 °F (36.7 °C) 100 20 (!) 198/129 97 % 08/03/18 1152 98.1 °F (36.7 °C) 80 18 (!) 190/103 93 % 08/03/18 0807 98.4 °F (36.9 °C) (!) 51 18 (!) 190/113 96 % Intake/Output Summary (Last 24 hours) at 08/04/18 1647 Last data filed at 08/04/18 5654 Gross per 24 hour Intake              350 ml Output              400 ml Net              -50 ml Physical Exam:  
General:  Sleeping comfortably on left side; no acute distress HEENT: Conjunctiva pink, sclera anicteric. EOMI. Pharynx moist, nonerythematous. Moist mucous membranes. Thyroid not enlarged, no nodules.   No cervical, supraclavicular, occipital or submandibular lymphadenopathy. No other gross abnormalities present. CV:  RRR, no murmurs. No visible pulsations or thrills. RESP:  Unlabored breathing. Lungs clear to auscultation. no wheeze, rales, or rhonchi. Equal expansion bilaterally. ABD:  Soft, nontender, slightly distended. No hepatosplenomegaly. No suprapubic tenderness. No CVA tenderness. RECTAL:  deferred MS:  No joint deformity or instability. No atrophy. Neuro: A+Ox3. Speech delayed when answering questions Ext:  No edema. 2+ radial and dp pulses bilaterally. Skin:  No rashes, lesions, or ulcers. Good turgor. Lab/Data Reviewed: All lab results for the last 24 hours reviewed. Scheduled Medications Reviewed: 
Current Facility-Administered Medications Medication Dose Route Frequency  tamsulosin (FLOMAX) capsule 0.4 mg  0.4 mg Oral DAILY  piperacillin-tazobactam (ZOSYN) 3.375 g in 0.9% sodium chloride (MBP/ADV) 100 mL ADV  3.375 g IntraVENous Q6H Assessment/Plan  
67 y. o. male with PMH significant for CVA, HTN, HLD, CKD III, chronic diarrhea, BPH/OAB now admitted with abdominal pain and nausea.  
   
Abdominal Pain:  Abdominal CT (Aug. 3) showed dilated loops of small bowel in the midabdomen with haziness in the mesentery. With possible partial small bowel obstruction, possible closed loop obstruction. Given previous inflammatory process, obstruction more likely related to adhesions than internal hernia. Patient had appendectomy in April 2018 due to perforation and abscess; WBC 22.0; UA with 300 protein, small blood, neg bacteria, leuks. Clinically improving.  
- NS @ 150  
- NPO for now,  
- Admit to med floor   
- PT/OT/case management - Abd XR ordered today, await results - Repeat urine analysis ordered  
- Daily CBC, CMP 
- Continue Zosyn 3.375g in NS  
- Surgery following  
   
DARINEL on CKD III, resolving.  Follows with Dr. Jackelin Rivera as an outpatient. Baseline Cr ~1.27-1. 3.  
- Cr 1.57, GFR 53 
- Continue NS @ 150  
- Monitor urine output 
  
Hypertensive: current BPs 169-191/106-122 
-PRN hydralazine for BPs over 180/110 
-Holding home meds  
   
H/o CVA, HTN, HLD, Vitamin D/B12 deficiency CVA in 2015 with residual mild L sided weakness and some aphasia  
- Continue holding home amlodipine 10mg QD, losartan 200mg BID, lasix 20 mg,  
- Fall precautions, aspiration precautions   
   
Recurrent cystitis/OAB/BPH  
-Restarted home Flomax  
- Urinalysis pending Tobacco use current tobacco user 
- Nicotine replacement not ordered to possible vascular constriction  
   
Diet: NPO 
DVT Prophylaxis: SCDs, Heparin (500 U once; restart tomorrow if he does not go to surgery) Code Status: Full Point of Contact: Mayco Cowan [phone] 
   
Disposition and anticipated LOS: 2 midnight  
  
 
Virgen Aguilar, [address] [facility] PGY-1 
08/04/18 7:18 AM

## 2018-08-04 NOTE — PROGRESS NOTES
Brief Progress Note Patient sleeping on side when I entered the room. He denied being currently in pain. He was non-tender to palpation to suprapubic region and overall abdomen. No increased respiratory effort, lungs CTAB. 100 cc of urine was obtained through condom catheter, this has been sent to the lab. Asked for phone number for his sister as the one on file is incorrect:  [phone] Visit Vitals  BP (!) 178/118 (BP 1 Location: Left arm)  Pulse 99  Temp 98.2 °F (36.8 °C)  Resp 20  
 Ht 5' 11\" (1.803 m)  Wt 90.9 kg (200 lb 4.8 oz)  SpO2 98%  BMI 27.94 kg/m2 Masood Casiano DO  
08/03/18; 11:28 PM

## 2018-08-04 NOTE — PROGRESS NOTES
Problem: Pressure Injury - Risk of 
Goal: *Prevention of pressure injury Document Juno Scale and appropriate interventions in the flowsheet. Outcome: Progressing Towards Goal 
Pressure Injury Interventions: 
  
 
Moisture Interventions: Absorbent underpads, Check for incontinence Q2 hours and as needed, Internal/External urinary devices, Maintain skin hydration (lotion/cream), Minimize layers, Offer toileting Q_hr Activity Interventions: Assess need for specialty bed, Increase time out of bed, Chair cushion, Pressure redistribution bed/mattress(bed type), PT/OT evaluation Mobility Interventions: Assess need for specialty bed, Chair cushion, HOB 30 degrees or less, Pressure redistribution bed/mattress (bed type), PT/OT evaluation Nutrition Interventions:  (NPO) Friction and Shear Interventions: Apply protective barrier, creams and emollients, Foam dressings/transparent film/skin sealants, HOB 30 degrees or less, Minimize layers, Transfer aides:transfer board/Lesley lift/ceiling lift Problem: Falls - Risk of 
Goal: *Absence of Falls Document Mary Warner Fall Risk and appropriate interventions in the flowsheet. Outcome: Progressing Towards Goal 
Fall Risk Interventions: 
Mobility Interventions: Assess mobility with egress test, Bed/chair exit alarm, Communicate number of staff needed for ambulation/transfer, OT consult for ADLs, Patient to call before getting OOB, PT Consult for mobility concerns, PT Consult for assist device competence, Strengthening exercises (ROM-active/passive), Utilize walker, cane, or other assistive device Medication Interventions: Assess postural VS orthostatic hypotension, Bed/chair exit alarm, Evaluate medications/consider consulting pharmacy, Patient to call before getting OOB, Teach patient to arise slowly Elimination Interventions: Bed/chair exit alarm, Call light in reach, Elevated toilet seat, Patient to call for help with toileting needs, Toilet paper/wipes in reach, Toileting schedule/hourly rounds, Urinal in reach

## 2018-08-04 NOTE — PROGRESS NOTES
Surgery Abd pain about the same AF VSS Soft abd with min tenderness no rebound or guarding Labs reviewed WBC remains 22k. UA from the 3rd shows 1-3 wbc 
CT images reviewed, no obvious source of pain found Continue Zosyn for now

## 2018-08-04 NOTE — PROGRESS NOTES
[facility] family paged regarding pt's increased abdominal pain Pt requesting pain medicine, MD aware Awaiting orders Family at bedside

## 2018-08-04 NOTE — ROUTINE PROCESS
Received report from [facility]. Pt AAOx3, NAD, breathing non labored, on room air, HOB up. IV site clean, dry and intact. IVF going per order. Bed at the lowest level on lock position, call bell w/i reach. Bedside and Verbal shift change report given to [facility] (oncoming nurse) by me (offgoing nurse).  Report included the following information SBAR, Kardex, Intake/Output, MAR, Recent Results and Cardiac Rhythm SR.

## 2018-08-05 LAB
ANION GAP SERPL CALC-SCNC: 10 MMOL/L (ref 3–18)
BASOPHILS # BLD: 0 K/UL (ref 0–0.06)
BASOPHILS NFR BLD: 0 % (ref 0–3)
BUN SERPL-MCNC: 19 MG/DL (ref 7–18)
BUN/CREAT SERPL: 11 (ref 12–20)
CALCIUM SERPL-MCNC: 9.4 MG/DL (ref 8.5–10.1)
CHLORIDE SERPL-SCNC: 105 MMOL/L (ref 100–108)
CO2 SERPL-SCNC: 26 MMOL/L (ref 21–32)
CREAT SERPL-MCNC: 1.66 MG/DL (ref 0.6–1.3)
DIFFERENTIAL METHOD BLD: ABNORMAL
EOSINOPHIL # BLD: 0 K/UL (ref 0–0.4)
EOSINOPHIL NFR BLD: 0 % (ref 0–5)
ERYTHROCYTE [DISTWIDTH] IN BLOOD BY AUTOMATED COUNT: 15.1 % (ref 11.6–14.5)
GLUCOSE BLD STRIP.AUTO-MCNC: 106 MG/DL (ref 70–110)
GLUCOSE SERPL-MCNC: 103 MG/DL (ref 74–99)
HCT VFR BLD AUTO: 40.7 % (ref 36–48)
HGB BLD-MCNC: 13.3 G/DL (ref 13–16)
LYMPHOCYTES # BLD: 1.5 K/UL (ref 0.8–3.5)
LYMPHOCYTES NFR BLD: 8 % (ref 20–51)
MCH RBC QN AUTO: 26.5 PG (ref 24–34)
MCHC RBC AUTO-ENTMCNC: 32.7 G/DL (ref 31–37)
MCV RBC AUTO: 81.1 FL (ref 74–97)
MONOCYTES # BLD: 1.3 K/UL (ref 0–1)
MONOCYTES NFR BLD: 7 % (ref 2–9)
NEUTS SEG # BLD: 16.3 K/UL (ref 1.8–8)
NEUTS SEG NFR BLD: 85 % (ref 42–75)
PLATELET # BLD AUTO: 285 K/UL (ref 135–420)
PLATELET COMMENTS,PCOM: ABNORMAL
PMV BLD AUTO: 11.2 FL (ref 9.2–11.8)
POTASSIUM SERPL-SCNC: 3.7 MMOL/L (ref 3.5–5.5)
RBC # BLD AUTO: 5.02 M/UL (ref 4.7–5.5)
RBC MORPH BLD: ABNORMAL
SODIUM SERPL-SCNC: 141 MMOL/L (ref 136–145)
WBC # BLD AUTO: 19.1 K/UL (ref 4.6–13.2)

## 2018-08-05 PROCEDURE — 74011000258 HC RX REV CODE- 258: Performed by: EMERGENCY MEDICINE

## 2018-08-05 PROCEDURE — 80048 BASIC METABOLIC PNL TOTAL CA: CPT

## 2018-08-05 PROCEDURE — 82962 GLUCOSE BLOOD TEST: CPT

## 2018-08-05 PROCEDURE — 74011250636 HC RX REV CODE- 250/636: Performed by: EMERGENCY MEDICINE

## 2018-08-05 PROCEDURE — 36415 COLL VENOUS BLD VENIPUNCTURE: CPT

## 2018-08-05 PROCEDURE — 74011250637 HC RX REV CODE- 250/637: Performed by: SURGERY

## 2018-08-05 PROCEDURE — 74011250636 HC RX REV CODE- 250/636: Performed by: FAMILY MEDICINE

## 2018-08-05 PROCEDURE — 85025 COMPLETE CBC W/AUTO DIFF WBC: CPT

## 2018-08-05 PROCEDURE — 65660000000 HC RM CCU STEPDOWN

## 2018-08-05 PROCEDURE — 74011250637 HC RX REV CODE- 250/637: Performed by: FAMILY MEDICINE

## 2018-08-05 PROCEDURE — 74011250636 HC RX REV CODE- 250/636: Performed by: STUDENT IN AN ORGANIZED HEALTH CARE EDUCATION/TRAINING PROGRAM

## 2018-08-05 RX ADMIN — HEPARIN SODIUM 5000 UNITS: 5000 INJECTION, SOLUTION INTRAVENOUS; SUBCUTANEOUS at 01:10

## 2018-08-05 RX ADMIN — PIPERACILLIN SODIUM,TAZOBACTAM SODIUM 3.38 G: 3; .375 INJECTION, POWDER, FOR SOLUTION INTRAVENOUS at 04:56

## 2018-08-05 RX ADMIN — PIPERACILLIN SODIUM,TAZOBACTAM SODIUM 3.38 G: 3; .375 INJECTION, POWDER, FOR SOLUTION INTRAVENOUS at 21:48

## 2018-08-05 RX ADMIN — Medication 2 MG: at 07:54

## 2018-08-05 RX ADMIN — ONDANSETRON 8 MG: 2 INJECTION, SOLUTION INTRAMUSCULAR; INTRAVENOUS at 07:54

## 2018-08-05 RX ADMIN — PIPERACILLIN SODIUM,TAZOBACTAM SODIUM 3.38 G: 3; .375 INJECTION, POWDER, FOR SOLUTION INTRAVENOUS at 08:01

## 2018-08-05 RX ADMIN — HEPARIN SODIUM 5000 UNITS: 5000 INJECTION, SOLUTION INTRAVENOUS; SUBCUTANEOUS at 16:12

## 2018-08-05 RX ADMIN — HYDRALAZINE HYDROCHLORIDE 10 MG: 20 INJECTION INTRAMUSCULAR; INTRAVENOUS at 00:57

## 2018-08-05 RX ADMIN — SODIUM CHLORIDE 150 ML/HR: 900 INJECTION, SOLUTION INTRAVENOUS at 23:21

## 2018-08-05 RX ADMIN — SODIUM CHLORIDE 150 ML/HR: 900 INJECTION, SOLUTION INTRAVENOUS at 07:53

## 2018-08-05 RX ADMIN — PIPERACILLIN SODIUM,TAZOBACTAM SODIUM 3.38 G: 3; .375 INJECTION, POWDER, FOR SOLUTION INTRAVENOUS at 16:11

## 2018-08-05 RX ADMIN — SODIUM CHLORIDE 150 ML/HR: 900 INJECTION, SOLUTION INTRAVENOUS at 00:43

## 2018-08-05 RX ADMIN — HEPARIN SODIUM 5000 UNITS: 5000 INJECTION, SOLUTION INTRAVENOUS; SUBCUTANEOUS at 08:01

## 2018-08-05 RX ADMIN — TAMSULOSIN HYDROCHLORIDE 0.4 MG: 0.4 CAPSULE ORAL at 08:00

## 2018-08-05 NOTE — PROGRESS NOTES
Pt up to chair with assist, chair alarm on  Pt states he is feeling better than yesterday  Medicated once this AM for pain/discomfort, otherwise pt resting comfortably  Tolerating clear liquid diet do far   Voiding clear yellow urine   IVFs infusing     1742-Pt had a LARGE BM, both formed and loose/watery, stating relief  LARGE BM x4 this evening

## 2018-08-05 NOTE — PROGRESS NOTES
Intern Progress Note  [facility]       Patient: Getachew Fitch MRN: [id number]  CSN: [id number]    YOB: 1945  Age: 67 y.o. Sex: male    DOA: 8/2/2018 LOS:  LOS: 3 days                    Subjective:     No acute events overnight. Pt feeling better than yesterday. Complaining of back pain which was relieved by removing the tele remote from under him. Review of Systems   Constitutional: Negative for chills and fever. Respiratory: Negative for cough and wheezing. Cardiovascular: Negative for chest pain and palpitations. Gastrointestinal: Positive for abdominal pain and nausea. Negative for heartburn. Genitourinary: Negative for dysuria, flank pain, frequency, hematuria and urgency. Neurological: Negative for weakness. Objective:      Patient Vitals for the past 24 hrs:   Temp Pulse Resp BP SpO2   08/05/18 0500 97 °F (36.1 °C) 95 18 (!) 165/106 95 %   08/05/18 0049 98 °F (36.7 °C) 98 18 (!) 171/113 93 %   08/04/18 1923 97.6 °F (36.4 °C) 90 18 (!) 168/99 -   08/04/18 1710 97.8 °F (36.6 °C) 92 20 138/67 96 %   08/04/18 1207 98.5 °F (36.9 °C) 82 20 (!) 178/108 95 %         Intake/Output Summary (Last 24 hours) at 08/05/18 0935  Last data filed at 08/05/18 0108   Gross per 24 hour   Intake                0 ml   Output              550 ml   Net             -550 ml       Physical Exam:   General:  Mild distress, cooperative  HEENT:  sclera anicteric. EOMI. Moist mucous membranes. No other gross abnormalities present. CV:  Occasional dropped beat , no murmurs. No visible pulsations or thrills. RESP:  Unlabored breathing. Lungs clear to auscultation. no wheeze, rales, or rhonchi. Equal expansion bilaterally. ABD:  Soft, nontender, slightly distended. No hepatosplenomegaly. No suprapubic tenderness. No CVA tenderness. RECTAL:  Pt refused. Neuro: A+Ox3. Expressive aphasia (chronic)   Ext:  No edema. 2+ radial and dp pulses bilaterally.   Skin:  No rashes, lesions, or ulcers. Good turgor. Lab/Data Reviewed: All lab results for the last 24 hours reviewed. Scheduled Medications Reviewed:  Current Facility-Administered Medications   Medication Dose Route Frequency    0.9% sodium chloride infusion  150 mL/hr IntraVENous CONTINUOUS    heparin (porcine) injection 5,000 Units  5,000 Units SubCUTAneous Q8H    cloNIDine (CATAPRES) 0.2 mg/24 hr patch 1 Patch  1 Patch TransDERmal Q7D    tamsulosin (FLOMAX) capsule 0.4 mg  0.4 mg Oral DAILY    piperacillin-tazobactam (ZOSYN) 3.375 g in 0.9% sodium chloride (MBP/ADV) 100 mL ADV  3.375 g IntraVENous Q6H     Assessment/Plan   67 y. o. male with PMH significant for CVA, HTN, HLD, CKD III, chronic diarrhea, BPH/OAB now admitted with abdominal pain and nausea.       Partial SBO:  Abdominal CT (Aug. 3) showed dilated loops of small bowel in the midabdomen with haziness in the mesentery. With possible partial small bowel obstruction, possible closed loop obstruction. recent open abd surgery April. WBC 19.1; UA with 300 protein, small blood, neg bacteria, leuks. Surgery: \"Suspect urosepsis but UA unimpressive\" Clinically improving. On Zosyn. Pt refusing NG tube. - NS @ 150  - NPO for now  - PT/OT/case management  - Daily CBC, CMP  - Continue Zosyn 3.375g in NS  - Surgery following    DARINEL on CKD III. Cr. 1.66  Follows with Dr. Jackelin Rivera as an outpatient. Baseline Cr ~1.27-1. 3.  - Continue NS @ 150  - Monitor urine output    Hypertensive: 160s/100s  -PRN hydralazine for BPs over 180/110  -Holding home meds    H/o CVA, HTN, HLD, Vitamin D/B12 deficiency CVA in 2015 with residual mild L sided weakness and some aphasia  - Continue holding home amlodipine 10mg QD, losartan 200mg BID, lasix 20 mg,   - Fall precautions, aspiration precautions    Recurrent cystitis/OAB/BPH   -Restarted home Flomax   - Urinalysis pending    Tobacco use current tobacco user  - Nicotine replacement not ordered to possible vascular constriction     Diet: NPO  DVT Prophylaxis: SCDs, Heparin (500 U once; restart tomorrow if he does not go to surgery)   Code Status: Full  Point of Contact: Kym Glaser [phone]      Disposition and anticipated LOS: 2 midnight        Glenn Wright, [address] [place] [address] PGY-1  08/05/18 7:18 AM

## 2018-08-05 NOTE — PROGRESS NOTES
Surgery  Looks a little better today, hungry  AF VSS  Soft abd exam without rebound or guarding  Labs reviewed WBC 19k down from 22  Suspect urosepsis but UA unimpressive  CT images do not show obvious intraabdominal source   Can have clear liquids, OOB

## 2018-08-05 NOTE — ROUTINE PROCESS
Received report from [facility]. Pt AAOx3, NAD, breathing non labored, on room air, HOB up. IV site clean, dry and intact. IVF going per order. Bed at the lowest level on lock position, call bell w/i reach. Bedside and Verbal shift change report given to [facility] [address] (oncoming nurse) by me (offgoing nurse).  Report included the following information SBAR, Kardex, Intake/Output, MAR, Recent Results and Cardiac Rhythm SR.

## 2018-08-05 NOTE — DISCHARGE SUMMARY
Discharge Summary  [address]      Patient: Cate Montgomery Age: 67 y.o. Sex: male  : 1945    MRN: [id number]      DOA: 2018      Discharge Date: 18      Attending:No att. providers found      PCP: MAGUE Sanders        ================================================================    Reason for Admission:   SBO (small bowel obstruction) ([facility] [address]  DARINEL  Leukocytosis    Discharge Diagnoses:   Partial Small Bowel Obstruction (Resolved)  Hypokalemia  HTN    Important notes to PCP/ follow-up studies and evaluations   - FU BMP (K 3.6 on discharge)  - Added Amlodipine 2.5mg QD    Pending labs and studies:  None  Operative Procedures:   None    Discharge Medications:     Discharge Medication List as of 2018  4:03 PM      CONTINUE these medications which have CHANGED    Details   amLODIPine (NORVASC) 2.5 mg tablet Take 1 Tab by mouth daily. , Print, Disp-30 Tab, R-0         CONTINUE these medications which have NOT CHANGED    Details   tolterodine ER (DETROL LA) 4 mg ER capsule Take 4 mg by mouth daily. , Historical Med      cholecalciferol, VITAMIN D3, (VITAMIN D3) 5,000 unit tab tablet Take 5,000 Units by mouth daily. , Historical Med      co-enzyme Q-10 (CO Q-10) 100 mg capsule Take 1 Cap by mouth daily. , Normal, Disp-15 Cap, R-0      tamsulosin (FLOMAX) 0.4 mg capsule Take 1 Cap by mouth daily. , Normal, Disp-10 Cap, R-0      lactase (LACTAID) 3,000 unit tablet Take 1 Tab by mouth as needed., Historical Med      aspirin 81 mg chewable tablet Take 81 mg by mouth daily. , Historical Med      ferrous sulfate 325 mg (65 mg iron) tablet Take 1 Tab by mouth daily (with breakfast). , Normal, Disp-15 Tab, R-0      escitalopram oxalate (LEXAPRO) 10 mg tablet Take 1 Tab by mouth every evening. Indications: Depression, Normal, Disp-15 Tab, R-0      ascorbic acid, vitamin C, (VITAMIN C) 250 mg tablet Take 1 Tab by mouth daily (with breakfast). , Normal, Disp-15 Tab, R-0 cyanocobalamin (VITAMIN B-12) 1,000 mcg tablet Take 1,000 mcg by mouth daily. , Historical Med      ezetimibe (ZETIA) 10 mg tablet Take 10 mg by mouth daily. , Historical Med               Disposition: Home with Home Health    Consultants:    Surgery Adonay Finley MD)    [address] Course (including pertinent history and physical findings)    Partial Small Bowel Obstruction  Pt came in with complaints of nausea and worsening abdominal pain and found to have a partial SBO on CT with an elevated WBC count (22) and normal Lactate. He had recently had an open appendectomy in April. He was made NPO and given zosyn. Urinalysis was negative. Surgery followed throughout. Pt had bowel movements and pain resolved with slow progression from NPO to regular diet. WBC resolved, no fevers, vitals stable, and was improving clinically, zosyn was d/c'ed as he was not believed to be infectious. WBC remained wnl and he continued to improve clinically. Hypokalemia  Pt became was noted to be hypokalemic, but asymptomatic with normal Mg; likely 2/2 NPO and IVF. Was given 40meq PO prior to discharge bringing K up to 3.6. Recommend outpt BMP. HTN  Asymptomatic. Pt was hypertensive throughout admission. 160s/80s. Started and discharged on amlodipine.     Summarized key findings and results (labs, imaging studies, ECHO, cardiac cath, endoscopies, etc):    CBC w/Diff    Lab Results   Component Value Date/Time    WBC 5.6 08/08/2018 06:14 AM    HGB 10.3 (L) 08/08/2018 06:14 AM    HCT 31.2 (L) 08/08/2018 06:14 AM    PLATELET 766 54/80/7704 06:14 AM    MCV 78.6 08/08/2018 06:14 AM           Chemistry    Lab Results   Component Value Date/Time    Sodium 140 08/08/2018 01:20 PM    Potassium 3.6 08/08/2018 01:20 PM    Chloride 106 08/08/2018 01:20 PM    CO2 28 08/08/2018 01:20 PM    Anion gap 6 08/08/2018 01:20 PM    Glucose 105 (H) 08/08/2018 01:20 PM    BUN 16 08/08/2018 01:20 PM    Creatinine 1.46 (H) 08/08/2018 01:20 PM BUN/Creatinine ratio 11 (L) 08/08/2018 01:20 PM    GFR est AA 57 (L) 08/08/2018 01:20 PM    GFR est non-AA 47 (L) 08/08/2018 01:20 PM    Calcium 8.7 08/08/2018 01:20 PM           Functional status and cognitive function:    Ambulates with: Walker  Status: alert, cooperative, no distress, appears stated age    Diet:General Diet    Code status and advanced care plan: Full  [facility] [employer] [facility] [address] [phone]    Patient Education:  Patient was educated on the following topics prior to discharge: Constipation/Bowel Obstruction    Follow-up:   Follow-up Information     Follow up With Details 261 MAGUE Mast On 8/15/2018 at [address]  [phone]              ================================================================  Aggie Sylvester DO, PGY I  [facility]  08/08/18  3:39 PM

## 2018-08-06 ENCOUNTER — APPOINTMENT (OUTPATIENT)
Dept: GENERAL RADIOLOGY | Age: 73
DRG: 389 | End: 2018-08-06
Attending: STUDENT IN AN ORGANIZED HEALTH CARE EDUCATION/TRAINING PROGRAM
Payer: MEDICARE

## 2018-08-06 LAB
ANION GAP SERPL CALC-SCNC: 7 MMOL/L (ref 3–18)
BASOPHILS # BLD: 0 K/UL (ref 0–0.1)
BASOPHILS NFR BLD: 0 % (ref 0–2)
BUN SERPL-MCNC: 23 MG/DL (ref 7–18)
BUN/CREAT SERPL: 14 (ref 12–20)
CALCIUM SERPL-MCNC: 8.2 MG/DL (ref 8.5–10.1)
CHLORIDE SERPL-SCNC: 111 MMOL/L (ref 100–108)
CO2 SERPL-SCNC: 25 MMOL/L (ref 21–32)
CREAT SERPL-MCNC: 1.7 MG/DL (ref 0.6–1.3)
DIFFERENTIAL METHOD BLD: ABNORMAL
EOSINOPHIL # BLD: 0 K/UL (ref 0–0.4)
EOSINOPHIL NFR BLD: 0 % (ref 0–5)
ERYTHROCYTE [DISTWIDTH] IN BLOOD BY AUTOMATED COUNT: 15.2 % (ref 11.6–14.5)
GLUCOSE SERPL-MCNC: 81 MG/DL (ref 74–99)
HCT VFR BLD AUTO: 33.6 % (ref 36–48)
HGB BLD-MCNC: 10.7 G/DL (ref 13–16)
LYMPHOCYTES # BLD: 1.2 K/UL (ref 0.9–3.6)
LYMPHOCYTES NFR BLD: 13 % (ref 21–52)
MCH RBC QN AUTO: 26.2 PG (ref 24–34)
MCHC RBC AUTO-ENTMCNC: 31.8 G/DL (ref 31–37)
MCV RBC AUTO: 82.2 FL (ref 74–97)
MONOCYTES # BLD: 1.1 K/UL (ref 0.05–1.2)
MONOCYTES NFR BLD: 12 % (ref 3–10)
NEUTS SEG # BLD: 7.3 K/UL (ref 1.8–8)
NEUTS SEG NFR BLD: 75 % (ref 40–73)
PLATELET # BLD AUTO: 209 K/UL (ref 135–420)
PMV BLD AUTO: 10.9 FL (ref 9.2–11.8)
POTASSIUM SERPL-SCNC: 3.5 MMOL/L (ref 3.5–5.5)
RBC # BLD AUTO: 4.09 M/UL (ref 4.7–5.5)
SODIUM SERPL-SCNC: 143 MMOL/L (ref 136–145)
WBC # BLD AUTO: 9.6 K/UL (ref 4.6–13.2)

## 2018-08-06 PROCEDURE — 36415 COLL VENOUS BLD VENIPUNCTURE: CPT

## 2018-08-06 PROCEDURE — 85025 COMPLETE CBC W/AUTO DIFF WBC: CPT

## 2018-08-06 PROCEDURE — 65660000000 HC RM CCU STEPDOWN

## 2018-08-06 PROCEDURE — 74011250637 HC RX REV CODE- 250/637: Performed by: SURGERY

## 2018-08-06 PROCEDURE — 80048 BASIC METABOLIC PNL TOTAL CA: CPT

## 2018-08-06 PROCEDURE — 74011250636 HC RX REV CODE- 250/636: Performed by: STUDENT IN AN ORGANIZED HEALTH CARE EDUCATION/TRAINING PROGRAM

## 2018-08-06 PROCEDURE — 74011000258 HC RX REV CODE- 258: Performed by: EMERGENCY MEDICINE

## 2018-08-06 PROCEDURE — 74011250636 HC RX REV CODE- 250/636: Performed by: EMERGENCY MEDICINE

## 2018-08-06 PROCEDURE — 77030010545

## 2018-08-06 PROCEDURE — 74019 RADEX ABDOMEN 2 VIEWS: CPT

## 2018-08-06 RX ADMIN — HEPARIN SODIUM 5000 UNITS: 5000 INJECTION, SOLUTION INTRAVENOUS; SUBCUTANEOUS at 03:54

## 2018-08-06 RX ADMIN — TAMSULOSIN HYDROCHLORIDE 0.4 MG: 0.4 CAPSULE ORAL at 10:48

## 2018-08-06 RX ADMIN — HEPARIN SODIUM 5000 UNITS: 5000 INJECTION, SOLUTION INTRAVENOUS; SUBCUTANEOUS at 10:48

## 2018-08-06 RX ADMIN — SODIUM CHLORIDE 150 ML/HR: 900 INJECTION, SOLUTION INTRAVENOUS at 23:03

## 2018-08-06 RX ADMIN — PIPERACILLIN SODIUM,TAZOBACTAM SODIUM 3.38 G: 3; .375 INJECTION, POWDER, FOR SOLUTION INTRAVENOUS at 10:47

## 2018-08-06 RX ADMIN — SODIUM CHLORIDE 150 ML/HR: 900 INJECTION, SOLUTION INTRAVENOUS at 10:47

## 2018-08-06 RX ADMIN — PIPERACILLIN SODIUM,TAZOBACTAM SODIUM 3.38 G: 3; .375 INJECTION, POWDER, FOR SOLUTION INTRAVENOUS at 03:54

## 2018-08-06 NOTE — PROGRESS NOTES
Problem: Pressure Injury - Risk of  Goal: *Prevention of pressure injury  Document Juno Scale and appropriate interventions in the flowsheet. Outcome: Progressing Towards Goal  Pressure Injury Interventions:  Sensory Interventions: Assess changes in LOC, Assess need for specialty bed, Chair cushion, Discuss PT/OT consult with provider, Keep linens dry and wrinkle-free, Maintain/enhance activity level, Minimize linen layers, Monitor skin under medical devices, Pad between skin to skin, Pressure redistribution bed/mattress (bed type), Sit a 90-degree angle/use footstool if needed, Turn and reposition approx. every two hours (pillows and wedges if needed), Use 30-degree side-lying position    Moisture Interventions: Absorbent underpads, Assess need for specialty bed, Maintain skin hydration (lotion/cream), Minimize layers, Moisture barrier, Offer toileting Q_hr    Activity Interventions: Assess need for specialty bed, Chair cushion, Increase time out of bed, Pressure redistribution bed/mattress(bed type)    Mobility Interventions: Assess need for specialty bed, Chair cushion, Float heels, HOB 30 degrees or less, Pressure redistribution bed/mattress (bed type), PT/OT evaluation, Turn and reposition approx. every two hours(pillow and wedges)    Nutrition Interventions: Document food/fluid/supplement intake, Discuss nutritional consult with provider, Offer support with meals,snacks and hydration    Friction and Shear Interventions: Apply protective barrier, creams and emollients, Foam dressings/transparent film/skin sealants, HOB 30 degrees or less, Lift team/patient mobility team, Minimize layers, Sit at 90-degree angle, Transfer aides:transfer board/Lesley lift/ceiling lift, Transferring/repositioning devices               Problem: Falls - Risk of  Goal: *Absence of Falls  Document Leyla Fall Risk and appropriate interventions in the flowsheet.    Outcome: Progressing Towards Goal  Fall Risk Interventions:  Mobility Interventions: Assess mobility with egress test, Bed/chair exit alarm, Communicate number of staff needed for ambulation/transfer, OT consult for ADLs, Patient to call before getting OOB, PT Consult for mobility concerns, PT Consult for assist device competence, Strengthening exercises (ROM-active/passive), Utilize walker, cane, or other assistive device         Medication Interventions: Assess postural VS orthostatic hypotension, Bed/chair exit alarm, Evaluate medications/consider consulting pharmacy, Patient to call before getting OOB, Teach patient to arise slowly    Elimination Interventions: Bed/chair exit alarm, Call light in reach, Patient to call for help with toileting needs, Toilet paper/wipes in reach, Toileting schedule/hourly rounds, Urinal in reach

## 2018-08-06 NOTE — PROGRESS NOTES
Intern Progress Note  [facility]       Patient: Sally Carrillo MRN: [id number]  CSN: [id number]    YOB: 1945  Age: 67 y.o. Sex: male    DOA: 8/2/2018 LOS:  LOS: 4 days                    Subjective:     No acute events overnight. Pt feeling much better than yesterday. Had \"Huge\" bowel movement last night as per pt and nurse. ROS Denies: CP, SOB, HA, Vision changes, MSK Pain, Abd pain    Objective:      Patient Vitals for the past 24 hrs:   Temp Pulse Resp BP SpO2   08/06/18 0740 97.6 °F (36.4 °C) 68 18 (!) 165/96 98 %   08/06/18 0402 97.2 °F (36.2 °C) 63 18 (!) 158/93 99 %   08/05/18 2328 98.5 °F (36.9 °C) 67 18 (!) 148/91 99 %   08/05/18 2020 98.5 °F (36.9 °C) 76 18 (!) 167/97 96 %   08/05/18 1556 98 °F (36.7 °C) 80 16 (!) 165/99 96 %   08/05/18 1211 98 °F (36.7 °C) 88 18 (!) 172/102 95 %         Intake/Output Summary (Last 24 hours) at 08/06/18 0837  Last data filed at 08/06/18 0659   Gross per 24 hour   Intake             2200 ml   Output              280 ml   Net             1920 ml       Physical Exam:   General:  NAD, cooperative  HEENT:  sclera anicteric. EOMI. Moist mucous membranes. No other gross abnormalities present. CV:  Occasional dropped beat , no murmurs. No visible pulsations or thrills. RESP:  Unlabored breathing. Lungs clear to auscultation. no wheeze, rales, or rhonchi. Equal expansion bilaterally. ABD:  Soft, periheral tenderness, improved distention. No hepatosplenomegaly. No suprapubic tenderness. No CVA tenderness. RECTAL:  Pt refused. Neuro: A+Ox3. Expressive aphasia (chronic)  Ext:  No edema. 2+ radial and dp pulses bilaterally. Skin:  No rashes, lesions, or ulcers. Good turgor. Lab/Data Reviewed: All lab results for the last 24 hours reviewed.      Scheduled Medications Reviewed:  Current Facility-Administered Medications   Medication Dose Route Frequency    0.9% sodium chloride infusion  150 mL/hr IntraVENous CONTINUOUS    heparin (porcine) injection 5,000 Units  5,000 Units SubCUTAneous Q8H    cloNIDine (CATAPRES) 0.2 mg/24 hr patch 1 Patch  1 Patch TransDERmal Q7D    tamsulosin (FLOMAX) capsule 0.4 mg  0.4 mg Oral DAILY    piperacillin-tazobactam (ZOSYN) 3.375 g in 0.9% sodium chloride (MBP/ADV) 100 mL ADV  3.375 g IntraVENous Q6H     Assessment/Plan   67 y. o. male with PMH significant for CVA, HTN, HLD, CKD III, chronic diarrhea, BPH/OAB now admitted with abdominal pain and nausea.       Partial SBO:  Abdominal CT (Aug. 3) showed dilated loops of small bowel in the midabdomen with haziness in the mesentery. With possible partial small bowel obstruction, possible closed loop obstruction. recent open abd surgery April. WBC 9.6; UA with 300 protein, small blood, neg bacteria or leuks. Surgery: \"Suspect urosepsis but UA unimpressive\" Clinically improving. On Zosyn. Pt refusing NG tube. Surgery put on Clear Liquids yesterday, tolerated well. \"huge\" BM last night as per nurse.  - NS @ 150  - Clear Liquids AM, consider soft diet PM  - PT/OT/case management  - Daily CBC, CMP  - discontinue Zosyn 3.375g in NS  - Surgery following  - continue home meds    DARINEL on CKD III. Cr. 1.7  Follows with Dr. Jaqui Herrera as an outpatient. Baseline Cr ~1.27-1. 3.  - Continue NS @ 150  - Monitor urine output    Hypertensive: 160s/90s.  Catapress patch yesterday d/t NPO.  - PRN hydralazine for BPs over 180/110  - start chlorthalidone  - d/c catapress tomorrow    H/o CVA, HTN, HLD, Vitamin D/B12 deficiency CVA in 2015 with residual mild L sided weakness and some aphasia  - Fall precautions, aspiration precautions    Recurrent cystitis/OAB/BPH   - Restarted home Flomax    Tobacco use current tobacco user  - Nicotine replacement not ordered to possible vascular constriction     Diet: NPO  DVT Prophylaxis: SCDs, Heparin (500 U once; restart tomorrow if he does not go to surgery)   Code Status: Full  Point of Contact: Shae Flores [phone]      Disposition and anticipated LOS: out tomorrow        DO MIRA Perez [facility] Medicine PGY-1  08/06/18 7:18 AM

## 2018-08-06 NOTE — PROGRESS NOTES
Riaz Schulz M.D. FACS  PROGRESS NOTE    Name: Sabino Schuster MRN: [id number]   : 1945 Hospital: [facility]   Date: 2018 Admission Date: 2018  5:41 PM     Hospital Day: 5     Subjective:  Pt without n/v with CLD. No acute overnight events. Large BM noted. He says that he is not hungry. Objective:  Vitals:    18 2328 18 0401 18 0402 18 0740   BP: (!) 148/91  (!) 158/93 (!) 165/96   Pulse: 67  63 68   Resp:    Temp: 98.5 °F (36.9 °C)  97.2 °F (36.2 °C) 97.6 °F (36.4 °C)   SpO2: 99%  99% 98%   Weight:  89.4 kg (197 lb)     Height:           Date 18 0700 - 18 0659 18 0700 - 18 0659   Shift 6740-0442 7408-2682 24 Hour Total 6794-8180 4449-4660 24 Hour Total   I  N  T  A  K  E   P.O.  400 400         P. O.  400 400       I.V.  (mL/kg/hr)  1800  (1.7) 1800  (0.8)         Volume (0.9% sodium chloride infusion)  1800 1800       Shift Total  (mL/kg)  2200  (24.6) 2200  (24.6)      O  U  T  P  U  T   Urine  (mL/kg/hr)  280  (0.3) 280  (0.1)         Urine Voided  280 280       Stool            Stool Occurrence(s) 1 x  1 x       Shift Total  (mL/kg)  280  (3.1) 280  (3.1)      NET  1920 1920      Weight (kg) 92.8 89.4 89.4 89.4 89.4 89.4         Physical Exam:    General: A&A, NAD, Ox4   Abdomen: abdomen is soft without tenderness.   No masses, organomegaly or guarding    Labs:  Recent Results (from the past 24 hour(s))   CBC WITH AUTOMATED DIFF    Collection Time: 18  3:30 AM   Result Value Ref Range    WBC 9.6 4.6 - 13.2 K/uL    RBC 4.09 (L) 4.70 - 5.50 M/uL    HGB 10.7 (L) 13.0 - 16.0 g/dL    HCT 33.6 (L) 36.0 - 48.0 %    MCV 82.2 74.0 - 97.0 FL    MCH 26.2 24.0 - 34.0 PG    MCHC 31.8 31.0 - 37.0 g/dL    RDW 15.2 (H) 11.6 - 14.5 %    PLATELET 332 675 - 112 K/uL    MPV 10.9 9.2 - 11.8 FL    NEUTROPHILS 75 (H) 40 - 73 %    LYMPHOCYTES 13 (L) 21 - 52 %    MONOCYTES 12 (H) 3 - 10 %    EOSINOPHILS 0 0 - 5 %    BASOPHILS 0 0 - 2 %    ABS. NEUTROPHILS 7.3 1.8 - 8.0 K/UL    ABS. LYMPHOCYTES 1.2 0.9 - 3.6 K/UL    ABS. MONOCYTES 1.1 0.05 - 1.2 K/UL    ABS. EOSINOPHILS 0.0 0.0 - 0.4 K/UL    ABS.  BASOPHILS 0.0 0.0 - 0.1 K/UL    DF AUTOMATED     METABOLIC PANEL, BASIC    Collection Time: 08/06/18  3:30 AM   Result Value Ref Range    Sodium 143 136 - 145 mmol/L    Potassium 3.5 3.5 - 5.5 mmol/L    Chloride 111 (H) 100 - 108 mmol/L    CO2 25 21 - 32 mmol/L    Anion gap 7 3.0 - 18 mmol/L    Glucose 81 74 - 99 mg/dL    BUN 23 (H) 7.0 - 18 MG/DL    Creatinine 1.70 (H) 0.6 - 1.3 MG/DL    BUN/Creatinine ratio 14 12 - 20      GFR est AA 48 (L) >60 ml/min/1.73m2    GFR est non-AA 40 (L) >60 ml/min/1.73m2    Calcium 8.2 (L) 8.5 - 10.1 MG/DL     All Micro Results     Procedure Component Value Units Date/Time    CULTURE, BLOOD [[id number]] Collected:  08/04/18 2207    Order Status:  Completed Specimen:  Blood from Blood Updated:  08/06/18 0716     Special Requests: NO SPECIAL REQUESTS        Culture result: NO GROWTH 2 DAYS       CULTURE, BLOOD [[id number]] Collected:  08/04/18 2200    Order Status:  Completed Specimen:  Blood from Blood Updated:  08/06/18 0716     Special Requests: NO SPECIAL REQUESTS        Culture result: NO GROWTH 2 DAYS             Current Medications:  Current Facility-Administered Medications   Medication Dose Route Frequency Provider Last Rate Last Dose    0.9% sodium chloride infusion  150 mL/hr IntraVENous CONTINUOUS Kristin Cedeno,  mL/hr at 08/05/18 2321 150 mL/hr at 08/05/18 2321    heparin (porcine) injection 5,000 Units  5,000 Units SubCUTAneous Q8H Kristin Cedeno, DO   5,000 Units at 08/06/18 0354    ondansetron (ZOFRAN) injection 8 mg  8 mg IntraVENous Q8H PRN Thersia Te, DO   8 mg at 08/05/18 0754    morphine injection 2 mg  2 mg IntraVENous Q3H PRN Thersia Gondola, DO   2 mg at 08/05/18 0754    cloNIDine (CATAPRES) 0.2 mg/24 hr patch 1 Patch  1 Patch TransDERmal Q7D Thersia Gondola, DO   1 Patch at 08/05/18 7627    hydrALAZINE (APRESOLINE) 20 mg/mL injection 10 mg  10 mg IntraVENous Q6H PRN Jody Kincaid DO   10 mg at 08/05/18 0057    tamsulosin (FLOMAX) capsule 0.4 mg  0.4 mg Oral DAILY Sunitha Guadarrama MD   0.4 mg at 08/05/18 0800    sodium chloride (NS) flush 5-10 mL  5-10 mL IntraVENous PRN Macey Barraza MD        piperacillin-tazobactam (ZOSYN) 3.375 g in 0.9% sodium chloride (MBP/ADV) 100 mL ADV  3.375 g IntraVENous Q6H Macey Barraza MD   3.375 g at 08/06/18 0354       Chart and notes reviewed. Data reviewed. I have evaluated and examined the patient. IMPRESSION:   · Pt with resolving ileus vs  PSBO. PLAN:/DISCUSION:   · Continue CLD and advance tomorrow  · OOB to chair.    · Continue flomax  · Repeat UA        Sunitha Guadarrama MD

## 2018-08-07 LAB
ANION GAP SERPL CALC-SCNC: 9 MMOL/L (ref 3–18)
BASOPHILS # BLD: 0 K/UL (ref 0–0.1)
BASOPHILS NFR BLD: 0 % (ref 0–2)
BUN SERPL-MCNC: 17 MG/DL (ref 7–18)
BUN/CREAT SERPL: 12 (ref 12–20)
CALCIUM SERPL-MCNC: 8.8 MG/DL (ref 8.5–10.1)
CHLORIDE SERPL-SCNC: 109 MMOL/L (ref 100–108)
CO2 SERPL-SCNC: 25 MMOL/L (ref 21–32)
CREAT SERPL-MCNC: 1.44 MG/DL (ref 0.6–1.3)
DIFFERENTIAL METHOD BLD: ABNORMAL
EOSINOPHIL # BLD: 0.1 K/UL (ref 0–0.4)
EOSINOPHIL NFR BLD: 2 % (ref 0–5)
ERYTHROCYTE [DISTWIDTH] IN BLOOD BY AUTOMATED COUNT: 14.8 % (ref 11.6–14.5)
GLUCOSE SERPL-MCNC: 71 MG/DL (ref 74–99)
HCT VFR BLD AUTO: 33 % (ref 36–48)
HGB BLD-MCNC: 10.5 G/DL (ref 13–16)
LYMPHOCYTES # BLD: 1.6 K/UL (ref 0.9–3.6)
LYMPHOCYTES NFR BLD: 22 % (ref 21–52)
MAGNESIUM SERPL-MCNC: 2 MG/DL (ref 1.6–2.6)
MCH RBC QN AUTO: 26.1 PG (ref 24–34)
MCHC RBC AUTO-ENTMCNC: 31.8 G/DL (ref 31–37)
MCV RBC AUTO: 82.1 FL (ref 74–97)
MONOCYTES # BLD: 0.9 K/UL (ref 0.05–1.2)
MONOCYTES NFR BLD: 13 % (ref 3–10)
NEUTS SEG # BLD: 4.4 K/UL (ref 1.8–8)
NEUTS SEG NFR BLD: 63 % (ref 40–73)
PLATELET # BLD AUTO: 194 K/UL (ref 135–420)
PMV BLD AUTO: 10.8 FL (ref 9.2–11.8)
POTASSIUM SERPL-SCNC: 3.6 MMOL/L (ref 3.5–5.5)
RBC # BLD AUTO: 4.02 M/UL (ref 4.7–5.5)
SODIUM SERPL-SCNC: 143 MMOL/L (ref 136–145)
WBC # BLD AUTO: 7 K/UL (ref 4.6–13.2)

## 2018-08-07 PROCEDURE — 74011250637 HC RX REV CODE- 250/637: Performed by: STUDENT IN AN ORGANIZED HEALTH CARE EDUCATION/TRAINING PROGRAM

## 2018-08-07 PROCEDURE — 65660000000 HC RM CCU STEPDOWN

## 2018-08-07 PROCEDURE — 74011250637 HC RX REV CODE- 250/637: Performed by: SURGERY

## 2018-08-07 PROCEDURE — 80048 BASIC METABOLIC PNL TOTAL CA: CPT

## 2018-08-07 PROCEDURE — 36415 COLL VENOUS BLD VENIPUNCTURE: CPT

## 2018-08-07 PROCEDURE — 83735 ASSAY OF MAGNESIUM: CPT | Performed by: STUDENT IN AN ORGANIZED HEALTH CARE EDUCATION/TRAINING PROGRAM

## 2018-08-07 PROCEDURE — 74011250636 HC RX REV CODE- 250/636: Performed by: STUDENT IN AN ORGANIZED HEALTH CARE EDUCATION/TRAINING PROGRAM

## 2018-08-07 PROCEDURE — 85025 COMPLETE CBC W/AUTO DIFF WBC: CPT

## 2018-08-07 RX ORDER — AMLODIPINE BESYLATE 2.5 MG/1
2.5 TABLET ORAL DAILY
Status: DISCONTINUED | OUTPATIENT
Start: 2018-08-07 | End: 2018-08-08 | Stop reason: HOSPADM

## 2018-08-07 RX ADMIN — HEPARIN SODIUM 5000 UNITS: 5000 INJECTION, SOLUTION INTRAVENOUS; SUBCUTANEOUS at 17:40

## 2018-08-07 RX ADMIN — AMLODIPINE BESYLATE 2.5 MG: 2.5 TABLET ORAL at 10:20

## 2018-08-07 RX ADMIN — TAMSULOSIN HYDROCHLORIDE 0.4 MG: 0.4 CAPSULE ORAL at 10:20

## 2018-08-07 RX ADMIN — SODIUM CHLORIDE 150 ML/HR: 900 INJECTION, SOLUTION INTRAVENOUS at 05:35

## 2018-08-07 RX ADMIN — HEPARIN SODIUM 5000 UNITS: 5000 INJECTION, SOLUTION INTRAVENOUS; SUBCUTANEOUS at 03:07

## 2018-08-07 RX ADMIN — HEPARIN SODIUM 5000 UNITS: 5000 INJECTION, SOLUTION INTRAVENOUS; SUBCUTANEOUS at 10:28

## 2018-08-07 NOTE — PROGRESS NOTES
Intern Progress Note  [facility]       Patient: Cate Montgomery MRN: [id number]  CSN: [id number]    YOB: 1945  Age: 67 y.o. Sex: male    DOA: 8/2/2018 LOS:  LOS: 5 days                    Subjective:     No acute events overnight. Pt feeling much better than yesterday. Had 2 bowel movements yesterday. ROS Denies: CP, SOB, HA, Vision changes, MSK Pain, Abd pain    Objective:      Patient Vitals for the past 24 hrs:   Temp Pulse Resp BP SpO2   08/07/18 0741 98.1 °F (36.7 °C) (!) 58 18 (!) 167/98 98 %   08/07/18 0339 98.6 °F (37 °C) (!) 59 18 (!) 169/96 97 %   08/06/18 2317 98.3 °F (36.8 °C) 60 17 152/77 99 %   08/06/18 2022 97.6 °F (36.4 °C) 67 18 (!) 172/93 98 %   08/06/18 1514 98.7 °F (37.1 °C) (!) 58 18 (!) 193/103 97 %   08/06/18 1130 98.6 °F (37 °C) 66 18 (!) 166/91 97 %         Intake/Output Summary (Last 24 hours) at 08/07/18 0834  Last data filed at 08/07/18 0630   Gross per 24 hour   Intake             1800 ml   Output             2001 ml   Net             -201 ml       Physical Exam:   General:  NAD, cooperative  HEENT:  sclera anicteric. EOMI. Moist mucous membranes. No other gross abnormalities present. CV:  Occasional dropped beat , no murmurs. No visible pulsations or thrills. RESP:  Unlabored breathing. Lungs clear to auscultation. no wheeze, rales, or rhonchi. Equal expansion bilaterally. ABD:  Soft, non-ttp, improved distention. No hepatosplenomegaly. No suprapubic tenderness. No CVA tenderness. RECTAL:  Pt refused. Neuro: A+Ox3. Expressive aphasia (chronic)  Ext:  No edema. 2+ radial and dp pulses bilaterally. Skin:  No rashes, lesions, or ulcers. Good turgor. Lab/Data Reviewed: All lab results for the last 24 hours reviewed.      Scheduled Medications Reviewed:  Current Facility-Administered Medications   Medication Dose Route Frequency    0.9% sodium chloride infusion  150 mL/hr IntraVENous CONTINUOUS    heparin (porcine) injection 5,000 Units  5,000 Units SubCUTAneous Q8H    cloNIDine (CATAPRES) 0.2 mg/24 hr patch 1 Patch  1 Patch TransDERmal Q7D    tamsulosin (FLOMAX) capsule 0.4 mg  0.4 mg Oral DAILY     Assessment/Plan   67 y. o. male with PMH significant for CVA, HTN, HLD, CKD III, chronic diarrhea, BPH/OAB now admitted with abdominal pain and nausea.       Partial SBO:  Abdominal CT (Aug. 3) showed dilated loops of small bowel in the midabdomen with haziness in the mesentery, possible partial small bowel obstruction, possible closed loop obstruction. recent open abd surgery April. WBC 7.0; UA with 300 protein, small blood, neg bacteria or leuks. Recent upright/flat XR shows no SBO, possible ileus. Surgery: \"Suspect urosepsis but UA unimpressive\" Clinically improving. Tolerated clear liquids yesterday, 2BM yesterday, surgery increased to Post-surgical diet today.  - NS @ 150  - Full Liquids AM, soft diet PM if better  - PT/OT/case management  - Daily CBC, CMP  - Surgery following  - continue home meds    DARINEL on CKD III. Cr. 1.7 yesterday.  Follows with Dr. Alejandro Bearden as an outpatient. Baseline Cr ~1.27-1. 3.  - Continue NS @ 150  - Monitor urine output    Hypertension: 160s/90s. Catapress patch yesterday.   - PRN hydralazine for BPs over 180/110  - start amlodipine 2.5mg, d/c catapress 2hrs after    H/o CVA, HTN, HLD, Vitamin D/B12 deficiency CVA in 2015 with residual mild L sided weakness and some aphasia  - Fall precautions, aspiration precautions    Recurrent cystitis/OAB/BPH   - Restarted home Flomax    Tobacco use current tobacco user  - Nicotine replacement not ordered to possible vascular constriction     Diet: NPO  DVT Prophylaxis: SCDs, Heparin (500 U once; restart tomorrow if he does not go to surgery)   Code Status: Full  Point of Contact: Stanley John [phone]      Disposition and anticipated LOS: out tomorrow       Justine Sanders, [address] [place] [address] PGY-1  08/07/18 7:18 AM

## 2018-08-07 NOTE — PROGRESS NOTES
Charles P. Payton Dandy, M.D. FACS  PROGRESS NOTE    Name: Ashly Gomez MRN: [id number]   : 1945 [facility]   Date: 2018 Admission Date: 2018  5:41 PM     Hospital Day: 6     Subjective:  Patient without acute overnight events. He did have a bowel movement and passed gas. He had no nausea vomiting with clear liquid diet. Objective:  Vitals:    18 1514 18 2022 18 2317 18 0339   BP: (!) 193/103 (!) 172/93 152/77 (!) 169/96   Pulse: (!) 58 67 60 (!) 59   Resp:    Temp: 98.7 °F (37.1 °C) 97.6 °F (36.4 °C) 98.3 °F (36.8 °C) 98.6 °F (37 °C)   SpO2: 97% 98% 99% 97%   Weight:    91.7 kg (202 lb 1.6 oz)   Height:           Date 18 0700 - 18 0659 18 0700 - 18 0659   Shift 4390-3150 2057-8893 24 Hour Total 7187-5472 6213-2654 24 Hour Total   I  N  T  A  K  E   I.V.  (mL/kg/hr)  1800  (1.6) 1800  (0.8)         Volume (0.9% sodium chloride infusion)  1800 1800       Shift Total  (mL/kg)  1800  (19.6) 1800  (19.6)      O  U  T  P  U  T   Urine  (mL/kg/hr) 200  (0.2) 1800  (1.6) 2000  (0.9)         Urine Voided 200 1800 2000         Urine Occurrence(s) 1 x 1 x 2 x       Stool 1  1         Stool Occurrence(s)  1 x 1 x         Stool 1  1       Shift Total  (mL/kg) 201  (2.2) 1800  (19.6) 2001  (21.8)      NET -201 0 -201      Weight (kg) 89.4 91.7 91.7 91.7 91.7 91.7         Physical Exam:    General: Awake and alert, no apparent distress, oriented ×4   Abdomen: abdomen is soft without tenderness. Incision(s) are C/D/I.   No masses, organomegaly or guarding    Labs:  Recent Results (from the past 24 hour(s))   CBC WITH AUTOMATED DIFF    Collection Time: 18  3:11 AM   Result Value Ref Range    WBC 7.0 4.6 - 13.2 K/uL    RBC 4.02 (L) 4.70 - 5.50 M/uL    HGB 10.5 (L) 13.0 - 16.0 g/dL    HCT 33.0 (L) 36.0 - 48.0 %    MCV 82.1 74.0 - 97.0 FL    MCH 26.1 24.0 - 34.0 PG    MCHC 31.8 31.0 - 37.0 g/dL    RDW 14.8 (H) 11.6 - 14.5 % PLATELET 919 136 - 455 K/uL    MPV 10.8 9.2 - 11.8 FL    NEUTROPHILS 63 40 - 73 %    LYMPHOCYTES 22 21 - 52 %    MONOCYTES 13 (H) 3 - 10 %    EOSINOPHILS 2 0 - 5 %    BASOPHILS 0 0 - 2 %    ABS. NEUTROPHILS 4.4 1.8 - 8.0 K/UL    ABS. LYMPHOCYTES 1.6 0.9 - 3.6 K/UL    ABS. MONOCYTES 0.9 0.05 - 1.2 K/UL    ABS. EOSINOPHILS 0.1 0.0 - 0.4 K/UL    ABS.  BASOPHILS 0.0 0.0 - 0.1 K/UL    DF AUTOMATED       All Micro Results     Procedure Component Value Units Date/Time    CULTURE, BLOOD [[id number]] Collected:  08/04/18 2207    Order Status:  Completed Specimen:  Blood from Blood Updated:  08/06/18 0716     Special Requests: NO SPECIAL REQUESTS        Culture result: NO GROWTH 2 DAYS       CULTURE, BLOOD [[id number]] Collected:  08/04/18 2200    Order Status:  Completed Specimen:  Blood from Blood Updated:  08/06/18 0716     Special Requests: NO SPECIAL REQUESTS        Culture result: NO GROWTH 2 DAYS             Current Medications:  Current Facility-Administered Medications   Medication Dose Route Frequency Provider Last Rate Last Dose    amLODIPine (NORVASC) tablet 2.5 mg  2.5 mg Oral DAILY Aubrey Witt DO        0.9% sodium chloride infusion  150 mL/hr IntraVENous CONTINUOUS Kristin Cedeno  mL/hr at 08/07/18 0535 150 mL/hr at 08/07/18 0535    heparin (porcine) injection 5,000 Units  5,000 Units SubCUTAneous Q8H Kristin Cedeno DO   5,000 Units at 08/07/18 0307    ondansetron (ZOFRAN) injection 8 mg  8 mg IntraVENous Q8H PRN [facility], DO   8 mg at 08/05/18 0754    morphine injection 2 mg  2 mg IntraVENous Q3H PRN [facility], DO   2 mg at 08/05/18 0754    cloNIDine (CATAPRES) 0.2 mg/24 hr patch 1 Patch  1 Patch TransDERmal Q7D [facility] [place], DO   1 Patch at 08/05/18 0043    hydrALAZINE (APRESOLINE) 20 mg/mL injection 10 mg  10 mg IntraVENous Q6H PRN Aubrey Witt DO   10 mg at 08/05/18 0057    tamsulosin (FLOMAX) capsule 0.4 mg  0.4 mg Oral DAILY Brianna Ambrosio MD   0.4 mg at 08/06/18 1048    sodium chloride (NS) flush 5-10 mL  5-10 mL IntraVENous PRN Neo Vicente MD           Chart and notes reviewed. Data reviewed. I have evaluated and examined the patient. IMPRESSION:   · Patient is a pleasant 69-year-old male with resolving ileus versus partial small bowel obstruction.       PLAN:/DISCUSION:   · GI light diet  · Out of bed to chair  · Continue present care        Mercedes Bonilla MD

## 2018-08-07 NOTE — ROUTINE PROCESS
Bedside and Verbal shift change report given to Iman Pérez RN   (oncoming nurse) by Zaki Arrieta RN (offgoing nurse). Report included the following information SBAR, Kardex, Intake/Output, MAR and Recent Results. 0730 Bedside and Verbal shift change report given to Ashok Del Cid RN (oncoming nurse) by Iman Pérez RN   (offgoing nurse). Report included the following information SBAR, Kardex, Intake/Output, MAR and Recent Results.

## 2018-08-07 NOTE — PROGRESS NOTES
0730 - [facility] report on patient, introduced self as RN.     4240 - Patient resting in bed with eyes closed, arouses to voice. Vital signs stable. Denies pain. AAOx3, disoriented to situation. Respirations even and unlabored, breath sounds clear. Belly is soft and rounded, non-distended. Active bowel sounds. Had large BM last night. Skin is warm and dry. Denies any acute needs, NAD, will continue to monitor    1300 - Patient resting in bed with eyes closed, arouses to voice. Sister at bedside, updated on patient status. Denies any acute needs. NAD, will continue to monitor. 1645 - Patient resting in bed with eyes open. Denies pain, denies any acute needs. Vital signs stable. NAD, will continue to monitor. 1830 - Patient resting in bed with eyes closed, arouses to voice. Fresh water at bedside.  Denies any acute needs, NAD, will continue to monitor    1930 - Report given to BRAD [facility]

## 2018-08-08 VITALS
HEIGHT: 71 IN | DIASTOLIC BLOOD PRESSURE: 88 MMHG | BODY MASS INDEX: 28.78 KG/M2 | WEIGHT: 205.6 LBS | SYSTOLIC BLOOD PRESSURE: 163 MMHG | OXYGEN SATURATION: 98 % | RESPIRATION RATE: 19 BRPM | HEART RATE: 62 BPM | TEMPERATURE: 97.9 F

## 2018-08-08 LAB
ANION GAP SERPL CALC-SCNC: 6 MMOL/L (ref 3–18)
ANION GAP SERPL CALC-SCNC: 8 MMOL/L (ref 3–18)
BASOPHILS # BLD: 0 K/UL (ref 0–0.1)
BASOPHILS NFR BLD: 0 % (ref 0–2)
BUN SERPL-MCNC: 15 MG/DL (ref 7–18)
BUN SERPL-MCNC: 16 MG/DL (ref 7–18)
BUN/CREAT SERPL: 11 (ref 12–20)
BUN/CREAT SERPL: 13 (ref 12–20)
CALCIUM SERPL-MCNC: 8.7 MG/DL (ref 8.5–10.1)
CALCIUM SERPL-MCNC: 8.8 MG/DL (ref 8.5–10.1)
CHLORIDE SERPL-SCNC: 106 MMOL/L (ref 100–108)
CHLORIDE SERPL-SCNC: 108 MMOL/L (ref 100–108)
CO2 SERPL-SCNC: 25 MMOL/L (ref 21–32)
CO2 SERPL-SCNC: 28 MMOL/L (ref 21–32)
CREAT SERPL-MCNC: 1.2 MG/DL (ref 0.6–1.3)
CREAT SERPL-MCNC: 1.46 MG/DL (ref 0.6–1.3)
DIFFERENTIAL METHOD BLD: ABNORMAL
EOSINOPHIL # BLD: 0.1 K/UL (ref 0–0.4)
EOSINOPHIL NFR BLD: 2 % (ref 0–5)
ERYTHROCYTE [DISTWIDTH] IN BLOOD BY AUTOMATED COUNT: 14.5 % (ref 11.6–14.5)
GLUCOSE SERPL-MCNC: 105 MG/DL (ref 74–99)
GLUCOSE SERPL-MCNC: 89 MG/DL (ref 74–99)
HCT VFR BLD AUTO: 31.2 % (ref 36–48)
HGB BLD-MCNC: 10.3 G/DL (ref 13–16)
LYMPHOCYTES # BLD: 1.4 K/UL (ref 0.9–3.6)
LYMPHOCYTES NFR BLD: 24 % (ref 21–52)
MCH RBC QN AUTO: 25.9 PG (ref 24–34)
MCHC RBC AUTO-ENTMCNC: 33 G/DL (ref 31–37)
MCV RBC AUTO: 78.6 FL (ref 74–97)
MONOCYTES # BLD: 0.6 K/UL (ref 0.05–1.2)
MONOCYTES NFR BLD: 11 % (ref 3–10)
NEUTS SEG # BLD: 3.5 K/UL (ref 1.8–8)
NEUTS SEG NFR BLD: 63 % (ref 40–73)
PLATELET # BLD AUTO: 184 K/UL (ref 135–420)
PMV BLD AUTO: 10.2 FL (ref 9.2–11.8)
POTASSIUM SERPL-SCNC: 3.3 MMOL/L (ref 3.5–5.5)
POTASSIUM SERPL-SCNC: 3.6 MMOL/L (ref 3.5–5.5)
RBC # BLD AUTO: 3.97 M/UL (ref 4.7–5.5)
SODIUM SERPL-SCNC: 140 MMOL/L (ref 136–145)
SODIUM SERPL-SCNC: 141 MMOL/L (ref 136–145)
WBC # BLD AUTO: 5.6 K/UL (ref 4.6–13.2)

## 2018-08-08 PROCEDURE — 36415 COLL VENOUS BLD VENIPUNCTURE: CPT

## 2018-08-08 PROCEDURE — 80048 BASIC METABOLIC PNL TOTAL CA: CPT

## 2018-08-08 PROCEDURE — 74011250637 HC RX REV CODE- 250/637: Performed by: SURGERY

## 2018-08-08 PROCEDURE — 74011250637 HC RX REV CODE- 250/637: Performed by: STUDENT IN AN ORGANIZED HEALTH CARE EDUCATION/TRAINING PROGRAM

## 2018-08-08 PROCEDURE — 85025 COMPLETE CBC W/AUTO DIFF WBC: CPT

## 2018-08-08 PROCEDURE — 74011250636 HC RX REV CODE- 250/636: Performed by: STUDENT IN AN ORGANIZED HEALTH CARE EDUCATION/TRAINING PROGRAM

## 2018-08-08 RX ORDER — ACETYLCYSTEINE 100 MG/ML
2 SOLUTION ORAL; RESPIRATORY (INHALATION)
Status: DISCONTINUED | OUTPATIENT
Start: 2018-08-08 | End: 2018-08-08

## 2018-08-08 RX ORDER — AMLODIPINE BESYLATE 2.5 MG/1
2.5 TABLET ORAL DAILY
Qty: 30 TAB | Refills: 0 | Status: SHIPPED | OUTPATIENT
Start: 2018-08-09 | End: 2021-04-14

## 2018-08-08 RX ORDER — POTASSIUM CHLORIDE 1.5 G/1.77G
40 POWDER, FOR SOLUTION ORAL 2 TIMES DAILY WITH MEALS
Status: COMPLETED | OUTPATIENT
Start: 2018-08-08 | End: 2018-08-08

## 2018-08-08 RX ORDER — AMLODIPINE BESYLATE 2.5 MG/1
2.5 TABLET ORAL DAILY
Qty: 30 TAB | Refills: 0 | Status: SHIPPED | OUTPATIENT
Start: 2018-08-09 | End: 2018-08-08

## 2018-08-08 RX ADMIN — POTASSIUM CHLORIDE 40 MEQ: 1.5 POWDER, FOR SOLUTION ORAL at 09:44

## 2018-08-08 RX ADMIN — TAMSULOSIN HYDROCHLORIDE 0.4 MG: 0.4 CAPSULE ORAL at 08:11

## 2018-08-08 RX ADMIN — HEPARIN SODIUM 5000 UNITS: 5000 INJECTION, SOLUTION INTRAVENOUS; SUBCUTANEOUS at 03:19

## 2018-08-08 RX ADMIN — HEPARIN SODIUM 5000 UNITS: 5000 INJECTION, SOLUTION INTRAVENOUS; SUBCUTANEOUS at 09:45

## 2018-08-08 RX ADMIN — AMLODIPINE BESYLATE 2.5 MG: 2.5 TABLET ORAL at 08:11

## 2018-08-08 NOTE — PROGRESS NOTES
0730 - Assumed care of patient from Johnathon Kowalski, introduced self as nurse. Patient resting in bed with eyes open. Denies pain, denies any acute needs. Bed alarm on, call light within reach, verbalized understanding of use. NAD, will continue to monitor. 0581 -  Patient resting in bed with eyes closed, arouses to voice. Denies pain, vital signs stable. AAOx3, disoriented to situation. Assisted to dangle at bedside to eat breakfast. Respirations even and unlabored, breath sounds clear. Skin is warm and dry. Bed alarm on, call light within reach. Denies any acute needs, NAD, will continue to monitor    1000 - Plan to be discharged home today per MD, patient tolerated oral potassium replacement without difficulty. Remains resting in bed at this time. Bed alarm on, call light within reach. NAD, will continue to monitor. 1300 - Patient resting in bed with eyes closed, arouses to voice. Denies pain, denies any acute needs. NAD, will continue to monitor. 1445 - Discharge orders in place, called and spoke with sister Emily Kelly, stated she will come  patient, most likely around 1700. NAD, will continue to monitor. Appointuit - Reviewed discharge with patient's niece, assisted in dressing. Denied questions. NAD.

## 2018-08-08 NOTE — PROGRESS NOTES
Reason for Admission:   Small bowel obstruction               RRAT Score:     25             Resources/supports as identified by patient/family:                   Top Challenges facing patient (as identified by patient/family and CM): Finances/Medication cost?                    Transportation? Family to assist              Support system or lack thereof? Family and personal care aides                     Living arrangements? Pt resides in the home with his sister              Self-care/ADLs/Cognition? Alert and oriented. Current Advanced Directive/Advance Care Plan:                            Plan for utilizing home health:                          Likelihood of readmission: Red/High                 Transition of Care Plan:     Home with family               Transition of Care (MARY) Plan:  Home    MARY Transportation:       How is patient being transported at discharge? Pt's niece will assist with transportation     When? 08/08/2018 @ 441 0134     Is transport scheduled? Follow-up appointment and transportation:     PCP? Marshall Robles MD     Specialist?     Time/Date? Who is transporting to the follow-up appointment? Is transport for follow up appointment scheduled? Communication plan (with patient/family): Who is being called? Patient or Next of Kin? Responsible party? Pt or sister Jasmine Olivera       What number(s) is to be used? [phone]      What service provider is calling for [facility] services? When are they calling? Readmission Risk? (Green/Low; Yellow/Moderate; Red/High): Red/High    Care Management Interventions  PCP Verified by CM: Yes  Physical Therapy Consult: Yes  Occupational Therapy Consult: Yes  Current Support Network: Relative's Home, Family Lives Nearby  Confirm Follow Up Transport: Family  Plan discussed with Pt/Family/Caregiver:  Yes

## 2018-08-08 NOTE — ROUTINE PROCESS
1932 Assumed care of patient from off going nurse. Patient resting in bed. No distress noted. Call bell within reach, siderails up x 3, bed in lowest position, and patient instructed to use call bell for assistance. Will continue to monitor. Bed alarm on.    0720 Bedside and Verbal shift change report given to [address] (oncoming nurse) by Yun Moran RN(offgoing nurse). Report included the following information Kardex, Intake/Output, MAR, Recent Results and Cardiac Rhythm SR tele box#[id number].

## 2018-08-08 NOTE — PROGRESS NOTES
Riaz Manjarrez M.D. FACS  PROGRESS NOTE    Name: Son Bell MRN: [id number]   : 1945 [facility]   Date: 2018 Admission Date: 2018  5:41 PM     Hospital Day: 7     Subjective:  Patient without complaints. Positive flatus and bowel movements. Tolerating regular food. Objective:  Vitals:    18 1941 18 2310 18 0407 18 0500   BP: 179/81 139/82 153/86    Pulse: 76 63 61    Resp:     Temp: 97.5 °F (36.4 °C) 97.6 °F (36.4 °C) 97.6 °F (36.4 °C)    SpO2: 97% 98% 99%    Weight:    93.3 kg (205 lb 9.6 oz)   Height:           Date 18 0700 - 18 0659 18 0700 - 18 0659   Shift 2246-2794 0817-3702 24 Hour Total 9299-5725 2192-3764 24 Hour Total   I  N  T  A  K  E   P.O. 240 240 480         P. O. 240 240 480       Shift Total  (mL/kg) 240  (2.6) 240  (2.6) 480  (5.1)      O  U  T  P  U  T   Urine  (mL/kg/hr) 375  (0.3) 300  (0.3) 675  (0.3)         Urine Voided 375 300 675         Urine Occurrence(s) 1 x  1 x       Stool            Stool Occurrence(s) 1 x  1 x       Shift Total  (mL/kg) 375  (4.1) 300  (3.2) 675  (7.2)      NET -135 -60 -195      Weight (kg) 91.7 93.3 93.3 93.3 93.3 93.3         Physical Exam:    General: Awake and alert, oriented x4, no apparent distress   Abdomen: abdomen is soft without tenderness. Incision(s) are C/D/I.   No masses, organomegaly or guarding    Labs:  Recent Results (from the past 24 hour(s))   CBC WITH AUTOMATED DIFF    Collection Time: 18  6:14 AM   Result Value Ref Range    WBC 5.6 4.6 - 13.2 K/uL    RBC 3.97 (L) 4.70 - 5.50 M/uL    HGB 10.3 (L) 13.0 - 16.0 g/dL    HCT 31.2 (L) 36.0 - 48.0 %    MCV 78.6 74.0 - 97.0 FL    MCH 25.9 24.0 - 34.0 PG    MCHC 33.0 31.0 - 37.0 g/dL    RDW 14.5 11.6 - 14.5 %    PLATELET 074 607 - 050 K/uL    MPV 10.2 9.2 - 11.8 FL    NEUTROPHILS 63 40 - 73 %    LYMPHOCYTES 24 21 - 52 %    MONOCYTES 11 (H) 3 - 10 %    EOSINOPHILS 2 0 - 5 %    BASOPHILS 0 0 - 2 %    ABS. NEUTROPHILS 3.5 1.8 - 8.0 K/UL    ABS. LYMPHOCYTES 1.4 0.9 - 3.6 K/UL    ABS. MONOCYTES 0.6 0.05 - 1.2 K/UL    ABS. EOSINOPHILS 0.1 0.0 - 0.4 K/UL    ABS.  BASOPHILS 0.0 0.0 - 0.1 K/UL    DF AUTOMATED     METABOLIC PANEL, BASIC    Collection Time: 08/08/18  6:14 AM   Result Value Ref Range    Sodium 141 136 - 145 mmol/L    Potassium 3.3 (L) 3.5 - 5.5 mmol/L    Chloride 108 100 - 108 mmol/L    CO2 25 21 - 32 mmol/L    Anion gap 8 3.0 - 18 mmol/L    Glucose 89 74 - 99 mg/dL    BUN 15 7.0 - 18 MG/DL    Creatinine 1.20 0.6 - 1.3 MG/DL    BUN/Creatinine ratio 13 12 - 20      GFR est AA >60 >60 ml/min/1.73m2    GFR est non-AA 59 (L) >60 ml/min/1.73m2    Calcium 8.8 8.5 - 10.1 MG/DL     All Micro Results     Procedure Component Value Units Date/Time    CULTURE, BLOOD [[id number]] Collected:  08/04/18 2207    Order Status:  Completed Specimen:  Blood from Blood Updated:  08/08/18 0729     Special Requests: NO SPECIAL REQUESTS        Culture result: NO GROWTH 4 DAYS       CULTURE, BLOOD [[id number]] Collected:  08/04/18 2200    Order Status:  Completed Specimen:  Blood from Blood Updated:  08/08/18 0729     Special Requests: NO SPECIAL REQUESTS        Culture result: NO GROWTH 4 DAYS             Current Medications:  Current Facility-Administered Medications   Medication Dose Route Frequency Provider Last Rate Last Dose    amLODIPine (NORVASC) tablet 2.5 mg  2.5 mg Oral DAILY Aubrey Witt DO   2.5 mg at 08/07/18 1020    heparin (porcine) injection 5,000 Units  5,000 Units SubCUTAneous Q8H Kristin Cedeno DO   5,000 Units at 08/08/18 0319    ondansetron (ZOFRAN) injection 8 mg  8 mg IntraVENous Q8H PRN [facility], DO   8 mg at 08/05/18 0754    morphine injection 2 mg  2 mg IntraVENous Q3H PRN [facility], DO   2 mg at 08/05/18 0754    hydrALAZINE (APRESOLINE) 20 mg/mL injection 10 mg  10 mg IntraVENous Q6H PRN Aubrey Witt DO   10 mg at 08/05/18 0057    tamsulosin (FLOMAX) capsule 0.4 mg  0.4 mg Oral DAILY Chino Kang MD   0.4 mg at 08/07/18 1020    sodium chloride (NS) flush 5-10 mL  5-10 mL IntraVENous PRN Ly Machuca MD           Chart and notes reviewed. Data reviewed. I have evaluated and examined the patient. IMPRESSION:   · Patient doing well with resolution of high-grade partial small bowel obstruction.       PLAN:/DISCUSION:   · Discharge planning  · Continue present care  · We will sign off for now please call back if any surgical issues arise        Chino Kang MD

## 2018-08-08 NOTE — DISCHARGE INSTRUCTIONS
Patient Discharge Instructions    Malathi Ruano / [id number] : 1945    Admitted 2018 Discharged: 2018     · It is important that you take the medication exactly as they are prescribed. · Keep your medication in the bottles provided by the pharmacist and keep a list of the medication names, dosages, and times to be taken with you at all times. · Do not take other medications without consulting your doctor. What to do at Home    Take your Amlodipine. Recommended Diet: Regular Diet    Recommended Activity: Activity as tolerated    If you experience any of the following symptoms Fever, Chills and Abdominal Pain, please follow up with Primary Care Clinic or Emergency Department. Signed By: Troy Briggs DO     2018         Bowel Blockage (Intestinal Obstruction): Care Instructions  Your Care Instructions  A bowel blockage, also called an intestinal obstruction, can prevent gas, fluids, or solids from moving through the intestines normally. It can cause constipation and, rarely, diarrhea. You may have pain, nausea, vomiting, and cramping. Most of the time, complete blockages require a stay in the hospital and possibly surgery. But if your bowel is only partly blocked, your doctor may tell you to wait until it clears on its own and you are able to pass gas and stool. If so, there are things you can do at home to help make you feel better. If you have had surgery for a bowel blockage, there are things you can do at home to make sure you heal well. You can also make some changes to keep your bowel from becoming blocked again. Follow-up care is a key part of your treatment and safety. Be sure to make and go to all appointments, and call your doctor if you are having problems. It's also a good idea to know your test results and keep a list of the medicines you take. How can you care for yourself at home?   If your doctor has told you to wait at home for a blockage to clear on its own:  · Follow your doctor's instructions. These may include eating a liquid diet to avoid complete blockage. · Be safe with medicines. Take your medicines exactly as prescribed. Call your doctor if you think you are having a problem with your medicine. · Put a heating pad set on low on your belly to relieve mild cramps and pain. To prevent another blockage  · Try to eat smaller amounts of food more often. For example, have 5 or 6 small meals throughout the day instead of 2 or 3 large meals. · Chew your food very well. Try to chew each bite about 20 times or until it is liquid. · Avoid high-fiber foods and raw fruits and vegetables with skins, husks, strings, or seeds. These can form a ball of undigested material that can cause a blockage if a part of your bowel is scarred or narrowed. · Check with your doctor before you eat whole-grain products or use a fiber supplement such as Citrucel or Metamucil. · To help you have regular bowel movements, eat at regular times, do not strain during a bowel movement, and drink at least 8 to 10 glasses of water each day. If you have kidney, heart, or liver disease and have to limit fluids, talk with your doctor or before you increase the amount of fluids you drink. · Drink high-calorie liquid formulas if your doctor says to. Severe symptoms may make it hard for your body to take in vitamins and minerals. · Get regular exercise. It helps you digest your food better. Get at least 30 minutes of physical activity on most days of the week. Walking is a good choice. When should you call for help? Call your doctor now or seek immediate medical care if:    · You have a fever.     · You are vomiting.     · You have new or worse belly pain.     · You cannot pass stools or gas.    Watch closely for changes in your health, and be sure to contact your doctor if you have any problems. Where can you learn more?   Go to http://aye-mari.info/. Enter A236 in the search box to learn more about \"Bowel Blockage (Intestinal Obstruction): Care Instructions. \"  Current as of: May 12, 2017  Content Version: 11.7  © 7272-5961 [employer], [employer]. Care instructions adapted under license by [employer] (which disclaims liability or warranty for this information). If you have questions about a medical condition or this instruction, always ask your healthcare professional. [address] any warranty or liability for your use of this information.

## 2018-08-08 NOTE — ADT AUTH CERT NOTES
[place]    Fax History    8/3/2018 10:24 AM Conversion Record  Type: application/pdf  G3 to TIFF attempt 1: Success (36ms)  GhostScript TIFF attempt 1: Success (541ms)  ([id number]:WORKSRV2)    8/3/2018 10:24 AM Conversion Record  Type: application/pdf  G3 to TIFF attempt 1: Success (203ms)  GhostScript TIFF attempt 1: Success (1403ms)  ([id number]:WORKSRV2)    8/3/2018 10:25 AM Transmission Record  Sent to: IP Notif - ID# [id number], [employer]  Phone: [phone]  Billing information: 'RANDA SCHULZ ', ''  Remote ID: [id number]  Unique ID: \"[id number]\"  Elapsed time: 2 minutes, 34 seconds. Used channel 2. No ANI data. No AOC data.   Resulting status code (0): Success  Job: [id number]  Pages sent: 1 - 6  Delegate ID: \"[email or web address]\"    8/8/2018 12:00 PM View Record  Viewed by: Patrica Mercado

## 2018-08-08 NOTE — PROGRESS NOTES
Intern Progress Note  [facility]       Patient: Suellen Lisa MRN: [id number]  CSN: [id number]    YOB: 1945  Age: 67 y.o. Sex: male    DOA: 8/2/2018 LOS:  LOS: 6 days                    Subjective:     No acute events overnight. Pt feeling much better than yesterday. no bowel movements, but typically every other day. Spoke with sister who said he's back to his normal self. Has home nurse 8hrs/day. ROS Denies: CP, SOB, HA, Vision changes, MSK Pain, Abd pain    Objective:      Patient Vitals for the past 24 hrs:   Temp Pulse Resp BP SpO2   08/08/18 0807 97.5 °F (36.4 °C) (!) 56 19 166/84 98 %   08/08/18 0407 97.6 °F (36.4 °C) 61 18 153/86 99 %   08/07/18 2310 97.6 °F (36.4 °C) 63 18 139/82 98 %   08/07/18 1941 97.5 °F (36.4 °C) 76 18 179/81 97 %   08/07/18 1638 98 °F (36.7 °C) 77 18 157/70 99 %   08/07/18 1130 98.1 °F (36.7 °C) 69 18 154/60 99 %   08/07/18 1018 - 67 19 149/79 97 %         Intake/Output Summary (Last 24 hours) at 08/08/18 0912  Last data filed at 08/08/18 0500   Gross per 24 hour   Intake              480 ml   Output             1175 ml   Net             -695 ml       Physical Exam:   General:  NAD, cooperative  HEENT:  sclera anicteric. EOMI. Moist mucous membranes. No other gross abnormalities present. CV:  Occasional dropped beat , no murmurs. No visible pulsations or thrills. RESP:  Unlabored breathing. Lungs clear to auscultation. no wheeze, rales, or rhonchi. Equal expansion bilaterally. ABD:  Soft, non-ttp, improved distention. No hepatosplenomegaly. No suprapubic tenderness. No CVA tenderness. RECTAL:  Pt refused. Neuro: A+Ox3. Expressive aphasia (chronic)  Ext:  No edema. 2+ radial and dp pulses bilaterally. Skin:  No rashes, lesions, or ulcers. Good turgor. Lab/Data Reviewed: All lab results for the last 24 hours reviewed.      Scheduled Medications Reviewed:  Current Facility-Administered Medications   Medication Dose Route Frequency    amLODIPine (NORVASC) tablet 2.5 mg  2.5 mg Oral DAILY    heparin (porcine) injection 5,000 Units  5,000 Units SubCUTAneous Q8H    tamsulosin (FLOMAX) capsule 0.4 mg  0.4 mg Oral DAILY     Assessment/Plan   67 y. o. male with PMH significant for CVA, HTN, HLD, CKD III, chronic diarrhea, BPH/OAB now admitted with abdominal pain and nausea.       Partial SBO:  Abdominal CT (Aug. 3) showed dilated loops of small bowel in the midabdomen with haziness in the mesentery, possible partial small bowel obstruction, possible closed loop obstruction. recent open abd surgery April. WBC 5.6; UA with 300 protein, small blood, neg bacteria or leuks. Recent upright/flat XR shows no SBO, possible ileus. Surgery: \"Suspect urosepsis but UA unimpressive\" Clinically improving. Tolerated clear liquids yesterday, 2BM yesterday, surgery increased to Post-surgical diet today. - Regular Diet  - case management  - Daily CBC, CMP  - Surgery following  - continue home meds    DARINEL on CKD III. Cr. 1.2.  Follows with Dr. Warden Leonard as an outpatient. Baseline Cr ~1.27-1. 3.  - Monitor urine output  - d/c NS    Hypokalemia - 3.3 unclear etiology. No meds contributing  - replete     Hypertension: 160s/80s. Catapress removed yesterday.   - PRN hydralazine for BPs over 180/110   - amlodipine 2.5mg qd    H/o CVA, HTN, HLD, Vitamin D/B12 deficiency CVA in 2015 with residual mild L sided weakness and some aphasia  - Fall precautions, aspiration precautions    Recurrent cystitis/OAB/BPH  - Restarted home Flomax    Tobacco use current tobacco user  - Nicotine replacement not ordered to possible vascular constriction     Diet: NPO  DVT Prophylaxis: SCDs, Heparin (500 U once; restart tomorrow if he does not go to surgery)   Code Status: Full  Point of Contact: Ana Laura Williamson [phone]      Disposition and anticipated LOS: out tomorrow       Clayton Esquivel, [address] [facility] PGY-1  08/08/18 7:18 AM

## 2018-08-10 LAB
BACTERIA SPEC CULT: NORMAL
BACTERIA SPEC CULT: NORMAL
SERVICE CMNT-IMP: NORMAL
SERVICE CMNT-IMP: NORMAL

## 2018-09-12 ENCOUNTER — HOSPITAL ENCOUNTER (OUTPATIENT)
Dept: LAB | Age: 73
Discharge: HOME | End: 2018-09-12

## 2018-09-12 LAB — XX-LABCORP SPECIMEN COL,LCBCF: NORMAL

## 2018-09-12 PROCEDURE — 99001 SPECIMEN HANDLING PT-LAB: CPT | Performed by: INTERNAL MEDICINE

## 2018-09-14 NOTE — PROGRESS NOTES
[facility] - Nickie Burks  [address] [facility]  [phone] fax    Physical Therapy Discharge Summary    Patient name: Rafael Knight of Care:  18   Referral source: Ji Medeiros MD : 1945   Medical/Treatment Diagnosis: CVA (cerebral vascular accident) [facility]) [I63.9] Onset Date:      Prior Hospitalization: see medical history Provider#: [id number]   Medications: Verified on Patient Summary List    Comorbidities: tobacco use, HTN, kidney disease, depression   Prior Level of Function: Independent with ambulation, ADLs before onset in . Visits from Start of Care: 3    Missed Visits: 3    Reporting Period : 18 to 18    Summary of Care:  Goal: Pt will report compliance and independence to HEP to help the pt manage their pain and symptoms  Status at last note/certification: Not assessed   Current Status: not met     Goal:Pt will increase FOTO score to 62 points to improve ability to perform ADLs. Status at last note/certification:Not assessed secondary to elevated BP  Current Status: not met     Goal:Pt will increase MMT left hip flex to 4/5, B hip IR/ER to 4/5 to improve ability to ambulate with more ease. Status at last note/certification:Not assessed secondary to elevated BP  Current Status: not met     Goal:Pt will improve TUG time to <30 seconds with LBQC to improve the pt's fall risk. Status at last note/certification:Not assessed secondary to elevated BP  Current Status: not met     Goal:Pt will perform EC rhomberg on foam for 30 seconds with no LOB and mild to no instability to improve stability with gait. Status at last note/certification:Not assessed secondary to elevated BP  Current Status: not met     Pt. Did not return to PT after last progress note, so he will be D/C at this time.  Per last progress note \"Pt has demonstrated limited improvements with therapy interventions secondary to pt's elevated BP levels, which has limited his participation in therapy. Did not perform therapy today and did not check goals secondary to pt's elevated BP. Pt reports that he started his BP medication last week and has been taking it. Educated pt to take the BP as prescribed by the MD. We will plan on placing pt on hold for 2 weeks secondary to elevated BP and will plan on following up with the pt regarding his BP at that time. \"      ASSESSMENT/RECOMMENDATIONS:  [x]Discontinue therapy: []Patient has reached or is progressing toward set goals      []Patient is non-compliant or has abdicated      []Due to lack of appreciable progress towards set goals      X: over 30 days since last visit    Kelly Tsai, PT 9/14/2018 3:21 PM

## 2019-01-14 ENCOUNTER — HOSPITAL ENCOUNTER (OUTPATIENT)
Dept: LAB | Age: 74
Discharge: HOME | End: 2019-01-14

## 2019-01-14 LAB — XX-LABCORP SPECIMEN COL,LCBCF: NORMAL

## 2019-01-14 PROCEDURE — 99001 SPECIMEN HANDLING PT-LAB: CPT

## 2019-02-04 ENCOUNTER — HOSPITAL ENCOUNTER (OUTPATIENT)
Dept: LAB | Age: 74
Discharge: HOME | End: 2019-02-04

## 2019-02-04 ENCOUNTER — HOSPITAL ENCOUNTER (OUTPATIENT)
Dept: PHYSICAL THERAPY | Age: 74
Discharge: HOME | End: 2019-02-04
Payer: MEDICARE

## 2019-02-04 LAB — XX-LABCORP SPECIMEN COL,LCBCF: NORMAL

## 2019-02-04 PROCEDURE — 97110 THERAPEUTIC EXERCISES: CPT

## 2019-02-04 PROCEDURE — 99001 SPECIMEN HANDLING PT-LAB: CPT

## 2019-02-04 PROCEDURE — 97116 GAIT TRAINING THERAPY: CPT

## 2019-02-04 PROCEDURE — 97161 PT EVAL LOW COMPLEX 20 MIN: CPT

## 2019-02-04 NOTE — PROGRESS NOTES
[facility] - [facility] [employer]  JANI  [address] [place] [address]  [phone] fax    Plan of Care/ Statement of Necessity for Physical Therapy Services    Patient name: Adia Estes Start of Care: 2019   Referral source: Marleny Logan MD : 1945    Medical Diagnosis: Left-sided weakness [R53.1]  CVA (cerebral vascular accident) ([facility] [address] [I63.9]  Gait abnormality [R26.9]  Impaired mobility [Z74.09]  Payor: VA MEDICARE / Plan: VA MEDICARE PART A & B / Product Type: Medicare /  Onset Date:2019    Treatment Diagnosis: gait impairment; left hemiparesis   Prior Hospitalization: see medical history Provider#: [id number]   Medications: Verified on Patient summary List    Comorbidities: high blood pressure, CVA()   Prior Level of Function: ambulates with LBQC, no recent falls, has HHA/nuse from 9 AM to 3 PM 5 days per week, needs assistance for dressing/bathing, lives with family in single story home      The Plan of Care and following information is based on the information from the initial evaluation. Assessment/ key information: Patient is a 75year-old male with a history of left hemiparesis who presents with impaired gait, decreased endurance, decreased left LE strength, and impaired balance. His goal is to improve his walking ability; currently he is using LBQC and ambulates with narrow EDGARD, short bilateral step length, fair heel toe pattern, decreased stance time on left LE, flexed posture, and slow gait speed. He also presents with decreased bilateral hip strength grossly 3/5 abduction/IR/ER and decreased bilateral UE shoulder flexion strength. Patient scored a 13/24 on the Dynamic Gait Index, which indicates increase falls risk.  Patient would benefit from skilled PT to address impairments listed above in order to improve safety and ease of community ambulation.       Evaluation Complexity History MEDIUM  Complexity : 1-2 comorbidities / personal factors will impact the outcome/ POC ; Examination MEDIUM Complexity : 3 Standardized tests and measures addressing body structure, function, activity limitation and / or participation in recreation  ;Presentation LOW Complexity : Stable, uncomplicated  ;Clinical Decision Making MEDIUM Complexity : FOTO score of 26-74  Overall Complexity Rating: LOW   Problem List: decrease strength, impaired gait/ balance, decrease ADL/ functional abilitiies and decrease activity tolerance   Treatment Plan may include any combination of the following: Therapeutic exercise, Therapeutic activities, Neuromuscular re-education, Physical agent/modality, Gait/balance training, Manual therapy, Aquatic therapy, Patient education, Self Care training, Functional mobility training, Home safety training and Stair training  Patient / Family readiness to learn indicated by: trying to perform skills  Persons(s) to be included in education: patient (P) and family support person (FSP);list [place] HHA  Barriers to Learning/Limitations: none  Patient Goal (s): walk better  Patient Self Reported Health Status: fair  Rehabilitation Potential: good    Short Term Goals: To be accomplished in 1 weeks:  1. Patient will be compliant with HEP to improve strength and balance to improve ease of ADLs. Status at last note/certification: initiated and reviewed    Long Term Goals: To be accomplished in 5 weeks:  1. Patient will improve bilateral hip strength to at least 4/5 to normalize gait pattern. Status at last note/certification: grossly 3/4  2. Patient will ambulate 350 ft with use of LRAQ with minimal to no gait deviations to restore community mobility. Status at last note/certification:   3. Patient will improve Dynamic Gait Index by 3 pts to show improved safety with community ambulation. Status at last note/certification:  pts 40/59  4. Patient will improve FOTO core to 55 pts to showe improved function and QOL.   Status at last note/certification:  51 pts  5. Patient will reports no difficulty walking 1 block for improved negotiation of home. Status at last note/certificaiton: limited a lot        Frequency / Duration: Patient to be seen 2 times per week for 5 weeks. Patient/ Caregiver education and instruction: Diagnosis, prognosis, exercises   [x]  Plan of care has been reviewed with PTA    Certification Period: 2/4/2019 to 3/4/2019  Alee Rodriguez, PT 2/4/2019 1:04 PM    ________________________________________________________________________    I certify that the above Therapy Services are being furnished while the patient is under my care. I agree with the treatment plan and certify that this therapy is necessary.     Physician's Signature:____________Date:_________TIME:________    ** Signature, Date and Time must be completed for valid certification **    Please sign and return to [facility] - Phillip Galeas  [address] [place] [address]  [phone] fax

## 2019-02-04 NOTE — PROGRESS NOTES
PT DAILY TREATMENT NOTE/NEURO EVAL 10-18    Patient Name: Shauna Lopez  Date:2019  : 1945  [x]  Patient  Verified  Payor: VA MEDICARE / Plan: VA MEDICARE PART A & B / Product Type: Medicare /    In time:1205  Out time:1245  Total Treatment Time (min): 40  Visit #: 1 of 10    Medicare/BCBS Only   Total Timed Codes (min):  16 1:1 Treatment Time:  16     Treatment Area: Left-sided weakness [R53.1]  CVA (cerebral vascular accident) ([facility] [address] [I63.9]  Gait abnormality [R26.9]  Impaired mobility [Z74.09]  SUBJECTIVE  Pain Level (0-10 scale): 0  []constant []intermittent []improving []worsening []no change since onset    Any medication changes, allergies to medications, adverse drug reactions, diagnosis change, or new procedure performed?: [x] No    [] Yes (see summary sheet for update)  Subjective functional status/changes:     PLOF:   Limitations to PLOF:   Mechanism of Injury:   Current symptoms/Complaints: trying to walk better, 2015 stroke, no history of falls, ambulates with LBQC cane   Previous Treatment/Compliance:   PMHx/Surgical Hx:   Work Hx:   Living Situation: lives with family, 1 level home no steps to enter  Pt Goals: \" walk better\"  Barriers: []pain []financial []time []transportation []other  Motivation:   Substance use: []Alcohol []Tobacco []other:   FABQ Score: []low []elevate  Cognition: A & O x  3   Other:   OBJECTIVE/EXAMINATION  Domestic Life:   Activity/Recreational Limitations:   Mobility:   Self Care: getting dressed, bathing, 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      8 min Gait Trainin feet with LBQCdevice on level surfaces with close supervision level of assist   Rationale: narrow EDGARD, short bilateral step length, fair heel toe pattern, decreased stance time on left LE, flexed posture cues provided to normalize gait pattern          With   [] TE   [] TA   [] neuro   [] other: Patient Education: [x] Review HEP    [] Progressed/Changed HEP based on:   [] positioning   [] body mechanics   [] transfers   [] heat/ice application    [] other:      Other Objective/Functional Measures:     Physical Therapy Evaluation - Neurologic    Posture: [x] Poor    [] Fair    [] Good    Describe:   Flexed posture, forward head,     Gait: [] Normal    [x] Abnormal    Device:      Describe: narrow EDGARD, short bilateral step length, fair heel toe pattern, decreased stance time on left LE, flexed posture, slow gait speed    ROM:                             AROM    PROM   Shoulder Left Right Left Right   Flex       Ext       ABD       ER       IR                AROM    PROM   Knee Left Right Left Right   Ext       Flex                 AROM                           PROM  Hip Left Right Left Right   Flex       Ext       ABD       ER       IR                                                AROM      PROM   Ankle Left Right Left Right   Ext       Flex         Strength (MMT):  Shoulder L (1-5) R (1-5)   Shoulder Flexion 3 3   Shoulder Ext     Shoulder ABD 3 3   Shoulder ADD     Shoulder IR     Shoulder ER                                               Hip L (1-5) R (1-5)   Hip Flexion 4+ 5   Hip Ext     Hip ABD 3 3   Hip ADD     Hip ER 3 3   Hip IR 3 3     Knee L (1-5) R (1-5)   Knee Flexion     Knee Extension 5 5   Ankle PF     Ankle DF 4+ 4+   Other       Tone:    Motor Control:    Sensation:    Reflexes: [] Not Tested   Left Right   Biceps (C5)     Brachioradiais (C6)     Triceps (C7)     Knee Jerk (L4)     Ankle Jerk (SI)       Balance/ Equilibrium:              Left            Right  Tracks Across Midline      Reaches Across Midline           Sitting Balance: Static:  [x] Good    [] Fair    [] Poor     Dynamic:   [] Good [] Fair    [] Poor        Standing Balance: Static:   [x] Good    [x] Fair    [] Poor     Dynamic:   [] Good    [x] Fair    [] Poor        Protective Extension:  [] Present    [] Delayed    [] Absent        Single Leg Stance:         Eyes Open  Eyes Closed   L 4 sec L    R 15 sec R       weakness left hip abduction weakness  Functional Mobility      Bed Mobility:      Scooting:        Rolling:       Sit-Supine:      Transfers:       Sit-Stand:       Floor-Stand:       Gait:       Tandem:       Backwards:       Braiding:      Elevations:       Curbs:      Ramps:      Stairs: CGA with step to gait pattern using LBQC and rail, cue provided for sequencing     Behavior: [x] Cooperative    [] Impulsive    [] Agitated    [] Perseverative    [] Confused   Oriented x:    Cognition: [x] One Step Commands   [] Multiple Commands   [] Displays Neglect [] R  [] L    Other:       Impaired Judgement: [x] Y    [] N      Impaired Vision:  [] Y    [] N      Safety Awareness Deficits  [x] Y    [] N      Impaired Hearing  [] Y    [] N      Able to Express Needs [] Y    [] N    Optional Tests:       Dynamic Gait Index (24pt scale): 13/24       Functional Gait Assessment (30pt scale):       Dowd Balance Scale (56pt scale): Other test /comments:  Impaired coordination finger coordination      Pain Level (0-10 scale) post treatment: 0    ASSESSMENT/Changes in Function:  Patient reports fatigue following ambulation/stair negotiation    Patient will continue to benefit from skilled PT services to modify and progress therapeutic interventions, address functional mobility deficits, address strength deficits, analyze and cue movement patterns, assess and modify postural abnormalities, address imbalance/dizziness and instruct in home and community integration to attain remaining goals.      [x]  See Plan of Care  []  See progress note/recertification  []  See Discharge Summary         Progress towards goals / Updated goals:  See POC    PLAN  [] Upgrade activities as tolerated     [x]  Continue plan of care  []  Update interventions per flow sheet       []  Discharge due to:_  []  Other:_      Maicol Singletary, PT 2/4/2019  12:11 PM

## 2019-02-13 ENCOUNTER — HOSPITAL ENCOUNTER (OUTPATIENT)
Dept: PHYSICAL THERAPY | Age: 74
Discharge: HOME | End: 2019-02-13
Payer: MEDICARE

## 2019-02-13 PROCEDURE — 97116 GAIT TRAINING THERAPY: CPT

## 2019-02-13 PROCEDURE — 97112 NEUROMUSCULAR REEDUCATION: CPT

## 2019-02-13 NOTE — PROGRESS NOTES
PT DAILY TREATMENT NOTE 10-18    Patient Name: Shauna Lopez  Date:2019  : 1945  [x]  Patient  Verified  Payor: [address] / Plan: [address] / Product Type: Managed Care Medicare /    In time: 03:33  Out time: 04:08  Total Treatment Time (min): 35  Visit #: 2 of 10    Medicare/BCBS Only   Total Timed Codes (min):  31 1:1 Treatment Time:  31       Treatment Area: Gait instability [R26.81]  CVA (cerebral vascular accident) ([facility] [address] [I63.9]    SUBJECTIVE  Pain Level (0-10 scale): 0/10  Any medication changes, allergies to medications, adverse drug reactions, diagnosis change, or new procedure performed?: [x] No    [] Yes (see summary sheet for update)  Subjective functional status/changes:   [] No changes reported  \"Feeling ok today; no pain. \"    OBJECTIVE    21 min Neuromuscular Re-education:  Wide EDGARD + romberg on floor + on foam. SLS with conetaps. Rationale: increase strength, improve coordination, improve balance and increase proprioception  to improve the patients ability to return to PLOF. 10 min Gait Trainin feet with no device in // bars & CGA. 30 feet on level surfaces with large base quad cane & supervision level of assist   Rationale: increase independence with home & community ambulation with reduced risk for falls. With   [] TE   [x] TA   [] neuro   [] other: Patient Education: [x] Review HEP    [] Progressed/Changed HEP based on:   [] positioning   [] body mechanics   [] transfers   [] heat/ice application    [x] other: assistive devise measurement adjustment. Other Objective/Functional Measures:   - pt able to complete wide EDGARD on floor & on foam with EO + EC with 0 UE support for 30sec 0 LOB.   - romberg EC on floor at [place] on foam: unable; LOB at 2sec- last 3 step ups to 6\" step able to complete with 0 UE support     Pain Level (0-10 scale) post treatment: 0/10    ASSESSMENT/Changes in Function:   Pt continues to demo decreased left sided weakness secondary to CVA; moderate right hip flexor weakness as evidenced by right lateral trunk rotation to start momentum for RLE step ups. Weakness addressed by performing slow marches in // bars; holding right hip flexion at end range for 3 seconds; controlled ascent & descent. Pt progressed well through balance exercises; will continue to challenge vestibular system with progression of exercises with eyes closed. SPT adjusted height of pt large base quad cane to ulnar styloid & performed gait training for 20' at new height; pt reports it \"feeling better. \" Patient will continue to benefit from skilled PT services to modify and progress therapeutic interventions, address functional mobility deficits, address ROM deficits, address strength deficits, analyze and address soft tissue restrictions, analyze and cue movement patterns, analyze and modify body mechanics/ergonomics, assess and modify postural abnormalities and address imbalance/dizziness to attain remaining goals. Progress towards goals / Updated goals:  Short Term Goals: To be accomplished in 1 weeks:  1. Patient will be compliant with HEP to improve strength and balance to improve ease of ADLs. Status at last note/certification: initiated and reviewed     Long Term Goals: To be accomplished in 5 weeks:  1. Patient will improve bilateral hip strength to at least 4/5 to normalize gait pattern. Status at last note/certification: grossly 3/4  2. Patient will ambulate 350 ft with use of LRAQ with minimal to no gait deviations to restore community mobility. Status at last note/certification:   3. Patient will improve Dynamic Gait Index by 3 pts to show improved safety with community ambulation. Status at last note/certification:  pts 18/47  4. Patient will improve FOTO core to 55 pts to show improved function and QOL. Status at last note/certification:  51 pts  5.  Patient will reports no difficulty walking 1 block for improved negotiation of home.   Status at last note/certificaiton: limited a lot    PLAN  [x]  Upgrade activities as tolerated     [x]  Continue plan of care  []  Update interventions per flow sheet       []  Discharge due to:_  []  Other:_      Дмитрий Buck, SPT 2/13/2019  10:52 AM    Future Appointments   Date Time Provider Clarice Freedman   2/13/2019  3:30 PM Kiara Retana, PT [id number] [facility]   2/15/2019 10:30 AM Fili Vizcaino, PT [id number] [facility]   2/19/2019 11:00 AM Fili Vizcaino, PT [id number] [facility]   2/22/2019 10:30 AM Kate Beckford, JOLENE [id number] [facility]   2/25/2019 12:00 PM Fili Vizcaino, PT [id number] [facility]   2/27/2019 11:30 AM Kate Beckford, PTA [id number] [facility]   3/4/2019 11:00 AM Kate Beckford, PTA [id number] [facility]   3/6/2019 11:00 AM Fili Vizcaino, PT [id number] [facility]

## 2019-02-15 ENCOUNTER — HOSPITAL ENCOUNTER (OUTPATIENT)
Dept: PHYSICAL THERAPY | Age: 74
Discharge: HOME | End: 2019-02-15
Payer: MEDICARE

## 2019-02-15 PROCEDURE — 97110 THERAPEUTIC EXERCISES: CPT

## 2019-02-15 PROCEDURE — 97112 NEUROMUSCULAR REEDUCATION: CPT

## 2019-02-15 NOTE — PROGRESS NOTES
PT DAILY TREATMENT NOTE 10-18    Patient Name: Rowdy Baker  Date:2/15/2019  : 1945  [x]  Patient  Verified  Payor: [address] / Plan: [address] / Product Type: Managed Care Medicare /    In time: 8763  Out time: 1105  Total Treatment Time (min):25  Visit #: 3 of 10    Medicare/BCBS Only   Total Timed Codes (min):  25 1:1 Treatment Time:  25       Treatment Area: Gait instability [R26.81]  CVA (cerebral vascular accident) ([facility] [address] [I63.9]    SUBJECTIVE  Pain Level (0-10 scale): 0/10  Any medication changes, allergies to medications, adverse drug reactions, diagnosis change, or new procedure performed?: [x] No    [] Yes (see summary sheet for update)  Subjective functional status/changes:   [] No changes reported  Pt's caregiver/HHA reports pt is caballero today. OBJECTIVE    25 min Neuromuscular Re-education:  Wide EDGARD + romberg on floor + on foam. STS without UE's, see flow sheet   Rationale: increase strength, improve coordination, improve balance and increase proprioception  to improve the patients ability to return to PLOF. With   [] TE   [] TA   [] neuro   [] other: Patient Education: [x] Review HEP    [] Progressed/Changed HEP based on:   [] positioning   [] body mechanics   [] transfers   [] heat/ice application    [] other:      Other Objective/Functional Measures:  Pt arrived 10 minutes late   Pt able to complete STS x10 without UE's    Pain Level (0-10 scale) post treatment: 0/10    ASSESSMENT/Changes in Function:  Continued Rx per POC. Patient demonstrates fair statis balance on compliant surface, stands with wide EDGARD and able to complete with eyes open/eyes closed. Patient demonstrates posterior weight shift when performing step ups with right LE leading, which improved with verbal cues. Will continue to improve strength and balance in order to improve ease and safety of community ambulation.       Patient will continue to benefit from skilled PT services to modify and progress therapeutic interventions, address functional mobility deficits, address ROM deficits, address strength deficits, analyze and address soft tissue restrictions, analyze and cue movement patterns, analyze and modify body mechanics/ergonomics, assess and modify postural abnormalities and address imbalance/dizziness to attain remaining goals. Progress towards goals / Updated goals:  Short Term Goals: To be accomplished in 1 weeks:  1. Patient will be compliant with HEP to improve strength and balance to improve ease of ADLs. Status at last note/certification: initiated and reviewed     Long Term Goals: To be accomplished in 5 weeks:  1. Patient will improve bilateral hip strength to at least 4/5 to normalize gait pattern. Status at last note/certification: grossly 3/4  2. Patient will ambulate 350 ft with use of LRAQ with minimal to no gait deviations to restore community mobility. Status at last note/certification:   3. Patient will improve Dynamic Gait Index by 3 pts to show improved safety with community ambulation. Status at last note/certification:  pts 70/42  4. Patient will improve FOTO core to 55 pts to show improved function and QOL. Status at last note/certification:  51 pts  5. Patient will reports no difficulty walking 1 block for improved negotiation of home.   Status at last note/certificaiton: limited a lot    PLAN  [x]  Upgrade activities as tolerated     [x]  Continue plan of care  []  Update interventions per flow sheet       []  Discharge due to:_  []  Other:_      Ehsan Pizano, PT, SPT 2/15/2019  10:52 AM    Future Appointments   Date Time Provider Clarice Freedman   2/19/2019 11:00 AM Lanny Gregorio, PT [id number] [facility]   2/22/2019 10:30 AM Sofia Causey PTA [id number] [facility]   2/25/2019 12:00 PM Lanny Gregorio, PT [id number] [facility]   2/27/2019 11:30 AM Sofia Causey PTA [id number] [facility]   3/4/2019 11:00 AM Sofia Causey PTA [id number] [facility]   3/6/2019 11:00 AM Taylor Dewitt, PT [id number] [facility]

## 2019-02-19 ENCOUNTER — HOSPITAL ENCOUNTER (OUTPATIENT)
Dept: PHYSICAL THERAPY | Age: 74
Discharge: HOME | End: 2019-02-19
Payer: MEDICARE

## 2019-02-19 PROCEDURE — 97110 THERAPEUTIC EXERCISES: CPT

## 2019-02-19 PROCEDURE — 97140 MANUAL THERAPY 1/> REGIONS: CPT

## 2019-02-19 NOTE — PROGRESS NOTES
PT DAILY TREATMENT NOTE 10-18    Patient Name: Spring Hurley  Date:2019  : 1945  [x]  Patient  Verified  Payor: [address] / Plan: [address] / Product Type: Managed Care Medicare /    In time: 1100 Out time: 9789  Total Treatment Time (min):38  Visit #: 4 of 10    Medicare/BCBS Only   Total Timed Codes (min):  38 1:1 Treatment Time:  38       Treatment Area: Gait instability [R26.81]  CVA (cerebral vascular accident) ([facility] [address] [I63.9]    SUBJECTIVE  Pain Level (0-10 scale): 0/10  Any medication changes, allergies to medications, adverse drug reactions, diagnosis change, or new procedure performed?: [x] No    [] Yes (see summary sheet for update)  Subjective functional status/changes:   [x] No changes reported  Pt report he is doing fine. OBJECTIVE    30 min Therapeutic exercises: see note   Rationale: increase strength, improve coordination, improve balance and increase proprioception  to improve the patients ability to return to PLOF. 8 minutes Gait training: ambulates 120 ft x2 with LBQC with close supervision; cues provided to improve left knee flexion during swing             With   [] TE   [] TA   [] neuro   [] other: Patient Education: [x] Review HEP    [] Progressed/Changed HEP based on:   [] positioning   [] body mechanics   [] transfers   [] heat/ice application    [] other:      Other Objective/Functional Measures:  Pt fatigues after ambulating 120 ft and decreased left knee flexion during swing, improves with cues    Pain Level (0-10 scale) post treatment: 0/10    ASSESSMENT/Changes in Function: Patient with fair motivation completes exercises without pain; cues provided to correct form and maintain upright posture. He presents with decreased endurance;ambulating 120ft with LBQC and decreased left knee flexion as pt fatigues.       Patient will continue to benefit from skilled PT services to modify and progress therapeutic interventions, address functional mobility deficits, address ROM deficits, address strength deficits, analyze and address soft tissue restrictions, analyze and cue movement patterns, analyze and modify body mechanics/ergonomics, assess and modify postural abnormalities and address imbalance/dizziness to attain remaining goals. Progress towards goals / Updated goals:  Short Term Goals: To be accomplished in 1 weeks:  1. Patient will be compliant with HEP to improve strength and balance to improve ease of ADLs. Status at last note/certification: initiated and reviewed  Partial compliance 2/19  Long Term Goals: To be accomplished in 5 weeks:  1. Patient will improve bilateral hip strength to at least 4/5 to normalize gait pattern. Status at last note/certification: grossly 3/4  2. Patient will ambulate 350 ft with use of LRAD with minimal to no gait deviations to restore community mobility. Status at last note/certification:   ambulates 120 ft x 2 with LBQC fatigue needed seated rest break between 2/19  3. Patient will improve Dynamic Gait Index by 3 pts to show improved safety with community ambulation. Status at last note/certification:  pts 59/45  4. Patient will improve FOTO core to 55 pts to show improved function and QOL. Status at last note/certification:  51 pts  5. Patient will reports no difficulty walking 1 block for improved negotiation of home.   Status at last note/certificaiton: limited a lot    PLAN  [x]  Upgrade activities as tolerated     [x]  Continue plan of care  []  Update interventions per flow sheet       []  Discharge due to:_  []  Other:_      Efren Alexander, PT, SPT 2/19/2019  10:52 AM    Future Appointments   Date Time Provider Clarice Freedman   2/22/2019 10:30 AM Jazmín Arguello PTA [id number] [facility]   2/25/2019 12:00 PM Zaida Desai, PT BAPHCCU [facility]   2/27/2019 11:30 AM Jazmín Arguello PTA [id number] [facility]   3/4/2019 11:00 AM JOLENE Webber [facility]   3/6/2019 11:00 AM Sonny Gray, PT MMCPTPB [facility]

## 2019-02-22 ENCOUNTER — HOSPITAL ENCOUNTER (OUTPATIENT)
Dept: PHYSICAL THERAPY | Age: 74
Discharge: HOME | End: 2019-02-22
Payer: MEDICARE

## 2019-02-22 PROCEDURE — 97110 THERAPEUTIC EXERCISES: CPT

## 2019-02-22 PROCEDURE — 97112 NEUROMUSCULAR REEDUCATION: CPT

## 2019-02-22 NOTE — PROGRESS NOTES
PT DAILY TREATMENT NOTE 10-18    Patient Name: Briana Harper  Date:2019  : 1945  [x]  Patient  Verified  Payor: [address] / Plan: [address] / Product Type: Managed Care Medicare /    In time:10:35  Out time:11:07  Total Treatment Time (min): 32  Visit #: 5 of 10    Medicare/BCBS Only   Total Timed Codes (min):  32 1:1 Treatment Time:  25       Treatment Area: Gait instability [R26.81]  CVA (cerebral vascular accident) ([facility] [address] [I63.9]    SUBJECTIVE  Pain Level (0-10 scale): 0/10  Any medication changes, allergies to medications, adverse drug reactions, diagnosis change, or new procedure performed?: [x] No    [] Yes (see summary sheet for update)  Subjective functional status/changes:   [] No changes reported  Pt reports he has not been doing his home exercises. OBJECTIVE    20 min Therapeutic Exercise:  [] See flow sheet :   Rationale: increase ROM and increase strength to improve the patients ability to perform ADL's independently. 12 min Neuromuscular Re-education:  []  See flow sheet :   Rationale: improve coordination, improve balance and increase proprioception  to improve the patients ability to perform ADL's safely. With   [x] TE   [] TA   [] neuro   [] other: Patient Education: [x] Review HEP    [] Progressed/Changed HEP based on:   [] positioning   [] body mechanics   [] transfers   [] heat/ice application    [] other:      Other Objective/Functional Measures: Performed TE's per flow sheet. Pain Level (0-10 scale) post treatment: 0/10    ASSESSMENT/Changes in Function: Pt progressed TE's evidenced by SLR, Abd, TB IR/ER. Pt did well with progression.      Patient will continue to benefit from skilled PT services to modify and progress therapeutic interventions, address functional mobility deficits, address ROM deficits, address strength deficits, analyze and address soft tissue restrictions and analyze and cue movement patterns to attain remaining goals. []  See Plan of Care  []  See progress note/recertification  []  See Discharge Summary         Progress towards goals / Updated goals:  Short Term Goals: To be accomplished in 1 weeks:  1. Patient will be compliant with HEP to improve strength and balance to improve ease of ADLs. Status at last note/certification: initiated and reviewed  Partial compliance 2/19  Long Term Goals: To be accomplished in 5 weeks:  1. Patient will improve bilateral hip strength to at least 4/5 to normalize gait pattern. Status at last note/certification: grossly 3/4  2. Patient will ambulate 350 ft with use of LRAD with minimal to no gait deviations to restore community mobility. Status at last note/certification:   ambulates 120 ft x 2 with LBQC fatigue needed seated rest break between 2/19  3. Patient will improve Dynamic Gait Index by 3 pts to show improved safety with community ambulation. Status at last note/certification:  pts 49/14  4. Patient will improve FOTO core to 55 pts to show improved function and QOL. Status at last note/certification:  09 pts  5. Patient will reports no difficulty walking 1 block for improved negotiation of home.   Status at last note/certificaiton: limited a lot        PLAN  []  Upgrade activities as tolerated     []  Continue plan of care  []  Update interventions per flow sheet       []  Discharge due to:_  []  Other:_      Iraida Champagne PTA 2/22/2019  10:52 AM    Future Appointments   Date Time Provider Clarice Freedman   2/25/2019 12:00 PM Liborio Gardner, PT CANQUAB [facility]   2/27/2019 11:30 AM Theresa Mendoza PTA MMCPTPB [facility]   3/4/2019 11:00 AM Theresa Mendoza PTA [id number] [facility]   3/6/2019 11:00 AM Liborio Gardner, PT MMCPTPB [facility]

## 2019-02-25 ENCOUNTER — HOSPITAL ENCOUNTER (OUTPATIENT)
Dept: PHYSICAL THERAPY | Age: 74
Discharge: HOME | End: 2019-02-25
Payer: MEDICARE

## 2019-02-25 PROCEDURE — 97110 THERAPEUTIC EXERCISES: CPT

## 2019-02-25 PROCEDURE — 97112 NEUROMUSCULAR REEDUCATION: CPT

## 2019-02-25 NOTE — PROGRESS NOTES
PT DAILY TREATMENT NOTE 10-18    Patient Name: Yvrose Duval  Date:2019  : 1945  [x]  Patient  Verified  Payor: [address] / Plan: [address] / Product Type: Managed Care Medicare /    In time:1200  Out time:1231  Total Treatment Time (min): 31  Visit #: 6 of 10    Medicare/BCBS Only   Total Timed Codes (min):  31 1:1 Treatment Time:  30       Treatment Area: Gait instability [R26.81]  CVA (cerebral vascular accident) ([facility] [address] [I63.9]    SUBJECTIVE  Pain Level (0-10 scale): 0/10  Any medication changes, allergies to medications, adverse drug reactions, diagnosis change, or new procedure performed?: [x] No    [] Yes (see summary sheet for update)  Subjective functional status/changes:   [] No changes reported  Pt reports he is doing fine. OBJECTIVE    20 min Therapeutic Exercise:  [x] See flow sheet : ambulates 163 ft with SBQC with cues provided to increase EDGARD and left foot clearance during swing   Rationale: increase ROM and increase strength to improve the patients ability to perform ADL's independently. 10 min Neuromuscular Re-education:  [x]  See flow sheet : low byron step over with 1 UE   Rationale: improve coordination, improve balance and increase proprioception  to improve the patients ability to perform ADL's safely.     With   [x] TE   [] TA   [] neuro   [] other: Patient Education: [x] Review HEP    [] Progressed/Changed HEP based on:   [] positioning   [] body mechanics   [] transfers   [] heat/ice application    [] other:      Other Objective/Functional Measures:   Progressing to 4# with standing marching, abduction/ extension   Ambulates for 163 ft with SBQC, as pt fatigues has decreased left foot clearance which improves with verbal cues to increase knee flexion during swing, narrow EDGARD, slow jazmyn  challenged with byron step over 1 UE, poor foot clearance, decreased neuromuscular control            Pain Level (0-10 scale) post treatment: 0/10    ASSESSMENT/Changes in Function: Pt ambulating 163 ft in clinic with [facility]; as pt fatigue he presents with decreased left foot clearance during swing and narrow EDGARD, which pt able to correct with verbal cues. Patient challenged with low byron step step over due to decreased neuromuscular control. Will continue to address LE stability and balance in order to improve gait pattern with community ambulation to decrease falls risk. Patient will continue to benefit from skilled PT services to modify and progress therapeutic interventions, address functional mobility deficits, address ROM deficits, address strength deficits, analyze and address soft tissue restrictions and analyze and cue movement patterns to attain remaining goals. []  See Plan of Care  []  See progress note/recertification  []  See Discharge Summary         Progress towards goals / Updated goals:  Short Term Goals: To be accomplished in 1 weeks:  1. Patient will be compliant with HEP to improve strength and balance to improve ease of ADLs. Status at last note/certification: initiated and reviewed  Partial compliance 2/19  Long Term Goals: To be accomplished in 5 weeks:  1. Patient will improve bilateral hip strength to at least 4/5 to normalize gait pattern. Status at last note/certification: grossly 3/4  2. Patient will ambulate 350 ft with use of LRAD with minimal to no gait deviations to restore community mobility. Status at last note/certification:   ambulates 120 ft x 2 with LBQC fatigue needed seated rest break between 2/19  3. Patient will improve Dynamic Gait Index by 3 pts to show improved safety with community ambulation. Status at last note/certification:  pts 91/14  4. Patient will improve FOTO core to 55 pts to show improved function and QOL. Status at last note/certification:  71 pts  5. Patient will reports no difficulty walking 1 block for improved negotiation of home.   Status at last note/certificaiton: limited a lot        PLAN  [x]  Upgrade activities as tolerated     [x]  Continue plan of care  []  Update interventions per flow sheet       []  Discharge due to:_  []  Other:_      Kirby Rivera, PT 2/25/2019  10:52 AM    Future Appointments   Date Time Provider Clarice Freedman   2/25/2019 12:00 PM Chanell Umaña, JUAN [id number] [facility]   2/27/2019 11:30 AM Honey Gilford, PTA MMCPTPB [facility]   3/4/2019 11:00 AM Honey Gilford, PTA [id number] [facility]   3/6/2019 11:00 AM Chanell Umaña PT MMCPTPB [facility]

## 2019-02-27 ENCOUNTER — HOSPITAL ENCOUNTER (OUTPATIENT)
Dept: PHYSICAL THERAPY | Age: 74
Discharge: HOME | End: 2019-02-27
Payer: MEDICARE

## 2019-02-27 PROCEDURE — 97110 THERAPEUTIC EXERCISES: CPT

## 2019-02-27 PROCEDURE — 97112 NEUROMUSCULAR REEDUCATION: CPT

## 2019-03-04 ENCOUNTER — HOSPITAL ENCOUNTER (OUTPATIENT)
Dept: PHYSICAL THERAPY | Age: 74
Discharge: HOME | End: 2019-03-04
Payer: MEDICARE

## 2019-03-04 PROCEDURE — 97110 THERAPEUTIC EXERCISES: CPT

## 2019-03-04 NOTE — PROGRESS NOTES
PT DAILY TREATMENT NOTE 10-18    Patient Name: Jennifer Pierre  Date:3/4/2019  : 1945  [x]  Patient  Verified  Payor: [employer] / Plan: [place] MED[place]ARE PART [place] [employer] B / Product Type: Medicare /    In time:1100  Out time:1130  Total Treatment Time (min): 30  Visit #: 8 of 10    Medicare/BCBS Only   Total Timed Codes (min):  30 1:1 Treatment Time:  30       Treatment Area: Gait instability [R26.81]  CVA (cerebral vascular accident) ([facility] [address] [I63.9]    SUBJECTIVE  Pain Level (0-10 scale): 0/10  Any medication changes, allergies to medications, adverse drug reactions, diagnosis change, or new procedure performed?: [x] No    [] Yes (see summary sheet for update)  Subjective functional status/changes:   [] No changes reported  Pt stated that he is doing all right today    OBJECTIVE    30 min Therapeutic Exercise:  [x] See flow sheet :   Rationale: increase ROM, increase strength and improve balance to improve the patients ability to increase ease with ADLs    With   [x] TE   [] TA   [] neuro   [] other: Patient Education: [x] Review HEP    [] Progressed/Changed HEP based on:   [] positioning   [] body mechanics   [] transfers   [] heat/ice application    [] other:      Other Objective/Functional Measures:   Had some LOB with static balance on foam with EC  Needed seated rest break during standing exercises  Had minimal difficulty with small byron step overs today bilaterally  Had some difficulty with TB hip IR/ER  Was fatigued after SLR     Pain Level (0-10 scale) post treatment: 0/10    ASSESSMENT/Changes in Function:   Pt is slowly progressing toward goals. Pt cont with decreased strength in B LE. Cont to use [facility] for all ambulation. Cont with decreased activity tolerance. Static balance is slowly improving.      Patient will continue to benefit from skilled PT services to modify and progress therapeutic interventions, address functional mobility deficits, address ROM deficits, address strength deficits and address imbalance/dizziness to attain remaining goals. [x]  See Plan of Care  []  See progress note/recertification  []  See Discharge Summary         Progress towards goals / Updated goals:  Short Term Goals: To be accomplished in 1 weeks:  1. Patient will be compliant with HEP to improve strength and balance to improve ease of ADLs. Status at last note/certification: initiated and reviewed  Partial compliance 2/19  Long Term Goals: To be accomplished in 5 weeks:  1. Patient will improve bilateral hip strength to at least 4/5 to normalize gait pattern. Status at last note/certification: grossly 3/4  2. Patient will ambulate 350 ft with use of LRAD with minimal to no gait deviations to restore community mobility. Status at last note/certification:   ambulates 120 ft x 2 with LBQC fatigue needed seated rest break between 2/19  3. Patient will improve Dynamic Gait Index by 3 pts to show improved safety with community ambulation. Status at last note/certification:  pts 06/05  4. Patient will improve FOTO core to 55 pts to show improved function and QOL. Status at last note/certification:  18 pts  5. Patient will reports no difficulty walking 1 block for improved negotiation of home.   Status at last note/certificaiton: limited a lot    PLAN  []  Upgrade activities as tolerated     [x]  Continue plan of care  []  Update interventions per flow sheet       []  Discharge due to:_  []  Other:_      Wali Chester PTA 3/4/2019  11:05 AM    Future Appointments   Date Time Provider Clarice Freedman   3/6/2019 11:00 AM [place] PT [id number] [facility]

## 2019-03-06 ENCOUNTER — HOSPITAL ENCOUNTER (OUTPATIENT)
Dept: PHYSICAL THERAPY | Age: 74
Discharge: HOME | End: 2019-03-06
Payer: MEDICARE

## 2019-03-06 PROCEDURE — 97110 THERAPEUTIC EXERCISES: CPT

## 2019-03-06 PROCEDURE — 97530 THERAPEUTIC ACTIVITIES: CPT

## 2019-03-06 NOTE — PROGRESS NOTES
PT DAILY TREATMENT NOTE 10-18    Patient Name: Rivas Bertrand  Date:3/6/2019  : 1945  [x]  Patient  Verified  Payor: [employer] MEDICARE / Plan: [place] MEDICARE PART A & B / Product Type: Medicare /    In time:1056 Out time:1139  Total Treatment Time (min): 43  Visit #: 9 of 10    Medicare/BCBS Only   Total Timed Codes (min):  43 1:1 Treatment Time:  43       Treatment Area: Gait instability [R26.81]  CVA (cerebral vascular accident) ([facility] [address] [I63.9]    SUBJECTIVE  Pain Level (0-10 scale): 0/10  Any medication changes, allergies to medications, adverse drug reactions, diagnosis change, or new procedure performed?: [x] No    [] Yes (see summary sheet for update)  Subjective functional status/changes:   [] No changes reported  Pt would like to continue with therapy to work on walking without a cane. Educated patient that we could work towards [employer], possibly SBQC or SPC, but walking without AD would likely be unsafe due to left hemiparesis and impaired balance. OBJECTIVE    35 min Therapeutic Exercise:  [x] See flow sheet : FOTO reassessment, discussed progress with therapy and goals   Rationale: increase ROM, increase strength and improve balance to improve the patients ability to increase ease with ADLs    8 minutes TA: dynamic gait index ( change in gait speed, head turns, pivot and turn, stepping over low obstacle, stair negotiation) to improve ease and safety with community ambulation. With   [x] TE   [] TA   [] neuro   [] other: Patient Education: [x] Review HEP    [] Progressed/Changed HEP based on:   [] positioning   [] body mechanics   [] transfers   [] heat/ice application    [] other:      Other Objective/Functional Measures:   See goals below     Pain Level (0-10 scale) post treatment: 0/10    ASSESSMENT/Changes in Function:  Patient is making slow progress with therapy. His hip strength and endurance improving. Patient would like to continue working on his walking, his goal is to ambulate without AD. Educated pt on safety and balance concerns with AD, will trial cane with smaller EDGARD next session. Patient will continue to benefit from skilled PT services to modify and progress therapeutic interventions, address functional mobility deficits, address ROM deficits, address strength deficits and address imbalance/dizziness to attain remaining goals. []  See Plan of Care  []  See progress note/recertification  []  See Discharge Summary         Progress towards goals / Updated goals:  Short Term Goals: To be accomplished in 1 weeks:  1. Patient will be compliant with HEP to improve strength and balance to improve ease of ADLs. Status at last note/certification: initiated and reviewed  Partial compliance 2/19  Long Term Goals: To be accomplished in 5 weeks:  1. Patient will improve bilateral hip strength to at least 4/5 to normalize gait pattern. Status at last note/certification: grossly 3/5  Hip flexion 5/5 right, 4+/5 left, hip IR/ER 3+/5 left  2. Patient will ambulate 350 ft with use of LRAD with minimal to no gait deviations to restore community mobility. Status at last note/certification:   ambulates community distances with [facility] fatigue needed seated rest break between 2/19  3. Patient will improve Dynamic Gait Index by 3 pts to show improved safety with community ambulation. Status at last note/certification:  pts 75/16  14/24 3/6  4. Patient will improve FOTO core to 55 pts to show improved function and QOL. Status at last note/certification:  04 pts  FOTO 78 pts 3/6  5. Patient will reports no difficulty walking 1 block for improved negotiation of home.   Status at last note/certificaiton: limited a lot      PLAN  []  Upgrade activities as tolerated     [x]  Continue plan of care  []  Update interventions per flow sheet       []  Discharge due to:_  []  Other:_      Mariola Bajwa, PT 3/6/2019  11:05 AM    Future Appointments   Date Time Provider Clarice Freedman   3/6/2019 11:00 AM [facility]dr[facility]

## 2019-03-08 NOTE — PROGRESS NOTES
[facility]  JANI  [address] [place] [address]  [phone] fax    Continued Plan of Care/ Re-certification for Physical Therapy Services    Patient name: Garnett Osgood Start of Care: 2019   Referral source: Osmar Garcia MD : 1945               Medical Diagnosis: Left-sided weakness [R53.1]  CVA (cerebral vascular accident) ([address] [I63.9]  Gait abnormality [R26.9]  Impaired mobility [Z74.09]  Payor: [place] MEDICARE / Plan: VA MEDICARE PART A & B / Product Type: Medicare /  Onset Date:2019               Treatment Diagnosis: gait impairment; left hemiparesis   Prior Hospitalization: see medical history Provider#: [id number]   Medications: Verified on Patient summary List    Comorbidities: high blood pressure, CVA()   Prior Level of Function: ambulates with LBQC, no recent falls, has HHA/nuse from 9 AM to 3 PM 5 days per week, needs assistance for dressing/bathing, lives with family in single story home      Visits from Start of Care: 8    Missed Visits: 0    The Plan of Care and following information is based on the patient's current status:    Short Term Goals:   1. Patient will be compliant with HEP to improve strength and balance to improve ease of ADLs. Status at last note/certification: initiated and reviewed  Current status: Not met, Partial compliance  Long Term Goals:   1. Patient will improve bilateral hip strength to at least 4/5 to normalize gait pattern. Status at last note/certification: grossly 3/5  Current Status: Progressing; Hip flexion 5/5 right, 4+/5 left, hip IR/ER 3+/5 left  2. Patient will ambulate 350 ft with use of LRAD with minimal to no gait deviations to restore community mobility. Status at last note/certification:   Current status; progressing; ambulates community distances with [facility] fatigue needed seated rest break   3.  Patient will improve Dynamic Gait Index by 3 pts to show improved safety with community ambulation. Status at last note/certification:  pts 04/61  Current status: Progressing; 14/24  4. Patient will improve FOTO core to 55 pts to show improved function and QOL. Status at last note/certification:  32 pts  Current status: Progressing, will formally assess next visit  5. Patient will reports no difficulty walking 1 block for improved negotiation of home. Status at last note/certificaiton: limited a lot  Current Status: Met, no difficulty       Key functional changes: improved LE strength, improved gait, improved balance       Problems/ barriers to goal attainment: history of CVA     Problem List: decrease strength, impaired gait/ balance, decrease ADL/ functional abilitiies and decrease activity tolerance    Treatment Plan: Therapeutic exercise, Therapeutic activities, Neuromuscular re-education, Gait/balance training, Manual therapy, Patient education, Self Care training, Functional mobility training, Home safety training and Stair training     Patient Goal (s) has been updated and includes: \"walk without cane\"     Goals for this certification period to be accomplished in 4 weeks:  1. Patient will improve bilateral hip strength to at least 4/5 to normalize gait pattern. Status at last note/certification:Hip flexion 5/5 right, 4+/5 left, hip IR/ER 3+/5 left  2. Patient will ambulate 350 ft with use of LRAD with minimal to no gait deviations to restore community mobility. Status at last note/certification: ambulates community distances with [facility] fatigue needed seated rest break   3. Patient will improve Dynamic Gait Index by 3 pts to show improved safety with community ambulation. Status at last note/certification: 85/35  4. Patient will improve FOTO core to 55 pts to show improved function and QOL.   Status at last note/certification:  80 pts         Frequency / Duration: Patient to be seen 2 times per week for 4 weeks:    Assessment / Recommendations:Patient is making good fair progress with improving B LE strength, improved endurance, and improved functional mobility. Patient reports no difficulty ambulating 1 block using LBQC and demonstrates no evidence of instability. Patient continues to present with decreased bilateral step length and slow gait speed. Patients goal is to improve gait and walk without cane. Educated and discussed with pt safety concerns and reasons for using AD. Will trial cane with more narrow EDGARD next sessions to assess safety. Pt will continue to benefit from skilled PT to address remaining strength and gait impairments in order to improve endurance with community ambulation using LRAD. Certification Period: 3/4/2019 to 4/2/2019    Noé Sun, PT 3/8/2019 2:25 PM    ________________________________________________________________________  I certify that the above Therapy Services are being furnished while the patient is under my care. I agree with the treatment plan and certify that this therapy is necessary. [] I have read the above and request that my patient continue as recommended.   [] I have read the above report and request that my patient continue therapy with the following changes/special instructions: _______________________________________  [] I have read the above report and request that my patient be discharged from therapy    Physician's Signature:____________Date:_________TIME:________    ** Signature, Date and Time must be completed for valid certification **    Please sign and return to [facility]  [address] [place] [address]  [phone] fax

## 2019-03-14 ENCOUNTER — HOSPITAL ENCOUNTER (OUTPATIENT)
Dept: PHYSICAL THERAPY | Age: 74
Discharge: HOME | End: 2019-03-14
Payer: MEDICARE

## 2019-03-14 PROCEDURE — 97116 GAIT TRAINING THERAPY: CPT

## 2019-03-14 PROCEDURE — 97110 THERAPEUTIC EXERCISES: CPT

## 2019-03-14 NOTE — PROGRESS NOTES
PT DAILY TREATMENT NOTE 10-18    Patient Name: Radha Dickens  Date:3/14/2019  : 1945  [x]  Patient  Verified  Payor: VA MEDICARE / Plan: VA MEDICARE PART A & B / Product Type: Medicare /    In time:202 Out time: 233  Total Treatment Time (min): 31  Visit #: 1 of 8    Medicare/BCBS Only   Total Timed Codes (min):  31 1:1 Treatment Time:  31       Treatment Area: Gait instability [R26.81]  CVA (cerebral vascular accident) ([facility] [I63.9]    SUBJECTIVE  Pain Level (0-10 scale): 0/10  Any medication changes, allergies to medications, adverse drug reactions, diagnosis change, or new procedure performed?: [x] No    [] Yes (see summary sheet for update)  Subjective functional status/changes:   [] No changes reported  Pt reports he is oscar quintanaht . OBJECTIVE    23 min Therapeutic Exercise:  [x] See flow sheet :    Rationale: increase ROM, increase strength and improve balance to improve the patients ability to increase ease with ADLs    8 minutes Gait trainin ft with SPC with supervision, cone weaving 30\" x2 to improve ease and safety with community ambulation with LRAD. With   [x] TE   [] TA   [] neuro   [] other: Patient Education: [x] Review HEP    [] Progressed/Changed HEP based on:   [] positioning   [] body mechanics   [] transfers   [] heat/ice application    [] other:      Other Objective/Functional Measures:    Ambulates with good gait pattern using SPC, no evidence of instability   Cues provided to improve step length and jazmyn    Pain Level (0-10 scale) post treatment: 0/10    ASSESSMENT/Changes in Function:  Patient demonstrates good balance and gait pattern ambulating with SPC in the clinic. He is able to weave in/out of cones without evidence of instability. Will continue to address strength and dynamic balance in order to improve ease and safety of ambulation with LRAD.     Patient will continue to benefit from skilled PT services to modify and progress therapeutic interventions, address functional mobility deficits, address ROM deficits, address strength deficits and address imbalance/dizziness to attain remaining goals. []  See Plan of Care  []  See progress note/recertification  []  See Discharge Summary         Progress towards goals / Updated goals:  1. Patient will improve bilateral hip strength to at least 4/5 to normalize gait pattern. Status at last note/certification:Hip flexion 5/5 right, 4+/5 left, hip IR/ER 3+/5 left  2. Patient will ambulate 350 ft with use of LRAD with minimal to no gait deviations to restore community mobility. Status at last note/certification: ambulates community distances with LBQC fatigue needed seated rest break   3. Patient will improve Dynamic Gait Index by 3 pts to show improved safety with community ambulation. Status at last note/certification: 74/79  4. Patient will improve FOTO core to 55 pts to show improved function and QOL.   Status at last note/certification:  07 pts           PLAN  []  Upgrade activities as tolerated     [x]  Continue plan of care  []  Update interventions per flow sheet       []  Discharge due to:_  []  Other:_      Hernan Hayes, PT 3/14/2019  11:05 AM    Future Appointments   Date Time Provider Clarice Freedman   3/15/2019 10:30 AM Paola Mcginnis PTA [id number] [facility]   3/19/2019 11:30 AM Vannessa Stanley, PT [id number] [facility]   3/22/2019 11:00 AM [facility] PT [address] MMCPTPB [facility]   3/26/2019  1:00 PM Paola Mcginnis PTA CSUESRS [facility]   3/29/2019 11:00 AM Vannessa Stanley, PT [id number] [facility]   4/1/2019 10:00 AM Vannessa Stanley, PT [id number] [facility]   4/3/2019 10:30 AM Pamela Hart PT [id number] [facility]

## 2019-03-15 ENCOUNTER — HOSPITAL ENCOUNTER (OUTPATIENT)
Dept: PHYSICAL THERAPY | Age: 74
Discharge: HOME | End: 2019-03-15
Payer: MEDICARE

## 2019-03-15 PROCEDURE — 97110 THERAPEUTIC EXERCISES: CPT

## 2019-03-15 NOTE — PROGRESS NOTES
PT DAILY TREATMENT NOTE 10-18    Patient Name: Дмитрий Byrnes  Date:3/15/2019  : 1945  [x]  Patient  Verified  Payor: VA MEDICARE / Plan: VA MEDICARE PART A & B / Product Type: Medicare /    In time:1035  Out time:1101  Total Treatment Time (min): 26  Visit #: 2 of 8    Medicare/BCBS Only   Total Timed Codes (min):  26 1:1 Treatment Time:  26       Treatment Area: Gait instability [R26.81]  CVA (cerebral vascular accident) ([facility] [I63.9]    SUBJECTIVE  Pain Level (0-10 scale): 0/10  Any medication changes, allergies to medications, adverse drug reactions, diagnosis change, or new procedure performed?: [x] No    [] Yes (see summary sheet for update)  Subjective functional status/changes:   [] No changes reported  Pt stated that he is doing fine today and has no pain    OBJECTIVE    26 min Therapeutic Exercise:  [x] See flow sheet :   Rationale: increase ROM and increase strength to improve the patients ability to increase ease with ADLs    With   [x] TE   [] TA   [] neuro   [] other: Patient Education: [x] Review HEP    [] Progressed/Changed HEP based on:   [] positioning   [] body mechanics   [] transfers   [] heat/ice application    [] other:      Other Objective/Functional Measures:   Had some difficulty with form for standing hip ext  No LOB with static balance exercise  Has some difficulty with minisquats     Pain Level (0-10 scale) post treatment: 0/10    ASSESSMENT/Changes in Function:   Pt is slowly progressing toward goals. Pt cont with decreased strength in B LE and hips. Cont to have decreased dynamic balance and cont to need AD. Pt cont to use [facility] for ambulation    Patient will continue to benefit from skilled PT services to modify and progress therapeutic interventions, address functional mobility deficits, address ROM deficits, address strength deficits and address imbalance/dizziness to attain remaining goals.      []  See Plan of Care  [x]  See progress note/recertification  []  See Discharge Summary         Progress towards goals / Updated goals:  1. Patient will improve bilateral hip strength to at least 4/5 to normalize gait pattern. Status at last note/certification:Hip flexion 5/5 right, 4+/5 left, hip IR/ER 3+/5 left  2. Patient will ambulate 350 ft with use of LRAD with minimal to no gait deviations to restore community mobility. Status at last note/certification: ambulates community distances with LBQC fatigue needed seated rest break   3. Patient will improve Dynamic Gait Index by 3 pts to show improved safety with community ambulation. Status at last note/certification: 14/24  4. Patient will improve FOTO core to 55 pts to show improved function and QOL.   Status at last note/certification:  13 pts    PLAN  []  Upgrade activities as tolerated     [x]  Continue plan of care  []  Update interventions per flow sheet       []  Discharge due to:_  []  Other:_      Antony Rocha PTA 3/15/2019  10:33 AM    Future Appointments   Date Time Provider Clarice Freedman   3/19/2019 11:30 AM Rachel Parnell, PT [id number] [facility]   3/22/2019 11:00 AM [facility] PT [address] MMCPTPB [facility]   3/26/2019  1:00 PM Kaiden Cavazos, PTA [id number] [facility]   3/29/2019 11:00 AM Rachel Parnell, PT [id number] [facility]   4/1/2019 10:00 AM Rachel Parnell, PT [id number] [facility]   4/3/2019 10:30 AM Donna Engle, PT [id number] [facility]

## 2019-03-19 ENCOUNTER — HOSPITAL ENCOUNTER (OUTPATIENT)
Dept: PHYSICAL THERAPY | Age: 74
Discharge: HOME | End: 2019-03-19
Payer: MEDICARE

## 2019-03-19 PROCEDURE — 97110 THERAPEUTIC EXERCISES: CPT

## 2019-03-19 NOTE — PROGRESS NOTES
PT DAILY TREATMENT NOTE 10-18    Patient Name: Opal Murry  Date:3/19/2019  : 1945  [x]  Patient  Verified  Payor: VA MEDICARE / Plan: VA MEDICARE PART A & B / Product Type: Medicare /    In time:   Out time:120  Total Treatment Time (min): 33  Visit #: 3 of 8    Medicare/BCBS Only   Total Timed Codes (min):  33 1:1 Treatment Time:  32       Treatment Area: Gait instability [R26.81]  CVA (cerebral vascular accident) ([facility] [address] [facility] [I63.9]    SUBJECTIVE  Pain Level (0-10 scale): 0/10   Any medication changes, allergies to medications, adverse drug reactions, diagnosis change, or new procedure performed?: [x] No    [] Yes (see summary sheet for update)  Subjective functional status/changes:   [x] No changes reported    OBJECTIVE    28 min Therapeutic Exercise:  [x] See flow sheet :   Rationale: increase ROM and increase strength to improve the patients ability to increase ease with ADLs    5 minutes TA: ambulates in clinic with [facility] with good gait/balance to restore mobility with LRAD. With   [x] TE   [] TA   [] neuro   [] other: Patient Education: [x] Review HEP    [] Progressed/Changed HEP based on:   [] positioning   [] body mechanics   [] transfers   [] heat/ice application    [] other:      Other Objective/Functional Measures:   Cues for upright posture during standing exercises  Challenged with low byron step over 1 UE, cues to step larger to clear foot  Pt ambulates  With SPC in clinic without evidence of instability    Pain Level (0-10 scale) post treatment: 0/10    ASSESSMENT/Changes in Function: Pt slowly progressing, continues to have decreased tanding endurance needing seated rest breaks following standing exercises. He Is able to ambulate around the clinic using [facility] without evidence of instability. Educated pt on where to purchase [facility] for improved carry over.     Patient will continue to benefit from skilled PT services to modify and progress therapeutic interventions, address functional mobility deficits, address ROM deficits, address strength deficits and address imbalance/dizziness to attain remaining goals. []  See Plan of Care  [x]  See progress note/recertification  []  See Discharge Summary         Progress towards goals / Updated goals:  1. Patient will improve bilateral hip strength to at least 4/5 to normalize gait pattern. Status at last note/certification:Hip flexion 5/5 right, 4+/5 left, hip IR/ER 3+/5 left  2. Patient will ambulate 350 ft with use of LRAD with minimal to no gait deviations to restore community mobility. Status at last note/certification: ambulates community distances with LBQC fatigue needed seated rest break   3. Patient will improve Dynamic Gait Index by 3 pts to show improved safety with community ambulation. Status at last note/certification: 14/24  4. Patient will improve FOTO core to 55 pts to show improved function and QOL.   Status at last note/certification:  81 pts    PLAN  []  Upgrade activities as tolerated     [x]  Continue plan of care  []  Update interventions per flow sheet       []  Discharge due to:_  []  Other:_      Efren Alexander, PT 3/19/2019  10:33 AM    Future Appointments   Date Time Provider Clarice Freedman   3/22/2019 11:00 AM [facility] PT [address] MMCPTPB [facility]   3/26/2019  1:00 PM Jazmín Arguello, PTA [id number] [facility]   3/29/2019 11:00 AM Zaida Desai, PT [id number] [facility]   4/1/2019 10:00 AM Zaida Desai, PT [id number] [facility]   4/3/2019 10:30 AM Stephanie Davison, PT [id number] [facility]

## 2019-03-26 ENCOUNTER — HOSPITAL ENCOUNTER (OUTPATIENT)
Dept: PHYSICAL THERAPY | Age: 74
Discharge: HOME | End: 2019-03-26
Payer: MEDICARE

## 2019-03-26 PROCEDURE — 97110 THERAPEUTIC EXERCISES: CPT

## 2019-03-26 NOTE — PROGRESS NOTES
PT DAILY TREATMENT NOTE 10-18    Patient Name: Anshul Raymundo  Date:3/26/2019  : 1945  [x]  Patient  Verified  Payor: VA MEDICARE / Plan: VA MEDICARE PART A & B / Product Type: Medicare /    In time:109  Out time:132  Total Treatment Time (min): 23  Visit #: 4 of 8    Medicare/BCBS Only   Total Timed Codes (min):  23 1:1 Treatment Time:  23       Treatment Area: Gait instability [R26.81]  CVA (cerebral vascular accident) ([facility] [address] [I63.9]    SUBJECTIVE  Pain Level (0-10 scale): 0/10  Any medication changes, allergies to medications, adverse drug reactions, diagnosis change, or new procedure performed?: [x] No    [] Yes (see summary sheet for update)  Subjective functional status/changes:   [] No changes reported  Pt stated that he has no pain today    OBJECTIVE    23 min Therapeutic Exercise:  [] See flow sheet :   Rationale: increase ROM and increase strength to improve the patients ability to increase ease with ADLs    With   [x] TE   [] TA   [] neuro   [] other: Patient Education: [x] Review HEP    [] Progressed/Changed HEP based on:   [] positioning   [] body mechanics   [] transfers   [] heat/ice application    [] other:      Other Objective/Functional Measures:   Pt was 9 minutes late for session   Pt had a very poor attitude in therapy today  Tolerated all exercises without difficulty    Pain Level (0-10 scale) post treatment: 0/10    ASSESSMENT/Changes in Function:   Pt is slowly progressing toward goals. Pt cont with decreased strength in B LE. Cont to ambulate with RW. Pt cont with decreased ambulation tolerance. Patient will continue to benefit from skilled PT services to modify and progress therapeutic interventions, address functional mobility deficits, address ROM deficits and address strength deficits to attain remaining goals. []  See Plan of Care  [x]  See progress note/recertification  []  See Discharge Summary         Progress towards goals / Updated goals:  .  Patient will improve bilateral hip strength to at least 4/5 to normalize gait pattern. Status at last note/certification:Hip flexion 5/5 right, 4+/5 left, hip IR/ER 3+/5 left  2. Patient will ambulate 350 ft with use of LRAD with minimal to no gait deviations to restore community mobility. Status at last note/certification: ambulates community distances with LBQC fatigue needed seated rest break   3. Patient will improve Dynamic Gait Index by 3 pts to show improved safety with community ambulation. Status at last note/certification: 14/24  4. Patient will improve FOTO core to 55 pts to show improved function and QOL.   Status at last note/certification:  75 pts    PLAN  []  Upgrade activities as tolerated     [x]  Continue plan of care  []  Update interventions per flow sheet       []  Discharge due to:_  []  Other:_      Keisha Bass PTA 3/26/2019  1:07 PM    Future Appointments   Date Time Provider Clarice Freedman   3/29/2019 11:00 AM Lesley Victoria, PT [id number] [facility]   4/1/2019 10:00 AM Lesley Victoria, PT [id number] [facility]   4/3/2019 10:30 AM Lesley Victoria, PT [id number] [facility]

## 2019-03-29 ENCOUNTER — APPOINTMENT (OUTPATIENT)
Dept: PHYSICAL THERAPY | Age: 74
End: 2019-03-29
Payer: MEDICARE

## 2019-04-01 ENCOUNTER — HOSPITAL ENCOUNTER (OUTPATIENT)
Dept: PHYSICAL THERAPY | Age: 74
Discharge: HOME | End: 2019-04-01
Payer: MEDICAID

## 2019-04-01 PROCEDURE — 97110 THERAPEUTIC EXERCISES: CPT

## 2019-04-01 NOTE — PROGRESS NOTES
PT DAILY TREATMENT NOTE 10-18    Patient Name: Jaspal Klein  Date:2019  : 1945  [x]  Patient  Verified  Payor: VA MEDICARE / Plan: VA MEDICARE PART A & B / Product Type: Medicare /    In time:  Out time:1035  Total Treatment Time (min): 33  Visit #: 5 of 8    Medicare/BCBS Only   Total Timed Codes (min):  33 1:1 Treatment Time:  30       Treatment Area: Gait instability [R26.81]  CVA (cerebral vascular accident) ([facility] [address] [I63.9]    SUBJECTIVE  Pain Level (0-10 scale): 0/10  Any medication changes, allergies to medications, adverse drug reactions, diagnosis change, or new procedure performed?: [x] No    [] Yes (see summary sheet for update)  Subjective functional status/changes:   [] No changes reported  Pt states that he did not do anything this weekend, sat around.      OBJECTIVE    33 min Therapeutic Exercise:  [] See flow sheet :DGI   Rationale: increase ROM and increase strength to improve the patients ability to increase ease with ADLs    With   [x] TE   [] TA   [] neuro   [] other: Patient Education: [x] Review HEP    [] Progressed/Changed HEP based on:   [] positioning   [] body mechanics   [] transfers   [] heat/ice application    [] other:      Other Objective/Functional Measures:   Pt put forth decreased effort, declines to complete increased repetitions  He is not compliant with HEP, given copy of original and will update next visit  Educated pt and caregiver to increase activity level at home ( walking, sit to stand, and HEP)  Discussed discharge next visit   Ambulates with good gait pattern and good balance using LBQC, slow gait speed, no recent falls  Negotiates stairs with reciprocal gait ascending using LBQC and rail,   Step to gait when descending   DGI   Performs sit to stand without UE's    Pain Level (0-10 scale) post treatment: 0/10    ASSESSMENT/Changes in Function: Patient is making fair progress towards goals; limited by sedentary lifestyle and decreased motivation. He puts forth decreased effort during today's session, and declines to complete increased repetitions. Educated pt and caregiver on increasing activity levels at home and HEP to maintain strength. Will give pt final HEP next visit and discharge. Patient will continue to benefit from skilled PT services to modify and progress therapeutic interventions, address functional mobility deficits, address ROM deficits and address strength deficits to attain remaining goals. []  See Plan of Care  [x]  See progress note/recertification  []  See Discharge Summary         Progress towards goals / Updated goals:  1. Patient will improve bilateral hip strength to at least 4/5 to normalize gait pattern. Status at last note/certification:Hip flexion 5/5 right, 4+/5 left, hip IR/ER 3+/5 left  2. Patient will ambulate 350 ft with use of LRAD with minimal to no gait deviations to restore community mobility. Status at last note/certification: ambulates community distances with LBQC fatigue needed seated rest break   3. Patient will improve Dynamic Gait Index by 3 pts to show improved safety with community ambulation. Status at last note/certification: 14/24  4. Patient will improve FOTO core to 55 pts to show improved function and QOL.   Status at last note/certification:  96 pts    PLAN  []  Upgrade activities as tolerated     []  Continue plan of care  []  Update interventions per flow sheet       []  Discharge due to:_  [x]  Other: DC next visit with updated Bao [id number], PT 4/1/2019  1:07 PM    Future Appointments   Date Time Provider Clarice Freedman   4/1/2019 10:00 AM Michi Owusu, PT [id number] [facility]   4/3/2019 10:30 AM Gaurav Kelly, PT [id number] [facility]

## 2019-04-03 ENCOUNTER — HOSPITAL ENCOUNTER (OUTPATIENT)
Dept: PHYSICAL THERAPY | Age: 74
Discharge: HOME | End: 2019-04-03
Payer: MEDICAID

## 2019-04-03 PROCEDURE — 97110 THERAPEUTIC EXERCISES: CPT

## 2019-04-03 PROCEDURE — 97530 THERAPEUTIC ACTIVITIES: CPT

## 2019-04-03 NOTE — PROGRESS NOTES
PT DAILY TREATMENT NOTE 10-18    Patient Name: Diane Colunga  Date:4/3/2019  : 1945  [x]  Patient  Verified  Payor: [place] MED[place]ARE / Plan: [place] MEDICARE PART A & B / Product Type: Medicare /    In time:1025  Out time:1048  Total Treatment Time (min): 23  Visit #: 6 of 8    Medicare/BCBS Only   Total Timed Codes (min):  23 1:1 Treatment Time:  23       Treatment Area: Gait instability [R26.81]  CVA (cerebral vascular accident) ([facility] [address] [I63.9]    SUBJECTIVE  Pain Level (0-10 scale): 0/10  Any medication changes, allergies to medications, adverse drug reactions, diagnosis change, or new procedure performed?: [x] No    [] Yes (see summary sheet for update)  Subjective functional status/changes:   [] No changes reported  Pt stated that he is ok today    OBJECTIVE    15 min Therapeutic Exercise:  [x] See flow sheet :   Rationale: increase ROM and increase strength to improve the patients ability to increase ease with ADLs    8 min Therapeutic Activity:  [x]  See flow sheet :FOTO   Rationale: check improvement in overall sx's              With   [x] TE   [] TA   [] neuro   [] other: Patient Education: [x] Review HEP    [x] Progressed/Changed HEP based on:   [] positioning   [] body mechanics   [] transfers   [] heat/ice application    [] other:      Other Objective/Functional Measures:   Pt showed understanding of final HEP  FOTO was 79     Pain Level (0-10 scale) post treatment: 0/10    ASSESSMENT/Changes in Function:   []  See Plan of Care  []  See progress note/recertification  [x]  See Discharge Summary         Progress towards goals / Updated goals:  1. Patient will improve bilateral hip strength to at least 4/5 to normalize gait pattern. Goal met. B hip strength is 5/5 B hip flex, and IR/ER is 4/5 bilaterally. 4/3/19  2. Patient will ambulate 350 ft with use of LRAD with minimal to no gait deviations to restore community mobility. Goal met. Pt is able to ambulate 350' with SPC without difficulty. 4/3/19  3. Patient will improve Dynamic Gait Index by 3 pts to show improved safety with community ambulation. Progressing. Increased from 14/24 to 16/24.  4. Patient will improve FOTO core to 55 pts to show improved function and QOL. Goal met.  Increased from 51 to 79. 4/3/19    PLAN  []  Upgrade activities as tolerated     []  Continue plan of care  []  Update interventions per flow sheet       [x]  Discharge   []  Other:_      Steve Guadalupe PTA 4/3/2019  10:26 AM    Future Appointments   Date Time Provider Clarice Freedman   4/3/2019 10:30 AM Oscar Blankenship PTA [id number]

## 2019-05-08 ENCOUNTER — HOSPITAL ENCOUNTER (OUTPATIENT)
Dept: GENERAL RADIOLOGY | Age: 74
Discharge: HOME | End: 2019-05-08
Payer: MEDICARE

## 2019-05-08 DIAGNOSIS — R53.83 FATIGUE: ICD-10-CM

## 2019-05-08 DIAGNOSIS — D75.839 THROMBOCYTOSIS: ICD-10-CM

## 2019-05-08 PROCEDURE — 71046 X-RAY EXAM CHEST 2 VIEWS: CPT

## 2019-05-23 NOTE — PROGRESS NOTES
[facility] [employer] [facility]  [address] [place] [address]  [phone] fax    Physical Therapy Discharge Summary    Patient name: Loki Aguilar of Care: 2/4/2019   Referral Noel Carrel, MD LDA: 09/79/8472               Medical Diagnosis: Left-sided weakness [R53.1]  CVA (cerebral vascular accident) [facility]) [I63.9]  Gait abnormality [R26.9]  Impaired mobility [Z74.09]  Payor: [place] MEDICARE / Plan: [place] MEDICARE PART A & B / Product Type: Medicare /  Onset Date:1/25/2019               Treatment Diagnosis: gait impairment; left hemiparesis   Prior Hospitalization: see medical history Provider#: [id number]   Medications: Verified on Patient summary List    Comorbidities: high blood pressure, CVA(2015)   Prior Level of Function: ambulates with LBQC, no recent falls, has HHA/nuse from 9 AM to 3 PM 5 days per week, needs assistance for dressing/bathing, lives with family in single story home      Visits from [place] of Care: 15    Missed Visits: 1    Reporting Period : 2/04/2019 to 4/03/2019    Summary of Care:  Goal:Patient will improve bilateral hip strength to at least 4/5 to normalize gait pattern  Status at last note/certification: Hip flexion 5/5 right, 4+/5 left, hip IR/ER 3+/5 left  Status at discharge: met,B hip strength is 5/5 B hip flex, and IR/ER is 4/5 bilaterally    Goal:Patient will ambulate 350 ft with use of LRAD with minimal to no gait deviations to restore community mobility. Status at last note/certification: ambulates community distances with LBQC fatigue needed seated rest break   Status at discharge: met    Goal:Patient will improve Dynamic Gait Index by 3 pts to show improved safety with community ambulation. Status at last note/certification: 13/08  Status at discharge: met, 16/24    Goal:Patient will improve FOTO core to 55 pts to show improved function and QOL.   Status at last note/certification:  Status at discharge: met, 79/100      ASSESSMENT/RECOMMENDATIONS: Patient has made good progress towards long term goals. He demonstrates improved hip strength and 3 pt increase in Dynamic Gait Index since start of therapy. Patient has met his FOTO goal indicating functional improvement.  He has been educated on updated HEP to progress/maintain strength and mobility independently and has progressed to discharge from PT.      [x]Discontinue therapy: [x]Patient has reached or is progressing toward set goals          Patient is non-compliant or has abdicated      []Due to lack of appreciable progress towards set goals    Shannan Paez, PT 5/23/2019 8:17 AM

## 2019-06-03 ENCOUNTER — HOSPITAL ENCOUNTER (OUTPATIENT)
Dept: LAB | Age: 74
Discharge: HOME | End: 2019-06-03
Payer: MEDICARE

## 2019-06-03 DIAGNOSIS — I10 PRIMARY HYPERTENSION: ICD-10-CM

## 2019-06-03 DIAGNOSIS — N18.30 CHRONIC KIDNEY DISEASE, STAGE III (MODERATE) (HCC): ICD-10-CM

## 2019-06-03 LAB
25(OH)D3 SERPL-MCNC: 57.3 NG/ML (ref 30–100)
ALBUMIN SERPL-MCNC: 3.6 G/DL (ref 3.4–5)
ALBUMIN/GLOB SERPL: 0.9 {RATIO} (ref 0.8–1.7)
ALP SERPL-CCNC: 79 U/L (ref 45–117)
ALT SERPL-CCNC: 30 U/L (ref 16–61)
ANION GAP SERPL CALC-SCNC: 7 MMOL/L (ref 3–18)
AST SERPL-CCNC: 18 U/L (ref 15–37)
BASOPHILS # BLD: 0.1 K/UL (ref 0–0.1)
BASOPHILS NFR BLD: 1 % (ref 0–2)
BILIRUB SERPL-MCNC: 0.4 MG/DL (ref 0.2–1)
BUN SERPL-MCNC: 24 MG/DL (ref 7–18)
BUN/CREAT SERPL: 15 (ref 12–20)
CALCIUM SERPL-MCNC: 9.7 MG/DL (ref 8.5–10.1)
CALCIUM SERPL-MCNC: 9.9 MG/DL (ref 8.5–10.1)
CHLORIDE SERPL-SCNC: 110 MMOL/L (ref 100–108)
CO2 SERPL-SCNC: 24 MMOL/L (ref 21–32)
CREAT SERPL-MCNC: 1.59 MG/DL (ref 0.6–1.3)
DIFFERENTIAL METHOD BLD: ABNORMAL
EOSINOPHIL # BLD: 0.2 K/UL (ref 0–0.4)
EOSINOPHIL NFR BLD: 2 % (ref 0–5)
ERYTHROCYTE [DISTWIDTH] IN BLOOD BY AUTOMATED COUNT: 16.2 % (ref 11.6–14.5)
GLOBULIN SER CALC-MCNC: 3.9 G/DL (ref 2–4)
GLUCOSE SERPL-MCNC: 83 MG/DL (ref 74–99)
HCT VFR BLD AUTO: 38.6 % (ref 36–48)
HGB BLD-MCNC: 12.7 G/DL (ref 13–16)
LYMPHOCYTES # BLD: 1.7 K/UL (ref 0.9–3.6)
LYMPHOCYTES NFR BLD: 17 % (ref 21–52)
MAGNESIUM SERPL-MCNC: 2.1 MG/DL (ref 1.6–2.6)
MCH RBC QN AUTO: 26.8 PG (ref 24–34)
MCHC RBC AUTO-ENTMCNC: 32.9 G/DL (ref 31–37)
MCV RBC AUTO: 81.4 FL (ref 74–97)
MONOCYTES # BLD: 0.8 K/UL (ref 0.05–1.2)
MONOCYTES NFR BLD: 8 % (ref 3–10)
NEUTS SEG # BLD: 7 K/UL (ref 1.8–8)
NEUTS SEG NFR BLD: 72 % (ref 40–73)
PHOSPHATE SERPL-MCNC: 3.4 MG/DL (ref 2.5–4.9)
PLATELET # BLD AUTO: 265 K/UL (ref 135–420)
PMV BLD AUTO: 10.3 FL (ref 9.2–11.8)
POTASSIUM SERPL-SCNC: 4.5 MMOL/L (ref 3.5–5.5)
PROT SERPL-MCNC: 7.5 G/DL (ref 6.4–8.2)
PTH-INTACT SERPL-MCNC: 102.1 PG/ML (ref 18.4–88)
RBC # BLD AUTO: 4.74 M/UL (ref 4.7–5.5)
SODIUM SERPL-SCNC: 141 MMOL/L (ref 136–145)
URATE SERPL-MCNC: 6.2 MG/DL (ref 2.6–7.2)
WBC # BLD AUTO: 9.6 K/UL (ref 4.6–13.2)

## 2019-06-03 PROCEDURE — 36415 COLL VENOUS BLD VENIPUNCTURE: CPT

## 2019-06-03 PROCEDURE — 85025 COMPLETE CBC W/AUTO DIFF WBC: CPT

## 2019-06-03 PROCEDURE — 83735 ASSAY OF MAGNESIUM: CPT

## 2019-06-03 PROCEDURE — 82652 VIT D 1 25-DIHYDROXY: CPT

## 2019-06-03 PROCEDURE — 80053 COMPREHEN METABOLIC PANEL: CPT

## 2019-06-03 PROCEDURE — 82306 VITAMIN D 25 HYDROXY: CPT

## 2019-06-03 PROCEDURE — 83970 ASSAY OF PARATHORMONE: CPT

## 2019-06-03 PROCEDURE — 84100 ASSAY OF PHOSPHORUS: CPT

## 2019-06-03 PROCEDURE — 84550 ASSAY OF BLOOD/URIC ACID: CPT

## 2019-06-05 LAB — 1,25(OH)2D3 SERPL-MCNC: 44.5 PG/ML (ref 19.9–79.3)

## 2019-06-19 ENCOUNTER — APPOINTMENT (OUTPATIENT)
Dept: PHYSICAL THERAPY | Age: 74
End: 2019-06-19

## 2019-06-20 ENCOUNTER — HOSPITAL ENCOUNTER (OUTPATIENT)
Dept: NON INVASIVE DIAGNOSTICS | Age: 74
Discharge: HOME | End: 2019-06-20
Attending: FAMILY MEDICINE
Payer: MEDICAID

## 2019-06-20 VITALS
HEIGHT: 71 IN | DIASTOLIC BLOOD PRESSURE: 86 MMHG | WEIGHT: 205 LBS | SYSTOLIC BLOOD PRESSURE: 153 MMHG | BODY MASS INDEX: 28.7 KG/M2

## 2019-06-20 DIAGNOSIS — R94.31 ABNORMAL ELECTROCARDIOGRAM: ICD-10-CM

## 2019-06-20 LAB
ECHO AO ROOT DIAM: 3.99 CM
ECHO AV REGURGITANT PHT: 689.1 CM
ECHO LA AREA 4C: 17.5 CM2
ECHO LA VOL 4C: 41.51 ML (ref 18–58)
ECHO LA VOLUME INDEX A4C: 19.48 ML/M2 (ref 16–28)
ECHO LV E' LATERAL VELOCITY: 10.66 CM/S
ECHO LV E' SEPTAL VELOCITY: 6.96 CM/S
ECHO LV INTERNAL DIMENSION DIASTOLIC: 4.88 CM (ref 4.2–5.9)
ECHO LV INTERNAL DIMENSION SYSTOLIC: 3.01 CM
ECHO LV IVSD: 1.22 CM (ref 0.6–1)
ECHO LV MASS 2D: 266.1 G (ref 88–224)
ECHO LV MASS INDEX 2D: 124.9 G/M2 (ref 49–115)
ECHO LV POSTERIOR WALL DIASTOLIC: 1.18 CM (ref 0.6–1)
ECHO LVOT DIAM: 2.11 CM
ECHO LVOT PEAK GRADIENT: 5.9 MMHG
ECHO LVOT PEAK VELOCITY: 121.14 CM/S
ECHO LVOT VTI: 22.49 CM
ECHO MV A VELOCITY: 121.5 CM/S
ECHO MV E DECELERATION TIME (DT): 316.8 MS
ECHO MV E VELOCITY: 66.11 CM/S
ECHO MV E/A RATIO: 0.54
ECHO MV E/E' LATERAL: 6.2
ECHO MV E/E' RATIO (AVERAGED): 7.85
ECHO MV E/E' SEPTAL: 9.5
ECHO RV TAPSE: 2.62 CM (ref 1.5–2)
PISA AR MAX VEL: 432.54 CM/S

## 2019-06-20 PROCEDURE — 93306 TTE W/DOPPLER COMPLETE: CPT

## 2019-06-24 ENCOUNTER — HOSPITAL ENCOUNTER (OUTPATIENT)
Dept: PHYSICAL THERAPY | Age: 74
Discharge: HOME | End: 2019-06-24
Payer: MEDICARE

## 2019-06-24 PROCEDURE — 97112 NEUROMUSCULAR REEDUCATION: CPT | Performed by: PHYSICAL THERAPIST

## 2019-06-24 PROCEDURE — 97110 THERAPEUTIC EXERCISES: CPT | Performed by: PHYSICAL THERAPIST

## 2019-06-24 PROCEDURE — 97162 PT EVAL MOD COMPLEX 30 MIN: CPT | Performed by: PHYSICAL THERAPIST

## 2019-06-24 NOTE — PROGRESS NOTES
[facility] - [facility], [address] [place] [address]  [phone] fax  Plan of Care/ Statement of Necessity for Physical Therapy Services     Patient name: Kris Vizcaino Start of Care: 2019   Referral source: Ismael Dunbar MD : 1945    Medical Diagnosis: Muscle weakness (generalized) [M62.81]  Weakness of right lower extremity [R29.898]  Weakness of left lower extremity [R29.898]  Payor: [facility] MEDICAID / Plan: Matt Hobson Rmshin Romero / Product Type: Managed Care Medicaid /  Onset Date:    Treatment Diagnosis: CVA with Left hemiparesis   Prior Hospitalization: see medical history Provider#: [id number]   Medications: Verified on Patient summary List    Comorbidities: HTN, smoker   Prior Level of Function: (I) gait in the community with decreased fall risk and with increased energy levels    The Plan of Care and following information is based on the information from the initial evaluation. Assessment/ key information: 76 yo male with reports of increasing L sided weakness and low energy levels. History of CVA with L weakness in  - had PT at [facility]. Lives with sister in one story home with one step entry. Has a CNA from 9[address]3 [place]) who is present during therapy today. Patient walks (I) slow with R LBQC, able to negotiate steps with reciprocal pattern with B handrails. Standing balance is fair with UE support close - unable to SLS L, SLS on R is 5 sec. R LE is 5/5 strength with weakness noted on L - hip flex:  3+/5, abd/ext: 3+/5, ankle DF: 4/5, knee flex: 3+/5,  Five times sit to stand test: 21 seconds with use of hands (goal is 12 seconds or less). Patient will benefit from skilled PT to improve strength, balance, and gait to allow more socialization and increase (I) in the community with less fear of falling.       Evaluation Complexity History HIGH Complexity :3+ comorbidities / personal factors will impact the outcome/ POC ; Examination MEDIUM Complexity : 3 Standardized tests and measures addressing body structure, function, activity limitation and / or participation in recreation  ;Presentation LOW Complexity : Stable, uncomplicated  ;Clinical Decision Making LOW Complexity : FOTO score of   Overall Complexity Rating: MEDIUM  Problem List: decrease ROM, decrease strength, impaired gait/ balance, decrease ADL/ functional abilitiies, decrease activity tolerance and decrease flexibility/ joint mobility   Treatment Plan may include any combination of the following: Therapeutic exercise, Therapeutic activities, Neuromuscular re-education, Gait/balance training, Manual therapy, Patient education, Self Care training, Functional mobility training, Home safety training and Stair training  Patient / Family readiness to learn indicated by: asking questions, trying to perform skills and interest  Persons(s) to be included in education: patient (P) and family support person (FSP);list Alicia Plaster (CNA)  Barriers to Learning/Limitations: None  Patient Goal (s): to increase strength and improve energy to walk more in neighborhood and go to senior center  Patient Self Reported Health Status: fair  Rehabilitation Potential: good    Short Term Goals: To be accomplished in 1 weeks:  Patient will demonstrate (I) with simple HEP to improve LE strength to allow safer ambulation in the home and community  Status at last note/certification:  dependent  Long Term Goals:  To be accomplished in 10 treatments:  Patient will demonstrate an increase in FOTO score to at least 51 points to show functional gains in all ADL/IADLS   Status at last note/certification: 45  Patient will demonstrate an increase in L LE strength to at least 4/5 to allow safer community ambulation  Status at last note/certification:  Hip flex: 3+/5, abduction: 3+/5, extension: 3+/5, knee flexion: 3+/5, knee ext: 4+/5, ankle DF: 4/5  Patient will relate at least a 60% improvement in strength and energy allowing him to safely go to senior center  Status at last note/certification:  Rarely goes to senior center   Patient and  Caregiver will be (I) with HEP and safetly strategies allowing cart to (I) manage sx and allow more (I) quality of life  Status at last note/certification:  NA    Frequency / Duration: Patient to be seen 2 times per week for 10 treatments. Patient/ Caregiver education and instruction: Diagnosis, prognosis, self care, activity modification and exercises   [x]  Plan of care has been reviewed with PTA    Certification period:  6/24/19-7/24/19  Korina Hernandez, PT 6/24/2019 12:53 PM  _____________________________________________________________________  I certify that the above Therapy Services are being furnished while the patient is under my care. I agree with the treatment plan and certify that this therapy is necessary.     Physician's Signature:____________Date:_________TIME:________    ** Signature, Date and Time must be completed for valid certification **    Please sign and return to [facility] - [address]  [phone] fax

## 2019-06-24 NOTE — PROGRESS NOTES
PT DAILY TREATMENT NOTE - [facility]     Patient Name: Trav Horvath  Date:2019  : 1945  [x]  Patient  Verified  Payor: [facility] MEDICAID / Plan: [facility] [employer] PL[employer] / Product Type: Managed Care Medicaid /    In time:11:55  Out time:12:30  Total Treatment Time (min): 35  Total Timed Codes (min): 25  1:1 Treatment Time ( only): 10   Visit #: 1 of 10    Treatment Area: Muscle weakness (generalized) [M62.81]  Weakness of right lower extremity [R29.898]  Weakness of left lower extremity [R29.898]    SUBJECTIVE  Pain Level (0-10 scale): 0  Any medication changes, allergies to medications, adverse drug reactions, diagnosis change, or new procedure performed?: [x] No    [] Yes (see summary sheet for update)  Subjective functional status/changes:   [] No changes reported  74 yo male with reports of increasing L sided weakness and low energy levels. History of CVA with L weakness in  - had PT at [facility]. Lives with sister in one story home with one step entry. Has a CNA from 3 [place]) who is present during therapy today. Patient walks (I) slow with R LBQC, able to negotiate steps with reciprocal pattern with B handrails. Standing balance is fair with UE support close - unable to SLS L, SLS on R is 5 sec. R LE is 5/5 strength with weakness noted on L - hip flex:  3+/5, abd/ext: 3+/5, ankle DF: 4/5, knee flex: 3+/5,  Five times sit to stand test: 21 seconds with use of hands (goal is 12 seconds or less). Patient will benefit from skilled PT to improve strength, balance, and gait to allow more socialization and increase (I) in the community with less fear of falling.       OBJECTIVE    10 min [x]Eval                  []Re-Eval       8 min Therapeutic Exercise:  [] See flow sheet : - instructed in HEP - see chart   Rationale: increase strength, improve balance and increase proprioception to improve the patients ability to walk in the community with less fall risk     17 min Neuromuscular Re-education:  []  See flow sheet :   Rationale: increase strength, improve balance and increase proprioception  to improve the patients ability to walk in the community with less fall risk     With   [] TE   [] TA   [] neuro   [] other: Patient Education: [x] Review HEP    [] Progressed/Changed HEP based on:   [] positioning   [] body mechanics   [] transfers   [] heat/ice application    [] other:      Other Objective/Functional Measures:     Eval note - see paper eval    Pain Level (0-10 scale) post treatment: 0    ASSESSMENT/Changes in Function:          See POC     PLAN  []  Upgrade activities as tolerated     []  Continue plan of care  []  Update interventions per flow sheet       []  Discharge due to:_  [x]  Other:_  2x/week for 10 visits - see [address] Yahir Romero, PT 6/24/2019  12:41 PM    Future Appointments   Date Time Provider Clarice Freedman   6/27/2019 12:00 PM Asim Valdez, PT [facility]

## 2019-06-27 ENCOUNTER — HOSPITAL ENCOUNTER (OUTPATIENT)
Dept: PHYSICAL THERAPY | Age: 74
Discharge: HOME | End: 2019-06-27
Payer: MEDICARE

## 2019-06-27 PROCEDURE — 97112 NEUROMUSCULAR REEDUCATION: CPT

## 2019-07-10 ENCOUNTER — HOSPITAL ENCOUNTER (OUTPATIENT)
Dept: PHYSICAL THERAPY | Age: 74
Discharge: HOME | End: 2019-07-10
Payer: MEDICARE

## 2019-07-10 PROCEDURE — 97112 NEUROMUSCULAR REEDUCATION: CPT

## 2019-07-10 PROCEDURE — 97110 THERAPEUTIC EXERCISES: CPT

## 2019-07-10 NOTE — PROGRESS NOTES
PT DAILY TREATMENT NOTE 10-18    Patient Name: Magdalene Duong  Date:7/10/2019  : 1945  [x]  Patient  Verified  Payor: [facility] MEDICARE / Plan: [facility] COMMUNITY PLAN MCR / Product Type: Managed Care Medicare /    In time:2:45  Out time:3;25  Total Treatment Time (min): 55  Visit #: 3 of 10    Medicare/BCBS Only   Total Timed Codes (min):  40 1:1 Treatment Time:  40       Treatment Area: Muscle weakness (generalized) [M62.81]  Weakness of right lower extremity [R29.898]  Weakness of left lower extremity [R29.898]    SUBJECTIVE  Pain Level (0-10 scale): 0  Any medication changes, allergies to medications, adverse drug reactions, diagnosis change, or new procedure performed?: [x] No    [] Yes (see summary sheet for update)  Subjective functional status/changes:   [] No changes reported      OBJECTIVE    30 min Therapeutic Exercise:  [] See flow sheet :   Rationale: increase ROM and increase strength to improve the patients ability to improve ease of function, walking       10 min Neuromuscular Re-education:  []  See flow sheet :   Rationale: increase strength, improve coordination, improve balance and increase proprioception  to improve the patients ability to improve stability for gait, functional tasks         With   [] TE   [] TA   [] neuro   [] other: Patient Education: [x] Review HEP    [] Progressed/Changed HEP based on:   [] positioning   [] body mechanics   [] transfers   [] heat/ice application    [] other:      Other Objective/Functional Measures:      Pain Level (0-10 scale) post treatment:  0    ASSESSMENT/Changes in Function: Postural cues for erect posture during session. Good static balance with eyes open,    Mild way with eyes closed, but postural cues required.     Patient will continue to benefit from skilled PT services to modify and progress therapeutic interventions, address functional mobility deficits, address ROM deficits, address strength deficits, analyze and address soft tissue restrictions, analyze and cue movement patterns, analyze and modify body mechanics/ergonomics, assess and modify postural abnormalities, address imbalance/dizziness and instruct in home and community integration to attain remaining goals.      []  See Plan of Care  []  See progress note/recertification  []  See Discharge Summary         Progress towards goals / Updated goals:  Patient will demonstrate (I) with simple HEP to improve LE strength to allow safer ambulation in the home and community  Status at last note/certification: [address] be accomplished in 10 treatments:  Patient will demonstrate an increase in FOTO score to at least 51 points to show functional gains in all ADL/IADLS   Status at last note/certification: 45  Patient will demonstrate an increase in L LE strength to at least 4/5 to allow safer community ambulation  Status at last note/certification:  Hip flex: 3+/5, abduction: 3+/5, extension: 3+/5, knee flexion: 3+/5, knee ext: 4+/5, ankle DF: 4/5  Patient will relate at least a 60% improvement in strength and energy allowing him to safely go to senior center  Status at last note/certification: Cameron Beebe goes to senior center   Patient and Tiff Donaldson will be (I) with HEP and safetly strategies allowing paiteint to (I) manage sx and allow more (I) quality of life  Status at last note/certification:  NA    PLAN  [x]  Upgrade activities as tolerated     []  Continue plan of care  []  Update interventions per flow sheet       []  Discharge due to:_  []  Other:_      Peggy Polanco, PTA 7/10/2019  2:54 PM    Future Appointments   Date Time Provider Clarice Freedman   7/11/2019 12:00 PM Alpesh Rajput [facility]

## 2019-07-11 ENCOUNTER — HOSPITAL ENCOUNTER (OUTPATIENT)
Dept: PHYSICAL THERAPY | Age: 74
Discharge: HOME | End: 2019-07-11
Payer: MEDICARE

## 2019-07-11 PROCEDURE — 97112 NEUROMUSCULAR REEDUCATION: CPT

## 2019-07-11 PROCEDURE — 97110 THERAPEUTIC EXERCISES: CPT

## 2019-07-11 NOTE — PROGRESS NOTES
PT DAILY TREATMENT NOTE 10-18    Patient Name: Mike Rock  Date:2019  : 1945  [x]  Patient  Verified  Payor: [facility] MEDICARE / Plan: [facility] COMMUNITY PLAN MCR / Product Type: Managed Care Medicare /    In time:12:00  Out time: 12;40  Total Treatment Time (min): 40  Visit #: 4 of 10    Medicare/BCBS Only   Total Timed Codes (min):  40 1:1 Treatment Time:  40       Treatment Area: Muscle weakness (generalized) [M62.81]  Weakness of right lower extremity [R29.898]  Weakness of left lower extremity [R29.898]    SUBJECTIVE  Pain Level (0-10 scale): 0  Any medication changes, allergies to medications, adverse drug reactions, diagnosis change, or new procedure performed?: [x] No    [] Yes (see summary sheet for update)  Subjective functional status/changes:   [] No changes reported  I haven't been working on my ex's or gone to [employer]. OBJECTIVE    30 min Therapeutic Exercise:  [] See flow sheet :   Rationale: increase ROM and increase strength to improve the patients ability to improve activity tolerance for functional tasks, reduce falls      10 min Neuromuscular Re-education:  []  See flow sheet :   Rationale: increase strength, improve coordination, improve balance and increase proprioception  to improve the patients ability to increase stability for standing/walking. reduce fall risk, safely perform ADL's            With   [] TE   [] TA   [] neuro   [] other: Patient Education: [x] Review HEP    [] Progressed/Changed HEP based on:   [] positioning   [] body mechanics   [] transfers   [] heat/ice application    [] other: emphasis on importance of HEP     Other Objective/Functional Measures:   added ball/band     Pain Level (0-10 scale) post treatment: 0    ASSESSMENT/Changes in Function:  2 rest beaks during session. Postural cues provided to correct slouched posture with ex's and reduce strain on LB.      Patient will continue to benefit from skilled PT services to modify and progress therapeutic interventions, address functional mobility deficits, address ROM deficits, address strength deficits, analyze and address soft tissue restrictions, analyze and cue movement patterns, analyze and modify body mechanics/ergonomics, assess and modify postural abnormalities, address imbalance/dizziness and instruct in home and community integration to attain remaining goals. []  See Plan of Care  []  See progress note/recertification  []  See Discharge Summary         Progress towards goals / Updated goals:  Patient will demonstrate (I) with simple HEP to improve LE strength to allow safer ambulation in the home and community  Status at last note/certification: [address] be accomplished in 10 treatments:  Patient will demonstrate an increase in FOTO score to at least 51 points to show functional gains in all ADL/IADLS   Status at last note/certification: 45  Patient will demonstrate an increase in L LE strength to at least 4/5 to allow safer community ambulation  Status at last note/certification:  Hip flex: 3+/5, abduction: 3+/5, extension: 3+/5, knee flexion: 3+/5, knee ext: 4+/5, ankle DF: 4/5  Patient will relate at least a 60% improvement in strength and energy allowing him to safely go to senior center  Status at last note/certification: Estrella Lamb goes to senior center    Has not gone back to senior center  Patient and Nuno December will be (I) with HEP and safetly strategies allowing cary to (I) manage sx and allow more (I) quality of life  Status at last note/certification:  NA     PLAN  [x]  Upgrade activities as tolerated     []  Continue plan of care  []  Update interventions per flow sheet       []  Discharge due to:_  []  Other:_      Nehal Pelayo, PTA 7/11/2019  12:08 PM    No future appointments.

## 2019-07-17 ENCOUNTER — HOSPITAL ENCOUNTER (OUTPATIENT)
Dept: PHYSICAL THERAPY | Age: 74
Discharge: HOME | End: 2019-07-17
Payer: MEDICARE

## 2019-07-17 PROCEDURE — 97112 NEUROMUSCULAR REEDUCATION: CPT

## 2019-07-17 PROCEDURE — 97110 THERAPEUTIC EXERCISES: CPT

## 2019-07-17 NOTE — PROGRESS NOTES
PT DAILY TREATMENT NOTE 10-18    Patient Name: Chasity Rosales  Date:2019  : 1945  [x]  Patient  Verified  Payor: [facility] MEDICARE / Plan: [facility] COMMUNITY PLAN MCR / Product Type: Managed Care Medicare /    In time:11:15  Out time:12;00  Total Treatment Time (min): 45  Visit #: 5 of 10    Medicare/BCBS Only   Total Timed Codes (min):  45 1:1 Treatment Time:  25       Treatment Area: Muscle weakness (generalized) [M62.81]  Weakness of right lower extremity [R29.898]  Weakness of left lower extremity [R29.898]    SUBJECTIVE  Pain Level (0-10 scale): 0  Any medication changes, allergies to medications, adverse drug reactions, diagnosis change, or new procedure performed?: [x] No    [] Yes (see summary sheet for update)  Subjective functional status/changes:   [] No changes reported  I feel a little stronger I think. OBJECTIVE      30 min Therapeutic Exercise:  [] See flow sheet :   Rationale: increase ROM and increase strength to improve the patients ability to increase activity tolerance for standing/walking, functional tasks       10 min Neuromuscular Re-education:  []  See flow sheet :   Rationale: increase strength, improve coordination, improve balance and increase proprioception  to improve the patients ability to increase stability to reduce fall risk, perfrom ADL's      With   [] TE   [] TA   [] neuro   [] other: Patient Education: [x] Review HEP    [] Progressed/Changed HEP based on:   [] positioning   [] body mechanics   [] transfers   [] heat/ice application    [] other:  Reviewed HEP with pt as he sates he is unsure if he knows how to do them. Other Objective/Functional Measures:      Pain Level (0-10 scale) post treatment: 0    ASSESSMENT/Changes in Function: Gains in knee strength,hwover still has deficits in bilateral hips. Presents with extnesor lag with SLR. AS well as compensatory ER with SL ABD.     Patient will continue to benefit from skilled PT services to modify and progress therapeutic interventions, address functional mobility deficits, address ROM deficits, address strength deficits, analyze and address soft tissue restrictions, analyze and cue movement patterns, analyze and modify body mechanics/ergonomics, assess and modify postural abnormalities, address imbalance/dizziness and instruct in home and community integration to attain remaining goals. []  See Plan of Care  []  See progress note/recertification  []  See Discharge Summary         Progress towards goals / Updated goals:  Patient will demonstrate (I) with simple HEP to improve LE strength to allow safer ambulation in the home and community  Status at last note/certification:  dependent  Not Met, reviewed with   Pt today Long Term Goals: To be accomplished in 10 treatments:  Patient will demonstrate an increase in FOTO score to at least 51 points to show functional gains in all ADL/IADLS   Status at last note/certification: 45  Patient will demonstrate an increase in L LE strength to at least 4/5 to allow safer community ambulation  Status at last note/certification:  Hip flex: 3+/5, abduction: 3+/5, extension: 3+/5, knee flexion: 3+/5, knee ext: 4+/5, ankle DF: 4/5   DF bilateral 5/5, knee extension bilateral 5/5, HS left 4+/5, right 5/5.  Hop flexion (SLR) bilaterally 4-/5, hip ABD right 3+/5, left 3+/5  Patient will relate at least a 60% improvement in strength and energy allowing him to safely go to senior center  Status at last note/certification: Kaylah Berry goes to senior center    Has not gone back to senior center  Patient and Preethi Yates will be (I) with HEP and safetly strategies allowing santosteint to (I) manage sx and allow more (I) quality of life  Status at last note/certification:  NA     PLAN  [x]  Upgrade activities as tolerated     []  Continue plan of care  []  Update interventions per flow sheet       []  Discharge due to:_  []  Other:_      Ruby Ray, JOLENE 7/17/2019  11:39 AM    Future Appointments   Date Time Provider Clarice Freedman   7/19/2019 12:00 PM [address]   7/22/2019 12:00 PM Samir Velasquez [facility]   7/24/2019 11:15 AM Natalio Ordonezstuart [facility]   7/29/2019 11:15 AM Laura Reagan ca [facility]   7/31/2019 12:00 PM [address]

## 2019-07-19 ENCOUNTER — HOSPITAL ENCOUNTER (OUTPATIENT)
Dept: PHYSICAL THERAPY | Age: 74
Discharge: HOME | End: 2019-07-19
Payer: MEDICARE

## 2019-07-19 PROCEDURE — 97110 THERAPEUTIC EXERCISES: CPT

## 2019-07-19 PROCEDURE — 97112 NEUROMUSCULAR REEDUCATION: CPT

## 2019-07-19 NOTE — PROGRESS NOTES
PT DAILY TREATMENT NOTE 10-18    Patient Name: Hazel Manley  Date:2019  : 1945  [x]  Patient  Verified  Payor: [facility] MEDICARE / Plan: [facility] COMMUNITY PLAN MCR / Product Type: Managed Care Medicare /    In time:12:05  Out time: 12;50  Total Treatment Time (min): 45  Visit #: 6 of 10    Medicare/BCBS Only   Total Timed Codes (min):  45 1:1 Treatment Time:  45       Treatment Area: Muscle weakness (generalized) [M62.81]  Weakness of right lower extremity [R29.898]  Weakness of left lower extremity [R29.898]    SUBJECTIVE  Pain Level (0-10 scale): 0  Any medication changes, allergies to medications, adverse drug reactions, diagnosis change, or new procedure performed?: [x] No    [] Yes (see summary sheet for [id number] functional status/changes:   [] No changes reported  I haven't worked on my HEP yet. OBJECTIVE    30 min Therapeutic Exercise:  [] See flow sheet :   Rationale: increase strength to improve the patients ability to im;rove standing/walking tolerance, ease of ADL's     15 min Neuromuscular Re-education:  []  See flow sheet :   Rationale: increase strength, improve coordination, improve balance and increase proprioception  to improve the patients ability to increase stability to reduce fall risk, ease of home and community mobility       With   [] TE   [] TA   [] neuro   [] other: Patient Education: [x] Review HEP    [] Progressed/Changed HEP based on:   [] positioning   [] body mechanics   [] transfers   [] heat/ice application    [] other:      Other Objective/Functional Measures: Added 1# weight to hip x 3  Sidestepping o nfloor 1 x with QC    Pain Level (0-10 scale) post treatment:  0    ASSESSMENT/Changes in Function: slow jazmyn when sidestepping outside of bars. Cues for QC placement to increase support.     Patient will continue to benefit from skilled PT services to modify and progress therapeutic interventions, address functional mobility deficits, address ROM deficits, address strength deficits, analyze and address soft tissue restrictions, analyze and cue movement patterns, analyze and modify body mechanics/ergonomics, assess and modify postural abnormalities, address imbalance/dizziness and instruct in home and community integration to attain remaining goals. []  See Plan of Care  []  See progress note/recertification  []  See Discharge Summary         Progress towards goals / Updated goals:  Patient will demonstrate (I) with simple HEP to improve LE strength to allow safer ambulation in the home and community  Status at last note/certification:  dependent  Not Met, reviewed with pt. However still has not performed at home. Pt today Long Term Goals: To be accomplished in 10 treatments:  Patient will demonstrate an increase in FOTO score to at least 51 points to show functional gains in all ADL/IADLS   Status at last note/certification: 45  Patient will demonstrate an increase in L LE strength to at least 4/5 to allow safer community ambulation  Status at last note/certification:  Hip flex: 3+/5, abduction: 3+/5, extension: 3+/5, knee flexion: 3+/5, knee ext: 4+/5, ankle DF: 4/5   DF bilateral 5/5, knee extension bilateral 5/5, HS left 4+/5, right 5/5.  Hop flexion (SLR) bilaterally 4-/5, hip ABD right 3+/5, left 3+/5  Patient will relate at least a 60% improvement in strength and energy allowing him to safely go to senior center  Status at last note/certification: Sheri Araya goes to senior center    Has not gone back to senior center  Patient and Marielle Cardenas will be (I) with HEP and safetly strategies allowing santosteint to (I) manage sx and allow more (I) quality of life  Status at last note/certification:  NA     PLAN  [x]  Upgrade activities as tolerated     []  Continue plan of care  []  Update interventions per flow sheet       []  Discharge due to:_  []  Other:_      Nora Min, JOLENE 7/19/2019  12:10 PM    Future Appointments   Date Time Provider Clarice Freedman [facility]2019 [facility]   7/24/2019 11:15 AM Gavi Gibson [facility] MCCARTHY [facility]   7/29/2019 11:15 AM Romie, [address] [facility]   7/31/2019 12:00 PM Romie, [address]

## 2019-07-22 ENCOUNTER — HOSPITAL ENCOUNTER (OUTPATIENT)
Dept: PHYSICAL THERAPY | Age: 74
Discharge: HOME | End: 2019-07-22
Payer: MEDICARE

## 2019-07-22 PROCEDURE — 97110 THERAPEUTIC EXERCISES: CPT

## 2019-07-22 PROCEDURE — 97112 NEUROMUSCULAR REEDUCATION: CPT

## 2019-07-22 NOTE — PROGRESS NOTES
PT DAILY TREATMENT NOTE 10-18    Patient Name: Kalyan Bland  Date:2019  : 1945  [x]  Patient  Verified  Payor: [facility] MEDICARE / Plan: [facility] COMMUNITY PLAN MCR / Product Type: Managed Care Medicare /    In time:12:05  Out time:12;50  Total Treatment Time (min): 45  Visit #: 7 of 10    Medicare/BCBS Only   Total Timed Codes (min):  45 1:1 Treatment Time:  45       Treatment Area: Muscle weakness (generalized) [M62.81]  Weakness of right lower extremity [R29.898]  Weakness of left lower extremity [R29.898]    SUBJECTIVE  Pain Level (0-10 scale): 0  Any medication changes, allergies to medications, adverse drug reactions, diagnosis change, or new procedure performed?: [x] No    [] Yes (see summary sheet for update)  Subjective functional status/changes:   [] No changes reported      OBJECTIVE    30 min Therapeutic Exercise:  [] See flow sheet :   Rationale: increase strength to improve the patients ability to improve activity tolerance    15 min Neuromuscular Re-education:  []  See flow sheet :   Rationale: increase strength, improve coordination, improve balance and increase proprioception  to improve the patients ability to increase stability for standing/walking          With   [] TE   [] TA   [] neuro   [] other: Patient Education: [x] Review HEP    [] Progressed/Changed HEP based on:   [] positioning   [] body mechanics   [] transfers   [] heat/ice application    [] other:      Other Objective/Functional Measures:   Functional Gains:  Pt describes feeling stronger overall as he can walk for longer of periods of time and with improved stability, using QC for support.     Deficits:  still has some issues with balance  Pt Goal: to continue working on strength and balance     Pain Level (0-10 scale) post treatment: 0    ASSESSMENT/Changes in Function:  See above    Patient will continue to benefit from skilled PT services to modify and progress therapeutic interventions, address functional mobility deficits, address ROM deficits, address strength deficits, analyze and address soft tissue restrictions, analyze and cue movement patterns, analyze and modify body mechanics/ergonomics, assess and modify postural abnormalities, address imbalance/dizziness and instruct in home and community integration to attain remaining goals. []  See Plan of Care  []  See progress note/recertification  []  See Discharge Summary         Progress towards goals / Updated goals:  Patient will demonstrate (I) with simple HEP to improve LE strength to allow safer ambulation in the home and community  Status at last note/certification:  dependent  Partial compliance. Pt stated that he has been doing his HEP some. Pt today Long Term Goals: To be accomplished in 10 treatments:  Patient will demonstrate an increase in FOTO score to at least 51 points to show functional gains in all ADL/IADLS   Status at last note/certification: Clarice Cunha  Patient will demonstrate an increase in L LE strength to at least 4/5 to allow safer community ambulation  Status at last note/certification:  Hip flex: 3+/5, abduction: 3+/5, extension: 3+/5, knee flexion: 3+/5, knee ext: 4+/5, ankle DF: 4/5   DF bilateral 5/5, knee extension bilateral 5/5, HS left 4+/5, right 5/5. Hop flexion (SLR) bilaterally 4-/5, hip ABD right 3+/5, left 3+/5  Patient will relate at least a 60% improvement in strength and energy allowing him to safely go to senior center  Status at last note/certification: Rodrick Teixeira goes to senior center    Has not gone back to senior center. Would like to return, but sates that they don't do \"anything there\" Referring to the ex's done in PT.   Patient and Manjit Wang will be (I) with HEP and safetly strategies allowing paiteint to (I) manage sx and allow more (I) quality of life  Status at last note/certification:  NA      PLAN  [x]  Upgrade activities as tolerated     []  Continue plan of care  []  Update interventions per flow sheet       []  Discharge due to:_  [x]  Other:_  reasess to continue PT to address balance, strength    Jairon Tim, PTA 7/22/2019  12:13 PM    Future Appointments   Date Time Provider Clarice Freedman   7/24/2019 11:15 AM Claude Palacios [facility]   7/29/2019 11:15 AM Lydia Cohen [facility]   7/31/2019 12:00 PM Romie, [address]

## 2019-07-24 ENCOUNTER — HOSPITAL ENCOUNTER (OUTPATIENT)
Dept: PHYSICAL THERAPY | Age: 74
Discharge: HOME | End: 2019-07-24
Payer: MEDICARE

## 2019-07-24 PROCEDURE — 97112 NEUROMUSCULAR REEDUCATION: CPT

## 2019-07-24 NOTE — PROGRESS NOTES
PT DAILY TREATMENT NOTE 10-18    Patient Name: Kylah Licona  Date:2019  : 1945  [x]  Patient  Verified  Payor: [facility] MEDICARE / Plan: [facility] COMMUNITY PLAN MCR / Product Type: Managed Care Medicare /    In time:11:15  Out time:11:58  Total Treatment Time (min): 43  Visit #: 1 of 10    Medicare/BCBS Only   Total Timed Codes (min):  43 1:1 Treatment Time:  43       Treatment Area: Muscle weakness (generalized) [M62.81]  Weakness of right lower extremity [R29.898]  Weakness of left lower extremity [R29.898]    SUBJECTIVE  Pain Level (0-10 scale): 0  Any medication changes, allergies to medications, adverse drug reactions, diagnosis change, or new procedure performed?: [x] No    [] Yes (see summary sheet for update)  Subjective functional status/changes:   [] No changes reported  \"I have made about 50% improvement at this point. \"    OBJECTIVE     43 min Neuromuscular Re-education:  [x]  See flow sheet :   Rationale: increase strength, improve coordination, improve balance and increase proprioception  to improve the patients ability to perform stair negotiation and functional mobility in the home/community with improved quadriceps control and decreased falls risk. With   [] TE   [] TA   [] neuro   [] other: Patient Education: [x] Review HEP    [] Progressed/Changed HEP based on:   [] positioning   [] body mechanics   [] transfers   [] heat/ice application    [] other:      Other Objective/Functional Measures: FOTO 59 pts     Pain Level (0-10 scale) post treatment: 0    ASSESSMENT/Changes in Function: Patient with difficulty performing SLS today, especially with left LE.  Notable Trendelenberg stance when performing SLS bilateral.    Patient will continue to benefit from skilled PT services to modify and progress therapeutic interventions, address functional mobility deficits, address ROM deficits, address strength deficits, analyze and address soft tissue restrictions, analyze and cue movement patterns, analyze and modify body mechanics/ergonomics, assess and modify postural abnormalities and address imbalance/dizziness to attain remaining goals. []  See Plan of Care  [x]  See progress note/recertification  []  See Discharge Summary         Progress towards goals / Updated goals:  Goal: Patient will demonstrate an increase in L LE strength to at least 4/5 to allow safer community ambulation  Status at last note/certification:  DF bilateral 5/5, knee extension bilateral 5/5, HS left 4+/5, right 5/5. Hop flexion (SLR) bilaterally 4-/5, hip ABD right 3+/5, left 3+/5  Goal: Patient will relate at least a 60% improvement in strength and energy allowing him to safely go to senior center  Status at last note/certification:  79%; Has not gone back to senior center. Would like to return, but sates that they don't do \"anything there\" Referring to the ex's done in PT. Goal: Patient and Jose Garcia will be (I) with HEP and safetly strategies allowing paiteint to (I) manage sx and allow more (I) quality of life  Status at last note/certification:   Patient reporting infrequent completion of HEP  Goals: Patient will demonstrate bilateral SLS time of >10 sec for improved safety negotiating obstacles in the home environment.   Status at last note/certification:  SLS Left 2 sec, right 8 sec    PLAN  [x]  Upgrade activities as tolerated     [x]  Continue plan of care  []  Update interventions per flow sheet       []  Discharge due to:_  []  Other:_      Doneta Minium 7/24/2019  1:42 PM    Future Appointments   Date Time Provider Clarice Freedman   7/29/2019 11:15 AM Tex Babinski [facility]   7/31/2019 12:00 PM Douglas Carr [facility]   8/5/2019 11:15 AM Susan Huitron [facility]   8/7/2019 11:15 AM Tex Babinski [facility]   8/12/2019 11:15 AM Tex Babinski [facility] MCCAR[facility]Y [facility]   8/14/2019 11:15 AM Tommy Frye

## 2019-07-24 NOTE — PROGRESS NOTES
[facility] - [facility] [address]  [phone] fax    Continued Plan of Care/ Re-certification for Physical Therapy Services      Patient name: Kylah Licona Start of Care: 2019   Referral source: Jake Salmon MD : 1945                Medical Diagnosis: Muscle weakness (generalized) [M62.81]  Weakness of right lower extremity [R29.898]  Weakness of left lower extremity [R29.898]  Payor: [facility] MEDICAID / Plan: [id number] / Product Type: Managed Care Medicaid /  Onset Date:                Treatment Diagnosis: CVA with Left hemiparesis   Prior Hospitalization: see medical history Provider#: [id number]   Medications: Verified on Patient summary List    Comorbidities: HTN, smoker   Prior Level of Function: (I) gait in the community with decreased fall risk and with increased energy level    Visits from Start of Care: 8    Missed Visits: 0    The Plan of Care and following information is based on the patient's current status:  Patient will demonstrate (I) with simple HEP to improve LE strength to allow safer ambulation in the home and community  Status at last note/certification:  dependent  Partial compliance. Pt stated that he has been doing his HEP some. Pt today Long Term Goals: To be accomplished in 10 treatments:  Patient will demonstrate an increase in FOTO score to at least 51 points to show functional gains in all ADL/IADLS   Status at last note/certification: 45  Current: met- 59 pts  Patient will demonstrate an increase in L LE strength to at least 4/5 to allow safer community ambulation  Status at last note/certification:  Hip flex: 3+/5, abduction: 3+/5, extension: 3+/5, knee flexion: 3+/5, knee ext: 4+/5, ankle DF: 4/5   Current: progressing- DF bilateral 5/5, knee extension bilateral 5/5, HS left 4+/5, right 5/5.  Hop flexion (SLR) bilaterally 4-/5, hip ABD right 3+/5, left 3+/5  Patient will relate at least a 60% improvement in strength and energy allowing him to safely go to senior center  Status at last note/certification: Mariana Rizo goes to senior center    Current: progressing- 50%; Has not gone back to senior center. Would like to return, but sates that they don't do \"anything there\" Referring to the ex's done in PT. Patient and Sayra Crow will be (I) with HEP and safetly strategies allowing paiteint to (I) manage sx and allow more (I) quality of life  Status at last note/certification:  NA   Current: Patient reporting infrequent completion of HEP    Key functional changes:   Functional Gains:  Pt describes feeling stronger overall as he can walk for longer of periods of time and with improved stability, using QC for support. Deficits:  still has some issues with balance  Pt Goal: to continue working on strength and balance      % Improvement: 50%      Problems/ barriers to goal attainment: none     Problem List: decrease ROM, decrease strength, impaired gait/ balance, decrease ADL/ functional abilitiies, decrease activity tolerance, decrease flexibility/ joint mobility and decrease transfer abilities    Treatment Plan: Therapeutic exercise, Therapeutic activities, Neuromuscular re-education, Physical agent/modality, Gait/balance training, Manual therapy, Aquatic therapy, Patient education, Self Care training, Functional mobility training, Home safety training and Stair training     Patient Goal (s) has been updated and includes: \"continue working on strength/balance\"     Goals for this certification period to be accomplished in 10 treatments:    Goal: Patient will demonstrate an increase in L LE strength to at least 4/5 to allow safer community ambulation  Status at last note/certification:  DF bilateral 5/5, knee extension bilateral 5/5, HS left 4+/5, right 5/5.  Hop flexion (SLR) bilaterally 4-/5, hip ABD right 3+/5, left 3+/5  Goal: Patient will relate at least a 60% improvement in strength and energy allowing him to safely go to senior center  Status at last note/certification:  31%; Has not gone back to senior center. Would like to return, but sates that they don't do \"anything there\" Referring to the ex's done in PT. Goal: Patient and Darci Sharpe will be (I) with HEP and safetly strategies allowing paiteint to (I) manage sx and allow more (I) quality of life  Status at last note/certification:   Patient reporting infrequent completion of HEP  Goals: Patient will demonstrate bilateral SLS time of >10 sec for improved safety negotiating obstacles in the home environment. Status at last note/certification:  SLS Left 2 sec, right 8 sec    Frequency / Duration: Patient to be seen 2 times per week for 10 treatments:    Assessment / Recommendations:Patient has attended therapy consistently for 8 sessions for the treatment of left weakness following CVA that impairs gait/balance. At this time, the patient has made improvements in bilateral LE strength and balance abilities. However, the patient presents with continued functional deficits with dynamic balance abilities, most notable left SLS. The patient will benefit from continued skilled outpatient therapy to address these remaining functional deficits and decrease falls risk in the home environment. Certification Period: 7/24/19-8/22/19    Christiana [facility] 7/24/2019 11:25 AM    ________________________________________________________________________  I certify that the above Therapy Services are being furnished while the patient is under my care. I agree with the treatment plan and certify that this therapy is necessary. [] I have read the above and request that my patient continue as recommended.   [] I have read the above report and request that my patient continue therapy with the following changes/special instructions: ______________________________________  [] I have read the above report and request that my patient be discharged from therapy    Physician's Signature:____________Date:_________TIME:________    ** Signature, Date and Time must be completed for valid certification **    Please sign and return to [facility] - [address]  [phone] fax

## 2019-07-29 ENCOUNTER — HOSPITAL ENCOUNTER (OUTPATIENT)
Dept: PHYSICAL THERAPY | Age: 74
Discharge: HOME | End: 2019-07-29
Payer: MEDICARE

## 2019-07-29 PROCEDURE — 97112 NEUROMUSCULAR REEDUCATION: CPT

## 2019-07-29 NOTE — PROGRESS NOTES
PT DAILY TREATMENT NOTE 10-18    Patient Name: Mike Rock  Date:2019  : 1945  [x]  Patient  Verified  Payor: [facility] MEDICARE / Plan: [facility] COMMUNITY PLAN MCR / Product Type: Managed Care Medicare /    In time:11;15  Out time:11;55  Total Treatment Time (min): 40  Visit #: 2 of 10    Medicare/BCBS Only   Total Timed Codes (min):  40 1:1 Treatment Time:  40       Treatment Area: Muscle weakness (generalized) [M62.81]  Weakness of right lower extremity [R29.898]  Weakness of left lower extremity [R29.898]    SUBJECTIVE  Pain Level (0-10 scale): 0  Any medication changes, allergies to medications, adverse drug reactions, diagnosis change, or new procedure performed?: [x] No    [] Yes (see summary sheet for update)  Subjective functional status/changes:   [] No changes reported      OBJECTIVE       40 min Neuromuscular Re-education:  []  See flow sheet :   Rationale: increase strength, improve coordination, improve balance and increase proprioception  to improve the patients ability to increase stability, activity tolerance       With   [] TE   [] TA   [] neuro   [] other: Patient Education: [x] Review HEP    [] Progressed/Changed HEP based on:   [] positioning   [] body mechanics   [] transfers   [] heat/ice application    [] other:      Other Objective/Functional Measures: Added lunges 10 x     Pain Level (0-10 scale) post treatment: 0    ASSESSMENT/Changes in Function: sway noted during 1/2 stance with head turns but no LOB. Patient will continue to benefit from skilled PT services to modify and progress therapeutic interventions, address functional mobility deficits, address ROM deficits, address strength deficits, analyze and address soft tissue restrictions, analyze and cue movement patterns, analyze and modify body mechanics/ergonomics, assess and modify postural abnormalities, address imbalance/dizziness and instruct in home and community integration to attain remaining goals.      [] See Plan of Care  []  See progress note/recertification  []  See Discharge Summary         Progress towards goals / Updated goals:  Goal: Patient will demonstrate an increase in L LE strength to at least 4/5 to allow safer community ambulation  Status at last note/certification:  DF bilateral 5/5, knee extension bilateral 5/5, HS left 4+/5, right 5/5. Hop flexion (SLR) bilaterally 4-/5, hip ABD right 3+/5, left 3+/5  Goal: Patient will relate at least a 60% improvement in strength and energy allowing him to safely go to senior center  Status at last note/certification:  17%; Has not gone back to senior center.  Would like to return, but sates that they don't do \"anything there\" Referring to the ex's done in PT. Goal: Patient and De Jones will be (I) with HEP and safetly strategies allowing paiteint to (I) manage sx and allow more (I) quality of life  Status at last note/certification:   Patient reporting infrequent completion of HEP  Goals: Patient will demonstrate bilateral SLS time of >10 sec for improved safety negotiating obstacles in the home environment.   Status at last note/certification:  SLS Left 2 sec, right 8 sec    PLAN  [x]  Upgrade activities as tolerated     []  Continue plan of care  []  Update interventions per flow sheet       []  Discharge due to:_  []  Other:_      Jairon Tim, JOLENE 7/29/2019  11:32 AM    Future Appointments   Date Time Provider Clarice Freedman   7/31/2019 12:00 PM Emily Carcamo [facility]   8/5/2019 11:15 AM Glory Landaverde [facility]2019 [facility]:15 [facility]   8/12/2019 11:15 AM [facility]   8/14/2019 11:15 AM Mann Farley

## 2019-07-31 ENCOUNTER — HOSPITAL ENCOUNTER (OUTPATIENT)
Dept: PHYSICAL THERAPY | Age: 74
Discharge: HOME | End: 2019-07-31
Payer: MEDICARE

## 2019-07-31 PROCEDURE — 97112 NEUROMUSCULAR REEDUCATION: CPT

## 2019-08-05 ENCOUNTER — HOSPITAL ENCOUNTER (OUTPATIENT)
Dept: PHYSICAL THERAPY | Age: 74
Discharge: HOME | End: 2019-08-05
Payer: MEDICARE

## 2019-08-05 PROCEDURE — 97112 NEUROMUSCULAR REEDUCATION: CPT

## 2019-08-05 NOTE — PROGRESS NOTES
PT DAILY TREATMENT NOTE 10-18    Patient Name: Rut Alonso  Date:2019  : 1945  [x]  Patient  Verified  Payor: [facility] MEDICAID / Plan: [facility] [employer] PLUS / Product Type: Managed Care Medicaid /    In time:11:15  Out time:11:56  Total Treatment Time (min): 41  Visit #: 4 of 10    Medicare/BCBS Only   Total Timed Codes (min):  41 1:1 Treatment Time:  41       Treatment Area: Muscle weakness (generalized) [M62.81]  Weakness of right lower extremity [R29.898]  Weakness of left lower extremity [R29.898]    SUBJECTIVE  Pain Level (0-10 scale): 0  Any medication changes, allergies to medications, adverse drug reactions, diagnosis change, or new procedure performed?: [x] No    [] Yes (see summary sheet for update)  Subjective functional status/changes:   [] No changes reported  \"I am feeling sluggish this morning. Cedeno Challenger \"    OBJECTIVE       41 min Neuromuscular Re-education:  [x]  See flow sheet :   Rationale: increase strength, improve coordination, improve balance and increase proprioception  to improve the patients ability to perform stair negotiation and functional mobility in the home/community with improved quadriceps control and decreased falls risk. With   [] TE   [] TA   [] neuro   [] other: Patient Education: [x] Review HEP    [] Progressed/Changed HEP based on:   [] positioning   [] body mechanics   [] transfers   [] heat/ice application    [] other:      Other Objective/Functional Measures: Able to maintain rhomberg 30\" EC on foam.     Pain Level (0-10 scale) post treatment: 0    ASSESSMENT/Changes in Function: Patient challenged by cone taps with cognitive dual task initially but speed and accuracy of movements improving with increased repetitions. Able to transition between walking forward and stopped quickly but difficulty increasing gait speed.     Patient will continue to benefit from skilled PT services to modify and progress therapeutic interventions, address functional mobility deficits, address ROM deficits, address strength deficits, analyze and address soft tissue restrictions, analyze and cue movement patterns, analyze and modify body mechanics/ergonomics, assess and modify postural abnormalities and address imbalance/dizziness to attain remaining goals. []  See Plan of Care  []  See progress note/recertification  []  See Discharge Summary         Progress towards goals / Updated goals:  Goal: Patient will demonstrate an increase in L LE strength to at least 4/5 to allow safer community ambulation  Status at last note/certification:  DF bilateral 5/5, knee extension bilateral 5/5, HS left 4+/5, right 5/5. Hop flexion (SLR) bilaterally 4-/5, hip ABD right 3+/5, left 3+/5  Goal: Patient will relate at least a 60% improvement in strength and energy allowing him to safely go to senior center  Status at last note/certification:  31%; Has not gone back to senior center.  Would like to return, but sates that they don't do \"anything there\" Referring to the ex's done in PT. Goal: Patient and Sue Guy will be (I) with HEP and safetly strategies allowing paiteint to (I) manage sx and allow more (I) quality of life  Status at last note/certification:   Patient reporting infrequent completion of HEP  Goals: Patient will demonstrate bilateral SLS time of >10 sec for improved safety negotiating obstacles in the home environment.   Status at last note/certification:  SLS Left 2 sec, right 8 sec    PLAN  [x]  Upgrade activities as tolerated     [x]  Continue plan of care  []  Update interventions per flow sheet       []  Discharge due to:_  []  Other:_      Rose Rinaldi 8/5/2019  11:22 AM    Future Appointments   Date Time Provider Clarice Freedman   8/7/2019 11:15 AM Suhas Groves [facility]   8/12/2019 11:15 AM Suhas Groves [facility]   8/14/2019 11:15 AM Aliayh Schneider

## 2019-08-07 ENCOUNTER — APPOINTMENT (OUTPATIENT)
Dept: PHYSICAL THERAPY | Age: 74
End: 2019-08-07
Payer: MEDICARE

## 2019-08-12 ENCOUNTER — APPOINTMENT (OUTPATIENT)
Dept: PHYSICAL THERAPY | Age: 74
End: 2019-08-12
Payer: MEDICARE

## 2019-08-14 ENCOUNTER — HOSPITAL ENCOUNTER (OUTPATIENT)
Dept: PHYSICAL THERAPY | Age: 74
Discharge: HOME | End: 2019-08-14
Payer: MEDICARE

## 2019-08-14 PROCEDURE — 97112 NEUROMUSCULAR REEDUCATION: CPT

## 2019-08-14 NOTE — PROGRESS NOTES
PT DAILY TREATMENT NOTE 10-18    Patient Name: Chasity Rosales  Date:2019  : 1945  [x]  Patient  Verified  Payor: [facility] MEDICAID / Plan: [facility] [employer] PLUS / Product Type: Managed Care Medicaid /    In time:12:00  Out time: 12;45  Total Treatment Time (min): 45  Visit #: 3 of 8    Medicare/BCBS Only   Total Timed Codes (min):  45 1:1 Treatment Time:  45       Treatment Area: Muscle weakness (generalized) [M62.81]  Weakness of right lower extremity [R29.898]  Weakness of left lower extremity [R29.898]    SUBJECTIVE  Pain Level (0-10 scale): 0  Any medication changes, allergies to medications, adverse drug reactions, diagnosis change, or new procedure performed?: [x] No    [] Yes (see summary sheet for update)  Subjective functional status/changes:   [] No changes reported  OK.    Did keep up with my HEP while out    OBJECTIVE       45 min Neuromuscular Re-education:  []  See flow sheet :   Rationale: increase strength, improve coordination and improve balance  to improve the patients ability to improve stability, activity tolerance, ease of ADL's       With   [] TE   [] TA   [] neuro   [] other: Patient Education: [x] Review HEP    [] Progressed/Changed HEP based on:   [] positioning   [] body mechanics   [] transfers   [] heat/ice application    [] other:      Other Objective/Functional Measures:      Pain Level (0-10 scale) post treatment: 0    ASSESSMENT/Changes in Function: pt required finger tip touch for tandem stance to stabiize    Patient will continue to benefit from skilled PT services to modify and progress therapeutic interventions, address functional mobility deficits, address ROM deficits, address strength deficits, analyze and address soft tissue restrictions, analyze and cue movement patterns, analyze and modify body mechanics/ergonomics, assess and modify postural abnormalities, address imbalance/dizziness and instruct in home and community integration to attain remaining goals.     []  See Plan of Care  []  See progress note/recertification  []  See Discharge Summary         Progress towards goals / Updated goals:  Goal: Patient will demonstrate an increase in L LE strength to at least 4/5 to allow safer community ambulation  Status at last note/certification:  DF bilateral 5/5, knee extension bilateral 5/5, HS left 4+/5, right 5/5. Hop flexion (SLR) bilaterally 4-/5, hip ABD right 3+/5, left 3+/5  Goal: Patient will relate at least a 60% improvement in strength and energy allowing him to safely go to senior center  Status at last note/certification:  51%; Has not gone back to senior center.  Would like to return, but sates that they don't do \"anything there\" Referring to the ex's done in PT. Goal: Patient and Shraddha Mustafa will be (I) with HEP and safetly strategies allowing paiteint to (I) manage sx and allow more (I) quality of life  Status at last note/certification:   Patient reporting infrequent completion of HEP  Goals: Patient will demonstrate bilateral SLS time of >10 sec for improved safety negotiating obstacles in the home environment. Status at last note/certification:  SLS Left 2 sec, right 8 sec    PLAN  [x]  Upgrade activities as tolerated     []  Continue plan of care  []  Update interventions per flow sheet       []  Discharge due to:_  []  Other:_      Gladis Forrester, JOLENE 8/14/2019  12:21 PM    No future appointments.

## 2019-08-19 NOTE — REHAB NOTE
----- Message from SAM Pang sent at 8/19/2019  2:04 PM CDT -----  Stool cultures negative for acute pathogen. If symptoms persist follow up with PCP.   SHIFT CHANGE NOTE FOR [facility]    Bedside and Verbal shift change report given to Violetta ADKINS  (oncoming nurse) by Thierry Kirk LPN (offgoing nurse). Report included the following information SBAR, Kardex, MAR and Recent Results.     Situation:   Code Status: Full Code   Reason for Admission: abscess  Hospital Day: 11   Problem List:   Hospital Problems  Date Reviewed: 5/10/2018          Codes Class Noted POA    Depression ICD-10-CM: F32.9  ICD-9-CM: 739  5/8/2018 Yes        Statin intolerance (Chronic) ICD-10-CM: Z78.9  ICD-9-CM: 995.27  5/7/2018 Yes    Overview Signed 5/7/2018  2:19 PM by Faisal Reyna MD     Atorvastatin and Simvastatin             Acute renal failure superimposed on stage 3 chronic kidney disease ([facility] [address] ICD-10-CM: N17.9, N18.3  ICD-9-CM: 584.9, 585.3  5/7/2018 No        Vitamin D deficiency (Chronic) ICD-10-CM: E55.9  ICD-9-CM: 268.9  5/1/2018 Yes    Overview Signed 5/1/2018 10:01 AM by Faisal Reyna MD     Vitamin D 25-Hydroxy (5/1/2018) = 17.9              Overactive bladder (Chronic) ICD-10-CM: N32.81  ICD-9-CM: 596.51  Unknown Yes        Benign prostatic hyperplasia (Chronic) ICD-10-CM: N40.0  ICD-9-CM: 600.00  Unknown Yes        Postoperative urinary retention ICD-10-CM: N99.89, R33.8  ICD-9-CM: 997.5  4/22/2018 Yes        Postoperative confusion ICD-10-CM: R41.0  ICD-9-CM: 293.9  4/22/2018 Yes        Status post appendectomy ICD-10-CM: Z90.49  ICD-9-CM: V45.89  4/21/2018 Yes    Overview Addendum 4/30/2018  4:41 PM by Faisal Reyna MD     S/P Diagnostic laparoscopy followed by open appendectomy with drainage of appendiceal abscess (4/20/2018 - Dr. Kodi Sumner)             Acute blood loss as cause of postoperative anemia ICD-10-CM: D62  ICD-9-CM: 285.1  4/21/2018 Yes        Generalized weakness ICD-10-CM: R53.1  ICD-9-CM: 780.79  4/20/2018 Yes        * (Principal)Acute appendicitis with peritoneal abscess ICD-10-CM: K35.3  ICD-9-CM: 540.1  4/20/2018 Yes        Hypertensive heart and kidney disease without heart failure and with chronic kidney disease stage III (Chronic) ICD-10-CM: I13.10, N18.3  ICD-9-CM: 404.90, 585.3  9/2/2015 Yes              Background:   Past Medical History:   Past Medical History:   Diagnosis Date    Acute blood loss as cause of postoperative anemia 4/21/2018    Benign prostatic hyperplasia     CKD (chronic kidney disease) stage 3, GFR 30-59 ml/min 9/1/2015    Depression 1/1/8232    Diastolic dysfunction without heart failure 9/2/2015    Grade 1 diastolic dysfunction on 2D ECHO done 9/02/2015    Dyslipidemia 9/2/2015    Dysphagia as late effect of cerebrovascular accident (CVA) 9/1/2015    Hemiparesis affecting left side as late effect of cerebrovascular accident (CVA) ([facility] [address] 9/1/2015    History of noncompliance with medical treatment, presenting hazards to health 9/1/2015    History of stroke with residual deficit 9/1/2015    Acute Ischemic Stroke (early subacute infarct at the posterior right lentiform nucleus) with residual left hemiparesis and dysphagia     History of tobacco use 9/1/2015    History of trichomonal urethritis 9/1/2015    History of vitamin D deficiency 9/6/2015    Hypertensive heart and kidney disease without heart failure and with chronic kidney disease stage III 9/2/2015    Obesity, Class I, BMI 30-34.9 9/1/2015    Overactive bladder     Postoperative atrial fibrillation (HCC) 4/21/2018    Resolved    Postoperative confusion 4/22/2018    Postoperative urinary retention 4/22/2018    Statin intolerance 05/07/2018    Atorvastatin and Simvastatin    Vitamin D deficiency 5/1/2018    Vitamin D 25-Hydroxy (5/1/2018) = 17.9       Patient taking anticoagulants no    Patient has a defibrillator: no     Assessment:   Changes in Assessment throughout shift: no     Patient has central line: no Reasons if yes: Insertion date: Last dressing date:   Patient has Chow Cath: no Reasons if yes:     Insertion date:     Last Vitals:     Vitals:    05/10/18 0805 05/10/18 1600 05/10/18 2216 05/11/18 0737   BP: 117/69 123/77 116/77 118/75   Pulse: 89 80 63 64   Resp: 17 17 18 18   Temp: 97 °F (36.1 °C) 98.7 °F (37.1 °C) 98.1 °F (36.7 °C) 97.8 °F (36.6 °C)   SpO2: 99% 95% 98% 98%   Weight:       Height:            PAIN    Pain Assessment    Pain Intensity 1: 0 (05/11/18 0736) Pain Intensity 1: 2 (12/29/14 1105)    Pain Location 1: Generalized Pain Location 1: Abdomen    Pain Intervention(s) 1: Medication (see MAR) Pain Intervention(s) 1: Medication (see MAR)  Patient Stated Pain Goal: 0 Patient Stated Pain Goal: 0  o Intervention effective: yes    o Other actions taken for pain: no     Skin Assessment  Skin color Skin Color: Appropriate for ethnicity  Condition/Temperature Skin Condition/Temp: Dry, Warm  Integrity Skin Integrity: Incision (comment)  Turgor Turgor: Non-tenting  Weekly Pressure Ulcer Documentation  Pressure  Injury Documentation: No Pressure Injury Noted-Pressure Ulcer Prevention Initiated  Wound Prevention & Protection Methods  Orientation of wound Orientation of Wound Prevention: Posterior  Location of Prevention Location of Wound Prevention: Buttocks, Sacrum/Coccyx  Dressing Present Dressing Present : No  Dressing Status Dressing Status: Intact  Wound Offloading Wound Offloading (Prevention Methods): Bed, pressure redistribution/air     INTAKE/OUPUT    Date 05/10/18 0700 - 05/11/18 0659 05/11/18 0700 - 05/12/18 0659   Shift 0700-1859 4581-8992 24 Hour Total 7750-2644 1973-1907 24 Hour Total   I  N  T  A  K  E   P.O. 630  630         P. O. 630  630       Shift Total  (mL/kg) 630  (6.4)  630  (6.4)      O  U  T  P  U  T   Urine  (mL/kg/hr)            Urine Occurrence(s) 3 x 3 x 6 x 1 x  1 x    Stool            Stool Occurrence(s) 1 x 0 x 1 x 0 x  0 x    Shift Total  (mL/kg)           630      Weight (kg) 99.1 99.1 99.1 99.1 99.1 99.1       Recommendations:  1. Patient needs and requests: education    2.  Diet: cardiac    3. Pending tests/procedures: no     4. Functional Level/Equipment: 1 x person assist    5. Estimated Discharge Date: TDB Posted on Whiteboard in Patients Room: no     [facility] Safety Check    A safety check occurred in the patient's room between off going nurse and oncoming nurse listed above. The safety check included the below items  Area Items   H  High Alert Medications - Verify all high alert medication drips (heparin, PCA, etc.)   E  Equipment - Suction is set up for ALL patients (with dipti)  - Red plugs utilized for all equipment (IV pumps, etc.)  - WOWs wiped down at end of shift.  - Room stocked with oxygen, suction, and other unit-specific supplies   A  Alarms - Bed alarm is set for fall risk patients  - Ensure chair alarm is in place and activated if patient is up in a chair   L  Lines - Check IV for any infiltration  - Chow bag is empty if patient has a Chow   - Tubing and IV bags are labeled   S  Safety   - Room is clean, patient is clean, and equipment is clean. - Hallways are clear from equipment besides carts. - Fall bracelet on for fall risk patients  - Ensure room is clear and free of clutter  - Suction is set up for ALL patients (with dipti)  - Hallways are clear from equipment besides carts.    - Isolation precautions followed, supplies available outside room, sign posted

## 2019-08-21 ENCOUNTER — HOSPITAL ENCOUNTER (OUTPATIENT)
Dept: PHYSICAL THERAPY | Age: 74
Discharge: HOME | End: 2019-08-21
Payer: MEDICARE

## 2019-08-21 PROCEDURE — 97110 THERAPEUTIC EXERCISES: CPT

## 2019-08-21 PROCEDURE — 97112 NEUROMUSCULAR REEDUCATION: CPT

## 2019-08-21 NOTE — PROGRESS NOTES
PT DAILY TREATMENT NOTE 10-18    Patient Name: Omero Walsh  Date:2019  : 1945  [x]  Patient  Verified  Payor: [facility] MEDICARE / Plan: [facility] COMMUNITY PLAN MCR / Product Type: Managed Care Medicare /    In time:11;15  Out time: 11;55  Total Treatment Time (min): 40  Visit #: 6 of 10    Medicare/BCBS Only   Total Timed Codes (min):  40 1:1 Treatment Time:  40       Treatment Area: Muscle weakness (generalized) [M62.81]  Weakness of right lower extremity [R29.898]  Weakness of left lower extremity [R29.898]    SUBJECTIVE  Pain Level (0-10 scale): 0  Any medication changes, allergies to medications, adverse drug reactions, diagnosis change, or new procedure performed?: [x] No    [] Yes (see summary sheet for update)  Subjective functional status/changes:   [] No changes reported  I plan to [facility] back to the [facility] after I finish here. OBJECTIVE    30 min Therapeutic Exercise:  _ See flow sheet :   Rationale: increase strength to improve the patients ability to improve activity tolerance     10 min Neuromuscular Re-education:  []  See flow sheet :   Rationale: increase strength, improve coordination and improve balance  to improve the patients ability to improve stability for gait          With   [] TE   [] TA   [] neuro   [] other: Patient Education: [x] Review HEP    [] Progressed/Changed HEP based on:   [] positioning   [] body mechanics   [] transfers   [] heat/ice application    [] other:      Other Objective/Functional Measures:   Gains:  More energy, more stable with gait using QC. [facility] to [facility] after [facility] from PT. Deficits:   None specifically. Able to do most everything he needs do.      Pain Level (0-10 scale) post treatment: 0    ASSESSMENT/Changes in Function: see goals    Patient will continue to benefit from skilled PT services to modify and progress therapeutic interventions, address functional mobility deficits, address ROM deficits, address strength deficits, analyze and address soft tissue restrictions, analyze and cue movement patterns, analyze and modify body mechanics/ergonomics, assess and modify postural abnormalities, address imbalance/dizziness and instruct in home and community integration to attain remaining goals. []  See Plan of Care  []  See progress note/recertification  []  See Discharge Summary         Progress towards goals / Updated goals:    Goal: Patient will demonstrate an increase in L LE strength to at least 4/5 to allow safer community ambulation  Status at last note/certification:  DF bilateral 5/5, knee extension bilateral 5/5, HS left 4+/5, right 5/5. Hip flexion (SLR) bilaterally 4-/5, hip ABD right 3+/5, left 3+/5  DF bilaterally 5/5, hip flexion (SLR) left 4+5, right 4+/5, ABD left 3+/5, right 4-/5  Goal: Patient will relate at least a 60% improvement in strength and energy allowing him to safely go to senior center  Status at last note/certification:  55%; Has not gone back to senior center.  Would like to return, but sates that they don't do \"anything there\" Referring to the ex's done in PT. Still at 50% improved since [facility]. Goal: Patient and Stephanie Valdez will be (I) with HEP and safetly strategies allowing cart to (I) manage sx and allow more (I) quality of life  Status at last note/certification:   Patient reporting infrequent completion of HEP  Still partially compliant with HEP  Goals: Patient will demonstrate bilateral SLS time of >10 sec for improved safety negotiating obstacles in the home environment.   Status at last note/certification:  SLS Left 2 sec, right 8 sec   right 27 seconds, left 5 seconds    PLAN  [x]  Upgrade activities as tolerated     []  Continue plan of care  []  Update interventions per flow sheet       []  Discharge due to:_  [x]  Other:_  Finish 3 remaining sessions, DC to [facility], PTA 8/21/2019  11:18 AM    Future Appointments   Date Time Provider Clarice Freedman   8/23/2019 11:15 AM Rachel Kramer, PT [facility]   8/26/2019 11:15 AM Rachel Kramer, PT [facility]   8/28/2019 11:15 AM Hector Marie, PT An[facility] Braden

## 2019-08-23 ENCOUNTER — HOSPITAL ENCOUNTER (OUTPATIENT)
Dept: PHYSICAL THERAPY | Age: 74
End: 2019-08-23
Payer: MEDICARE

## 2019-08-23 NOTE — PROGRESS NOTES
[facility] - [facility], [address] [place] [address]  [phone] fax    Continued Plan of Care/ Re-certification for Physical Therapy Services      Patient name: Loki Landers of Care: 2019   Referral source: Bhanu Champagne MD : 1945                Medical Diagnosis: Muscle weakness (generalized) [M62.81]  Weakness of right lower extremity [R29.898]  Weakness of left lower extremity [R29.898]  Payor: [facility] MEDICAID / Plan: Loreatha Kehr / Product Type: Managed Care Medicaid /  Onset Date:                Treatment Diagnosis: CVA with Left hemiparesis   Prior Hospitalization: see medical history Provider#: [id number]   Medications: Verified on Patient summary List    Comorbidities: HTN, smoker   Prior Level of Function: (I) gait in the community with decreased fall risk and with increased energy level    Visits from Start of Care: 13    Missed Visits: 2    The Plan of Care and following information is based on the patient's current status:    Goal: Patient will demonstrate an increase in L LE strength to at least 4/5 to allow safer community ambulation  Status at last note/certification:  DF bilateral 5/5, knee extension bilateral 5/5, HS left 4+/5, right 5/5. Hip flexion (SLR) bilaterally 4-/5, hip ABD right 3+/5, left 3+/5  Current: progressing- DF bilaterally 5/5, hip flexion (SLR) left 4+5, right 4+/5, ABD left 3+/5, right 4-/5  Goal: Patient will relate at least a 60% improvement in strength and energy allowing him to safely go to senior center  Status at last note/certification:  94%; Has not gone back to senior center.  Would like to return, but sates that they don't do \"anything there\" Referring to the ex's done in PT. Current: progressing- Still at 50% improved since [facility].   Goal: Patient and Lois Said will be (I) with HEP and safetly strategies allowing paiteint to (I) manage sx and allow more (I) quality of life  Status at last note/certification:   Patient reporting infrequent completion of HEP  Current: progressing- still partially compliant with HEP  Goals: Patient will demonstrate bilateral SLS time of >10 sec for improved safety negotiating obstacles in the home environment. Status at last note/certification:  SLS Left 2 sec, right 8 sec   Current: progressing- right 27 seconds, left 5 seconds    Key functional changes:   Functional Gains: More energy, more stable with gait using QC. Plans to return to Senior center after DC from PT. Functional Deficits: balance is still an issue  % improvement: 50%  Patient Goal: \"continue improving balance\"        Problems/ barriers to goal attainment: none     Problem List: decrease ROM, decrease strength, impaired gait/ balance, decrease ADL/ functional abilitiies, decrease activity tolerance, decrease flexibility/ joint mobility and decrease transfer abilities    Treatment Plan: Therapeutic exercise, Therapeutic activities, Neuromuscular re-education, Physical agent/modality, Gait/balance training, Manual therapy, Aquatic therapy, Patient education, Self Care training, Functional mobility training, Home safety training and Stair training     Patient Goal (s) has been updated and includes: \"continue improving balance\"     Goals for this certification period to be accomplished in 6 treatments:  Goal: Patient will demonstrate an increase in L LE strength to at least 4/5 to allow safer community ambulation  Status at last note/certification: DF bilaterally 5/5, hip flexion (SLR) left 4+5, right 4+/5, ABD left 3+/5, right 4-/5  Goal: Patient will relate at least a 60% improvement in strength and energy allowing him to safely go to senior center  Status at last note/certification: Still at 50% improved since [facility].   Goal: Patient and Christi Osborne will be (I) with HEP and safetly strategies allowing santosteint to (I) manage sx and allow more (I) quality of life  Status at last note/certification: still partially compliant with HEP  Goals: Patient will demonstrate bilateral SLS time of >10 sec for improved safety negotiating obstacles in the home environment. Status at last note/certification:  right 27 seconds, left 5 seconds    Frequency / Duration: Patient to be seen 2 times per week for 6 treatments:    Assessment / Recommendations:Patient has attended therapy consistently for 13 sessions for the treatment of generalized muscle weakness and impaired balance. At this time, the patient has made improvements in left LE strength, single leg stance time, and self reported stability during gait. However, the patient presents with continued functional deficits with knowledge of balance exercises for home. The patient will benefit from continued skilled outpatient therapy to address these remaining functional deficits and return to OF activities at the [facility]. Certification Period: 8/23/19-9/21/19    Isela Ramey 8/23/2019 7:20 AM    ________________________________________________________________________  I certify that the above Therapy Services are being furnished while the patient is under my care. I agree with the treatment plan and certify that this therapy is necessary. [] I have read the above and request that my patient continue as recommended.   [] I have read the above report and request that my patient continue therapy with the following changes/special instructions: ______________________________________  [] I have read the above report and request that my patient be discharged from therapy    Physician's Signature:____________Date:_________TIME:________    ** Signature, Date and Time must be completed for valid certification **    Please sign and return to [facility] - Lucina Watkins  [address]  [phone] fax

## 2019-08-26 ENCOUNTER — HOSPITAL ENCOUNTER (OUTPATIENT)
Dept: PHYSICAL THERAPY | Age: 74
Discharge: HOME | End: 2019-08-26
Payer: MEDICARE

## 2019-08-26 PROCEDURE — 97112 NEUROMUSCULAR REEDUCATION: CPT

## 2019-08-26 PROCEDURE — 97110 THERAPEUTIC EXERCISES: CPT

## 2019-08-26 NOTE — PROGRESS NOTES
PT DAILY TREATMENT NOTE 10-18    Patient Name: Ivonne Johnson  Date:2019  : 1945  [x]  Patient  Verified  Payor: [facility] MEDICARE / Plan: [facility] COMMUNITY PLAN MCR / Product Type: Managed Care Medicare /    In time:1111  Out time:1155  Total Treatment Time (min): 44   Visit #: 1 of 6    Medicare/BCBS Only   Total Timed Codes (min):  44 1:1 Treatment Time:  41       Treatment Area: Muscle weakness (generalized) [M62.81]  Weakness of right lower extremity [R29.898]  Weakness of left lower extremity [R29.898]    SUBJECTIVE  Pain Level (0-10 scale): 0  Any medication changes, allergies to medications, adverse drug reactions, diagnosis change, or new procedure performed?: [x] No    [] Yes (see summary sheet for update)  Subjective functional status/changes:   [] No changes reported  I'm fine.      OBJECTIVE    Modality rationale:    Min Type Additional Details    [] Estim:  []Unatt       []IFC  []Premod                        []Other:  []w/ice   []w/heat  Position:  Location:    [] Estim: []Att    []TENS instruct  []NMES                    []Other:  []w/US   []w/ice   []w/heat  Position:  Location:    []  Traction: [] Cervical       []Lumbar                       [] Prone          []Supine                       []Intermittent   []Continuous Lbs:  [] before manual  [] after manual    []  Ultrasound: []Continuous   [] Pulsed                           []1MHz   []3MHz W/cm2:  Location:    []  Iontophoresis with dexamethasone         Location: [] Take home patch   [] In clinic    []  Ice     []  heat  []  Ice massage  []  Laser   []  Anodyne Position:  Location:    []  Laser with stim  []  Other:  Position:  Location:    []  Vasopneumatic Device Pressure:       [] lo [] med [] hi   Temperature: [] lo [] med [] hi   [] Skin assessment post-treatment:  []intact []redness- no adverse reaction    []redness - adverse reaction:     15 min Therapeutic Exercise:  [x] See flow sheet :   Rationale: increase ROM and increase strength to improve the patients ability to improve ease of daily tasks    29 min Neuromuscular Re-education:  [x]  See flow sheet :   Rationale: increase strength, improve coordination, improve balance and increase proprioception  to improve the patients ability to improve stability with ambulation, reduce falls risk       With   [] TE   [] TA   [] neuro   [] other: Patient Education: [x] Review HEP    [] Progressed/Changed HEP based on:   [] positioning   [] body mechanics   [] transfers   [] heat/ice application    [] other:      Other Objective/Functional Measures: completed exercises per flow sheet     Pain Level (0-10 scale) post treatment: 0    ASSESSMENT/Changes in Function: No LOB with tandem stance bilaterally. Increasing trunk flexion during standing foam LE exercises. Requires cueing for posture and exercise technique throughout session. Patient will continue to benefit from skilled PT services to modify and progress therapeutic interventions, address functional mobility deficits, address ROM deficits, address strength deficits, analyze and address soft tissue restrictions, analyze and cue movement patterns, analyze and modify body mechanics/ergonomics, assess and modify postural abnormalities, address imbalance/dizziness and instruct in home and community integration to attain remaining goals. []  See Plan of Care  []  See progress note/recertification  []  See Discharge Summary         Progress towards goals / Updated goals:  Goal: Patient will demonstrate an increase in L LE strength to at least 4/5 to allow safer community ambulation  Status at last note/certification: DF bilaterally 5/5, hip flexion (SLR) left 4+5, right 4+/5, ABD left 3+/5, right 4-/5  Current status:   Goal: Patient will relate at least a 60% improvement in strength and energy allowing him to safely go to senior center  Status at last note/certification: Still at 50% improved since [facility].   Current status:   Goal: Patient and Angelica Lira will be (I) with HEP and safetly strategies allowing cart to (I) manage sx and allow more (I) quality of life  Status at last note/certification: still partially compliant with HEP  Current status:   Goals: Patient will demonstrate bilateral SLS time of >10 sec for improved safety negotiating obstacles in the home environment.   Status at last note/certification:  right 27 seconds, left 5 seconds  Current status:     PLAN  [x]  Upgrade activities as tolerated     [x]  Continue plan of care  []  Update interventions per flow sheet       []  Discharge due to:_  []  Other:_      Miguel Thomson, PT 8/26/2019  11:11 AM    Future Appointments   Date Time Provider Clarice Freedman   8/26/2019 11:15 AM Rachel Kramer, PT [facility]   8/28/2019 11:15 AM Hector Marie, PT [facility]

## 2019-08-28 ENCOUNTER — APPOINTMENT (OUTPATIENT)
Dept: PHYSICAL THERAPY | Age: 74
End: 2019-08-28
Payer: MEDICARE

## 2019-08-30 ENCOUNTER — HOSPITAL ENCOUNTER (OUTPATIENT)
Dept: PHYSICAL THERAPY | Age: 74
Discharge: HOME | End: 2019-08-30
Payer: MEDICARE

## 2019-08-30 PROCEDURE — 97112 NEUROMUSCULAR REEDUCATION: CPT

## 2019-08-30 PROCEDURE — 97110 THERAPEUTIC EXERCISES: CPT

## 2019-08-30 NOTE — PROGRESS NOTES
PT DISCHARGE DAILY NOTE AND [id number]     Patient name: Loki Kendrick of Care: 2019   Referral source: Joey Champagne MD UTF:                 Medical Diagnosis: Muscle weakness (generalized) [M62.81]  Weakness of right lower extremity [R29.898]  Weakness of left lower extremity [R29.898]  Payor: [facility] MEDICAID / Plan: [employer] / Product Type: Managed Care Medicaid /  Onset Date:                Treatment Diagnosis: CVA with Left hemiparesis   Prior Hospitalization: see medical history Provider#: [id number]   Medications: Verified on Patient summary List    Comorbidities: HTN, smoker   Prior Level of Function: (I) gait in the community with decreased fall risk and with increased energy level    Visits from Start of Care: 15    Missed Visits: 3    Reporting Period : 19 to 19    Date:2019  : 1945  [x]  Patient  Verified  Payor: [facility] MEDICARE / Plan: [employer] MCR / Product Type: Managed Care Medicare /    In time:144  Out time:230  Total Treatment Time (min): 46  Visit #: 2 of 6    Medicare/BCBS Only   Total Timed Codes (min):  46 1:1 Treatment Time:  46       SUBJECTIVE  Pain Level (0-10 scale): 0  Any medication changes, allergies to medications, adverse drug reactions, diagnosis change, or new procedure performed?: [x] No    [] Yes (see summary sheet for update)  Subjective functional status/changes:   [] No changes reported  No pain no pain.     OBJECTIVE    Modality rationale:    Min Type Additional Details    [] Estim:  []Unatt       []IFC  []Premod                        []Other:  []w/ice   []w/heat  Position:  Location:    [] Estim: []Att    []TENS instruct  []NMES                    []Other:  []w/US   []w/ice   []w/heat  Position:  Location:    []  Traction: [] Cervical       []Lumbar                       [] Prone          []Supine                       []Intermittent   []Continuous Lbs:  [] before manual  [] after manual []  Ultrasound: []Continuous   [] Pulsed                           []1MHz   []3MHz W/cm2:  Location:    []  Iontophoresis with dexamethasone         Location: [] Take home patch   [] In clinic    []  Ice     []  heat  []  Ice massage  []  Laser   []  Anodyne Position:  Location:    []  Laser with stim  []  Other:  Position:  Location:    []  Vasopneumatic Device Pressure:       [] lo [] med [] hi   Temperature: [] lo [] med [] hi   [] Skin assessment post-treatment:  []intact []redness- no adverse reaction    []redness - adverse reaction:       12 min Therapeutic Exercise:  [x] See flow sheet :   Rationale: increase ROM and increase strength to improve the patients ability to perform daily tasks    34 min Neuromuscular Re-education:  [x]  See flow sheet :   Rationale: increase strength, improve coordination, improve balance and increase proprioception  to improve the patients ability to improve stability with ambulation, reduce falls risk       With   [] TE   [] TA   [] neuro   [] other: Patient Education: [x] Review HEP    [] Progressed/Changed HEP based on:   [] positioning   [] body mechanics   [] transfers   [] heat/ice application    [] other:      Other Objective/Functional Measures: updated HEP     Pain Level (0-10 scale) post treatment: 0    Summary of Care:  Goal: Patient will demonstrate an increase in L LE strength to at least 4/5 to allow safer community ambulation  Status at last note/certification: DF bilaterally 5/5, hip flexion (SLR) left 4+5, right 4+/5, ABD left 3+/5, right 4-/5  Current status: met, 4+/5 to 5/5  Goal: Patient will relate at least a 60% improvement in strength and energy allowing him to safely go to senior center  Status at last note/certification: Still at 50% improved since [facility].   Current status: met, 70%  Goal: Patient and Shraddha Causeyjaylen will be (I) with HEP and safetly strategies allowing cart to (I) manage sx and allow more (I) quality of life  Status at last note/certification: still partially compliant with HEP  Current status: progressing, continues to report partial compliance  Goals: Patient will demonstrate bilateral SLS time of >10 sec for improved safety negotiating obstacles in the home environment. Status at last note/certification:  right 27 seconds, left 5 seconds  Current status: progressing, met Right, Left 6 seconds    ASSESSMENT/Changes in Function: Patient has attended therapy for LE weakness and Left hemiplegia over the past two months, with reports of some improvement in daily tasks, He does demonstrate improved strength, but Left LE remains less stable than Right. Due to maximizing gains with therapy we will discharge at this time; Patient reports that he plans to return to the [facility] for workout classes a few times a week.      Thank you for this referral!      PLAN  [x]Discontinue therapy: [x]Patient has reached or is progressing toward set goals      []Patient is non-compliant or has abdicated      []Due to lack of appreciable progress towards set goals    Derick Diamond, PT 8/30/2019  1:46 PM    [] I have read the above report and request that my patient continue therapy with the following changes/special instructions:  [] I have read the above report and request that my patient be discharged from therapy    Physician's Signature:____________Date:_________TIME:________    ** Signature, Date and Time must be completed for valid certification **

## 2019-09-09 ENCOUNTER — HOSPITAL ENCOUNTER (OUTPATIENT)
Dept: LAB | Age: 74
Discharge: HOME | End: 2019-09-09
Payer: MEDICARE

## 2019-09-09 LAB
25(OH)D3 SERPL-MCNC: 33.5 NG/ML (ref 30–100)
ALBUMIN SERPL-MCNC: 3.6 G/DL (ref 3.4–5)
ALBUMIN/GLOB SERPL: 0.9 {RATIO} (ref 0.8–1.7)
ALP SERPL-CCNC: 82 U/L (ref 45–117)
ALT SERPL-CCNC: 26 U/L (ref 16–61)
ANION GAP SERPL CALC-SCNC: 5 MMOL/L (ref 3–18)
AST SERPL-CCNC: 19 U/L (ref 10–38)
BASOPHILS # BLD: 0 K/UL (ref 0–0.1)
BASOPHILS NFR BLD: 0 % (ref 0–2)
BILIRUB SERPL-MCNC: 0.3 MG/DL (ref 0.2–1)
BUN SERPL-MCNC: 20 MG/DL (ref 7–18)
BUN/CREAT SERPL: 13 (ref 12–20)
CALCIUM SERPL-MCNC: 9.6 MG/DL (ref 8.5–10.1)
CALCIUM SERPL-MCNC: 9.6 MG/DL (ref 8.5–10.1)
CHLORIDE SERPL-SCNC: 107 MMOL/L (ref 100–111)
CHOLEST SERPL-MCNC: 147 MG/DL
CO2 SERPL-SCNC: 25 MMOL/L (ref 21–32)
CREAT SERPL-MCNC: 1.52 MG/DL (ref 0.6–1.3)
DIFFERENTIAL METHOD BLD: ABNORMAL
EOSINOPHIL # BLD: 0.1 K/UL (ref 0–0.4)
EOSINOPHIL NFR BLD: 1 % (ref 0–5)
ERYTHROCYTE [DISTWIDTH] IN BLOOD BY AUTOMATED COUNT: 15 % (ref 11.6–14.5)
GLOBULIN SER CALC-MCNC: 3.9 G/DL (ref 2–4)
GLUCOSE SERPL-MCNC: 91 MG/DL (ref 74–99)
HCT VFR BLD AUTO: 40.3 % (ref 36–48)
HDLC SERPL-MCNC: 44 MG/DL (ref 40–60)
HDLC SERPL: 3.3 {RATIO} (ref 0–5)
HGB BLD-MCNC: 13.2 G/DL (ref 13–16)
LDLC SERPL CALC-MCNC: 89.8 MG/DL (ref 0–100)
LIPID PROFILE,FLP: NORMAL
LYMPHOCYTES # BLD: 1.8 K/UL (ref 0.9–3.6)
LYMPHOCYTES NFR BLD: 20 % (ref 21–52)
MAGNESIUM SERPL-MCNC: 2.1 MG/DL (ref 1.6–2.6)
MCH RBC QN AUTO: 27.3 PG (ref 24–34)
MCHC RBC AUTO-ENTMCNC: 32.8 G/DL (ref 31–37)
MCV RBC AUTO: 83.3 FL (ref 74–97)
MONOCYTES # BLD: 0 K/UL (ref 0.05–1.2)
MONOCYTES NFR BLD: 0 % (ref 3–10)
NEUTS SEG # BLD: 7 K/UL (ref 1.8–8)
NEUTS SEG NFR BLD: 79 % (ref 40–73)
PHOSPHATE SERPL-MCNC: 2.8 MG/DL (ref 2.5–4.9)
PLATELET # BLD AUTO: 254 K/UL (ref 135–420)
PMV BLD AUTO: 10.4 FL (ref 9.2–11.8)
POTASSIUM SERPL-SCNC: 4.6 MMOL/L (ref 3.5–5.5)
PROT SERPL-MCNC: 7.5 G/DL (ref 6.4–8.2)
PTH-INTACT SERPL-MCNC: 125.5 PG/ML (ref 18.4–88)
RBC # BLD AUTO: 4.84 M/UL (ref 4.7–5.5)
SODIUM SERPL-SCNC: 137 MMOL/L (ref 136–145)
TRIGL SERPL-MCNC: 66 MG/DL (ref ?–150)
URATE SERPL-MCNC: 6 MG/DL (ref 2.6–7.2)
VLDLC SERPL CALC-MCNC: 13.2 MG/DL
WBC # BLD AUTO: 9 K/UL (ref 4.6–13.2)

## 2019-09-09 PROCEDURE — 80061 LIPID PANEL: CPT

## 2019-09-09 PROCEDURE — 83735 ASSAY OF MAGNESIUM: CPT

## 2019-09-09 PROCEDURE — 84550 ASSAY OF BLOOD/URIC ACID: CPT

## 2019-09-09 PROCEDURE — 80053 COMPREHEN METABOLIC PANEL: CPT

## 2019-09-09 PROCEDURE — 84100 ASSAY OF PHOSPHORUS: CPT

## 2019-09-09 PROCEDURE — 85025 COMPLETE CBC W/AUTO DIFF WBC: CPT

## 2019-09-09 PROCEDURE — 36415 COLL VENOUS BLD VENIPUNCTURE: CPT

## 2019-09-09 PROCEDURE — 83970 ASSAY OF PARATHORMONE: CPT

## 2019-09-09 PROCEDURE — 82306 VITAMIN D 25 HYDROXY: CPT

## 2019-09-20 ENCOUNTER — HOSPITAL ENCOUNTER (OUTPATIENT)
Dept: ULTRASOUND IMAGING | Age: 74
Discharge: HOME | End: 2019-09-20
Attending: FAMILY MEDICINE
Payer: MEDICARE

## 2019-09-20 ENCOUNTER — HOSPITAL ENCOUNTER (OUTPATIENT)
Dept: BONE DENSITY | Age: 74
Discharge: HOME | End: 2019-09-20
Attending: FAMILY MEDICINE
Payer: MEDICARE

## 2019-09-20 DIAGNOSIS — Z13.820 OSTEOPOROSIS SCREENING: ICD-10-CM

## 2019-09-20 DIAGNOSIS — R39.9 SYMPTOMS INVOLVING URINARY SYSTEM: ICD-10-CM

## 2019-09-20 DIAGNOSIS — R33.9 INCOMPLETE BLADDER EMPTYING: ICD-10-CM

## 2019-09-20 PROCEDURE — 77080 DXA BONE DENSITY AXIAL: CPT

## 2019-09-20 PROCEDURE — 76857 US EXAM PELVIC LIMITED: CPT

## 2019-09-24 ENCOUNTER — HOSPITAL ENCOUNTER (OUTPATIENT)
Dept: RESPIRATORY THERAPY | Age: 74
Discharge: HOME | End: 2019-09-24
Attending: FAMILY MEDICINE
Payer: MEDICARE

## 2019-09-24 DIAGNOSIS — Z72.0 CURRENT TOBACCO USE: ICD-10-CM

## 2019-09-24 PROCEDURE — 94729 DIFFUSING CAPACITY: CPT

## 2019-09-24 PROCEDURE — 94727 GAS DIL/WSHOT DETER LNG VOL: CPT

## 2019-09-24 PROCEDURE — 94060 EVALUATION OF WHEEZING: CPT

## 2019-12-09 ENCOUNTER — HOSPITAL ENCOUNTER (OUTPATIENT)
Dept: LAB | Age: 74
Discharge: HOME | End: 2019-12-09

## 2019-12-09 LAB — XX-LABCORP SPECIMEN COL,LCBCF: NORMAL

## 2019-12-09 PROCEDURE — 99001 SPECIMEN HANDLING PT-LAB: CPT

## 2020-01-28 NOTE — PROGRESS NOTES
PT DAILY TREATMENT NOTE 10-18    Patient Name: Vandana Route  Date:2019  : 1945  [x]  Patient  Verified  Payor: [facility] MEDICARE / Plan: [address] [place] [address] / Product Type: Managed Care Medicare /    In time:12:05  Out time:12:47  Total Treatment Time (min): 32 (10 min bathroom break -see below)  Visit #: 2 of 10    Medicare/BCBS Only   Total Timed Codes (min):  32 1:1 Treatment Time:  32       Treatment Area: Muscle weakness (generalized) [M62.81]  Weakness of right lower extremity [R29.898]  Weakness of left lower extremity [R29.898]    SUBJECTIVE  Pain Level (0-10 scale): 0  Any medication changes, allergies to medications, adverse drug reactions, diagnosis change, or new procedure performed?: [x] No    [] Yes (see summary sheet for update)  Subjective functional status/changes:   [] No changes reported  Reports no falls since the last session. OBJECTIVE     27 min Neuromuscular Re-education:  [x]  See flow sheet :   Rationale: increase strength, improve coordination, improve balance and increase proprioception  to improve the patients ability to tolerate functional tasks    5 min Therapeutic Activity:  []  See flow sheet : pt education on how to perform HEP exercises correctly and how many repetitions to perform per HEP handout   Rationale: increase ROM and increase strength  to improve the patients ability to tolerate ADLs          With   [] TE   [x] TA   [x] neuro   [] other: Patient Education: [x] Review HEP    [] Progressed/Changed HEP based on:   [] positioning   [] body mechanics   [] transfers   [] heat/ice application    [] other:      Other Objective/Functional Measures: Initiated exercises/interventions in flow sheet. Pain Level (0-10 scale) post treatment: 0    ASSESSMENT/Changes in Function: pt asked to go to the bathroom during therapy. On the way to the bathroom, the pt urinated on himself and therefore held the rest of the exercises.  Challenged with balance and stability with EC and foam balance exercises today. Pt able to self-correct posture during balance exercises to maintain balance. CGA/SBA used for all balance exercises today. Continue POC as tolerated. Patient will continue to benefit from skilled PT services to modify and progress therapeutic interventions, address functional mobility deficits, address ROM deficits, address strength deficits, analyze and address soft tissue restrictions, analyze and cue movement patterns, analyze and modify body mechanics/ergonomics, assess and modify postural abnormalities, address imbalance/dizziness and instruct in home and community integration to attain remaining goals. []  See Plan of Care  []  See progress note/recertification  []  See Discharge Summary         Progress towards goals / Updated goals:  Short Term Goals: To be accomplished in 1 weeks:  Patient will demonstrate (I) with simple HEP to improve LE strength to allow safer ambulation in the home and community  Status at last note/certification:  dependent  Long Term Goals:  To be accomplished in 10 treatments:  Patient will demonstrate an increase in FOTO score to at least 51 points to show functional gains in all ADL/IADLS   Status at last note/certification: 45  Patient will demonstrate an increase in L LE strength to at least 4/5 to allow safer community ambulation  Status at last note/certification:  Hip flex: 3+/5, abduction: 3+/5, extension: 3+/5, knee flexion: 3+/5, knee ext: 4+/5, ankle DF: 4/5  Patient will relate at least a 60% improvement in strength and energy allowing him to safely go to senior center  Status at last note/certification:  Rarely goes to senior center   Patient and  Caregiver will be (I) with HEP and safetly strategies allowing paiteint to (I) manage sx and allow more (I) quality of life  Status at last note/certification:  NA    PLAN  [x]  Upgrade activities as tolerated     [x]  Continue plan of care  [x]  Update interventions per flow sheet       []  Discharge due to:_  []  Other:_      Rachel Montero, PT 6/27/2019  12:54 PM    Future Appointments   Date Time Provider Clarice Freedman   6/27/2019 12:00 PM Priyanka Jasso, PT [facility] see hpi

## 2020-02-06 NOTE — PROGRESS NOTES
Problem: Self Care Deficits Care Plan (Adult)  Goal: *Therapy Goal (Edit Goal, Insert Text)  Occupational Therapy Goals   Long Term Goals  Initiated 2018 and to be accomplished within 1-2 week(s)  1. Pt will perform self-feeding with MI to . (I)  2. Pt will perform grooming with MI.  3. Pt will perform UB bathing with Setup. 4. Pt will perform LB bathing with Supervision. 5. Pt will perform tub/shower, shower stall, and/or self propel to sink/vanity Supervision. 6. Pt will perform UB dressing with Setup. 7. Pt will perform LB dressing with Supervision. 8. Pt will perform toileting task with Supervision. 9. Pt will perform toilet transfer with Supervision. Short Term Goals   Initiated 2018 and to be accomplished within 7 day(s) 2018  1. Pt will perform self-feeding with MI to (I). 2. Pt will perform grooming with MI.  3. Pt will perform UB bathing with Setup. 4. Pt will perform LB bathing with Min A with AE as needed. 5. Pt will perform tub/shower, shower stall, and/or self propel to sink/vanity Min A.   6. Pt will perform UB dressing with Setup. 7. Pt will perform LB dressing with Mod A.  8. Pt will perform toileting task with Mod A.  9. Pt will perform toilet transfer with SBA. Outcome: Progressing Towards Goal  OCCUPATIONAL THERAPY DAILY NOTE  Patient Name:Loki Phillips  Time Spent With Patient  Time In: 36  Time Out: 200      Medical Diagnosis:  Dibility  Acute appendicitis with peritoneal abscess Acute appendicitis with peritoneal abscess     Pain at start of tx:0  Pain at stop of tx:0    Patient identified with name and : yes  Subjective:  \"I'm depressed. I just want to go home. \"    Objective: Pt seen for therapeutic activities to elevate mood and improve activity tolerance w/ADLs. THERAPEUTIC ACTIVITY Daily Assessment    Pt performs multiple reaching tasks at w/c level and standing w/clothespin activity. Pt tolerates standing ~ 5 minutes w/1 seated rest break.  Pt has no LOB and requires vc's to widen EDGARD for improved dynamic standing balance. Pt participates in tabletop activity w/card game reaching in multiple planes and performing bilateral coordination activity for carryover w/ADLs     THERAPEUTIC EXERCISE Daily Assessment         IADL Daily Assessment         NMRE Daily Assessment         FEEDING/EATING Daily Assessment          GROOMING Daily Assessment          UPPER BODY BATHING Daily Assessment          LOWER BODY BATHING Daily Assessment          TOILETING Daily Assessment          UPPER BODY DRESSING Daily Assessment          LOWER BODY DRESSING Daily Assessment          MOBILITY/TRANSFERS Daily Assessment    Pt performs stand pivot transfer w/functional transfer from chair to w/c w/SBA. Pt demonstrates good safety w/hand placement w/functional tranfers and w/c mgt, locking brakes prior to functional transfer to standing w/tabletop activity. Assessment: Pt requires encouragement for participation 2/2 admitted depressed mood today. Pt participates in tabletop activities reaching w/BUE in multiple planes for ADL carryover. Plan of Care: continue current POC to maximize activity tolerance w/ADLs and return to PLOF.   Equipment recommendations:  ROSE Mendez  5/8/2018 [facility] Counseling: I discussed with the patient the risks of ixekizumab including but not limited to immunosuppression, serious infections, worsening of inflammatory bowel disease and drug reactions.  The patient understands that monitoring is required including a PPD at baseline and must alert us or the primary physician if symptoms of infection or other concerning signs are noted.

## 2020-02-24 ENCOUNTER — HOSPITAL ENCOUNTER (OUTPATIENT)
Dept: GENERAL RADIOLOGY | Age: 75
Discharge: HOME | End: 2020-02-24
Payer: MEDICARE

## 2020-02-24 DIAGNOSIS — R53.1 WEAKNESS GENERALIZED: ICD-10-CM

## 2020-02-24 DIAGNOSIS — F17.200 TOBACCO USE DISORDER: ICD-10-CM

## 2020-02-24 PROCEDURE — 71046 X-RAY EXAM CHEST 2 VIEWS: CPT

## 2020-03-27 ENCOUNTER — HOSPITAL ENCOUNTER (OUTPATIENT)
Dept: LAB | Age: 75
Discharge: HOME | End: 2020-03-27

## 2020-03-27 LAB — XX-LABCORP SPECIMEN COL,LCBCF: NORMAL

## 2020-03-27 PROCEDURE — 99001 SPECIMEN HANDLING PT-LAB: CPT

## 2020-10-19 NOTE — PROGRESS NOTES
ARU/[facility] REFERRAL CONTACT NOTE  [id number] [place] [address] for Physical Rehabilitation    RE: Otilia Mendosa    Referral received to review this patient's case for admission to [id number] [place] [address] for Physical Rehabilitation. Current status reviewed with team and patient meets criteria for admission to [facility] for Physical Rehabilitation pending authorization from [facility]. Case has been opened and is pending review. Writer will notify  of status/determination once obtained. Thank you for this referral.  Should you have any questions please do not hesitate to call. Sincerely,  Ramirez Santana. LUCILLE Sumner  [address] [place] [address] Physical Rehabilitation  [phone] RT at cartside to administer albuterol only neb.

## 2020-11-09 ENCOUNTER — HOSPITAL ENCOUNTER (OUTPATIENT)
Dept: LAB | Age: 75
Discharge: HOME | End: 2020-11-09

## 2020-11-09 LAB — XX-LABCORP SPECIMEN COL,LCBCF: NORMAL

## 2020-11-09 PROCEDURE — 99001 SPECIMEN HANDLING PT-LAB: CPT

## 2020-12-04 ENCOUNTER — HOSPITAL ENCOUNTER (INPATIENT)
Age: 75
LOS: 3 days | Discharge: HOME | DRG: 439 | End: 2020-12-07
Attending: EMERGENCY MEDICINE | Admitting: FAMILY MEDICINE
Payer: MEDICARE

## 2020-12-04 ENCOUNTER — APPOINTMENT (OUTPATIENT)
Dept: ULTRASOUND IMAGING | Age: 75
DRG: 439 | End: 2020-12-04
Attending: EMERGENCY MEDICINE
Payer: MEDICARE

## 2020-12-04 ENCOUNTER — APPOINTMENT (OUTPATIENT)
Dept: CT IMAGING | Age: 75
DRG: 439 | End: 2020-12-04
Attending: EMERGENCY MEDICINE
Payer: MEDICARE

## 2020-12-04 DIAGNOSIS — K85.80 OTHER ACUTE PANCREATITIS WITHOUT INFECTION OR NECROSIS: Primary | ICD-10-CM

## 2020-12-04 DIAGNOSIS — K83.8 COMMON BILE DUCT DILATATION: ICD-10-CM

## 2020-12-04 DIAGNOSIS — R11.2 NON-INTRACTABLE VOMITING WITH NAUSEA, UNSPECIFIED VOMITING TYPE: ICD-10-CM

## 2020-12-04 DIAGNOSIS — R79.89 ELEVATED LFTS: ICD-10-CM

## 2020-12-04 PROBLEM — R74.8 ELEVATED LIVER ENZYMES: Status: ACTIVE | Noted: 2020-12-04

## 2020-12-04 PROBLEM — K85.90 PANCREATITIS: Status: ACTIVE | Noted: 2020-12-04

## 2020-12-04 PROBLEM — K85.90 PANCREATITIS, ACUTE: Status: ACTIVE | Noted: 2020-12-04

## 2020-12-04 LAB
ALBUMIN SERPL-MCNC: 3.4 G/DL (ref 3.4–5)
ALBUMIN/GLOB SERPL: 0.8 {RATIO} (ref 0.8–1.7)
ALP SERPL-CCNC: 266 U/L (ref 45–117)
ALT SERPL-CCNC: 2824 U/L (ref 16–61)
ANION GAP SERPL CALC-SCNC: 7 MMOL/L (ref 3–18)
AST SERPL-CCNC: 4288 U/L (ref 10–38)
ATRIAL RATE: 77 BPM
BASOPHILS # BLD: 0 K/UL (ref 0–0.1)
BASOPHILS NFR BLD: 0 % (ref 0–2)
BILIRUB SERPL-MCNC: 2 MG/DL (ref 0.2–1)
BUN SERPL-MCNC: 15 MG/DL (ref 7–18)
BUN/CREAT SERPL: 9 (ref 12–20)
CALCIUM SERPL-MCNC: 9.6 MG/DL (ref 8.5–10.1)
CALCULATED P AXIS, ECG09: 35 DEGREES
CALCULATED R AXIS, ECG10: -62 DEGREES
CALCULATED T AXIS, ECG11: 40 DEGREES
CHLORIDE SERPL-SCNC: 105 MMOL/L (ref 100–111)
CO2 SERPL-SCNC: 24 MMOL/L (ref 21–32)
CREAT SERPL-MCNC: 1.66 MG/DL (ref 0.6–1.3)
DIAGNOSIS, 93000: NORMAL
DIFFERENTIAL METHOD BLD: ABNORMAL
EOSINOPHIL # BLD: 0 K/UL (ref 0–0.4)
EOSINOPHIL NFR BLD: 0 % (ref 0–5)
ERYTHROCYTE [DISTWIDTH] IN BLOOD BY AUTOMATED COUNT: 14.8 % (ref 11.6–14.5)
GLOBULIN SER CALC-MCNC: 4.1 G/DL (ref 2–4)
GLUCOSE SERPL-MCNC: 142 MG/DL (ref 74–99)
HCT VFR BLD AUTO: 41.7 % (ref 36–48)
HGB BLD-MCNC: 13.5 G/DL (ref 13–16)
LIPASE SERPL-CCNC: ABNORMAL U/L (ref 73–393)
LYMPHOCYTES # BLD: 0.3 K/UL (ref 0.9–3.6)
LYMPHOCYTES NFR BLD: 2 % (ref 21–52)
MCH RBC QN AUTO: 27.5 PG (ref 24–34)
MCHC RBC AUTO-ENTMCNC: 32.4 G/DL (ref 31–37)
MCV RBC AUTO: 84.9 FL (ref 74–97)
MONOCYTES # BLD: 0.3 K/UL (ref 0.05–1.2)
MONOCYTES NFR BLD: 2 % (ref 3–10)
NEUTS SEG # BLD: 12.8 K/UL (ref 1.8–8)
NEUTS SEG NFR BLD: 96 % (ref 40–73)
P-R INTERVAL, ECG05: 186 MS
PLATELET # BLD AUTO: 187 K/UL (ref 135–420)
PMV BLD AUTO: 11.6 FL (ref 9.2–11.8)
POTASSIUM SERPL-SCNC: 4 MMOL/L (ref 3.5–5.5)
PROT SERPL-MCNC: 7.5 G/DL (ref 6.4–8.2)
Q-T INTERVAL, ECG07: 380 MS
QRS DURATION, ECG06: 108 MS
QTC CALCULATION (BEZET), ECG08: 430 MS
RBC # BLD AUTO: 4.91 M/UL (ref 4.7–5.5)
SODIUM SERPL-SCNC: 136 MMOL/L (ref 136–145)
VENTRICULAR RATE, ECG03: 77 BPM
WBC # BLD AUTO: 13.4 K/UL (ref 4.6–13.2)

## 2020-12-04 PROCEDURE — 99285 EMERGENCY DEPT VISIT HI MDM: CPT

## 2020-12-04 PROCEDURE — 65660000000 HC RM CCU STEPDOWN

## 2020-12-04 PROCEDURE — 96361 HYDRATE IV INFUSION ADD-ON: CPT

## 2020-12-04 PROCEDURE — 74011250636 HC RX REV CODE- 250/636: Performed by: STUDENT IN AN ORGANIZED HEALTH CARE EDUCATION/TRAINING PROGRAM

## 2020-12-04 PROCEDURE — 65270000029 HC RM PRIVATE

## 2020-12-04 PROCEDURE — 74011000636 HC RX REV CODE- 636: Performed by: EMERGENCY MEDICINE

## 2020-12-04 PROCEDURE — 77030040831 HC BAG URINE DRNG MDII -A

## 2020-12-04 PROCEDURE — 93005 ELECTROCARDIOGRAM TRACING: CPT

## 2020-12-04 PROCEDURE — 74011250636 HC RX REV CODE- 250/636: Performed by: EMERGENCY MEDICINE

## 2020-12-04 PROCEDURE — 2709999900 HC NON-CHARGEABLE SUPPLY

## 2020-12-04 PROCEDURE — 80053 COMPREHEN METABOLIC PANEL: CPT

## 2020-12-04 PROCEDURE — 96374 THER/PROPH/DIAG INJ IV PUSH: CPT

## 2020-12-04 PROCEDURE — 74178 CT ABD&PLV WO CNTR FLWD CNTR: CPT

## 2020-12-04 PROCEDURE — 96375 TX/PRO/DX INJ NEW DRUG ADDON: CPT

## 2020-12-04 PROCEDURE — 83690 ASSAY OF LIPASE: CPT

## 2020-12-04 PROCEDURE — 85025 COMPLETE CBC W/AUTO DIFF WBC: CPT

## 2020-12-04 PROCEDURE — 76705 ECHO EXAM OF ABDOMEN: CPT

## 2020-12-04 RX ORDER — LANOLIN ALCOHOL/MO/W.PET/CERES
1000 CREAM (GRAM) TOPICAL DAILY
Status: DISCONTINUED | OUTPATIENT
Start: 2020-12-05 | End: 2020-12-07 | Stop reason: HOSPADM

## 2020-12-04 RX ORDER — GUAIFENESIN 100 MG/5ML
81 LIQUID (ML) ORAL DAILY
Status: DISCONTINUED | OUTPATIENT
Start: 2020-12-05 | End: 2020-12-07 | Stop reason: HOSPADM

## 2020-12-04 RX ORDER — SODIUM CHLORIDE 0.9 % (FLUSH) 0.9 %
5-40 SYRINGE (ML) INJECTION EVERY 8 HOURS
Status: DISCONTINUED | OUTPATIENT
Start: 2020-12-05 | End: 2020-12-07 | Stop reason: HOSPADM

## 2020-12-04 RX ORDER — ALBUTEROL SULFATE 0.83 MG/ML
2.5 SOLUTION RESPIRATORY (INHALATION)
Status: DISCONTINUED | OUTPATIENT
Start: 2020-12-04 | End: 2020-12-07 | Stop reason: HOSPADM

## 2020-12-04 RX ORDER — MORPHINE SULFATE 2 MG/ML
2 INJECTION, SOLUTION INTRAMUSCULAR; INTRAVENOUS
Status: COMPLETED | OUTPATIENT
Start: 2020-12-04 | End: 2020-12-04

## 2020-12-04 RX ORDER — ATORVASTATIN CALCIUM 10 MG/1
10 TABLET, FILM COATED ORAL
COMMUNITY

## 2020-12-04 RX ORDER — LOSARTAN POTASSIUM 50 MG/1
100 TABLET ORAL DAILY
Status: DISCONTINUED | OUTPATIENT
Start: 2020-12-05 | End: 2020-12-07 | Stop reason: HOSPADM

## 2020-12-04 RX ORDER — IPRATROPIUM BROMIDE 0.5 MG/2.5ML
0.5 SOLUTION RESPIRATORY (INHALATION)
Status: DISCONTINUED | OUTPATIENT
Start: 2020-12-04 | End: 2020-12-07 | Stop reason: HOSPADM

## 2020-12-04 RX ORDER — ONDANSETRON 2 MG/ML
4 INJECTION INTRAMUSCULAR; INTRAVENOUS
Status: COMPLETED | OUTPATIENT
Start: 2020-12-04 | End: 2020-12-04

## 2020-12-04 RX ORDER — SODIUM CHLORIDE 0.9 % (FLUSH) 0.9 %
5-40 SYRINGE (ML) INJECTION AS NEEDED
Status: DISCONTINUED | OUTPATIENT
Start: 2020-12-04 | End: 2020-12-07 | Stop reason: HOSPADM

## 2020-12-04 RX ORDER — SODIUM CHLORIDE 9 MG/ML
175 INJECTION, SOLUTION INTRAVENOUS CONTINUOUS
Status: DISCONTINUED | OUTPATIENT
Start: 2020-12-04 | End: 2020-12-06

## 2020-12-04 RX ORDER — POLYETHYLENE GLYCOL 3350 17 G/17G
17 POWDER, FOR SOLUTION ORAL DAILY
COMMUNITY
End: 2021-02-19

## 2020-12-04 RX ORDER — AMLODIPINE BESYLATE 5 MG/1
2.5 TABLET ORAL DAILY
Status: DISCONTINUED | OUTPATIENT
Start: 2020-12-05 | End: 2020-12-07 | Stop reason: HOSPADM

## 2020-12-04 RX ORDER — OXYBUTYNIN CHLORIDE 5 MG/1
5 TABLET ORAL DAILY
COMMUNITY

## 2020-12-04 RX ORDER — ATORVASTATIN CALCIUM 10 MG/1
10 TABLET, FILM COATED ORAL DAILY
Status: DISCONTINUED | OUTPATIENT
Start: 2020-12-05 | End: 2020-12-07 | Stop reason: HOSPADM

## 2020-12-04 RX ORDER — OXYBUTYNIN CHLORIDE 5 MG/1
5 TABLET, EXTENDED RELEASE ORAL DAILY
Status: DISCONTINUED | OUTPATIENT
Start: 2020-12-05 | End: 2020-12-07 | Stop reason: HOSPADM

## 2020-12-04 RX ORDER — HEPARIN SODIUM 5000 [USP'U]/ML
5000 INJECTION, SOLUTION INTRAVENOUS; SUBCUTANEOUS EVERY 8 HOURS
Status: DISCONTINUED | OUTPATIENT
Start: 2020-12-05 | End: 2020-12-07 | Stop reason: HOSPADM

## 2020-12-04 RX ORDER — SERTRALINE HYDROCHLORIDE 50 MG/1
50 TABLET, FILM COATED ORAL
COMMUNITY

## 2020-12-04 RX ORDER — SERTRALINE HYDROCHLORIDE 50 MG/1
50 TABLET, FILM COATED ORAL DAILY
Status: DISCONTINUED | OUTPATIENT
Start: 2020-12-05 | End: 2020-12-07 | Stop reason: HOSPADM

## 2020-12-04 RX ORDER — ALBUTEROL SULFATE 90 UG/1
AEROSOL, METERED RESPIRATORY (INHALATION)
COMMUNITY

## 2020-12-04 RX ORDER — MORPHINE SULFATE 2 MG/ML
2 INJECTION, SOLUTION INTRAMUSCULAR; INTRAVENOUS
Status: DISCONTINUED | OUTPATIENT
Start: 2020-12-04 | End: 2020-12-07

## 2020-12-04 RX ORDER — LOSARTAN POTASSIUM 100 MG/1
100 TABLET ORAL DAILY
COMMUNITY
End: 2021-04-14

## 2020-12-04 RX ORDER — POLYETHYLENE GLYCOL 3350 17 G/17G
17 POWDER, FOR SOLUTION ORAL DAILY
Status: DISCONTINUED | OUTPATIENT
Start: 2020-12-05 | End: 2020-12-07 | Stop reason: HOSPADM

## 2020-12-04 RX ORDER — ONDANSETRON 2 MG/ML
4 INJECTION INTRAMUSCULAR; INTRAVENOUS
Status: DISCONTINUED | OUTPATIENT
Start: 2020-12-04 | End: 2020-12-07 | Stop reason: HOSPADM

## 2020-12-04 RX ADMIN — SODIUM CHLORIDE 1000 ML: 900 INJECTION, SOLUTION INTRAVENOUS at 18:56

## 2020-12-04 RX ADMIN — SODIUM CHLORIDE 1000 ML: 900 INJECTION, SOLUTION INTRAVENOUS at 17:29

## 2020-12-04 RX ADMIN — SODIUM CHLORIDE 125 ML/HR: 900 INJECTION, SOLUTION INTRAVENOUS at 23:50

## 2020-12-04 RX ADMIN — SODIUM CHLORIDE 125 ML/HR: 900 INJECTION, SOLUTION INTRAVENOUS at 18:56

## 2020-12-04 RX ADMIN — IOPAMIDOL 78 ML: 755 INJECTION, SOLUTION INTRAVENOUS at 17:16

## 2020-12-04 RX ADMIN — MORPHINE SULFATE 2 MG: 2 INJECTION, SOLUTION INTRAMUSCULAR; INTRAVENOUS at 17:33

## 2020-12-04 RX ADMIN — ONDANSETRON 4 MG: 2 INJECTION INTRAMUSCULAR; INTRAVENOUS at 17:33

## 2020-12-04 NOTE — ED PROVIDER NOTES
EMERGENCY DEPARTMENT HISTORY AND PHYSICAL EXAM    12:18 PM      Date: 12/4/2020  Patient Name: Joy Carter    History of Presenting Illness     Chief Complaint   Patient presents with    Nausea    Vomiting         History Provided By: Patient    Additional History (Context): Joy Carter is a 67 y.o. male with Past medical history of chronic kidney disease, diastolic dysfunction without heart failure, dyslipidemia, stroke, obesity who presents with complaint of 2 episodes of nausea and vomiting this week. He also complains of some right-sided abdominal pain. He states he has been taking Pepto-Bismol which helps with the symptoms. He states that he does not take any other pain medication. Denies fever, diarrhea, constipation, chest pain, dysuria, flank pain, shortness of breath, cough, numbness, dizziness, no other complaints. Patient sister, Ms. Yvette Rivas, states that he takes Pepto-Bismol every once in a while. She reports that he has not been eating very much lately.     PCP: Tristin Fierro MD        Past History     Past Medical History:  Past Medical History:   Diagnosis Date    Acute blood loss as cause of postoperative anemia 4/21/2018    Benign prostatic hyperplasia     CKD (chronic kidney disease) stage 3, GFR 30-59 ml/min 9/1/2015    Depression 4/0/7969    Diastolic dysfunction without heart failure 9/2/2015    Grade 1 diastolic dysfunction on 2D ECHO done 9/02/2015    Dyslipidemia 9/2/2015    Dysphagia as late effect of cerebrovascular accident (CVA) 9/1/2015    Hemiparesis affecting left side as late effect of cerebrovascular accident (CVA) ([facility] [address] 9/1/2015    History of noncompliance with medical treatment, presenting hazards to health 9/1/2015    History of stroke with residual deficit 9/1/2015    Acute Ischemic Stroke (early subacute infarct at the posterior right lentiform nucleus) with residual left hemiparesis and dysphagia     History of tobacco use 9/1/2015    History of trichomonal urethritis 2015    History of vitamin D deficiency 2015    Hypertensive heart and kidney disease without heart failure and with chronic kidney disease stage III 2015    Obesity, Class I, BMI 30-34.9 2015    Overactive bladder     Postoperative atrial fibrillation (HCC) 2018    Resolved    Postoperative confusion 2018    Postoperative urinary retention 2018    Statin intolerance 2018    Atorvastatin and Simvastatin    Vitamin D deficiency 2018    Vitamin D 25-Hydroxy (2018) = 17.9        Past Surgical History:  Past Surgical History:   Procedure Laterality Date    APPENDECTOMY,RUPT APPENDX+ABSCESS N/A 2018    Dr. Isai Marino    COLONOSCOPY N/A 4/3/2018    COLONOSCOPY,  w heated snared polypectomy performed by Carol Chavez MD at [facility] ENDOSCOPY       Family History:  Family History   Problem Relation Age of Onset    Diabetes Mother     Stroke Mother     Heart Disease Father     Hypertension Father     Diabetes Brother     Hypertension Brother        Social History:  Social History     Tobacco Use    Smoking status: Current Every Day Smoker     Last attempt to quit: 2014     Years since quittin.0    Smokeless tobacco: Never Used   Substance Use Topics    Alcohol use: Yes    Drug use: No       Allergies: Allergies   Allergen Reactions    Lipitor [Atorvastatin] Other (comments)     Elevated CK level    Pt has no awareness of this allergy.  Simvastatin Other (comments)     Elevation in LFTs         Review of Systems       Review of Systems   Constitutional: Positive for appetite change (Decreased appetite per caregiver/sister). Negative for chills and fever. HENT: Negative for congestion, rhinorrhea, sore throat and trouble swallowing. Eyes: Negative for visual disturbance. Respiratory: Negative for cough, shortness of breath and wheezing. Cardiovascular: Negative for chest pain and leg swelling. Gastrointestinal: Negative for abdominal pain, nausea and vomiting. Endocrine: Negative for polyuria. Genitourinary: Negative for difficulty urinating and dysuria. Musculoskeletal: Negative for arthralgias and neck stiffness. Skin: Negative for rash. Neurological: Negative for dizziness, weakness, numbness and headaches. Hematological: Does not bruise/bleed easily. Psychiatric/Behavioral: Negative for confusion and dysphoric mood. All other systems reviewed and are negative. Physical Exam     Visit Vitals  BP (!) 161/88   Pulse 82   Temp 98.4 °F (36.9 °C)   Resp 18   Ht 5' 11\" (1.803 m)   Wt 108.9 kg (240 lb)   SpO2 93%   BMI 33.47 kg/m²         Physical Exam  Vitals signs and nursing note reviewed. Constitutional:       General: He is not in acute distress. Appearance: He is well-developed. He is not diaphoretic. HENT:      Head: Normocephalic and atraumatic. Eyes:      General: No scleral icterus. Conjunctiva/sclera: Conjunctivae normal.      Pupils: Pupils are equal, round, and reactive to light. Neck:      Musculoskeletal: Normal range of motion and neck supple. Cardiovascular:      Rate and Rhythm: Normal rate. Comments: Capillary refill < 3 seconds  Pulmonary:      Effort: Pulmonary effort is normal. No respiratory distress. Breath sounds: Normal breath sounds. No wheezing. Abdominal:      General: Bowel sounds are normal. There is no distension. Palpations: Abdomen is soft. There is hepatomegaly. There is no shifting dullness, fluid wave, splenomegaly or pulsatile mass. Tenderness: There is abdominal tenderness. There is guarding. There is no right CVA tenderness, left CVA tenderness or rebound. Negative signs include Sharma's sign and McBurney's sign. Hernia: No hernia is present. Musculoskeletal: Normal range of motion. Lymphadenopathy:      Cervical: No cervical adenopathy. Skin:     General: Skin is warm and dry.    Neurological: Mental Status: He is alert and oriented to person, place, and time. Cranial Nerves: No cranial nerve deficit. Diagnostic Study Results     Labs -  Recent Results (from the past 12 hour(s))   EKG, 12 LEAD, INITIAL    Collection Time: 12/04/20 11:47 AM   Result Value Ref Range    Ventricular Rate 77 BPM    Atrial Rate 77 BPM    P-R Interval 186 ms    QRS Duration 108 ms    Q-T Interval 380 ms    QTC Calculation (Bezet) 430 ms    Calculated P Axis 35 degrees    Calculated R Axis -62 degrees    Calculated T Axis 40 degrees    Diagnosis       Normal sinus rhythm  Left anterior fascicular block  Abnormal ECG  When compared with ECG of 02-AUG-2018 17:47,  Questionable change in initial forces of Inferior leads  T wave amplitude has decreased in Anterior leads  Confirmed by Elfego Mao ([id number]) on 12/4/2020 8:10:50 PM     CBC WITH AUTOMATED DIFF    Collection Time: 12/04/20 12:00 PM   Result Value Ref Range    WBC 13.4 (H) 4.6 - 13.2 K/uL    RBC 4.91 4.70 - 5.50 M/uL    HGB 13.5 13.0 - 16.0 g/dL    HCT 41.7 36.0 - 48.0 %    MCV 84.9 74.0 - 97.0 FL    MCH 27.5 24.0 - 34.0 PG    MCHC 32.4 31.0 - 37.0 g/dL    RDW 14.8 (H) 11.6 - 14.5 %    PLATELET 738 792 - 797 K/uL    MPV 11.6 9.2 - 11.8 FL    NEUTROPHILS 96 (H) 40 - 73 %    LYMPHOCYTES 2 (L) 21 - 52 %    MONOCYTES 2 (L) 3 - 10 %    EOSINOPHILS 0 0 - 5 %    BASOPHILS 0 0 - 2 %    ABS. NEUTROPHILS 12.8 (H) 1.8 - 8.0 K/UL    ABS. LYMPHOCYTES 0.3 (L) 0.9 - 3.6 K/UL    ABS. MONOCYTES 0.3 0.05 - 1.2 K/UL    ABS. EOSINOPHILS 0.0 0.0 - 0.4 K/UL    ABS.  BASOPHILS 0.0 0.0 - 0.1 K/UL    DF AUTOMATED     METABOLIC PANEL, COMPREHENSIVE    Collection Time: 12/04/20 12:00 PM   Result Value Ref Range    Sodium 136 136 - 145 mmol/L    Potassium 4.0 3.5 - 5.5 mmol/L    Chloride 105 100 - 111 mmol/L    CO2 24 21 - 32 mmol/L    Anion gap 7 3.0 - 18 mmol/L    Glucose 142 (H) 74 - 99 mg/dL    BUN 15 7.0 - 18 MG/DL    Creatinine 1.66 (H) 0.6 - 1.3 MG/DL BUN/Creatinine ratio 9 (L) 12 - 20      GFR est AA 49 (L) >60 ml/min/1.73m2    GFR est non-AA 40 (L) >60 ml/min/1.73m2    Calcium 9.6 8.5 - 10.1 MG/DL    Bilirubin, total 2.0 (H) 0.2 - 1.0 MG/DL    ALT (SGPT) 2,824 (H) 16 - 61 U/L    AST (SGOT) 4,288 (H) 10 - 38 U/L    Alk. phosphatase 266 (H) 45 - 117 U/L    Protein, total 7.5 6.4 - 8.2 g/dL    Albumin 3.4 3.4 - 5.0 g/dL    Globulin 4.1 (H) 2.0 - 4.0 g/dL    A-G Ratio 0.8 0.8 - 1.7         Radiologic Studies -   CT ABD PELV W WO CONT   Final Result   IMPRESSION:       Severe edematous pancreatitis. Biliary dilation likely secondary to the above   Extensive duodenal edema secondary to the above         All CT scans at this facility are performed using dose optimization technique as   appropriate to the performed exam, to include automated exposure control,   adjustment of the mA and/or kV according to patient's size (Including   appropriate matching for site-specific examinations), or use of iterative   reconstruction technique. US ABD LTD    (Results Pending)         Medical Decision Making   I am the first provider for this patient. I reviewed the vital signs, available nursing notes, past medical history, past surgical history, family history and social history. Vital Signs-Reviewed the patient's vital signs.     Pulse Oximetry Analysis -  95% on room air (Interpretation)nml    Cardiac Monitor:  Rate: 82  Rhythm:  Normal Sinus Rhythm         Records Reviewed: Nursing Notes and Old Medical Records (Time of Review: 12:18 PM)    Provider Notes (Medical Decision Making): DDX: Biliary, pancreatitis, neoplasm, hernia, GERD, dehydration, metabolic    Labs, CT abdomen and pelvis, gallbladder ultrasound, IV fluid    Patient is afebrile, vital signs stable    MDM    Medications   0.9% sodium chloride infusion (125 mL/hr IntraVENous New Bag 12/4/20 8336)   sodium chloride 0.9 % bolus infusion 1,000 mL (0 mL IntraVENous IV Completed 12/4/20 1900)   iopamidoL (ISOVUE-370) 76 % injection  mL (78 mL IntraVENous Given 12/4/20 1716)   morphine injection 2 mg (2 mg IntraVENous Given 12/4/20 1733)   ondansetron (ZOFRAN) injection 4 mg (4 mg IntraVENous Given 12/4/20 1733)   sodium chloride 0.9 % bolus infusion 1,000 mL (0 mL IntraVENous IV Completed 12/4/20 2041)           ED Course: Progress Notes, Reevaluation, and Consults:  Creatinine normal  ALT 2800  AST 4300   alk phos 266      Consult:  Discussed care with Dr. Zeynep Graham, Specialty: Gastroenterologist, standard discussion; including history of patients chief complaint, available diagnostic results, and treatment course. Accepts consult. 5:18 PM, 12/4/2020     Consult:  Discussed care with [facility] resident, Specialty: On-call for the attending Dr. Elodia Morgan, standard discussion; including history of patients chief complaint, available diagnostic results, and treatment course. He accepts admission      Further discussed imaging findings with Dr. Zeynep Graham and she states she will see him in the a.m. I have discussed diagnosis, results, plan with patient. Patient wanted me to notify his sister of diagnosis and plan. I also discussed with this with his sister/care provider      Diagnosis     Clinical Impression:   1. Other acute pancreatitis without infection or necrosis    2. Non-intractable vomiting with nausea, unspecified vomiting type    3. Elevated LFTs    4. Common bile duct dilatation        Disposition: Admitted    Follow-up Information    None          Patient's Medications   Start Taking    No medications on file   Continue Taking    AMLODIPINE (NORVASC) 2.5 MG TABLET    Take 1 Tab by mouth daily. ASPIRIN 81 MG CHEWABLE TABLET    Take 81 mg by mouth daily. ATORVASTATIN (LIPITOR) 10 MG TABLET    Take 10 mg by mouth daily. CYANOCOBALAMIN (VITAMIN B-12) 1,000 MCG TABLET    Take 1,000 mcg by mouth daily. LOSARTAN (COZAAR) 100 MG TABLET    Take 100 mg by mouth daily. OXYBUTYNIN (DITROPAN) 5 MG TABLET    Take 5 mg by mouth daily. SERTRALINE (ZOLOFT) 50 MG TABLET    Take 50 mg by mouth daily. These Medications have changed    No medications on file   Stop Taking    ASCORBIC ACID, VITAMIN C, (VITAMIN C) 250 MG TABLET    Take 1 Tab by mouth daily (with breakfast). CHOLECALCIFEROL, VITAMIN D3, (VITAMIN D3) 5,000 UNIT TAB TABLET    Take 5,000 Units by mouth daily. CO-ENZYME Q-10 (CO Q-10) 100 MG CAPSULE    Take 1 Cap by mouth daily. ESCITALOPRAM OXALATE (LEXAPRO) 10 MG TABLET    Take 1 Tab by mouth every evening. Indications: Depression    EZETIMIBE (ZETIA) 10 MG TABLET    Take 10 mg by mouth daily. FERROUS SULFATE 325 MG (65 MG IRON) TABLET    Take 1 Tab by mouth daily (with breakfast). LACTASE (LACTAID) 3,000 UNIT TABLET    Take 1 Tab by mouth as needed. TAMSULOSIN (FLOMAX) 0.4 MG CAPSULE    Take 1 Cap by mouth daily. TOLTERODINE ER (DETROL LA) 4 MG ER CAPSULE    Take 4 mg by mouth daily. Stan Connor DO    Dragon medical dictation software was used for portions of this report. Unintended transcription errors may occur. My signature above authenticates this document and my orders, the final    diagnosis (es), discharge prescription (s), and instructions in the Epic    record.

## 2020-12-04 NOTE — ED TRIAGE NOTES
Pt transported via [address] [place] [address] Dr 3 from home for evaluation of nausea with 2 episodes of vomiting this week. Note from caregiver indicates that pt has been taking Pepto Bismol &  has not had an appetite.

## 2020-12-05 LAB
ALBUMIN SERPL-MCNC: 2.9 G/DL (ref 3.4–5)
ALBUMIN/GLOB SERPL: 0.8 {RATIO} (ref 0.8–1.7)
ALP SERPL-CCNC: 202 U/L (ref 45–117)
ALT SERPL-CCNC: 1743 U/L (ref 16–61)
ANION GAP SERPL CALC-SCNC: 6 MMOL/L (ref 3–18)
AST SERPL-CCNC: 1434 U/L (ref 10–38)
BASOPHILS # BLD: 0 K/UL (ref 0–0.1)
BASOPHILS NFR BLD: 0 % (ref 0–2)
BILIRUB SERPL-MCNC: 3.3 MG/DL (ref 0.2–1)
BUN SERPL-MCNC: 14 MG/DL (ref 7–18)
BUN/CREAT SERPL: 8 (ref 12–20)
CALCIUM SERPL-MCNC: 9 MG/DL (ref 8.5–10.1)
CHLORIDE SERPL-SCNC: 108 MMOL/L (ref 100–111)
CO2 SERPL-SCNC: 23 MMOL/L (ref 21–32)
CREAT SERPL-MCNC: 1.72 MG/DL (ref 0.6–1.3)
DEPRECATED S PYO AG THROAT QL EIA: NEGATIVE
DIFFERENTIAL METHOD BLD: ABNORMAL
EOSINOPHIL # BLD: 0 K/UL (ref 0–0.4)
EOSINOPHIL NFR BLD: 0 % (ref 0–5)
ERYTHROCYTE [DISTWIDTH] IN BLOOD BY AUTOMATED COUNT: 15 % (ref 11.6–14.5)
GLOBULIN SER CALC-MCNC: 3.6 G/DL (ref 2–4)
GLUCOSE SERPL-MCNC: 105 MG/DL (ref 74–99)
HCT VFR BLD AUTO: 39.3 % (ref 36–48)
HGB BLD-MCNC: 13 G/DL (ref 13–16)
LIPASE SERPL-CCNC: 8734 U/L (ref 73–393)
LYMPHOCYTES # BLD: 0.5 K/UL (ref 0.9–3.6)
LYMPHOCYTES NFR BLD: 3 % (ref 21–52)
MCH RBC QN AUTO: 27.7 PG (ref 24–34)
MCHC RBC AUTO-ENTMCNC: 33.1 G/DL (ref 31–37)
MCV RBC AUTO: 83.6 FL (ref 74–97)
MONOCYTES # BLD: 0.8 K/UL (ref 0.05–1.2)
MONOCYTES NFR BLD: 5 % (ref 3–10)
NEUTS SEG # BLD: 14.7 K/UL (ref 1.8–8)
NEUTS SEG NFR BLD: 92 % (ref 40–73)
PLATELET # BLD AUTO: 161 K/UL (ref 135–420)
PMV BLD AUTO: 10.9 FL (ref 9.2–11.8)
POTASSIUM SERPL-SCNC: 4.5 MMOL/L (ref 3.5–5.5)
PROT SERPL-MCNC: 6.5 G/DL (ref 6.4–8.2)
RBC # BLD AUTO: 4.7 M/UL (ref 4.7–5.5)
SODIUM SERPL-SCNC: 137 MMOL/L (ref 136–145)
WBC # BLD AUTO: 16 K/UL (ref 4.6–13.2)

## 2020-12-05 PROCEDURE — 85025 COMPLETE CBC W/AUTO DIFF WBC: CPT

## 2020-12-05 PROCEDURE — 80074 ACUTE HEPATITIS PANEL: CPT

## 2020-12-05 PROCEDURE — 87147 CULTURE TYPE IMMUNOLOGIC: CPT

## 2020-12-05 PROCEDURE — 97116 GAIT TRAINING THERAPY: CPT

## 2020-12-05 PROCEDURE — 74011250636 HC RX REV CODE- 250/636: Performed by: STUDENT IN AN ORGANIZED HEALTH CARE EDUCATION/TRAINING PROGRAM

## 2020-12-05 PROCEDURE — 87880 STREP A ASSAY W/OPTIC: CPT

## 2020-12-05 PROCEDURE — 36415 COLL VENOUS BLD VENIPUNCTURE: CPT

## 2020-12-05 PROCEDURE — 80053 COMPREHEN METABOLIC PANEL: CPT

## 2020-12-05 PROCEDURE — 97165 OT EVAL LOW COMPLEX 30 MIN: CPT

## 2020-12-05 PROCEDURE — 86038 ANTINUCLEAR ANTIBODIES: CPT

## 2020-12-05 PROCEDURE — 87070 CULTURE OTHR SPECIMN AEROBIC: CPT

## 2020-12-05 PROCEDURE — 97162 PT EVAL MOD COMPLEX 30 MIN: CPT

## 2020-12-05 PROCEDURE — 83690 ASSAY OF LIPASE: CPT

## 2020-12-05 PROCEDURE — 2709999900 HC NON-CHARGEABLE SUPPLY

## 2020-12-05 PROCEDURE — 80061 LIPID PANEL: CPT

## 2020-12-05 PROCEDURE — 74011000250 HC RX REV CODE- 250: Performed by: STUDENT IN AN ORGANIZED HEALTH CARE EDUCATION/TRAINING PROGRAM

## 2020-12-05 PROCEDURE — 65270000029 HC RM PRIVATE

## 2020-12-05 PROCEDURE — 74011250637 HC RX REV CODE- 250/637: Performed by: STUDENT IN AN ORGANIZED HEALTH CARE EDUCATION/TRAINING PROGRAM

## 2020-12-05 PROCEDURE — 82787 IGG 1 2 3 OR 4 EACH: CPT

## 2020-12-05 PROCEDURE — 86301 IMMUNOASSAY TUMOR CA 19-9: CPT

## 2020-12-05 RX ADMIN — ASPIRIN 81 MG CHEWABLE TABLET 81 MG: 81 TABLET CHEWABLE at 10:35

## 2020-12-05 RX ADMIN — OXYBUTYNIN CHLORIDE 5 MG: 5 TABLET, EXTENDED RELEASE ORAL at 10:36

## 2020-12-05 RX ADMIN — HEPARIN SODIUM 5000 UNITS: 5000 INJECTION INTRAVENOUS; SUBCUTANEOUS at 23:44

## 2020-12-05 RX ADMIN — HEPARIN SODIUM 5000 UNITS: 5000 INJECTION INTRAVENOUS; SUBCUTANEOUS at 00:32

## 2020-12-05 RX ADMIN — Medication 10 ML: at 00:35

## 2020-12-05 RX ADMIN — HEPARIN SODIUM 5000 UNITS: 5000 INJECTION INTRAVENOUS; SUBCUTANEOUS at 16:38

## 2020-12-05 RX ADMIN — FAMOTIDINE 20 MG: 10 INJECTION INTRAVENOUS at 23:44

## 2020-12-05 RX ADMIN — AMLODIPINE BESYLATE 2.5 MG: 5 TABLET ORAL at 10:35

## 2020-12-05 RX ADMIN — SODIUM CHLORIDE, SODIUM GLUCONATE, SODIUM ACETATE, POTASSIUM CHLORIDE AND MAGNESIUM CHLORIDE 1000 ML: 526; 502; 368; 37; 30 INJECTION, SOLUTION INTRAVENOUS at 00:33

## 2020-12-05 RX ADMIN — CYANOCOBALAMIN TAB 1000 MCG 1000 MCG: 1000 TAB at 10:35

## 2020-12-05 RX ADMIN — FAMOTIDINE 20 MG: 10 INJECTION INTRAVENOUS at 00:32

## 2020-12-05 RX ADMIN — POLYETHYLENE GLYCOL 3350 17 G: 17 POWDER, FOR SOLUTION ORAL at 10:37

## 2020-12-05 RX ADMIN — HEPARIN SODIUM 5000 UNITS: 5000 INJECTION INTRAVENOUS; SUBCUTANEOUS at 10:35

## 2020-12-05 RX ADMIN — SODIUM CHLORIDE 125 ML/HR: 900 INJECTION, SOLUTION INTRAVENOUS at 08:41

## 2020-12-05 RX ADMIN — Medication 10 ML: at 06:35

## 2020-12-05 RX ADMIN — Medication 10 ML: at 23:45

## 2020-12-05 NOTE — H&P
Admission History and Physical  [facility]      Patient: Annamarie Saenz MRN: [id number]  [facility]: [id number]    YOB: 1945  Age: 67 y.o. Sex: male      Admission Date: 12/4/2020       HPI:     Annamarie Saenz is a 67 y.o. male with PMH CKD, HTN, HLD, CVA, now admitted with Acute pancreatitis. Patient states that he has been feeling otherwise well, with no significant complaints or change in his usual health until approximately 1 week ago, where he started to experience nausea and vomiting. Over this week he has had episodes of nausea with occasional vomiting, and some mild Right sided abdominal pain. He has been taking pepto bismol for this with some relief, but no resolution. He has not been eating secondary to this. He denies alcohol use, new medication use, drug use, trauma, insect or arachnid stings or bites, fever/chills or other symptoms. ED Course (See objective for values/interpretations):  Labs obtained: cbc, CMP, lipase  Medications administered: 2L NS, morphine, zofran  Imaging obtained: CT abd, US abd    Review of Systems:  General ROS: negative for  - chills, fever, night sweats, weight gain and weight loss  ENT ROS: negative for - headaches, hearing change or visual changes  Respiratory ROS: negative for - cough, hemoptysis, shortness of breath, orthopnea, paroxysmal dyspnea, or wheezing  Cardiovascular ROS: negative for - chest pain, edema, loss of consciousness, or palpitations   Gastrointestinal ROS: Positive for - Nausea, vomiting, abdominal pain.    Musculoskeletal ROS: negative for - joint pain, joint swelling or muscle pain  Neurological ROS: negative for - dizziness, headaches  Dermatological ROS: negative for - rash or skin lesion changes    Past Medical History:   Diagnosis Date    Acute blood loss as cause of postoperative anemia 4/21/2018    Benign prostatic hyperplasia     CKD (chronic kidney disease) stage 3, GFR 30-59 ml/min 9/1/2015    Depression 6/6/6990    Diastolic dysfunction without heart failure 9/2/2015    Grade 1 diastolic dysfunction on 2D ECHO done 9/02/2015    Dyslipidemia 9/2/2015    Dysphagia as late effect of cerebrovascular accident (CVA) 9/1/2015    Hemiparesis affecting left side as late effect of cerebrovascular accident (CVA) ([facility] [address] 9/1/2015    History of noncompliance with medical treatment, presenting hazards to health 9/1/2015    History of stroke with residual deficit 9/1/2015    Acute Ischemic Stroke (early subacute infarct at the posterior right lentiform nucleus) with residual left hemiparesis and dysphagia     History of tobacco use 9/1/2015    History of trichomonal urethritis 9/1/2015    History of vitamin D deficiency 9/6/2015    Hypertensive heart and kidney disease without heart failure and with chronic kidney disease stage III 9/2/2015    Obesity, Class I, BMI 30-34.9 9/1/2015    Overactive bladder     Postoperative atrial fibrillation (HCC) 4/21/2018    Resolved    Postoperative confusion 4/22/2018    Postoperative urinary retention 4/22/2018    Statin intolerance 05/07/2018    Atorvastatin and Simvastatin    Vitamin D deficiency 5/1/2018    Vitamin D 25-Hydroxy (5/1/2018) = 17.9        Past Surgical History:   Procedure Laterality Date    APPENDECTOMY,RUPT APPENDX+ABSCESS N/A 04/20/2018    Dr. Adrienne Chavarria    COLONOSCOPY N/A 4/3/2018    COLONOSCOPY,  w heated snared polypectomy performed by Nicole Alcazar MD at [facility] ENDOSCOPY       Family History   Problem Relation Age of Onset    Diabetes Mother     Stroke Mother     Heart Disease Father     Hypertension Father     Diabetes Brother     Hypertension Brother        Social History     Socioeconomic History    Marital status: SINGLE     Spouse name: Not on file    Number of children: Not on file    Years of education: Not on file    Highest education level: Not on file   Tobacco Use    Smoking status: Current Every Day Smoker     Last attempt to quit: 2014     Years since quittin.0    Smokeless tobacco: Never Used   Substance and Sexual Activity    Alcohol use: Yes    Drug use: No    Sexual activity: Never       Allergies   Allergen Reactions    Lipitor [Atorvastatin] Other (comments)     Elevated CK level    Pt has no awareness of this allergy.  Simvastatin Other (comments)     Elevation in LFTs       Prior to Admission Medications   Prescriptions Last Dose Informant Patient Reported? Taking? amLODIPine (NORVASC) 2.5 mg tablet   No Yes   Sig: Take 1 Tab by mouth daily. aspirin 81 mg chewable tablet   Yes Yes   Sig: Take 81 mg by mouth daily. atorvastatin (LIPITOR) 10 mg tablet   Yes Yes   Sig: Take 10 mg by mouth daily. cyanocobalamin (VITAMIN B-12) 1,000 mcg tablet   Yes No   Sig: Take 1,000 mcg by mouth daily. losartan (COZAAR) 100 mg tablet   Yes Yes   Sig: Take 100 mg by mouth daily. oxybutynin (DITROPAN) 5 mg tablet   Yes Yes   Sig: Take 5 mg by mouth daily. sertraline (ZOLOFT) 50 mg tablet   Yes Yes   Sig: Take 50 mg by mouth daily. Facility-Administered Medications: None       Physical Exam:     Patient Vitals for the past 24 hrs:   Temp Pulse Resp BP SpO2   20 2324 97.8 °F (36.6 °C) 83 18 (!) 146/89 95 %   20 2200 98.2 °F (36.8 °C) 78 18 (!) 160/97 93 %   20 2100  76 18 (!) 168/87 94 %   20 2000  82 18 (!) 161/88 93 %   20 1900 98.4 °F (36.9 °C) 73 20 (!) 162/87 94 %   20 1736  86 19 (!) 161/90 96 %   20 1730  80 22 (!) 167/94 96 %   20 1719  80 17     20 1712  79 22  96 %   20 1711    (!) 183/91    20 1630  93 20 (!) 140/92    20 1600  (!) 108 20 (!) 139/95    20 1545  (!) 101 20 (!) 127/98 96 %   20 1530  78 21 (!) 167/94 96 %   20 1145 98.6 °F (37 °C) 92 20 (!) 170/90 95 %       Physical Exam:   Physical Exam  Vitals signs reviewed.    Constitutional:       General: He is not in acute distress. Appearance: Normal appearance. He is not ill-appearing or toxic-appearing. HENT:      Head: Normocephalic and atraumatic. Nose: No congestion. Mouth/Throat:      Mouth: Mucous membranes are moist.   Eyes:      General: No scleral icterus. Extraocular Movements: Extraocular movements intact. Pupils: Pupils are equal, round, and reactive to light. Cardiovascular:      Rate and Rhythm: Normal rate and regular rhythm. Pulses: Normal pulses. Heart sounds: No murmur. No friction rub. No gallop. Pulmonary:      Effort: Pulmonary effort is normal.      Breath sounds: Normal breath sounds. Abdominal:      General: Abdomen is flat. Bowel sounds are normal. There is distension. Palpations: Abdomen is soft. Tenderness: There is abdominal tenderness (Epigastric and RUQ). There is guarding (voluntary). There is no rebound. Skin:     Capillary Refill: Capillary refill takes less than 2 seconds. Neurological:      Mental Status: He is alert and oriented to person, place, and time. Mental status is at baseline. Psychiatric:         Mood and Affect: Mood normal.         Chemistry Recent Labs     12/04/20  1200   *      K 4.0      CO2 24   BUN 15   CREA 1.66*   CA 9.6   AGAP 7   BUCR 9*   *   TP 7.5   ALB 3.4   GLOB 4.1*   AGRAT 0.8        CBC w/Diff Recent Labs     12/04/20  1200   WBC 13.4*   RBC 4.91   HGB 13.5   HCT 41.7      GRANS 96*   LYMPH 2*   EOS 0        Liver Enzymes Protein, total   Date Value Ref Range Status   12/04/2020 7.5 6.4 - 8.2 g/dL Final     Albumin   Date Value Ref Range Status   12/04/2020 3.4 3.4 - 5.0 g/dL Final     Globulin   Date Value Ref Range Status   12/04/2020 4.1 (H) 2.0 - 4.0 g/dL Final     A-G Ratio   Date Value Ref Range Status   12/04/2020 0.8 0.8 - 1.7   Final     Alk.  phosphatase   Date Value Ref Range Status   12/04/2020 266 (H) 45 - 117 U/L Final     Recent Labs     12/04/20  1200   TP 7.5 ALB 3.4   GLOB 4.1*   AGRAT 0.8   *        Imaging:  XR (Most Recent). Results from [place] encounter on 02/24/20   XR CHEST PA LAT    Narrative EXAM: XR CHEST PA LAT    HISTORY: Smoker, generalized weakness. COMPARISON: May 8, 2019    FINDINGS:  No evidence of pneumonia or acute pulmonary infiltrate. Lung volumes are  satisfactory and there is no evidence of a pleural effusion or pneumothorax. Cardiac size and pulmonary vascular markings are within normal limits. There is  moderate atheromatous calcification and tortuosity of the thoracic aorta. The  bony structures appear intact. Impression IMPRESSION[de-identified]    1.  No acute cardiopulmonary disease. CT (Most Recent) Results from Hospital Encounter encounter on 12/04/20   CT ABD PELV W WO CONT    Narrative CT ABDOMEN AND PELVIS WITHOUT AND WITH INTRAVENOUS CONTRAST    COMPARISON: August 3, 2018 CT. INDICATIONS: Acute liver failure, leukocytosis. TECHNIQUE: Noncontrast CT was performed of the abdomen and pelvis . Contrast enhanced CT was then performed following the uneventful administration  of 100 cc of Isovue-300. Volumetric data acquisition was performed of the  abdomen and pelvis during arterial and venous phases on a multislice scanner and  reconstructed in axial coronal and sagittal planes,    Noncontrast images through the abdomen show no biliary or renal calculi. Vascular calcifications are present consistent with age. Following Contrast Administration:    CT ABDOMEN FINDINGS:    Lung Bases: Some mild groundglass and reticular opacities suggests  hypoventilation. .  Liver: There is intra and extrahepatic biliary dilation. The common duct  measures 1.6 cm. The common duct is dilated to the ampulla. No focal hepatic  lesions are evident. The gallbladder is contracted. Osie Ra Spleen: .  Kidneys: Small cysts are present bilaterally. Otherwise unremarkable. Pancreas: There is severe edematous pancreatitis. Inflammatory exudate extends  into the mesenteric root. The duct is not dilated. Adrenal Glands: Negative. Stomach, Small Bowel And Colon: The posterior wall the stomach is mildly  edematous contiguous with the pancreatic inflammation. There is marked edema of  the duodenal C-loop through to the fourth portion of the duodenum. The remainder  of the small bowel is unremarkable. There is a knuckle of nonobstructed small  bowel extending into an epigastric ventral hernia the appendix, if present, is  not confidently identified. There is diverticulosis throughout the colon without  findings of diverticulitis. The abdominal aorta and cava are unremarkable. There is no retroperitoneal lymphadenopathy. .  Peritoneal Spaces: There is no free fluid or free air. Bladder: Unremarkable . Osseous Structures Of Abdomen And Pelvis: Unremarkable for age. Impression IMPRESSION:     Severe edematous pancreatitis. Biliary dilation likely secondary to the above  Extensive duodenal edema secondary to the above      All CT scans at this facility are performed using dose optimization technique as  appropriate to the performed exam, to include automated exposure control,  adjustment of the mA and/or kV according to patient's size (Including  appropriate matching for site-specific examinations), or use of iterative  reconstruction technique. No results found for this or any previous visit (from the past 12 hour(s)). Assessment/Plan:   67 y.o. male with PMH CKD, HTN, HLD, CVA, now admitted with Acute pancreatitis. Acute pancreatitis. Unknown etiology as of yet. Potential etiology include gallstones, EtOH, High triglycerides, iatrogenic, medication, infection, and envenomation. Given rise of LFTs with this, likely gallstone pancreatitis, however cannot discount other etiologies as of yet.    - Admit to floor with telemetry monitoring  - GI consulted by ER  - DuncanFs, LR @ 125cc/hr   - Daily CBC, CMP, Lipase  - Lipid panel, hepatitis panel  - Vital signs per unit routine  - Monitor strict I/Os  - Ambulate as tolerated  - Replete electrolytes daily  - PT/OT/CM  - Pain control with morphine 2mg q4h PRN, to be spaced out when appropriate. HTN  -Continue home Amlodipine  -Consider holding home losartan    CKD stage 3  -Renal dose medications    HLD  -Hold home atorvastatin    Hx of CVA  -Continue home ASA    COPD  - O2 as needed to maintain sats >88%  - Home Albuterol PRN, spiriva    Overactive bladder  -Continue home oxybutynin    Global care  - Renally dose all meds  - NPO       Diet DIET NPO   DVT Prophylaxis SCD. SQH   GI Prophylaxis Famotidine   Code status Full   Disposition >2MN     Point of Contact She olmstead  Relationship: Sister  [phone]      Grant Rinaldi MD , PGY-1   [address] [facility], [place]   Intern Pager: [phone]   December 5, 2020, 12:13 AM        Admission History and Physical  [facility]        Patient: Rebecca Hinds MRN: [id number]  CSN: [id number]    YOB: 1945  Age: 67 y.o. Sex: male       DOA: 12/4/2020       HPI:      Rebecca Hinds is a 67 y.o. male with PMH HLD, Diastolic CHF, CKD, CVA w/ residual L sided weakness/dysphyais, now presenting with complaint of NV x1wk. Over the past wk pt reports episodes of NV with mild R sided abdominal pain. Pepto has helped some although did not resolve. He also reports anorexia 2/2 nausea/vomting not pain. He has not had fevers, chills, diarrhea. No ETOH use. He has not been around scorpions. He has not new medications.     He did not want to go to the ER today, his family made him.     ED Course: CT abd, US abd, CBC, CMP, lipase, EKG, Zofran, 2L NS, Morphine     Review of Systems  Constitutional: Negative for fever, chills and diaphoresis. HENT: Negative for hearing loss and sore throat. Eyes: Negative for blurred vision and double vision.   Respiratory: Negative for cough and hemoptysis. Cardiovascular: Negative for chest pain and palpitations. Gastrointestinal: Positive for nausea and vomiting. Genitourinary: Negative for dysuria and hematuria. Musculoskeletal: Negative for myalgias and joint pain. Skin: Negative for itching and rash.   Neurological: Negative for dizziness and headaches.          Past Medical History:   Diagnosis Date    Acute blood loss as cause of postoperative anemia 4/21/2018    Benign prostatic hyperplasia      CKD (chronic kidney disease) stage 3, GFR 30-59 ml/min 9/1/2015    Depression 2/8/1375    Diastolic dysfunction without heart failure 9/2/2015     Grade 1 diastolic dysfunction on 2D ECHO done 9/02/2015    Dyslipidemia 9/2/2015    Dysphagia as late effect of cerebrovascular accident (CVA) 9/1/2015    Hemiparesis affecting left side as late effect of cerebrovascular accident (CVA) ([facility] [address] 9/1/2015    History of noncompliance with medical treatment, presenting hazards to health 9/1/2015    History of stroke with residual deficit 9/1/2015     Acute Ischemic Stroke (early subacute infarct at the posterior right lentiform nucleus) with residual left hemiparesis and dysphagia     History of tobacco use 9/1/2015    History of trichomonal urethritis 9/1/2015    History of vitamin D deficiency 9/6/2015    Hypertensive heart and kidney disease without heart failure and with chronic kidney disease stage III 9/2/2015    Obesity, Class I, BMI 30-34.9 9/1/2015    Overactive bladder      Postoperative atrial fibrillation ([facility] [address] 4/21/2018     Resolved    Postoperative confusion 4/22/2018    Postoperative urinary retention 4/22/2018    Statin intolerance 05/07/2018     Atorvastatin and Simvastatin    Vitamin D deficiency 5/1/2018     Vitamin D 25-Hydroxy (5/1/2018) = 17.9                Past Surgical History:   Procedure Laterality Date    APPENDECTOMY,RUPT APPENDX+ABSCESS N/A 04/20/2018     Dr. Veda Sierra COLONOSCOPY N/A 4/3/2018     COLONOSCOPY,  w heated snared polypectomy performed by Tiara Gonzalez MD at [address]  Family History   Problem Relation Age of Onset    Diabetes Mother      Stroke Mother      Heart Disease Father      Hypertension Father      Diabetes Brother      Hypertension Brother           Social History               Socioeconomic History    Marital status: SINGLE       Spouse name: Not on file    Number of children: Not on file    Years of education: Not on file    Highest education level: Not on file   Tobacco Use    Smoking status: Current Every Day Smoker       Last attempt to quit: 2014       Years since quittin.0    Smokeless tobacco: Never Used   Substance and Sexual Activity    Alcohol use: Yes    Drug use: No    Sexual activity: Never                 Allergies   Allergen Reactions    Lipitor [Atorvastatin] Other (comments)       Elevated CK level     Pt has no awareness of this allergy.  Simvastatin Other (comments)       Elevation in LFTs         Prior to Admission Medications   Prescriptions Last Dose Informant Patient Reported? Taking? amLODIPine (NORVASC) 2.5 mg tablet     No Yes   Sig: Take 1 Tab by mouth daily. aspirin 81 mg chewable tablet     Yes Yes   Sig: Take 81 mg by mouth daily. atorvastatin (LIPITOR) 10 mg tablet     Yes Yes   Sig: Take 10 mg by mouth daily. cyanocobalamin (VITAMIN B-12) 1,000 mcg tablet     Yes No   Sig: Take 1,000 mcg by mouth daily. losartan (COZAAR) 100 mg tablet     Yes Yes   Sig: Take 100 mg by mouth daily. oxybutynin (DITROPAN) 5 mg tablet     Yes Yes   Sig: Take 5 mg by mouth daily. sertraline (ZOLOFT) 50 mg tablet     Yes Yes   Sig: Take 50 mg by mouth daily.       Facility-Administered Medications: None         Physical Exam:      Patient Vitals for the past 24 hrs:    Temp Pulse Resp BP SpO2   20 2200 98.2 °F (36.8 °C) 78 18 (!) 160/97 93 %   20 2100  76 18 (!) 168/87 94 %   20 2000  82 18 (!) 161/88 93 %   20 1900 98.4 °F (36.9 °C) 73 20 (!) 162/87 94 %   12/04/20 1736  86 19 (!) 161/90 96 %   12/04/20 1730  80 22 (!) 167/94 96 %   12/04/20 1719  80 17     12/04/20 1712  79 22  96 %   12/04/20 1711    (!) 183/91    12/04/20 1630  93 20 (!) 140/92    12/04/20 1600  (!) 108 20 (!) 139/95    12/04/20 1545  (!) 101 20 (!) 127/98 96 %   12/04/20 1530  78 21 (!) 167/94 96 %   12/04/20 1145 98.6 °F (37 °C) 92 20 (!) 170/90 95 %         Physical Exam:   General:  Alert and Responsive and in No acute distress. Dysarthria although understandable  HEENT: Conjunctiva pink, sclera anicteric. EOMI. Pharynx moist, nonerythematous. Moist mucous membranes. No other gross abnormalities appreciated. CV:  RRR. No murmurs, rubs, or gallops appreciated. No visible pulsations or thrills. RESP:  Unlabored breathing. Lungs clear to auscultation. No wheeze, rales, or rhonchi. Equal expansion bilaterally. ABD:  Soft, mild epigastric tender, nondistended. Normoactive bowel sounds. No hepatosplenomegaly. No suprapubic tenderness. MS:  No joint deformity or instability. No atrophy. Neuro:  Dysarthria, L sided weakness. A+Ox3. Ext:  No edema. 2+ radial and dp pulses bilaterally. Skin:  No rashes, lesions, or ulcers, no suspicious arachnid puncture wounds. Good turgor.     IMAGING: Ct Abd Pelv W Wo Cont     Result Date: 12/4/2020  IMPRESSION: Severe edematous pancreatitis. Biliary dilation likely secondary to the above Extensive duodenal edema secondary to the above All CT scans at this facility are performed using dose optimization technique as appropriate to the performed exam, to include automated exposure control, adjustment of the mA and/or kV according to patient's size (Including appropriate matching for site-specific examinations), or use of iterative reconstruction technique.  [facility]     Result Date: 12/4/2020  IMPRESSION: 1. Liver appears mildly echogenic.  No masses. -Central intrahepatic biliary ductal dilation with enlarged extrahepatic common bile duct as on earlier CT. 2. Gallbladder contracted. Not well assessed. Small amount of fluid in the gallbladder fossa. 3. Right renal cyst. Mildly echogenic parenchyma as above. No hydronephrosis. -Tiny amount of adjacent free fluid. 4. Pancreas not adequately visualized as above.           Recent Results          Recent Results (from the past 12 hour(s))   EKG, 12 LEAD, INITIAL     Collection Time: 12/04/20 11:47 AM   Result Value Ref Range     Ventricular Rate 77 BPM     Atrial Rate 77 BPM     P-R Interval 186 ms     QRS Duration 108 ms     Q-T Interval 380 ms     QTC Calculation (Bezet) 430 ms     Calculated P Axis 35 degrees     Calculated R Axis -62 degrees     Calculated T Axis 40 degrees     Diagnosis           Normal sinus rhythm  Left anterior fascicular block  Abnormal ECG  When compared with ECG of 02-AUG-2018 17:47,  Questionable change in initial forces of Inferior leads  T wave amplitude has decreased in Anterior leads  Confirmed by Snow Ortiz ([id number]) on 12/4/2020 8:10:50 PM      CBC WITH AUTOMATED DIFF     Collection Time: 12/04/20 12:00 PM   Result Value Ref Range     WBC 13.4 (H) 4.6 - 13.2 K/uL     RBC 4.91 4.70 - 5.50 M/uL     HGB 13.5 13.0 - 16.0 g/dL     HCT 41.7 36.0 - 48.0 %     MCV 84.9 74.0 - 97.0 FL     MCH 27.5 24.0 - 34.0 PG     MCHC 32.4 31.0 - 37.0 g/dL     RDW 14.8 (H) 11.6 - 14.5 %     PLATELET 258 022 - 853 K/uL     MPV 11.6 9.2 - 11.8 FL     NEUTROPHILS 96 (H) 40 - 73 %     LYMPHOCYTES 2 (L) 21 - 52 %     MONOCYTES 2 (L) 3 - 10 %     EOSINOPHILS 0 0 - 5 %     BASOPHILS 0 0 - 2 %     ABS. NEUTROPHILS 12.8 (H) 1.8 - 8.0 K/UL     ABS. LYMPHOCYTES 0.3 (L) 0.9 - 3.6 K/UL     ABS. MONOCYTES 0.3 0.05 - 1.2 K/UL     ABS. EOSINOPHILS 0.0 0.0 - 0.4 K/UL     ABS.  BASOPHILS 0.0 0.0 - 0.1 K/UL     DF AUTOMATED     METABOLIC PANEL, COMPREHENSIVE     Collection Time: 12/04/20 12:00 PM   Result Value Ref Range     Sodium 136 136 - 145 mmol/L     Potassium 4.0 3.5 - 5.5 mmol/L     Chloride 105 100 - 111 mmol/L     CO2 24 21 - 32 mmol/L     Anion gap 7 3.0 - 18 mmol/L     Glucose 142 (H) 74 - 99 mg/dL     BUN 15 7.0 - 18 MG/DL     Creatinine 1.66 (H) 0.6 - 1.3 MG/DL     BUN/Creatinine ratio 9 (L) 12 - 20       GFR est AA 49 (L) >60 ml/min/1.73m2     GFR est non-AA 40 (L) >60 ml/min/1.73m2     Calcium 9.6 8.5 - 10.1 MG/DL     Bilirubin, total 2.0 (H) 0.2 - 1.0 MG/DL     ALT (SGPT) 2,824 (H) 16 - 61 U/L     AST (SGOT) 4,288 (H) 10 - 38 U/L     Alk. phosphatase 266 (H) 45 - 117 U/L     Protein, total 7.5 6.4 - 8.2 g/dL     Albumin 3.4 3.4 - 5.0 g/dL     Globulin 4.1 (H) 2.0 - 4.0 g/dL     A-G Ratio 0.8 0.8 - 1.7     LIPASE     Collection Time: 12/04/20 12:00 PM   Result Value Ref Range     Lipase 29,683 (H) 73 - 393 U/L              Assessment/Plan:   67 y.o. male with PMH HLD, Diastolic CHF, CKD, CVA w/ residual L sided weakness/dysphyais, now admitted with Pancreatitis.     Pancreatitis with elevated LFTs - Lipase 29k with elevated LFTs, mildly elevated T Bili, mildly elevated alk phos. Looks and feels well, no swelling, mild tenderness, no jaundice. CT and US do not show obstruction, gall stones, or necrosis. Etiology unclear, is on ARB, Sertraline, however not new medications; possibly passed gall stones? .  Denies ETOH/drug use. Pancreatic mass? Vs Gallstone vs scorpion although no travel or documented/visualized sting vs Triglycerides vs less likely drug induced (sertraline or arb).   -tele  -NPO  -1L NS now  -NS 125ml/hr  -GI consulted in ER  -Lipids  -CBC, CMP, Lipase daily  -PT/OT/CM  -hold lipitor  strict I/O  -pain control with 2mg morphine q4hr prn  -consider holding sertraline and losartan sequentially tomorrow if no improvement     Diet: NPO  DVT Prophylaxis: Hep  Code Status: Full  Point of ContactGaylphyllis Fleming  [phone]          Disposition and anticipated LOS: 2 midnights     Bruce Sharp DO, PGY III  [facility]  12/04/20  10:57 PM

## 2020-12-05 NOTE — ROUTINE PROCESS
TRANSFER - IN REPORT:    Verbal report received from Rafiq Gotti RN (name) on Prerna Dominguez  being received from [facility]-ED (unit) for routine progression of care      Report consisted of patients Situation, Background, Assessment and   Recommendations(SBAR). Information from the following report(s) SBAR, Kardex and ED Summary was reviewed with the receiving nurse. Opportunity for questions and clarification was provided. Assessment completed upon patients arrival to unit and care assumed.

## 2020-12-05 NOTE — ED NOTES
Pt awake alert, comfortably lying in bed, no untoward signs and symptoms noted, vital signs reman stable. Pt waiting for [employer].

## 2020-12-05 NOTE — ED NOTES
TRANSFER - OUT REPORT:    Telephone Verbal report given to Glory Sanon on Adeline Second  being transferred to [place] [address](unit) for routine progression of care       Report consisted of patients Situation, Background, Assessment and   Recommendations(SBAR). Information from the following report(s) SBAR, Kardex, ED Summary, Procedure Summary, Intake/Output, MAR and Accordion was reviewed with the receiving nurse. Opportunity for questions and clarification was provided.       Patient transported with:   Monitor  Tech   [employer]  IV ns 125ml/hr

## 2020-12-05 NOTE — PROGRESS NOTES
Problem: Mobility Impaired (Adult and Pediatric)  Goal: *Acute Goals and Plan of Care (Insert Text)  Description: Physical Therapy Goals  Initiated 12/5/2020 and to be accomplished within 7 day(s)  1. Patient will move from supine to sit and sit to supine  in bed with supervision/set-up. 2.  Patient will transfer from bed to chair and chair to bed with supervision/set-up using the least restrictive device. 3.  Patient will perform sit to stand with supervision/set-up. 4.  Patient will ambulate with supervision/set-up for 300 feet with the least restrictive device. PLOF: pt lives in a [facility] [place] with [address] with his sister who is his primary care giver. He has home aides who come MWF for 5 hours to assist with bathing and dressing. He has residual speech deficits from a previous CVA. Pt uses a quad cane to ambulate short distances but also uses a RW. Outcome: Progressing Towards Goal     PHYSICAL THERAPY EVALUATION    Patient: Marleni Crews (86 y.o. male)  Date: 12/5/2020  Primary Diagnosis: Pancreatitis, acute [K85.90]  Elevated liver enzymes [R74.8]  Nausea and vomiting [R11.2]  Pancreatitis [K85.90]        Precautions:  Fall    ASSESSMENT :  Based on the objective data described below, the patient presents with decreased strength, endurance, functional mobility capacity. RN cleared for PT to work with pt. Pt found supine in bed, in NAD, willing to work with PT. He reports no pain but states he has been feeling nauseated. Pt noted to have speech deficit with increased stuttering and difficulty with words (consistent with broca's aphasia). Pt performed supine-sit transfer with Polina, reported slight dizziness. /77 and dizziness resolved. Pt stood with CGA and RW. Pt amb 25 feet around room without difficulty, but cueing to go slowly and take time as patient tends to speed up especially during turns. Pt back sitting EOB for exercises per flow sheet.  Quad cane noted to be in room, patient stated he usually walks with that, but declined to do anymore amb this date. Pt edu on using quad cane next time he works with PT to ensure he is safe. Pt left supine in bed with HOB elevated, call bell nearby, no new questions or concerns. Pt in need of acute PT for all functional mobility deficits. Recommend d/c home with continued 24/7 care. Patient will be assessed amb with quad cane next session. Patient will benefit from skilled intervention to address the above impairments. Patient's rehabilitation potential is considered to be Fair  Factors which may influence rehabilitation potential include:   []         None noted  [x]         Mental ability/status  [x]         Medical condition  []         Home/family situation and support systems  []         Safety awareness  [x]         Pain tolerance/management  []         Other:      PLAN :  Recommendations and Planned Interventions:   [x]           Bed Mobility Training             [x]    Neuromuscular Re-Education  [x]           Transfer Training                   []    Orthotic/Prosthetic Training  [x]           Gait Training                          []    Modalities  [x]           Therapeutic Exercises           []    Edema Management/Control  [x]           Therapeutic Activities            [x]    Family Training/Education  [x]           Patient Education  []           Other (comment):    Frequency/Duration: Patient will be followed by physical therapy 1-2 times per day/4-7 days per week to address goals. Discharge Recommendations: Home Health with continues 24/7 care from sister/aides  Further Equipment Recommendations for Discharge: N/A     SUBJECTIVE:   Patient stated Lucila Jimenez are we gonna walk again? Sobia Chand    OBJECTIVE DATA SUMMARY:     Past Medical History:   Diagnosis Date    Acute blood loss as cause of postoperative anemia 4/21/2018    Benign prostatic hyperplasia     CKD (chronic kidney disease) stage 3, GFR 30-59 ml/min 9/1/2015    Depression 0/6/9439    Diastolic dysfunction without heart failure 9/2/2015    Grade 1 diastolic dysfunction on 2D ECHO done 9/02/2015    Dyslipidemia 9/2/2015    Dysphagia as late effect of cerebrovascular accident (CVA) 9/1/2015    Hemiparesis affecting left side as late effect of cerebrovascular accident (CVA) ([facility] [address] 9/1/2015    History of noncompliance with medical treatment, presenting hazards to health 9/1/2015    History of stroke with residual deficit 9/1/2015    Acute Ischemic Stroke (early subacute infarct at the posterior right lentiform nucleus) with residual left hemiparesis and dysphagia     History of tobacco use 9/1/2015    History of trichomonal urethritis 9/1/2015    History of vitamin D deficiency 9/6/2015    Hypertensive heart and kidney disease without heart failure and with chronic kidney disease stage III 9/2/2015    Obesity, Class I, BMI 30-34.9 9/1/2015    Overactive bladder     Postoperative atrial fibrillation ([facility] [address] 4/21/2018    Resolved    Postoperative confusion 4/22/2018    Postoperative urinary retention 4/22/2018    Statin intolerance 05/07/2018    Atorvastatin and Simvastatin    Vitamin D deficiency 5/1/2018    Vitamin D 25-Hydroxy (5/1/2018) = 17.9      Past Surgical History:   Procedure Laterality Date    APPENDECTOMY,RUPT APPENDX+ABSCESS N/A 04/20/2018    Dr. Maritza Jeffrey    COLONOSCOPY N/A 4/3/2018    COLONOSCOPY,  w heated snared polypectomy performed by Tammi Pedro MD at [facility] ENDOSCOPY     Barriers to Learning/Limitations: yes;  sensory deficits-vision/hearing/speech  Compensate with: Visual Cues and Verbal Cues  Home Situation:  Home Situation  Home Environment: Private residence  # Steps to Enter: 1  One/Two Story Residence: One story  # of Interior Steps: (Unknown)  Living Alone: No  Support Systems: Family member(s), Home care staff(sister)  Patient Expects to be Discharged to[de-identified] Private residence  Current DME Used/Available at Home: [place], quad, Walker, rolling  Critical Behavior:  Neurologic State: Alert  Orientation Level: Oriented X4  Cognition: Follows commands  Safety/Judgement: Fall prevention  Psychosocial  Patient Behaviors: Calm; Cooperative  Needs Expressed: Emotional  Purposeful Interaction: Yes  Pt Identified Daily Priority: Communication issues (comment)  Caritas Process: Nurture loving kindness;Enable matthew/hope;Establish trust  Caring Interventions: Reassure  Reassure: Sit with patient;Caring rounds  Therapeutic Modalities: Humor  Other Caring Modalities: Hourly rounding  Skin Condition/Temp: Warm;Dry     Skin Integrity: Scars (comment)(mid abdominal incision )  Skin Integumentary  Skin Color: Appropriate for ethnicity  Skin Condition/Temp: Warm;Dry  Skin Integrity: Scars (comment)(mid abdominal incision )  Turgor: Non-tenting  Hair Growth: Present  Varicosities: Absent  Nails: Exceptions to WDL  Exceptions to WDL: Thick(toe naisl thick)     Strength:    Strength: Generally decreased, functional    Tone & Sensation:   Tone: Normal    Sensation: Intact    Range Of Motion:  AROM: Generally decreased, functional    PROM: Generally decreased, functional    Functional Mobility:  Bed Mobility:     Supine to Sit: Minimum assistance; Additional time  Sit to Supine: Minimum assistance; Additional time     Transfers:  Sit to Stand: Contact guard assistance  Stand to Sit: Contact guard assistance        Balance:   Sitting: Intact; Without support  Standing: Impaired; With support  Standing - Static: Good  Standing - Dynamic : Fair;Occasional    Ambulation/Gait Training:  Distance (ft): 25 Feet (ft)  Assistive Device: Walker, rolling  Ambulation - Level of Assistance: Contact guard assistance        Gait Abnormalities: Decreased step clearance        Base of Support: Narrowed     Speed/Chiquita: Fluctuations  Step Length: Right shortened;Left shortened    Therapeutic Exercises:   Pt performed exercises per flow sheet sitting EOB at end of session      EXERCISE   Sets   Reps   Active Active Assist   Passive Self ROM   Comments   Ankle Pumps 1 15  [x] [] [] []    Quad Sets/Glut Sets   [] [] [] []    Hamstring Sets   [] [] [] []    Short Arc Quads   [] [] [] []    Heel Slides   [] [] [] []    Straight Leg Raises   [] [] [] []    Hip Abd/Add   [] [] [] []    Long Arc Quads 2 15 [x] [] [] []    Seated Marching 1 20 [x] [] [] []    Standing Marching   [] [] [] []    STS 1 10 [x] [] [] []       Pain:  Pain level pre-treatment: 0/10   Pain level post-treatment: 0/10   Pain Intervention(s) : Medication (see MAR); Rest, Repositioning  Response to intervention: Nurse notified, See doc flow    Activity Tolerance:   Pt tolerated mobility well  Please refer to the flowsheet for vital signs taken during this treatment. After treatment:   []         Patient left in no apparent distress sitting up in chair  [x]         Patient left in no apparent distress in bed  [x]         Call bell left within reach  [x]         Nursing notified  []         Caregiver present  []         Bed alarm activated  []         SCDs applied    COMMUNICATION/EDUCATION:   [x]         Role of Physical Therapy in the acute care setting. [x]         Fall prevention education was provided and the patient/caregiver indicated understanding. [x]         Patient/family have participated as able in goal setting and plan of care. []         Patient/family agree to work toward stated goals and plan of care. []         Patient understands intent and goals of therapy, but is neutral about his/her participation. []         Patient is unable to participate in goal setting/plan of care: ongoing with therapy staff.  []         Other:     Thank you for this referral.  Holly Wei   Time Calculation: 25 mins      Eval Complexity: History: LOW Complexity : Zero comorbidities / personal factors that will impact the outcome / POCExam:MEDIUM Complexity : 3 Standardized tests and measures addressing body structure, function, activity limitation and / or participation in recreation Presentation: MEDIUM Complexity : Evolving with changing characteristics  Clinical Decision Making:Medium Complexity    Overall Complexity:MEDIUM

## 2020-12-05 NOTE — ROUTINE PROCESS
Bedside and Verbal shift change report given to Megan Webster RN and 1200 Richie Cárdenas RN (oncoming nurse) by Vazquez Serrano (offgoing nurse). Report included the following information SBAR, Kardex, MAR and Recent Results.     SITUATION:  Code Status: Full Code  Reason for Admission: Pancreatitis, acute [K85.90]  Elevated liver enzymes [R74.8]  Nausea and vomiting [R11.2]  Pancreatitis [K85.90]  Hospital day: 1  Problem List:       Hospital Problems  Date Reviewed: 5/30/2018          Codes Class Noted POA    * (Principal) Pancreatitis, acute ICD-10-CM: K85.90  ICD-9-CM: 782.7  12/4/2020 Unknown        Nausea and vomiting ICD-10-CM: R11.2  ICD-9-CM: 787.01  12/4/2020 Unknown        Elevated liver enzymes ICD-10-CM: R74.8  ICD-9-CM: 790.5  12/4/2020 Unknown        Pancreatitis ICD-10-CM: K85.90  ICD-9-CM: 029.2  12/4/2020 Unknown              BACKGROUND:   Past Medical History:   Past Medical History:   Diagnosis Date    Acute blood loss as cause of postoperative anemia 4/21/2018    Benign prostatic hyperplasia     CKD (chronic kidney disease) stage 3, GFR 30-59 ml/min 9/1/2015    Depression 9/0/0069    Diastolic dysfunction without heart failure 9/2/2015    Grade 1 diastolic dysfunction on 2D ECHO done 9/02/2015    Dyslipidemia 9/2/2015    Dysphagia as late effect of cerebrovascular accident (CVA) 9/1/2015    Hemiparesis affecting left side as late effect of cerebrovascular accident (CVA) ([facility] [address] 9/1/2015    History of noncompliance with medical treatment, presenting hazards to health 9/1/2015    History of stroke with residual deficit 9/1/2015    Acute Ischemic Stroke (early subacute infarct at the posterior right lentiform nucleus) with residual left hemiparesis and dysphagia     History of tobacco use 9/1/2015    History of trichomonal urethritis 9/1/2015    History of vitamin D deficiency 9/6/2015    Hypertensive heart and kidney disease without heart failure and with chronic kidney disease stage III 9/2/2015  Obesity, Class I, BMI 30-34.9 9/1/2015    Overactive bladder     Postoperative atrial fibrillation (HCC) 4/21/2018    Resolved    Postoperative confusion 4/22/2018    Postoperative urinary retention 4/22/2018    Statin intolerance 05/07/2018    Atorvastatin and Simvastatin    Vitamin D deficiency 5/1/2018    Vitamin D 25-Hydroxy (5/1/2018) = 17.9       Patient taking anticoagulants yes    Patient has a defibrillator: no    History of shots YES for example, flu, pneumonia, tetanus   Isolation History YES for example, MRSA, CDiff    ASSESSMENT:  Changes in Assessment Throughout Shift: None  Significant Changes in 24 hours (for example, RR/code, fall)  Patient has Central Line: no   Patient has Chow Cath: no Patient has condom catheter  Mobility Issues  PT  IV Patency  OR Checklist  Pending Tests    Last Vitals:  Vitals w/ MEWS Score (last day)     Date/Time MEWS Score Pulse Resp Temp BP Level of Consciousness SpO2    12/05/20 0324  1  81  18  97.8 °F (36.6 °C)  (!) 142/81  Alert  92 %    12/04/20 2324  1  83  18  97.8 °F (36.6 °C)  (!) 146/89  Alert  95 %    12/04/20 2200  1  78  18  98.2 °F (36.8 °C)  (!) 160/97  Alert  93 %    12/04/20 2100    76  18    (!) 168/87  Alert  94 %    12/04/20 2000    82  18    (!) 161/88  Alert  93 %    12/04/20 1900  1  73  20  98.4 °F (36.9 °C)  (!) 162/87  Alert  94 %    12/04/20 1736    86  19    (!) 161/90    96 %    12/04/20 1730    80  22    (!) 167/94    96 %    12/04/20 1719    80  17            12/04/20 1712    79  22        96 %    12/04/20 1711          (!) 183/91        12/04/20 1630    93  20    (!) 140/92        12/04/20 1600    (!) 108  20    (!) 139/95        12/04/20 1545    (!) 101  20    (!) 127/98    96 %    12/04/20 1530    78  21    (!) 167/94    96 %    12/04/20 1145  1  92  20  98.6 °F (37 °C)  (!) 170/90  Alert  95 %            PAIN    Pain Assessment    Pain Intensity 1: 0 (12/05/20 4071)              Patient Stated Pain Goal: 0  Intervention effective: N/A  Time of last intervention: N/A Reassessment Completed: yes   Other actions taken for pain: Distraction    Last 3 Weights:  Last 3 Recorded Weights in this Encounter    12/04/20 1145   Weight: 108.9 kg (240 lb)   Weight change:     INTAKE/OUPUT    Current Shift: No intake/output data recorded. Last three shifts: 12/03 1901 - 12/05 0700  In: 4372.9 [I.V.:4372.9]  Out: 1000 [Urine:1000]    RECOMMENDATIONS AND DISCHARGE PLANNING  Patient needs and requests: Assistance with ADLs    Pending tests/procedures: Labs     Discharge plan for patient: Home    Discharge planning Needs or Barriers: None    Estimated Discharge Date: 12/11 Posted on Whiteboard in Patients Room: yes       \"HEALS\" SAFETY CHECK  A safety check occurred in the patient's room between off going nurse and oncoming nurse listed above. The safety check included the below items:    H  High Alert Medications Verify all high alert medication drips (heparin, PCA, etc.)  E  Equipment Suction is set up for ALL patients (with dipti)  Red plugs utilized for all equipment (IV pumps, etc.)  WOWs wiped down at end of shift. Room stocked with oxygen, suction, and other unit-specific supplies  A  Alarms Bed alarm is set for fall risk patients  Ensure chair alarm is in place and activated if patient is up in a chair  L  Lines Check IV for any infiltration  Chow bag is empty if patient has a Chow   [place] and IV bags are labeled  S  Safety  Room is clean, patient is clean, and equipment is clean. Hallways are clear from equipment besides carts. Fall bracelet on for fall risk patients  Ensure room is clear and free of clutter  Suction is set up for ALL patients (with dipti)  Hallways are clear from equipment besides carts.    Isolation precautions followed, supplies available outside room, sign posted    [facility]

## 2020-12-05 NOTE — PROGRESS NOTES
Problem: Self Care Deficits Care Plan (Adult)  Goal: *Acute Goals and Plan of Care (Insert Text)  Outcome: Resolved/Met       OCCUPATIONAL THERAPY EVALUATION/DISCHARGE     Patient: Deirdre Goodrich  Date: 12/5/2020  Primary Diagnosis: pancreatitis      Precautions: fall   PLOF: he reports he lives with his sister who is home most of the time and he has a CNA who comes in 3 days/week to bathe and dress him. He feels he is back to his baseline status and is declining to get up to the [place] [id number]:  Based on the objective data described below, the patient presents with good UB strength and assistance at home for his self care at baseline. He is declining to get up to the bedside commode as he is tired. He feels he is back to his baseline abilities; therefore, skilled occupational therapy is not indicated at this time. Discharge Recommendations: Home Health  Further Equipment Recommendations for Discharge: N/A       SUBJECTIVE:   Patient stated I have help at home, I'm OK.      OBJECTIVE DATA SUMMARY:      Barriers to Learning/Limitations: no    Home Situation  Home Environment: Private residence  # Steps to Enter: 0(ramp)  One/Two Story Residence: One story  Living Alone: No  Support Systems: Family member(s), Friends \ neighbors, Home care staff  Patient Expects to be Discharged to[de-identified] Private residence  Current DME Used/Available at Home: Adaptive bathing aides, Hospital bed, Malorie Moellers, Wheelchair  [x]     Right hand dominant   []     Left hand dominant     Cognitive/Behavioral Status:  Neurologic State: Alert  Orientation Level: Oriented to person;Oriented to place;Oriented to situation     Skin: no skin integrity issues noted this date  Edema: no UE edema noted     Vision/Perceptual:      Tracking is [facility]       Coordination: BUE   WFL   Strength: BUE  4/5   Tone & Sensation: BUE  WFL   Range of Motion: BUE  AROM WFL   Functional Mobility and Transfers for ADLs:  Bed Mobility:   Modified independent rolling   Transfers:   Declined this morning  ADL Assessment:   Self feeding: independent (simulated)  Grooming: independent (simulated at bed level)  UB bathing/dressing: independent (simulated)  LB bathing/dressing: min to mod assist (self report and he is able to reach below his knees only)   Pain:  Pain level pre-treatment: 0/10   Pain level post-treatment: 0/10   Activity Tolerance:   No SOB noted; he is reporting generalized fatigue  Please refer to the flowsheet for vital signs taken during this treatment. After treatment:   []  Patient left in no apparent distress sitting up in chair  [x]  Patient left in no apparent distress in bed  [x]  Call bell left within reach  []  Nursing notified  []  Caregiver present  []  Bed alarm activated     COMMUNICATION/EDUCATION:   [x]      Role of Occupational Therapy in the acute care setting  []      Home safety education was provided and the patient/caregiver indicated understanding. []      Patient/family have participated as able and agree with findings and recommendations. []      Patient is unable to participate in plan of care at this time. Thank you for this referral.  Jordi Bardales OTR/GARO  Time Calculation: 9 mins        Eval Complexity: History: LOW Complexity : Brief history review ; Examination: LOW Complexity : 1-3 performance deficits relating to physical, cognitive , or psychosocial skils that result in activity limitations and / or participation restrictions ;    Decision Making:LOW Complexity : No comorbidities that affect functional and no verbal or physical assistance needed to complete eval tasks

## 2020-12-05 NOTE — CONSULTS
[email or web address]  [phone]    Impression:   1. Acute edematous pancreatitis - per imaging with marked elevation of transaminases and only minimal elevation of cholestatic serologies (alk phos and bilirubin); clinically presented with pancreatitis, now improving with transition of abdominal pain to left side and only mild nausea. Suspect his elevated transaminases are due to the intra-hepatic dilation noted on CT, MRI/MRCP ordered to further assess ongoing process. Patient denies ETOH, new medications, OTC supplements/herbal therapy. Triglycerides normal. Transaminases and lipase trending down with lag in bilirubin, as expected. 2. Left-sided abdominal pain - possibly secondary to duodenal edema noted secondary to pancreatitis  3. Sore throat - no visible abnormality or erythema, no dysphagia      Plan:     1. Increase IVF hydration to 175 cc/hr in accordance with pancreatitis treatment protocol, cautious in setting of CKD  2. May have clear liquids, advance as tolerated  3. KEV, IgG4 and CA 19-9 ordered  4. Strep panel ordered  5. Cepachol spray prn once throat swabs obtained  6. Anti-emetic and analgesic therapy per primary team      Chief Complaint: Pancreatitis      HPI:  Yao Negrete is a 67 y.o. male who is being seen on consult for pancreatitis as diagnosed in ED with presentation of RUQ/epigastric pain with associated nausea x 2 days, serologic correlation with marked elevation of transaminases, mild cholestatic elevations, marked lipase elevation -- trending down.     PMH:   Past Medical History:   Diagnosis Date    Acute blood loss as cause of postoperative anemia 4/21/2018    Benign prostatic hyperplasia     CKD (chronic kidney disease) stage 3, GFR 30-59 ml/min 9/1/2015    Depression 1/3/3788    Diastolic dysfunction without heart failure 9/2/2015    Grade 1 diastolic dysfunction on 2D ECHO done 9/02/2015    Dyslipidemia 9/2/2015    Dysphagia as late effect of cerebrovascular accident (CVA) 2015    Hemiparesis affecting left side as late effect of cerebrovascular accident (CVA) ([facility] [address] 2015    History of noncompliance with medical treatment, presenting hazards to health 2015    History of stroke with residual deficit 2015    Acute Ischemic Stroke (early subacute infarct at the posterior right lentiform nucleus) with residual left hemiparesis and dysphagia     History of tobacco use 2015    History of trichomonal urethritis 2015    History of vitamin D deficiency 2015    Hypertensive heart and kidney disease without heart failure and with chronic kidney disease stage III 2015    Obesity, Class I, BMI 30-34.9 2015    Overactive bladder     Postoperative atrial fibrillation (HCC) 2018    Resolved    Postoperative confusion 2018    Postoperative urinary retention 2018    Statin intolerance 2018    Atorvastatin and Simvastatin    Vitamin D deficiency 2018    Vitamin D 25-Hydroxy (2018) = 17.9        PSH:   Past Surgical History:   Procedure Laterality Date    APPENDECTOMY,RUPT APPENDX+ABSCESS N/A 2018    Dr. Mariama Laureano    COLONOSCOPY N/A 4/3/2018    COLONOSCOPY,  w heated snared polypectomy performed by Jacque Ruelas MD at [address] [place] [address] HX:   Social History     Socioeconomic History    Marital status: SINGLE     Spouse name: Not on file    Number of children: Not on file    Years of education: Not on file    Highest education level: Not on file   Occupational History    Not on file   Social Needs    Financial resource strain: Not on file    Food insecurity     Worry: Not on file     Inability: Not on file    Transportation needs     Medical: Not on file     Non-medical: Not on file   Tobacco Use    Smoking status: Current Every Day Smoker     Last attempt to quit: 2014     Years since quittin.0    Smokeless tobacco: Never Used   Substance and Sexual Activity    Alcohol use:  Yes    Drug use: No    Sexual activity: Never   Lifestyle    Physical activity     Days per week: Not on file     Minutes per session: Not on file    Stress: Not on file   Relationships    Social connections     Talks on phone: Not on file     Gets together: Not on file     Attends Mosque service: Not on file     Active member of club or organization: Not on file     Attends meetings of clubs or organizations: Not on file     Relationship status: Not on file    Intimate partner violence     Fear of current or ex partner: Not on file     Emotionally abused: Not on file     Physically abused: Not on file     Forced sexual activity: Not on file   Other Topics Concern    Not on file   Social History Narrative    Not on file       FHX:   Family History   Problem Relation Age of Onset    Diabetes Mother     Stroke Mother     Heart Disease Father     Hypertension Father     Diabetes Brother     Hypertension Brother        Allergy:   Allergies   Allergen Reactions    Lipitor [Atorvastatin] Other (comments)     Elevated CK level    Pt has no awareness of this allergy.  Simvastatin Other (comments)     Elevation in LFTs       Home Medications:     Medications Prior to Admission   Medication Sig    atorvastatin (LIPITOR) 10 mg tablet Take 10 mg by mouth daily.  losartan (COZAAR) 100 mg tablet Take 100 mg by mouth daily.  oxybutynin (DITROPAN) 5 mg tablet Take 5 mg by mouth daily.  sertraline (ZOLOFT) 50 mg tablet Take 50 mg by mouth daily.  amLODIPine (NORVASC) 2.5 mg tablet Take 1 Tab by mouth daily.  aspirin 81 mg chewable tablet Take 81 mg by mouth daily.  cyanocobalamin (VITAMIN B-12) 1,000 mcg tablet Take 1,000 mcg by mouth daily.  tiotropium (Spiriva with HandiHaler) 18 mcg inhalation capsule Take 1 Cap by inhalation daily.  albuterol (PROVENTIL HFA, VENTOLIN HFA, PROAIR HFA) 90 mcg/actuation inhaler Take  by inhalation.     polyethylene glycol (Miralax) 17 gram/dose powder Take 17 g by mouth daily. Review of Systems:     A fourteen point review of systems was obtained, and was negative except per HPI. Visit Vitals  /70 (BP 1 Location: Left arm, BP Patient Position: At rest)   Pulse 81   Temp 97.8 °F (36.6 °C)   Resp 18   Ht 5' 11\" (1.803 m)   Wt 108.9 kg (240 lb)   SpO2 95%   BMI 33.47 kg/m²       Physical Assessment:     constitutional: appearance: well developed, well nourished, in no acute distress. skin: no rashes, jaundice  eyes: inspection: normal conjunctivae and lids; no scleral icterus   ENMT: mouth: mucous membranes dry, no thrush or apparent pharyngeal erythema/exudate  neck:  no masses or tenderness; normal range of motion  respiratory: breath sounds clear, no wheeze/rales/rhonchi  cardiovascular: normal rate, regular rhythm without murmur  abdominal: soft, bowel sounds present, non-tender to palpation over epigastric region and right abdomen, tender to deep palpation over left side (upper and lower quadrants) without rebound or guarding; no appreciable mass; no appreciable hepatosplenomegaly; no appreciable fluid wave  extremities: no clubbing, cyanosis, edema  neurologic:  Cranial nerves II-XII grossly intact; no asterixis   psychiatric:  oriented to time, space and person. Basic Metabolic Profile   Recent Labs     12/05/20  0321      K 4.5      CO2 23   BUN 14   *   CA 9.0         CBC w/Diff    Recent Labs     12/05/20  0321   WBC 16.0*   RBC 4.70   HGB 13.0   HCT 39.3   MCV 83.6   MCH 27.7   MCHC 33.1   RDW 15.0*       Recent Labs     12/05/20  0321   GRANS 92*   LYMPH 3*   EOS 0        Hepatic Function   Recent Labs     12/05/20  0321   ALB 2.9*   TP 6.5   TBILI 3.3*   *   LPSE 8,734*          Zhang Nj MD  [facility] [address]  cell - [phone]  [email or web address]

## 2020-12-05 NOTE — ROUTINE PROCESS
80 Paged [facility] to obtain clarifying ordeers for NPO vs medication by mouth    5980 V/o from Dr Fabricio myers to give med with sip of water

## 2020-12-06 LAB
ALBUMIN SERPL-MCNC: 2.6 G/DL (ref 3.4–5)
ALBUMIN/GLOB SERPL: 0.8 {RATIO} (ref 0.8–1.7)
ALP SERPL-CCNC: 149 U/L (ref 45–117)
ALT SERPL-CCNC: 935 U/L (ref 16–61)
ANION GAP SERPL CALC-SCNC: 4 MMOL/L (ref 3–18)
AST SERPL-CCNC: 430 U/L (ref 10–38)
BASOPHILS # BLD: 0 K/UL (ref 0–0.1)
BASOPHILS NFR BLD: 0 % (ref 0–2)
BILIRUB SERPL-MCNC: 2.6 MG/DL (ref 0.2–1)
BUN SERPL-MCNC: 17 MG/DL (ref 7–18)
BUN/CREAT SERPL: 10 (ref 12–20)
CALCIUM SERPL-MCNC: 8.9 MG/DL (ref 8.5–10.1)
CANCER AG19-9 SERPL-ACNC: <2 U/ML (ref 0–35)
CHLORIDE SERPL-SCNC: 110 MMOL/L (ref 100–111)
CO2 SERPL-SCNC: 24 MMOL/L (ref 21–32)
CREAT SERPL-MCNC: 1.65 MG/DL (ref 0.6–1.3)
DIFFERENTIAL METHOD BLD: ABNORMAL
EOSINOPHIL # BLD: 0 K/UL (ref 0–0.4)
EOSINOPHIL NFR BLD: 0 % (ref 0–5)
ERYTHROCYTE [DISTWIDTH] IN BLOOD BY AUTOMATED COUNT: 14.9 % (ref 11.6–14.5)
GLOBULIN SER CALC-MCNC: 3.4 G/DL (ref 2–4)
GLUCOSE SERPL-MCNC: 103 MG/DL (ref 74–99)
HCT VFR BLD AUTO: 33.7 % (ref 36–48)
HGB BLD-MCNC: 11.1 G/DL (ref 13–16)
LIPASE SERPL-CCNC: 2846 U/L (ref 73–393)
LYMPHOCYTES # BLD: 0.7 K/UL (ref 0.9–3.6)
LYMPHOCYTES NFR BLD: 7 % (ref 21–52)
MCH RBC QN AUTO: 27.1 PG (ref 24–34)
MCHC RBC AUTO-ENTMCNC: 32.9 G/DL (ref 31–37)
MCV RBC AUTO: 82.4 FL (ref 74–97)
MONOCYTES # BLD: 0.3 K/UL (ref 0.05–1.2)
MONOCYTES NFR BLD: 3 % (ref 3–10)
NEUTS SEG # BLD: 8.7 K/UL (ref 1.8–8)
NEUTS SEG NFR BLD: 90 % (ref 40–73)
PLATELET # BLD AUTO: 149 K/UL (ref 135–420)
PMV BLD AUTO: 11.2 FL (ref 9.2–11.8)
POTASSIUM SERPL-SCNC: 4 MMOL/L (ref 3.5–5.5)
PROT SERPL-MCNC: 6 G/DL (ref 6.4–8.2)
RBC # BLD AUTO: 4.09 M/UL (ref 4.7–5.5)
SODIUM SERPL-SCNC: 138 MMOL/L (ref 136–145)
WBC # BLD AUTO: 9.7 K/UL (ref 4.6–13.2)

## 2020-12-06 PROCEDURE — 65270000029 HC RM PRIVATE

## 2020-12-06 PROCEDURE — 2709999900 HC NON-CHARGEABLE SUPPLY

## 2020-12-06 PROCEDURE — 97116 GAIT TRAINING THERAPY: CPT

## 2020-12-06 PROCEDURE — 74011000250 HC RX REV CODE- 250: Performed by: STUDENT IN AN ORGANIZED HEALTH CARE EDUCATION/TRAINING PROGRAM

## 2020-12-06 PROCEDURE — 36415 COLL VENOUS BLD VENIPUNCTURE: CPT

## 2020-12-06 PROCEDURE — 74011250637 HC RX REV CODE- 250/637: Performed by: STUDENT IN AN ORGANIZED HEALTH CARE EDUCATION/TRAINING PROGRAM

## 2020-12-06 PROCEDURE — 85025 COMPLETE CBC W/AUTO DIFF WBC: CPT

## 2020-12-06 PROCEDURE — 80053 COMPREHEN METABOLIC PANEL: CPT

## 2020-12-06 PROCEDURE — 97110 THERAPEUTIC EXERCISES: CPT

## 2020-12-06 PROCEDURE — 83690 ASSAY OF LIPASE: CPT

## 2020-12-06 PROCEDURE — 74011250636 HC RX REV CODE- 250/636: Performed by: STUDENT IN AN ORGANIZED HEALTH CARE EDUCATION/TRAINING PROGRAM

## 2020-12-06 RX ADMIN — FAMOTIDINE 20 MG: 10 INJECTION INTRAVENOUS at 21:15

## 2020-12-06 RX ADMIN — HEPARIN SODIUM 5000 UNITS: 5000 INJECTION INTRAVENOUS; SUBCUTANEOUS at 11:13

## 2020-12-06 RX ADMIN — Medication 10 ML: at 05:55

## 2020-12-06 RX ADMIN — AMLODIPINE BESYLATE 2.5 MG: 5 TABLET ORAL at 11:14

## 2020-12-06 RX ADMIN — ASPIRIN 81 MG CHEWABLE TABLET 81 MG: 81 TABLET CHEWABLE at 11:12

## 2020-12-06 RX ADMIN — HEPARIN SODIUM 5000 UNITS: 5000 INJECTION INTRAVENOUS; SUBCUTANEOUS at 15:54

## 2020-12-06 RX ADMIN — CYANOCOBALAMIN TAB 1000 MCG 1000 MCG: 1000 TAB at 11:12

## 2020-12-06 RX ADMIN — Medication 10 ML: at 21:16

## 2020-12-06 RX ADMIN — OXYBUTYNIN CHLORIDE 5 MG: 5 TABLET, EXTENDED RELEASE ORAL at 11:13

## 2020-12-06 RX ADMIN — POLYETHYLENE GLYCOL 3350 17 G: 17 POWDER, FOR SOLUTION ORAL at 11:12

## 2020-12-06 RX ADMIN — LOSARTAN POTASSIUM 100 MG: 50 TABLET, FILM COATED ORAL at 11:12

## 2020-12-06 RX ADMIN — SERTRALINE HYDROCHLORIDE 50 MG: 50 TABLET ORAL at 11:13

## 2020-12-06 NOTE — DISCHARGE SUMMARY
[address] Medicine  Discharge Summary    Patient: Prerna Dominguez MRN: [id number]  CSN: [id number]    YOB: 1945  Age: 67 y.o. Sex: male      Admission Date: 12/4/2020 Discharge Date: 12/7/2020   Attending: No att. providers found PCP: Mandi Del Cid MD     ===================================================================    Reason for Admission: Pancreatitis, acute [K85.90]  Elevated liver enzymes [R74.8]  Nausea and vomiting [R11.2]  Pancreatitis [K85.90]    Discharge Diagnoses:   Acute edematous pancreatitis  transaminates     Important notes to PCP/ follow-up studies and evaluations   - GI was consulted who recommended outpatient MRCP pt needs to follow up with GI     Pending labs and studies:   KEV, IgG4     Operative Procedures:   none    Discharge Medications:     Discharge Medication List as of 12/7/2020  3:31 PM      CONTINUE these medications which have NOT CHANGED    Details   atorvastatin (LIPITOR) 10 mg tablet Take 10 mg by mouth daily. , Historical Med      losartan (COZAAR) 100 mg tablet Take 100 mg by mouth daily. , Historical Med      oxybutynin (DITROPAN) 5 mg tablet Take 5 mg by mouth daily. , Historical Med      sertraline (ZOLOFT) 50 mg tablet Take 50 mg by mouth daily. , Historical Med      amLODIPine (NORVASC) 2.5 mg tablet Take 1 Tab by mouth daily. , Print, Disp-30 Tab, R-0      aspirin 81 mg chewable tablet Take 81 mg by mouth daily. , Historical Med      cyanocobalamin (VITAMIN B-12) 1,000 mcg tablet Take 1,000 mcg by mouth daily. , Historical Med      tiotropium (Spiriva with HandiHaler) 18 mcg inhalation capsule Take 1 Cap by inhalation daily. , Historical Med      albuterol (PROVENTIL HFA, VENTOLIN HFA, PROAIR HFA) 90 mcg/actuation inhaler Take  by inhalation. , Historical Med      polyethylene glycol (Miralax) 17 gram/dose powder Take 17 g by mouth daily. , Historical Med             Disposition: home  Consultants:    GI     Brief Hospital Course (including pertinent history and physical findings)  67 y. o. male with PMH CKD, HTN, HLD, CVA, now admitted with acute edematous pancreatitis. Pt does not drink alcohol. No new medications. Triglycerides were normal. On admission he had significantly elevated transaminates and mild  hyperbilirubinemia that resolved without intervention. His CT abdomen showed intra and extrahepatic biliary dilation. The common duct measures 1.6 cm. The common duct is dilated to the ampulla. Duodenal edema. Pt was placed NPO, given fluids and pain meds. But pt was able to quickly recover tolerate regular diet and abdominal pain resolved. His transaminates  and lipase trended down. Possible that patient may have passed a bilary stone. GI was consulted who recommended outpatient MRI/MRCP. GI ordered labs CA 19-9 (negative) , and Strep panel ( negative). CURRENT ADMISSION IMAGING RESULTS   Ct Abd Pelv W Wo Cont    Result Date: 12/4/2020  IMPRESSION: Severe edematous pancreatitis. Biliary dilation likely secondary to the above Extensive duodenal edema secondary to the above All CT scans at this facility are performed using dose optimization technique as appropriate to the performed exam, to include automated exposure control, adjustment of the mA and/or kV according to patient's size (Including appropriate matching for site-specific examinations), or use of iterative reconstruction technique. [id number]    Result Date: 12/4/2020  IMPRESSION: 1. Liver appears mildly echogenic. No masses. -Central intrahepatic biliary ductal dilation with enlarged extrahepatic common bile duct as on earlier CT. 2. Gallbladder contracted. Not well assessed. Small amount of fluid in the gallbladder fossa. 3. Right renal cyst. Mildly echogenic parenchyma as above. No hydronephrosis. -Tiny amount of adjacent free fluid. 4. Pancreas not adequately visualized as above. Mri Mrcp Only Wo Cont    Result Date: 12/7/2020  Impression: 1.   Acute pancreatitis, improving. No pseudocyst. 2. Mild biliary ductal dilation, improving. No choledocholithiasis identified. Motion limited exam. 3. Cholelithiasis. 4. Thick-walled, nondistended, stable gallbladder. 5. Renal cysts. 6. Small midline incisional hernia containing small bowel, without current evidence of obstruction. 7. Diverticulosis. Cardiology Procedures/Testing:  MODALITY RESULTS   EKG Results for orders placed or performed during the hospital encounter of 12/04/20   EKG, 12 LEAD, INITIAL   Result Value Ref Range    Ventricular Rate 77 BPM    Atrial Rate 77 BPM    P-R Interval 186 ms    QRS Duration 108 ms    Q-T Interval 380 ms    QTC Calculation (Bezet) 430 ms    Calculated P Axis 35 degrees    Calculated R Axis -62 degrees    Calculated T Axis 40 degrees    Diagnosis       Normal sinus rhythm  Left anterior fascicular block  Abnormal ECG  When compared with ECG of 02-AUG-2018 17:47,  Questionable change in initial forces of Inferior leads  T wave amplitude has decreased in Anterior leads  Confirmed by Jimbo Feliz ([id number]) on 12/4/2020 8:10:50 PM         ECHO 06/20/19   ECHO ADULT COMPLETE 06/20/2019 6/20/2019    Narrative · Left Ventricle: Estimated left ventricular ejection fraction is 56 -   60%. No regional wall motion abnormality noted. Age-appropriate left   ventricular diastolic function. · Aorta: Moderate aortic root dilatation. · Aortic Valve: Probably trileaflet aortic valve. Aortic valve sclerosis. Mild aortic valve regurgitation is present. Signed by: Ghada Marie MD      Nuclear Medicine No results found for this or any previous visit. IR No results found for this or any previous visit.    CATH       Special Testing/Procedures:  MODALITY RESULTS   MICRO All Micro Results     Procedure Component Value Units Date/Time    CULTURE, THROAT [[id number]]  (Abnormal) Collected:  12/05/20 1900    Order Status:  Completed Specimen:  Throat swab Updated:  12/08/20 1357     Special Requests: NO SPECIAL REQUESTS        Culture result:       MODERATE STREPTOCOCCI, BETA HEMOLYTIC GROUP B                  MODERATE NORMAL RESPIRATORY CORBIN          STREP AG SCREEN, GROUP A [[id number]] Collected:  12/05/20 1900    Order Status:  Completed Specimen:  Throat Updated:  12/05/20 2021     Group A Strep Ag ID Negative            ABG No results found for: PH, PHI, PCO2, PCO2I, PO2, PO2I, HCO3, HCO3I, FIO2, FIO2I   UA Results for orders placed or performed in visit on 03/29/18   AMB POC URINALYSIS DIP STICK AUTO W/O MICRO     Status: None   Result Value Ref Range Status    Color (UA POC) Yellow  Final    Clarity (UA POC) Clear  Final    Glucose (UA POC) Negative Negative Final    Bilirubin (UA POC) Negative Negative Final    Ketones (UA POC) Negative Negative Final    Specific gravity (UA POC) 1.010 1.001 - 1.035 Final    Blood (UA POC) Trace Negative Final    pH (UA POC) 5.0 4.6 - 8.0 Final    Protein (UA POC) Negative Negative Final    Urobilinogen (UA POC) 0.2 mg/dL 0.2 - 1 Final    Nitrites (UA POC) Negative Negative Final    Leukocyte esterase (UA POC) Negative Negative Final      ENDO [unfilled]   [unfilled]    PATH none     Laboratory Results:  LABORATORY RESULTS   HEMATOLOGY Lab Results   Component Value Date/Time    WBC 9.4 12/07/2020 05:33 AM    HGB 10.9 (L) 12/07/2020 05:33 AM    HCT 32.1 (L) 12/07/2020 05:33 AM    PLATELET 927 12/09/2371 05:33 AM    MCV 82.5 12/07/2020 05:33 AM       CHEMISTRIES Lab Results   Component Value Date/Time    Sodium 139 12/07/2020 05:33 AM    Potassium 4.1 12/07/2020 05:33 AM    Chloride 109 12/07/2020 05:33 AM    CO2 26 12/07/2020 05:33 AM    Anion gap 4 12/07/2020 05:33 AM    Glucose 95 12/07/2020 05:33 AM    BUN 15 12/07/2020 05:33 AM    Creatinine 1.44 (H) 12/07/2020 05:33 AM    BUN/Creatinine ratio 10 (L) 12/07/2020 05:33 AM    GFR est AA 58 (L) 12/07/2020 05:33 AM    GFR est non-AA 48 (L) 12/07/2020 05:33 AM    Calcium 9.0 12/07/2020 05:33 AM      HEPATIC FUNCTION Lab Results   Component Value Date/Time    Albumin 2.6 (L) 12/07/2020 05:33 AM    Bilirubin, direct 0.1 05/14/2018 06:28 AM    Bilirubin, total 2.3 (H) 12/07/2020 05:33 AM    Protein, total 5.7 (L) 12/07/2020 05:33 AM    Globulin 3.1 12/07/2020 05:33 AM    A-G Ratio 0.8 12/07/2020 05:33 AM    ALT (SGPT) 587 (H) 12/07/2020 05:33 AM    Alk. phosphatase 134 (H) 12/07/2020 05:33 AM       LACTIC ACID Lab Results   Component Value Date/Time    Lactic acid 1.1 08/03/2018 05:40 AM      CARDIAC PANEL Lab Results   Component Value Date/Time    CK 91 05/07/2018 06:33 AM    CK - MB 7.1 (H) 04/22/2018 03:14 AM    CK-MB Index 1.6 04/22/2018 03:14 AM    Troponin-I, QT <0.02 04/22/2018 03:14 AM      NT-proBNP No results found for: BNP, BNPP, BNPPPOC, XBNPT, BNPNT   THYROID Lab Results   Component Value Date/Time    TSH 0.65 04/22/2018 03:14 AM      LIPID PANEL Lab Results   Component Value Date/Time    Cholesterol, total 87 12/05/2020 03:21 AM    HDL Cholesterol 41 12/05/2020 03:21 AM    LDL, calculated 31.4 12/05/2020 03:21 AM    VLDL, calculated 14.6 12/05/2020 03:21 AM    Triglyceride 73 12/05/2020 03:21 AM    CHOL/HDL Ratio 2.1 12/05/2020 03:21 AM         RISK CALCULATORS:  SCORE RESULT   ASCVD The ASCVD Risk score (Daniel Ashraf et al., 2013) failed to calculate for the following reasons:    ASCVD risk score not calculated    [id number]     HAS-BLED    READMISSION RISK SCORE Low Risk            7       Total Score        3 Has Seen PCP in Last 6 Months (Yes=3, No=0)    4 Pt. Coverage (Medicare=5 , Medicaid, or Self-Pay=4)        Criteria that do not apply:    . Living with Significant Other. Assisted Living. LTAC. SNF.  or   Rehab    Patient Length of Stay (>5 days = 3)    IP Visits Last 12 Months (1-3=4, 4=9, >4=11)    Charlson Comorbidity Score (Age + Comorbid Conditions)           Functional status and cognitive function:    Ambulates with cane  Status: alert, cooperative, no distress, appears stated age  Condition: STABLE  Disposition: home    Diet: General Diet    Code status and advanced care plan: full    Point of Contact She olmstead  Relationship: Sister  [phone]           Patient Education:  Patient was educated on the following topics prior to discharge:abdominal pain    Follow-up:   Follow-up Information     Follow up With Specialties Details Why Contact Info Karyle Gain, MD Family Medicine   12/18 3p with Dr. Montana Garcia  [phone]      Silvia Peña MD Gastroenterology Schedule an appointment as soon as possible for a visit on 1/22/2021 @ 8:30am [address] [place] [address]  [address] [place] [address] [phone]            =================================================================    Jamison Trinh PGY-1   [address]   Senior Pager: [phone]   December 9, 2020, 1:45 PM

## 2020-12-06 NOTE — PROGRESS NOTES
Problem: Mobility Impaired (Adult and Pediatric)  Goal: *Acute Goals and Plan of Care (Insert Text)  Description: Physical Therapy Goals  Initiated 12/5/2020 and to be accomplished within 7 day(s)  1. Patient will move from supine to sit and sit to supine  in bed with supervision/set-up. 2.  Patient will transfer from bed to chair and chair to bed with supervision/set-up using the least restrictive device. 3.  Patient will perform sit to stand with supervision/set-up. 4.  Patient will ambulate with supervision/set-up for 300 feet with the least restrictive device. PLOF: pt lives in a [facility] [place] with [address] with his sister who is his primary care giver. He has home aides who come MWF for 5 hours to assist with bathing and dressing. He has residual speech deficits from a previous CVA. Pt uses a quad cane to ambulate short distances but also uses a RW. Outcome: Progressing Towards Goal     PHYSICAL THERAPY TREATMENT    Patient: Fredi Ortiz (50 y.o. male)  Date: 12/6/2020  Diagnosis: Pancreatitis, acute [K85.90]  Elevated liver enzymes [R74.8]  Nausea and vomiting [R11.2]  Pancreatitis [K85.90]   Pancreatitis, acute       Precautions: Fall    ASSESSMENT:  Pt found supine in bed, in NAD,willing to work with PT. He reports he has been feeling well and hopes to go home tomorrow. Pt performed supine-sit transfer with SBA and stood with SBA. Pt elected to use his quad cane in his R hand today as he usually does at home, amb 150 feet in hallways. Narrow EDGARD and slight unsteadiness noted but no LOB. Pt required 1 ~20 second standing break in hallway as he said he was tired. Pt back sitting in chair once finished with amb to perform exercises per flow sheet. Pt also performed x10 sit to stands with ease. Pt back supine in bed at end of session, call bell nearby, no new questions or concerns. Edu pt on using his RW until he gets stronger with HH PT at home as he looks much steadier with RW vs quad cane.  Recommend d/c back home with [facility] and continued 24/7 care. Progression toward goals:   []      Improving appropriately and progressing toward goals  [x]      Improving slowly and progressing toward goals  []      Not making progress toward goals and plan of care will be adjusted     PLAN:  Patient continues to benefit from skilled intervention to address the above impairments. Continue treatment per established plan of care. Discharge Recommendations:  Home Health with continued 24/7 care  Further Equipment Recommendations for Discharge:  N/A     SUBJECTIVE:   Patient stated am I walking okay? Hermelinda Rowland    OBJECTIVE DATA SUMMARY:   Critical Behavior:  Neurologic State: Alert  Orientation Level: Oriented to person, Oriented to place, Oriented to situation  Cognition: Follows commands  Safety/Judgement: Fall prevention  Functional Mobility Training:  Bed Mobility:     Supine to Sit: Stand-by assistance  Sit to Supine: Stand-by assistance            Transfers:  Sit to Stand: Stand-by assistance  Stand to Sit: Stand-by assistance       Balance:  Sitting: Intact; Without support  Standing: Impaired; With support  Standing - Static: Good  Standing - Dynamic : Fair   Ambulation/Gait Training:  Distance (ft): 150 Feet (ft)  Assistive Device: Cane, quad  Ambulation - Level of Assistance: Contact guard assistance;Stand-by assistance        Gait Abnormalities: Decreased step clearance;Trunk sway increased        Base of Support: Narrowed     Speed/Chiquita: Fluctuations  Step Length: Left shortened;Right shortened  Therapeutic Exercises:   Pt performed exercises per flow sheet      EXERCISE   Sets   Reps   Active Active Assist   Passive Self ROM   Comments   Ankle Pumps 1 15  [x] [] [] []    Quad Sets/Glut Sets    [] [] [] [] Hold for 5 secs   Hamstring Sets   [] [] [] []    Short Arc Quads   [] [] [] []    Heel Slides   [] [] [] []    Straight Leg Raises   [] [] [] []    Hip Add   [] [] [] [] Hold for 5 secs, w/ pillow squeeze   Long Arc Quads 1 15 [x] [] [] []    Seated Marching 1 15 [x] [] [] []    Standing Marching   [] [] [] []       [] [] [] []        Pain:  Pain level pre-treatment: 0/10  Pain level post-treatment: 0/10   Pain Intervention(s): Medication (see MAR); Rest, Repositioning   Response to intervention: Nurse notified, See doc flow    Activity Tolerance:   Pt tolerated mobility well  Please refer to the flowsheet for vital signs taken during this treatment. After treatment:   [] Patient left in no apparent distress sitting up in chair  [x] Patient left in no apparent distress in bed  [x] Call bell left within reach  [] Nursing notified  [] Caregiver present  [] Bed alarm activated  [] SCDs applied      COMMUNICATION/EDUCATION:   [x]         Role of Physical Therapy in the acute care setting. [x]         Fall prevention education was provided and the patient/caregiver indicated understanding. [x]         Patient/family have participated as able in working toward goals and plan of care. []         Patient/family agree to work toward stated goals and plan of care. []         Patient understands intent and goals of therapy, but is neutral about his/her participation.   []         Patient is unable to participate in stated goals/plan of care: ongoing with therapy staff.  []         Other:        Milly Yost   Time Calculation: 23 mins

## 2020-12-06 NOTE — PROGRESS NOTES
Problem: Falls - Risk of  Goal: *Absence of Falls  Description: Document Patti Ventura Fall Risk and appropriate interventions in the flowsheet. Outcome: Progressing Towards Goal  Note: Fall Risk Interventions:  Mobility Interventions: Bed/chair exit alarm         Medication Interventions: Bed/chair exit alarm    Elimination Interventions: Bed/chair exit alarm, Call light in reach              Problem: Patient Education: Go to Patient Education Activity  Goal: Patient/Family Education  Outcome: Progressing Towards Goal     Problem: Patient Education: Go to Patient Education Activity  Goal: Patient/Family Education  Outcome: Progressing Towards Goal     Problem: Pancreatitis  Goal: *Control of acute pain  Outcome: Progressing Towards Goal  Goal: *Absence of nausea/vomiting  Outcome: Progressing Towards Goal  Goal: *Optimize nutritional status  Outcome: Progressing Towards Goal  Goal: *Labs within defined limits  Outcome: Progressing Towards Goal     Problem: Patient Education: Go to Patient Education Activity  Goal: Patient/Family Education  Outcome: Progressing Towards Goal     Problem: Pressure Injury - Risk of  Goal: *Prevention of pressure injury  Description: Document Juno Scale and appropriate interventions in the flowsheet.   Outcome: Progressing Towards Goal  Note: Pressure Injury Interventions:  Sensory Interventions: Assess changes in LOC    Moisture Interventions: Absorbent underpads    Activity Interventions: Assess need for specialty bed    Mobility Interventions: Float heels, HOB 30 degrees or less, Pressure redistribution bed/mattress (bed type), PT/OT evaluation    Nutrition Interventions: Document food/fluid/supplement intake(strict i and o)                     Problem: Patient Education: Go to Patient Education Activity  Goal: Patient/Family Education  Outcome: Progressing Towards Goal     Problem: General Medical Care Plan  Goal: *Vital signs within specified parameters  Outcome: Progressing Towards Goal  Goal: *Labs within defined limits  Outcome: Progressing Towards Goal  Goal: *Absence of infection signs and symptoms  Outcome: Progressing Towards Goal  Goal: *Optimal pain control at patient's stated goal  Outcome: Progressing Towards Goal  Goal: *Skin integrity maintained  Outcome: Progressing Towards Goal  Goal: *Fluid volume balance  Outcome: Progressing Towards Goal  Goal: *Optimize nutritional status  Outcome: Progressing Towards Goal  Goal: *Anxiety reduced or absent  Outcome: Progressing Towards Goal  Goal: *Progressive mobility and function (eg: ADL's)  Outcome: Progressing Towards Goal     Problem: Patient Education: Go to Patient Education Activity  Goal: Patient/Family Education  Outcome: Progressing Towards Goal     Problem: Patient Education: Go to Patient Education Activity  Goal: Patient/Family Education  Outcome: Progressing Towards Goal     Problem: Patient Education: Go to Patient Education Activity  Goal: Patient/Family Education  Outcome: Progressing Towards Goal

## 2020-12-06 NOTE — PROGRESS NOTES
Spoke with patient regarding discharge home today and MRI/MRCP as outpatient. He states he would feel more comfortable staying today and plan for discharge tomorrow which is reasonable.       Asmita Corado MD PGY-3

## 2020-12-06 NOTE — PROGRESS NOTES
[facility]  Progress Note    Patient: Joy Carter MRN: [id number]   SSN: [id number]  YOB: 1945   Age: 67 y.o. Sex: male      Admit Date: 12/4/2020    LOS: 2 days   Chief Complaint   Patient presents with    Nausea    Vomiting       Subjective:     Pt stated his abdominal pain resolved. He is tolerating clears. He is not asking for prn pain meds. ROS  No cp, no sob  No fever No chills  Objective:     Visit Vitals  BP (!) 164/83 (BP 1 Location: Left arm, BP Patient Position: At rest)   Pulse 67   Temp 97.9 °F (36.6 °C)   Resp 20   Ht 5' 11\" (1.803 m)   Wt 108.9 kg (240 lb)   SpO2 94%   BMI 33.47 kg/m²       Physical Exam:   PHYSICAL EXAM    GENERAL APPEARANCE: No acute distress. MENTAL: Well developed, well groomed. Appears stated age. Affect appropriate. Responds to questions appropriately. HEAD: Normocephalic. Atraumatic. NECK: Symmetric, trachea midline. CHEST: . Breath sounds clear bilaterally w/o wheezes, rubs, rales, or rhonchi. CV: Normal S1 and S2 w/o murmur, rub, gallop, or click  ABDOMEN: Abdomen soft. BS+. No guarding or rebound. No tenderness to palpation  EXTREMITIES:No edema, clubbing, or cyanosis    SKIN: No grossly apparent lesions, masses, rashes, or ulcerations. Intake and Output:  Current Shift: 12/06 0701 - 12/06 1900  In: 120 [P.O.:120]  Out: 300 [Urine:300]  Last three shifts: 12/04 1901 - 12/06 0700  In: 8263.8 [P.O.:1448;  I.V.:6815.8]  Out: 200 [Urine:1900]    Lab/Data Review:  Recent Results (from the past 12 hour(s))   CBC WITH AUTOMATED DIFF    Collection Time: 12/06/20  1:49 AM   Result Value Ref Range    WBC 9.7 4.6 - 13.2 K/uL    RBC 4.09 (L) 4.70 - 5.50 M/uL    HGB 11.1 (L) 13.0 - 16.0 g/dL    HCT 33.7 (L) 36.0 - 48.0 %    MCV 82.4 74.0 - 97.0 FL    MCH 27.1 24.0 - 34.0 PG    MCHC 32.9 31.0 - 37.0 g/dL    RDW 14.9 (H) 11.6 - 14.5 %    PLATELET 061 764 - 958 K/uL    MPV 11.2 9.2 - 11.8 FL    NEUTROPHILS 90 (H) 40 - 73 % LYMPHOCYTES 7 (L) 21 - 52 %    MONOCYTES 3 3 - 10 %    EOSINOPHILS 0 0 - 5 %    BASOPHILS 0 0 - 2 %    ABS. NEUTROPHILS 8.7 (H) 1.8 - 8.0 K/UL    ABS. LYMPHOCYTES 0.7 (L) 0.9 - 3.6 K/UL    ABS. MONOCYTES 0.3 0.05 - 1.2 K/UL    ABS. EOSINOPHILS 0.0 0.0 - 0.4 K/UL    ABS. BASOPHILS 0.0 0.0 - 0.1 K/UL    DF AUTOMATED     LIPASE    Collection Time: 12/06/20  1:49 AM   Result Value Ref Range    Lipase 2,846 (H) 73 - 519 U/L   METABOLIC PANEL, COMPREHENSIVE    Collection Time: 12/06/20  1:49 AM   Result Value Ref Range    Sodium 138 136 - 145 mmol/L    Potassium 4.0 3.5 - 5.5 mmol/L    Chloride 110 100 - 111 mmol/L    CO2 24 21 - 32 mmol/L    Anion gap 4 3.0 - 18 mmol/L    Glucose 103 (H) 74 - 99 mg/dL    BUN 17 7.0 - 18 MG/DL    Creatinine 1.65 (H) 0.6 - 1.3 MG/DL    BUN/Creatinine ratio 10 (L) 12 - 20      GFR est AA 49 (L) >60 ml/min/1.73m2    GFR est non-AA 41 (L) >60 ml/min/1.73m2    Calcium 8.9 8.5 - 10.1 MG/DL    Bilirubin, total 2.6 (H) 0.2 - 1.0 MG/DL    ALT (SGPT) 935 (H) 16 - 61 U/L    AST (SGOT) 430 (H) 10 - 38 U/L    Alk. phosphatase 149 (H) 45 - 117 U/L    Protein, total 6.0 (L) 6.4 - 8.2 g/dL    Albumin 2.6 (L) 3.4 - 5.0 g/dL    Globulin 3.4 2.0 - 4.0 g/dL    A-G Ratio 0.8 0.8 - 1.7         RECENT RESULTS  MODALITY IMPRESSION   XR Results from [place] encounter on 02/24/20   XR CHEST PA LAT    Narrative EXAM: XR CHEST PA LAT    HISTORY: Smoker, generalized weakness. COMPARISON: May 8, 2019    FINDINGS:  No evidence of pneumonia or acute pulmonary infiltrate. Lung volumes are  satisfactory and there is no evidence of a pleural effusion or pneumothorax. Cardiac size and pulmonary vascular markings are within normal limits. There is  moderate atheromatous calcification and tortuosity of the thoracic aorta. The  bony structures appear intact. Impression IMPRESSION[de-identified]    1.  No acute cardiopulmonary disease.             CT Results from [place] encounter on 12/04/20   CT ABD PELV W WO CONT    Narrative CT ABDOMEN AND PELVIS WITHOUT AND WITH INTRAVENOUS CONTRAST    COMPARISON: August 3, 2018 CT. INDICATIONS: Acute liver failure, leukocytosis. TECHNIQUE: Noncontrast CT was performed of the abdomen and pelvis . Contrast enhanced CT was then performed following the uneventful administration  of 100 cc of Isovue-300. Volumetric data acquisition was performed of the  abdomen and pelvis during arterial and venous phases on a multislice scanner and  reconstructed in axial coronal and sagittal planes,    Noncontrast images through the abdomen show no biliary or renal calculi. Vascular calcifications are present consistent with age. Following Contrast Administration:    CT ABDOMEN FINDINGS:    Lung Bases: Some mild groundglass and reticular opacities suggests  hypoventilation. .  Liver: There is intra and extrahepatic biliary dilation. The common duct  measures 1.6 cm. The common duct is dilated to the ampulla. No focal hepatic  lesions are evident. The gallbladder is contracted. [place] Spleen: .  Kidneys: Small cysts are present bilaterally. Otherwise unremarkable. Pancreas: There is severe edematous pancreatitis. Inflammatory exudate extends  into the mesenteric root. The duct is not dilated. Adrenal Glands: Negative. Stomach, Small Bowel And Colon: The posterior wall the stomach is mildly  edematous contiguous with the pancreatic inflammation. There is marked edema of  the duodenal C-loop through to the fourth portion of the duodenum. The remainder  of the small bowel is unremarkable. There is a knuckle of nonobstructed small  bowel extending into an epigastric ventral hernia the appendix, if present, is  not confidently identified. There is diverticulosis throughout the colon without  findings of diverticulitis. The abdominal aorta and cava are unremarkable. There is no retroperitoneal lymphadenopathy. .  Peritoneal Spaces: There is no free fluid or free air.   Bladder: Unremarkable .      Osseous Structures Of Abdomen And Pelvis: Unremarkable for age. Impression IMPRESSION:     Severe edematous pancreatitis. Biliary dilation likely secondary to the above  Extensive duodenal edema secondary to the above      All CT scans at this facility are performed using dose optimization technique as  appropriate to the performed exam, to include automated exposure control,  adjustment of the mA and/or kV according to patient's size (Including  appropriate matching for site-specific examinations), or use of iterative  reconstruction technique. MRI Results from [place] encounter on 09/01/15   MRA BRAIN WO CONT    Narrative MRA Head Without Contrast    CPT CODE: 66865    HISTORY: Left leg weakness, hypertension. COMPARISON: Concurrent brain MRI. FINDINGS:     Moderately motion degraded exam which gives the vessels a diffusely  pseudo-beaded appearance. True stenoses would be difficult to differentiate from  artifactual narrowing. Tortuosity of the visualized cervical ICAs. The ICAs are  patent but with suggested mild to moderate bilateral supraclinoid stenoses. A1  and M1 segments of the VIVEK and MCA are patent however also with suggested  multifocal stenoses, appearing moderate to severe. Specifically there is  apparent moderate narrowing in the mid M1 RMCA. Suggested severe stenoses at the  MCA bifurcations, right greater than left. Also suggested severe stenoses in the  A1 LACA Codominant vertebral arteries are patent with mild luminal irregularity. Basilar trunk is patent. Proximal PCAs are patent. Impression IMPRESSION:    Motion degraded exam with diffusely pseudo-beaded appearance to the vessels. True stenoses cannot clearly be differentiated from artifactual stenoses. No  evidence of occlusion of the ICAs or very proximal ACAs or MCAs. The  vertebrobasilar and posterior cerebral arteries also appear to be patent.         ULTRASOUND Results from [facility] encounter on 12/04/20   US ABD LTD    Impression IMPRESSION:  1. Liver appears mildly echogenic. No masses.  -Central intrahepatic biliary ductal dilation with enlarged extrahepatic common  bile duct as on earlier CT. 2. Gallbladder contracted. Not well assessed. Small amount of fluid in the  gallbladder fossa. 3. Right renal cyst. Mildly echogenic parenchyma as above. No hydronephrosis. -Tiny amount of adjacent free fluid. 4. Pancreas not adequately visualized as above. Results from [place] encounter on 09/20/19   US PELV NON OBS  LTD    Impression IMPRESSION:  1. No bladder wall thickening. 2. Minimal post void residual bladder volume as described. 3. Prostate hypertrophy with nodular impression and bladder base. Cardiology Procedures/Testing:  MODALITY RESULTS   EKG Results for orders placed or performed during the hospital encounter of 12/04/20   EKG, 12 LEAD, INITIAL   Result Value Ref Range    Ventricular Rate 77 BPM    Atrial Rate 77 BPM    P-R Interval 186 ms    QRS Duration 108 ms    Q-T Interval 380 ms    QTC Calculation (Bezet) 430 ms    Calculated P Axis 35 degrees    Calculated R Axis -62 degrees    Calculated T Axis 40 degrees    Diagnosis       Normal sinus rhythm  Left anterior fascicular block  Abnormal ECG  When compared with ECG of 02-AUG-2018 17:47,  Questionable change in initial forces of Inferior leads  T wave amplitude has decreased in Anterior leads  Confirmed by Brandy Cruz ([id number]) on 12/4/2020 8:10:50 PM         ECHO 06/20/19   ECHO ADULT COMPLETE 06/20/2019 6/20/2019    Narrative · Left Ventricle: Estimated left ventricular ejection fraction is 56 -   60%. No regional wall motion abnormality noted. Age-appropriate left   ventricular diastolic function. · Aorta: Moderate aortic root dilatation. · Aortic Valve: Probably trileaflet aortic valve. Aortic valve sclerosis. Mild aortic valve regurgitation is present.         Signed by: Sita Flor MD Special Testing/Procedures:  MODALITY RESULTS   MICRO All Micro Results     Procedure Component Value Units Date/Time    CULTURE, THROAT [[id number]] Collected:  12/05/20 1900    Order Status:  Completed Specimen:  Throat swab Updated:  12/06/20 0932    STREP AG Baudilio Encarnacion [[id number]] Collected:  12/05/20 1900    Order Status:  Completed Specimen:  Throat Updated:  12/05/20 2021     Group A Strep Ag ID Negative            UA Results for orders placed or performed in visit on 03/29/18   AMB POC URINALYSIS DIP STICK AUTO W/O MICRO     Status: None   Result Value Ref Range Status    Color (UA POC) Yellow  Final    Clarity (UA POC) Clear  Final    Glucose (UA POC) Negative Negative Final    Bilirubin (UA POC) Negative Negative Final    Ketones (UA POC) Negative Negative Final    Specific gravity (UA POC) 1.010 1.001 - 1.035 Final    Blood (UA POC) Trace Negative Final    pH (UA POC) 5.0 4.6 - 8.0 Final    Protein (UA POC) Negative Negative Final    Urobilinogen (UA POC) 0.2 mg/dL 0.2 - 1 Final    Nitrites (UA POC) Negative Negative Final    Leukocyte esterase (UA POC) Negative Negative Final      PATH none       Assessment and Plan:   67 y.o. male with PMH CKD, HTN, HLD, CVA, now admitted with Acute pancreatitis.     Acute pancreatitis likely related bilary duct issue pt with significantly elevated transaminitis and hyperbilirubinemia on admission   Currently afebrile, liver enzymes down-trending, BS+ and abdominal pain improved  - mIVFs, LR @ 175 cc/hr   - morphine prn for pain  - GI - started on clear liquids, KEV, IgG4 and CA 19-9 , Strep panel, Strep panel , plan for MRCP   - zofran prn   -PT/OT rec    HTN  -Continue home Amlodipine  -Consider holding home losartan     CKD stage 3  -Renal dose medications     HLD  -Hold home atorvastatin     Hx of CVA  -Continue home ASA     COPD  - O2 as needed to maintain sats >88%  - Home Albuterol PRN, spiriva     Overactive bladder  -Continue home oxybutynin     Global care  - Renally dose all meds          Diet DIET clears    DVT Prophylaxis SCD.  SQH   GI Prophylaxis Famotidine   Code status Full   Disposition >2MN      Point of Contact She olmstead  Relationship: Sister  [phone]         [facility] PGY-1   [address]   Pager: [phone]   December 6, 2020, 11:27 AM

## 2020-12-06 NOTE — PROGRESS NOTES
[email or web address]  [phone]       Impression  1. Acute edematous pancreatitis - per imaging with marked elevation of transaminases and only minimal elevation of cholestatic serologies (alk phos and bilirubin); clinically presented with pancreatitis, now improved with resolution of abdominal pain and nausea. Suspect his elevated transaminases are due to the intra-hepatic dilation noted on CT, MRI/MRCP ordered to further assess ongoing process, not yet completed. Patient denies ETOH, new medications, OTC supplements/herbal therapy. Triglycerides normal. Serologic elevations all trending down. Tolerating regular diet. 2. Left-sided abdominal pain - possibly secondary to duodenal edema noted secondary to pancreatitis, resolved  3. Sore throat - strep negative, throat culture pending, symptoms resolved    Plan:  1. May have MRI/MRCP as outpatient, OK for discharge from GI standpoint  2. KEV, IgG4, CA 19-9 all in progress  3. Will plan to see patient in office in 2-4 weeks    Thank you for this consultation and the opportunity to participate in the care of this patient. Please do not hesitate to call with any questions or concerns, or should event occur that may necessitate additional GI evaluation. Kai Corral  [facility] [address] - [phone]  [email or web address]      Chief Complaint: pancreatitis    Subjective:  Patient feeling well, tolerating regular diet without difficulty. Reports resolution of abdominal pain and nausea. Sore throat has also resolved.         Eyes: conjunctiva normal, EOM normal   ENT: Mucous membranes moist, no lesion or thrush   Cardiovascular:  normal rate and regular rhythm, no murmur   Pulmonary/Chest Wall: breath sounds normal and effort normal   Abdominal:  soft, non-acute, non-tender, no appreciable mass or hepatosplenomegaly, no appreciable ascites       Visit Vitals  BP (!) 158/62 (BP 1 Location: Left arm, BP Patient Position: At rest)   Pulse 77   Temp 98.5 °F (36.9 °C)   Resp 19   Ht 5' 11\" (1.803 m)   Wt 108.9 kg (240 lb)   SpO2 95%   BMI 33.47 kg/m²           Intake/Output Summary (Last 24 hours) at 12/6/2020 1251  Last data filed at 12/6/2020 1125  Gross per 24 hour   Intake 5422.92 ml   Output 1000 ml   Net 4422.92 ml       CBC w/Diff    Lab Results   Component Value Date/Time    WBC 9.7 12/06/2020 01:49 AM    RBC 4.09 (L) 12/06/2020 01:49 AM    HGB 11.1 (L) 12/06/2020 01:49 AM    HCT 33.7 (L) 12/06/2020 01:49 AM    MCV 82.4 12/06/2020 01:49 AM    MCH 27.1 12/06/2020 01:49 AM    MCHC 32.9 12/06/2020 01:49 AM    RDW 14.9 (H) 12/06/2020 01:49 AM     12/06/2020 01:49 AM    Lab Results   Component Value Date/Time    GRANS 90 (H) 12/06/2020 01:49 AM    LYMPH 7 (L) 12/06/2020 01:49 AM    EOS 0 12/06/2020 01:49 AM    BANDS 3 08/04/2018 12:56 AM    BASOS 0 12/06/2020 01:49 AM    MYELO 2 (H) 04/27/2018 01:02 AM    METAS 2 (H) 04/28/2018 05:04 AM      Basic Metabolic Profile   Recent Labs     12/06/20  0149      K 4.0      CO2 24   BUN 17   CA 8.9        Hepatic Function    Lab Results   Component Value Date/Time    ALB 2.6 (L) 12/06/2020 01:49 AM    TP 6.0 (L) 12/06/2020 01:49 AM     (H) 12/06/2020 01:49 AM    No results found for: AIMEE Ledbetter MD  [facility] [address] - [phone] [place] - [phone]  [email or web address]

## 2020-12-07 ENCOUNTER — APPOINTMENT (OUTPATIENT)
Dept: MRI IMAGING | Age: 75
DRG: 439 | End: 2020-12-07
Attending: INTERNAL MEDICINE
Payer: MEDICARE

## 2020-12-07 VITALS
HEIGHT: 71 IN | SYSTOLIC BLOOD PRESSURE: 154 MMHG | DIASTOLIC BLOOD PRESSURE: 83 MMHG | WEIGHT: 240 LBS | OXYGEN SATURATION: 96 % | HEART RATE: 64 BPM | RESPIRATION RATE: 18 BRPM | TEMPERATURE: 98.1 F | BODY MASS INDEX: 33.6 KG/M2

## 2020-12-07 LAB
ALBUMIN SERPL-MCNC: 2.6 G/DL (ref 3.4–5)
ALBUMIN/GLOB SERPL: 0.8 {RATIO} (ref 0.8–1.7)
ALP SERPL-CCNC: 134 U/L (ref 45–117)
ALT SERPL-CCNC: 587 U/L (ref 16–61)
ANION GAP SERPL CALC-SCNC: 4 MMOL/L (ref 3–18)
AST SERPL-CCNC: 151 U/L (ref 10–38)
BASOPHILS # BLD: 0 K/UL (ref 0–0.1)
BASOPHILS NFR BLD: 0 % (ref 0–2)
BILIRUB SERPL-MCNC: 2.3 MG/DL (ref 0.2–1)
BUN SERPL-MCNC: 15 MG/DL (ref 7–18)
BUN/CREAT SERPL: 10 (ref 12–20)
CALCIUM SERPL-MCNC: 9 MG/DL (ref 8.5–10.1)
CHLORIDE SERPL-SCNC: 109 MMOL/L (ref 100–111)
CO2 SERPL-SCNC: 26 MMOL/L (ref 21–32)
CREAT SERPL-MCNC: 1.44 MG/DL (ref 0.6–1.3)
DIFFERENTIAL METHOD BLD: ABNORMAL
EOSINOPHIL # BLD: 0.1 K/UL (ref 0–0.4)
EOSINOPHIL NFR BLD: 2 % (ref 0–5)
ERYTHROCYTE [DISTWIDTH] IN BLOOD BY AUTOMATED COUNT: 15 % (ref 11.6–14.5)
GLOBULIN SER CALC-MCNC: 3.1 G/DL (ref 2–4)
GLUCOSE SERPL-MCNC: 95 MG/DL (ref 74–99)
HAV IGM SER QL: NEGATIVE
HBV CORE IGM SER QL: NEGATIVE
HBV SURFACE AG SER QL: <0.1 INDEX
HBV SURFACE AG SER QL: NEGATIVE
HCT VFR BLD AUTO: 32.1 % (ref 36–48)
HCV AB SER IA-ACNC: 0.03 INDEX
HCV AB SERPL QL IA: NEGATIVE
HCV COMMENT,HCGAC: NORMAL
HGB BLD-MCNC: 10.9 G/DL (ref 13–16)
IGG4 SER-MCNC: 21 MG/DL (ref 2–96)
LIPASE SERPL-CCNC: 822 U/L (ref 73–393)
LYMPHOCYTES # BLD: 0.9 K/UL (ref 0.9–3.6)
LYMPHOCYTES NFR BLD: 9 % (ref 21–52)
MCH RBC QN AUTO: 28 PG (ref 24–34)
MCHC RBC AUTO-ENTMCNC: 34 G/DL (ref 31–37)
MCV RBC AUTO: 82.5 FL (ref 74–97)
MONOCYTES # BLD: 0.6 K/UL (ref 0.05–1.2)
MONOCYTES NFR BLD: 6 % (ref 3–10)
NEUTS SEG # BLD: 7.8 K/UL (ref 1.8–8)
NEUTS SEG NFR BLD: 83 % (ref 40–73)
PLATELET # BLD AUTO: 170 K/UL (ref 135–420)
PMV BLD AUTO: 11.4 FL (ref 9.2–11.8)
POTASSIUM SERPL-SCNC: 4.1 MMOL/L (ref 3.5–5.5)
PROT SERPL-MCNC: 5.7 G/DL (ref 6.4–8.2)
RBC # BLD AUTO: 3.89 M/UL (ref 4.7–5.5)
SODIUM SERPL-SCNC: 139 MMOL/L (ref 136–145)
SP1: NORMAL
SP2: NORMAL
SP3: NORMAL
WBC # BLD AUTO: 9.4 K/UL (ref 4.6–13.2)

## 2020-12-07 PROCEDURE — 90471 IMMUNIZATION ADMIN: CPT

## 2020-12-07 PROCEDURE — 90686 IIV4 VACC NO PRSV 0.5 ML IM: CPT | Performed by: FAMILY MEDICINE

## 2020-12-07 PROCEDURE — 80053 COMPREHEN METABOLIC PANEL: CPT

## 2020-12-07 PROCEDURE — 74011250636 HC RX REV CODE- 250/636: Performed by: FAMILY MEDICINE

## 2020-12-07 PROCEDURE — 74181 MRI ABDOMEN W/O CONTRAST: CPT

## 2020-12-07 PROCEDURE — 36415 COLL VENOUS BLD VENIPUNCTURE: CPT

## 2020-12-07 PROCEDURE — 85025 COMPLETE CBC W/AUTO DIFF WBC: CPT

## 2020-12-07 PROCEDURE — 83690 ASSAY OF LIPASE: CPT

## 2020-12-07 PROCEDURE — 74011250637 HC RX REV CODE- 250/637: Performed by: STUDENT IN AN ORGANIZED HEALTH CARE EDUCATION/TRAINING PROGRAM

## 2020-12-07 PROCEDURE — 74011250636 HC RX REV CODE- 250/636: Performed by: STUDENT IN AN ORGANIZED HEALTH CARE EDUCATION/TRAINING PROGRAM

## 2020-12-07 PROCEDURE — 74011250637 HC RX REV CODE- 250/637: Performed by: INTERNAL MEDICINE

## 2020-12-07 RX ADMIN — Medication 10 ML: at 05:44

## 2020-12-07 RX ADMIN — HEPARIN SODIUM 5000 UNITS: 5000 INJECTION INTRAVENOUS; SUBCUTANEOUS at 15:20

## 2020-12-07 RX ADMIN — CYANOCOBALAMIN TAB 1000 MCG 1000 MCG: 1000 TAB at 11:08

## 2020-12-07 RX ADMIN — AMLODIPINE BESYLATE 2.5 MG: 5 TABLET ORAL at 11:07

## 2020-12-07 RX ADMIN — POLYETHYLENE GLYCOL 3350 17 G: 17 POWDER, FOR SOLUTION ORAL at 11:07

## 2020-12-07 RX ADMIN — INFLUENZA VIRUS VACCINE 0.5 ML: 15; 15; 15; 15 SUSPENSION INTRAMUSCULAR at 15:20

## 2020-12-07 RX ADMIN — LOSARTAN POTASSIUM 100 MG: 50 TABLET, FILM COATED ORAL at 11:07

## 2020-12-07 RX ADMIN — ASPIRIN 81 MG CHEWABLE TABLET 81 MG: 81 TABLET CHEWABLE at 11:07

## 2020-12-07 RX ADMIN — HEPARIN SODIUM 5000 UNITS: 5000 INJECTION INTRAVENOUS; SUBCUTANEOUS at 11:08

## 2020-12-07 RX ADMIN — PHENOL 1 SPRAY: 1.5 LIQUID ORAL at 11:06

## 2020-12-07 RX ADMIN — OXYBUTYNIN CHLORIDE 5 MG: 5 TABLET, EXTENDED RELEASE ORAL at 11:08

## 2020-12-07 RX ADMIN — SERTRALINE HYDROCHLORIDE 50 MG: 50 TABLET ORAL at 11:08

## 2020-12-07 RX ADMIN — Medication 10 ML: at 15:13

## 2020-12-07 RX ADMIN — HEPARIN SODIUM 5000 UNITS: 5000 INJECTION INTRAVENOUS; SUBCUTANEOUS at 01:51

## 2020-12-07 NOTE — PROGRESS NOTES
Problem: Falls - Risk of  Goal: *Absence of Falls  Description: Document Lana Bentley Fall Risk and appropriate interventions in the flowsheet. Outcome: Progressing Towards Goal  Note: Fall Risk Interventions:  Mobility Interventions: Bed/chair exit alarm    Mentation Interventions: Door open when patient unattended    Medication Interventions: Bed/chair exit alarm    Elimination Interventions: Bed/chair exit alarm              Problem: Patient Education: Go to Patient Education Activity  Goal: Patient/Family Education  Outcome: Progressing Towards Goal     Problem: Patient Education: Go to Patient Education Activity  Goal: Patient/Family Education  Outcome: Progressing Towards Goal     Problem: Pancreatitis  Goal: *Control of acute pain  Outcome: Progressing Towards Goal  Goal: *Absence of nausea/vomiting  Outcome: Progressing Towards Goal  Goal: *Optimize nutritional status  Outcome: Progressing Towards Goal  Goal: *Labs within defined limits  Outcome: Progressing Towards Goal     Problem: Patient Education: Go to Patient Education Activity  Goal: Patient/Family Education  Outcome: Progressing Towards Goal     Problem: Pressure Injury - Risk of  Goal: *Prevention of pressure injury  Description: Document Juno Scale and appropriate interventions in the flowsheet.   Outcome: Progressing Towards Goal  Note: Pressure Injury Interventions:  Sensory Interventions: Assess changes in LOC    Moisture Interventions: Absorbent underpads    Activity Interventions: Increase time out of bed, PT/OT evaluation    Mobility Interventions: Pressure redistribution bed/mattress (bed type)    Nutrition Interventions: Document food/fluid/supplement intake                     Problem: Patient Education: Go to Patient Education Activity  Goal: Patient/Family Education  Outcome: Progressing Towards Goal     Problem: General Medical Care Plan  Goal: *Vital signs within specified parameters  Outcome: Progressing Towards Goal  Goal: *Labs within defined limits  Outcome: Progressing Towards Goal  Goal: *Absence of infection signs and symptoms  Outcome: Progressing Towards Goal  Goal: *Optimal pain control at patient's stated goal  Outcome: Progressing Towards Goal  Goal: *Skin integrity maintained  Outcome: Progressing Towards Goal  Goal: *Fluid volume balance  Outcome: Progressing Towards Goal  Goal: *Optimize nutritional status  Outcome: Progressing Towards Goal  Goal: *Anxiety reduced or absent  Outcome: Progressing Towards Goal  Goal: *Progressive mobility and function (eg: ADL's)  Outcome: Progressing Towards Goal     Problem: Patient Education: Go to Patient Education Activity  Goal: Patient/Family Education  Outcome: Progressing Towards Goal     Problem: Patient Education: Go to Patient Education Activity  Goal: Patient/Family Education  Outcome: Progressing Towards Goal     Problem: Patient Education: Go to Patient Education Activity  Goal: Patient/Family Education  Outcome: Progressing Towards Goal

## 2020-12-07 NOTE — PROGRESS NOTES
Discharge:    Patient will discharge home today (12/7/2020) with family assistance. He also has personal care services, in home. Patient has been set up with Medicaid stretcher to transport him home, upon discharge. Patient's sister has declined home health services. She reports that his aide is all the help that he needs. The nurse Nash Bellamy and the physician were made aware that patient's sister is declining home health. There are no other care management concerns regarding this discharge. This writer will continue to monitor for discharge planning to ensure a safe discharge home from [place]. Lowell Santiago MSW  Care Manager  Pager#: [phone]

## 2020-12-07 NOTE — DISCHARGE INSTRUCTIONS
Patient Education      Patient armband removed and shredded  DISCHARGE SUMMARY from Nurse    PATIENT INSTRUCTIONS:    After general anesthesia or intravenous sedation, for 24 hours or while taking prescription Narcotics:  · Limit your activities  · Do not drive and operate hazardous machinery  · Do not make important personal or business decisions  · Do  not drink alcoholic beverages  · If you have not urinated within 8 hours after discharge, please contact your surgeon on call. Report the following to your surgeon:  · Excessive pain, swelling, redness or odor of or around the surgical area  · Temperature over 100.5  · Nausea and vomiting lasting longer than 4 hours or if unable to take medications  · Any signs of decreased circulation or nerve impairment to extremity: change in color, persistent  numbness, tingling, coldness or increase pain  · Any questions    What to do at Home:  Recommended activity: Activity as tolerated,     If you experience any of the following symptoms abdominal pain, nausea and vomiting not controlled, fever greater than 100.5, please follow up with GI or primary care doctor. *  Please give a list of your current medications to your Primary Care Provider. *  Please update this list whenever your medications are discontinued, doses are      changed, or new medications (including over-the-counter products) are added. *  Please carry medication information at all times in case of emergency situations. These are general instructions for a healthy lifestyle:    No smoking/ No tobacco products/ Avoid exposure to second hand smoke  Surgeon General's Warning:  Quitting smoking now greatly reduces serious risk to your health.     Obesity, smoking, and sedentary lifestyle greatly increases your risk for illness    A healthy diet, regular physical exercise & weight monitoring are important for maintaining a healthy lifestyle    You may be retaining fluid if you have a history of heart failure or if you experience any of the following symptoms:  Weight gain of 3 pounds or more overnight or 5 pounds in a week, increased swelling in our hands or feet or shortness of breath while lying flat in bed. Please call your doctor as soon as you notice any of these symptoms; do not wait until your next office visit. The discharge information has been reviewed with the patient. The patient verbalized understanding. Discharge medications reviewed with the patient and appropriate educational materials and side effects teaching were provided. ___________________________________________________________________________________________________________________________________  Pancreatitis: Care Instructions  Your Care Instructions     The pancreas is an organ behind the stomach. It makes hormones and enzymes to help your body digest food. But if these enzymes attack the pancreas, it can get inflamed. This is called pancreatitis. Most cases are caused by gallstones or by heavy alcohol use. If you take care of yourself at home, it will help you get better. It will also help you avoid more problems with your pancreas. Follow-up care is a key part of your treatment and safety. Be sure to make and go to all appointments, and call your doctor if you are having problems. It's also a good idea to know your test results and keep a list of the medicines you take. How can you care for yourself at home? · Drink clear liquids and eat bland foods until you feel better. [place] foods include rice, dry toast, and crackers. They also include bananas and applesauce. · Eat a low-fat diet until your doctor says your pancreas is healed. · Do not drink alcohol. Tell your doctor if you need help to quit. Counseling, support groups, and sometimes medicines can help you stay sober. · Be safe with medicines. Read and follow all instructions on the label.   ? If the doctor gave you a prescription medicine for pain, take it as prescribed. ? If you are not taking a prescription pain medicine, ask your doctor if you can take an over-the-counter medicine. · If your doctor prescribed antibiotics, take them as directed. Do not stop taking them just because you feel better. You need to take the full course of antibiotics. · Get extra rest until you feel better. To prevent future problems with your pancreas  · Do not drink alcohol. · Tell your doctors and pharmacist that you've had pancreatitis. They can help you avoid medicines that may cause this problem again. When should you call for help? Call 911 anytime you think you may need emergency care. For example, call if:    · You vomit blood or what looks like coffee grounds.     · Your stools are maroon or very bloody. Call your doctor now or seek immediate medical care if:    · You have new or worse belly pain.     · Your stools are black and look like tar, or they have streaks of blood.     · You are vomiting. Watch closely for changes in your health, and be sure to contact your doctor if:    · You do not get better as expected. Where can you learn more? Go to http://www.gray.com/  Enter J381 in the search box to learn more about \"Pancreatitis: Care Instructions. \"  Current as of: April 15, 2020               Content Version: 12.6  © 8697-8348 Healthwise, Incorporated. Care instructions adapted under license by [employer] (which disclaims liability or warranty for this information). If you have questions about a medical condition or this instruction, always ask your healthcare professional. [place] [address] any warranty or liability for your use of this information. Patient Education        Nausea and Vomiting: Care Instructions  Your Care Instructions     When you are nauseated, you may feel weak and sweaty and notice a lot of saliva in your mouth. Nausea often leads to vomiting.  Most of the time you do not need to worry about nausea and vomiting, but they can be signs of other illnesses. Two common causes of nausea and vomiting are stomach flu and food poisoning. Nausea and vomiting from viral stomach flu will usually start to improve within 24 hours. Nausea and vomiting from food poisoning may last from 12 to 48 hours. The doctor has checked you carefully, but problems can develop later. If you notice any problems or new symptoms, get medical treatment right away. Follow-up care is a key part of your treatment and safety. Be sure to make and go to all appointments, and call your doctor if you are having problems. It's also a good idea to know your test results and keep a list of the medicines you take. How can you care for yourself at home? · To prevent dehydration, drink plenty of fluids, enough so that your urine is light yellow or clear like water. Choose water and other caffeine-free clear liquids until you feel better. If you have kidney, heart, or liver disease and have to limit fluids, talk with your doctor before you increase the amount of fluids you drink. · Rest in bed until you feel better. · When you are able to eat, try clear soups, mild foods, and liquids until all symptoms are gone for 12 to 48 hours. Other good choices include dry toast, crackers, cooked cereal, and gelatin dessert, such as Jell-O. When should you call for help? Call 911 anytime you think you may need emergency care. For example, call if:    · You passed out (lost consciousness). Call your doctor now or seek immediate medical care if:    · You have symptoms of dehydration, such as:  ? Dry eyes and a dry mouth. ? Passing only a little dark urine. ? Feeling thirstier than usual.     · You have new or worsening belly pain.     · You have a new or higher fever.     · You vomit blood or what looks like coffee grounds.    Watch closely for changes in your health, and be sure to contact your doctor if:    · You have ongoing nausea and vomiting.     · Your vomiting is getting worse.     · Your vomiting lasts longer than 2 days.     · You are not getting better as expected. Where can you learn more? Go to http://www.Kiind.me.com/  Enter H591 in the search box to learn more about \"Nausea and Vomiting: Care Instructions. \"  Current as of: June 26, 2019               Content Version: 12.6  © 8363-0088 [employer]. Care instructions adapted under license by [employer] (which disclaims liability or warranty for this information). If you have questions about a medical condition or this instruction, always ask your healthcare professional. [place] [address] any warranty or liability for your use of this information.

## 2020-12-07 NOTE — PROGRESS NOTES
MRI Screening form needs to be filled out and faxed to [phone] MRI can be scheduled. If unable to obtain information from pt, [facility] needs to be contacted.  If pt is claustro or will need pain meds, please have ordered in advance in order to facilitate exam.

## 2020-12-07 NOTE — PROGRESS NOTES
Discharge update:    Patient's medicaid does not have a transportation benefit. His stretcher transport was set up through [facility]. Lowell Montes De Oca MSW  Care Manager  Pager#: [phone]

## 2020-12-07 NOTE — PROGRESS NOTES
Per Lowlel (LULY) called [employer] [place] ([id number]) to schedule stretcher transport to patient's [address] [place] with Lesley Adams. Per Lesley Adams at [place] patient doesn't have transportation benefits. Called [facility] scheduled transportation to patient's home with Sanjuana Antunez transport will be here within an hour which will make transport no later than 5:30 pm.   will call Nurse's station when at the facility. Informed Lowell of transportation conversation and arrangements.

## 2020-12-07 NOTE — PROGRESS NOTES
Pt lives w/ sister. Spoke to her to update her about patients medical condition.  Also told her about follow up appts

## 2020-12-07 NOTE — PROGRESS NOTES
PT treatment attempted. Pt currently receiving medications from RN and pt reporting he needs to eat breakfast as he just came back from MRI and was NPO before. Pt requesting PT return at a later time. Will follow up. 2nd attempt [phone], pt sleeping but easily aroused, pt upset and reporting he would like to sleep, declining to participate. Will continue to follow.      Thank you for this referral.     Heavenly Green PT DPT

## 2020-12-08 LAB
ANA TITR SER IF: NEGATIVE {TITER}
BACTERIA SPEC CULT: ABNORMAL
BACTERIA SPEC CULT: ABNORMAL
SERVICE CMNT-IMP: ABNORMAL

## 2020-12-08 NOTE — Clinical Note
Shanna calls requesting second opinion and to see Dr Bautista.  She says that she had IUD placed 11/13/2020 and then had \"so much pain from it\" had it removed a couple weeks later.  She now continues to have a dull ache and cramping. She then had bleeding with \"large clots\" today.  Last cycle was in august. Cycles irregular.   Discussed with her that heavy cycles are expected after skipping months of periods.  She wants to see Dr Bautista and see what he recommends.  She was told she might need a hysteroscope to see what is causing the bleeding and pain.  Advised that she will need to see the doctor to see if that is recommended.   appt scheduled for her.    Status[de-identified] Inpatient [101] Type of Bed: Surgical [18] Inpatient Hospitalization Certified Necessary for the Following Reasons: 3. Patient receiving treatment that can only be provided in an inpatient setting (further clarification in H&P documentation) Admitting Diagnosis: Ruptured appendicitis [[id number]] Admitting Diagnosis[facility] [[id number]] Admitting Diagnosis: Acute UTI [[id number]] Admitting Diagnosis: Nausea [[id number]] Admitting Diagnosis: Abdominal pain [[id number]] Admitting Physician: Hema Farnsworth [[id number]] Attending Physician: Hema Farnsworth [[id number]] Estimated Length of Stay: 2 Midnights Discharge Plan[de-identified] Home with Office Follow-up

## 2020-12-21 LAB
CHOLEST SERPL-MCNC: 87 MG/DL
HDLC SERPL-MCNC: 41 MG/DL (ref 40–60)
HDLC SERPL: 2.1 {RATIO} (ref 0–5)
LDLC SERPL CALC-MCNC: 31.4 MG/DL (ref 0–100)
TRIGL SERPL-MCNC: 73 MG/DL (ref ?–150)
VLDLC SERPL CALC-MCNC: 14.6 MG/DL

## 2020-12-30 ENCOUNTER — OFFICE VISIT (OUTPATIENT)
Dept: SURGERY | Age: 75
End: 2020-12-30
Payer: COMMERCIAL

## 2020-12-30 VITALS
BODY MASS INDEX: 29.68 KG/M2 | RESPIRATION RATE: 16 BRPM | DIASTOLIC BLOOD PRESSURE: 84 MMHG | WEIGHT: 212 LBS | HEART RATE: 77 BPM | TEMPERATURE: 97.7 F | OXYGEN SATURATION: 95 % | SYSTOLIC BLOOD PRESSURE: 157 MMHG | HEIGHT: 71 IN

## 2020-12-30 DIAGNOSIS — K80.20 GALLSTONES: ICD-10-CM

## 2020-12-30 DIAGNOSIS — K85.10 GALLSTONE PANCREATITIS: Primary | ICD-10-CM

## 2020-12-30 PROCEDURE — 99213 OFFICE O/P EST LOW 20 MIN: CPT | Performed by: SURGERY

## 2020-12-30 RX ORDER — BISMUTH SUBSALICYLATE 262 MG
1 TABLET,CHEWABLE ORAL DAILY
COMMUNITY

## 2020-12-30 RX ORDER — LOPERAMIDE HYDROCHLORIDE 2 MG/1
CAPSULE ORAL AS NEEDED
COMMUNITY
End: 2021-04-14

## 2020-12-30 NOTE — PATIENT INSTRUCTIONS
Low-Fat Diet for Gallbladder Disease: After Your Visit Your Care Instructions When you eat, the gallbladder releases bile, which helps you digest the fat in food. If you have an inflamed gallbladder, this may cause pain. A low-fat diet may give your gallbladder a rest so you can start to heal. Your doctor and dietitian can help you make an eating plan that does not irritate your digestive system. Always talk with your doctor or dietitian before you make changes in your diet. Follow-up care is a key part of your treatment and safety. Be sure to make and go to all appointments, and call your doctor if you are having problems. Its also a good idea to know your test results and keep a list of the medicines you take. How can you care for yourself at home? · Eat many small meals and snacks each day instead of three large meals. · Choose lean meats. ¨ Eat no more than 5 to 6½ ounces of meat a day. ¨ Cut off all fat you can see. ¨ Eat chicken and turkey without the skin. ¨ Many types of fish, such as salmon, lake trout, tuna, and herring, provide healthy omega-3 fat. But, avoid fish canned in oil, such as sardines in olive oil. ¨ Bake, broil, or grill meats, fowl, or fish instead of frying them in butter or fat. · Drink or eat nonfat or low-fat milk, yogurt, cheese, or other milk products each day. ¨ Read the labels on cheeses, and choose those with less than 5 grams of fat an ounce. ¨ Try fat-free sour cream, cream cheese, or yogurt. ¨ Avoid cream soups and cream sauces on pasta. ¨ Eat low-fat ice cream, frozen yogurt, or sorbet. Avoid regular ice cream. 
· Eat whole-grain cereals, breads, crackers, rice, or pasta. Avoid high-fat foods such as croissants, scones, biscuits, waffles, doughnuts, muffins, granola, and high-fat breads. · Flavor your foods with herbs and spices (such as basil, tarragon, or mint), fat-free sauces, or lemon juice instead of butter. You can also use butter substitutes, fat-free mayonnaise, or fat-free dressing. · Try applesauce, prune puree, or mashed bananas to replace some or all of the fat when you bake. · Limit fats and oils, such as butter, margarine, mayonnaise, and salad dressing, to no more than 1 tablespoon a meal. 
· Avoid high-fat foods, such as: 
¨ Chocolate, whole milk, ice cream, processed cheese, and egg yolks. ¨ Fried or buttered foods. ¨ Ham, salami, and allen. ¨ Cinnamon rolls, cakes, pies, cookies, and other pastries. ¨ Prepared snack foods, such as potato chips, nut and granola bars, and mixed nuts. ¨ Coconut and avocado. · Learn how to read food labels for serving sizes and ingredients. Fast-food and convenience-food meals often have lots of fat. Where can you learn more? Go to [employer] Enter J848 in the search box to learn more about \"Low-Fat Diet for Gallbladder Disease: After Your Visit. \"  
© 4175-9579 Healthwise, Incorporated. Care instructions adapted under license by [facility] (which disclaims liability or warranty for this information). This care instruction is for use with your licensed healthcare professional. If you have questions about a medical condition or this instruction, always ask your healthcare professional. [place] [address] any warranty or liability for your use of this information. Content Version: 5.1.201484; Last Revised: August 31, 2011 High-Fiber Diet: Care Instructions Your Care Instructions A high-fiber diet may help you relieve constipation and feel less bloated. Your doctor and dietitian will help you make a high-fiber eating plan based on your personal needs. The plan will include the things you like to eat. It will also make sure that you get 30 grams of fiber a day. Before you make changes to the way you eat, be sure to talk with your doctor or dietitian. Follow-up care is a key part of your treatment and safety. Be sure to make and go to all appointments, and call your doctor if you are having problems. It's also a good idea to know your test results and keep a list of the medicines you take. How can you care for yourself at home? · You can increase how much fiber you get if you eat more of certain foods. These foods include: 
? Whole-grain breads and cereals. ? Fruits, such as pears, apples, and peaches. Eat the skins, peels, and seeds, if you can. 
? Vegetables, such as broccoli, cabbage, spinach, carrots, asparagus, and squash. ? Starchy vegetables. These include potatoes with skins, kidney beans, and lima beans. · Take a fiber supplement every day if your doctor recommends it. Examples are Benefiber, Citrucel, FiberCon, and Metamucil. Ask your doctor how much to take. · Drink plenty of fluids, enough so that your urine is light yellow or clear like water. If you have kidney, heart, or liver disease and have to limit fluids, talk with your doctor before you increase the amount of fluids you drink. · Get some exercise every day. Exercise helps stool move through the colon. It also helps prevent constipation. · Keep a food diary. Try to notice and write down what foods cause gas, pain, or other symptoms. Then you can avoid these foods. Where can you learn more? Go to http://www.gray.com/ Enter E421 in the search box to learn more about \"High-Fiber Diet: Care Instructions. \" Current as of: August 22, 2019               Content Version: 12.6 © 0433-1918 [employer]. Care instructions adapted under license by [employer] (which disclaims liability or warranty for this information). If you have questions about a medical condition or this instruction, always ask your healthcare professional. [address] any warranty or liability for your use of this information. Gallbladder Removal: Before Your Surgery What is gallbladder removal surgery? Gallbladder surgery removes a diseased gallbladder. It is also known as cholecystectomy (ib-khg-dit-CRISTINA-tuh-pablo). This surgery is usually done as a laparoscopic surgery. The doctor puts a lighted tube and other surgical tools through small cuts (incisions) in your belly. The tube is called a scope. It lets your doctor see your organs so your doctor can do the surgery. The incisions leave scars that fade with time. Most people go home the same day. You probably will feel better each day. Most people have only a small amount of pain after 1 week. If you have a desk job, you can probably go back to work in 1 to 2 weeks. If you lift heavy objects or have a very active job, it may take up to 4 weeks. In some cases, open surgery is the best choice. Your doctor may choose open surgery in advance. Or the doctor may choose it in the middle of laparoscopic surgery. In open surgery, the doctor makes a larger incision in your upper belly. If you have open surgery, you will probably stay in the hospital for 2 to 4 days. And it may take 4 to 6 weeks to get back to your normal routine. Follow-up care is a key part of your treatment and safety. Be sure to make and go to all appointments, and call your doctor if you are having problems. It's also a good idea to know your test results and keep a list of the medicines you take. How do you prepare for surgery? Surgery can be stressful. This information will help you understand what you can expect. And it will help you safely prepare for surgery. Preparing for surgery 
  · You may need to empty your colon with an enema or laxative. Your doctor will tell you how to do this.  
  · Be sure you have someone to take you home. Anesthesia and pain medicine will make it unsafe for you to drive or get home on your own.  
  · Understand exactly what surgery is planned, along with the risks, benefits, and other options.  
  · If you take aspirin or some other blood thinner, ask your doctor if you should stop taking it before your surgery. Make sure that you understand exactly what your doctor wants you to do. These medicines increase the risk of bleeding.  
  · Tell your doctor ALL the medicines, vitamins, supplements, and herbal remedies you take. Some may increase the risk of problems during your surgery. Your doctor will tell you if you should stop taking any of them before the surgery and how soon to do it.  
  · Make sure your doctor and the hospital have a copy of your advance directive. If you don't have one, you may want to prepare one. It lets others know your health care wishes. It's a good thing to have before any type of surgery or procedure. What happens on the day of surgery? · Follow the instructions exactly about when to stop eating and drinking. If you don't, your surgery may be canceled. If your doctor told you to take your medicines on the day of surgery, take them with only a sip of water.  
  · Take a bath or shower before you come in for your surgery. Do not apply lotions, perfumes, deodorants, or nail polish.  
  · Do not shave the surgical site yourself.  
  · Take off all jewelry and piercings. And take out contact lenses, if you wear them. At the hospital or surgery center · Bring a picture ID.  
  · The area for surgery is often marked to make sure there are no errors.  
  · You will be kept comfortable and safe by your anesthesia provider. You will be asleep during the surgery.  
  · The surgery usually takes 1 to 2 hours. When should you call your doctor? · You have questions or concerns.  
  · You don't understand how to prepare for your surgery.  
  · You become ill before the surgery (such as fever, flu, or a cold).  
  · You need to reschedule or have changed your mind about having the surgery. Where can you learn more? Go to http://www.gray.com/ Enter P401 in the search box to learn more about \"Gallbladder Removal: Before Your Surgery. \" Current as of: April 15, 2020               Content Version: 12.6 © 5384-0255 [employer]. Care instructions adapted under license by [employer] (which disclaims liability or warranty for this information). If you have questions about a medical condition or this instruction, always ask your healthcare professional. [address] any warranty or liability for your use of this information.

## 2020-12-30 NOTE — PROGRESS NOTES
[address] [place] [address] [facility]  General Surgery    Name: Dante Phipps MRN: [id number]   : 1945 Hospital: [facility]   Date: 2020 Admission Date: No admission date for patient encounter. Subjective: The patient is well-known to me from evaluation and management of acute perforated appendicitis in 2018. He then had a partial small bowel obstruction later in the year which are managed nonoperatively. He is here now with recent history of gallstone pancreatitis. I reviewed his CAT scans from December along with the ultrasound and MRCP from early December this year and his note from gastrointestinal liver specialist regarding this perceived gallstone pancreatitis episode. The patient and his wife are trying to adjust her diet to more of a low-fat diet. They are afraid to pursue surgery at this time because of the COVID- pandemic. I informed the patient's that gallstone pancreatitis could recur if he ate something which was fatty and triggered it. He is willing to take this risk because of his fear of the pandemic. Objective:  Vitals:    20 1034   BP: (!) 157/84   Pulse: 77   Resp: 16   Temp: 97.7 °F (36.5 °C)   TempSrc: Temporal   SpO2: 95%   Weight: 96.2 kg (212 lb)   Height: 5' 11\" (1.803 m)       Physical Exam:    General: Awake and alert, oriented x4, no apparent distress   Abdomen: abdomen is soft without tenderness. No masses, organomegaly or guarding    Current Medications:  Current Outpatient Medications   Medication Sig Dispense Refill    multivitamin (ONE A DAY) tablet Take 1 Tab by mouth daily.  loperamide (IMODIUM) 2 mg capsule Take  by mouth.  atorvastatin (LIPITOR) 10 mg tablet Take 10 mg by mouth daily.  losartan (COZAAR) 100 mg tablet Take 100 mg by mouth daily.  oxybutynin (DITROPAN) 5 mg tablet Take 5 mg by mouth daily.  sertraline (ZOLOFT) 50 mg tablet Take 50 mg by mouth daily.       tiotropium (Spiriva with HandiHaler) 18 mcg inhalation capsule Take 1 Cap by inhalation daily.  albuterol (PROVENTIL HFA, VENTOLIN HFA, PROAIR HFA) 90 mcg/actuation inhaler Take  by inhalation.  amLODIPine (NORVASC) 2.5 mg tablet Take 1 Tab by mouth daily. (Patient taking differently: Take 10 mg by mouth daily.) 30 Tab 0    aspirin 81 mg chewable tablet Take 81 mg by mouth daily.  polyethylene glycol (Miralax) 17 gram/dose powder Take 17 g by mouth daily.  cyanocobalamin (VITAMIN B-12) 1,000 mcg tablet Take 1,000 mcg by mouth daily. Chart and notes reviewed. Data reviewed. I have evaluated and examined the patient. IMPRESSION:   · Patient with gallstone pancreatitis history.       PLAN:/DISCUSION:   · Low-fat diet  · Follow-up when he is ready to have the gallbladder removed        Benji Fenton MD

## 2021-02-03 ENCOUNTER — OFFICE VISIT (OUTPATIENT)
Dept: SURGERY | Age: 76
End: 2021-02-03
Payer: MEDICARE

## 2021-02-03 VITALS
RESPIRATION RATE: 18 BRPM | WEIGHT: 212 LBS | BODY MASS INDEX: 29.68 KG/M2 | OXYGEN SATURATION: 99 % | HEART RATE: 75 BPM | HEIGHT: 71 IN | DIASTOLIC BLOOD PRESSURE: 88 MMHG | TEMPERATURE: 97.7 F | SYSTOLIC BLOOD PRESSURE: 145 MMHG

## 2021-02-03 DIAGNOSIS — K85.10 GALLSTONE PANCREATITIS: ICD-10-CM

## 2021-02-03 DIAGNOSIS — K80.20 GALLSTONES: Primary | ICD-10-CM

## 2021-02-03 PROCEDURE — 99213 OFFICE O/P EST LOW 20 MIN: CPT | Performed by: SURGERY

## 2021-02-03 NOTE — PROGRESS NOTES
Buddy Ya is a 67 y.o. male  Chief Complaint   Patient presents with    Follow-up     dicuss gallbladder removal         Is someone accompanying this pt? Yes,   [facility] [employer] [facility]    Is the patient using any DME equipment during OV? Ys, standard cane          Vitals:    02/03/21 1113   Pulse: 75   Resp: 18   Temp: 97.7 °F (36.5 °C)   SpO2: 99%   Weight: 212 lb (96.2 kg)   Height: 5' 11\" (1.803 m)        Depression Screening:  No flowsheet data found. Learning Assessment:  Learning Assessment 3/29/2018   PRIMARY LEARNER Patient   BARRIERS PRIMARY LEARNER NONE   PRIMARY LANGUAGE ENGLISH   LEARNER PREFERENCE PRIMARY LISTENING   ANSWERED BY patient   RELATIONSHIP SELF         Fall Risk  No flowsheet data found. Health Maintenance reviewed and discussed and ordered per Provider. Coordination of Care:  1. Have you been to the ER, urgent care clinic since your last visit? Hospitalized since your last visit? no    2. Have you seen or consulted any other health care providers outside of the [address] [place] [address] since your last visit? Include any pap smears or colon screening.  no

## 2021-02-04 NOTE — PATIENT INSTRUCTIONS
Low-Fat Diet for Gallbladder Disease: After Your Visit Your Care Instructions When you eat, the gallbladder releases bile, which helps you digest the fat in food. If you have an inflamed gallbladder, this may cause pain. A low-fat diet may give your gallbladder a rest so you can start to heal. Your doctor and dietitian can help you make an eating plan that does not irritate your digestive system. Always talk with your doctor or dietitian before you make changes in your diet. Follow-up care is a key part of your treatment and safety. Be sure to make and go to all appointments, and call your doctor if you are having problems. Its also a good idea to know your test results and keep a list of the medicines you take. How can you care for yourself at home? · Eat many small meals and snacks each day instead of three large meals. · Choose lean meats. ¨ Eat no more than 5 to 6½ ounces of meat a day. ¨ Cut off all fat you can see. ¨ Eat chicken and turkey without the skin. ¨ Many types of fish, such as salmon, lake trout, tuna, and herring, provide healthy omega-3 fat. But, avoid fish canned in oil, such as sardines in olive oil. ¨ Bake, broil, or grill meats, fowl, or fish instead of frying them in butter or fat. · Drink or eat nonfat or low-fat milk, yogurt, cheese, or other milk products each day. ¨ Read the labels on cheeses, and choose those with less than 5 grams of fat an ounce. ¨ Try fat-free sour cream, cream cheese, or yogurt. ¨ Avoid cream soups and cream sauces on pasta. ¨ Eat low-fat ice cream, frozen yogurt, or sorbet. Avoid regular ice cream. 
· Eat whole-grain cereals, breads, crackers, rice, or pasta. Avoid high-fat foods such as croissants, scones, biscuits, waffles, doughnuts, muffins, granola, and high-fat breads. · Flavor your foods with herbs and spices (such as basil, tarragon, or mint), fat-free sauces, or lemon juice instead of butter. You can also use butter substitutes, fat-free mayonnaise, or fat-free dressing. · Try applesauce, prune puree, or mashed bananas to replace some or all of the fat when you bake. · Limit fats and oils, such as butter, margarine, mayonnaise, and salad dressing, to no more than 1 tablespoon a meal. 
· Avoid high-fat foods, such as: 
¨ Chocolate, whole milk, ice cream, processed cheese, and egg yolks. ¨ Fried or buttered foods. ¨ Ham, salami, and allen. ¨ Cinnamon rolls, cakes, pies, cookies, and other pastries. ¨ Prepared snack foods, such as potato chips, nut and granola bars, and mixed nuts. ¨ Coconut and avocado. · Learn how to read food labels for serving sizes and ingredients. Fast-food and convenience-food meals often have lots of fat. Where can you learn more? Go to imgix.be Enter C650 in the search box to learn more about \"Low-Fat Diet for Gallbladder Disease: After Your Visit. \"  
© 8169-3657 [employer], [employer]. Care instructions adapted under license by [employer] (which disclaims liability or warranty for this information). This care instruction is for use with your licensed healthcare professional. If you have questions about a medical condition or this instruction, always ask your healthcare professional. [place] [address] any warranty or liability for your use of this information. Content Version: 9.2.102735; Last Revised: August 31, 2011 High-Fiber Diet: Care Instructions Your Care Instructions A high-fiber diet may help you relieve constipation and feel less bloated. Your doctor and dietitian will help you make a high-fiber eating plan based on your personal needs. The plan will include the things you like to eat. It will also make sure that you get 30 grams of fiber a day. Before you make changes to the way you eat, be sure to talk with your doctor or dietitian. Follow-up care is a key part of your treatment and safety. Be sure to make and go to all appointments, and call your doctor if you are having problems. It's also a good idea to know your test results and keep a list of the medicines you take. How can you care for yourself at home? · You can increase how much fiber you get if you eat more of certain foods. These foods include: 
? Whole-grain breads and cereals. ? Fruits, such as pears, apples, and peaches. Eat the skins, peels, and seeds, if you can. 
? Vegetables, such as broccoli, cabbage, spinach, carrots, asparagus, and squash. ? Starchy vegetables. These include potatoes with skins, kidney beans, and lima beans. · Take a fiber supplement every day if your doctor recommends it. Examples are Benefiber, Citrucel, FiberCon, and Metamucil. Ask your doctor how much to take. · Drink plenty of fluids, enough so that your urine is light yellow or clear like water. If you have kidney, heart, or liver disease and have to limit fluids, talk with your doctor before you increase the amount of fluids you drink. · Get some exercise every day. Exercise helps stool move through the colon. It also helps prevent constipation. · Keep a food diary. Try to notice and write down what foods cause gas, pain, or other symptoms. Then you can avoid these foods. Where can you learn more? Go to http://www.gray.com/ Enter S208 in the search box to learn more about \"High-Fiber Diet: Care Instructions. \" Current as of: August 22, 2019               Content Version: 12.6 © 9118-4610 [employer]. Care instructions adapted under license by [employer] (which disclaims liability or warranty for this information). If you have questions about a medical condition or this instruction, always ask your healthcare professional. [address] any warranty or liability for your use of this information.

## 2021-02-04 NOTE — PROGRESS NOTES
[facility] Specialists  General Surgery    Name: Shasha Mejía MRN: [id number]   : 1945 [facility]   Date: 2021 Admission Date: No admission date for patient encounter. Subjective:  Patient presents today accompanied by her . He denies any nausea vomiting or right upper quadrant abdominal pain no epigastric abdominal pain. His bowels are moving well according to the patient. Objective:  Vitals:    21 1113   BP: (!) 145/88   Pulse: 75   Resp: 18   Temp: 97.7 °F (36.5 °C)   SpO2: 99%   Weight: 96.2 kg (212 lb)   Height: 5' 11\" (1.803 m)       Physical Exam:    General: Awake and alert, oriented x4, no apparent distress   Abdomen: abdomen is soft with minimal tenderness. No masses, organomegaly or guarding    Current Medications:  Current Outpatient Medications   Medication Sig Dispense Refill    multivitamin (ONE A DAY) tablet Take 1 Tab by mouth daily.  loperamide (IMODIUM) 2 mg capsule Take  by mouth.  atorvastatin (LIPITOR) 10 mg tablet Take 10 mg by mouth daily.  losartan (COZAAR) 100 mg tablet Take 100 mg by mouth daily.  oxybutynin (DITROPAN) 5 mg tablet Take 5 mg by mouth daily.  sertraline (ZOLOFT) 50 mg tablet Take 50 mg by mouth daily.  tiotropium (Spiriva with HandiHaler) 18 mcg inhalation capsule Take 1 Cap by inhalation daily.  albuterol (PROVENTIL HFA, VENTOLIN HFA, PROAIR HFA) 90 mcg/actuation inhaler Take  by inhalation.  amLODIPine (NORVASC) 2.5 mg tablet Take 1 Tab by mouth daily. (Patient taking differently: Take 10 mg by mouth daily.) 30 Tab 0    cyanocobalamin (VITAMIN B-12) 1,000 mcg tablet Take 1,000 mcg by mouth daily.  polyethylene glycol (Miralax) 17 gram/dose powder Take 17 g by mouth daily.  aspirin 81 mg chewable tablet Take 81 mg by mouth daily. Chart and notes reviewed. Data reviewed. I have evaluated and examined the patient.         IMPRESSION: · Patient with history of gallstone pancreatitis now without symptoms who prefers to try to control his biliary colic with diet.       PLAN:/DISCUSION:   · Follow-up as needed        Terrall Dakin, MD

## 2021-02-19 ENCOUNTER — HOSPITAL ENCOUNTER (OUTPATIENT)
Dept: LAB | Age: 76
Discharge: HOME | End: 2021-02-19
Payer: COMMERCIAL

## 2021-02-19 PROCEDURE — U0003 INFECTIOUS AGENT DETECTION BY NUCLEIC ACID (DNA OR RNA); SEVERE ACUTE RESPIRATORY SYNDROME CORONAVIRUS 2 (SARS-COV-2) (CORONAVIRUS DISEASE [COVID-19]), AMPLIFIED PROBE TECHNIQUE, MAKING USE OF HIGH THROUGHPUT TECHNOLOGIES AS DESCRIBED BY CMS-2020-01-R: HCPCS

## 2021-02-20 LAB — SARS-COV-2, COV2NT: NOT DETECTED

## 2021-02-24 ENCOUNTER — ANESTHESIA EVENT (OUTPATIENT)
Dept: ENDOSCOPY | Age: 76
End: 2021-02-24
Payer: MEDICARE

## 2021-02-25 ENCOUNTER — ANESTHESIA (OUTPATIENT)
Dept: ENDOSCOPY | Age: 76
End: 2021-02-25
Payer: MEDICARE

## 2021-02-25 ENCOUNTER — HOSPITAL ENCOUNTER (OUTPATIENT)
Age: 76
Setting detail: OUTPATIENT SURGERY
Discharge: HOME | End: 2021-02-25
Attending: INTERNAL MEDICINE | Admitting: INTERNAL MEDICINE
Payer: MEDICARE

## 2021-02-25 VITALS
TEMPERATURE: 97.7 F | HEART RATE: 58 BPM | RESPIRATION RATE: 20 BRPM | HEIGHT: 71 IN | OXYGEN SATURATION: 99 % | BODY MASS INDEX: 29.68 KG/M2 | SYSTOLIC BLOOD PRESSURE: 125 MMHG | WEIGHT: 212 LBS | DIASTOLIC BLOOD PRESSURE: 82 MMHG

## 2021-02-25 LAB — GLUCOSE BLD STRIP.AUTO-MCNC: 93 MG/DL (ref 70–110)

## 2021-02-25 PROCEDURE — 99100 ANES PT EXTEME AGE<1 YR&>70: CPT | Performed by: NURSE ANESTHETIST, CERTIFIED REGISTERED

## 2021-02-25 PROCEDURE — 88305 TISSUE EXAM BY PATHOLOGIST: CPT

## 2021-02-25 PROCEDURE — 74011250636 HC RX REV CODE- 250/636: Performed by: NURSE ANESTHETIST, CERTIFIED REGISTERED

## 2021-02-25 PROCEDURE — 82962 GLUCOSE BLOOD TEST: CPT

## 2021-02-25 PROCEDURE — 77030013992 HC SNR POLYP ENDOSC BSC -B: Performed by: INTERNAL MEDICINE

## 2021-02-25 PROCEDURE — 77030008565 HC TBNG SUC IRR ERBE -B: Performed by: INTERNAL MEDICINE

## 2021-02-25 PROCEDURE — 99100 ANES PT EXTEME AGE<1 YR&>70: CPT | Performed by: ANESTHESIOLOGY

## 2021-02-25 PROCEDURE — 00811 ANES LWR INTST NDSC NOS: CPT | Performed by: ANESTHESIOLOGY

## 2021-02-25 PROCEDURE — 76060000031 HC ANESTHESIA FIRST 0.5 HR: Performed by: INTERNAL MEDICINE

## 2021-02-25 PROCEDURE — 74011250637 HC RX REV CODE- 250/637: Performed by: NURSE ANESTHETIST, CERTIFIED REGISTERED

## 2021-02-25 PROCEDURE — 76040000019: Performed by: INTERNAL MEDICINE

## 2021-02-25 PROCEDURE — 77030021593 HC FCPS BIOP ENDOSC BSC -A: Performed by: INTERNAL MEDICINE

## 2021-02-25 PROCEDURE — 2709999900 HC NON-CHARGEABLE SUPPLY: Performed by: INTERNAL MEDICINE

## 2021-02-25 PROCEDURE — 00811 ANES LWR INTST NDSC NOS: CPT | Performed by: NURSE ANESTHETIST, CERTIFIED REGISTERED

## 2021-02-25 RX ORDER — SODIUM CHLORIDE 0.9 % (FLUSH) 0.9 %
5-40 SYRINGE (ML) INJECTION EVERY 8 HOURS
Status: DISCONTINUED | OUTPATIENT
Start: 2021-02-25 | End: 2021-02-25 | Stop reason: HOSPADM

## 2021-02-25 RX ORDER — FAMOTIDINE 20 MG/1
20 TABLET, FILM COATED ORAL ONCE
Status: COMPLETED | OUTPATIENT
Start: 2021-02-25 | End: 2021-02-25

## 2021-02-25 RX ORDER — SODIUM CHLORIDE, SODIUM LACTATE, POTASSIUM CHLORIDE, CALCIUM CHLORIDE 600; 310; 30; 20 MG/100ML; MG/100ML; MG/100ML; MG/100ML
50 INJECTION, SOLUTION INTRAVENOUS CONTINUOUS
Status: DISCONTINUED | OUTPATIENT
Start: 2021-02-26 | End: 2021-02-25 | Stop reason: HOSPADM

## 2021-02-25 RX ORDER — SODIUM CHLORIDE 0.9 % (FLUSH) 0.9 %
5-40 SYRINGE (ML) INJECTION AS NEEDED
Status: DISCONTINUED | OUTPATIENT
Start: 2021-02-25 | End: 2021-02-25 | Stop reason: HOSPADM

## 2021-02-25 RX ORDER — PROPOFOL 10 MG/ML
INJECTION, EMULSION INTRAVENOUS AS NEEDED
Status: DISCONTINUED | OUTPATIENT
Start: 2021-02-25 | End: 2021-02-25 | Stop reason: HOSPADM

## 2021-02-25 RX ADMIN — PROPOFOL 20 MG: 10 INJECTION, EMULSION INTRAVENOUS at 11:35

## 2021-02-25 RX ADMIN — PROPOFOL 20 MG: 10 INJECTION, EMULSION INTRAVENOUS at 11:28

## 2021-02-25 RX ADMIN — SODIUM CHLORIDE, SODIUM LACTATE, POTASSIUM CHLORIDE, AND CALCIUM CHLORIDE 50 ML/HR: 600; 310; 30; 20 INJECTION, SOLUTION INTRAVENOUS at 11:11

## 2021-02-25 RX ADMIN — PROPOFOL 20 MG: 10 INJECTION, EMULSION INTRAVENOUS at 11:27

## 2021-02-25 RX ADMIN — PROPOFOL 50 MG: 10 INJECTION, EMULSION INTRAVENOUS at 11:26

## 2021-02-25 RX ADMIN — FAMOTIDINE 20 MG: 20 TABLET ORAL at 11:11

## 2021-02-25 NOTE — ANESTHESIA PREPROCEDURE EVALUATION
Relevant Problems   No relevant active problems       Anesthetic History     PONV          Review of Systems / Medical History  Patient summary reviewed and pertinent labs reviewed    Pulmonary                   Neuro/Psych       CVA: no residual symptoms  Psychiatric history     Cardiovascular    Hypertension: well controlled        Dysrhythmias       Exercise tolerance: <4 METS     GI/Hepatic/Renal                Endo/Other        Arthritis     Other Findings              Physical Exam    Airway  Mallampati: III  TM Distance: 4 - 6 cm  Neck ROM: decreased range of motion   Mouth opening: Diminished (comment)     Cardiovascular    Rhythm: regular  Rate: normal         Dental    Dentition: Full lower dentures and Full upper dentures     Pulmonary  Breath sounds clear to auscultation               Abdominal  GI exam deferred       Other Findings            Anesthetic Plan    ASA: 3  Anesthesia type: MAC          Induction: Intravenous  Anesthetic plan and risks discussed with: Patient

## 2021-02-25 NOTE — H&P
[email or web address]  [phone]      History and Physical    Patient: James Dickens MRN: [id number]  SSN: [id number]    YOB: 1945  Age: 67 y.o. Sex: male      Subjective: James Dickens is a 67 y.o. male who presents for increased risk CRCS. Pt has a personal history of colon polyps. Denies symptoms or concerns.  .     Past Medical History:   Diagnosis Date    Acute blood loss as cause of postoperative anemia 4/21/2018    Benign prostatic hyperplasia     CKD (chronic kidney disease) stage 3, GFR 30-59 ml/min 9/1/2015    Depression 0/8/9752    Diastolic dysfunction without heart failure 9/2/2015    Grade 1 diastolic dysfunction on 2D ECHO done 9/02/2015    Dyslipidemia 9/2/2015    Dysphagia as late effect of cerebrovascular accident (CVA) 9/1/2015    Hemiparesis affecting left side as late effect of cerebrovascular accident (CVA) ([facility] [address] 9/1/2015    History of noncompliance with medical treatment, presenting hazards to health 9/1/2015    History of stroke with residual deficit 9/1/2015    Acute Ischemic Stroke (early subacute infarct at the posterior right lentiform nucleus) with residual left hemiparesis and dysphagia     History of tobacco use 9/1/2015    History of trichomonal urethritis 9/1/2015    History of vitamin D deficiency 9/6/2015    Hypertension     Hypertensive heart and kidney disease without heart failure and with chronic kidney disease stage III 09/02/2015    Obesity, Class I, BMI 30-34.9 9/1/2015    Overactive bladder     Pancreatitis, acute 12/2020    Postoperative atrial fibrillation (HCC) 4/21/2018    Resolved    Postoperative confusion 4/22/2018    Postoperative urinary retention 4/22/2018    Statin intolerance 05/07/2018    Atorvastatin and Simvastatin    Stroke ([facility] [address] 2015    weakness rt side, dyphagia    Vitamin D deficiency 5/1/2018    Vitamin D 25-Hydroxy (5/1/2018) = 17.9      Past Surgical History:   Procedure Laterality Date    COLONOSCOPY N/A 4/3/2018    COLONOSCOPY,  w heated snared polypectomy performed by Cady Patel MD at [facility] APPENDECTOMY,RUPT APPENDX+ABSCESS N/A 04/20/2018    Dr. Chhaya Day      Family History   Problem Relation Age of Onset    Diabetes Mother     Stroke Mother     Heart Disease Father     Hypertension Father     Diabetes Brother     Hypertension Brother      Social History     Tobacco Use    Smoking status: Current Every Day Smoker    Smokeless tobacco: Never Used    Tobacco comment: 2 cigs daily   Substance Use Topics    Alcohol use: Not Currently      Prior to Admission medications    Medication Sig Start Date End Date Taking? Authorizing Provider   calcium carbonate (TUMS PO) Take  by mouth. As needed   Yes Provider, Historical   multivitamin (ONE A DAY) tablet Take 1 Tab by mouth daily. Yes Provider, Historical   loperamide (IMODIUM) 2 mg capsule Take  by mouth as needed. Yes Provider, Historical   atorvastatin (LIPITOR) 10 mg tablet Take 10 mg by mouth daily. Yes Other, MD Fabian   oxybutynin (DITROPAN) 5 mg tablet Take 5 mg by mouth daily. Yes Other, MD Fabian   sertraline (ZOLOFT) 50 mg tablet Take 50 mg by mouth daily. Yes Other, MD Fabian   tiotropium (Spiriva with HandiHaler) 18 mcg inhalation capsule Take 1 Cap by inhalation daily. Yes Provider, Historical   albuterol (PROVENTIL HFA, VENTOLIN HFA, PROAIR HFA) 90 mcg/actuation inhaler Take  by inhalation. Yes Provider, Historical   amLODIPine (NORVASC) 2.5 mg tablet Take 1 Tab by mouth daily. Patient taking differently: Take 10 mg by mouth daily. 8/9/18  Yes Bradly Haynes, DO   aspirin 81 mg chewable tablet Take 81 mg by mouth daily. Yes Provider, Historical   losartan (COZAAR) 100 mg tablet Take 100 mg by mouth daily. Other, MD Fabian        Allergies   Allergen Reactions    Lipitor [Atorvastatin] Other (comments)     Elevated CK level    Pt has no awareness of this allergy.     Simvastatin Other (comments) Elevation in LFTs       Review of Systems:  A comprehensive review of systems was negative except for that written in the History of Present Illness. Objective:     Vitals:    02/19/21 1256   Weight: 96.2 kg (212 lb)        Physical Exam:  GENERAL: alert, cooperative, no distress, appears stated age  LUNG: clear to auscultation bilaterally  HEART: regular rate and rhythm, S1, S2 normal, no murmur, click, rub or gallop  ABDOMEN: soft, non-tender. Bowel sounds normal. No masses,  no organomegaly  NEUROLOGIC: alert & oriented x 3    Assessment:     1. Personal history of colon polyps    Plan:     1. Colonoscopy    Signed By: David Stark MD     February 25, 2021      David Stark MD  [facility], [address] [place] [address] [phone]  [email or web address]

## 2021-02-25 NOTE — DISCHARGE INSTRUCTIONS
Colonoscopy: What to Expect at [address] [place] [address]  After you have a colonoscopy, you will stay at the clinic for 1 to 2 hours until the medicines wear off. Then you can go home. But you will need to arrange for a ride. Your doctor will tell you when you can eat and do your other usual activities. Your doctor will talk to you about when you will need your next colonoscopy. Your doctor can help you decide how often you need to be checked. This will depend on the results of your test and your risk for colorectal cancer. After the test, you may be bloated or have gas pains. You may need to pass gas. If a biopsy was done or a polyp was removed, you may have streaks of blood in your stool (feces) for a few days. This care sheet gives you a general idea about how long it will take for you to recover. But each person recovers at a different pace. Follow the steps below to get better as quickly as possible. How can you care for yourself at home? Activity  · Rest when you feel tired. · You can do your normal activities when it feels okay to do so. Diet  · Follow your doctor's directions for eating. · Unless your doctor has told you not to, drink plenty of fluids. This helps to replace the fluids that were lost during the colon prep. · Do not drink alcohol. Medicines  · If polyps were removed or a biopsy was done during the test, your doctor may tell you not to take aspirin or other anti-inflammatory medicines for a few days. These include ibuprofen (Advil, Motrin) and naproxen (Aleve). Other instructions  · For your safety, do not drive or operate machinery until the medicine wears off and you can think clearly. Your doctor may tell you not to drive or operate machinery until the day after your test.  · Do not sign legal documents or make major decisions until the medicine wears off and you can think clearly. The anesthesia can make it hard for you to fully understand what you are agreeing to.   Follow-up care is a key part of your treatment and safety. Be sure to make and go to all appointments, and call your doctor if you are having problems. It's also a good idea to know your test results and keep a list of the medicines you take. When should you call for help? Call 911 anytime you think you may need emergency care. For example, call if:  · You passed out (lost consciousness). · You pass maroon or bloody stools. · You have severe belly pain. Call your doctor now or seek immediate medical care if:  · Your stools are black and tarlike. · Your stools have streaks of blood, but you did not have a biopsy or any polyps removed. · You have belly pain, or your belly is swollen and firm. · You vomit. · You have a fever. · You are very dizzy. Watch closely for changes in your health, and be sure to contact your doctor if you have any problems. Where can you learn more? Go to weartolook.be  Enter E264 in the search box to learn more about \"Colonoscopy: What to Expect at Home. \"   © 2003-7225 Healthwise, Incorporated. Care instructions adapted under license by [address] [place] [address] [employer] (which disclaims liability or warranty for this information). This care instruction is for use with your licensed healthcare professional. If you have questions about a medical condition or this instruction, always ask your healthcare professional. [place] [address] any warranty or liability for your use of this information. Content Version: 96.0.154322; Current as of: November 14, 2014  Patient Education        Hemorrhoids: Care Instructions  Your Care Instructions     Hemorrhoids are enlarged veins that develop in the anal canal. Bleeding during bowel movements, itching, swelling, and rectal pain are the most common symptoms. They can be uncomfortable at times, but hemorrhoids rarely are a serious problem. You can treat most hemorrhoids with simple changes to your diet and bowel habits.  These changes include eating more fiber and not straining to pass stools. Most hemorrhoids do not need surgery or other treatment unless they are very large and painful or bleed a lot. Follow-up care is a key part of your treatment and safety. Be sure to make and go to all appointments, and call your doctor if you are having problems. It's also a good idea to know your test results and keep a list of the medicines you take. How can you care for yourself at home? · Sit in a few inches of warm water (sitz bath) 3 times a day and after bowel movements. The warm water helps with pain and itching. · Put ice on your anal area several times a day for 10 minutes at a time. Put a thin cloth between the ice and your skin. Follow this by placing a warm, wet towel on the area for another 10 to 20 minutes. · Take pain medicines exactly as directed. ? If the doctor gave you a prescription medicine for pain, take it as prescribed. ? If you are not taking a prescription pain medicine, ask your doctor if you can take an over-the-counter medicine. · Keep the anal area clean, but be gentle. Use water and a fragrance-free soap, such as [place], or use baby wipes or medicated pads, such as Tucks. · Wear cotton underwear and loose clothing to decrease moisture in the anal area. · Eat more fiber. Include foods such as whole-grain breads and cereals, raw vegetables, raw and dried fruits, and beans. · Drink plenty of fluids, enough so that your urine is light yellow or clear like water. If you have kidney, heart, or liver disease and have to limit fluids, talk with your doctor before you increase the amount of fluids you drink. · Use a stool softener that contains bran or psyllium. You can save money by buying bran or psyllium (available in bulk at most health food stores) and sprinkling it on foods or stirring it into fruit juice. Or you can use a product such as Metamucil or Hydrocil. · Practice healthy bowel habits.   ? Go to the bathroom as soon as you have the urge. ? Avoid straining to pass stools. Relax and give yourself time to let things happen naturally. ? Do not hold your breath while passing stools. ? Do not read while sitting on the toilet. Get off the toilet as soon as you have finished. · Take your medicines exactly as prescribed. Call your doctor if you think you are having a problem with your medicine. When should you call for help? Call 911 anytime you think you may need emergency care. For example, call if:    · You pass maroon or very bloody stools. Call your doctor now or seek immediate medical care if:    · You have increased pain.     · You have increased bleeding. Watch closely for changes in your health, and be sure to contact your doctor if:    · Your symptoms have not improved after 3 or 4 days. Where can you learn more? Go to http://www.alvarado.com/  Enter F228 in the search box to learn more about \"Hemorrhoids: Care Instructions. \"  Current as of: April 15, 2020               Content Version: 12.6  © 1350-7125 [employer]. Care instructions adapted under license by [employer] (which disclaims liability or warranty for this information). If you have questions about a medical condition or this instruction, always ask your healthcare professional. [address] any warranty or liability for your use of this information. Patient Education        Colon Polyps: Care Instructions  Your Care Instructions     Colon polyps are growths in the colon or the rectum. The cause of most colon polyps is not known, and most people who get them do not have any problems. But a certain kind can turn into cancer. For this reason, regular testing for colon polyps is important for people as they get older. It is also important for anyone who has an increased risk for colon cancer. Polyps are usually found through routine colon cancer screening tests.  Although most colon polyps are not cancerous, they are usually removed and then tested for cancer. Screening for colon cancer saves lives because the cancer can usually be cured if it is caught early. If you have a polyp that is the type that can turn into cancer, you may need more tests to examine your entire colon. The doctor will remove any other polyps that he or she finds, and you will be tested more often. Follow-up care is a key part of your treatment and safety. Be sure to make and go to all appointments, and call your doctor if you are having problems. It's also a good idea to know your test results and keep a list of the medicines you take. How can you care for yourself at home? Regular exams to look for colon polyps are the best way to prevent polyps from turning into colon cancer. These can include stool tests, sigmoidoscopy, colonoscopy, and CT colonography. Talk with your doctor about a testing schedule that is right for you. To prevent polyps  There is no home treatment that can prevent colon polyps. But these steps may help lower your risk for cancer. · Stay active. Being active can help you get to and stay at a healthy weight. Try to exercise on most days of the week. Walking is a good choice. · Eat well. Choose a variety of vegetables, fruits, legumes (such as peas and beans), fish, poultry, and whole grains. · Do not smoke. If you need help quitting, talk to your doctor about stop-smoking programs and medicines. These can increase your chances of quitting for good. · If you drink alcohol, limit how much you drink. Limit alcohol to 2 drinks a day for men and 1 drink a day for women. When should you call for help? Call your doctor now or seek immediate medical care if:    · You have severe belly pain.     · Your stools are maroon or very bloody.    Watch closely for changes in your health, and be sure to contact your doctor if:    · You have a fever.     · You have nausea or vomiting.     · You have a change in bowel habits (new constipation or diarrhea).     · Your symptoms get worse or are not improving as expected. Where can you learn more? Go to http://www.Rising.com/  Enter C571 in the search box to learn more about \"Colon Polyps: Care Instructions. \"  Current as of: April 29, 2020               Content Version: 12.6  © 2006-2020 [employer]. Care instructions adapted under license by [employer] (which disclaims liability or warranty for this information). If you have questions about a medical condition or this instruction, always ask your healthcare professional. [address] any warranty or liability for your use of this information. Patient Education      Diverticulosis: Care Instructions  Your Care Instructions  In diverticulosis, pouches called diverticula form in the wall of the large intestine (colon). The pouches do not cause any pain or other symptoms. Most people who have diverticulosis do not know they have it. But the pouches sometimes bleed, and if they become infected, they can cause pain and other symptoms. When this happens, it is called diverticulitis. Diverticula form when pressure pushes the wall of the colon outward at certain weak points. A diet that is too low in fiber can cause diverticula. Follow-up care is a key part of your treatment and safety. Be sure to make and go to all appointments, and call your doctor if you are having problems. It's also a good idea to know your test results and keep a list of the medicines you take. How can you care for yourself at home? · Include fruits, leafy green vegetables, beans, and whole grains in your diet each day. These foods are high in fiber. · Take a fiber supplement, such as Citrucel or Metamucil, every day if needed. Read and follow all instructions on the label. · Drink plenty of fluids, enough so that your urine is light yellow or clear like water.  If you have kidney, heart, or liver disease and have to limit fluids, talk with your doctor before you increase the amount of fluids you drink. · Get at least 30 minutes of exercise on most days of the week. Walking is a good choice. You also may want to do other activities, such as running, swimming, cycling, or playing tennis or team sports. · Cut out foods that cause gas, pain, or other symptoms. When should you call for help? Call your doctor now or seek immediate medical care if:    · You have belly pain.     · You pass maroon or very bloody stools.     · You have a fever.     · You have nausea and vomiting.     · You have unusual changes in your bowel movements or abdominal swelling.     · You have burning pain when you urinate.     · You have abnormal vaginal discharge.     · You have shoulder pain.     · You have cramping pain that does not get better when you have a bowel movement or pass gas.     · You pass gas or stool from your urethra while urinating. Watch closely for changes in your health, and be sure to contact your doctor if you have any problems. Where can you learn more? Go to http://www.gray.com/  Enter [id number] in the search box to learn more about \"Diverticulosis: Care Instructions. \"  Current as of: April 15, 2020               Content Version: 12.6  © 9024-7773 [employer]. Care instructions adapted under license by [employer] (which disclaims liability or warranty for this information). If you have questions about a medical condition or this instruction, always ask your healthcare professional. [place] [address] any warranty or liability for your use of this information. DISCHARGE SUMMARY from Nurse     POST-PROCEDURE INSTRUCTIONS:    Call your Physician if you:  ? Observe any excess bleeding. ? Develop a temperature over 100.5o F.  ? Experience abdominal, shoulder or chest pain. ?  Notice any signs of decreased circulation or nerve impairment to an extremity such as a change in color, persistent numbness, tingling, coldness or increase in pain. ? Vomit blood or you have nausea and vomiting lasting longer than 4 hours. ? Are unable to take medications. ? Are unable to urinate within 8 hours after discharge following general anesthesia or intravenous sedation. For the next 24 hours after receiving general anesthesia or intravenous sedation, or while taking prescription Narcotics, limit your activities:  ? Do NOT drive a motor vehicle, operate hazard machinery or power tools, or perform tasks that require coordination. The medication you received during your procedure may have some effect on your mental awareness. ? Do NOT make important personal or business decisions. The medication you received during your procedure may have some effect on your mental awareness. ? Do NOT drink alcoholic beverages. These drinks do not mix well with the medications that have been given to you. ? Upon discharge from the hospital, you must be accompanied by a responsible adult. ? Resume your diet as directed by your physician. ? Resume medications as your physician has prescribed. ? Please give a list of your current medications to your Primary Care Provider. ? Please update this list whenever your medications are discontinued, doses are changed, or new medications (including over-the-counter products) are added. ? Please carry medication information at all times in case of emergency situations. These are general instructions for a healthy lifestyle:    No smoking/ No tobacco products/ Avoid exposure to second hand smoke.  Surgeon General's Warning:  Quitting smoking now greatly reduces serious risk to your health. Obesity, smoking, and a sedentary lifestyle greatly increase your risk for illness.    A healthy diet, regular physical exercise & weight monitoring are important for maintaining a healthy lifestyle   You may be retaining fluid if you have a history of heart failure or if you experience any of the following symptoms:  Weight gain of 3 pounds or more overnight or 5 pounds in a week, increased swelling in our hands or feet or shortness of breath while lying flat in bed. Please call your doctor as soon as you notice any of these symptoms; do not wait until your next office visit. Recognize signs and symptoms of STROKE:  F  -  Face looks uneven  A  -  Arms unable to move or move unevenly  S  -  Speech slurred or non-existent  T  -  Time to call 911 - as soon as signs and symptoms begin - DO NOT go back to bed or wait to see If you get better - TIME IS BRAIN. Colorectal Screening   Colorectal cancer almost always develops from precancerous polyps (abnormal growths) in the colon or rectum. Screening tests can find precancerous polyps, so that they can be removed before they turn into cancer. Screening tests can also find colorectal cancer early, when treatment works best.  Janeen Rowan Speak with your physician about when you should begin screening and how often you should be tested. Additional Information    If you have questions, please call [phone]. Remember, [employer] is NOT to be used for urgent needs. For medical emergencies, dial 911. Educational references and/or instructions provided during this visit included:    See attached. APPOINTMENTS:    Follow up as needed. Discharge information has been reviewed with the patient. The patient verbalized understanding.

## 2021-02-25 NOTE — ANESTHESIA POSTPROCEDURE EVALUATION
Procedure(s):  COLONOSCOPY w polypectomy. MAC    Anesthesia Post Evaluation      Multimodal analgesia: multimodal analgesia used between 6 hours prior to anesthesia start to PACU discharge  Patient location during evaluation: bedside  Patient participation: complete - patient participated  Level of consciousness: awake  Pain management: adequate  Airway patency: patent  Anesthetic complications: no  Cardiovascular status: stable  Respiratory status: acceptable  Hydration status: acceptable  Post anesthesia nausea and vomiting:  controlled      INITIAL Post-op Vital signs:   Vitals Value Taken Time   /73 02/25/21 1202   Temp 36.4 °C (97.6 °F) 02/25/21 1147   Pulse 54 02/25/21 1211   Resp 15 02/25/21 1211   SpO2 99 % 02/25/21 1211   Vitals shown include unvalidated device data.

## 2021-02-25 NOTE — PROCEDURES
[email or web address]  [phone]        Brief Procedure Note    Ramirez Exon  [birth year]  [id number]    Date of Procedure: 2/25/2021    Preoperative diagnosis: Personal history of colonic polyps:   V12.72 - Z86.010    Postoperative diagnosis: diverticulosis, transverse polyp, hemorrhoids, poor prep - aborted    Description of Findings: same    Sedation/Anesthesia: Monitored Anesthesia Care; See Anesthesia Note    Procedure: Procedure(s):  COLONOSCOPY w polypectomy    :  Dr. Franca Siddiqui MD    Assistant(s): Endoscopy RN-1: Amanda Guthrie RN; Elise Mead RN    EBL:None    Specimens:   ID Type Source Tests Collected by Time Destination   1 : Transverse polyp Preservative Colon  Kathryn Bustillos MD 2/25/2021 1135 Pathology       Findings: See printed and scanned procedure note    Complications: None    Tissue Implant Device: None    Dr. Franca Siddiqui MD  2/25/2021  11:43 AM    Franca Siddiqui MD  [facility] [address] [phone]  [email or web address]
In 1-3 treatments, patient will perform sit to stand transfer Independent

## 2021-03-01 ENCOUNTER — HOSPITAL ENCOUNTER (OUTPATIENT)
Dept: PHYSICAL THERAPY | Age: 76
Discharge: HOME | End: 2021-03-01
Payer: MEDICARE

## 2021-03-01 PROCEDURE — 97530 THERAPEUTIC ACTIVITIES: CPT

## 2021-03-01 PROCEDURE — 97162 PT EVAL MOD COMPLEX 30 MIN: CPT

## 2021-03-01 NOTE — PROGRESS NOTES
PT DAILY TREATMENT NOTE     Patient Name: Adrian Grider  Date:3/1/2021  : 1945  [x]  Patient  Verified  Payor: [employer] HE[employer] MEDICARE / Plan: [address] / Product Type: Managed Care Medicare /    In time:3:05  Out time:3:45  Total Treatment Time (min): 40  Visit #: 1 of 10    Medicare/BCBS Only   Total Timed Codes (min):  23 1:1 Treatment Time:  40       Treatment Area: Low back pain [M54.5]  Other abnormalities of gait and mobility [R26.89]    SUBJECTIVE  Pain Level (0-10 scale): 0/10  Any medication changes, allergies to medications, adverse drug reactions, diagnosis change, or new procedure performed?: [x] No    [] Yes (see summary sheet for update)  Subjective functional status/changes:   [] No changes reported  See paper initial evaluation note. OBJECTIVE    17 min [x]Eval                  []Re-Eval       23 min Therapeutic Activity:  []  See flow sheet : diagnosis; prognosis; HEP given and reviewed with Pt, aide   Rationale: increase ROM and increase strength  to improve the patients ability to increase ease of transfers, standing/amb tolernace           With   [] TE   [x] TA   [] neuro   [] other: Patient Education: [x] Review HEP    [] Progressed/Changed HEP based on:   [] positioning   [] body mechanics   [] transfers   [] heat/ice application    [] other:      Other Objective/Functional Measures: FOTO 40 pts     Pain Level (0-10 scale) post treatment: 0/10    ASSESSMENT/Changes in Function:     Patient will continue to benefit from skilled PT services to address functional mobility deficits, address ROM deficits, address strength deficits, analyze and address soft tissue restrictions, analyze and cue movement patterns, analyze and modify body mechanics/ergonomics, assess and modify postural abnormalities, address imbalance/dizziness, and instruct in home and community integration to attain remaining goals.      [x]  See Plan of Care  []  See progress note/recertification  []  See Discharge Summary         Progress towards goals / Updated goals:  See plan of care.     PLAN  []  Upgrade activities as tolerated     [x]  Continue plan of care  []  Update interventions per flow sheet       []  Discharge due to:_  []  Other:_      Kaelyn Marie, PT 3/1/2021  5:27 PM    Future Appointments   Date Time Provider Clarice Freedman   3/9/2021  3:00 PM Kaelyn Marie, [facility]   3/12/2021  3:00 PM Romie, [address] [place] [address]   3/16/2021  3:00 PM Kaelyn Marie, [facility]   3/19/2021  3:00 PM [facility]   3/23/2021  3:45 PM Kaelyn Marie, [facility]   3/26/2021  3:00 PM Rosa Malagon [facility]   3/30/2021  3:00 PM Kaelyn Marie, [facility]

## 2021-03-01 NOTE — PROGRESS NOTES
[facility] ROSALINA BAUMAN [facility], [address] [place] [address]  [phone] fax  Plan of Care/ Statement of Necessity for Physical Therapy Services    Patient name: Fawad Childers Start of Care: 3/1/2021   Referral source: Lauren Hobson MD : 1945    Medical Diagnosis: Low back pain [M54.5]  Other abnormalities of gait and mobility [R26.89]  Payor: [address] [place] [address],9D / Plan: [address] / Product Type: new test [employer] Care Medicare /  Onset Date:21    Treatment Diagnosis: LBP; Gait Abnormality   Prior Hospitalization: see medical history Provider#: [id number]   Medications: Verified on Patient summary List    Comorbidities: Back pain; BMI > 30; HTN; hx CVA, left hemiparesis   Prior Level of Function: Lives in [facility] with sister; sedentary lifestyle; aide 2x/week; uses [facility] for [facility] gait      The Plan of Care and following information is based on the information from the initial evaluation. Assessment/ key information: Pt is a 67 y.o. male who presents with main c/o B LE weakness, left > right that limits standing/amb tolerance and activity tolerance in home, community. Pt notes LBP x 1 day that completely resolved two months ago. Pt has hx of right CVA with left hemiparesis in . Pt notes increased difficulty with sit to stand transfers, managing LEs to get on/off bed, and is limited to 10 minutes of standing/amb due to fatigue. Pt is mostly sedentary at this time. Upon exam, Pt exhibited impaired left hip strength and fair standing static and dynamic standing balance. Pt would benefit from skilled PT to address above deficits to improve Pt's function and safety with gait in home, community.     Evaluation Complexity History MEDIUM  Complexity : 1-2 comorbidities / personal factors will impact the outcome/ POC ; Examination MEDIUM Complexity : 3 Standardized tests and measures addressing body structure, function, activity limitation and / or participation in recreation  ;Presentation MEDIUM Complexity : Evolving with changing characteristics  ; Clinical Decision Making MEDIUM Complexity : FOTO score of 26-74  Overall Complexity Rating: MEDIUM  Problem List: pain affecting function, decrease ROM, decrease strength, edema affecting function, impaired gait/ balance, decrease ADL/ functional abilitiies, decrease activity tolerance, decrease flexibility/ joint mobility and decrease transfer abilities   Treatment Plan may include any combination of the following: Therapeutic exercise, Therapeutic activities, Neuromuscular re-education, Physical agent/modality, Gait/balance training, Manual therapy, Aquatic therapy, Patient education, Functional mobility training, Home safety training and Stair training  Patient / Family readiness to learn indicated by: asking questions, trying to perform skills and interest  Persons(s) to be included in education: patient (P)  Barriers to Learning/Limitations: None  Patient Goal (s): Relief.   Patient Self Reported Health Status: good  Rehabilitation Potential: good    Short Term Goals: To be accomplished in 1 weeks:  Goal: Pt to be compliant with initial HEP to improve LE strength for ease of transfers, standing/amb tolerance. Status at last note/certification: Established and reviewed with Pt  Long Term Goals: To be accomplished in 5 weeks:  Goal: Pt to increase left hip strength to at least 4/5 grossly to increase standing/amb tolerance in home, community. Status at last note/certification: 4-/5 grossly except 3+/5 glutes, HS  Goal: Pt to increase standing/amb tolerance to at least 20 minutes with LBQC, SBA to increase activity tolerance with family. Status at last note/certification: 10 minutes  Goal: Pt to perform at least 12 sit to stands in 30 seconds without fatigue or UE support to show improved LE strength and endurance.   Status at last note/certification: 8 sit to stands  Goal: Pt to perform TUG test in 15 second average to show improved dynamic standing balance for safety navigating in home without falls. Status at last note/certification: 26 second average  Goal: Pt to report FOTO score of 56 pts to show improved function and quality of life. Status at last note/certification: FOTO 40 pts     Frequency / Duration: Patient to be seen 2 times per week for 5 weeks. Patient/ Caregiver education and instruction: Diagnosis, prognosis, exercises   [x]  Plan of care has been reviewed with PTA    Certification Period: 3/1/21 - 3/30/21  Lydia Marie, PT 3/1/2021 5:46 PM  _____________________________________________________________________  I certify that the above Therapy Services are being furnished while the patient is under my care. I agree with the treatment plan and certify that this therapy is necessary.     [de-identified] Signature:____________Date:_________TIME:________     Emilie Dunn MD  ** Signature, Date and Time must be completed for valid certification **    Please sign and return to [facility] ROSALINA BAUMAN [facility], [address] [place] [address]  [phone] fax

## 2021-03-09 ENCOUNTER — HOSPITAL ENCOUNTER (OUTPATIENT)
Dept: PHYSICAL THERAPY | Age: 76
Discharge: HOME | End: 2021-03-09
Payer: MEDICARE

## 2021-03-09 PROCEDURE — 97112 NEUROMUSCULAR REEDUCATION: CPT

## 2021-03-09 PROCEDURE — 97110 THERAPEUTIC EXERCISES: CPT

## 2021-03-09 NOTE — PROGRESS NOTES
PT DAILY TREATMENT NOTE     Patient Name: Alexa Nickerson  Date:3/9/2021  : 1945  [x]  Patient  Verified  Payor: UNITED HEALTHCARE MEDICARE / Plan: [address] / Product Type: Managed Care Medicare /    In time:3:01  Out time:3:45  Total Treatment Time (min): 44  Visit #: 2 of 10    Medicare/BCBS Only   Total Timed Codes (min):  44 1:1 Treatment Time:  39       Treatment Area: Low back pain [M54.5]  Other abnormalities of gait and mobility [R26.89]    SUBJECTIVE  Pain Level (0-10 scale): 0/10  Any medication changes, allergies to medications, adverse drug reactions, diagnosis change, or new procedure performed?: [x] No    [] Yes (see summary sheet for update)  Subjective functional status/changes:   [] No changes reported  \"I feel okay. I didn't understand one of the exercises for home. Can we go over it again? \"    OBJECTIVE    36 min Therapeutic Exercise:  [] See flow sheet :   Rationale: increase ROM and increase strength to improve the patients ability to increase LE strength for increased standing/amb tolerance     8 min Neuromuscular Re-education:  []  See flow sheet :   Rationale: increase strength, improve coordination and improve balance  to improve the patients ability to increase standing static and dynamic balance to reduce fall risk          With   [] TE   [] TA   [] neuro   [] other: Patient Education: [x] Review HEP    [] Progressed/Changed HEP based on:   [] positioning   [] body mechanics   [] transfers   [] heat/ice application    [] other:      Other Objective/Functional Measures: Initiated treatment per flow sheet. Pt given verbal and tactile cueing for proper technique. Therapist counted all repetitions and holds for Pt as Pt needed assistance with this. Pain Level (0-10 scale) post treatment: 0/10    ASSESSMENT/Changes in Function: Pt able to perform all exercise interventions today without onset of pain.   Pt noted mild fatigue by end of session. Pt required only close SBA for static balance activities. Reviewed ball squeeze in seated position with Pt, instructing to use pillow instead at home for HEP. Patient will continue to benefit from skilled PT services to address functional mobility deficits, address ROM deficits, address strength deficits, analyze and cue movement patterns, analyze and modify body mechanics/ergonomics, assess and modify postural abnormalities, address imbalance/dizziness and instruct in home and community integration to attain remaining goals. []  See Plan of Care  []  See progress note/recertification  []  See Discharge Summary         Progress towards goals / Updated goals:  Short Term Goals: To be accomplished in 1 weeks:  Goal: Pt to be compliant with initial HEP to improve LE strength for ease of transfers, standing/amb tolerance. Status at last note/certification: Established and reviewed with Pt  Current status: progressing - Pt has initiated HEP, reviewed today  Long Term Goals: To be accomplished in 5 weeks:  Goal: Pt to increase left hip strength to at least 4/5 grossly to increase standing/amb tolerance in home, community. Status at last note/certification: 4-/5 grossly except 3+/5 glutes, HS  Goal: Pt to increase standing/amb tolerance to at least 20 minutes with LBQC, SBA to increase activity tolerance with family. Status at last note/certification: 10 minutes  Goal: Pt to perform at least 12 sit to stands in 30 seconds without fatigue or UE support to show improved LE strength and endurance. Status at last note/certification: 8 sit to stands  Goal: Pt to perform TUG test in 15 second average to show improved dynamic standing balance for safety navigating in home without falls. Status at last note/certification: 26 second average  Goal: Pt to report FOTO score of 56 pts to show improved function and quality of life.   Status at last note/certification: FOTO 40 pts     PLAN  [x]  Upgrade activities as tolerated     [x]  Continue plan of care  []  Update interventions per flow sheet       []  Discharge due to:_  []  Other:_      Gloriajean Gosselin Daughtry, PT 3/9/2021  3:12 PM    Future Appointments   Date Time Provider Clarice Freedman   3/12/2021  3:00 PM [place] [facility]   3/16/2021  3:00 PM Daughtry, Gloriajean Gosselin, PT [facility]   3/19/2021  3:00 PM [place] [facility]   3/23/2021  3:45 PM Daughtry, Gloriajean Gosselin, PT [facility]   3/26/2021  3:00 PM Romie [address]   3/30/2021  3:00 PM Daughtry, Gloriajean Gosselin, PT [facility]

## 2021-03-12 ENCOUNTER — APPOINTMENT (OUTPATIENT)
Dept: PHYSICAL THERAPY | Age: 76
End: 2021-03-12
Payer: MEDICARE

## 2021-03-16 ENCOUNTER — APPOINTMENT (OUTPATIENT)
Dept: PHYSICAL THERAPY | Age: 76
End: 2021-03-16
Payer: MEDICARE

## 2021-03-19 ENCOUNTER — APPOINTMENT (OUTPATIENT)
Dept: PHYSICAL THERAPY | Age: 76
End: 2021-03-19
Payer: MEDICARE

## 2021-03-23 ENCOUNTER — HOSPITAL ENCOUNTER (OUTPATIENT)
Dept: PHYSICAL THERAPY | Age: 76
Discharge: HOME | End: 2021-03-23
Payer: MEDICARE

## 2021-03-23 PROCEDURE — 97530 THERAPEUTIC ACTIVITIES: CPT

## 2021-03-23 PROCEDURE — 97110 THERAPEUTIC EXERCISES: CPT

## 2021-03-23 NOTE — PROGRESS NOTES
PT DAILY TREATMENT NOTE 10-18    Patient Name: Johanne Vail  Date:3/23/2021  : 1945  [x]  Patient  Verified  Payor: UNITED HEALTHCARE MEDICARE / Plan: "[employer] [address] / Product Type: Managed Care Medicare /    In time:3:47  Out time:4:32  Total Treatment Time (min): 45  Visit #: 3 of 10    Medicare/BCBS Only   Total Timed Codes (min):  45 1:1 Treatment Time:  40       Treatment Area: Low back pain [M54.5]  Other abnormalities of gait and mobility [R26.89]    SUBJECTIVE  Pain Level (0-10 scale): 0  Any medication changes, allergies to medications, adverse drug reactions, diagnosis change, or new procedure performed?: [x] No    [] Yes (see summary sheet for update)  Subjective functional status/changes:   [] No changes reported  \"I was not able to come for the past few appointments because I was not feeling well. I am ready to work hard today though. \"    OBJECTIVE    28 min Therapeutic Exercise:  [x] See flow sheet :   Rationale: increase ROM and increase strength to improve LE strength/mobility and  lumbar mobility to improve ease of ADLs and gait. 17 min Therapeutic Activity:  [x]  See flow sheet :   Rationale: increase strength, improve coordination, improve balance and increase proprioception  to improve the patients ability to perform transfer/stair negotiation. With   [x] TE   [] TA   [] neuro   [] other: Patient Education: [x] Review HEP    [] Progressed/Changed HEP based on:   [] positioning   [] body mechanics   [] transfers   [] heat/ice application    [] other:      Other Objective/Functional Measures:   -Close guard required for lateral/retro ambulation     Pain Level (0-10 scale) post treatment: 0    ASSESSMENT/Changes in Function: Patient continued to require frequent verbal/visual cuing for all therapy interventions today. He reports increased fatigue in his LEs at end of session but no increased pain.     Patient will continue to benefit from skilled PT services to modify and progress therapeutic interventions, address functional mobility deficits, address ROM deficits, address strength deficits, analyze and address soft tissue restrictions, analyze and cue movement patterns, analyze and modify body mechanics/ergonomics, assess and modify postural abnormalities and address imbalance/dizziness to attain remaining goals. []  See Plan of Care  []  See progress note/recertification  []  See Discharge Summary         Progress towards goals / Updated goals:  Short Term Goals: To be accomplished in 1 weeks:  Goal: Pt to be compliant with initial HEP to improve LE strength for ease of transfers, standing/amb tolerance. Status at last note/certification: Established and reviewed with Pt  Current status: progressing - Pt has initiated HEP, reviewed today  Long Term Goals: To be accomplished in 5 weeks:  Goal: Pt to increase left hip strength to at least 4/5 grossly to increase standing/amb tolerance in home, community. Status at last note/certification: 4-/5 grossly except 3+/5 glutes, HS  Goal: Pt to increase standing/amb tolerance to at least 20 minutes with LBQC, SBA to increase activity tolerance with family. Status at last note/certification: 10 minutes  Goal: Pt to perform at least 12 sit to stands in 30 seconds without fatigue or UE support to show improved LE strength and endurance. Status at last note/certification: 8 sit to stands  Goal: Pt to perform TUG test in 15 second average to show improved dynamic standing balance for safety navigating in home without falls. Status at last note/certification: 26 second average  Goal: Pt to report FOTO score of 56 pts to show improved function and quality of life.   Status at last note/certification: SKKN 67 MEV   Current: reassess closer to MD note    PLAN  [x]  Upgrade activities as tolerated     [x]  Continue plan of care  []  Update interventions per flow sheet       []  Discharge due to:_  []  Other:_ Saira Mock 3/23/2021  12:43 PM    Future Appointments   Date Time Provider Clarice Freedman   3/23/2021  3:45 [facility]   3/26/2021  3:00 PM Stefano Kirby   3/30/2021  3:00 PM Maritza Ramos PT [facility]

## 2021-03-26 ENCOUNTER — HOSPITAL ENCOUNTER (OUTPATIENT)
Dept: PHYSICAL THERAPY | Age: 76
Discharge: HOME | End: 2021-03-26
Payer: MEDICARE

## 2021-03-26 PROCEDURE — 97110 THERAPEUTIC EXERCISES: CPT

## 2021-03-26 PROCEDURE — 97112 NEUROMUSCULAR REEDUCATION: CPT

## 2021-03-26 PROCEDURE — 97530 THERAPEUTIC ACTIVITIES: CPT

## 2021-03-26 NOTE — PROGRESS NOTES
PT DAILY TREATMENT NOTE     Patient Name: Radha Tobin  Date:3/26/2021  : 1945  [x]  Patient  Verified  Payor: UNITED HEALTHCARE MEDICARE / Plan: [address] / Product Type: Managed Care Medicare /    In time:3:00  Out time: 3:45  Total Treatment Time (min): 45  Visit #: 4 of 10    Medicare/BCBS Only   Total Timed Codes (min):  45 1:1 Treatment Time:  45       Treatment Area: Low back pain [M54.5]  Other abnormalities of gait and mobility [R26.89]    SUBJECTIVE  Pain Level (0-10 scale): 0  Any medication changes, allergies to medications, adverse drug reactions, diagnosis change, or new procedure performed?: [x] No    [] Yes (see summary sheet for update)  Subjective functional status/changes:   [] No changes reported  I feel like I can walk more steady now, just a little though. OBJECTIVE    29 min Therapeutic Exercise:  [] See flow sheet :   Rationale: increase strength to improve the patients ability to improve activity tolerance for functional tasks    8 min Therapeutic Activity:  []  See flow sheet :   Rationale: increase ROM and increase strength  to improve the patients ability to perform transfers with imprved ease     8 min Neuromuscular Re-education:  []  See flow sheet :   Rationale: increase strength, improve coordination, improve balance and increase proprioception  to improve the patients ability to increase stability for safe ambulation in home and community         With   [] TE   [] TA   [] neuro   [] other: Patient Education: [x] Review HEP    [] Progressed/Changed HEP based on:   [] positioning   [] body mechanics   [] transfers   [] heat/ice application    [] other:      Other Objective/Functional Measures: ex's per card     Pain Level (0-10 scale) post treatment: 0    ASSESSMENT/Changes in Function:  Denies any discomfort during or after session. Mild fatigue after session but no rest breaks required.     Patient will continue to benefit from skilled PT services to modify and progress therapeutic interventions, address functional mobility deficits, address ROM deficits, address strength deficits, analyze and address soft tissue restrictions, analyze and cue movement patterns, analyze and modify body mechanics/ergonomics, assess and modify postural abnormalities, address imbalance/dizziness and instruct in home and community integration to attain remaining goals. []  See Plan of Care  []  See progress note/recertification  []  See Discharge Summary         Progress towards goals / Updated goals:  Goal: Pt to be compliant with initial HEP to improve LE strength for ease of transfers, standing/amb tolerance. Status at last note/certification: Established and reviewed with Pt  Current status: progressing - Pt has initiated HEP, reviewed today  Long Term Goals: To be accomplished in 5 weeks:  Goal: Pt to increase left hip strength to at least 4/5 grossly to increase standing/amb tolerance in home, community. Status at last note/certification: 4-/5 grossly except 3+/5 glutes, HS  Right hip abduction 4/5, left 3/5  Goal: Pt to increase standing/amb tolerance to at least 20 minutes with LBQC, SBA to increase activity tolerance with family. Status at last note/certification: 10 minutes  Goal: Pt to perform at least 12 sit to stands in 30 seconds without fatigue or UE support to show improved LE strength and endurance. Status at last note/certification: 8 sit to stands  5 in 30 seconds,  12 sit stands in 1 minute 25 seconds   Goal: Pt to perform TUG test in 15 second average to show improved dynamic standing balance for safety navigating in home without falls. Status at last note/certification: 26 second average  Goal: Pt to report FOTO score of 56 pts to show improved function and quality of life.   Status at last note/certification: VJVM 27 BALTAZAR   Current: reassess closer to MD note    PLAN  [x]  Upgrade activities as tolerated     []  Continue plan of care  []  Update interventions per flow sheet       []  Discharge due to:_  []  Other:_      Leandro Love, JOLENE 3/26/2021  3:06 PM    Future Appointments   Date Time Provider Clarice Freedman   3/30/2021  3:00 PM Ramesh Marie, PT [facility] [address]

## 2021-03-30 ENCOUNTER — HOSPITAL ENCOUNTER (OUTPATIENT)
Dept: PHYSICAL THERAPY | Age: 76
Discharge: HOME | End: 2021-03-30
Payer: MEDICARE

## 2021-03-30 PROCEDURE — 97110 THERAPEUTIC EXERCISES: CPT

## 2021-03-30 PROCEDURE — 97530 THERAPEUTIC ACTIVITIES: CPT

## 2021-03-30 PROCEDURE — 97112 NEUROMUSCULAR REEDUCATION: CPT

## 2021-03-30 NOTE — PROGRESS NOTES
[facility] ROSALINA BAUMAN [facility], [address] [place] [address]  [phone] fax    Continued Plan of Care/ Re-certification for Physical Therapy Services      Patient name: Sheri Opitz Start of Care: 3/1/2021   Referral source: Matilda Duane, MD : 1945   Medical/Treatment Diagnosis: Low back pain [M54.5]  Other abnormalities of gait and mobility [R26.89]  Payor: [facility] / Plan: [address] / Product Type: [employer] Medicare /  Onset Date:21     Prior Hospitalization: see medical history Provider#: [id number]   Medications: Verified on Patient Summary List    Comorbidities: Back pain; BMI > 30; HTN; hx CVA, left hemiparesis  Prior Level of Function: Lives in [facility] with sister; sedentary lifestyle; aide 2x/week; uses [facility] for all gait    Visits from Start of Care: 5    Missed Visits: 3    The Plan of Care and following information is based on the patient's current status:    Short Term Goals: To be accomplished in 1 week:  Goal: Pt to be compliant with initial HEP to improve LE strength for ease of transfers, standing/amb tolerance. Status at last note/certification: Established and reviewed with Pt  Current status: progressing - Pt has initiated HEP, reviewed today  Long Term Goals: To be accomplished in 5 weeks:  Goal: Pt to increase left hip strength to at least 4/5 grossly to increase standing/amb tolerance in home, community. Status at last note/certification: 4-/5 grossly except 3+/5 glutes, HS  Current status: progressing - hip ABD 4-/5 B; hip Flex 5/5 B; Hip Ext 4-/5; Hip ER/IR 5/5 B  Goal: Pt to increase standing/amb tolerance to at least 20 minutes with [facility], SBA to increase activity tolerance with family.   Status at last note/certification: 10 minutes  Current status: not met - still 8-10 minutes  Goal: Pt to perform at least 12 sit to stands in 30 seconds without fatigue or UE support to show improved LE strength and endurance. Status at last note/certification: 8 sit to stands  Current status: not met - still 8 sit to stands  Goal: Pt to perform TUG test in 15 second average to show improved dynamic standing balance for safety navigating in home without falls. Status at last note/certification: 26 second average  Current status: progressing - 19 second average  Goal: Pt to report FOTO score of 56 pts to show improved function and quality of life. Status at last note/certification: LGSE 36 WSJ   Current: progressing - FOTO 53 pts    Key functional changes:  Pt has attended skilled therapy for 5 sessions, including initial evaluation, to address low back pain and gait abnormalities. At time of last visit, Pt reported the following:  Functional Gains: Pt reports improved strength in LEs, is able to walk a little faster, and feels a little more steady on feet  Functional Deficits: Pt still is limited to 8-10 minutes of standing/amb tolerance and would like to increase to 1 hour for ease of shopping errands with family, caregiver  % improvement: 40%  Pain   Average: 0/10                  Best: 0/10                Worst: 0/10  Patient Goal: \"Be able to walk better and longer. \"    Problems/ barriers to goal attainment: None     Problem List: pain affecting function, decrease ROM, decrease strength, edema affecting function, impaired gait/ balance, decrease ADL/ functional abilitiies, decrease activity tolerance, decrease flexibility/ joint mobility and decrease transfer abilities    Treatment Plan: Therapeutic exercise, Therapeutic activities, Neuromuscular re-education, Physical agent/modality, Gait/balance training, Manual therapy, Aquatic therapy, Patient education, Functional mobility training and Stair training    Patient Goal (s) has been updated and includes: \"Be able to walk better and longer. \"     Goals for this certification period to be accomplished in 5 weeks:  Goal: Pt to increase left hip strength to at least 4/5 grossly to increase standing/amb tolerance in home, community. Status at last note/certification: hip ABD 4-/5 B; hip Flex 5/5 B; Hip Ext 4-/5; Hip ER/IR 5/5 B  Current status:   Goal: Pt to increase standing/amb tolerance to at least 20 minutes with [facility], SBA to increase activity tolerance with family. Status at last note/certification: still 8-10 minutes  Current status:   Goal: Pt to perform at least 12 sit to stands in 30 seconds without fatigue or UE support to show improved LE strength and endurance. Status at last note/certification: still 8 sit to stands  Current status:   Goal: Pt to perform TUG test in 15 second average to show improved dynamic standing balance for safety navigating in home without falls. Status at last note/certification: 19 second average  Current status:   Goal: Pt to report FOTO score of 56 pts to show improved function and quality of life. Status at last note/certification: BYGO 02 TGG   Current:     Frequency / Duration: Patient to be seen 2 times per week for 5 weeks:    Assessment / Recommendations: Pt would benefit from continued skilled therapy to improve LE strength, standing dynamic balance, and endurance with standing/amb activities to improve gait mechanics to reduce fall risk and increase walking tolerance to be better able to participate in community activities with family and caregiver. Certification Period: 3/31/21 - 4/29/21    Lydia Marie, PT 3/30/2021 3:58 PM    ________________________________________________________________________  I certify that the above Therapy Services are being furnished while the patient is under my care. I agree with the treatment plan and certify that this therapy is necessary. [] I have read the above and request that my patient continue as recommended.   [] I have read the above report and request that my patient continue therapy with the following changes/special instructions: ______________________________________  [] I have read the above report and request that my patient be discharged from therapy    Physician's Signature:____________Date:_________TIME:________    ** Signature, Date and Time must be completed for valid certification **    Please sign and return to [facility]  [id number] [facility], [address] [place] [address]  [phone] fax

## 2021-03-30 NOTE — PROGRESS NOTES
PT DAILY TREATMENT NOTE     Patient Name: Kiara Burks  Date:3/30/2021  : 1945  [x]  Patient  Verified  Payor: [employer] MEDICARE / Plan: [id number] / Product Type: Managed Care Medicare /    In time:3:03  Out time: 3:50  Total Treatment Time (min): 47  Visit #: 5 of 10    Medicare/BCBS Only   Total Timed Codes (min):  47 1:1 Treatment Time:  38       Treatment Area: Low back pain [M54.5]  Other abnormalities of gait and mobility [R26.89]    SUBJECTIVE  Pain Level (0-10 scale): 0/10  Any medication changes, allergies to medications, adverse drug reactions, diagnosis change, or new procedure performed?: [x] No    [] Yes (see summary sheet for update)  Subjective functional status/changes:   [] No changes reported  \"I feel okay. Therapy is going well. \"    OBJECTIVE    22 min Therapeutic Exercise:  [] See flow sheet :   Rationale: increase strength to improve the patients ability to improve activity tolerance for functional tasks    15 min Therapeutic Activity:  []  See flow sheet : goals, FOTO   Rationale: increase ROM and increase strength  to improve the patients ability to perform transfers with imprved ease     10 min Neuromuscular Re-education:  []  See flow sheet :   Rationale: increase strength, improve coordination, improve balance and increase proprioception  to improve the patients ability to increase stability for safe ambulation in home and community      With   [] TE   [] TA   [] neuro   [] other: Patient Education: [x] Review HEP    [] Progressed/Changed HEP based on:   [] positioning   [] body mechanics   [] transfers   [] heat/ice application    [] other:      Other Objective/Functional Measures: Continued with exercises per flow sheet. Reassessed goals for re-certification note.     Pain Level (0-10 scale) post treatment: 0/10    ASSESSMENT/Changes in Function:  Pt has attended skilled therapy for 5 sessions, including initial evaluation, to address low back pain and gait abnormalities. At time of last visit, Pt reported the following:  Functional Gains: Pt reports improved strength in LEs, is able to walk a little faster, and feels a little more steady on feet  Functional Deficits: Pt still is limited to 8-10 minutes of standing/amb tolerance and would like to increase to 1 hour for ease of shopping errands with family, caregiver  % improvement: 40%  Pain   Average: 0/10       Best: 0/10     Worst: 0/10  Patient Goal: \"Be able to walk better and longer. \"    Pt would benefit from continued skilled therapy to improve LE strength, standing dynamic balance, and endurance with standing/amb activities to improve gait mechanics to reduce fall risk and increase walking tolerance to be better able to participate in community activities with family and caregiver. Patient will continue to benefit from skilled PT services to modify and progress therapeutic interventions, address functional mobility deficits, address ROM deficits, address strength deficits, analyze and address soft tissue restrictions, analyze and cue movement patterns, analyze and modify body mechanics/ergonomics, assess and modify postural abnormalities, address imbalance/dizziness and instruct in home and community integration to attain remaining goals. []  See Plan of Care  [x]  See progress note/recertification  []  See Discharge Summary         Progress towards goals / Updated goals:  Goal: Pt to be compliant with initial HEP to improve LE strength for ease of transfers, standing/amb tolerance. Status at last note/certification: Established and reviewed with Pt  Current status: progressing - Pt has initiated HEP, reviewed today  Long Term Goals: To be accomplished in 5 weeks:  Goal: Pt to increase left hip strength to at least 4/5 grossly to increase standing/amb tolerance in home, community.   Status at last note/certification: 4-/5 grossly except 3+/5 glutes, HS  Current status: progressing - hip ABD 4-/5 B; hip Flex 5/5 B; Hip Ext 4-/5; Hip ER/IR 5/5 B  Goal: Pt to increase standing/amb tolerance to at least 20 minutes with [facility], SBA to increase activity tolerance with family. Status at last note/certification: 10 minutes  Current status: not met - still 8-10 minutes  Goal: Pt to perform at least 12 sit to stands in 30 seconds without fatigue or UE support to show improved LE strength and endurance. Status at last note/certification: 8 sit to stands  Current status: not met - still 8 sit to stands  Goal: Pt to perform TUG test in 15 second average to show improved dynamic standing balance for safety navigating in home without falls. Status at last note/certification: 26 second average  Current status: progressing - 19 second average  Goal: Pt to report FOTO score of 56 pts to show improved function and quality of life.   Status at last note/certification: BYRS 97 RKM   Current: progressing - FOTO 53 pts    PLAN  [x]  Upgrade activities as tolerated     []  Continue plan of care  []  Update interventions per flow sheet       []  Discharge due to:_  []  Other:_      Maria C Marie, PT 3/30/2021  3:06 PM    Future Appointments   Date Time Provider Clarice Freedman   4/7/2021  3:00 [facility]   4/9/2021  3:45 PM Renato Orozco [facility]   4/13/2021  [facility]   4/16/2021  3:00 PM Damon Contreras   4/20/2021  3:00 PM Rick Person [facility]   4/23/2021  3:00 PM Sofia Gonzalez [facility]   4/27/2021  3:45 [facility]   4/30/2021  3:00 PM [facility]   5/4/2021  3:00 PM Lili [facility]   5/7/2021  3:00 PM Damon Contreras

## 2021-04-07 ENCOUNTER — HOSPITAL ENCOUNTER (OUTPATIENT)
Dept: LAB | Age: 76
Discharge: HOME | End: 2021-04-07

## 2021-04-07 ENCOUNTER — HOSPITAL ENCOUNTER (OUTPATIENT)
Dept: PHYSICAL THERAPY | Age: 76
Discharge: HOME | End: 2021-04-07
Payer: COMMERCIAL

## 2021-04-07 LAB — XX-LABCORP SPECIMEN COL,LCBCF: NORMAL

## 2021-04-07 PROCEDURE — 97112 NEUROMUSCULAR REEDUCATION: CPT

## 2021-04-07 PROCEDURE — 99001 SPECIMEN HANDLING PT-LAB: CPT

## 2021-04-07 PROCEDURE — 97110 THERAPEUTIC EXERCISES: CPT

## 2021-04-07 NOTE — PROGRESS NOTES
PT DAILY TREATMENT NOTE     Patient Name: Marilee Estevez  Date:2021  : 1945  [x]  Patient  Verified  Payor: [employer] MEDICARE / Plan: [address] / Product Type: Managed Care Medicare /    In time:3:00  Out time: 3:45  Total Treatment Time (min): 45  Visit #: 1 of 10    Medicare/BCBS Only   Total Timed Codes (min):  45 1:1 Treatment Time:  45       Treatment Area: Low back pain [M54.5]  Other abnormalities of gait and mobility [R26.89]    SUBJECTIVE  Pain Level (0-10 scale): 0  Any medication changes, allergies to medications, adverse drug reactions, diagnosis change, or new procedure performed?: [x] No    [] Yes (see summary sheet for update)  Subjective functional status/changes:   [] No changes reported  I feel al little tired from the second vaccine    OBJECTIVE    33 min Therapeutic Exercise:  [] See flow sheet :   Rationale: increase ROM and increase strength to improve the patients ability to increase activity tolerance, functional tasks    12 min Neuromuscular Re-education:  []  See flow sheet :   Rationale: increase strength, improve coordination, improve balance and increase proprioception  to improve the patients ability to reduce fall risk, stability with gait           With   [] TE   [] TA   [] neuro   [] other: Patient Education: [x] Review HEP    [] Progressed/Changed HEP based on:   [] positioning   [] body mechanics   [] transfers   [] heat/ice application    [] other:      Other Objective/Functional Measures:      Pain Level (0-10 scale) post treatment: 0    ASSESSMENT/Changes in Function: cues provided throughout to correct for uproght posture and FW gaze.     Patient will continue to benefit from skilled PT services to modify and progress therapeutic interventions, address functional mobility deficits, address ROM deficits, address strength deficits, analyze and address soft tissue restrictions, analyze and cue movement patterns, analyze and modify body mechanics/ergonomics, assess and modify postural abnormalities, address imbalance/dizziness and instruct in home and community integration to attain remaining goals. []  See Plan of Care  []  See progress note/recertification  []  See Discharge Summary         Progress towards goals / Updated goals:  Goal: Pt to increase left hip strength to at least 4/5 grossly to increase standing/amb tolerance in home, community. Status at last note/certification: hip ABD 4-/5 B; hip Flex 5/5 B; Hip Ext 4-/5; Hip ER/IR 5/5 B  Current status:   Goal: Pt to increase standing/amb tolerance to at least 20 minutes with [facility], SBA to increase activity tolerance with family. Status at last note/certification: still 8-10 minutes  Current status:   Goal: Pt to perform at least 12 sit to stands in 30 seconds without fatigue or UE support to show improved LE strength and endurance. Status at last note/certification: still 8 sit to stands  Current status:   Goal: Pt to perform TUG test in 15 second average to show improved dynamic standing balance for safety navigating in home without falls. Status at last note/certification: 19 second average  Current status:   Goal: Pt to report FOTO score of 56 pts to show improved function and quality of life.   Status at last note/certification: TOPS 50 SIR   Current:     PLAN  [x]  Upgrade activities as tolerated     []  Continue plan of care  []  Update interventions per flow sheet       []  Discharge due to:_  []  Other:_      Tamar Workman, PTA 4/7/2021  3:04 PM    Future Appointments   Date Time Provider Clarice Freedman   4/9/2021  3:45 PM Geraldina Bos[facility]   4/13/2021  3:45 PM Romie, [address]   4/16/2021  3:00 PM Amanda Cooley   4/20/2021  3:00 PM [facility]   4/23/2021  3:00 PM Amanda Cooley   4/27/2021  3:45 PM [facility]   4/30/2021  3:00 PM Kimi Chris [address] Zuleika Powell [facility]   5/4/2021  3:00 PM Romie, [address]   5/7/2021  3:00 PM Mila Sanders

## 2021-04-09 ENCOUNTER — HOSPITAL ENCOUNTER (OUTPATIENT)
Dept: PHYSICAL THERAPY | Age: 76
Discharge: HOME | End: 2021-04-09
Payer: COMMERCIAL

## 2021-04-09 PROCEDURE — 97110 THERAPEUTIC EXERCISES: CPT

## 2021-04-09 PROCEDURE — 97530 THERAPEUTIC ACTIVITIES: CPT

## 2021-04-09 NOTE — PROGRESS NOTES
PT DAILY TREATMENT NOTE     Patient Name: Chiquis Love  Date:2021  : 1945  [x]  Patient  Verified  Payor: [employer] MEDICARE / Plan: [address] / Product Type: Managed Care Medicare /    In time:344  Out time:329  Total Treatment Time (min): 45  Visit #: 2 of 10    Medicare/BCBS Only   Total Timed Codes (min):  45 1:1 Treatment Time:  45       Treatment Area: Low back pain [M54.5]  Other abnormalities of gait and mobility [R26.89]    SUBJECTIVE  Pain Level (0-10 scale): 0  Any medication changes, allergies to medications, adverse drug reactions, diagnosis change, or new procedure performed?: [x] No    [] Yes (see summary sheet for update)  Subjective functional status/changes:   [] No changes reported  \"Not so good today. I can't walk. \"    OBJECTIVE    30 min Therapeutic Exercise:  [] See flow sheet :   Rationale: increase ROM and increase strength to improve the patients ability to increase activity tolerance    15 min Therapeutic Activity:  []  See flow sheet : squats, step ups   Rationale: increase ROM, increase strength and improve coordination  to improve the patients ability to perform transfers with ease           With   [] TE   [] TA   [] neuro   [] other: Patient Education: [x] Review HEP    [] Progressed/Changed HEP based on:   [] positioning   [] body mechanics   [] transfers   [] heat/ice application    [] other:      Other Objective/Functional Measures: progressed exercises     Pain Level (0-10 scale) post treatment: 0    ASSESSMENT/Changes in Function: Progressed standing therex per flow sheet. Provided consistent cuing for upright posture with all standing exercises. Patient was fatigued following progressions requiring a seated rest break. Provided cuing for eccentric control with LAQ.      Patient will continue to benefit from skilled PT services to modify and progress therapeutic interventions, address functional mobility deficits, address ROM deficits, address strength deficits, analyze and address soft tissue restrictions, analyze and cue movement patterns, analyze and modify body mechanics/ergonomics, assess and modify postural abnormalities, address imbalance/dizziness and instruct in home and community integration to attain remaining goals. []  See Plan of Care  []  See progress note/recertification  []  See Discharge Summary         Progress towards goals / Updated goals:  Goal: Pt to increase left hip strength to at least 4/5 grossly to increase standing/amb tolerance in home, community. Status at last note/certification: hip ABD 4-/5 B; hip Flex 5/5 B; Hip Ext 4-/5; Hip ER/IR 5/5 B  Current status:   Goal: Pt to increase standing/amb tolerance to at least 20 minutes with [facility], SBA to increase activity tolerance with family. Status at last note/certification: still 8-10 minutes  Current status:   Goal: Pt to perform at least 12 sit to stands in 30 seconds without fatigue or UE support to show improved LE strength and endurance. Status at last note/certification: still 8 sit to stands  Current status:   Goal: Pt to perform TUG test in 15 second average to show improved dynamic standing balance for safety navigating in home without falls. Status at last note/certification: 19 second average  Current status:   Goal: Pt to report FOTO score of 56 pts to show improved function and quality of life.   Status at last note/certification: HHWQ 66 PIJ   Current:     PLAN  [x]  Upgrade activities as tolerated     []  Continue plan of care  []  Update interventions per flow sheet       []  Discharge due to:_  []  Other:_      Onel Lew, PTA 4/9/2021  3:53 PM    Future Appointments   Date Time Provider Clarice Freedman   4/13/2021  3:45 PM [place]  [facility]   4/16/2021  3:00 PM Rosy Benavidez   4/20/2021  3:00 PM [place]  [facility]   4/23/2021  3:00 PM Rosy Benavidez 4/27/2021  3:45 PM Sina Null [facility]   4/30/2021  3:00 PM Don [facility]   5/4/2021  3:00 PM Romie, [address]   5/7/2021  3:00 PM Shannen Ibarra

## 2021-04-13 ENCOUNTER — HOSPITAL ENCOUNTER (OUTPATIENT)
Dept: PHYSICAL THERAPY | Age: 76
Discharge: HOME | End: 2021-04-13
Payer: COMMERCIAL

## 2021-04-13 PROCEDURE — 97112 NEUROMUSCULAR REEDUCATION: CPT

## 2021-04-13 PROCEDURE — 97110 THERAPEUTIC EXERCISES: CPT

## 2021-04-13 NOTE — PROGRESS NOTES
PT DAILY TREATMENT NOTE     Patient Name: Andreas Shall  Date:2021  : 1945  [x]  Patient  Verified  Payor: [employer] MEDICARE / Plan: [address] / Product Type: Managed Care Medicare /    In time:3:35  Out time: 4:20  Total Treatment Time (min): 45  Visit #: 2 of 10    Medicare/BCBS Only   Total Timed Codes (min):  45 1:1 Treatment Time:  45       Treatment Area: Low back pain [M54.5]  Other abnormalities of gait and mobility [R26.89]    SUBJECTIVE  Pain Level (0-10 scale): 0  Any medication changes, allergies to medications, adverse drug reactions, diagnosis change, or new procedure performed?: [x] No    [] Yes (see summary sheet for update)  Subjective functional status/changes:   [] No changes reported  Feeling tired today. Not a good day    OBJECTIVE      20 min Therapeutic Exercise:  [] See flow sheet :   Rationale: increase strength to improve the patients ability to improve activity tolerance for functional tasks, ADL's       25 min Neuromuscular Re-education:  []  See flow sheet :   Rationale: increase strength, improve coordination, improve balance and increase proprioception  to improve the patients ability to increase stability for safe ambulation in home and community            With   [] TE   [] TA   [] neuro   [] other: Patient Education: [x] Review HEP    [] Progressed/Changed HEP based on:   [] positioning   [] body mechanics   [] transfers   [] heat/ice application    [] other:      Other Objective/Functional Measures:  Seated rest breaks required during session due to fatigue    Pain Level (0-10 scale) post treatment: 0    ASSESSMENT/Changes in Function: postural cues required in standing. Increased fatigue today, requiring rest breaks.   Limited walking tolerance continue to effect overal function    Patient will continue to benefit from skilled PT services to modify and progress therapeutic interventions, address functional mobility deficits, address ROM deficits, address strength deficits, analyze and address soft tissue restrictions, analyze and cue movement patterns, analyze and modify body mechanics/ergonomics, assess and modify postural abnormalities, address imbalance/dizziness and instruct in home and community integration to attain remaining goals. []  See Plan of Care  []  See progress note/recertification  []  See Discharge Summary         Progress towards goals / Updated goals:  Goal: Pt to increase left hip strength to at least 4/5 grossly to increase standing/amb tolerance in home, community. Status at last note/certification: hip ABD 4-/5 B; hip Flex 5/5 B; Hip Ext 4-/5; Hip ER/IR 5/5 B  Current status:   Goal: Pt to increase standing/amb tolerance to at least 20 minutes with LBQC, SBA to increase activity tolerance with family. Status at last note/certification: still 8-10 minutes  Current status:   Goal: Pt to perform at least 12 sit to stands in 30 seconds without fatigue or UE support to show improved LE strength and endurance. Status at last note/certification: still 8 sit to stands  Current status:   Goal: Pt to perform TUG test in 15 second average to show improved dynamic standing balance for safety navigating in home without falls. Status at last note/certification: 19 second average  Current status:   Goal: Pt to report FOTO score of 56 pts to show improved function and quality of life.   Status at last note/certification: JPZO 96 XHT   Current:       PLAN  [x]  Upgrade activities as tolerated     []  Continue plan of care  []  Update interventions per flow sheet       []  Discharge due to:_  []  Other:_      Reagan Rogers, PTA 4/13/2021  3:51 PM    Future Appointments   Date Time Provider Clarice Freedman   4/16/2021  3:00 PM Glenora Nose [facility]   4/20/2021  3:00 PM Alyse Sanchezer [facility]   4/23/2021  3:00 PM [facility]   4/27/2021  3:45 PM Desmond Lan [facility]   4/30/2021  3:00 PM [facility]   5/4/2021  3:00 PM Romie [address] [place] [address]   5/7/2021  3:00 PM Carmine Bonilla

## 2021-04-14 ENCOUNTER — HOSPITAL ENCOUNTER (OUTPATIENT)
Dept: LAB | Age: 76
Discharge: HOME | End: 2021-04-14

## 2021-04-14 LAB — XX-LABCORP SPECIMEN COL,LCBCF: NORMAL

## 2021-04-14 PROCEDURE — 99001 SPECIMEN HANDLING PT-LAB: CPT

## 2021-04-14 RX ORDER — LOPERAMIDE HYDROCHLORIDE 2 MG/1
2 CAPSULE ORAL
COMMUNITY

## 2021-04-14 RX ORDER — AMLODIPINE BESYLATE 10 MG/1
10 TABLET ORAL DAILY
COMMUNITY
End: 2021-10-03

## 2021-04-16 ENCOUNTER — HOSPITAL ENCOUNTER (OUTPATIENT)
Dept: PHYSICAL THERAPY | Age: 76
Discharge: HOME | End: 2021-04-16
Payer: COMMERCIAL

## 2021-04-16 ENCOUNTER — ANESTHESIA EVENT (OUTPATIENT)
Dept: ENDOSCOPY | Age: 76
End: 2021-04-16
Payer: MEDICARE

## 2021-04-16 PROCEDURE — 97116 GAIT TRAINING THERAPY: CPT

## 2021-04-16 PROCEDURE — 97110 THERAPEUTIC EXERCISES: CPT

## 2021-04-16 NOTE — PROGRESS NOTES
PT DAILY TREATMENT NOTE 10-18    Patient Name: Abi Callaway  Date:2021  : 1945  [x]  Patient  Verified  Payor: UNITED HEALTHCARE MEDICARE / Plan: [address] / Product Type: Managed Care Medicare /    In time:2:55  Out time:3:43  Total Treatment Time (min): 48  Visit #: 4 of 10    Medicare/BCBS Only   Total Timed Codes (min):  48 1:1 Treatment Time:  46       Treatment Area: Low back pain [M54.5]  Other abnormalities of gait and mobility [R26.89]    SUBJECTIVE  Pain Level (0-10 scale): 0  Any medication changes, allergies to medications, adverse drug reactions, diagnosis change, or new procedure performed?: [x] No    [] Yes (see summary sheet for update)  Subjective functional status/changes:   [] No changes reported  \"I am not sure what I want to work on today\"    OBJECTIVE    19 min Therapeutic Exercise:  [x] See flow sheet :   Rationale: increase ROM and increase strength to improve LE strength/mobility and  lumbar mobility to improve ease of ADLs and gait. 29 min Gait Trainin' x 5 with LBQC device on level surfaces with CGA to close SBA level of assist. Obstacle course used to promote obstacle negotiation, stepping forward over obstacles, proper AD sequencing, lateral/retro walking, compliant surfaces. Rationale: improve safety with household/community mobility          With   [] TE   [] TA   [] neuro   [] other: Patient Education: [x] Review HEP    [] Progressed/Changed HEP based on:   [] positioning   [] body mechanics   [] transfers   [] heat/ice application    [] other:      Other Objective/Functional Measures: - Minisquats performed without UE assist   -15 min stand tolerance without seated break    Pain Level (0-10 scale) post treatment: 0    ASSESSMENT/Changes in Function: Patient demonstrates moderate carryover with obstacle negotiation with appropriate AD sequencing. Small LOB observed with cone step over requiring CGA to correct.  Pt fatigued at end of session so he was assisted partially to his car before his caretaker assisted him further. Patient will continue to benefit from skilled PT services to modify and progress therapeutic interventions, address functional mobility deficits, address ROM deficits, address strength deficits, analyze and address soft tissue restrictions, analyze and cue movement patterns, analyze and modify body mechanics/ergonomics, assess and modify postural abnormalities and address imbalance/dizziness to attain remaining goals. []  See Plan of Care  []  See progress note/recertification  []  See Discharge Summary         Progress towards goals / Updated goals:  Goal: Pt to increase left hip strength to at least 4/5 grossly to increase standing/amb tolerance in home, community. Status at last note/certification: hip ABD 4-/5 B; hip Flex 5/5 B; Hip Ext 4-/5; Hip ER/IR 5/5 B  Current status:   Goal: Pt to increase standing/amb tolerance to at least 20 minutes with LBQC, SBA to increase activity tolerance with family. Status at last note/certification: still 8-10 minutes  Current status: progressing, 15 min standing tolerance during therapy session today (4/16/21)  Goal: Pt to perform at least 12 sit to stands in 30 seconds without fatigue or UE support to show improved LE strength and endurance. Status at last note/certification: still 8 sit to stands  Current status:   Goal: Pt to perform TUG test in 15 second average to show improved dynamic standing balance for safety navigating in home without falls. Status at last note/certification: 19 second average  Current status:   Goal: Pt to report FOTO score of 56 pts to show improved function and quality of life.   Status at last note/certification: FWPT 92 MSE   Current: reassess at MD note     PLAN  [x]  Upgrade activities as tolerated     [x]  Continue plan of care  []  Update interventions per flow sheet       []  Discharge due to:_  []  Other:_      Miranda Keene [facility] 4/16/2021  12:58 PM    Future Appointments   Date Time Provider Clarice Freedman   4/16/2021  3:00 PM Princeton Glassman [facility]   4/20/2021  3:00 PM Harper Snyder [facility]   4/23/2021  3:00 PM Princeton Glassman [facility]   4/27/2021  3:45 PM [facility]   4/30/2021  [facility]   5/4/2021  3:00 PM [facility]   5/7/2021  3:00 PM Corey Lima

## 2021-04-19 ENCOUNTER — ANESTHESIA (OUTPATIENT)
Dept: ENDOSCOPY | Age: 76
End: 2021-04-19
Payer: MEDICARE

## 2021-04-19 ENCOUNTER — HOSPITAL ENCOUNTER (OUTPATIENT)
Age: 76
Setting detail: OUTPATIENT SURGERY
Discharge: HOME | End: 2021-04-19
Attending: INTERNAL MEDICINE | Admitting: INTERNAL MEDICINE
Payer: MEDICARE

## 2021-04-19 VITALS
WEIGHT: 213 LBS | OXYGEN SATURATION: 99 % | RESPIRATION RATE: 19 BRPM | DIASTOLIC BLOOD PRESSURE: 72 MMHG | BODY MASS INDEX: 28.85 KG/M2 | SYSTOLIC BLOOD PRESSURE: 120 MMHG | HEART RATE: 52 BPM | TEMPERATURE: 97.9 F | HEIGHT: 72 IN

## 2021-04-19 LAB
ANION GAP SERPL CALC-SCNC: 4 MMOL/L (ref 3–18)
BUN SERPL-MCNC: 16 MG/DL (ref 7–18)
BUN/CREAT SERPL: 11 (ref 12–20)
CALCIUM SERPL-MCNC: 10 MG/DL (ref 8.5–10.1)
CHLORIDE SERPL-SCNC: 108 MMOL/L (ref 100–111)
CO2 SERPL-SCNC: 29 MMOL/L (ref 21–32)
CREAT SERPL-MCNC: 1.41 MG/DL (ref 0.6–1.3)
GLUCOSE SERPL-MCNC: 80 MG/DL (ref 74–99)
POTASSIUM SERPL-SCNC: 4.7 MMOL/L (ref 3.5–5.5)
SODIUM SERPL-SCNC: 141 MMOL/L (ref 136–145)

## 2021-04-19 PROCEDURE — 99100 ANES PT EXTEME AGE<1 YR&>70: CPT | Performed by: NURSE ANESTHETIST, CERTIFIED REGISTERED

## 2021-04-19 PROCEDURE — 99100 ANES PT EXTEME AGE<1 YR&>70: CPT | Performed by: ANESTHESIOLOGY

## 2021-04-19 PROCEDURE — 74011250636 HC RX REV CODE- 250/636: Performed by: NURSE ANESTHETIST, CERTIFIED REGISTERED

## 2021-04-19 PROCEDURE — 88305 TISSUE EXAM BY PATHOLOGIST: CPT

## 2021-04-19 PROCEDURE — 00811 ANES LWR INTST NDSC NOS: CPT | Performed by: NURSE ANESTHETIST, CERTIFIED REGISTERED

## 2021-04-19 PROCEDURE — 80048 BASIC METABOLIC PNL TOTAL CA: CPT

## 2021-04-19 PROCEDURE — 76040000019: Performed by: INTERNAL MEDICINE

## 2021-04-19 PROCEDURE — 00811 ANES LWR INTST NDSC NOS: CPT | Performed by: ANESTHESIOLOGY

## 2021-04-19 PROCEDURE — 76060000031 HC ANESTHESIA FIRST 0.5 HR: Performed by: INTERNAL MEDICINE

## 2021-04-19 PROCEDURE — 77030040934 HC CATH DIAG DXTERITY MEDT -A: Performed by: INTERNAL MEDICINE

## 2021-04-19 PROCEDURE — 74011250637 HC RX REV CODE- 250/637: Performed by: NURSE ANESTHETIST, CERTIFIED REGISTERED

## 2021-04-19 PROCEDURE — 77030008565 HC TBNG SUC IRR ERBE -B: Performed by: INTERNAL MEDICINE

## 2021-04-19 PROCEDURE — 74011000250 HC RX REV CODE- 250: Performed by: NURSE ANESTHETIST, CERTIFIED REGISTERED

## 2021-04-19 PROCEDURE — 2709999900 HC NON-CHARGEABLE SUPPLY: Performed by: INTERNAL MEDICINE

## 2021-04-19 RX ORDER — SODIUM CHLORIDE 0.9 % (FLUSH) 0.9 %
5-40 SYRINGE (ML) INJECTION EVERY 8 HOURS
Status: DISCONTINUED | OUTPATIENT
Start: 2021-04-19 | End: 2021-04-19 | Stop reason: HOSPADM

## 2021-04-19 RX ORDER — FAMOTIDINE 20 MG/1
20 TABLET, FILM COATED ORAL ONCE
Status: COMPLETED | OUTPATIENT
Start: 2021-04-19 | End: 2021-04-19

## 2021-04-19 RX ORDER — ONDANSETRON 2 MG/ML
4 INJECTION INTRAMUSCULAR; INTRAVENOUS ONCE
Status: CANCELLED | OUTPATIENT
Start: 2021-04-19 | End: 2021-04-19

## 2021-04-19 RX ORDER — PROPOFOL 10 MG/ML
INJECTION, EMULSION INTRAVENOUS AS NEEDED
Status: DISCONTINUED | OUTPATIENT
Start: 2021-04-19 | End: 2021-04-19 | Stop reason: HOSPADM

## 2021-04-19 RX ORDER — SODIUM CHLORIDE 9 MG/ML
25 INJECTION, SOLUTION INTRAVENOUS CONTINUOUS
Status: CANCELLED | OUTPATIENT
Start: 2021-04-19

## 2021-04-19 RX ORDER — SODIUM CHLORIDE 0.9 % (FLUSH) 0.9 %
5-40 SYRINGE (ML) INJECTION AS NEEDED
Status: DISCONTINUED | OUTPATIENT
Start: 2021-04-19 | End: 2021-04-19 | Stop reason: HOSPADM

## 2021-04-19 RX ORDER — SODIUM CHLORIDE 9 MG/ML
25 INJECTION, SOLUTION INTRAVENOUS CONTINUOUS
Status: DISCONTINUED | OUTPATIENT
Start: 2021-04-19 | End: 2021-04-19 | Stop reason: HOSPADM

## 2021-04-19 RX ORDER — LIDOCAINE HYDROCHLORIDE 20 MG/ML
INJECTION, SOLUTION EPIDURAL; INFILTRATION; INTRACAUDAL; PERINEURAL AS NEEDED
Status: DISCONTINUED | OUTPATIENT
Start: 2021-04-19 | End: 2021-04-19 | Stop reason: HOSPADM

## 2021-04-19 RX ADMIN — PROPOFOL 50 MG: 10 INJECTION, EMULSION INTRAVENOUS at 13:06

## 2021-04-19 RX ADMIN — SODIUM CHLORIDE: 900 INJECTION, SOLUTION INTRAVENOUS at 12:55

## 2021-04-19 RX ADMIN — FAMOTIDINE 20 MG: 20 TABLET, FILM COATED ORAL at 11:49

## 2021-04-19 RX ADMIN — LIDOCAINE HYDROCHLORIDE 50 MG: 20 INJECTION, SOLUTION EPIDURAL; INFILTRATION; INTRACAUDAL; PERINEURAL at 13:06

## 2021-04-19 RX ADMIN — PROPOFOL 10 MG: 10 INJECTION, EMULSION INTRAVENOUS at 13:17

## 2021-04-19 RX ADMIN — PROPOFOL 20 MG: 10 INJECTION, EMULSION INTRAVENOUS at 13:14

## 2021-04-19 NOTE — PERIOP NOTES
Pre-Op Summary    Pt arrived via car with family/friend and is oriented to time, place, person and situation. Patient with unsteady gait with wheelchair assistive devices. Visit Vitals  Ht 6' (1.829 m)   Wt 96.6 kg (213 lb)   BMI 28.89 kg/m²       Peripheral IV located on Left wrist .    Patients belongings are located [facility]. Patient's point of contact is Svetlana (caretaker) and their contact number is: [phone]. They will be leaving and coming back. They are able to receive medication information. They will be their ride home.

## 2021-04-19 NOTE — DISCHARGE INSTRUCTIONS
Patient Education        Colon Polyps: Care Instructions  Your Care Instructions     Colon polyps are growths in the colon or the rectum. The cause of most colon polyps is not known, and most people who get them do not have any problems. But a certain kind can turn into cancer. For this reason, regular testing for colon polyps is important for people as they get older. It is also important for anyone who has an increased risk for colon cancer. Polyps are usually found through routine colon cancer screening tests. Although most colon polyps are not cancerous, they are usually removed and then tested for cancer. Screening for colon cancer saves lives because the cancer can usually be cured if it is caught early. If you have a polyp that is the type that can turn into cancer, you may need more tests to examine your entire colon. The doctor will remove any other polyps that he or she finds, and you will be tested more often. Follow-up care is a key part of your treatment and safety. Be sure to make and go to all appointments, and call your doctor if you are having problems. It's also a good idea to know your test results and keep a list of the medicines you take. How can you care for yourself at home? Regular exams to look for colon polyps are the best way to prevent polyps from turning into colon cancer. These can include stool tests, sigmoidoscopy, colonoscopy, and CT colonography. Talk with your doctor about a testing schedule that is right for you. To prevent polyps  There is no home treatment that can prevent colon polyps. But these steps may help lower your risk for cancer. · Stay active. Being active can help you get to and stay at a healthy weight. Try to exercise on most days of the week. Walking is a good choice. · Eat well. Choose a variety of vegetables, fruits, legumes (such as peas and beans), fish, poultry, and whole grains. · Do not smoke.  If you need help quitting, talk to your doctor about stop-smoking programs and medicines. These can increase your chances of quitting for good. · If you drink alcohol, limit how much you drink. Limit alcohol to 2 drinks a day for men and 1 drink a day for women. When should you call for help? Call your doctor now or seek immediate medical care if:    · You have severe belly pain.     · Your stools are maroon or very bloody. Watch closely for changes in your health, and be sure to contact your doctor if:    · You have a fever.     · You have nausea or vomiting.     · You have a change in bowel habits (new constipation or diarrhea).     · Your symptoms get worse or are not improving as expected. Where can you learn more? Go to http://www.alvarado.com/  Enter C571 in the search box to learn more about \"Colon Polyps: Care Instructions. \"  Current as of: December 17, 2020               Content Version: 12.8  © 1725-6030 [employer]. Care instructions adapted under license by [employer] (which disclaims liability or warranty for this information). If you have questions about a medical condition or this instruction, always ask your healthcare professional. [place] [address] any warranty or liability for your use of this information. Patient Education        High-Fiber Diet: Care Instructions  Your Care Instructions     A high-fiber diet may help you relieve constipation and feel less bloated. Your doctor and dietitian will help you make a high-fiber eating plan based on your personal needs. The plan will include the things you like to eat. It will also make sure that you get 30 grams of fiber a day. Before you make changes to the way you eat, be sure to talk with your doctor or dietitian. Follow-up care is a key part of your treatment and safety. Be sure to make and go to all appointments, and call your doctor if you are having problems.  It's also a good idea to know your test results and keep a list of the medicines you take. How can you care for yourself at home? · You can increase how much fiber you get if you eat more of certain foods. These foods include:  ? Whole-grain breads and cereals. ? Fruits, such as pears, apples, and peaches. Eat the skins, peels, and seeds, if you can.  ? Vegetables, such as broccoli, cabbage, spinach, carrots, asparagus, and squash. ? Starchy vegetables. These include potatoes with skins, kidney beans, and lima beans. · Take a fiber supplement every day if your doctor recommends it. Examples are Benefiber, Citrucel, FiberCon, and Metamucil. Ask your doctor how much to take. · Drink plenty of fluids. If you have kidney, heart, or liver disease and have to limit fluids, talk with your doctor before you increase the amount of fluids you drink. · Get some exercise every day. Exercise helps stool move through the colon. It also helps prevent constipation. · Keep a food diary. Try to notice and write down what foods cause gas, pain, or other symptoms. Then you can avoid these foods. Where can you learn more? Go to http://www.gray.com/  Enter Y681 in the search box to learn more about \"High-Fiber Diet: Care Instructions. \"  Current as of: December 17, 2020               Content Version: 12.8  © 4424-0649 [employer]. Care instructions adapted under license by [employer] (which disclaims liability or warranty for this information). If you have questions about a medical condition or this instruction, always ask your healthcare professional. [place] [address] any warranty or liability for your use of this information. Patient Education        Colonoscopy: What to Expect at [address] [place] [address]  After a colonoscopy, you'll stay at the clinic for 1 to 2 hours until the medicines wear off. Then you can go home. But you'll need to arrange for a ride.  Your doctor will tell you when you can eat and do your other usual activities. Your doctor will talk to you about when you'll need your next colonoscopy. Your doctor can help you decide how often you need to be checked. This will depend on the results of your test and your risk for colorectal cancer. After the test, you may be bloated or have gas pains. You may need to pass gas. If a biopsy was done or a polyp was removed, you may have streaks of blood in your stool (feces) for a few days. Problems such as heavy rectal bleeding may not occur until several weeks after the test. This isn't common. But it can happen after polyps are removed. This care sheet gives you a general idea about how long it will take for you to recover. But each person recovers at a different pace. Follow the steps below to get better as quickly as possible. How can you care for yourself at home? Activity    · Rest when you feel tired.     · You can do your normal activities when it feels okay to do so. Diet    · Follow your doctor's directions for eating.     · Unless your doctor has told you not to, drink plenty of fluids. This helps to replace the fluids that were lost during the colon prep.     · Do not drink alcohol. Medicines    · Your doctor will tell you if and when you can restart your medicines. He or she will also give you instructions about taking any new medicines.     · If you take aspirin or some other blood thinner, ask your doctor if and when to start taking it again. Make sure that you understand exactly what your doctor wants you to do.     · If polyps were removed or a biopsy was done during the test, your doctor may tell you not to take aspirin or other anti-inflammatory medicines for a few days. These include ibuprofen (Advil, Motrin) and naproxen (Aleve). Other instructions    · For your safety, do not drive or operate machinery until the medicine wears off and you can think clearly.  Your doctor may tell you not to drive or operate machinery until the day after your test.     · Do not sign legal documents or make major decisions until the medicine wears off and you can think clearly. The anesthesia can make it hard for you to fully understand what you are agreeing to. Follow-up care is a key part of your treatment and safety. Be sure to make and go to all appointments, and call your doctor if you are having problems. It's also a good idea to know your test results and keep a list of the medicines you take. When should you call for help? Call 911 anytime you think you may need emergency care. For example, call if:    · You passed out (lost consciousness).     · You pass maroon or bloody stools.     · You have trouble breathing. Call your doctor now or seek immediate medical care if:    · You have pain that does not get better after you take pain medicine.     · You are sick to your stomach or cannot drink fluids.     · You have new or worse belly pain.     · You have blood in your stools.     · You have a fever.     · You cannot pass stools or gas. Watch closely for changes in your health, and be sure to contact your doctor if you have any problems. Where can you learn more? Go to http://www.gray.com/  Enter E264 in the search box to learn more about \"Colonoscopy: What to Expect at Home. \"  Current as of: December 17, 2020               Content Version: 12.8  © 3312-5161 [employer]. Care instructions adapted under license by [employer] (which disclaims liability or warranty for this information). If you have questions about a medical condition or this instruction, always ask your healthcare professional. [place] [address] any warranty or liability for your use of this information.     Patient armband removed and shredded'

## 2021-04-19 NOTE — ANESTHESIA PREPROCEDURE EVALUATION
Relevant Problems   NEUROLOGY   (+) Depression   (+) Postoperative confusion      CARDIOVASCULAR   (+) Postoperative atrial fibrillation (HCC)      RENAL FAILURE   (+) Acute renal failure superimposed on stage 3 chronic kidney disease (HCC)   (+) CKD (chronic kidney disease) stage 3, GFR 30-59 ml/min (HCC)   (+) Hypertensive heart and kidney disease without heart failure and with chronic kidney disease stage III (HCC)      HEMATOLOGY   (+) Acute blood loss as cause of postoperative anemia       Anesthetic History               Review of Systems / Medical History  Patient summary reviewed, nursing notes reviewed and pertinent labs reviewed    Pulmonary    COPD: mild               Neuro/Psych       CVA  Psychiatric history    Comments: depression Cardiovascular    Hypertension        Dysrhythmias : atrial fibrillation           GI/Hepatic/Renal  Within defined limits              Endo/Other  Within defined limits           Other Findings              Physical Exam    Airway  Mallampati: II  TM Distance: 4 - 6 cm  Neck ROM: normal range of motion   Mouth opening: Normal     Cardiovascular  Regular rate and rhythm,  S1 and S2 normal,  no murmur, click, rub, or gallop             Dental  No notable dental hx       Pulmonary  Breath sounds clear to auscultation               Abdominal  GI exam deferred       Other Findings            Anesthetic Plan    ASA: 3  Anesthesia type: MAC          Induction: Intravenous  Anesthetic plan and risks discussed with: Patient

## 2021-04-19 NOTE — H&P
[facility]   [email or web address]      Impression: 1. Hx of colon polyps      Plan:   colo  1. Chief Complaint:   Hx of colon polyps    HPI:  April Graff is a 67 y.o. male who is being seen on consult for the above. No Sxs, last colo > 5 yr ago.     PMH:   Past Medical History:   Diagnosis Date    Acute blood loss as cause of postoperative anemia 4/21/2018    Benign prostatic hyperplasia     Chronic kidney disease     Chronic obstructive pulmonary disease (HCC)     CKD (chronic kidney disease) stage 3, GFR 30-59 ml/min ([address] 9/1/2015    Depression 9/3/1348    Diastolic dysfunction without heart failure 9/2/2015    Grade 1 diastolic dysfunction on 2D ECHO done 9/02/2015    Dyslipidemia 9/2/2015    Dysphagia as late effect of cerebrovascular accident (CVA) 9/1/2015    Hemiparesis affecting left side as late effect of cerebrovascular accident (CVA) ([address] 9/1/2015    History of noncompliance with medical treatment, presenting hazards to health 9/1/2015    History of stroke with residual deficit 9/1/2015    Acute Ischemic Stroke (early subacute infarct at the posterior right lentiform nucleus) with residual left hemiparesis and dysphagia     History of tobacco use 9/1/2015    History of trichomonal urethritis 9/1/2015    History of vitamin D deficiency 9/6/2015    Hypertension     Hypertensive heart and kidney disease without heart failure and with chronic kidney disease stage III ([address] 09/02/2015    Obesity, Class I, BMI 30-34.9 9/1/2015    Overactive bladder     Pancreatitis, acute 12/2020    Postoperative atrial fibrillation ([address] 4/21/2018    Resolved    Postoperative confusion 4/22/2018    Postoperative urinary retention 4/22/2018    Statin intolerance 05/07/2018    Atorvastatin and Simvastatin    Stroke ([address] 2015    weakness rt side, dyphagia    Vitamin D deficiency 5/1/2018    Vitamin D 25-Hydroxy (5/1/2018) = 17.9 PSH:   Past Surgical History:   Procedure Laterality Date    COLONOSCOPY N/A 4/3/2018    COLONOSCOPY,  w heated snared polypectomy performed by Severo Blumenthal, MD at [facility] COLONOSCOPY N/A 2/25/2021    COLONOSCOPY w polypectomy performed by Chantal Carlson MD at [address] APPENDECTOMY,RUPT APPENDX+ABSCESS N/A 04/20/2018    Dr. Lott Daughters HX:   Social History     Socioeconomic History    Marital status: SINGLE     Spouse name: Not on file    Number of children: Not on file    Years of education: Not on file    Highest education level: Not on file   Occupational History    Not on file   Social Needs    Financial resource strain: Not on file    Food insecurity     Worry: Not on file     Inability: Not on file    Transportation needs     Medical: Not on file     Non-medical: Not on file   Tobacco Use    Smoking status: Current Every Day Smoker     Packs/day: 0.25    Smokeless tobacco: Never Used    Tobacco comment: 2 cigs daily   Substance and Sexual Activity    Alcohol use: Not Currently    Drug use: No    Sexual activity: Never   Lifestyle    Physical activity     Days per week: Not on file     Minutes per session: Not on file    Stress: Not on file   Relationships    Social connections     Talks on phone: Not on file     Gets together: Not on file     Attends Gnosticist service: Not on file     Active member of club or organization: Not on file     Attends meetings of clubs or organizations: Not on file     Relationship status: Not on file    Intimate partner violence     Fear of current or ex partner: Not on file     Emotionally abused: Not on file     Physically abused: Not on file     Forced sexual activity: Not on file   Other Topics Concern    Not on file   Social History Narrative    Not on file       FHX:   Family History   Problem Relation Age of Onset    Diabetes Mother     Stroke Mother     Heart Disease Father     Hypertension Father  Diabetes Brother     Hypertension Brother        Allergy:   Allergies   Allergen Reactions    Lipitor [Atorvastatin] Other (comments)     Elevated CK level    Pt has no awareness of this allergy.  Simvastatin Other (comments)     Elevation in LFTs       Home Medications:     Medications Prior to Admission   Medication Sig    amLODIPine (NORVASC) 10 mg tablet Take 10 mg by mouth daily.  loperamide (Imodium A-D) 2 mg capsule Take 2 mg by mouth four (4) times daily as needed for Diarrhea.  calcium carbonate (TUMS PO) Take  by mouth. As needed    multivitamin (ONE A DAY) tablet Take 1 Tab by mouth daily.  atorvastatin (LIPITOR) 10 mg tablet Take 10 mg by mouth nightly.  oxybutynin (DITROPAN) 5 mg tablet Take 5 mg by mouth daily.  sertraline (ZOLOFT) 50 mg tablet Take 50 mg by mouth nightly.  tiotropium (Spiriva with HandiHaler) 18 mcg inhalation capsule Take 1 Cap by inhalation daily.  albuterol (PROVENTIL HFA, VENTOLIN HFA, PROAIR HFA) 90 mcg/actuation inhaler Take  by inhalation.  aspirin 81 mg chewable tablet Take 81 mg by mouth daily. Review of Systems:     Constitutional: No fevers, chills, weight loss, fatigue. Skin: No rashes, pruritis, jaundice, ulcerations, erythema. HENT: No headaches, nosebleeds, sinus pressure, rhinorrhea, sore throat. Eyes: No visual changes, blurred vision, eye pain, photophobia, jaundice. Cardiovascular: No chest pain, heart palpitations. Respiratory: No cough, SOB, wheezing, chest discomfort, orthopnea. Gastrointestinal: nege   Genitourinary: No dysuria, bleeding, discharge, pyuria. Musculoskeletal: No weakness, arthralgias, wasting. Endo: No sweats. Heme: No bruising, easy bleeding. Allergies: As noted. Neurological: Cranial nerves intact. Alert and oriented. Gait not assessed. Psychiatric:  No anxiety, depression, hallucinations.                  Visit Vitals  Ht 6' (1.829 m)   Wt 96.6 kg (213 lb)   BMI 28.89 kg/m² Physical Assessment:     constitutional: appearance: well developed, well nourished, normal habitus, no deformities, in no acute distress. skin: inspection: no rashes, ulcers, icterus or other lesions; no clubbing or telangiectasias. palpation: no induration or subcutaneos nodules. eyes: inspection: normal conjunctivae and lids; no jaundice pupils: symmetrical, normoreactive to light, normal accommodation and size. ENMT: mouth: normal oral mucosa,lips and gums; good dentition. oropharynx: normal tongue, hard and soft palate; posterior pharynx without erithema, exudate or lesions. neck: thyroid: normal size, consistency and position; no masses or tenderness. respiratory: effort: normal chest excursion; no intercostal retraction or accessory muscle use. cardiovascular: abdominal aorta: normal size and position; no bruits. palpation: PMI of normal size and position; normal rhythm; no thrill or murmurs. abdominal: abdomen: normal consistency; no tenderness or masses. hernias: no hernias appreciated. liver: normal size and consistency. spleen: not palpable. rectal: hemoccult/guaiac: not performed. musculoskeletal: digits and nails: no clubbing, cyanosis, petechiae or other inflammatory conditions. gait: normal gait and station head and neck: normal range of motion; no pain, crepitation or contracture. spine/ribs/pelvis: normal range of motion; no pain, deformity or contracture. lymphatic: axilae: not palpable. groin: not palpable. neck: within normal limits. other: not palpable. neurologic: cranial nerves: II-XII normal.   psychiatric: judgement/insight: within normal limits. memory: within normal limits for recent and remote events. mood and affect: no evidence of depression, anxiety or agitation. orientation: oriented to time, space and person. Basic Metabolic Profile   No results for input(s): NA, K, CL, CO2, BUN, GLU, CA, MG, PHOS in the last 72 hours.     No lab exists for component: CREAT      CBC w/Diff    No results for input(s): WBC, RBC, HGB, HCT, MCV, MCH, MCHC, RDW, PLT, HGBEXT, HCTEXT, PLTEXT in the last 72 hours. No lab exists for component: MPV No results for input(s): GRANS, LYMPH, EOS, PRO, MYELO, METAS, BLAST in the last 72 hours. No lab exists for component: MONO, BASO     Hepatic Function   No results for input(s): ALB, TP, TBILI, AP, AML, LPSE in the last 72 hours. No lab exists for component: DBILI, GPT, SGOT       Jakob Hartman MD, M.D. [facility], [address] [place] [address]  [email or web address]

## 2021-04-20 ENCOUNTER — HOSPITAL ENCOUNTER (OUTPATIENT)
Dept: PHYSICAL THERAPY | Age: 76
Discharge: HOME | End: 2021-04-20
Payer: COMMERCIAL

## 2021-04-20 PROCEDURE — 97110 THERAPEUTIC EXERCISES: CPT

## 2021-04-20 PROCEDURE — 97112 NEUROMUSCULAR REEDUCATION: CPT

## 2021-04-20 NOTE — PROGRESS NOTES
PT DAILY TREATMENT NOTE     Patient Name: Bari Romero  Date:2021  : 1945  [x]  Patient  Verified  Payor: [employer] MEDICARE / Plan: [email or web address] / Product Type: Managed Care Medicare /    In time:2:55  Out time:3:40  Total Treatment Time (min): 45  Visit #: 5 of 10    Medicare/BCBS Only   Total Timed Codes (min):  45 1:1 Treatment Time:  45       Treatment Area: Low back pain [M54.5]  Other abnormalities of gait and mobility [R26.89]    SUBJECTIVE  Pain Level (0-10 scale): 0  Any medication changes, allergies to medications, adverse drug reactions, diagnosis change, or new procedure performed?: [x] No    [] Yes (see summary sheet for update)  Subjective functional status/changes:   [] No changes reported  I'm tired today. I had a colonoscopy yesterday and it drained my energy    OBJECTIVE      22 min Therapeutic Exercise:  [] See flow sheet :   Rationale: increase ROM and increase strength to improve the patients ability to improve activity tolerance for ADL's, ambulation       23 min Neuromuscular Re-education:  []  See flow sheet :   Rationale: increase strength, improve coordination, improve balance and increase proprioception  to improve the patients ability to redeuce fall risk, increase stability          With   [] TE   [] TA   [] neuro   [] other: Patient Education: [x] Review HEP    [] Progressed/Changed HEP based on:   [] positioning   [] body mechanics   [] transfers   [] heat/ice application    [] other:      Other Objective/Functional Measures: stepping over and around obstacles with use of QC for support, SBA    Pain Level (0-10 scale) post treatment: 0    ASSESSMENT/Changes in Function:  fatigued today, requiring more frequent rest breaks to recover and resume activity.   Hesitates prior to stepping over obstacle to stabilize    Patient will continue to benefit from skilled PT services to modify and progress therapeutic interventions, address functional mobility deficits, address ROM deficits, address strength deficits, analyze and address soft tissue restrictions, analyze and cue movement patterns, analyze and modify body mechanics/ergonomics, assess and modify postural abnormalities, address imbalance/dizziness and instruct in home and community integration to attain remaining goals. []  See Plan of Care  []  See progress note/recertification  []  See Discharge Summary         Progress towards goals / Updated goals:  Goal: Pt to increase left hip strength to at least 4/5 grossly to increase standing/amb tolerance in home, community. Status at last note/certification: hip ABD 4-/5 B; hip Flex 5/5 B; Hip Ext 4-/5; Hip ER/IR 5/5 B  Current status:   Goal: Pt to increase standing/amb tolerance to at least 20 minutes with LBQC, SBA to increase activity tolerance with family. Status at last note/certification: still 8-10 minutes  Current status: progressing, 15 min standing tolerance during therapy session today (4/16/21)  Goal: Pt to perform at least 12 sit to stands in 30 seconds without fatigue or UE support to show improved LE strength and endurance. Status at last note/certification: still 8 sit to stands  Current status:   Goal: Pt to perform TUG test in 15 second average to show improved dynamic standing balance for safety navigating in home without falls. Status at last note/certification: 19 second average  Current status: assess next session  Goal: Pt to report FOTO score of 56 pts to show improved function and quality of life.   Status at last note/certification: CHI 86 QCE   Current: reassess at MD note     PLAN  []  Upgrade activities as tolerated     []  Continue plan of care  []  Update interventions per flow sheet       []  Discharge due to:_  []  Other:_      Jayce Rosenberg, PTA 4/20/2021  3:05 PM    Future Appointments   Date Time Provider Clarice Freedman   4/23/2021  3:00 PM Job Nicole   4/27/2021  3:45 PM Moose [facility]   4/30/2021  3:00 PM Reagan [facility]   5/4/2021  3:00 PM Romie, [address] [place] [address]   5/7/2021  3:00 PM Rosy Benavidez

## 2021-04-23 ENCOUNTER — HOSPITAL ENCOUNTER (OUTPATIENT)
Dept: PHYSICAL THERAPY | Age: 76
Discharge: HOME | End: 2021-04-23
Payer: COMMERCIAL

## 2021-04-23 PROCEDURE — 97110 THERAPEUTIC EXERCISES: CPT

## 2021-04-23 PROCEDURE — 97530 THERAPEUTIC ACTIVITIES: CPT

## 2021-04-23 NOTE — PROGRESS NOTES
PT DAILY TREATMENT NOTE 10-18    Patient Name: Joshua Adamson  Date:2021  : 1945  [x]  Patient  Verified  Payor: [facility] MEDICARE / Plan: [address] / Product Type: Managed Care Medicare /    In time:2:59  Out time:3:43  Total Treatment Time (min): 44  Visit #: 6 of 10    Medicare/BCBS Only   Total Timed Codes (min):  44 1:1 Treatment Time:  42       Treatment Area: Low back pain [M54.5]  Other abnormalities of gait and mobility [R26.89]    SUBJECTIVE  Pain Level (0-10 scale): 0  Any medication changes, allergies to medications, adverse drug reactions, diagnosis change, or new procedure performed?: [x] No    [] Yes (see summary sheet for update)  Subjective functional status/changes:   [x] No changes reported      OBJECTIVE    28 min Therapeutic Exercise:  [x] See flow sheet :   Rationale: increase ROM and increase strength to improve LE strength/mobility and  lumbar mobility to improve ease of ADLs and gait. 16 min Therapeutic Activity:  [x]  See flow sheet :   Rationale: increase strength, improve coordination, improve balance, and increase proprioception  to improve the patients ability to perform transfers, stair negotiation, functional lifts, and functional carries. Pt education: progress toward goals, updated HEP         With   [] TE   [x] TA   [] neuro   [] other: Patient Education: [x] Review HEP    [] Progressed/Changed HEP based on:   [] positioning   [] body mechanics   [] transfers   [] heat/ice application    [] other:      Other Objective/Functional Measures: 30\" STS= 5 repetitions  TUG Test with [facility] = 24\" on average      Pain Level (0-10 scale) post treatment: 0    ASSESSMENT/Changes in Function: Patient demonstrating some functional regression with transfer/gait measures today; this is likely attributed to increased fatigue this week following colonoscopy and multiple additional medical appointments.     Patient will continue to benefit from skilled PT services to modify and progress therapeutic interventions, address functional mobility deficits, address ROM deficits, address strength deficits, analyze and address soft tissue restrictions, analyze and cue movement patterns, analyze and modify body mechanics/ergonomics, assess and modify postural abnormalities and address imbalance/dizziness to attain remaining goals. []  See Plan of Care  []  See progress note/recertification  []  See Discharge Summary         Progress towards goals / Updated goals:  Goal: Pt to increase left hip strength to at least 4/5 grossly to increase standing/amb tolerance in home, community. Status at last note/certification: hip ABD 4-/5 B; hip Flex 5/5 B; Hip Ext 4-/5; Hip ER/IR 5/5 B  Current status:   Goal: Pt to increase standing/amb tolerance to at least 20 minutes with LBQC, SBA to increase activity tolerance with family. Status at last note/certification: still 8-10 minutes  Current status: progressing, 15 min standing tolerance during therapy session today (4/16/21)  Goal: Pt to perform at least 12 sit to stands in 30 seconds without fatigue or UE support to show improved LE strength and endurance. Status at last note/certification: still 8 sit to stands  Current status: not met, 5 repetitions from standard chair without UE support in 30 seconds   Goal: Pt to perform TUG test in 15 second average to show improved dynamic standing balance for safety navigating in home without falls. Status at last note/certification: 19 second average  Current status: not met, 24 seconds on average (2/2 fatigue)  Goal: Pt to report FOTO score of 56 pts to show improved function and quality of life.   Status at last note/certification: OEDP 21 NKM   Current: reassess at MD note     PLAN  [x]  Upgrade activities as tolerated     [x]  Continue plan of care  []  Update interventions per flow sheet       []  Discharge due to:_  []  Other:_      Anais Line [facility] 4/23/2021  8:15 AM    Future Appointments   Date Time Provider Clarice Freedman   4/23/2021  3:00 PM Sherice [facility]   4/27/2021  3:45 PM Radha [facility]   4/30/2021  3[facility]   5/4/2021  3:00 PM Diego Grubbs2 [address]   5/7/2021  3:00 PM Roderick Bajwa

## 2021-04-27 ENCOUNTER — HOSPITAL ENCOUNTER (OUTPATIENT)
Dept: PHYSICAL THERAPY | Age: 76
Discharge: HOME | End: 2021-04-27
Payer: COMMERCIAL

## 2021-04-27 PROCEDURE — 97112 NEUROMUSCULAR REEDUCATION: CPT

## 2021-04-27 PROCEDURE — 97110 THERAPEUTIC EXERCISES: CPT

## 2021-04-27 PROCEDURE — 97530 THERAPEUTIC ACTIVITIES: CPT

## 2021-04-27 NOTE — PROGRESS NOTES
PT DAILY TREATMENT NOTE     Patient Name: Aki Schreiber  Date:2021  : 1945  [x]  Patient  Verified  Payor: [employer] / Plan: [id number] HMO/CHOICE PLUS/POS / Product Type: HMO /    In time:3:45  Out time: 4:23  Total Treatment Time (min): 38  Visit #: 7 of 10    Medicare/BCBS Only   Total Timed Codes (min):  38 1:1 Treatment Time:  38       Treatment Area: Low back pain [M54.5]  Other abnormalities of gait and mobility [R26.89]    SUBJECTIVE  Pain Level (0-10 scale): 0  Any medication changes, allergies to medications, adverse drug reactions, diagnosis change, or new procedure performed?: [x] No    [] Yes (see summary sheet for update)  Subjective functional status/changes:   [] No changes reported  I feel stronger since beginning PT    OBJECTIVE      12 min Therapeutic Exercise:  [] See flow sheet :   Rationale: increase ROM and increase strength to improve the patients ability to increase activity tolerance for functional tasks, ambulation    13 min Therapeutic Activity:  []  See flow sheet :FOTO, reassess   Rationale: increase strength and improve coordination  to improve the patients ability to improve transfers,      8 min Neuromuscular Re-education:  []  See flow sheet :   Rationale: increase strength, improve coordination and improve balance  to improve the patients ability to increase stability and reduce fall risk            With   [] TE   [] TA   [] neuro   [] other: Patient Education: [x] Review HEP    [] Progressed/Changed HEP based on:   [] positioning   [] body mechanics   [] transfers   [] heat/ice application    [] other:      Other Objective/Functional Measures:   Gains: \"walking better\" including increased duration, more stable and feels stronger. Depends on [employer] [place] for support.   Deficits: weakness in LE's limits activity tolerance  50% improved    Pain Level (0-10 scale) post treatment: 0    ASSESSMENT/Changes in Function: onset of fatigue today limiting tolerance to ex's, declined full session. Patient will continue to benefit from skilled PT services to modify and progress therapeutic interventions, address functional mobility deficits, address ROM deficits, address strength deficits, analyze and address soft tissue restrictions, analyze and cue movement patterns, analyze and modify body mechanics/ergonomics, assess and modify postural abnormalities, address imbalance/dizziness and instruct in home and community integration to attain remaining goals. []  See Plan of Care  []  See progress note/recertification  []  See Discharge Summary         Progress towards goals / Updated goals:  Goal: Pt to increase left hip strength to at least 4/5 grossly to increase standing/amb tolerance in home, community. Status at last note/certification: hip ABD 4-/5 B; hip Flex 5/5 B; Hip Ext 4-/5; Hip ER/IR 5/5 B  Current status: left hip ER 4+/5, IR 4/5; hip flexion 5/5; hip abduction 4-/5, extension 4-/5  Goal: Pt to increase standing/amb tolerance to at least 20 minutes with LBQC, SBA to increase activity tolerance with family. Status at last note/certification: still 8-10 minutes  Current status: progressing, 15 min standing tolerance during therapy session today (4/16/21)  Goal: Pt to perform at least 12 sit to stands in 30 seconds without fatigue or UE support to show improved LE strength and endurance. Status at last note/certification: still 8 sit to stands  Current status: not met, 5 repetitions from standard chair without UE support in 30 seconds   Goal: Pt to perform TUG test in 15 second average to show improved dynamic standing balance for safety navigating in home without falls. Status at last note/certification: 19 second average  Current status: not met, 24 seconds on average (2/2 fatigue)  Goal: Pt to report FOTO score of 56 pts to show improved function and quality of life.   Status at last note/certification: PPTD 63 UYN   Current: FOTO 38, not met    PLAN  [x]  Upgrade activities as tolerated     []  Continue plan of care  []  Update interventions per flow sheet       []  Discharge due to:_  []  Other:_      Sheldon Dong, PTA 4/27/2021  3:48 PM    Future Appointments   Date Time Provider Clarice Freedman   4/30/2021  3:00 PM Lan[facility]   5/4/2021  3:00 PM Romie, [facility] [address]   5/7/2021  3:00 PM Joshua Jerry

## 2021-04-29 NOTE — PROGRESS NOTES
[facility] ROSALINA BAUMAN [facility], [address] [place] [address]  [phone] fax    Continued Plan of Care/ Re-certification for Physical Therapy Services  Patient name: Aki Schreiber Start of Care: 3/1/2021   Referral source: Ephraim Serrano MD : 1945   Medical/Treatment Diagnosis: Low back pain [M54.5]  Other abnormalities of gait and mobility [R26.89]  Payor: [address] [place] [address],9D / Plan: [employer] [employer] [address] / Product Type: Managed Care Medicare /  Onset Date:21      Prior Hospitalization: see medical history Provider#: [id number]   Medications: Verified on Patient Summary List     Comorbidities: Back pain; BMI > 30; HTN; hx CVA, left hemiparesis  Prior Level of Function: Lives in 2-story home with sister; sedentary lifestyle; aide 2x/week; uses [facility] for all gait      Visits from Start of Care: 12    Missed Visits: 3    The Plan of Care and following information is based on the patient's current status:  - Goal: Pt to increase left hip strength to at least 4/5 grossly to increase standing/amb tolerance in home, community. Status at last note/certification: hip ABD 4-/5 B; hip Flex 5/5 B; Hip Ext 4-/5; Hip ER/IR 5/5 B  Current status: left hip ER 4+/5, IR 4/5; hip flexion 5/5; hip abduction 4-/5, extension 4-/5  Goal: Pt to increase standing/amb tolerance to at least 20 minutes with [facility], SBA to increase activity tolerance with family. Status at last note/certification: still 8-10 minutes  Current status: progressing, 15 min standing tolerance during therapy session today (21)  Goal: Pt to perform at least 12 sit to stands in 30 seconds without fatigue or UE support to show improved LE strength and endurance.   Status at last note/certification: still 8 sit to stands  Current status: not met, 5 repetitions from standard chair without UE support in 30 seconds   Goal: Pt to perform TUG test in 15 second average to show improved dynamic standing balance for safety navigating in home without falls. Status at last note/certification: 19 second average  Current status: not met, 24 seconds on average (2/2 fatigue)  Goal: Pt to report FOTO score of 56 pts to show improved function and quality of life. Status at last note/certification: SVCN 52 DZB   Current: not met, FOTO 38 pts (no functional regression reported)    Key functional changes:   Improvements: \"walking better\" including increased duration, more stability,  feels stronger with prolonged ambulation; [place] [facility] [place]  Deficits: weakness in LE's limits activity tolerance; pt easily fatigued with daily tasks/ambulation  Improvement: 50% improved      Problems/ barriers to goal attainment: fatigue, extensive medical history     Problem List: pain affecting function, decrease ROM, decrease strength, impaired gait/ balance, decrease ADL/ functional abilitiies, decrease activity tolerance, decrease flexibility/ joint mobility and decrease transfer abilities    Treatment Plan: Therapeutic exercise, Therapeutic activities, Neuromuscular re-education, Physical agent/modality, Gait/balance training, Manual therapy, Aquatic therapy, Patient education, Self Care training, Functional mobility training, Home safety training and Stair training     Patient Goal (s) has been updated and includes: \"improve walking/balance\"     Goals for this certification period to be accomplished in 6 treatments:  - Goal: Pt to demo compliance with updated HEP to maintain therapy gains upon discharge  Status at last note/certification: HEP updated recently, unsure of compliance with caregiver  Goal: Pt to increase standing/amb tolerance to at least 20 minutes with [facility], SBA to increase activity tolerance with family.   Status at last note/certification: 15 min standing tolerance during therapy session  Goal: Pt to perform at least 12 sit to stands in 30 seconds without fatigue or UE support to show improved LE strength and endurance. Status at last note/certification:  5 repetitions from standard chair without UE support in 30 seconds   Goal: Pt to perform TUG test in 15 second average to show improved dynamic standing balance for safety navigating in home without falls. Status at last note/certification: 24 seconds on average (2/2 fatigue)    Frequency / Duration: Patient to be seen 2 times per week for 6 treatments:    Assessment / Recommendations: Patient has attended therapy consistently for 12 sessions for the treatment of low back pain and impaired gait/mobility. At this time, the patient demonstrates minimal progress with skilled therapy interventions. He reports improved stability with ambulation at home and demonstrates improved standing tolerance at times during therapy sessions. However, his performance in therapy is highly variable 2/2 fatigue with other medical appointments and generalized activity intolerance. The patient continues to require frequent verbal/visual cues for correct technique with interventions. The patient will benefit from continued skilled outpatient therapy for continued instruction in updated HEP to maintain therapy gain in anticipation of expected discharge to [facility]. Certification Period: 4/30/21-5/29/21    Florinda Mart 4/29/2021 5:39 PM    ________________________________________________________________________  I certify that the above Therapy Services are being furnished while the patient is under my care. I agree with the treatment plan and certify that this therapy is necessary. [] I have read the above and request that my patient continue as recommended.   [] I have read the above report and request that my patient continue therapy with the following changes/special instructions: ______________________________________  [] I have read the above report and request that my patient be discharged from therapy    Physician's Signature:____________Date:_________TIME:________     Consuelo Gandara MD  ** Signature, Date and Time must be completed for valid certification **    Please sign and [facility] to [facility], [address] [place] [address]  [phone] fax

## 2021-04-30 ENCOUNTER — HOSPITAL ENCOUNTER (OUTPATIENT)
Dept: PHYSICAL THERAPY | Age: 76
Discharge: HOME | End: 2021-04-30
Payer: COMMERCIAL

## 2021-04-30 PROCEDURE — 97112 NEUROMUSCULAR REEDUCATION: CPT

## 2021-04-30 PROCEDURE — 97110 THERAPEUTIC EXERCISES: CPT

## 2021-04-30 NOTE — PROGRESS NOTES
PT DAILY TREATMENT NOTE     Patient Name: Charo Purvis  Date:2021  : 1945  [x]  Patient  Verified  Payor: Rashaad Juan / Plan: [employer] HMO/CHOICE PLUS/POS / Product Type: HMO /    In time:3:00  Out time:3:40  Total Treatment Time (min): 40  Visit #: 1 of 6    Medicare/BCBS Only   Total Timed Codes (min):  40 1:1 Treatment Time:  40       Treatment Area: Low back pain [M54.5]  Other abnormalities of gait and mobility [R26.89]    SUBJECTIVE  Pain Level (0-10 scale): 0  Any medication changes, allergies to medications, adverse drug reactions, diagnosis change, or new procedure performed?: [x] No    [] Yes (see summary sheet for update)  Subjective functional status/changes:   [] No changes reported  I'm OK    OBJECTIVE    30 min Therapeutic Exercise:  [] See flow sheet :   Rationale: increase ROM and increase strength to improve the patients ability to increase activity tolerance       10 min Neuromuscular Re-education:  []  See flow sheet :   Rationale: increase strength, improve coordination and increase proprioception  to improve the patients ability to increase stability for functional tasks, transfers          With   [] TE   [] TA   [] neuro   [] other: Patient Education: [x] Review HEP    [] Progressed/Changed HEP based on:   [] positioning   [] body mechanics   [] transfers   [] heat/ice application    [] other:      Other Objective/Functional Measures:   Sit to stand from 22\", 20\" and 18\" bed no UE support     Pain Level (0-10 scale) post treatment: 0    ASSESSMENT/Changes in Function: progressing toward goal for increased activity tolerance in standing, and minimaize need for rest breaks 10 minute tolerance today.     Patient will continue to benefit from skilled PT services to modify and progress therapeutic interventions, address functional mobility deficits, address ROM deficits, address strength deficits, analyze and address soft tissue restrictions, analyze and cue movement patterns, analyze and modify body mechanics/ergonomics, assess and modify postural abnormalities, address imbalance/dizziness and instruct in home and community integration to attain remaining goals. []  See Plan of Care  []  See progress note/recertification  []  See Discharge Summary         Progress towards goals / Updated goals:  - Goal: Pt to demo compliance with updated HEP to maintain therapy gains upon discharge  Status at last note/certification: HEP updated recently, unsure of compliance with caregiver  Goal: Pt to increase standing/amb tolerance to at least 20 minutes with [facility], SBA to increase activity tolerance with family. Status at last note/certification: 15 min standing tolerance during therapy session  10 minute standing tolerance today during session  Goal: Pt to perform at least 12 sit to stands in 30 seconds without fatigue or UE support to show improved LE strength and endurance. Status at last note/certification:  5 repetitions from standard chair without UE support in 30 seconds   Goal: Pt to perform TUG test in 15 second average to show improved dynamic standing balance for safety navigating in home without falls.   Status at last note/certification: 24 seconds on average (2/2 fatigue)     PLAN  [x]  Upgrade activities as tolerated     []  Continue plan of care  []  Update interventions per flow sheet       []  Discharge due to:_  []  Other:_      Briseida Elaine, PTA 4/30/2021  3:10 PM    Future Appointments   Date Time Provider Clarice Freedman   5/4/2021  3:00 PM [facility]   5/7/2021  3:00 PM Stacie Harper

## 2021-05-04 ENCOUNTER — APPOINTMENT (OUTPATIENT)
Dept: PHYSICAL THERAPY | Age: 76
End: 2021-05-04
Payer: MEDICARE

## 2021-05-05 ENCOUNTER — HOSPITAL ENCOUNTER (OUTPATIENT)
Dept: PHYSICAL THERAPY | Age: 76
Discharge: HOME | End: 2021-05-05
Payer: MEDICARE

## 2021-05-05 PROCEDURE — 97110 THERAPEUTIC EXERCISES: CPT

## 2021-05-05 PROCEDURE — 97530 THERAPEUTIC ACTIVITIES: CPT

## 2021-05-05 NOTE — PROGRESS NOTES
PT DAILY TREATMENT NOTE     Patient Name: Bhupinder Myers  Date:2021  : 1945  [x]  Patient  Verified  Payor: Yue Castaneda / Plan: [facility] [employer] HMO/CHOICE PLUS/POS / Product Type: HMO /    In time:1549  Out time:1630  Total Treatment Time (min): 41  Visit #: 2 of 6    Medicare/BCBS Only   Total Timed Codes (min):  41 1:1 Treatment Time:  41       Treatment Area: Low back pain [M54.5]  Other abnormalities of gait and mobility [R26.89]    SUBJECTIVE  Pain Level (0-10 scale): 0  Any medication changes, allergies to medications, adverse drug reactions, diagnosis change, or new procedure performed?: [x] No    [] Yes (see summary sheet for update)  Subjective functional status/changes:   [] No changes reported  \"no pain\"    OBJECTIVE    30 min Therapeutic Exercise:  [x] See flow sheet :   Rationale: increase ROM and increase strength to improve LE strength/mobility and  lumbar mobility to improve ease of ADLs and gait. 11 min Therapeutic Activity:  [x]  See flow sheet :   Rationale: increase strength, improve coordination and increase proprioception  to improve the patients ability to increase stability for functional tasks, transfers          With   [] TE   [] TA   [] neuro   [] other: Patient Education: [x] Review HEP    [] Progressed/Changed HEP based on:   [] positioning   [] body mechanics   [] transfers   [] heat/ice application    [] other:      Other Objective/Functional Measures:   Sit to  30 seconds: 5 repetitions from standard chair with no UE support    Pain Level (0-10 scale) post treatment: 0    ASSESSMENT/Changes in Function: Skilled cues provided for proper pacing during standing therex. 4 rest breaks needed during session today. SBA provided for step ups in parallel bars with skilled cues for proper foot placement on step.      Patient will continue to benefit from skilled PT services to modify and progress therapeutic interventions, address functional mobility deficits, address ROM deficits, address strength deficits, analyze and address soft tissue restrictions, analyze and cue movement patterns, analyze and modify body mechanics/ergonomics, assess and modify postural abnormalities, address imbalance/dizziness and instruct in home and community integration to attain remaining goals. []  See Plan of Care  []  See progress note/recertification  []  See Discharge Summary         Progress towards goals / Updated goals:  - Goal: Pt to demo compliance with updated HEP to maintain therapy gains upon discharge  Status at last note/certification: HEP updated recently, unsure of compliance with caregiver  Goal: Pt to increase standing/amb tolerance to at least 20 minutes with [facility], SBA to increase activity tolerance with family. Status at last note/certification: 15 min standing tolerance during therapy session  10 minute standing tolerance today during session  Goal: Pt to perform at least 12 sit to stands in 30 seconds without fatigue or UE support to show improved LE strength and endurance. Status at last note/certification:  5 repetitions from standard chair without UE support in 30 seconds   Current: Sit to  30 seconds: 5 repetitions from standard chair with no UE support; no change  Goal: Pt to perform TUG test in 15 second average to show improved dynamic standing balance for safety navigating in home without falls.   Status at last note/certification: 24 seconds on average (2/2 fatigue)     PLAN  [x]  Upgrade activities as tolerated     []  Continue plan of care  []  Update interventions per flow sheet       []  Discharge due to:_  []  Other:_      Lico Robledo, PT 5/5/2021 1635 pm    Future Appointments   Date Time Provider Clarice Freedman   5/7/2021  3:00 PM Bruno Martin

## 2021-05-07 ENCOUNTER — HOSPITAL ENCOUNTER (OUTPATIENT)
Dept: PHYSICAL THERAPY | Age: 76
Discharge: HOME | End: 2021-05-07
Payer: MEDICARE

## 2021-05-07 PROCEDURE — 97110 THERAPEUTIC EXERCISES: CPT

## 2021-05-07 PROCEDURE — 97530 THERAPEUTIC ACTIVITIES: CPT

## 2021-05-07 NOTE — PROGRESS NOTES
PT DISCHARGE DAILY NOTE AND [id number]    Date:2021  Patient name: Loki Chaves of Care: 3/1/2021   Referral source: Deneen Champagne MD PTA:    Medical/Treatment Diagnosis: Low back pain [M54.5]  Other abnormalities of gait and mobility [R26.89]  Payor: Antonette Cruz / Plan: [address] / Product Type: Managed Care Medicare /  Onset Date:21      Prior Hospitalization: see medical history Provider#: [id number]   Medications: Verified on Patient Summary List     Comorbidities: Back pain; BMI > 30; HTN; hx CVA, left hemiparesis  Prior Level of Function: Lives in 2-story home with sister; sedentary lifestyle; aide 2x/week; uses [facility] for all gait       Visits from Start of Care: 15    Missed Visits: 3    Reporting Period : 21 to 21    [x]  Patient  Verified  Payor: Samra Reid / Plan: [employer] HMO/CHOICE PLUS/POS / Product Type: HMO /    In time:3:00  Out time:3:45  Total Treatment Time (min): 45  Visit #: 3 of 6    Medicare/BCBS Only   Total Timed Codes (min):  45 1:1 Treatment Time:  41       SUBJECTIVE  Pain Level (0-10 scale): 0  Any medication changes, allergies to medications, adverse drug reactions, diagnosis change, or new procedure performed?: [x] No    [] Yes (see summary sheet for update)  Subjective functional status/changes:   [] No changes reported  I have not gotten a chance to do my exercises at home but I will talk to my caregiver about that. OBJECTIVE    30 min Therapeutic Exercise:  [x] See flow sheet :   Rationale: increase ROM and increase strength to improve the patients ability to perform gait and transfers with improved LE strength and mobility.     15 min Therapeutic Activity:  [x]  See flow sheet :   Rationale: increase strength, improve coordination, improve balance, and increase proprioception  to improve the patients ability to perform transfers, stair negotiation, and floor <> waist lifts. Patient education: progress toward goals, updated HEP          With   [] TE   [x] TA   [] neuro   [] other: Patient Education: [x] Review HEP    [] Progressed/Changed HEP based on:   [] positioning   [] body mechanics   [] transfers   [] heat/ice application    [] other:      Other Objective/Functional Measures: -55% improvement  -Updated HEP     Pain Level (0-10 scale) post treatment: 0    Summary of Care:  - Goal: Pt to demo compliance with updated HEP to maintain therapy gains upon discharge  Status at last note/certification: HEP updated recently, unsure of compliance with caregiver  Current: progressing, has not initiated yet but demonstrates understanding in clinic           - Goal: Pt to increase standing/amb tolerance to at least 20 minutes with [facility], SBA to increase activity tolerance with family. Status at last note/certification: 15 min standing tolerance during therapy session  Current: progressing, 10-15 minute standing tolerance during sessions  - Goal: Pt to perform at least 12 sit to stands in 30 seconds without fatigue or UE support to show improved LE strength and endurance. Status at last note/certification:  5 repetitions from standard chair without UE support in 30 seconds   Current: progressing, 7 repetitions in 30 seconds  - Goal: Pt to perform TUG test in 15 second average to show improved dynamic standing balance for safety navigating in home without falls. Status at last note/certification: 24 seconds on average (2/2 fatigue)  Current: progressing, 22 seconds on average       ASSESSMENT/Changes in Function: Pt attended therapy consistently for 15 visits for the treatment of low back pain and impaired gait/mobility.  At this point, the patient reports 55% improvement since [facility] with specific improvements in the following areas: improved ease of ambulation in the home, improved balance with functional mobility, improved ease of transfers, and improved understanding of therapy program to continue at home. The patient's progress has started to plateau, so we will initiate discharge to [facility] for continued maintenance of therapy gains. Thank you for this referral.      Thank you for this referral!     PLAN  [x]Discontinue therapy: [x]Patient has reached or is progressing toward set goals      []Patient is non-compliant or has abdicated      [x]Due to lack of appreciable progress towards set goals    Kely Rya 5/7/2021  8:17 AM    NOTE TO PHYSICIAN:  Please complete the following and fax to: [facility] [place] at [phone]  . Retain this original for your records. If you are unable to process this request in   24 hours, please contact our office.      [] I have read the above report and request that my patient continue therapy with the following changes/special instructions:  [] I have read the above report and request that my patient be discharged from therapy    Physician's Signature:____________Date:_________TIME:________     Matheus Cedeno MD  ** Signature, Date and Time must be completed for valid certification **

## 2021-05-07 NOTE — PROGRESS NOTES
Physical Therapy Discharge Instructions    [facility] ROSALINA DAWSONTrevor HEIDED[facility], [address] [place] [address]  [phone] fax    Patient: Charo Purvis  :     Continue Home Exercise Program 2-3 times per week. Continue with    [x] Ice  as needed 2-3 times per day     [x] Heat           Follow up with MD:     [] Upon completion of therapy     [x] As needed    Recommendations:     [x]   Return to activity with home program    []   Return to activity with the following modifications:       []Post Rehab Program    []Join Independent aquatic program     []Return to/join local gym    Additional Comments: MrTrevor Everton Springer, it has been a pleasure working with you. Please continue your exercises regularly at home so you can keep up the progress you have made so far. Please feel free to reach out in the future if you have any questions, we are always more than happy to help. Take care!       Uche Byers PT, DPT 2021 3:25 PM

## 2021-07-13 ENCOUNTER — HOSPITAL ENCOUNTER (OUTPATIENT)
Dept: LAB | Age: 76
Discharge: HOME | End: 2021-07-13
Payer: MEDICARE

## 2021-07-13 LAB
ALBUMIN SERPL-MCNC: 4.1 G/DL (ref 3.4–5)
ALBUMIN/GLOB SERPL: 1.1 {RATIO} (ref 0.8–1.7)
ALP SERPL-CCNC: 75 U/L (ref 45–117)
ALT SERPL-CCNC: 29 U/L (ref 16–61)
ANION GAP SERPL CALC-SCNC: 6 MMOL/L (ref 3–18)
AST SERPL-CCNC: 20 U/L (ref 10–38)
BASOPHILS # BLD: 0.1 K/UL (ref 0–0.1)
BASOPHILS NFR BLD: 1 % (ref 0–2)
BILIRUB SERPL-MCNC: 0.4 MG/DL (ref 0.2–1)
BUN SERPL-MCNC: 23 MG/DL (ref 7–18)
BUN/CREAT SERPL: 14 (ref 12–20)
CALCIUM SERPL-MCNC: 10.4 MG/DL (ref 8.5–10.1)
CALCIUM SERPL-MCNC: 10.5 MG/DL (ref 8.5–10.1)
CHLORIDE SERPL-SCNC: 108 MMOL/L (ref 100–111)
CO2 SERPL-SCNC: 26 MMOL/L (ref 21–32)
CREAT SERPL-MCNC: 1.69 MG/DL (ref 0.6–1.3)
DIFFERENTIAL METHOD BLD: ABNORMAL
EOSINOPHIL # BLD: 0.1 K/UL (ref 0–0.4)
EOSINOPHIL NFR BLD: 1 % (ref 0–5)
ERYTHROCYTE [DISTWIDTH] IN BLOOD BY AUTOMATED COUNT: 14.9 % (ref 11.6–14.5)
GLOBULIN SER CALC-MCNC: 3.7 G/DL (ref 2–4)
GLUCOSE SERPL-MCNC: 78 MG/DL (ref 74–99)
HCT VFR BLD AUTO: 40.9 % (ref 36–48)
HGB BLD-MCNC: 13.2 G/DL (ref 13–16)
LYMPHOCYTES # BLD: 1.9 K/UL (ref 0.9–3.6)
LYMPHOCYTES NFR BLD: 19 % (ref 21–52)
MCH RBC QN AUTO: 27.2 PG (ref 24–34)
MCHC RBC AUTO-ENTMCNC: 32.3 G/DL (ref 31–37)
MCV RBC AUTO: 84.2 FL (ref 74–97)
MONOCYTES # BLD: 0.8 K/UL (ref 0.05–1.2)
MONOCYTES NFR BLD: 8 % (ref 3–10)
NEUTS SEG # BLD: 7.2 K/UL (ref 1.8–8)
NEUTS SEG NFR BLD: 71 % (ref 40–73)
PHOSPHATE SERPL-MCNC: 3.2 MG/DL (ref 2.5–4.9)
PLATELET # BLD AUTO: 248 K/UL (ref 135–420)
PMV BLD AUTO: 10.2 FL (ref 9.2–11.8)
POTASSIUM SERPL-SCNC: 4.5 MMOL/L (ref 3.5–5.5)
PROT SERPL-MCNC: 7.8 G/DL (ref 6.4–8.2)
PTH-INTACT SERPL-MCNC: 97.1 PG/ML (ref 18.4–88)
RBC # BLD AUTO: 4.86 M/UL (ref 4.35–5.65)
SODIUM SERPL-SCNC: 140 MMOL/L (ref 136–145)
WBC # BLD AUTO: 10.1 K/UL (ref 4.6–13.2)

## 2021-07-13 PROCEDURE — 36415 COLL VENOUS BLD VENIPUNCTURE: CPT

## 2021-07-13 PROCEDURE — 83970 ASSAY OF PARATHORMONE: CPT

## 2021-07-13 PROCEDURE — 84100 ASSAY OF PHOSPHORUS: CPT

## 2021-07-13 PROCEDURE — 80053 COMPREHEN METABOLIC PANEL: CPT

## 2021-07-13 PROCEDURE — 85025 COMPLETE CBC W/AUTO DIFF WBC: CPT

## 2021-07-15 NOTE — PROGRESS NOTES
Patient evaluated due to nursing reported pain. Mr Krishna Chang was asleep on examiner arrival (note: patient just arrived to the stepdown unit- may have had significant pain on arrival due to being jostled during transport from [facility]). Discussed pain medication regimen with pharmacy. Recommended 1mg Q4H morphine. Per pharmacy, ok to dose morphine to start now (received 50mcg of fentanyl at 0130). Eli Enriquez MD   [address] [place] [address]. Pt tested positive for COVID-19, surgery called stating that upcoming procedure will have to be rescheduled after 10 day quarantine.   Pt was notified and would like to reschedule procedure. She is asking that we write a letter for her job stating that she has to quarantine.    -Katharina

## 2021-07-22 ENCOUNTER — TRANSCRIBE ORDER (OUTPATIENT)
Dept: SCHEDULING | Age: 76
End: 2021-07-22

## 2021-07-22 DIAGNOSIS — N18.30 CHRONIC KIDNEY DISEASE, STAGE III (MODERATE) (HCC): ICD-10-CM

## 2021-07-22 DIAGNOSIS — I10 ESSENTIAL HYPERTENSION, MALIGNANT: Primary | ICD-10-CM

## 2021-08-25 ENCOUNTER — HOSPITAL ENCOUNTER (OUTPATIENT)
Dept: ULTRASOUND IMAGING | Age: 76
Discharge: HOME | End: 2021-08-25
Attending: INTERNAL MEDICINE
Payer: MEDICARE

## 2021-08-25 DIAGNOSIS — I10 ESSENTIAL HYPERTENSION, MALIGNANT: ICD-10-CM

## 2021-08-25 DIAGNOSIS — N18.30 CHRONIC KIDNEY DISEASE, STAGE III (MODERATE) (HCC): ICD-10-CM

## 2021-08-25 PROCEDURE — 76770 US EXAM ABDO BACK WALL COMP: CPT

## 2021-09-27 ENCOUNTER — HOSPITAL ENCOUNTER (INPATIENT)
Age: 76
LOS: 5 days | Discharge: HOME | DRG: 871 | End: 2021-10-03
Attending: STUDENT IN AN ORGANIZED HEALTH CARE EDUCATION/TRAINING PROGRAM | Admitting: FAMILY MEDICINE
Payer: MEDICARE

## 2021-09-27 ENCOUNTER — APPOINTMENT (OUTPATIENT)
Dept: CT IMAGING | Age: 76
DRG: 871 | End: 2021-09-27
Attending: STUDENT IN AN ORGANIZED HEALTH CARE EDUCATION/TRAINING PROGRAM
Payer: MEDICARE

## 2021-09-27 ENCOUNTER — APPOINTMENT (OUTPATIENT)
Dept: GENERAL RADIOLOGY | Age: 76
DRG: 871 | End: 2021-09-27
Attending: NURSE PRACTITIONER
Payer: MEDICARE

## 2021-09-27 DIAGNOSIS — R10.13 ABDOMINAL PAIN, EPIGASTRIC: ICD-10-CM

## 2021-09-27 DIAGNOSIS — K85.90 ACUTE PANCREATITIS, UNSPECIFIED COMPLICATION STATUS, UNSPECIFIED PANCREATITIS TYPE: Primary | ICD-10-CM

## 2021-09-27 LAB
ALBUMIN SERPL-MCNC: 3.8 G/DL (ref 3.4–5)
ALBUMIN/GLOB SERPL: 0.9 {RATIO} (ref 0.8–1.7)
ALP SERPL-CCNC: 119 U/L (ref 45–117)
ALT SERPL-CCNC: 356 U/L (ref 16–61)
ANION GAP SERPL CALC-SCNC: 3 MMOL/L (ref 3–18)
AST SERPL-CCNC: 562 U/L (ref 10–38)
BASOPHILS # BLD: 0 K/UL (ref 0–0.1)
BASOPHILS NFR BLD: 0 % (ref 0–2)
BILIRUB SERPL-MCNC: 0.8 MG/DL (ref 0.2–1)
BNP SERPL-MCNC: 52 PG/ML (ref 0–1800)
BUN SERPL-MCNC: 20 MG/DL (ref 7–18)
BUN/CREAT SERPL: 11 (ref 12–20)
CALCIUM SERPL-MCNC: 10.1 MG/DL (ref 8.5–10.1)
CHLORIDE SERPL-SCNC: 108 MMOL/L (ref 100–111)
CK MB CFR SERPL CALC: 0.6 % (ref 0–4)
CK MB SERPL-MCNC: 1.4 NG/ML (ref 5–25)
CK SERPL-CCNC: 249 U/L (ref 39–308)
CO2 SERPL-SCNC: 29 MMOL/L (ref 21–32)
CREAT SERPL-MCNC: 1.9 MG/DL (ref 0.6–1.3)
DIFFERENTIAL METHOD BLD: ABNORMAL
EOSINOPHIL # BLD: 0 K/UL (ref 0–0.4)
EOSINOPHIL NFR BLD: 0 % (ref 0–5)
ERYTHROCYTE [DISTWIDTH] IN BLOOD BY AUTOMATED COUNT: 15.2 % (ref 11.6–14.5)
GLOBULIN SER CALC-MCNC: 4.3 G/DL (ref 2–4)
GLUCOSE SERPL-MCNC: 142 MG/DL (ref 74–99)
HCT VFR BLD AUTO: 42.6 % (ref 36–48)
HGB BLD-MCNC: 13.7 G/DL (ref 13–16)
LIPASE SERPL-CCNC: ABNORMAL U/L (ref 73–393)
LYMPHOCYTES # BLD: 0.5 K/UL (ref 0.9–3.6)
LYMPHOCYTES NFR BLD: 2 % (ref 21–52)
MCH RBC QN AUTO: 27 PG (ref 24–34)
MCHC RBC AUTO-ENTMCNC: 32.2 G/DL (ref 31–37)
MCV RBC AUTO: 84 FL (ref 78–100)
MONOCYTES # BLD: 0.5 K/UL (ref 0.05–1.2)
MONOCYTES NFR BLD: 2 % (ref 3–10)
NEUTS SEG # BLD: 23.9 K/UL (ref 1.8–8)
NEUTS SEG NFR BLD: 96 % (ref 40–73)
PLATELET # BLD AUTO: 193 K/UL (ref 135–420)
PLATELET COMMENTS,PCOM: ABNORMAL
PMV BLD AUTO: 11.2 FL (ref 9.2–11.8)
POTASSIUM SERPL-SCNC: 4.2 MMOL/L (ref 3.5–5.5)
PROT SERPL-MCNC: 8.1 G/DL (ref 6.4–8.2)
RBC # BLD AUTO: 5.07 M/UL (ref 4.35–5.65)
RBC MORPH BLD: ABNORMAL
SODIUM SERPL-SCNC: 140 MMOL/L (ref 136–145)
TROPONIN I SERPL-MCNC: <0.02 NG/ML (ref 0–0.04)
WBC # BLD AUTO: 24.9 K/UL (ref 4.6–13.2)

## 2021-09-27 PROCEDURE — 83880 ASSAY OF NATRIURETIC PEPTIDE: CPT

## 2021-09-27 PROCEDURE — 85025 COMPLETE CBC W/AUTO DIFF WBC: CPT

## 2021-09-27 PROCEDURE — 99285 EMERGENCY DEPT VISIT HI MDM: CPT

## 2021-09-27 PROCEDURE — 74177 CT ABD & PELVIS W/CONTRAST: CPT

## 2021-09-27 PROCEDURE — 93005 ELECTROCARDIOGRAM TRACING: CPT

## 2021-09-27 PROCEDURE — 83690 ASSAY OF LIPASE: CPT

## 2021-09-27 PROCEDURE — 74011000636 HC RX REV CODE- 636: Performed by: STUDENT IN AN ORGANIZED HEALTH CARE EDUCATION/TRAINING PROGRAM

## 2021-09-27 PROCEDURE — 82553 CREATINE MB FRACTION: CPT

## 2021-09-27 PROCEDURE — 80053 COMPREHEN METABOLIC PANEL: CPT

## 2021-09-27 PROCEDURE — 71045 X-RAY EXAM CHEST 1 VIEW: CPT

## 2021-09-27 RX ORDER — ONDANSETRON 2 MG/ML
4 INJECTION INTRAMUSCULAR; INTRAVENOUS ONCE
Status: ACTIVE | OUTPATIENT
Start: 2021-09-27 | End: 2021-09-28

## 2021-09-27 RX ORDER — MORPHINE SULFATE 4 MG/ML
4 INJECTION INTRAVENOUS ONCE
Status: ACTIVE | OUTPATIENT
Start: 2021-09-27 | End: 2021-09-28

## 2021-09-27 RX ADMIN — IOPAMIDOL 67 ML: 612 INJECTION, SOLUTION INTRAVENOUS at 19:19

## 2021-09-27 NOTE — Clinical Note
Status[de-identified] INPATIENT [101]   Type of Bed: Telemetry [19]   Cardiac Monitoring Required?: Yes   Inpatient Hospitalization Certified Necessary for the Following Reasons: 3.  Patient receiving treatment that can only be provided in an inpatient setting (further clarification in H&P documentation)   Admitting Diagnosis: Pancreatitis [180583]   Admitting Physician: Shira Ford   Attending Physician: Sultana Osborne [[id number]]   Estimated Length of Stay: 3-4 Midnights   Discharge Plan[de-identified] Home with Office Follow-up

## 2021-09-27 NOTE — ED NOTES
I performed a brief evaluation, including history and physical, of the patient here in triage and I have determined that the patient will need further treatment and evaluation from the main side ER provider. I have placed initial orders to help in expediting patients care.      September 27, 2021 at 5:56 PM - JOSE ENRIQUE Rojas        Visit Vitals  /87   Pulse 63   Temp 97.3 °F (36.3 °C)   Resp 16   Wt 98 kg (216 lb)   SpO2 97%   BMI 29.29 kg/m²

## 2021-09-27 NOTE — ED PROVIDER NOTES
EMERGENCY DEPARTMENT HISTORY AND PHYSICAL EXAM    6:27 PM    Date: 9/27/2021  Patient Name: Jennyfer Good    History of Presenting Illness     Chief Complaint   Patient presents with    Epigastric Pain     x 1 hour       History Provided By: Patient  Location/Duration/Severity/Modifying factors   HPI   Jennyfer Good is a 67 y.o. male with asthma history of pancreatitis, prior CVA,, hypertension presenting for evaluation of abdominal pain. Hour prior to arrival he developed a burning pain in the top of his stomach, nonradiating. He is associated with mild nausea without an episode of emesis. He reports no lower abdominal pain, dysuria, bowel symptoms, fever, chest pain or shortness of breath. The pain is moderate in severity. He did not take anything for it. Nothing makes it better, it is worse when pressing on his abdomen. He has a history of pancreatitis, however he does not remember what this felt like in the past.  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 PCP: Shaun Muñoz MD    Current Facility-Administered Medications   Medication Dose Route Frequency Provider Last Rate Last Admin    ondansetron [facility]) injection 4 mg  4 mg IntraVENous ONCE Yvette Vega MD        morphine injection 4 mg  4 mg IntraVENous ONCE Yvette Vega MD        morphine injection 4 mg  4 mg IntraVENous Joanne Flood MD         Current Outpatient Medications   Medication Sig Dispense Refill    amLODIPine (NORVASC) 10 mg tablet Take 10 mg by mouth daily.  loperamide (Imodium A-D) 2 mg capsule Take 2 mg by mouth four (4) times daily as needed for Diarrhea.  calcium carbonate (TUMS PO) Take  by mouth. As needed      multivitamin (ONE A DAY) tablet Take 1 Tab by mouth daily.  atorvastatin (LIPITOR) 10 mg tablet Take 10 mg by mouth nightly.  oxybutynin (DITROPAN) 5 mg tablet Take 5 mg by mouth daily.  sertraline (ZOLOFT) 50 mg tablet Take 50 mg by mouth nightly.       tiotropium (Spiriva with HandiHaler) 18 mcg inhalation capsule Take 1 Cap by inhalation daily.  albuterol (PROVENTIL HFA, VENTOLIN HFA, PROAIR HFA) 90 mcg/actuation inhaler Take  by inhalation.  aspirin 81 mg chewable tablet Take 81 mg by mouth daily.          Past History     Past Medical History:  Past Medical History:   Diagnosis Date    Acute blood loss as cause of postoperative anemia 4/21/2018    Benign prostatic hyperplasia     Chronic kidney disease     Chronic obstructive pulmonary disease (HCC)     CKD (chronic kidney disease) stage 3, GFR 30-59 ml/min ([address] 9/1/2015    Depression 5/1/6903    Diastolic dysfunction without heart failure 9/2/2015    Grade 1 diastolic dysfunction on 2D ECHO done 9/02/2015    Dyslipidemia 9/2/2015    Dysphagia as late effect of cerebrovascular accident (CVA) 9/1/2015    Hemiparesis affecting left side as late effect of cerebrovascular accident (CVA) ([address] 9/1/2015    History of noncompliance with medical treatment, presenting hazards to health 9/1/2015    History of stroke with residual deficit 9/1/2015    Acute Ischemic Stroke (early subacute infarct at the posterior right lentiform nucleus) with residual left hemiparesis and dysphagia     History of tobacco use 9/1/2015    History of trichomonal urethritis 9/1/2015    History of vitamin D deficiency 9/6/2015    Hypertension     Hypertensive heart and kidney disease without heart failure and with chronic kidney disease stage III ([address] 09/02/2015    Obesity, Class I, BMI 30-34.9 9/1/2015    Overactive bladder     Pancreatitis, acute 12/2020    Postoperative atrial fibrillation ([address] 4/21/2018    Resolved    Postoperative confusion 4/22/2018    Postoperative urinary retention 4/22/2018    Statin intolerance 05/07/2018    Atorvastatin and Simvastatin    Stroke ([address] 2015    weakness rt side, dyphagia    Vitamin D deficiency 5/1/2018    Vitamin D 25-Hydroxy (5/1/2018) = 17.9        Past Surgical History:  Past Surgical History:   Procedure Laterality Date    COLONOSCOPY N/A 4/3/2018    COLONOSCOPY,  w heated snared polypectomy performed by Che Fernandez MD at [facility] COLONOSCOPY N/A 2/25/2021    COLONOSCOPY w polypectomy performed by Kathryn Bustillos MD at [address] COLONOSCOPY N/A 4/19/2021    COLONOSCOPY with Polypectomy performed by Akosua Suarez MD at [facility]COPY    MD APPENDECTOMY,RUPT APPENDX+ABSCESS N/A 04/20/2018    Dr. Philippe Echevarria       Family History:  Family History   Problem Relation Age of Onset    Diabetes Mother     Stroke Mother     Heart Disease Father     Hypertension Father     Diabetes Brother     Hypertension Brother        Social History:  Social History     Tobacco Use    Smoking status: Current Every Day Smoker     Packs/day: 0.25    Smokeless tobacco: Never Used    Tobacco comment: 2 cigs daily   Vaping Use    Vaping Use: Never used   Substance Use Topics    Alcohol use: Not Currently    Drug use: No       Allergies: Allergies   Allergen Reactions    Lipitor [Atorvastatin] Other (comments)     Elevated CK level    Pt has no awareness of this allergy.  Simvastatin Other (comments)     Elevation in LFTs       I reviewed and confirmed the above information with patient and updated as necessary. Review of Systems     Review of Systems   Constitutional: Negative for diaphoresis and fever. HENT: Negative for ear pain and sore throat. Eyes:        No acute change in vision   Respiratory: Negative for cough and shortness of breath. Cardiovascular: Negative for chest pain and leg swelling. Gastrointestinal: Positive for abdominal pain and nausea. Negative for blood in stool, diarrhea and vomiting. Genitourinary: Negative for dysuria. Musculoskeletal: Negative for neck pain. Skin: Negative for wound. Neurological: Negative for weakness and headaches.        Physical Exam     Visit Vitals  /87   Pulse 63   Temp 97.3 °F (36.3 °C)   Resp 16   Wt 98 kg (216 lb)   SpO2 97%   BMI 29.29 kg/m²       Physical Exam  Vitals and nursing note reviewed. Constitutional:       Appearance: Normal appearance. He is not ill-appearing. Comments: Adult male sitting comfortably, NAD   HENT:      Mouth/Throat:      Mouth: Mucous membranes are moist.   Eyes:      Pupils: Pupils are equal, round, and reactive to light. Cardiovascular:      Rate and Rhythm: Normal rate and regular rhythm. Pulses: Normal pulses. Heart sounds: Normal heart sounds. Pulmonary:      Effort: Pulmonary effort is normal.      Breath sounds: Normal breath sounds. Abdominal:      General: Abdomen is flat. Tenderness: There is abdominal tenderness (Epigastric without rebound or guarding). Musculoskeletal:         General: No swelling. Normal range of motion. Cervical back: Normal range of motion. Skin:     General: Skin is warm. Neurological:      General: No focal deficit present. Mental Status: He is alert and oriented to person, place, and time.          Diagnostic Study Results     Labs -  Recent Results (from the past 24 hour(s))   EKG, 12 LEAD, INITIAL    Collection Time: 09/27/21  5:55 PM   Result Value Ref Range    Ventricular Rate 66 BPM    Atrial Rate 66 BPM    P-R Interval 198 ms    QRS Duration 98 ms    Q-T Interval 410 ms    QTC Calculation (Bezet) 429 ms    Calculated P Axis 50 degrees    Calculated R Axis -53 degrees    Calculated T Axis 6 degrees    Diagnosis       Sinus rhythm with occasional premature ventricular complexes  Left axis deviation  Minimal voltage criteria for LVH, may be normal variant ( R in aVL )  Inferior-posterior infarct (cited on or before 27-SEP-2021)  Abnormal ECG  When compared with ECG of 04-DEC-2020 11:47,  premature ventricular complexes are now present  Left anterior fascicular block is no longer present  Questionable change in initial forces of Inferior leads     CBC WITH AUTOMATED DIFF Collection Time: 09/27/21  6:04 PM   Result Value Ref Range    WBC 24.9 (H) 4.6 - 13.2 K/uL    RBC 5.07 4.35 - 5.65 M/uL    HGB 13.7 13.0 - 16.0 g/dL    HCT 42.6 36.0 - 48.0 %    MCV 84.0 78.0 - 100.0 FL    MCH 27.0 24.0 - 34.0 PG    MCHC 32.2 31.0 - 37.0 g/dL    RDW 15.2 (H) 11.6 - 14.5 %    PLATELET 171 353 - 834 K/uL    MPV 11.2 9.2 - 11.8 FL    NEUTROPHILS 96 (H) 40 - 73 %    LYMPHOCYTES 2 (L) 21 - 52 %    MONOCYTES 2 (L) 3 - 10 %    EOSINOPHILS 0 0 - 5 %    BASOPHILS 0 0 - 2 %    ABS. NEUTROPHILS 23.9 (H) 1.8 - 8.0 K/UL    ABS. LYMPHOCYTES 0.5 (L) 0.9 - 3.6 K/UL    ABS. MONOCYTES 0.5 0.05 - 1.2 K/UL    ABS. EOSINOPHILS 0.0 0.0 - 0.4 K/UL    ABS. BASOPHILS 0.0 0.0 - 0.1 K/UL    DF MANUAL      PLATELET COMMENTS ADEQUATE PLATELETS      RBC COMMENTS NORMOCYTIC, NORMOCHROMIC     METABOLIC PANEL, COMPREHENSIVE    Collection Time: 09/27/21  6:04 PM   Result Value Ref Range    Sodium 140 136 - 145 mmol/L    Potassium 4.2 3.5 - 5.5 mmol/L    Chloride 108 100 - 111 mmol/L    CO2 29 21 - 32 mmol/L    Anion gap 3 3.0 - 18 mmol/L    Glucose 142 (H) 74 - 99 mg/dL    BUN 20 (H) 7.0 - 18 MG/DL    Creatinine 1.90 (H) 0.6 - 1.3 MG/DL    BUN/Creatinine ratio 11 (L) 12 - 20      GFR est AA 42 (L) >60 ml/min/1.73m2    GFR est non-AA 35 (L) >60 ml/min/1.73m2    Calcium 10.1 8.5 - 10.1 MG/DL    Bilirubin, total 0.8 0.2 - 1.0 MG/DL    ALT (SGPT) 356 (H) 16 - 61 U/L    AST (SGOT) 562 (H) 10 - 38 U/L    Alk.  phosphatase 119 (H) 45 - 117 U/L    Protein, total 8.1 6.4 - 8.2 g/dL    Albumin 3.8 3.4 - 5.0 g/dL    Globulin 4.3 (H) 2.0 - 4.0 g/dL    A-G Ratio 0.9 0.8 - 1.7     CARDIAC PANEL,(CK, CKMB & TROPONIN)    Collection Time: 09/27/21  6:04 PM   Result Value Ref Range    CK - MB 1.4 <3.6 ng/ml    CK-MB Index 0.6 0.0 - 4.0 %     39 - 308 U/L    Troponin-I, QT <0.02 0.0 - 0.045 NG/ML   NT-PRO BNP    Collection Time: 09/27/21  6:04 PM   Result Value Ref Range    NT pro-BNP 52 0 - 1,800 PG/ML   LIPASE    Collection Time: 09/27/21 6:04 PM   Result Value Ref Range    Lipase 22,314 (H) 73 - 393 U/L         Radiologic Studies -   CT ABD PELV W CONT   Final Result   1. Interstitial edematous pancreatitis. No loculated fluid collection. 2. Large prostate. Mild bladder wall thickening may be related to   underdistention or chronic outlet obstruction. 3. Colonic diverticulosis. 4. Stable bilateral common iliac artery aneurysms, measuring 2.4 cm on the right   and 2.1 cm on the left. 5. Small ventral abdominal wall hernia contains a loop of small bowel, without   evidence for obstruction or inflammation. 6. Additional findings as above. XR CHEST SNGL V   Final Result   Minimal atelectasis left lung base. No acute cardiopulmonary process. Medical Decision Making   I am the first provider for this patient. I reviewed the vital signs, available nursing notes, past medical history, past surgical history, family history and social history. Vital Signs-Reviewed the patient's vital signs. EKG: Nondiagnostic    Records Reviewed: Nursing Notes, Previous electrocardiograms, Previous Radiology Studies and Previous Laboratory Studies (Time of Review: 6:27 PM)    Provider Notes (Medical Decision Making):   MDM  13-year-old male here with epigastric abdominal pain, likely pancreatitis given his history. Low suspicion for ACS, also consider valvular disease, gastroenteritis, gastritis, obstruction, perforated hollow viscus, others    ED Course: Progress Notes, Reevaluation, and Consults:. Patient arrives afebrile, hemodynamically normal  He is resting comfortably, not distressed  His abdomen is soft, tender to palpation in the epigastric region without rebound or guarding, not a surgical abdomen  Remainder physical exam is reassuring    We will screen labs, EKG, repeat CT abdomen pelvis. Will treat with morphine, Zofran. CBC shows a leukocytosis of 24.9 with significant left shift.   Renal function at baseline  LFTs are elevated, lipase is 22,000  CT abdomen pelvis  IMPRESSION  1. Interstitial edematous pancreatitis. No loculated fluid collection.     2. Large prostate. Mild bladder wall thickening may be related to  underdistention or chronic outlet obstruction.     3. Colonic diverticulosis.     4. Stable bilateral common iliac artery aneurysms, measuring 2.4 cm on the right  and 2.1 cm on the left.     5. Small ventral abdominal wall hernia contains a loop of small bowel, without  evidence for obstruction or inflammation.     6. Additional findings as above. Discussed above results with patient, he would like to be discharged home which I do not think is a good idea for him. Given his age, comorbidities and risk factors I think that if he went home today he may return very sick. I have explained this to him, but he wants to try to take p.o. anyway. He is actually able to tolerate a small amount of juice however noted that it brought the pain back. I have discussed with his sister, she has encouraged him to stay and be admitted. Have discussed with hospitalist team who agrees to consult. Procedures    Diagnosis     Clinical Impression:   1. Acute pancreatitis, unspecified complication status, unspecified pancreatitis type    2. Abdominal pain, epigastric        Disposition: Admit    Follow-up Information    None          Patient's Medications   Start Taking    No medications on file   Continue Taking    ALBUTEROL (PROVENTIL HFA, VENTOLIN HFA, PROAIR HFA) 90 MCG/ACTUATION INHALER    Take  by inhalation. AMLODIPINE (NORVASC) 10 MG TABLET    Take 10 mg by mouth daily. ASPIRIN 81 MG CHEWABLE TABLET    Take 81 mg by mouth daily. ATORVASTATIN (LIPITOR) 10 MG TABLET    Take 10 mg by mouth nightly. CALCIUM CARBONATE (TUMS PO)    Take  by mouth. As needed    LOPERAMIDE (IMODIUM A-D) 2 MG CAPSULE    Take 2 mg by mouth four (4) times daily as needed for Diarrhea.     MULTIVITAMIN (ONE A DAY) TABLET    Take 1 Tab by mouth daily. OXYBUTYNIN (DITROPAN) 5 MG TABLET    Take 5 mg by mouth daily. SERTRALINE (ZOLOFT) 50 MG TABLET    Take 50 mg by mouth nightly. TIOTROPIUM (SPIRIVA WITH HANDIHALER) 18 MCG INHALATION CAPSULE    Take 1 Cap by inhalation daily. These Medications have changed    No medications on file   Stop Taking    No medications on file       Ivelisse Moore MD   Emergency Medicine   September 27, 2021, 6:27 PM     This note is dictated utilizing Dragon voice recognition software. Unfortunately this leads to occasional typographical errors using the voice recognition. I apologize in advance if the situation occurs. If questions occur please do not hesitate to contact me directly.     Miesha Castillo MD

## 2021-09-28 ENCOUNTER — APPOINTMENT (OUTPATIENT)
Dept: MRI IMAGING | Age: 76
DRG: 871 | End: 2021-09-28
Attending: STUDENT IN AN ORGANIZED HEALTH CARE EDUCATION/TRAINING PROGRAM
Payer: MEDICARE

## 2021-09-28 ENCOUNTER — APPOINTMENT (OUTPATIENT)
Dept: ULTRASOUND IMAGING | Age: 76
DRG: 871 | End: 2021-09-28
Attending: STUDENT IN AN ORGANIZED HEALTH CARE EDUCATION/TRAINING PROGRAM
Payer: MEDICARE

## 2021-09-28 PROBLEM — K85.90 ACUTE PANCREATITIS: Status: ACTIVE | Noted: 2021-09-28

## 2021-09-28 LAB
ALBUMIN SERPL-MCNC: 3.7 G/DL (ref 3.4–5)
ALBUMIN/GLOB SERPL: 0.9 {RATIO} (ref 0.8–1.7)
ALP SERPL-CCNC: 124 U/L (ref 45–117)
ALT SERPL-CCNC: 470 U/L (ref 16–61)
ANION GAP SERPL CALC-SCNC: 7 MMOL/L (ref 3–18)
AST SERPL-CCNC: 357 U/L (ref 10–38)
ATRIAL RATE: 66 BPM
BASOPHILS # BLD: 0.1 K/UL (ref 0–0.1)
BASOPHILS NFR BLD: 0 % (ref 0–2)
BILIRUB SERPL-MCNC: 0.7 MG/DL (ref 0.2–1)
BUN SERPL-MCNC: 20 MG/DL (ref 7–18)
BUN/CREAT SERPL: 13 (ref 12–20)
CALCIUM SERPL-MCNC: 10.5 MG/DL (ref 8.5–10.1)
CALCULATED P AXIS, ECG09: 50 DEGREES
CALCULATED R AXIS, ECG10: -53 DEGREES
CALCULATED T AXIS, ECG11: 6 DEGREES
CHLORIDE SERPL-SCNC: 108 MMOL/L (ref 100–111)
CO2 SERPL-SCNC: 23 MMOL/L (ref 21–32)
CREAT SERPL-MCNC: 1.57 MG/DL (ref 0.6–1.3)
DIAGNOSIS, 93000: NORMAL
DIFFERENTIAL METHOD BLD: ABNORMAL
EOSINOPHIL # BLD: 0 K/UL (ref 0–0.4)
EOSINOPHIL NFR BLD: 0 % (ref 0–5)
ERYTHROCYTE [DISTWIDTH] IN BLOOD BY AUTOMATED COUNT: 15.2 % (ref 11.6–14.5)
GLOBULIN SER CALC-MCNC: 4.2 G/DL (ref 2–4)
GLUCOSE SERPL-MCNC: 110 MG/DL (ref 74–99)
HCT VFR BLD AUTO: 42.7 % (ref 36–48)
HGB BLD-MCNC: 13.7 G/DL (ref 13–16)
LIPASE SERPL-CCNC: 4320 U/L (ref 73–393)
LYMPHOCYTES # BLD: 1 K/UL (ref 0.9–3.6)
LYMPHOCYTES NFR BLD: 6 % (ref 21–52)
MCH RBC QN AUTO: 26.8 PG (ref 24–34)
MCHC RBC AUTO-ENTMCNC: 32.1 G/DL (ref 31–37)
MCV RBC AUTO: 83.4 FL (ref 78–100)
MONOCYTES # BLD: 1 K/UL (ref 0.05–1.2)
MONOCYTES NFR BLD: 6 % (ref 3–10)
NEUTS SEG # BLD: 15.3 K/UL (ref 1.8–8)
NEUTS SEG NFR BLD: 88 % (ref 40–73)
P-R INTERVAL, ECG05: 198 MS
PHOSPHATE SERPL-MCNC: 3 MG/DL (ref 2.5–4.9)
PLATELET # BLD AUTO: 220 K/UL (ref 135–420)
PMV BLD AUTO: 10.8 FL (ref 9.2–11.8)
POTASSIUM SERPL-SCNC: 4.2 MMOL/L (ref 3.5–5.5)
PROT SERPL-MCNC: 7.9 G/DL (ref 6.4–8.2)
Q-T INTERVAL, ECG07: 410 MS
QRS DURATION, ECG06: 98 MS
QTC CALCULATION (BEZET), ECG08: 429 MS
RBC # BLD AUTO: 5.12 M/UL (ref 4.35–5.65)
SODIUM SERPL-SCNC: 138 MMOL/L (ref 136–145)
VENTRICULAR RATE, ECG03: 66 BPM
WBC # BLD AUTO: 17.5 K/UL (ref 4.6–13.2)

## 2021-09-28 PROCEDURE — 85025 COMPLETE CBC W/AUTO DIFF WBC: CPT

## 2021-09-28 PROCEDURE — 97166 OT EVAL MOD COMPLEX 45 MIN: CPT

## 2021-09-28 PROCEDURE — 83690 ASSAY OF LIPASE: CPT

## 2021-09-28 PROCEDURE — 87077 CULTURE AEROBIC IDENTIFY: CPT

## 2021-09-28 PROCEDURE — 80053 COMPREHEN METABOLIC PANEL: CPT

## 2021-09-28 PROCEDURE — 87040 BLOOD CULTURE FOR BACTERIA: CPT

## 2021-09-28 PROCEDURE — 74011250636 HC RX REV CODE- 250/636: Performed by: FAMILY MEDICINE

## 2021-09-28 PROCEDURE — A9577 INJ MULTIHANCE: HCPCS | Performed by: FAMILY MEDICINE

## 2021-09-28 PROCEDURE — 76705 ECHO EXAM OF ABDOMEN: CPT

## 2021-09-28 PROCEDURE — 84100 ASSAY OF PHOSPHORUS: CPT

## 2021-09-28 PROCEDURE — 74011250636 HC RX REV CODE- 250/636: Performed by: STUDENT IN AN ORGANIZED HEALTH CARE EDUCATION/TRAINING PROGRAM

## 2021-09-28 PROCEDURE — 97162 PT EVAL MOD COMPLEX 30 MIN: CPT

## 2021-09-28 PROCEDURE — 74181 MRI ABDOMEN W/O CONTRAST: CPT

## 2021-09-28 PROCEDURE — 65270000029 HC RM PRIVATE

## 2021-09-28 PROCEDURE — 74011250637 HC RX REV CODE- 250/637: Performed by: STUDENT IN AN ORGANIZED HEALTH CARE EDUCATION/TRAINING PROGRAM

## 2021-09-28 PROCEDURE — 87186 SC STD MICRODIL/AGAR DIL: CPT

## 2021-09-28 RX ORDER — FUROSEMIDE 20 MG/1
20 TABLET ORAL DAILY
Status: DISCONTINUED | OUTPATIENT
Start: 2021-09-29 | End: 2021-10-01 | Stop reason: HOSPADM

## 2021-09-28 RX ORDER — HEPARIN SODIUM 5000 [USP'U]/ML
5000 INJECTION, SOLUTION INTRAVENOUS; SUBCUTANEOUS EVERY 8 HOURS
Status: DISCONTINUED | OUTPATIENT
Start: 2021-09-28 | End: 2021-10-01 | Stop reason: HOSPADM

## 2021-09-28 RX ORDER — ONDANSETRON 4 MG/1
4 TABLET, ORALLY DISINTEGRATING ORAL
Status: DISCONTINUED | OUTPATIENT
Start: 2021-09-28 | End: 2021-09-28

## 2021-09-28 RX ORDER — ONDANSETRON 2 MG/ML
4 INJECTION INTRAMUSCULAR; INTRAVENOUS
Status: DISCONTINUED | OUTPATIENT
Start: 2021-09-28 | End: 2021-10-01 | Stop reason: HOSPADM

## 2021-09-28 RX ORDER — ACETAMINOPHEN 325 MG/1
650 TABLET ORAL
Status: DISCONTINUED | OUTPATIENT
Start: 2021-09-28 | End: 2021-10-01 | Stop reason: HOSPADM

## 2021-09-28 RX ORDER — SODIUM CHLORIDE 0.9 % (FLUSH) 0.9 %
5-40 SYRINGE (ML) INJECTION EVERY 8 HOURS
Status: DISCONTINUED | OUTPATIENT
Start: 2021-09-28 | End: 2021-10-01 | Stop reason: HOSPADM

## 2021-09-28 RX ORDER — LOPERAMIDE HYDROCHLORIDE 2 MG/1
2 CAPSULE ORAL
Status: DISCONTINUED | OUTPATIENT
Start: 2021-09-28 | End: 2021-10-01 | Stop reason: HOSPADM

## 2021-09-28 RX ORDER — AMLODIPINE BESYLATE 10 MG/1
10 TABLET ORAL DAILY
Status: DISCONTINUED | OUTPATIENT
Start: 2021-09-29 | End: 2021-10-01 | Stop reason: HOSPADM

## 2021-09-28 RX ORDER — GUAIFENESIN 100 MG/5ML
81 LIQUID (ML) ORAL DAILY
Status: DISCONTINUED | OUTPATIENT
Start: 2021-09-29 | End: 2021-10-01 | Stop reason: HOSPADM

## 2021-09-28 RX ORDER — VANCOMYCIN 2 GRAM/500 ML IN 0.9 % SODIUM CHLORIDE INTRAVENOUS
2000 ONCE
Status: ACTIVE | OUTPATIENT
Start: 2021-09-28 | End: 2021-09-29

## 2021-09-28 RX ORDER — THERA TABS 400 MCG
1 TAB ORAL DAILY
Status: DISCONTINUED | OUTPATIENT
Start: 2021-09-29 | End: 2021-10-01 | Stop reason: HOSPADM

## 2021-09-28 RX ORDER — OXYBUTYNIN CHLORIDE 5 MG/1
5 TABLET ORAL DAILY
Status: DISCONTINUED | OUTPATIENT
Start: 2021-09-29 | End: 2021-10-01 | Stop reason: HOSPADM

## 2021-09-28 RX ORDER — POLYETHYLENE GLYCOL 3350 17 G/17G
17 POWDER, FOR SOLUTION ORAL DAILY PRN
Status: DISCONTINUED | OUTPATIENT
Start: 2021-09-28 | End: 2021-10-01 | Stop reason: HOSPADM

## 2021-09-28 RX ORDER — ACETAMINOPHEN 650 MG/1
650 SUPPOSITORY RECTAL
Status: DISCONTINUED | OUTPATIENT
Start: 2021-09-28 | End: 2021-10-01 | Stop reason: HOSPADM

## 2021-09-28 RX ORDER — SERTRALINE HYDROCHLORIDE 50 MG/1
50 TABLET, FILM COATED ORAL
Status: DISCONTINUED | OUTPATIENT
Start: 2021-09-28 | End: 2021-10-01 | Stop reason: HOSPADM

## 2021-09-28 RX ORDER — SODIUM CHLORIDE, SODIUM LACTATE, POTASSIUM CHLORIDE, CALCIUM CHLORIDE 600; 310; 30; 20 MG/100ML; MG/100ML; MG/100ML; MG/100ML
200 INJECTION, SOLUTION INTRAVENOUS CONTINUOUS
Status: DISCONTINUED | OUTPATIENT
Start: 2021-09-28 | End: 2021-10-01 | Stop reason: HOSPADM

## 2021-09-28 RX ORDER — FUROSEMIDE 20 MG/1
20 TABLET ORAL DAILY
COMMUNITY
End: 2021-10-03

## 2021-09-28 RX ORDER — IBUPROFEN 200 MG
1 TABLET ORAL DAILY
Status: DISCONTINUED | OUTPATIENT
Start: 2021-09-28 | End: 2021-10-01 | Stop reason: HOSPADM

## 2021-09-28 RX ORDER — SODIUM CHLORIDE 0.9 % (FLUSH) 0.9 %
5-40 SYRINGE (ML) INJECTION AS NEEDED
Status: DISCONTINUED | OUTPATIENT
Start: 2021-09-28 | End: 2021-10-01 | Stop reason: HOSPADM

## 2021-09-28 RX ORDER — ATORVASTATIN CALCIUM 10 MG/1
10 TABLET, FILM COATED ORAL
Status: DISCONTINUED | OUTPATIENT
Start: 2021-09-28 | End: 2021-10-01 | Stop reason: HOSPADM

## 2021-09-28 RX ADMIN — HEPARIN SODIUM 5000 UNITS: 5000 INJECTION INTRAVENOUS; SUBCUTANEOUS at 11:26

## 2021-09-28 RX ADMIN — HEPARIN SODIUM 5000 UNITS: 5000 INJECTION INTRAVENOUS; SUBCUTANEOUS at 18:10

## 2021-09-28 RX ADMIN — SODIUM CHLORIDE, SODIUM LACTATE, POTASSIUM CHLORIDE, AND CALCIUM CHLORIDE 200 ML/HR: 600; 310; 30; 20 INJECTION, SOLUTION INTRAVENOUS at 18:09

## 2021-09-28 RX ADMIN — GADOBENATE DIMEGLUMINE 17 ML: 529 INJECTION, SOLUTION INTRAVENOUS at 20:34

## 2021-09-28 RX ADMIN — SODIUM CHLORIDE, SODIUM LACTATE, POTASSIUM CHLORIDE, AND CALCIUM CHLORIDE 1000 ML: 600; 310; 30; 20 INJECTION, SOLUTION INTRAVENOUS at 05:36

## 2021-09-28 NOTE — H&P
Admission History and Physical  [facility]          Patient: Buddy Ya MRN: [id number]  [facility]: [id number]    YOB: 1945  Age: 67 y.o. Sex: male      Admission Date: 9/27/2021       HPI:     Buddy Ya is a 67 y.o. male with asthma, history of gallstone pancreatitis in 2020, HLD, prior CVA,, hypertension, now admitted for abdominal pain since this morning. Patient started having sharp epigastric pain this morning associated with nausea but no vomiting. There was no radiation of pain. Denied fever, diarrhea, constipation, chest pain, sob, melena, or Hx of NSAID use . Endorsed urinary frequency and occasional urinary incontinence. Patient has not been able to keep anything down since this morning. He stopped drinking alcohol 25 years ago, smokes 4-5 cigarettes a day for 4-5 years, and has Hx of gallstone pancreatitis in 2020. ED Course (See objective for values/interpretations):  Labs obtained: CBC, CMP, lipase, cardiac panel, BNP   Medications administered:  2x 4mg morphine, 1x IV zofran   Imaging obtained: CXR     Review of Systems   Constitutional: Negative for chills and fever. Respiratory: Negative for shortness of breath. Cardiovascular: Positive for leg swelling. Negative for chest pain. Gastrointestinal: Positive for abdominal pain and nausea. Negative for constipation, diarrhea, melena and vomiting. Genitourinary: Positive for frequency.        Past Medical History:   Diagnosis Date    Acute blood loss as cause of postoperative anemia 4/21/2018    Benign prostatic hyperplasia     Chronic kidney disease     Chronic obstructive pulmonary disease (HCC)     CKD (chronic kidney disease) stage 3, GFR 30-59 ml/min ([facility] [address] 9/1/2015    Depression 0/6/7277    Diastolic dysfunction without heart failure 9/2/2015    Grade 1 diastolic dysfunction on 2D ECHO done 9/02/2015    Dyslipidemia 9/2/2015    Dysphagia as late effect of cerebrovascular accident (CVA) 9/1/2015    Hemiparesis affecting left side as late effect of cerebrovascular accident (CVA) ([facility] [address] 9/1/2015    History of noncompliance with medical treatment, presenting hazards to health 9/1/2015    History of stroke with residual deficit 9/1/2015    Acute Ischemic Stroke (early subacute infarct at the posterior right lentiform nucleus) with residual left hemiparesis and dysphagia     History of tobacco use 9/1/2015    History of trichomonal urethritis 9/1/2015    History of vitamin D deficiency 9/6/2015    Hypertension     Hypertensive heart and kidney disease without heart failure and with chronic kidney disease stage III ([facility] [address] 09/02/2015    Obesity, Class I, BMI 30-34.9 9/1/2015    Overactive bladder     Pancreatitis, acute 12/2020    Postoperative atrial fibrillation (HCC) 4/21/2018    Resolved    Postoperative confusion 4/22/2018    Postoperative urinary retention 4/22/2018    Statin intolerance 05/07/2018    Atorvastatin and Simvastatin    Stroke ([facility] [address] 2015    weakness rt side, dyphagia    Vitamin D deficiency 5/1/2018    Vitamin D 25-Hydroxy (5/1/2018) = 17.9        Past Surgical History:   Procedure Laterality Date    COLONOSCOPY N/A 4/3/2018    COLONOSCOPY,  w heated snared polypectomy performed by Moose Murillo MD at [facility] COLONOSCOPY N/A 2/25/2021    COLONOSCOPY w polypectomy performed by Maxim Hines MD at [address] COLONOSCOPY N/A 4/19/2021    COLONOSCOPY with Polypectomy performed by Bess Frances MD at [facility] ENDOSCOPY    MN APPENDECTOMY,RUPT APPENDX+ABSCESS N/A 04/20/2018    Dr. Shruthi Crain       Family History   Problem Relation Age of Onset    Diabetes Mother     Stroke Mother     Heart Disease Father     Hypertension Father     Diabetes Brother     Hypertension Brother        Social History     Socioeconomic History    Marital status: SINGLE     Spouse name: Not on file    Number of children: Not on file    Years of education: Not on file    Highest education level: Not on file   Tobacco Use    Smoking status: Current Every Day Smoker     Packs/day: 0.25    Smokeless tobacco: Never Used    Tobacco comment: 2 cigs daily   Vaping Use    Vaping Use: Never used   Substance and Sexual Activity    Alcohol use: Not Currently    Drug use: No    Sexual activity: Never     Social Determinants of Health     Financial Resource Strain:     Difficulty of Paying Living Expenses:    Food Insecurity:     Worried About Running Out of Food in the Last Year:     Ran Out of Food in the Last Year:    Transportation Needs:     Lack of Transportation (Medical):  Lack of Transportation (Non-Medical):    Physical Activity:     Days of Exercise per Week:     Minutes of Exercise per Session:    Stress:     Feeling of Stress :    Social Connections:     Frequency of Communication with Friends and Family:     Frequency of Social Gatherings with Friends and Family:     Attends Faith Services:     Active Member of Clubs or Organizations:     Attends Club or Organization Meetings:     Marital Status:        Allergies   Allergen Reactions    Lipitor [Atorvastatin] Other (comments)     Elevated CK level    Pt has no awareness of this allergy.  Simvastatin Other (comments)     Elevation in LFTs       Prior to Admission Medications   Prescriptions Last Dose Informant Patient Reported? Taking? albuterol (PROVENTIL HFA, VENTOLIN HFA, PROAIR HFA) 90 mcg/actuation inhaler   Yes No   Sig: Take  by inhalation. amLODIPine (NORVASC) 10 mg tablet   Yes No   Sig: Take 10 mg by mouth daily. aspirin 81 mg chewable tablet   Yes No   Sig: Take 81 mg by mouth daily. atorvastatin (LIPITOR) 10 mg tablet   Yes No   Sig: Take 10 mg by mouth nightly. calcium carbonate (TUMS PO)   Yes No   Sig: Take  by mouth. As needed   loperamide (Imodium A-D) 2 mg capsule   Yes No   Sig: Take 2 mg by mouth four (4) times daily as needed for Diarrhea. multivitamin (ONE A DAY) tablet   Yes No   Sig: Take 1 Tab by mouth daily. oxybutynin (DITROPAN) 5 mg tablet   Yes No   Sig: Take 5 mg by mouth daily. sertraline (ZOLOFT) 50 mg tablet   Yes No   Sig: Take 50 mg by mouth nightly. tiotropium (Spiriva with HandiHaler) 18 mcg inhalation capsule   Yes No   Sig: Take 1 Cap by inhalation daily. Facility-Administered Medications: None       Objective:     Patient Vitals for the past 24 hrs:   Temp Pulse Resp BP SpO2   09/27/21 1723 97.3 °F (36.3 °C) 63 16 122/87 97 %       Physical Exam:   General:  AAOx self, location, situation, year and month, NAD   CV:  RRR, no M/G/R. No visible pulsations or thrills. RESP:  Unlabored breathing. Lungs CTAB, no wheezes, rales or rhonchi appreciated. Equal expansion bilaterally. ABD:  Soft, tenderness to palpation in epigastric region w/ guarding, no rebound, negative Sharma's sign, BS (+). No hepatosplenomegaly. Ext:  Bilateral lower extremity pitting edema. 2+ dp pulses bilaterally.   Psych: normal mood and affect     Labs & Imaging:   Recent Results (from the past 48 hour(s))   EKG, 12 LEAD, INITIAL    Collection Time: 09/27/21  5:55 PM   Result Value Ref Range    Ventricular Rate 66 BPM    Atrial Rate 66 BPM    P-R Interval 198 ms    QRS Duration 98 ms    Q-T Interval 410 ms    QTC Calculation (Bezet) 429 ms    Calculated P Axis 50 degrees    Calculated R Axis -53 degrees    Calculated T Axis 6 degrees    Diagnosis       Sinus rhythm with occasional premature ventricular complexes  Left axis deviation  Minimal voltage criteria for LVH, may be normal variant ( R in aVL )  Inferior-posterior infarct (cited on or before 27-SEP-2021)  Abnormal ECG  When compared with ECG of 04-DEC-2020 11:47,  premature ventricular complexes are now present  Left anterior fascicular block is no longer present  Questionable change in initial forces of Inferior leads     CBC WITH AUTOMATED DIFF    Collection Time: 09/27/21  6:04 PM Result Value Ref Range    WBC 24.9 (H) 4.6 - 13.2 K/uL    RBC 5.07 4.35 - 5.65 M/uL    HGB 13.7 13.0 - 16.0 g/dL    HCT 42.6 36.0 - 48.0 %    MCV 84.0 78.0 - 100.0 FL    MCH 27.0 24.0 - 34.0 PG    MCHC 32.2 31.0 - 37.0 g/dL    RDW 15.2 (H) 11.6 - 14.5 %    PLATELET 083 212 - 909 K/uL    MPV 11.2 9.2 - 11.8 FL    NEUTROPHILS 96 (H) 40 - 73 %    LYMPHOCYTES 2 (L) 21 - 52 %    MONOCYTES 2 (L) 3 - 10 %    EOSINOPHILS 0 0 - 5 %    BASOPHILS 0 0 - 2 %    ABS. NEUTROPHILS 23.9 (H) 1.8 - 8.0 K/UL    ABS. LYMPHOCYTES 0.5 (L) 0.9 - 3.6 K/UL    ABS. MONOCYTES 0.5 0.05 - 1.2 K/UL    ABS. EOSINOPHILS 0.0 0.0 - 0.4 K/UL    ABS. BASOPHILS 0.0 0.0 - 0.1 K/UL    DF MANUAL      PLATELET COMMENTS ADEQUATE PLATELETS      RBC COMMENTS NORMOCYTIC, NORMOCHROMIC     METABOLIC PANEL, COMPREHENSIVE    Collection Time: 09/27/21  6:04 PM   Result Value Ref Range    Sodium 140 136 - 145 mmol/L    Potassium 4.2 3.5 - 5.5 mmol/L    Chloride 108 100 - 111 mmol/L    CO2 29 21 - 32 mmol/L    Anion gap 3 3.0 - 18 mmol/L    Glucose 142 (H) 74 - 99 mg/dL    BUN 20 (H) 7.0 - 18 MG/DL    Creatinine 1.90 (H) 0.6 - 1.3 MG/DL    BUN/Creatinine ratio 11 (L) 12 - 20      GFR est AA 42 (L) >60 ml/min/1.73m2    GFR est non-AA 35 (L) >60 ml/min/1.73m2    Calcium 10.1 8.5 - 10.1 MG/DL    Bilirubin, total 0.8 0.2 - 1.0 MG/DL    ALT (SGPT) 356 (H) 16 - 61 U/L    AST (SGOT) 562 (H) 10 - 38 U/L    Alk.  phosphatase 119 (H) 45 - 117 U/L    Protein, total 8.1 6.4 - 8.2 g/dL    Albumin 3.8 3.4 - 5.0 g/dL    Globulin 4.3 (H) 2.0 - 4.0 g/dL    A-G Ratio 0.9 0.8 - 1.7     CARDIAC PANEL,(CK, CKMB & TROPONIN)    Collection Time: 09/27/21  6:04 PM   Result Value Ref Range    CK - MB 1.4 <3.6 ng/ml    CK-MB Index 0.6 0.0 - 4.0 %     39 - 308 U/L    Troponin-I, QT <0.02 0.0 - 0.045 NG/ML   NT-PRO BNP    Collection Time: 09/27/21  6:04 PM   Result Value Ref Range    NT pro-BNP 52 0 - 1,800 PG/ML   LIPASE    Collection Time: 09/27/21  6:04 PM   Result Value Ref Range Lipase 22,314 (H) 73 - 393 U/L       Assessment & Plan:   Julien Rangel is a 67 y.o. male with asthma, history of gallstone pancreatitis in 2020, HLD, prior CVA, hypertension, now admitted for acute pancreatitis. Epigastric pain  Patient started having sharp epigastric pain this morning associated with nausea but no vomiting. There was no radiation of pain. Denied fever, diarrhea, constipation, chest pain, sob, melena, or Hx of NSAID use . Endorsed urinary frequency and occasional urinary incontinence. Patient has not been able to keep anything down since this morning. He stopped drinking alcohol 25 years ago, smokes 4-5 cigarettes a day for 4-5 years, and has Hx of gallstone pancreatitis in 2020. Afebrile, WBC 25, , , marium phos 119, lipase 22K. Most likely d/t gallstone pancreatitis given Hx of gallstone pancreatitis, elevated LFT's, marium phos, elevated lipase, epigastric pain a/w n/v, and Hx of alcoholism and HLD. Gastric ulcer is on the differential given epigastric pain with tenderness of palpation in the epigastric region. Cholecystitis is also a possibility given Hx of gallstones, however, its lower on the ddx given negative Sharma's sign.                 - admit to medicine  - NPO, advance slowly  - LR 1L bolus, cautious given reported 1 day of sxs and hx CKD with current edema  - LR maintenance fluid at 200 cc/hr   - daily CBC, CMP, lipase  - one time Mg, Phos  - zofran PRN   - RUQ US   - Pain control as needed,     DARINEL in the setting of CKD  Likely 2/2 decreased PO intake 2/2 abdominal pain/nausea  - Cr 1.9, baseline 1.4-1.6  - CKD stage 3 w/ GFR 42  - fluid bolus and maintenance fluid  - daily BMP  - Avoid nephrotoxic meds    Asthma  - cont home meds Spiriva and albuterol pending med rec w sister    HLD/Hx CVA  - cont home med atorvastatin, ASA pending med rec w sister    HTN   - cont home med amlodipine  pending med rec w sister    OAB  - Continue home oxybutynin pending med rec    Mood  - Continue home sertraline pending med rec    [employer] Care:  - VS per unit routine  - supplemental O2 for sats < 92%  - incentive spirometer  - PT/OT/CM    Diet npo   DVT Prophylaxis SQH   GI Prophylaxis None    Code status  full    Disposition  medical floor      Point of Contact She Ruiz (Sister)   [phone] (Home Phone)      Niru Mehta MD , PGY-1   [facility]   September 28, 2021, 1:53 AM        Agree with note as written  Pretty mildly symptomatic acute pancreatitis with some decreased PO intake. Impressive lab changes in 67year old male so will keep NPO and hydrate and then slowly advance diet. Hx of gallstone pancreatitis seen by GI at admission 12/2020 and pt planned to control symptoms with diet as opposed to procedure. Will consult GI for similar decision at this hospitalization. RUQ US to evaluate for stone and other complications. Pain control as needed. Pt does not remember his previous episode of pancreatitis or any of his meds at all, otherwise pretty oriented. Sister is in charge of his meds, number above.    Anali Alston MD 4:13 AM

## 2021-09-28 NOTE — ED NOTES
Dr. Skye Bhakta is the provider of record for this patient. Briefly, the patient was evaluated by [facility]. They have accepted him on their service. I have placed the ADT order.

## 2021-09-28 NOTE — PROGRESS NOTES
completed the initial Spiritual Assessment of the patient in bed 2 of the emergency room  and offered Pastoral Care  Support to the patient. Patient does not have an advance directive completed at this time. Patient does not have any Hinduism/cultural needs that will affect patients preferences in health care. Chaplains will continue to follow and will provide pastoral care on an as needed/requested basis.     Cyndi Herrmann  \[facility] [place] [facility]\"" Care Department  [phone]

## 2021-09-28 NOTE — PROGRESS NOTES
Problem: Mobility Impaired (Adult and Pediatric)  Goal: *Acute Goals and Plan of Care (Insert Text)  Description: Physical Therapy Goals  Initiated 9/28/2021 and to be accomplished within 7 day(s)  1. Patient will move from supine to sit and sit to supine , scoot up and down, and roll side to side in bed with modified independence. 2.  Patient will transfer from bed to chair and chair to bed with supervision/set-up using the least restrictive device. 3.  Patient will perform sit to stand with supervision/set-up. 4.  Patient will ambulate with supervision/set-up for 100 feet with the least restrictive device. 5.  Patient will perform LE therex as prescribed to BLE as tolerated     PLOF: Pt reports he lives in a 1  with 0 [facility] ([facility] with his sister. Was mod ind with mobility PTA with use of a LBQC, reports he has a walker if needed as well. Outcome: Progressing Towards Goal     PHYSICAL THERAPY EVALUATION    Patient: Karla Saha (7 y.o. male)  Date: 9/28/2021  Primary Diagnosis: Pancreatitis [K85.90]  Acute pancreatitis [K85.90]        Precautions:      WBAT  PLOF: see above     ASSESSMENT :  Based on the objective data described below, the patient presents with decreased activity tolerance  and functional mobility from baseline. Pt seen in ED on stretcher in NAD. Pt with history of CVA with intermittent aphasia noted but is able to follow commands and answer questions appropriate, no obvious strength discrepancy in BLE. Pt is able to perform supine <> sit and sit <> stand with CGA this date, pt given RW for gait next to bed with CGA for safety and steadying, no LOB noted. Pt declines lightheadedness or dizziness with mobility. At end of session, pt returned to supine and left with all needs met and call bell within reach. Pt would benefit from continued skilled PT in the acute setting, will continue to follow per POC, recommend [facility] with family support pending progress.     Patient will benefit from skilled intervention to address the above impairments. Patient's rehabilitation potential is considered to be Good  Factors which may influence rehabilitation potential include:   []         None noted  [x]         Mental ability/status  [x]         Medical condition  [x]         Home/family situation and support systems  []         Safety awareness  []         Pain tolerance/management  []         Other:      PLAN :  Recommendations and Planned Interventions:   [x]           Bed Mobility Training             [x]    Neuromuscular Re-Education  [x]           Transfer Training                   []    Orthotic/Prosthetic Training  [x]           Gait Training                          []    Modalities  [x]           Therapeutic Exercises           []    Edema Management/Control  [x]           Therapeutic Activities            [x]    Family Training/Education  [x]           Patient Education  []           Other (comment):    Frequency/Duration: Patient will be followed by physical therapy 1-2 times per day/3-5 days per week to address goals. Discharge Recommendations: Home Health pending progress  Further Equipment Recommendations for Discharge: N/A     SUBJECTIVE:   Patient stated I live with my sister.     OBJECTIVE DATA SUMMARY:     Past Medical History:   Diagnosis Date    Acute blood loss as cause of postoperative anemia 4/21/2018    Benign prostatic hyperplasia     Chronic kidney disease     Chronic obstructive pulmonary disease (HCC)     CKD (chronic kidney disease) stage 3, GFR 30-59 ml/min ([facility] [address] 9/1/2015    Depression 0/0/1879    Diastolic dysfunction without heart failure 9/2/2015    Grade 1 diastolic dysfunction on 2D ECHO done 9/02/2015    Dyslipidemia 9/2/2015    Dysphagia as late effect of cerebrovascular accident (CVA) 9/1/2015    Hemiparesis affecting left side as late effect of cerebrovascular accident (CVA) ([facility] [address] 9/1/2015    History of noncompliance with medical treatment, presenting hazards to health 9/1/2015    History of stroke with residual deficit 9/1/2015    Acute Ischemic Stroke (early subacute infarct at the posterior right lentiform nucleus) with residual left hemiparesis and dysphagia     History of tobacco use 9/1/2015    History of trichomonal urethritis 9/1/2015    History of vitamin D deficiency 9/6/2015    Hypertension     Hypertensive heart and kidney disease without heart failure and with chronic kidney disease stage III ([facility] [address] 09/02/2015    Obesity, Class I, BMI 30-34.9 9/1/2015    Overactive bladder     Pancreatitis, acute 12/2020    Postoperative atrial fibrillation ([facility] [address] 4/21/2018    Resolved    Postoperative confusion 4/22/2018    Postoperative urinary retention 4/22/2018    Statin intolerance 05/07/2018    Atorvastatin and Simvastatin    [facility]roke [facility] 2015    weakness rt side, dyphagia    Vitamin D deficiency 5/1/2018    Vitamin D 25-Hydroxy (5/1/2018) = 17.9      Past Surgical History:   Procedure Laterality Date    COLONOSCOPY N/A 4/3/2018    COLONOSCOPY,  w heated snared polypectomy performed by Monroe Dior MD at [facility] ENDOSCOPY    COLONOSCOPY N/A 2/25/2021    COLONOSCOPY w polypectomy performed by Jacob Rodrigues MD at [facility]    COLONOSCOPY N/A 4/19/2021    COLONOSCOPY with Polypectomy performed by Agustín Young MD at [place] [id number] APPENDECTOMY,RUPT APPENDX+ABSCESS N/A 04/20/2018    Dr. Garrison Points     Barriers to Learning/Limitations: yes;  physical  Compensate with: Visual Cues, Verbal Cues, and Tactile Cues  Home Situation:  Home Situation  Home Environment: Private residence  # Steps to Enter: 0  One/Two Story Residence: One story  Living Alone: No  Support Systems: Other Family Member(s) (sister )  Patient Expects to be Discharged to[de-identified] House  Current DME Used/Available at Home: [address], [place], quad, Walker, rolling  Critical Behavior:  Neurologic State: Alert  Orientation Level: Oriented to person  Cognition: Follows commands  Safety/Judgement: Fall prevention     Skin Condition/Temp: Dry;Warm     Skin Integrity: Intact  Skin Integumentary  Skin Color: Appropriate for ethnicity  Skin Condition/Temp: Dry;Warm  Skin Integrity: Intact     Strength:    Strength: Generally decreased, functional                    Tone & Sensation:   Tone: Normal       Sensation: Intact        Range Of Motion:  AROM: Within functional limits    Functional Mobility:  Bed Mobility:     Supine to Sit: Minimum assistance  Sit to Supine: Contact guard assistance     Transfers:  Sit to Stand: Contact guard assistance  Stand to Sit: Contact guard assistance    Balance:   Sitting: Intact  Standing: Impaired; With support    Ambulation/Gait Training:  Distance (ft): 6 Feet (ft)  Assistive Device: Walker, rolling  Ambulation - Level of Assistance: Contact guard assistance        Gait Abnormalities: Decreased step clearance    Speed/Chiquita: Slow  Step Length: Right shortened;Left shortened    Pain:  Pain level pre-treatment: 0/10   Pain level post-treatment: 0/10   Pain Intervention(s) : Medication (see MAR); Rest, Ice, Repositioning  Response to intervention: Nurse notified    Activity Tolerance:   Good    Please refer to the flowsheet for vital signs taken during this treatment. After treatment:   []         Patient left in no apparent distress sitting up in chair  [x]         Patient left in no apparent distress in bed  [x]         Call bell left within reach  [x]         Nursing notified  []         Caregiver present  []         Bed alarm activated  []         SCDs applied    COMMUNICATION/EDUCATION:   [x]         Role of Physical Therapy in the acute care setting. [x]         Fall prevention education was provided and the patient/caregiver indicated understanding. [x]         Patient/family have participated as able in goal setting and plan of care. [x]         Patient/family agree to work toward stated goals and plan of care.   []         Patient understands intent and goals of therapy, but is neutral about his/her participation. []         Patient is unable to participate in goal setting/plan of care: ongoing with therapy staff.  []         Other:     Thank you for this referral.  Guille Olmos   Time Calculation: 13 mins      Eval Complexity: History: MEDIUM  Complexity : 1-2 comorbidities / personal factors will impact the outcome/ POC Exam:MEDIUM Complexity : 3 Standardized tests and measures addressing body structure, function, activity limitation and / or participation in recreation  Presentation: MEDIUM Complexity : Evolving with changing characteristics  Clinical Decision Making:Medium Complexity    Overall Complexity:MEDIUM

## 2021-09-28 NOTE — ED NOTES
Pt received from triage alert and awake. Pt with hx CVA and garbled speech. PT with abdominal pain x 1 day. + nausea and vomitin today. Pt denies any pain at this time. Pt noted to have wet pants. Pt assisted onto stretcher. Changed into a gown. VS stable. Continue to monitor. Call bell within reach.

## 2021-09-28 NOTE — PROGRESS NOTES
MRI Safety Screening form needs to be filled out and FAXED to [phone] BEFORE MRI can be scheduled. If unable to acquire information from patient, [facility]PO[facility] must be contacted, or screening xrays will need to be ordred.     If pt is Claustrophobic or needs Pain Meds, please have these ordered in advance to help facilitate MRI exam.

## 2021-09-28 NOTE — PROGRESS NOTES
MRI Screening form needs to be filled out and faxed to [phone] MRI can be scheduled. If unable to obtain information from pt, [facility]PO[employer] needs to be contacted.  If pt is claustro or will need pain meds, please have ordered in advance in order to facilitate exam.

## 2021-09-28 NOTE — PROGRESS NOTES
Problem: Self Care Deficits Care Plan (Adult)  Goal: *Acute Goals and Plan of Care (Insert Text)  Description: Occupational Therapy Goals  Initiated 9/28/2021 within 7 day(s). 1.  Patient will perform grooming with modified independence. 2.  Patient will perform bathing with modified independence. 3.  Patient will perform upper body dressing and lower body dressing with modified independence. 4.  Patient will perform toilet transfers with modified independence using LRAD. 5.  Patient will perform all aspects of toileting with modified independence. 6.  Patient will participate in upper extremity therapeutic exercise/activities with modified independence for 8 minutes. 7.  Patient will utilize energy conservation techniques during functional activities with min verbal cues. Prior Level of Function: pt reports he was Mod I with ADLs and functional mobility using LBQC, sister assists with meal prep, med mgmt and home making tasks     Outcome: Progressing Towards Goal   OCCUPATIONAL THERAPY EVALUATION    Patient: Eleanor Donis (48 y.o. male)  Date: 9/28/2021  Primary Diagnosis: Pancreatitis [K85.90]  Acute pancreatitis [K85.90]        Precautions:   Fall  PLOF: pt reports he was Mod I with ADLs and functional mobility using LBQC, sister assists with meal prep, med mgmt and home making tasks    ASSESSMENT :  Nursing/RN cleared for pt to participate in OT evaluation and tx session. Patient presents lying semi-reclined on ED stretcher. Bed mobility: CGA supine <-> sit. Sit <-> stand w/ CGA, stand step using LBQC with CGA from edge of bed to foot of bed for simulated bedside commode transfer, pt reports fatigue and returned to sitting on edge of stretcher. Pt lying semi recliner in bed, call bell within reach & pt verbalized understanding and provided return demonstration to utilize for assist e.g. functional transfers in order to prevent falls.     Patient will benefit from skilled intervention to address the above impairments. Patient's rehabilitation potential is considered to be Good  Factors which may influence rehabilitation potential include:   [x]             None noted  []             Mental ability/status  []             Medical condition  []             Home/family situation and support systems  []             Safety awareness  []             Pain tolerance/management  []             Other:      PLAN :  Recommendations and Planned Interventions:   [x]               Self Care Training                  [x]      Therapeutic Activities  [x]               Functional Mobility Training   []      Cognitive Retraining  [x]               Therapeutic Exercises           [x]      Endurance Activities  [x]               Balance Training                    [x]      Neuromuscular Re-Education  []               Visual/Perceptual Training     [x]      Home Safety Training  [x]               Patient Education                   [x]      Family Training/Education  []               Other (comment):    Frequency/Duration: Patient will be followed by occupational therapy 1-2 times per day/4-7 days per week to address goals. Discharge Recommendations: Rehab  Further Equipment Recommendations for Discharge: bedside commode     SUBJECTIVE:   Patient stated I'm tired.     OBJECTIVE DATA SUMMARY:     Past Medical History:   Diagnosis Date    Acute blood loss as cause of postoperative anemia 4/21/2018    Benign prostatic hyperplasia     Chronic kidney disease     Chronic obstructive pulmonary disease (HCC)     CKD (chronic kidney disease) stage 3, GFR 30-59 ml/min ([facility] [address] 9/1/2015    Depression 6/5/8224    Diastolic dysfunction without heart failure 9/2/2015    Grade 1 diastolic dysfunction on 2D ECHO done 9/02/2015    Dyslipidemia 9/2/2015    Dysphagia as late effect of cerebrovascular accident (CVA) 9/1/2015    Hemiparesis affecting left side as late effect of cerebrovascular accident (CVA) ([facility] [address] 9/1/2015    History of noncompliance with medical treatment, presenting hazards to health 9/1/2015    History of stroke with residual deficit 9/1/2015    Acute Ischemic Stroke (early subacute infarct at the posterior right lentiform nucleus) with residual left hemiparesis and dysphagia     History of tobacco use 9/1/2015    History of trichomonal urethritis 9/1/2015    History of vitamin D deficiency 9/6/2015    Hypertension     Hypertensive heart and kidney disease without heart failure and with chronic kidney disease stage III ([facility] [address] 09/02/2015    Obesity, Class I, BMI 30-34.9 9/1/2015    Overactive bladder     Pancreatitis, acute 12/2020    Postoperative atrial fibrillation ([facility] [address] 4/21/2018    Resolved    Postoperative confusion 4/22/2018    Postoperative urinary retention 4/22/2018    Statin intolerance 05/07/2018    Atorvastatin and Simvastatin    Stroke ([facility] [address] 2015    weakness rt side, dyphagia    Vitamin D deficiency 5/1/2018    Vitamin D 25-Hydroxy (5/1/2018) = 17.9      Past Surgical History:   Procedure Laterality Date    COLONOSCOPY N/A 4/3/2018    COLONOSCOPY,  w heated snared polypectomy performed by Maria Isabel Chawla MD at [facility] ENDOSCOPY    COLONOSCOPY N/A 2/25/2021    COLONOSCOPY w polypectomy performed by Loni Camacho MD at [facility] ENDOSCOPY    COLONOSCOPY N/A 4/19/2021    COLONOSCOPY with Polypectomy performed by Jessica Ledbetter MD at [place] [address] APPENDECTOMY,RUPT APPENDX+ABSCESS N/A 04/20/2018    Dr. Roth[facility]     Barriers to Learning/Limitations: yes;  sensory deficits-vision/hearing/speech  Compensate with: visual, verbal, tactile, kinesthetic cues/model    Home Situation:   Home Situation  Home Environment: Private residence  # Steps to Enter: 0  One/Two Story Residence: One story  Living Alone: No  Support Systems: Other Family Member(s) (sister)  Patient Expects to be Discharged to[de-identified] House  Current DME Used/Available at Home: Cane, quad, Shower chair, Raised toilet seat, Grab bars  Tub or Shower Type: Tub/Shower combination  [x]  Right hand dominant   []  Left hand dominant    Cognitive/Behavioral Status:  Neurologic State: Alert  Orientation Level: Oriented to person  Cognition: Follows commands  Safety/Judgement: Fall prevention    Skin: appears intact  Edema: none noted    Vision/Perceptual:  appears intact       Coordination: BUE  Coordination: Within functional limits  Fine Motor Skills-Upper: Left Intact; Right Intact    Gross Motor Skills-Upper: Left Intact; Right Intact    Balance:  Sitting: Intact  Standing: Impaired; With support  Standing - Static: Fair  Standing - Dynamic : Fair (-)    Strength: BUE  Strength: Generally decreased, functional     Tone & Sensation: BUE  Tone: Normal  Sensation: Intact     Range of Motion: BUE  AROM: Within functional limits     Functional Mobility and Transfers for ADLs:  Bed Mobility:     Supine to Sit: Contact guard assistance  Sit to Supine: Contact guard assistance     Transfers:  Sit to Stand: Contact guard assistance  Stand to Sit: Setup     ADL Assessment:   Feeding: Other (comment) (NPO)    Oral Facial Hygiene/Grooming: Contact guard assistance    Bathing: Minimum assistance    Upper Body Dressing: Minimum assistance    Lower Body Dressing: Moderate assistance    Toileting: Minimum assistance     ADL Intervention:  Cognitive Retraining  Safety/Judgement: Fall prevention    Pain:  Pain level pre-treatment: 0/10   Pain level post-treatment: 0/10     Activity Tolerance:   poor  Please refer to the flowsheet for vital signs taken during this treatment. After treatment:   [] Patient left in no apparent distress sitting up in chair  [x] Patient left in no apparent distress in bed  [x] Call bell left within reach  [x] Nursing notified  [] Caregiver present  [] Bed alarm activated    COMMUNICATION/EDUCATION:   [x] Role of Occupational Therapy in the acute care setting  [x] Home safety education was provided and the patient/caregiver indicated understanding.   [x] Patient/family have participated as able in goal setting and plan of care. [x] Patient/family agree to work toward stated goals and plan of care. [] Patient understands intent and goals of therapy, but is neutral about his/her participation. [] Patient is unable to participate in goal setting and plan of care. Thank you for this referral.  Violet Zhang  Time Calculation: 11 mins    Eval Complexity: History: MEDIUM Complexity : Expanded review of history including physical, cognitive and psychosocial  history ; Examination: MEDIUM Complexity : 3-5 performance deficits relating to physical, cognitive , or psychosocial skils that result in activity limitations and / or participation restrictions; Decision Making:MEDIUM Complexity : Patient may present with comorbidities that affect occupational performnce.  Miniml to moderate modification of tasks or assistance (eg, physical or verbal ) with assesment(s) is necessary to enable patient to complete evaluation

## 2021-09-28 NOTE — ED NOTES
Attempted to call [facility] regarding positive blood cultures  (anaerobic gram negative rods) - awaiting response.

## 2021-09-28 NOTE — PROGRESS NOTES
[email or web address]  [phone]        Thank you for consulting us regarding acute/recurrent pancreatitis in patient with known gallstones - previous encounter for similar presentation 12/2020. Chart reviewed. MRCP has been ordered and pending to be scheduled. Summary of prior imaging studies below. Recommendations:  · If repeat MRCP confirms CBD stone/blockage/stricture, recommend transfer to [facility] or [facility] where ERCP can be performed as this advanced GI intervention is not available at [facility]. Can also consider consultation with IR for diagnostic and therapeutic intervention via  percutaneous transhepatic cholangiography. · If no biliary abnormalities, supportive care and surgical consult for elective cholecystectomy for management of symptomatic cholelithiasis. · Outpatient MRI w MRCP in about 4-6 weeks to evaluate status of \"small amount of nonloculated peripancreatic fluid\"       CT ABD PELV W CONT  Result Date: 9/27/2021  1. Interstitial edematous pancreatitis. No loculated fluid collection. 2. Large prostate. Mild bladder wall thickening may be related to underdistention or chronic outlet obstruction. 3. Colonic diverticulosis. 4. Stable bilateral common iliac artery aneurysms, measuring 2.4 cm on the right and 2.1 cm on the left. 5. Small ventral abdominal wall hernia contains a loop of small bowel, without evidence for obstruction or inflammation. 6. Additional findings as above. US ABD LTD  Result Date: 9/28/2021   1. Limited exam due to bowel gas and the pancreas is not visualized. 2. The gallbladder is not visualized and probably contracted or partially contracted with sludge. 3. Dilated common bile duct 11 mm. No visible choledocholithiasis. Consider further evaluation with MRCP. MRI w MRCP 12/7/20  IMPRESSION   1.  Acute pancreatitis, improving. No pseudocyst.  2. Mild biliary ductal dilation, improving.  No choledocholithiasis identified. Motion limited exam.  3. Cholelithiasis. 4. Thick-walled, nondistended, stable gallbladder. 5. Renal cysts. 6. Small midline incisional hernia containing small bowel, without current  evidence of obstruction. 7. Diverticulosis. Dr Perry Funes is available on-call after [phone] if questions/concerns. Thank you. Will see patient with full consult when he arrives to the floor. Elsa Levi PA-C  [facility].  [email or web address]  Phone: [phone]

## 2021-09-29 LAB
ALBUMIN SERPL-MCNC: 3.4 G/DL (ref 3.4–5)
ALBUMIN/GLOB SERPL: 1 {RATIO} (ref 0.8–1.7)
ALP SERPL-CCNC: 99 U/L (ref 45–117)
ALT SERPL-CCNC: 267 U/L (ref 16–61)
ANION GAP SERPL CALC-SCNC: 8 MMOL/L (ref 3–18)
AST SERPL-CCNC: 119 U/L (ref 10–38)
BASOPHILS # BLD: 0 K/UL (ref 0–0.1)
BASOPHILS NFR BLD: 0 % (ref 0–2)
BILIRUB SERPL-MCNC: 1 MG/DL (ref 0.2–1)
BUN SERPL-MCNC: 17 MG/DL (ref 7–18)
BUN/CREAT SERPL: 11 (ref 12–20)
CALCIUM SERPL-MCNC: 9.8 MG/DL (ref 8.5–10.1)
CHLORIDE SERPL-SCNC: 105 MMOL/L (ref 100–111)
CO2 SERPL-SCNC: 26 MMOL/L (ref 21–32)
CREAT SERPL-MCNC: 1.59 MG/DL (ref 0.6–1.3)
DIFFERENTIAL METHOD BLD: ABNORMAL
EOSINOPHIL # BLD: 0 K/UL (ref 0–0.4)
EOSINOPHIL NFR BLD: 0 % (ref 0–5)
ERYTHROCYTE [DISTWIDTH] IN BLOOD BY AUTOMATED COUNT: 15.3 % (ref 11.6–14.5)
GLOBULIN SER CALC-MCNC: 3.4 G/DL (ref 2–4)
GLUCOSE SERPL-MCNC: 85 MG/DL (ref 74–99)
HCT VFR BLD AUTO: 39 % (ref 36–48)
HGB BLD-MCNC: 12.6 G/DL (ref 13–16)
LACTATE SERPL-SCNC: 1.5 MMOL/L (ref 0.4–2)
LIPASE SERPL-CCNC: 672 U/L (ref 73–393)
LYMPHOCYTES # BLD: 1.1 K/UL (ref 0.9–3.6)
LYMPHOCYTES NFR BLD: 8 % (ref 21–52)
MAGNESIUM SERPL-MCNC: 2 MG/DL (ref 1.6–2.6)
MCH RBC QN AUTO: 26.8 PG (ref 24–34)
MCHC RBC AUTO-ENTMCNC: 32.3 G/DL (ref 31–37)
MCV RBC AUTO: 82.8 FL (ref 78–100)
MONOCYTES # BLD: 1 K/UL (ref 0.05–1.2)
MONOCYTES NFR BLD: 8 % (ref 3–10)
NEUTS SEG # BLD: 10.2 K/UL (ref 1.8–8)
NEUTS SEG NFR BLD: 82 % (ref 40–73)
PLATELET # BLD AUTO: 210 K/UL (ref 135–420)
PMV BLD AUTO: 11.1 FL (ref 9.2–11.8)
POTASSIUM SERPL-SCNC: 4.2 MMOL/L (ref 3.5–5.5)
PROT SERPL-MCNC: 6.8 G/DL (ref 6.4–8.2)
RBC # BLD AUTO: 4.71 M/UL (ref 4.35–5.65)
SODIUM SERPL-SCNC: 139 MMOL/L (ref 136–145)
VANCOMYCIN SERPL-MCNC: <0.8 UG/ML (ref 5–40)
WBC # BLD AUTO: 12.4 K/UL (ref 4.6–13.2)

## 2021-09-29 PROCEDURE — 80053 COMPREHEN METABOLIC PANEL: CPT

## 2021-09-29 PROCEDURE — 65270000029 HC RM PRIVATE

## 2021-09-29 PROCEDURE — 74011000258 HC RX REV CODE- 258: Performed by: STUDENT IN AN ORGANIZED HEALTH CARE EDUCATION/TRAINING PROGRAM

## 2021-09-29 PROCEDURE — 74011250636 HC RX REV CODE- 250/636: Performed by: STUDENT IN AN ORGANIZED HEALTH CARE EDUCATION/TRAINING PROGRAM

## 2021-09-29 PROCEDURE — 82787 IGG 1 2 3 OR 4 EACH: CPT

## 2021-09-29 PROCEDURE — 74011250637 HC RX REV CODE- 250/637: Performed by: STUDENT IN AN ORGANIZED HEALTH CARE EDUCATION/TRAINING PROGRAM

## 2021-09-29 PROCEDURE — 80202 ASSAY OF VANCOMYCIN: CPT

## 2021-09-29 PROCEDURE — 83605 ASSAY OF LACTIC ACID: CPT

## 2021-09-29 PROCEDURE — 36415 COLL VENOUS BLD VENIPUNCTURE: CPT

## 2021-09-29 PROCEDURE — 83690 ASSAY OF LIPASE: CPT

## 2021-09-29 PROCEDURE — 2709999900 HC NON-CHARGEABLE SUPPLY

## 2021-09-29 PROCEDURE — 83735 ASSAY OF MAGNESIUM: CPT

## 2021-09-29 PROCEDURE — 85025 COMPLETE CBC W/AUTO DIFF WBC: CPT

## 2021-09-29 PROCEDURE — 87040 BLOOD CULTURE FOR BACTERIA: CPT

## 2021-09-29 PROCEDURE — 74011250636 HC RX REV CODE- 250/636: Performed by: FAMILY MEDICINE

## 2021-09-29 RX ORDER — VANCOMYCIN 2 GRAM/500 ML IN 0.9 % SODIUM CHLORIDE INTRAVENOUS
2000 ONCE
Status: COMPLETED | OUTPATIENT
Start: 2021-09-29 | End: 2021-09-29

## 2021-09-29 RX ADMIN — Medication 10 ML: at 22:00

## 2021-09-29 RX ADMIN — Medication 10 ML: at 14:00

## 2021-09-29 RX ADMIN — Medication 10 ML: at 08:25

## 2021-09-29 RX ADMIN — HEPARIN SODIUM 5000 UNITS: 5000 INJECTION INTRAVENOUS; SUBCUTANEOUS at 14:33

## 2021-09-29 RX ADMIN — PIPERACILLIN AND TAZOBACTAM 3.38 G: 3; .375 INJECTION, POWDER, LYOPHILIZED, FOR SOLUTION INTRAVENOUS at 14:00

## 2021-09-29 RX ADMIN — PIPERACILLIN AND TAZOBACTAM 3.38 G: 3; .375 INJECTION, POWDER, LYOPHILIZED, FOR SOLUTION INTRAVENOUS at 02:57

## 2021-09-29 RX ADMIN — HEPARIN SODIUM 5000 UNITS: 5000 INJECTION INTRAVENOUS; SUBCUTANEOUS at 04:27

## 2021-09-29 RX ADMIN — Medication 10 ML: at 04:28

## 2021-09-29 RX ADMIN — PIPERACILLIN AND TAZOBACTAM 3.38 G: 3; .375 INJECTION, POWDER, LYOPHILIZED, FOR SOLUTION INTRAVENOUS at 11:59

## 2021-09-29 RX ADMIN — PIPERACILLIN AND TAZOBACTAM 3.38 G: 3; .375 INJECTION, POWDER, LYOPHILIZED, FOR SOLUTION INTRAVENOUS at 19:33

## 2021-09-29 RX ADMIN — VANCOMYCIN HYDROCHLORIDE 2000 MG: 10 INJECTION, POWDER, LYOPHILIZED, FOR SOLUTION INTRAVENOUS at 10:00

## 2021-09-29 RX ADMIN — SODIUM CHLORIDE, SODIUM LACTATE, POTASSIUM CHLORIDE, AND CALCIUM CHLORIDE 200 ML/HR: 600; 310; 30; 20 INJECTION, SOLUTION INTRAVENOUS at 08:25

## 2021-09-29 NOTE — CONSULTS
[email or web address]  [phone]    GASTROENTEROLOGY CONSULT      Impression:   1. Acute/recurrent pancreatitis in patient with known gallstones - clinically without cholangitis   - lipase 672<<<22,314   - BISAP score 0  2. Symptomatic cholelithiasis  3. Dilated CBD of 11 mm without sonographic evidence of obstruction/stricture  4. Abnormal liver biochemistries in setting of gallstone pancreatitis   - ;       Plan:     1. Supportive management with IVF/pain control as appropriate. Please follow BUN/Hct to assess for adequate hydration. 2. MRI w MRCP is complete and final report pending. · If confirmed CBD stone/blockage/stricture, ERCP is recommended; however this advanced GI intervention is not available at [facility]. Patient will not need to be transferred for [facility] or [facility] where ERCP can be performed if he remains without cholangitis. Can also consider consultation with IR for diagnostic and therapeutic intervention via  percutaneous transhepatic cholangiography.        · If no biliary abnormalities, supportive care and surgical consult for elective cholecystectomy for management of symptomatic cholelithiasis. 3. Outpatient MRI w MRCP in about 4-6 weeks to evaluate status of \"small amount of nonloculated peripancreatic fluid\"  4. Clear liquid diet today and advanced to low-fat as tolerated. 5. IgG subclass ordered. 6. Medical management deferred to primary team.  7. Outpatient GI follow-up recommended. Will be available as needed. Please call with questions/concerns. Reason for consult: pancreatitis      HPI:  Alexa Nickerson is a 67 y.o. male who I am being asked to see in consultation for an opinion regarding acute pancreatitis. Patient with known gallstones arrived in ED 9/27/21 with acute onset of postprandial upper abdominal pain about 1 hour after eating with mild nausea, but no emesis.  Labs noted WBC at 24 as well as abnormally elevated lipase and LFTs. Imaging studies as below. He was admitted for medical management. From chart review, patient has similar presentation for gallstone pancreatitis 12/2020. Seen today, reports feeling much better. No abdominal pain, fever, chills, nausea, emesis. Has an appetite and desires to eat. No known family history of pancreatic cancer. CT ABD PELV W CONT    Result Date: 9/27/2021  1. Interstitial edematous pancreatitis. No loculated fluid collection. 2. Large prostate. Mild bladder wall thickening may be related to underdistention or chronic outlet obstruction. 3. Colonic diverticulosis. 4. Stable bilateral common iliac artery aneurysms, measuring 2.4 cm on the right and 2.1 cm on the left. 5. Small ventral abdominal wall hernia contains a loop of small bowel, without evidence for obstruction or inflammation. 6. Additional findings as above. US ABD LTD    Result Date: 9/28/2021   1. Limited exam due to bowel gas and the pancreas is not visualized. 2. The gallbladder is not visualized and probably contracted or partially contracted with sludge. 3. Dilated common bile duct 11 mm. No visible choledocholithiasis. Consider further evaluation with MRCP.           PMH:   Past Medical History:   Diagnosis Date    Acute blood loss as cause of postoperative anemia 4/21/2018    Benign prostatic hyperplasia     Chronic kidney disease     Chronic obstructive pulmonary disease (HCC)     CKD (chronic kidney disease) stage 3, GFR 30-59 ml/min ([address] 9/1/2015    Depression 8/0/2187    Diastolic dysfunction without heart failure 9/2/2015    Grade 1 diastolic dysfunction on 2D ECHO done 9/02/2015    Dyslipidemia 9/2/2015    Dysphagia as late effect of cerebrovascular accident (CVA) 9/1/2015    Hemiparesis affecting left side as late effect of cerebrovascular accident (CVA) ([address] 9/1/2015    History of noncompliance with medical treatment, presenting hazards to health 9/1/2015    History of stroke with residual deficit 9/1/2015    Acute Ischemic Stroke (early subacute infarct at the posterior right lentiform nucleus) with residual left hemiparesis and dysphagia     History of tobacco use 9/1/2015    History of trichomonal urethritis 9/1/2015    History of vitamin D deficiency 9/6/2015    Hypertension     Hypertensive heart and kidney disease without heart failure and with chronic kidney disease stage III ([facility] 09/02/2015    Obesity, Class I, BMI 30-34.9 9/1/2015    Overactive bladder     Pancreatitis, acute 12/2020    Postoperative atrial fibrillation (HCC) 4/21/2018    Resolved    Postoperative confusion 4/22/2018    Postoperative urinary retention 4/22/2018    Statin intolerance 05/07/2018    Atorvastatin and Simvastatin    Stroke ([facility] [facility] 2015    weakness rt side, dyphagia    Vitamin D deficiency 5/1/2018    Vitamin D 25-Hydroxy (5/1/2018) = 17.9        PSH:   Past Surgical History:   Procedure Laterality Date    COLONOSCOPY N/A 4/3/2018    COLONOSCOPY,  w heated snared polypectomy performed by Ramez Pollock MD at [facility] COLONOSCOPY N/A 2/25/2021    COLONOSCOPY w polypectomy performed by Guanako Mcgrath MD at [facility] COLONOSCOPY N/A 4/19/2021    COLONOSCOPY with Polypectomy performed by Alondra Hardy MD at [address] APPENDECTOMY,RUPT APPENDX+ABSCESS N/A 04/20/2018    Dr. Shelby Garcia HX:   Social History     Socioeconomic History    Marital status: SINGLE     Spouse name: Not on file    Number of children: Not on file    Years of education: Not on file    Highest education level: Not on file   Occupational History    Not on file   Tobacco Use    Smoking status: Current Every Day Smoker     Packs/day: 0.25    Smokeless tobacco: Never Used    Tobacco comment: 2 cigs daily   Vaping Use    Vaping Use: Never used   Substance and Sexual Activity    Alcohol use: Not Currently    Drug use: No    Sexual activity: Never   Other Topics Concern  Not on file   Social History Narrative    Not on file     Social Determinants of Health     Financial Resource Strain:     Difficulty of Paying Living Expenses:    Food Insecurity:     Worried About Running Out of Food in the Last Year:     [address] in the Last Year:    Transportation Needs:     Lack of Transportation (Medical):  Lack of Transportation (Non-Medical):    Physical Activity:     Days of Exercise per Week:     Minutes of Exercise per Session:    Stress:     Feeling of Stress :    Social Connections:     Frequency of Communication with Friends and Family:     Frequency of Social Gatherings with Friends and Family:     Attends [employer] Services:     Active Member of Clubs or Organizations:     Attends Club or Organization Meetings:     Marital Status:    Intimate Partner Violence:     Fear of Current or Ex-Partner:     Emotionally Abused:     Physically Abused:     Sexually Abused:        FHX:   Family History   Problem Relation Age of Onset    Diabetes Mother     Stroke Mother     Heart Disease Father     Hypertension Father     Diabetes Brother     Hypertension Brother        Allergy:   Allergies   Allergen Reactions    Lipitor [Atorvastatin] Other (comments)     Elevated CK level    Pt has no awareness of this allergy.     Simvastatin Other (comments)     Elevation in LFTs       Patient Active Problem List   Diagnosis Code    History of stroke with residual deficit I69.30    Hypertensive heart and kidney disease without heart failure and with chronic kidney disease stage III ([facility]) I13.10, Q60.24    Diastolic dysfunction without heart failure I51.89    Dyslipidemia E78.5    CKD (chronic kidney disease) stage 3, GFR 30-59 ml/min ([facility]) N18.30    History of trichomonal urethritis Z86.19    Obesity, Class I, BMI 30-34.9 E66.9    History of noncompliance with medical treatment, presenting hazards to health Z91.19    History of tobacco use Z87.891    Impaired mobility and ADLs Z74.09, Z78.9    History of vitamin D deficiency Z86.39    Generalized weakness R53.1    Acute appendicitis with peritoneal abscess K35.33    Status post appendectomy Z90.49    Hemiparesis affecting left side as late effect of cerebrovascular accident (CVA) ([facility] W13.839    Dysphagia as late effect of cerebrovascular accident (CVA) I69.391    Postoperative atrial fibrillation (HCC) I97.89, I48.91    Overactive bladder N32.81    Benign prostatic hyperplasia N40.0    Postoperative urinary retention N99.89, R33.8    Postoperative confusion R41.0    Acute blood loss as cause of postoperative anemia D62    Vitamin D deficiency E55.9    Statin intolerance Z78.9    Acute renal failure superimposed on stage 3 chronic kidney disease (HCC) N17.9, N18.30    Depression F32.9    Hyperkalemia E87.5    SBO (small bowel obstruction) (HCC) K56.609    Small bowel obstruction (HCC) K56.609    Pancreatitis, acute K85.90    Nausea and vomiting R11.2    Elevated liver enzymes R74.8    Pancreatitis K85.90    Acute pancreatitis K85.90       Home Medications:     Medications Prior to Admission   Medication Sig    furosemide (Lasix) 20 mg tablet Take 20 mg by mouth daily.  amLODIPine (NORVASC) 10 mg tablet Take 10 mg by mouth daily.  multivitamin (ONE A DAY) tablet Take 1 Tab by mouth daily.  atorvastatin (LIPITOR) 10 mg tablet Take 10 mg by mouth nightly.  oxybutynin (DITROPAN) 5 mg tablet Take 5 mg by mouth daily.  sertraline (ZOLOFT) 50 mg tablet Take 50 mg by mouth nightly.  aspirin 81 mg chewable tablet Take 81 mg by mouth daily.  loperamide (Imodium A-D) 2 mg capsule Take 2 mg by mouth four (4) times daily as needed for Diarrhea.  tiotropium (Spiriva with HandiHaler) 18 mcg inhalation capsule Take 1 Cap by inhalation daily.  albuterol (PROVENTIL HFA, VENTOLIN HFA, PROAIR HFA) 90 mcg/actuation inhaler Take  by inhalation.        Review of Systems:     Constitutional: No fever, chills, weight loss, fatigue. Skin: No rashes, pruritis, jaundice, ulcerations, erythema. HENT: No headache, nosebleed, sinus pressure, rhinorrhea, sore throat. Eyes: No blurred vision, eye pain, photophobia, jaundice. Cardiovascular: No chest pain, heart palpitation   Respiratory: No cough, shortness of breath   Gastrointestinal: As in HPI   Genitourinary: No dysuria, bleeding, discharge, pyuria. Musculoskeletal: No wasting. Endo: No sweats. Heme: No bruising, easy bleeding. Allergies: As noted. Neurological: Alert and oriented. Gait not assessed. Psychiatric:  No anxiety, depression, hallucinations. Visit Vitals  /83 (BP 1 Location: Left upper arm, BP Patient Position: At rest)   Pulse 63   Temp 98 °F (36.7 °C)   Resp 17   Ht 5' 11\" (1.803 m)   Wt 96.3 kg (212 lb 4.8 oz)   SpO2 97%   BMI 29.61 kg/m²       Physical Assessment:     constitutional: appearance: well developed, overweight habitus, no deformities, in no acute distress. skin: inspection: no rashes, ulcers, icterus  eyes: inspection: normal conjunctivae and lids; no jaundice  ENMT: mouth: normal oral mucosa,lips  neck: thyroid: normal size, consistency and position; no masses or tenderness. respiratory: effort: normal chest excursion; no intercostal retraction or accessory muscle use. cardiovascular: normal rate/rhythem  abdominal: abdomen: normal consistency; no tenderness or masses. Normal bowel sounds  rectal: hemoccult/guaiac: not performed. musculoskeletal: no pain, deformity or contracture. neurologic: grossly normal by observation  psychiatric: judgement/insight: within normal limits. memory: within normal limits for recent and remote events. mood and affect: no evidence of depression, anxiety or agitation. orientation: oriented to time, space and person.         Basic Metabolic Profile   Recent Labs     09/29/21  0350 09/28/21  0600 09/28/21  0600      < > 138   K 4.2   < > 4.2    < > 108   CO2 26   < > 23   BUN 17   < > 20*   GLU 85   < > 110*   CA 9.8   < > 10.5*   MG 2.0  --   --    PHOS  --   --  3.0    < > = values in this interval not displayed. CBC w/Diff    Recent Labs     09/29/21  0350   WBC 12.4   RBC 4.71   HGB 12.6*   HCT 39.0   MCV 82.8   MCH 26.8   MCHC 32.3   RDW 15.3*       Recent Labs     09/29/21  0350   GRANS 82*   LYMPH 8*   EOS 0        Hepatic Function   Recent Labs     09/29/21  0350   ALB 3.4   TP 6.8   TBILI 1.0   AP 99   LPSE 672*        Coags   No results for input(s): PTP, INR, APTT, INREXT in the last 72 hours. XR CHEST SNGL V    Result Date: 9/27/2021  Minimal atelectasis left lung base. No acute cardiopulmonary process. CT ABD PELV W CONT    Result Date: 9/27/2021  1. Interstitial edematous pancreatitis. No loculated fluid collection. 2. Large prostate. Mild bladder wall thickening may be related to underdistention or chronic outlet obstruction. 3. Colonic diverticulosis. 4. Stable bilateral common iliac artery aneurysms, measuring 2.4 cm on the right and 2.1 cm on the left. 5. Small ventral abdominal wall hernia contains a loop of small bowel, without evidence for obstruction or inflammation. 6. Additional findings as above. US ABD LTD    Result Date: 9/28/2021   1. Limited exam due to bowel gas and the pancreas is not visualized. 2. The gallbladder is not visualized and probably contracted or partially contracted with sludge. 3. Dilated common bile duct 11 mm. No visible choledocholithiasis. Consider further evaluation with MRCP. MRI Results (most recent):  Results from [place] encounter on 12/04/20    MRI MRCP ONLY WO CONT    Narrative  MRCP    Indication: Acute pancreatitis. Elevated transaminitis and hyperbilirubinemia. Abdominal pain. Dilation of the biliary tree.     Technique: MRCP performed consisting of the following pulse sequences: Axial and  coronal single shot, axial T2 with fat saturation, radial and coronal thin and  thick slab fluid sensitive sequences. Source data reviewed along with 3D MIP  reconstructed images created at the console to fully evaluate the tortuous  biliary structures. Comparison: Ultrasound abdomen limited and CT abdomen and pelvis exams from  12/4/2020    Findings:  Redemonstration of peripancreatic fluid signal abnormalities consistent with  pancreatitis, improved to prior CT. No pancreatic ductal dilation. 2 cm lipoma  versus mesenteric root prominent fat at the medial left margin of the pancreatic  head as seen on CT. No significant pancreatic mass seen by noncontrast imaging. No pseudocyst.    The CBD and intrahepatic biliary ducts are mildly but improved to prior recent  CT exam. CBD measures approximately 8-9 mm, decreased from 14 mm. The current  size is similar to that seen on remote 2018 exams. No choledocholithiasis  identified. Motion limitations. There are several small gallstones. The gallbladder continues to demonstrate a  partly contracted, nondistended stable appearance with thick wall. Mildly thickened, stable adrenal glands. Small T2 hyperintense bilateral renal  cysts redemonstrated. No hydronephrosis. No splenomegaly. Small midline periumbilical ventral wall incisional hernia contains small bowel  without current evidence of bowel obstruction. Diverticulosis. Mild prostate  enlargement. Degenerative changes along the lower lumbar spine. Impression  Impression:    1. Acute pancreatitis, improving. No pseudocyst.  2. Mild biliary ductal dilation, improving. No choledocholithiasis identified. Motion limited exam.  3. Cholelithiasis. 4. Thick-walled, nondistended, stable gallbladder. 5. Renal cysts. 6. Small midline incisional hernia containing small bowel, without current  evidence of obstruction. 7. Diverticulosis. LINDA White.    [facility], [address] [place] [address]  Cell: [phone]  [email or web address]

## 2021-09-29 NOTE — PROGRESS NOTES
[address] [facility]  Intern Progress Note    Patient: Ever Escamilla MRN: [id number]   SSN: [id number]  YOB: 1945   Age: 67 y.o. Sex: male      Admit Date: 9/27/2021    LOS: 1 day   Chief Complaint   Patient presents with    Epigastric Pain     x 1 hour       Subjective:     MONICA overnight per pt, NT and nursing. Pt comfortable and resting at bedside visit this AM.  Recently placed in new room w/IVF. Lipase much improved this AM: 57992>9361>193. Of note, Cr is slightly higher at 1.59 w/GFR of 52. Does not have OP nephro. Will consult while IP. Objective:     Visit Vitals  /83 (BP 1 Location: Left upper arm, BP Patient Position: At rest)   Pulse 63   Temp 98 °F (36.7 °C)   Resp 17   Ht 5' 11\" (1.803 m)   Wt 96.3 kg (212 lb 4.8 oz)   SpO2 97%   BMI 29.61 kg/m²       Physical Exam:   General appearance: cooperative, no distress  Lungs: clear to auscultation bilaterally  Heart: regular rate and rhythm, S1, S2 normal, no murmur, click, rub or gallop  Abdomen: soft, non-tender. Bowel sounds normal. No masses,  no organomegaly  Pulses: 2+ and symmetric      Intake and Output:  Current Shift: No intake/output data recorded.   Last three shifts: 09/27 1901 - 09/29 0700  In: 100 [I.V.:100]  Out: -     Lab/Data Review:  Recent Results (from the past 12 hour(s))   MAGNESIUM    Collection Time: 09/29/21  3:50 AM   Result Value Ref Range    Magnesium 2.0 1.6 - 2.6 mg/dL   LACTIC ACID    Collection Time: 09/29/21  3:50 AM   Result Value Ref Range    Lactic acid 1.5 0.4 - 2.0 MMOL/L   CBC WITH AUTOMATED DIFF    Collection Time: 09/29/21  3:50 AM   Result Value Ref Range    WBC 12.4 4.6 - 13.2 K/uL    RBC 4.71 4.35 - 5.65 M/uL    HGB 12.6 (L) 13.0 - 16.0 g/dL    HCT 39.0 36.0 - 48.0 %    MCV 82.8 78.0 - 100.0 FL    MCH 26.8 24.0 - 34.0 PG    MCHC 32.3 31.0 - 37.0 g/dL    RDW 15.3 (H) 11.6 - 14.5 %    PLATELET 033 769 - 005 K/uL    MPV 11.1 9.2 - 11.8 FL    NEUTROPHILS 82 (H) 40 - 73 % LYMPHOCYTES 8 (L) 21 - 52 %    MONOCYTES 8 3 - 10 %    EOSINOPHILS 0 0 - 5 %    BASOPHILS 0 0 - 2 %    ABS. NEUTROPHILS 10.2 (H) 1.8 - 8.0 K/UL    ABS. LYMPHOCYTES 1.1 0.9 - 3.6 K/UL    ABS. MONOCYTES 1.0 0.05 - 1.2 K/UL    ABS. EOSINOPHILS 0.0 0.0 - 0.4 K/UL    ABS. BASOPHILS 0.0 0.0 - 0.1 K/UL    DF AUTOMATED     METABOLIC PANEL, COMPREHENSIVE    Collection Time: 09/29/21  3:50 AM   Result Value Ref Range    Sodium 139 136 - 145 mmol/L    Potassium 4.2 3.5 - 5.5 mmol/L    Chloride 105 100 - 111 mmol/L    CO2 26 21 - 32 mmol/L    Anion gap 8 3.0 - 18 mmol/L    Glucose 85 74 - 99 mg/dL    BUN 17 7.0 - 18 MG/DL    Creatinine 1.59 (H) 0.6 - 1.3 MG/DL    BUN/Creatinine ratio 11 (L) 12 - 20      GFR est AA 52 (L) >60 ml/min/1.73m2    GFR est non-AA 43 (L) >60 ml/min/1.73m2    Calcium 9.8 8.5 - 10.1 MG/DL    Bilirubin, total 1.0 0.2 - 1.0 MG/DL    ALT (SGPT) 267 (H) 16 - 61 U/L    AST (SGOT) 119 (H) 10 - 38 U/L    Alk. phosphatase 99 45 - 117 U/L    Protein, total 6.8 6.4 - 8.2 g/dL    Albumin 3.4 3.4 - 5.0 g/dL    Globulin 3.4 2.0 - 4.0 g/dL    A-G Ratio 1.0 0.8 - 1.7     LIPASE    Collection Time: 09/29/21  3:50 AM   Result Value Ref Range    Lipase 672 (H) 73 - 393 U/L   VANCOMYCIN, RANDOM    Collection Time: 09/29/21  3:50 AM   Result Value Ref Range    Vancomycin, random <0.8 (L) 5.0 - 40.0 UG/ML         Assessment and Plan:     Sept 29, 2021: (1) MRCP pending. Depending on results will dictate course of action: if obstructing, likely transferred to different facility for ERCP. If not, will consider surg consult for cholecystectomy. (2) Decreasing renal function. Nephro consulted      Bedelia Due a 67 y. o. male with asthma, history of gallstone pancreatitis in 2020, HLD, prior CVA, hypertension, now admitted for acute pancreatitis.       Epigastric pain  Patient started having sharp epigastric pain this morning associated with nausea but no vomiting. There was no radiation of pain.   Denied fever, diarrhea, constipation, chest pain, sob, melena, or Hx of NSAID use . Endorsed urinary frequency and occasional urinary incontinence. Patient has not been able to keep anything down since this morning. He stopped drinking alcohol 25 years ago, smokes 4-5 cigarettes a day for 4-5 years, and has Hx of gallstone pancreatitis in 2020. Afebrile, WBC 25, , , marium phos 119, lipase 22K. Most likely d/t gallstone pancreatitis given Hx of gallstone pancreatitis, elevated LFT's, marium phos, elevated lipase, epigastric pain a/w n/v, and Hx of alcoholism and HLD. Gastric ulcer is on the differential given epigastric pain with tenderness of palpation in the epigastric region. Cholecystitis is also a possibility given Hx of gallstones, however, its lower on the ddx given negative Sharma's sign.                 - admit to medicine  - NPO, advance slowly  - LR 1L bolus, cautious given reported 1 day of sxs and hx CKD with current edema  - LR maintenance fluid at 200 cc/hr   - daily CBC, CMP, lipase  - one time Mg, Phos  - zofran PRN   - RUQ US   - Pain control as needed,      DARINEL in the setting of CKD  Likely 2/2 decreased PO intake 2/2 abdominal pain/nausea  - Cr 1.9, baseline 1.4-1.6  - CKD stage 3 w/ GFR 42  - fluid bolus and maintenance fluid  - daily BMP  - Avoid nephrotoxic meds     Asthma  - cont home meds Spiriva and albuterol pending med rec w sister     HLD/Hx CVA  - cont home med atorvastatin, ASA pending med rec w sister     HTN   - cont home med amlodipine  pending med rec w sister     OAB  - Continue home oxybutynin pending med rec     Mood  - Continue home sertraline pending med rec     Global Care:  - VS per unit routine  - supplemental O2 for sats < 92%  - incentive spirometer  - PT/OT/CM      Daylin Miranda MD, PGY-3   [address]   Intern Pager: [phone]   September 29, 2021, 12:58 PM

## 2021-09-29 NOTE — PROGRESS NOTES
[facility]- Initial Progress Note    Pharmacy Consult ordered by Dr. Ciro Puckett     Indication: bloodstream infection    Patient clinical status and labs ordered/reviewed. Pt Weight Weight: 98 kg (216 lb)   Serum Creatinine Lab Results   Component Value Date/Time    Creatinine 1.57 (H) 09/28/2021 06:00 AM       Creatinine Clearance Estimated Creatinine Clearance: 48.6 mL/min (A) (based on SCr of 1.57 mg/dL (H)).    BUN Lab Results   Component Value Date/Time    BUN 20 (H) 09/28/2021 06:00 AM       WBC Lab Results   Component Value Date/Time    WBC 17.5 (H) 09/28/2021 06:00 AM      Temperature Temp: 98.5 °F (36.9 °C)   HR Pulse (Heart Rate): 63     BP BP: 136/82             Analysis using F2G gives the following patient-specific pharmacokinetic parameters:    CL: 2.4 L/hr   V: 81.8 L   T1/2: 25.7 hours    At this time we recommend a loading dose of 2000 mg at 00:00 09/28/2021, followed by random level in the AM.    Continue to monitor    Sign: Jona Feliz PHARMD  Date: 9/28/2021  Time: 8:00 PM

## 2021-09-29 NOTE — PROGRESS NOTES
conducted a Follow up consultation and Spiritual Assessment for Jessika Lee, who is a 67 y.o.,male. The  provided the following Interventions:  Continued the relationship of care and support. Listened empathically. Patient prefers to be called \"Bill\" and he asked for prayers. Offered prayer and assurance of continued prayer on patient's behalf. Chart reviewed. The following outcomes were achieved:  Patient expressed gratitude for 's visit. Assessment:  There are no further spiritual or [employer]ish issues which require Spiritual Care Services interventions at this time. Plan:  Chaplains will continue to follow and will provide pastoral care on an as needed/requested basis.  recommends bedside caregivers page  on duty if patient shows signs of acute spiritual or emotional distress.      Torsten Stallworth5 [phone]

## 2021-09-29 NOTE — PROGRESS NOTES
Problem: Patient Education: Go to Patient Education Activity  Goal: Patient/Family Education  Outcome: Progressing Towards Goal     Problem: Patient Education: Go to Patient Education Activity  Goal: Patient/Family Education  Outcome: Progressing Towards Goal     Problem: Pressure Injury - Risk of  Goal: *Prevention of pressure injury  Description: Document Juno Scale and appropriate interventions in the flowsheet. Outcome: Progressing Towards Goal  Note: Pressure Injury Interventions:  Sensory Interventions: Assess changes in LOC, Float heels, Keep linens dry and wrinkle-free    Moisture Interventions: Absorbent underpads, Apply protective barrier, creams and emollients    Activity Interventions: Pressure redistribution bed/mattress(bed type)    Mobility Interventions: Assess need for specialty bed, HOB 30 degrees or less    Nutrition Interventions: Document food/fluid/supplement intake (NPO)    Friction and Shear Interventions: Apply protective barrier, creams and emollients, HOB 30 degrees or less                Problem: Falls - Risk of  Goal: *Absence of Falls  Description: Document Leyla Fall Risk and appropriate interventions in the flowsheet.   Outcome: Progressing Towards Goal  Note: Fall Risk Interventions:                 Elimination Interventions: Bed/chair exit alarm, Call light in reach

## 2021-09-29 NOTE — ED NOTES
Bedside and verbal shift report given by Dior Olmedo (off going nurse) to Amina Bowie RN (oncoming nurse). Report included the following information SBAR and ED Summary.     Patient currently at MRI

## 2021-09-29 NOTE — ED NOTES
TRANSFER - OUT REPORT:    Verbal report given to Rahul Callahan RN on Karla Saha  being transferred to [facility] for routine progression of care       Report consisted of patients Situation, Background, Assessment and   Recommendations(SBAR). Information from the following report(s) SBAR and ED Summary was reviewed with the receiving nurse. Lines:   Peripheral IV 09/28/21 Posterior;Right Hand (Active)        Opportunity for questions and clarification was provided.       Patient transported with:   [employer]

## 2021-09-30 LAB
ALBUMIN SERPL-MCNC: 3 G/DL (ref 3.4–5)
ALBUMIN/GLOB SERPL: 0.8 {RATIO} (ref 0.8–1.7)
ALP SERPL-CCNC: 213 U/L (ref 45–117)
ALT SERPL-CCNC: 1005 U/L (ref 16–61)
ANION GAP SERPL CALC-SCNC: 7 MMOL/L (ref 3–18)
AST SERPL-CCNC: 1057 U/L (ref 10–38)
BACTERIA SPEC CULT: ABNORMAL
BASOPHILS # BLD: 0 K/UL (ref 0–0.1)
BASOPHILS NFR BLD: 0 % (ref 0–2)
BILIRUB SERPL-MCNC: 1.2 MG/DL (ref 0.2–1)
BUN SERPL-MCNC: 17 MG/DL (ref 7–18)
BUN/CREAT SERPL: 10 (ref 12–20)
CALCIUM SERPL-MCNC: 9.7 MG/DL (ref 8.5–10.1)
CHLORIDE SERPL-SCNC: 106 MMOL/L (ref 100–111)
CO2 SERPL-SCNC: 24 MMOL/L (ref 21–32)
COVID-19 RAPID TEST, COVR: NOT DETECTED
CREAT SERPL-MCNC: 1.67 MG/DL (ref 0.6–1.3)
DIFFERENTIAL METHOD BLD: ABNORMAL
EOSINOPHIL # BLD: 0 K/UL (ref 0–0.4)
EOSINOPHIL NFR BLD: 0 % (ref 0–5)
ERYTHROCYTE [DISTWIDTH] IN BLOOD BY AUTOMATED COUNT: 15.2 % (ref 11.6–14.5)
GLOBULIN SER CALC-MCNC: 4 G/DL (ref 2–4)
GLUCOSE SERPL-MCNC: 96 MG/DL (ref 74–99)
GRAM STN SPEC: ABNORMAL
GRAM STN SPEC: ABNORMAL
HCT VFR BLD AUTO: 38.1 % (ref 36–48)
HGB BLD-MCNC: 12.4 G/DL (ref 13–16)
IGG4 SER-MCNC: 29 MG/DL (ref 2–96)
INR PPP: 1 (ref 0.8–1.2)
LIPASE SERPL-CCNC: 4777 U/L (ref 73–393)
LYMPHOCYTES # BLD: 0.9 K/UL (ref 0.9–3.6)
LYMPHOCYTES NFR BLD: 11 % (ref 21–52)
MCH RBC QN AUTO: 27 PG (ref 24–34)
MCHC RBC AUTO-ENTMCNC: 32.5 G/DL (ref 31–37)
MCV RBC AUTO: 82.8 FL (ref 78–100)
MONOCYTES # BLD: 0.8 K/UL (ref 0.05–1.2)
MONOCYTES NFR BLD: 9 % (ref 3–10)
NEUTS SEG # BLD: 6.6 K/UL (ref 1.8–8)
NEUTS SEG NFR BLD: 79 % (ref 40–73)
PLATELET # BLD AUTO: 196 K/UL (ref 135–420)
PMV BLD AUTO: 10.6 FL (ref 9.2–11.8)
POTASSIUM SERPL-SCNC: 3.9 MMOL/L (ref 3.5–5.5)
PROT SERPL-MCNC: 7 G/DL (ref 6.4–8.2)
PROTHROMBIN TIME: 13.4 SEC (ref 11.5–15.2)
RBC # BLD AUTO: 4.6 M/UL (ref 4.35–5.65)
SERVICE CMNT-IMP: ABNORMAL
SODIUM SERPL-SCNC: 137 MMOL/L (ref 136–145)
SOURCE, COVRS: NORMAL
VANCOMYCIN SERPL-MCNC: 6.4 UG/ML (ref 5–40)
WBC # BLD AUTO: 8.3 K/UL (ref 4.6–13.2)

## 2021-09-30 PROCEDURE — 36415 COLL VENOUS BLD VENIPUNCTURE: CPT

## 2021-09-30 PROCEDURE — 2709999900 HC NON-CHARGEABLE SUPPLY

## 2021-09-30 PROCEDURE — 74011250636 HC RX REV CODE- 250/636: Performed by: STUDENT IN AN ORGANIZED HEALTH CARE EDUCATION/TRAINING PROGRAM

## 2021-09-30 PROCEDURE — 65270000029 HC RM PRIVATE

## 2021-09-30 PROCEDURE — 74011250637 HC RX REV CODE- 250/637: Performed by: STUDENT IN AN ORGANIZED HEALTH CARE EDUCATION/TRAINING PROGRAM

## 2021-09-30 PROCEDURE — 87635 SARS-COV-2 COVID-19 AMP PRB: CPT

## 2021-09-30 PROCEDURE — 83690 ASSAY OF LIPASE: CPT

## 2021-09-30 PROCEDURE — 97530 THERAPEUTIC ACTIVITIES: CPT

## 2021-09-30 PROCEDURE — 97110 THERAPEUTIC EXERCISES: CPT

## 2021-09-30 PROCEDURE — 85025 COMPLETE CBC W/AUTO DIFF WBC: CPT

## 2021-09-30 PROCEDURE — 74011000258 HC RX REV CODE- 258: Performed by: STUDENT IN AN ORGANIZED HEALTH CARE EDUCATION/TRAINING PROGRAM

## 2021-09-30 PROCEDURE — 97116 GAIT TRAINING THERAPY: CPT

## 2021-09-30 PROCEDURE — 80053 COMPREHEN METABOLIC PANEL: CPT

## 2021-09-30 PROCEDURE — 80202 ASSAY OF VANCOMYCIN: CPT

## 2021-09-30 PROCEDURE — 85610 PROTHROMBIN TIME: CPT

## 2021-09-30 RX ADMIN — Medication 10 ML: at 23:24

## 2021-09-30 RX ADMIN — HEPARIN SODIUM 5000 UNITS: 5000 INJECTION INTRAVENOUS; SUBCUTANEOUS at 11:34

## 2021-09-30 RX ADMIN — Medication 10 ML: at 05:40

## 2021-09-30 RX ADMIN — HEPARIN SODIUM 5000 UNITS: 5000 INJECTION INTRAVENOUS; SUBCUTANEOUS at 05:39

## 2021-09-30 RX ADMIN — LOPERAMIDE HYDROCHLORIDE 2 MG: 2 CAPSULE ORAL at 05:39

## 2021-09-30 RX ADMIN — PIPERACILLIN AND TAZOBACTAM 3.38 G: 3; .375 INJECTION, POWDER, LYOPHILIZED, FOR SOLUTION INTRAVENOUS at 18:28

## 2021-09-30 RX ADMIN — PIPERACILLIN AND TAZOBACTAM 3.38 G: 3; .375 INJECTION, POWDER, LYOPHILIZED, FOR SOLUTION INTRAVENOUS at 05:38

## 2021-09-30 RX ADMIN — PIPERACILLIN AND TAZOBACTAM 3.38 G: 3; .375 INJECTION, POWDER, LYOPHILIZED, FOR SOLUTION INTRAVENOUS at 11:36

## 2021-09-30 RX ADMIN — Medication 10 ML: at 14:00

## 2021-09-30 NOTE — PROGRESS NOTES
Problem: Pressure Injury - Risk of  Goal: *Prevention of pressure injury  Description: Document Juno Scale and appropriate interventions in the flowsheet. Outcome: Progressing Towards Goal  Note: Pressure Injury Interventions:  Sensory Interventions: Assess changes in LOC    Moisture Interventions: Absorbent underpads    Activity Interventions: Pressure redistribution bed/mattress(bed type)    Mobility Interventions: HOB 30 degrees or less    Nutrition Interventions: Document food/fluid/supplement intake    Friction and Shear Interventions: Apply protective barrier, creams and emollients             Problem: Falls - Risk of  Goal: *Absence of Falls  Description: Document Leyla Fall Risk and appropriate interventions in the flowsheet.   Outcome: Progressing Towards Goal  Note: Fall Risk Interventions:            Elimination Interventions: Bed/chair exit alarm            roblem: Patient Education: Go to Patient Education Activity  Goal: Patient/Family Education  Outcome: Progressing Towards Goal

## 2021-09-30 NOTE — PROGRESS NOTES
[email or web address]  [phone]      GASTROENTEROLOGY BRIEF NOTE    Update (1538 hours) - Marry of [facility] Endo called with update that earliest transportation could get patient to [facility] is 2000 hours tonight. ERCP is deferred until tomorrow 10/1/2021 to allow for timely transportation to [facility] for ERCP. Clear liquid diet tonight and NPO after midnight. Patient's assigned nurse to call with report in the morning to [facility], Endo nurse at [facility]. Tel: [phone]. Please alert Dr Timmy Solorzano (on call GI), if acute changes in patient's status concerning for cholangitis. Above relayed to PFM team via [employer]. All acknowledged the transmission.    -----------------------------------------------------------------------    I confirmed with Dr Shakira Barrientos for patient to arrive at [facility] at earliest available departure this afternoon. Transportation team to take patient to 2nd floor - preop location. PFM team in progress of coordinating for patient's interfacility transfer for an ERCP. Theresa Conti PA-C  [facility].  [email or web address]  Phone: [phone]

## 2021-09-30 NOTE — CONSULTS
[facility]  (A Division of [address] [place] [address])      Consultation Note      Date of Admission: 9/27/2021    Date of Note: 9/30/2021    Dr. Milagro Adam will see tomorrow 10/1 for [facility]. Reason for Referral: Gram negative bacteremia      Current Antimicrobials:    Prior Antimicrobials:  Zosyn 9/29 - 1        Assessment: Rec / Plan:   Blood Stream Invasion - E coli  - 1 of 2 blcx 9/27 + pan-sensitive E coli  - no cholecystitis, unlikely from pancreatitis since usually not bacterial, suspect from mild cholangitis  - there was mention of urinary frequency in H&P but no urinalysis done.  -> continue Zosyn  -> urinalysis, urine cx   -> monitor rpt blcx 9/29. If negative by 10/1 and remains afebrile, could switch zosyn to po Cipro 500 mg bid x 7 days   Acute pancreatitis  - gallstone related. Lipase initially 22,314 - had improved to 672 but up again to 4,777  - unlikely source of E coli bacteremia -> per GI, possible transfer for ERCP   Suspect mild cholangitis  - rising transaminases, alk phos and bili  - may be source of bacteremia -> abx as above   HTN    HLD    prior CVA    cholelithiasis    h/o gallstone pancreatitis          Microbiology:      Lines / Catheters:      HPI:  67year-old -American male with HTN, HLD, prior CVA, cholelithiasis, h/o gallstone pancreatitis admitted to [facility] [place] 9/27/2021 due to abdominal pain. Acute epigastric pain with nausea but no vomiting on the morning of admission led to ED visit where he was afebrile but had epigastric tenderness on exam and a WBC count of 24.9. Lipase was 22,314, , , T bili 0.8 alk phos 119. CTAP showed interstitial edematous pancreatitis, large prostate with mild bladder wall thickening and bilateral common iliac artery aneurysms. Abdominal US 9/28 showed GB not visualized (?contracted) and dilated CBD 11 mm.  MRI 9/28 showed mild biliary duct dilation with suspected small calculus within ampulla, cholelithiasis, acute pancreatitis but no pseudocyst, necrosis or pancreatic ductal dilatation. One of two blood cultures from 9/27 grew a pan-sensitive E coli. Zosyn and vancomycin were started 9/29 but vancomycin was dc'd 9/30.   WBC count had improved prior to starting antibiotics and is currently normal at 8.3     Active Hospital Problems    Diagnosis Date Noted    Acute pancreatitis 09/28/2021    Pancreatitis 12/04/2020     Past Medical History:   Diagnosis Date    Acute blood loss as cause of postoperative anemia 4/21/2018    Benign prostatic hyperplasia     Chronic kidney disease     Chronic obstructive pulmonary disease (HCC)     CKD (chronic kidney disease) stage 3, GFR 30-59 ml/min ([address] 9/1/2015    Depression 3/0/0569    Diastolic dysfunction without heart failure 9/2/2015    Grade 1 diastolic dysfunction on 2D ECHO done 9/02/2015    Dyslipidemia 9/2/2015    Dysphagia as late effect of cerebrovascular accident (CVA) 9/1/2015    Hemiparesis affecting left side as late effect of cerebrovascular accident (CVA) ([address] 9/1/2015    History of noncompliance with medical treatment, presenting hazards to health 9/1/2015    History of stroke with residual deficit 9/1/2015    Acute Ischemic Stroke (early subacute infarct at the posterior right lentiform nucleus) with residual left hemiparesis and dysphagia     History of tobacco use 9/1/2015    History of trichomonal urethritis 9/1/2015    History of vitamin D deficiency 9/6/2015    Hypertension     Hypertensive heart and kidney disease without heart failure and with chronic kidney disease stage III ([address] 09/02/2015    Obesity, Class I, BMI 30-34.9 9/1/2015    Overactive bladder     Pancreatitis, acute 12/2020    Postoperative atrial fibrillation ([address] 4/21/2018    Resolved    Postoperative confusion 4/22/2018    Postoperative urinary retention 4/22/2018    Statin intolerance 05/07/2018 Atorvastatin and Simvastatin    Stroke [facility]) 2015    weakness rt side, dyphagia    Vitamin D deficiency 5/1/2018    Vitamin D 25-Hydroxy (5/1/2018) = 17.9      Past Surgical History:   Procedure Laterality Date    COLONOSCOPY N/A 4/3/2018    COLONOSCOPY,  w heated snared polypectomy performed by Toshia Trevino MD at [facility] COLONOSCOPY N/A 2/25/2021    COLONOSCOPY w polypectomy performed by Lauri Shell MD at [address] COLONOSCOPY N/A 4/19/2021    COLONOSCOPY with Polypectomy performed by Rhys Giles MD at [address] APPENDECTOMY,RUPT APPENDX+ABSCESS N/A 04/20/2018    Dr. Ellie Cruz     Family History   Problem Relation Age of Onset    Diabetes Mother     Stroke Mother     Heart Disease Father     Hypertension Father     Diabetes Brother     Hypertension Brother      Social History     Socioeconomic History    Marital status: SINGLE     Spouse name: Not on file    Number of children: Not on file    Years of education: Not on file    Highest education level: Not on file   Occupational History    Not on file   Tobacco Use    Smoking status: Current Every Day Smoker     Packs/day: 0.25    Smokeless tobacco: Never Used    Tobacco comment: 2 cigs daily   Vaping Use    Vaping Use: Never used   Substance and Sexual Activity    Alcohol use: Not Currently    Drug use: No    Sexual activity: Never   Other Topics Concern    Not on file   Social History Narrative    Not on file     Social Determinants of Health     Financial Resource Strain:     Difficulty of Paying Living Expenses:    Food Insecurity:     Worried About Running Out of Food in the Last Year:     Ran Out of Food in the Last Year:    Transportation Needs:     Lack of Transportation (Medical):      Lack of Transportation (Non-Medical):    Physical Activity:     Days of Exercise per Week:     Minutes of Exercise per Session:    Stress:     Feeling of Stress :    Social Connections:     Frequency of Communication with Friends and Family:     Frequency of Social Gatherings with Friends and Family:     Attends Christian Services:     Active Member of Clubs or Organizations:     Attends Club or Organization Meetings:     Marital Status:    Intimate Partner Violence:     Fear of Current or Ex-Partner:     Emotionally Abused:     Physically Abused:     Sexually Abused:         Allergies:  Lipitor [atorvastatin] and Simvastatin     Medications:  Current Facility-Administered Medications   Medication Dose Route Frequency    sodium chloride (NS) flush 5-40 mL  5-40 mL IntraVENous Q8H    sodium chloride (NS) flush 5-40 mL  5-40 mL IntraVENous PRN    acetaminophen (TYLENOL) tablet 650 mg  650 mg Oral Q6H PRN    Or    acetaminophen (TYLENOL) suppository 650 mg  650 mg Rectal Q6H PRN    polyethylene glycol (MIRALAX) packet 17 g  17 g Oral DAILY PRN    ondansetron (ZOFRAN) injection 4 mg  4 mg IntraVENous Q6H PRN    nicotine (NICODERM CQ) 14 mg/24 hr patch 1 Patch  1 Patch TransDERmal DAILY    heparin (porcine) injection 5,000 Units  5,000 Units SubCUTAneous Q8H    lactated Ringers infusion  200 mL/hr IntraVENous CONTINUOUS    [Held by provider] amLODIPine (NORVASC) tablet 10 mg  10 mg Oral DAILY    [Held by provider] aspirin chewable tablet 81 mg  81 mg Oral DAILY    [Held by provider] atorvastatin (LIPITOR) tablet 10 mg  10 mg Oral QHS    [Held by provider] sertraline (ZOLOFT) tablet 50 mg  50 mg Oral QHS    [Held by provider] furosemide (LASIX) tablet 20 mg  20 mg Oral DAILY    loperamide (IMODIUM) capsule 2 mg  2 mg Oral QID PRN    [Held by provider] therapeutic multivitamin (THERAGRAN) tablet 1 Tablet  1 Tablet Oral DAILY    [Held by provider] oxybutynin (DITROPAN) tablet 5 mg  5 mg Oral DAILY    piperacillin-tazobactam (ZOSYN) 3.375 g in 0.9% sodium chloride (MBP/ADV) 100 mL MBP  3.375 g IntraVENous Q6H        ROS:  A comprehensive review of systems was negative except for that written in the History of Present Illness. Physical Exam:    Temp (24hrs), Av.9 °F (36.6 °C), Min:97.1 °F (36.2 °C), Max:99.2 °F (37.3 °C)    Visit Vitals  /79 (BP 1 Location: Left upper arm, BP Patient Position: At rest)   Pulse 63   Temp 97.5 °F (36.4 °C)   Resp 20   Ht 5' 11\" (1.803 m)   Wt 96.3 kg (212 lb 4.8 oz)   SpO2 96%   BMI 29.61 kg/m²       General: Well developed, well nourished 67 y.o. BLACK/ male in no acute distress. ENT: ENT exam normal, no neck nodes or sinus tenderness  Head: normocephalic, without obvious abnormality  Mouth:  mucous membranes moist, pharynx normal without lesions  Neck: supple, symmetrical, trachea midline   Cardio:  regular rate and rhythm, S1, S2 normal, no murmur, click, rub or gallop  Chest: inspection normal - no chest wall deformities or tenderness, respiratory effort normal  Lungs: no wheezes or rales  Abdomen: soft, non-tender. Bowel sounds normal. No masses, no organomegaly.   Extremities:  no redness or tenderness in the calves or thighs, no edema  Neuro: Grossly normal       Lab results:    Chemistry  Recent Labs     21  0350 21  0600   GLU 96 85 110*    139 138   K 3.9 4.2 4.2    105 108   CO2 24 26 23   BUN 17 17 20*   CREA 1.67* 1.59* 1.57*   CA 9.7 9.8 10.5*   AGAP 7 8 7   BUCR 10* 11* 13   * 99 124*   TP 7.0 6.8 7.9   ALB 3.0* 3.4 3.7   GLOB 4.0 3.4 4.2*   AGRAT 0.8 1.0 0.9       CBC w/ Diff  Recent Labs     21  0350 21  0600   WBC 8.3 12.4 17.5*   RBC 4.60 4.71 5.12   HGB 12.4* 12.6* 13.7   HCT 38.1 39.0 42.7    210 220   GRANS 79* 82* 88*   LYMPH 11* 8* 6*   EOS 0 0 0       Microbiology  All Micro Results     Procedure Component Value Units Date/Time    CULTURE, BLOOD #1 [[id number]]  (Abnormal)  (Susceptibility) Collected: 21 0610    Order Status: Completed Specimen: Blood Updated: 21 0859     Special Requests: NO SPECIAL REQUESTS        Mat Mina STAIN       ANAEROBIC BOTTLE GRAM NEGATIVE RODS                  SMEAR CALLED TO AND CORRECTLY REPEATED BY: LATRICE Massey [facility] ED AT [id number].            Culture result:       ESCHERICHIA COLI GROWING IN THE ANAEROBIC BOTTLE          CULTURE, BLOOD [[id number]] Collected: 09/29/21 2215    Order Status: Completed Specimen: Blood Updated: 09/30/21 0652     Special Requests: NO SPECIAL REQUESTS        Culture result: NO GROWTH AFTER 7 HOURS       CULTURE, BLOOD [[id number]] Collected: 09/29/21 2230    Order Status: Completed Specimen: Blood Updated: 09/30/21 0652     Special Requests: NO SPECIAL REQUESTS        Culture result: NO GROWTH AFTER 7 HOURS       CULTURE, BLOOD #2 [[id number]] Collected: 09/28/21 0600    Order Status: Completed Specimen: Blood Updated: 09/30/21 0651     Special Requests: NO SPECIAL REQUESTS        Culture result: NO GROWTH 2 DAYS                Solange Owusu MD, [facility]  9/30/2021   10:53 AM

## 2021-09-30 NOTE — PROGRESS NOTES
[facility]  Intern Progress Note    Patient: Fer Godwin MRN: [id number]   SSN: [id number]  YOB: 1945   Age: 67 y.o. Sex: male      Admit Date: 9/27/2021    LOS: 2 days   Chief Complaint   Patient presents with    Epigastric Pain     x 1 hour       Subjective:     MONICA overnight per pt, NT and nursing. Pt comfortable and resting at bedside visit this AM.  No complaints. Denies pain. Lipase increased on labs this AM: 47489>4320>672>4777. ALT elevated today: 356>470>267>1005  AST elevated today: 562>357>119>1057  Of note, Cr is slightly higher at 1.59 w/GFR of 52. Does not have OP nephro. Will consult while IP. MRCP of Sept 28, 2021 showed: 3mm calculus within the ampullary channel. Mild intrahepatic and extrahepatic biliary ductal dilation. Multiple small gallstones w/o gallbladder distention. Cholelithiasis. No pancreatic ductal dilation. Peripancreatic fluid signal abnormalities consistent w/pancreatitis. ID following - per recs will switch zosyn to PO abx (cipro) if BCx (-) tomorrow (Oct 1)      Objective:     Visit Vitals  /79 (BP 1 Location: Left upper arm, BP Patient Position: At rest)   Pulse 63   Temp 97.5 °F (36.4 °C)   Resp 20   Ht 5' 11\" (1.803 m)   Wt 96.3 kg (212 lb 4.8 oz)   SpO2 96%   BMI 29.61 kg/m²       Physical Exam:   General appearance: cooperative, no distress  Lungs: clear to auscultation bilaterally  Heart: regular rate and rhythm, S1, S2 normal, no murmur, click, rub or gallop  Abdomen: soft, non-tender. Bowel sounds normal. No masses,  no organomegaly  Pulses: 2+ and symmetric      Intake and Output:  Current Shift: No intake/output data recorded. Last three shifts: 09/28 1901 - 09/30 0700  In: 700 [P.O.:600;  I.V.:100]  Out: -     Lab/Data Review:  Recent Results (from the past 12 hour(s))   CULTURE, BLOOD    Collection Time: 09/29/21 10:15 PM    Specimen: Blood   Result Value Ref Range    Special Requests: NO SPECIAL REQUESTS Culture result: NO GROWTH AFTER 7 HOURS     CULTURE, BLOOD    Collection Time: 09/29/21 10:30 PM    Specimen: Blood   Result Value Ref Range    Special Requests: NO SPECIAL REQUESTS      Culture result: NO GROWTH AFTER 7 HOURS     CBC WITH AUTOMATED DIFF    Collection Time: 09/30/21  2:55 AM   Result Value Ref Range    WBC 8.3 4.6 - 13.2 K/uL    RBC 4.60 4.35 - 5.65 M/uL    HGB 12.4 (L) 13.0 - 16.0 g/dL    HCT 38.1 36.0 - 48.0 %    MCV 82.8 78.0 - 100.0 FL    MCH 27.0 24.0 - 34.0 PG    MCHC 32.5 31.0 - 37.0 g/dL    RDW 15.2 (H) 11.6 - 14.5 %    PLATELET 617 201 - 788 K/uL    MPV 10.6 9.2 - 11.8 FL    NEUTROPHILS 79 (H) 40 - 73 %    LYMPHOCYTES 11 (L) 21 - 52 %    MONOCYTES 9 3 - 10 %    EOSINOPHILS 0 0 - 5 %    BASOPHILS 0 0 - 2 %    ABS. NEUTROPHILS 6.6 1.8 - 8.0 K/UL    ABS. LYMPHOCYTES 0.9 0.9 - 3.6 K/UL    ABS. MONOCYTES 0.8 0.05 - 1.2 K/UL    ABS. EOSINOPHILS 0.0 0.0 - 0.4 K/UL    ABS. BASOPHILS 0.0 0.0 - 0.1 K/UL    DF AUTOMATED     METABOLIC PANEL, COMPREHENSIVE    Collection Time: 09/30/21  2:55 AM   Result Value Ref Range    Sodium 137 136 - 145 mmol/L    Potassium 3.9 3.5 - 5.5 mmol/L    Chloride 106 100 - 111 mmol/L    CO2 24 21 - 32 mmol/L    Anion gap 7 3.0 - 18 mmol/L    Glucose 96 74 - 99 mg/dL    BUN 17 7.0 - 18 MG/DL    Creatinine 1.67 (H) 0.6 - 1.3 MG/DL    BUN/Creatinine ratio 10 (L) 12 - 20      GFR est AA 49 (L) >60 ml/min/1.73m2    GFR est non-AA 40 (L) >60 ml/min/1.73m2    Calcium 9.7 8.5 - 10.1 MG/DL    Bilirubin, total 1.2 (H) 0.2 - 1.0 MG/DL    ALT (SGPT) 1,005 (H) 16 - 61 U/L    AST (SGOT) 1,057 (H) 10 - 38 U/L    Alk.  phosphatase 213 (H) 45 - 117 U/L    Protein, total 7.0 6.4 - 8.2 g/dL    Albumin 3.0 (L) 3.4 - 5.0 g/dL    Globulin 4.0 2.0 - 4.0 g/dL    A-G Ratio 0.8 0.8 - 1.7     LIPASE    Collection Time: 09/30/21  2:55 AM   Result Value Ref Range    Lipase 4,777 (H) 73 - 393 U/L   VANCOMYCIN, RANDOM    Collection Time: 09/30/21  2:55 AM   Result Value Ref Range    Vancomycin, random 6.4 5.0 - 40.0 UG/ML         Assessment and Plan:     Sept 30, 2021: MRCP results as above. GI following. May need to be transferred to facility that can perform ERCP. Otherwise, will consider surg consult for cholecystectomy. ID recs to switch to PO abx if BCx (-) tomorrw (10/1). F/U nephro recs    Yayo Phillips is a 67 y. o. male with asthma, history of gallstone pancreatitis in 2020, HLD, prior CVA, hypertension, now admitted for acute pancreatitis.       Epigastric pain  Patient started having sharp epigastric pain this morning associated with nausea but no vomiting. There was no radiation of pain. Denied fever, diarrhea, constipation, chest pain, sob, melena, or Hx of NSAID use . Endorsed urinary frequency and occasional urinary incontinence. Patient has not been able to keep anything down since this morning. He stopped drinking alcohol 25 years ago, smokes 4-5 cigarettes a day for 4-5 years, and has Hx of gallstone pancreatitis in 2020. Afebrile, WBC 25, , , marium phos 119, lipase 22K. Most likely d/t gallstone pancreatitis given Hx of gallstone pancreatitis, elevated LFT's, marium phos, elevated lipase, epigastric pain a/w n/v, and Hx of alcoholism and HLD. Gastric ulcer is on the differential given epigastric pain with tenderness of palpation in the epigastric region. Cholecystitis is also a possibility given Hx of gallstones, however, its lower on the ddx given negative Sharma's sign.                 - admit to medicine  - NPO, advance slowly  - LR maintenance fluid at 200 cc/hr   - daily CBC, CMP, lipase  - zofran PRN   - Pain control as needed      DARINEL in the setting of CKD  Likely 2/2 decreased PO intake 2/2 abdominal pain/nausea  - Cr 1.67 today, baseline 1.4-1.6  - CKD stage 3 w/ GFR 49  - fluid bolus and maintenance fluid  - daily BMP  - Avoid nephrotoxic meds     Asthma  - cont home meds Spiriva and albuterol pending med rec w sister     HLD/Hx CVA  - cont home med atorvastatin, ASA pending med rec w sister     HTN   - cont home med amlodipine  pending med rec w sister     OAB  - Continue home oxybutynin pending med rec     Mood  - Continue home sertraline pending med rec     [employer]:  - VS per unit routine  - supplemental O2 for sats < 92%  - incentive spirometer  - PT/OT/CM      Davy Noe MD, PGY-3   [address] [place]   Intern Pager: [phone]   September 30, 2021, 12:10 PM

## 2021-09-30 NOTE — PROGRESS NOTES
Problem: Mobility Impaired (Adult and Pediatric)  Goal: *Acute Goals and Plan of Care (Insert Text)  Description: Physical Therapy Goals  Initiated 9/28/2021 and to be accomplished within 7 day(s)  1. Patient will move from supine to sit and sit to supine , scoot up and down, and roll side to side in bed with modified independence. 2.  Patient will transfer from bed to chair and chair to bed with supervision/set-up using the least restrictive device. 3.  Patient will perform sit to stand with supervision/set-up. 4.  Patient will ambulate with supervision/set-up for 100 feet with the least restrictive device. 5.  Patient will perform LE therex as prescribed to BLE as tolerated     PLOF: Pt reports he lives in a 1  with 0 [facility] ([facility]) with his sister. Was mod ind with mobility PTA with use of a LBQC, reports he has a walker if needed as well. Outcome: Progressing Towards Goal    PHYSICAL THERAPY TREATMENT    Patient: Leatha Peres (1 y.o. male)  Date: 9/30/2021  Diagnosis: Pancreatitis [K85.90]  Acute pancreatitis [K85.90] Acute pancreatitis       Precautions: Fall      ASSESSMENT:  Patient received supine in bed and willing to work with PT. He transfers to EOB with SBA and additional time. Sit<>stand with CGA. Patient ambulates 40 ft in side the room with [facility] initially with CGA for stability then progressed to SBA. He stands at sink with unilateral UE support to brush his teeth. Patient declines to sit up OOB in chair despite encouragement. Educated on LE HEP and completes per flow sheet. Patient verbalizes understanding to use call bell for assistance with mobility and encouraged to get OOB for meals with nursing. Call bell in reach and bed alarm donned.      Progression toward goals:   []      Improving appropriately and progressing toward goals  [x]      Improving slowly and progressing toward goals  []      Not making progress toward goals and plan of care will be adjusted PLAN:  Patient continues to benefit from skilled intervention to address the above impairments. Continue treatment per established plan of care. Discharge Recommendations:  Home Health with family support  Further Equipment Recommendations for Discharge:  N/A     SUBJECTIVE:   Patient stated I use a cane.     OBJECTIVE DATA SUMMARY:   Critical Behavior:  Neurologic State: Alert  Orientation Level: Disoriented to situation  Cognition: Follows commands  Safety/Judgement: Fall prevention  Functional Mobility Training:  Bed Mobility:  Rolling: Stand-by assistance  Supine to Sit: Stand-by assistance               Transfers:  Sit to Stand: Contact guard assistance  Stand to Sit: Contact guard assistance                    Balance:  Sitting: Intact  Standing: Impaired; Without support  Standing - Static: Fair  Standing - Dynamic : Fair        Ambulation/Gait Training:  Distance (ft): 40 Feet (ft)  Assistive Device: Cane, quad  Ambulation - Level of Assistance: Contact guard assistance;Stand-by assistance        Gait Abnormalities: Decreased step clearance  Step Length: Left shortened;Right shortened      Neuro Re-Education:  Stand at sink with unilateral UE on sink while completing ADLs in standin  . Therapeutic Exercises:       EXERCISE   Sets   Reps   Active Active Assist   Passive Self ROM   Comments   Ankle Pumps 1 10  [x] [] [] []    Quad Sets/Glut Sets    [] [] [] [] Hold for 5 secs   Hamstring Sets   [] [] [] []    Short Arc Quads   [] [] [] []    Heel Slides 1 10 [] [] [] []    Straight Leg Raises 1 10 [x] [] [] []    Hip Add/addiction 1 10 [x] [] [] [] Hold for 5 secs, w/ pillow squeeze   Long Arc Quads 1 10  [] [] []    Seated Marching   [] [] [] []    Standing Marching   [] [] [] []       [] [] [] []        Pain:  Pain level pre-treatment: 0110  Pain level post-treatment: 0/10   Pain Intervention(s): Medication (see MAR);  Rest, Ice, Repositioning   Response to intervention: Nurse notified, See doc flow    Activity Tolerance:   Fair  Please refer to the flowsheet for vital signs taken during this treatment. After treatment:   [] Patient left in no apparent distress sitting up in chair  [x] Patient left in no apparent distress in bed  [x] Call bell left within reach  [x] Nursing notified  [] Caregiver present  [x] Bed alarm activated  [] SCDs applied      COMMUNICATION/EDUCATION:   [x]         Role of Physical Therapy in the acute care setting. [x]         Fall prevention education was provided and the patient/caregiver indicated understanding. [x]         Patient/family have participated as able in working toward goals and plan of care. [x]         Patient/family agree to work toward stated goals and plan of care. []         Patient understands intent and goals of therapy, but is neutral about his/her participation.   []         Patient is unable to participate in stated goals/plan of care: ongoing with therapy staff.  []         Other:        Nenita Hwang, PT   Time Calculation: 23 mins

## 2021-09-30 NOTE — PROGRESS NOTES
Problem: Self Care Deficits Care Plan (Adult)  Goal: *Acute Goals and Plan of Care (Insert Text)  Description: Occupational Therapy Goals  Initiated 9/28/2021 within 7 day(s). 1.  Patient will perform grooming with modified independence. 2.  Patient will perform bathing with modified independence. 3.  Patient will perform upper body dressing and lower body dressing with modified independence. 4.  Patient will perform toilet transfers with modified independence using LRAD. 5.  Patient will perform all aspects of toileting with modified independence. 6.  Patient will participate in upper extremity therapeutic exercise/activities with modified independence for 8 minutes. 7.  Patient will utilize energy conservation techniques during functional activities with min verbal cues. Prior Level of Function: pt reports he was Mod I with ADLs and functional mobility using LBQC, sister assists with meal prep, med mgmt and home making tasks     Outcome: Progressing Towards Goal   OCCUPATIONAL THERAPY TREATMENT    Patient: Ml Byrnes (83 y.o. male)  Date: 9/30/2021  Diagnosis: Pancreatitis [K85.90]  Acute pancreatitis [K85.90] Acute pancreatitis       Precautions: Fall      Chart, occupational therapy assessment, plan of care, and goals were reviewed. ASSESSMENT:  Upon therapist entry, pt laying supine in bed and agreeable to occupational therapy treatment with encouragement. Pt reporting he feels tired today. Pt performed bed mobility with MI in prep for participation in there ex. Pt performed there ex as stated below in order to increase UB strength for participation in self care. Pt with edema of R UE. Pt encouraged to elevate RUE and perform light fist flexion/extension to decrease edema. Pt provided with extra blankets for positioning. At end of session, pt resting comfortably with call bell in reach.      Progression toward goals:  []          Improving appropriately and progressing toward goals  [x] Improving slowly and progressing toward goals  []          Not making progress toward goals and plan of care will be adjusted     PLAN:  Patient continues to benefit from skilled intervention to address the above impairments. Continue treatment per established plan of care. Discharge Recommendations:  Rehab vs Home Health with 24 hour assist from sister  Further Equipment Recommendations for Discharge:  TBD, possible BSC depending on pt's progress     SUBJECTIVE:   Patient stated i'm tired.     OBJECTIVE DATA SUMMARY:   Cognitive/Behavioral Status:  Neurologic State: Alert  Orientation Level: Disoriented to situation  Cognition: Follows commands  Safety/Judgement: Fall prevention    Functional Mobility and Transfers for ADLs:   Bed Mobility:  Rolling: Stand-by assistance  Supine to Sit: Modified independent  Sit to Supine: Modified independent   Transfers:  Sit to Stand: Contact guard assistance  Stand to Sit: Contact guard assistance   Balance:  Sitting: Intact  Standing: Impaired; Without support  Standing - Static: Fair  Standing - Dynamic : Fair    UE Therapeutic Exercises:   Pt was able to perform BUE:    EXERCISE   Sets   Reps   Active Active Assist   Passive Self- assisted ROM   Comments   Shoulder flexion 2   10 [x]  []  []  []      Forearm extension/flexion 2 10 [x]  []  []  []     Scapular protraction/retraction  2  10 [x]  []  []  []      To increase strength/endurance for ADLs. Pain:  Pain level pre-treatment: 0/10   Pain level post-treatment: 0/10  Pain Intervention(s): Medication (see MAR); Rest, Repositioning   Response to intervention: See doc flow    Activity Tolerance:    fair  Please refer to the flowsheet for vital signs taken during this treatment.   After treatment:   []  Patient left in no apparent distress sitting up in chair  [x]  Patient left in no apparent distress in bed  [x]  Call bell left within reach  []  Nursing notified  []  Caregiver present  []  Bed alarm activated    COMMUNICATION/EDUCATION:   [x] Role of Occupational Therapy in the acute care setting  [x] Home safety education was provided and the patient/caregiver indicated understanding. [x] Patient/family have participated as able in working towards goals and plan of care. [x] Patient/family agree to work toward stated goals and plan of care. [] Patient understands intent and goals of therapy, but is neutral about his/her participation. [] Patient is unable to participate in goal setting and plan of care.       Thank you for this referral.  Rosa Mar OT  Time Calculation: 16 mins

## 2021-09-30 NOTE — PROGRESS NOTES
Bedside shift change report given to Sadia Colon RN (oncoming nurse) by Rosmery Blancas RN (offgoing nurse). Report included the following information SBAR, Kardex and Quality Measures.

## 2021-09-30 NOTE — PROGRESS NOTES
[email or web address]  [phone]    Gastroenterology follow up-Progress note    Impression:  1. Suspected 3 mm stone at ampulla per MRCP 9/28/21 - no clinical cholangitis  2. Acute/recurrent pancreatitis in patient with known gallstones - clinically without cholangitis              - lipase 4777<672<<<22,314              - BISAP score 0  2. Symptomatic cholelithiasis  3. Dilated CBD of 11 mm    4. Abnormal liver biochemistries in setting of gallstone pancreatitis              - ALT 1005; AST 1057; Tbili 1.5; Alk phos 213   5. S/p laparoscopy open appendectomy 4/20/2018 by Dr Chhaya Day for acute ruptured appendix with abscess      Plan:  1. Awaiting response from Dr Jose Zimmerman on his availability for ERCP at either [facility] or [facility]. 2. If he accepts, interfacility procedural transfer will be coordinated with the Transfer Center by PFM team.  3. Patient is currently NPO, rapid COVID ordered as well as INR in preparation for ERCP if accepted. 4. Consideration for IR intervention (percutaneous transhepatic cholangiography) if Dr Jose Zimmerman is not available for ERCP. 5. Recommend general surgery consult (Dr Chhaya Day or Laisha Soriano) for elective cholecystectomy. 6. Outpatient MRI w MRCP in about 4-6 weeks to evaluate status of \"small amount of nonloculated peripancreatic fluid\"  7. IgG subclass pending  8. Medical management deferred to primary team.  9. Outpatient GI follow-up recommended. Subjective:  No GI events overnight. Tolerated diet without abdominal pain. No fever, chills, nausea, emesis, jaundice. He has no GI concerns. MRI MRCP ONLY W CONT  Result Date: 9/30/2021  1. Mild biliary ductal dilation. Suspected small calculus within the ampullary channel but is not definitive. 2. Cholelithiasis. 3. Acute pancreatitis. No pseudocyst, necrosis or pancreatic ductal dilation. 4. Renal cysts. 5. Increased gas in small and large bowel could indicate a mild ileus. 6. Small bowel containing ventral wall hernia.  7. Diverticulosis. ROS: Denies any fevers, chills, rash.      General: awake, alert, no distress    Eyes: anicteric   Cardiovascular: normal rate   Pulmonary/Chest Wall: unlabored effort    Abdominal: appearance normal, bowel sounds normal and soft, non-acute, non-tender; old lower midline surgical scar     Patient Active Problem List   Diagnosis Code    History of stroke with residual deficit I69.30    Hypertensive heart and kidney disease without heart failure and with chronic kidney disease stage III ([facility]) I13.10, P24.65    Diastolic dysfunction without heart failure I51.89    Dyslipidemia E78.5    CKD (chronic kidney disease) stage 3, GFR 30-59 ml/min ([facility]) N18.30    History of trichomonal urethritis Z86.19    Obesity, Class I, BMI 30-34.9 E66.9    History of noncompliance with medical treatment, presenting hazards to health Z91.19    History of tobacco use Z87.891    Impaired mobility and ADLs Z74.09, Z78.9    History of vitamin D deficiency Z86.39    Generalized weakness R53.1    Acute appendicitis with peritoneal abscess K35.33    Status post appendectomy Z90.49    Hemiparesis affecting left side as late effect of cerebrovascular accident (CVA) ([facility] K11.241    Dysphagia as late effect of cerebrovascular accident (CVA) I69.391    Postoperative atrial fibrillation (HCC) I97.89, I48.91    Overactive bladder N32.81    Benign prostatic hyperplasia N40.0    Postoperative urinary retention N99.89, R33.8    Postoperative confusion R41.0    Acute blood loss as cause of postoperative anemia D62    Vitamin D deficiency E55.9    Statin intolerance Z78.9    Acute renal failure superimposed on stage 3 chronic kidney disease (HCC) N17.9, N18.30    Depression F32.9    Hyperkalemia E87.5    SBO (small bowel obstruction) (HCC) K56.609    Small bowel obstruction (HCC) K56.609    Pancreatitis, acute K85.90    Nausea and vomiting R11.2    Elevated liver enzymes R74.8    Pancreatitis K85.90  Acute pancreatitis K85.90         Visit Vitals  /79 (BP 1 Location: Left upper arm, BP Patient Position: At rest)   Pulse 63   Temp 97.5 °F (36.4 °C)   Resp 20   Ht 5' 11\" (1.803 m)   Wt 96.3 kg (212 lb 4.8 oz)   SpO2 96%   BMI 29.61 kg/m²           Intake/Output Summary (Last 24 hours) at 9/30/2021 0856  Last data filed at 9/29/2021 1855  Gross per 24 hour   Intake 600 ml   Output    Net 600 ml       CBC w/Diff    Lab Results   Component Value Date/Time    WBC 8.3 09/30/2021 02:55 AM    RBC 4.60 09/30/2021 02:55 AM    HGB 12.4 (L) 09/30/2021 02:55 AM    HCT 38.1 09/30/2021 02:55 AM    MCV 82.8 09/30/2021 02:55 AM    MCH 27.0 09/30/2021 02:55 AM    MCHC 32.5 09/30/2021 02:55 AM    RDW 15.2 (H) 09/30/2021 02:55 AM     09/30/2021 02:55 AM    Lab Results   Component Value Date/Time    GRANS 79 (H) 09/30/2021 02:55 AM    LYMPH 11 (L) 09/30/2021 02:55 AM    EOS 0 09/30/2021 02:55 AM    BANDS 3 08/04/2018 12:56 AM    BASOS 0 09/30/2021 02:55 AM    MYELO 2 (H) 04/27/2018 01:02 AM    METAS 2 (H) 04/28/2018 05:04 AM      Basic Metabolic Profile   Recent Labs     09/30/21  0255 09/29/21  0350 09/29/21  0350 09/28/21  0600 09/28/21  0600      < > 139   < > 138   K 3.9   < > 4.2   < > 4.2      < > 105   < > 108   CO2 24   < > 26   < > 23   BUN 17   < > 17   < > 20*   CA 9.7   < > 9.8   < > 10.5*   MG  --   --  2.0  --   --    PHOS  --   --   --   --  3.0    < > = values in this interval not displayed. Hepatic Function    Lab Results   Component Value Date/Time    ALB 3.0 (L) 09/30/2021 02:55 AM    TP 7.0 09/30/2021 02:55 AM     (H) 09/30/2021 02:55 AM    No results found for: TBIL       Coags   No results for input(s): PTP, INR, APTT, INREXT in the last 72 hours.       MRI Results (most recent):  Results from [place] encounter on 09/27/21    MRI MRCP ONLY W CONT    Narrative  Exam: MR abdomen with and without IV contrast with MRCP    Indication: Abdominal pain since this morning, sharp epigastric pain with  nausea. Prior history of gallstone pancreatitis in 2020. Dilated CBD on  ultrasound recommending MRCP. Technique: MR abdomen performed with and without IV contrast. MRCP performed. Source data reviewed along with 3D MIP reconstructed images created at the  console to fully evaluate the tortuous biliary structures. Comparison: Ultrasound abdomen limited 9/28/2021, CT abdomen and pelvis  9/27/2021, MR abdomen 12/7/2020    Findings:    Suspected 3 mm calculus within the ampullary channel, only seen on axial series  7/image 12. This cannot be confirmed on other sequences. Mild intrahepatic and  extrahepatic biliary ductal dilation. Multiple small gallstones, without  gallbladder distention. No pancreatic ductal dilation. Peripancreatic fluid signal abnormalities  consistent with pancreatitis as seen on CT. No organized pseudocyst. Stable  pancreatic head versus mesenteric root 2 cm lipoma. Right upper pole 2 cm cyst. Other subcentimeter renal cysts. No hydronephrosis. Stable mild thickening of the adrenal glands. No splenomegaly. The major vessels  are patent. No lymphadenopathy. Mild dependent atelectasis at the lung base. The duodenum is mildly dilated but decompressed distally. Borderline size of  small bowel elsewhere, with scattered gas and fluid throughout. There is also  gas in the nondilated colon. Diverticulosis. Impression  1. Mild biliary ductal dilation. Suspected small calculus within the ampullary  channel but is not definitive. 2. Cholelithiasis. 3. Acute pancreatitis. No pseudocyst, necrosis or pancreatic ductal dilation. 4. Renal cysts. 5. Increased gas in small and large bowel could indicate a mild ileus. 6. Small bowel containing ventral wall hernia. 7. Diverticulosis. LINDA Flor    [facility]. [email or web address]  Phone: [phone]  Pager: [phone]

## 2021-10-01 LAB
ALBUMIN SERPL-MCNC: 2.9 G/DL (ref 3.4–5)
ALBUMIN/GLOB SERPL: 0.8 {RATIO} (ref 0.8–1.7)
ALP SERPL-CCNC: 153 U/L (ref 45–117)
ALT SERPL-CCNC: 554 U/L (ref 16–61)
ANION GAP SERPL CALC-SCNC: 8 MMOL/L (ref 3–18)
AST SERPL-CCNC: 255 U/L (ref 10–38)
BASOPHILS # BLD: 0 K/UL (ref 0–0.1)
BASOPHILS NFR BLD: 0 % (ref 0–2)
BILIRUB SERPL-MCNC: 1.2 MG/DL (ref 0.2–1)
BUN SERPL-MCNC: 15 MG/DL (ref 7–18)
BUN/CREAT SERPL: 10 (ref 12–20)
CALCIUM SERPL-MCNC: 9.4 MG/DL (ref 8.5–10.1)
CHLORIDE SERPL-SCNC: 107 MMOL/L (ref 100–111)
CO2 SERPL-SCNC: 23 MMOL/L (ref 21–32)
CREAT SERPL-MCNC: 1.57 MG/DL (ref 0.6–1.3)
DIFFERENTIAL METHOD BLD: ABNORMAL
EOSINOPHIL # BLD: 0.1 K/UL (ref 0–0.4)
EOSINOPHIL NFR BLD: 2 % (ref 0–5)
ERYTHROCYTE [DISTWIDTH] IN BLOOD BY AUTOMATED COUNT: 15.1 % (ref 11.6–14.5)
GLOBULIN SER CALC-MCNC: 3.6 G/DL (ref 2–4)
GLUCOSE BLD STRIP.AUTO-MCNC: 147 MG/DL (ref 70–110)
GLUCOSE SERPL-MCNC: 74 MG/DL (ref 74–99)
HCT VFR BLD AUTO: 33.4 % (ref 36–48)
HGB BLD-MCNC: 11.1 G/DL (ref 13–16)
LIPASE SERPL-CCNC: 315 U/L (ref 73–393)
LYMPHOCYTES # BLD: 1 K/UL (ref 0.9–3.6)
LYMPHOCYTES NFR BLD: 13 % (ref 21–52)
MCH RBC QN AUTO: 27.6 PG (ref 24–34)
MCHC RBC AUTO-ENTMCNC: 33.2 G/DL (ref 31–37)
MCV RBC AUTO: 83.1 FL (ref 78–100)
MONOCYTES # BLD: 0.9 K/UL (ref 0.05–1.2)
MONOCYTES NFR BLD: 11 % (ref 3–10)
NEUTS SEG # BLD: 5.5 K/UL (ref 1.8–8)
NEUTS SEG NFR BLD: 73 % (ref 40–73)
PLATELET # BLD AUTO: 179 K/UL (ref 135–420)
PMV BLD AUTO: 11.1 FL (ref 9.2–11.8)
POTASSIUM SERPL-SCNC: 3.7 MMOL/L (ref 3.5–5.5)
PROT SERPL-MCNC: 6.5 G/DL (ref 6.4–8.2)
RBC # BLD AUTO: 4.02 M/UL (ref 4.35–5.65)
SODIUM SERPL-SCNC: 138 MMOL/L (ref 136–145)
WBC # BLD AUTO: 7.5 K/UL (ref 4.6–13.2)

## 2021-10-01 PROCEDURE — 80053 COMPREHEN METABOLIC PANEL: CPT

## 2021-10-01 PROCEDURE — 85025 COMPLETE CBC W/AUTO DIFF WBC: CPT

## 2021-10-01 PROCEDURE — 74011000258 HC RX REV CODE- 258: Performed by: STUDENT IN AN ORGANIZED HEALTH CARE EDUCATION/TRAINING PROGRAM

## 2021-10-01 PROCEDURE — 2709999900 HC NON-CHARGEABLE SUPPLY

## 2021-10-01 PROCEDURE — 74011000250 HC RX REV CODE- 250: Performed by: STUDENT IN AN ORGANIZED HEALTH CARE EDUCATION/TRAINING PROGRAM

## 2021-10-01 PROCEDURE — 36415 COLL VENOUS BLD VENIPUNCTURE: CPT

## 2021-10-01 PROCEDURE — 74011250636 HC RX REV CODE- 250/636: Performed by: STUDENT IN AN ORGANIZED HEALTH CARE EDUCATION/TRAINING PROGRAM

## 2021-10-01 PROCEDURE — 82962 GLUCOSE BLOOD TEST: CPT

## 2021-10-01 PROCEDURE — 83690 ASSAY OF LIPASE: CPT

## 2021-10-01 PROCEDURE — 74011250637 HC RX REV CODE- 250/637: Performed by: STUDENT IN AN ORGANIZED HEALTH CARE EDUCATION/TRAINING PROGRAM

## 2021-10-01 PROCEDURE — 65270000029 HC RM PRIVATE

## 2021-10-01 RX ORDER — SODIUM CHLORIDE 0.9 % (FLUSH) 0.9 %
5-40 SYRINGE (ML) INJECTION AS NEEDED
Status: DISCONTINUED | OUTPATIENT
Start: 2021-10-01 | End: 2021-10-03 | Stop reason: HOSPADM

## 2021-10-01 RX ORDER — IBUPROFEN 200 MG
1 TABLET ORAL DAILY
Status: DISCONTINUED | OUTPATIENT
Start: 2021-10-02 | End: 2021-10-03 | Stop reason: HOSPADM

## 2021-10-01 RX ORDER — POLYETHYLENE GLYCOL 3350 17 G/17G
17 POWDER, FOR SOLUTION ORAL DAILY PRN
Status: DISCONTINUED | OUTPATIENT
Start: 2021-10-01 | End: 2021-10-03 | Stop reason: HOSPADM

## 2021-10-01 RX ORDER — DEXTROSE 50 % IN WATER (D50W) INTRAVENOUS SYRINGE
25 ONCE
Status: COMPLETED | OUTPATIENT
Start: 2021-10-01 | End: 2021-10-01

## 2021-10-01 RX ORDER — ACETAMINOPHEN 325 MG/1
650 TABLET ORAL
Status: DISCONTINUED | OUTPATIENT
Start: 2021-10-01 | End: 2021-10-03 | Stop reason: HOSPADM

## 2021-10-01 RX ORDER — HEPARIN SODIUM 5000 [USP'U]/ML
5000 INJECTION, SOLUTION INTRAVENOUS; SUBCUTANEOUS EVERY 8 HOURS
Status: DISCONTINUED | OUTPATIENT
Start: 2021-10-01 | End: 2021-10-03 | Stop reason: HOSPADM

## 2021-10-01 RX ORDER — SODIUM CHLORIDE, SODIUM LACTATE, POTASSIUM CHLORIDE, CALCIUM CHLORIDE 600; 310; 30; 20 MG/100ML; MG/100ML; MG/100ML; MG/100ML
100 INJECTION, SOLUTION INTRAVENOUS CONTINUOUS
Status: DISCONTINUED | OUTPATIENT
Start: 2021-10-01 | End: 2021-10-02

## 2021-10-01 RX ORDER — SERTRALINE HYDROCHLORIDE 50 MG/1
50 TABLET, FILM COATED ORAL DAILY
Status: DISCONTINUED | OUTPATIENT
Start: 2021-10-02 | End: 2021-10-03 | Stop reason: HOSPADM

## 2021-10-01 RX ORDER — CIPROFLOXACIN 500 MG/1
500 TABLET ORAL EVERY 12 HOURS
Status: DISCONTINUED | OUTPATIENT
Start: 2021-10-01 | End: 2021-10-03 | Stop reason: HOSPADM

## 2021-10-01 RX ORDER — GUAIFENESIN 100 MG/5ML
81 LIQUID (ML) ORAL DAILY
Status: DISCONTINUED | OUTPATIENT
Start: 2021-10-02 | End: 2021-10-03 | Stop reason: HOSPADM

## 2021-10-01 RX ORDER — AMLODIPINE BESYLATE 10 MG/1
10 TABLET ORAL DAILY
Status: DISCONTINUED | OUTPATIENT
Start: 2021-10-02 | End: 2021-10-03 | Stop reason: HOSPADM

## 2021-10-01 RX ORDER — ACETAMINOPHEN 650 MG/1
650 SUPPOSITORY RECTAL
Status: DISCONTINUED | OUTPATIENT
Start: 2021-10-01 | End: 2021-10-03 | Stop reason: HOSPADM

## 2021-10-01 RX ORDER — OXYBUTYNIN CHLORIDE 5 MG/1
5 TABLET, EXTENDED RELEASE ORAL DAILY
Status: DISCONTINUED | OUTPATIENT
Start: 2021-10-02 | End: 2021-10-03 | Stop reason: HOSPADM

## 2021-10-01 RX ORDER — SODIUM CHLORIDE 0.9 % (FLUSH) 0.9 %
5-40 SYRINGE (ML) INJECTION EVERY 8 HOURS
Status: DISCONTINUED | OUTPATIENT
Start: 2021-10-01 | End: 2021-10-03 | Stop reason: HOSPADM

## 2021-10-01 RX ADMIN — CIPROFLOXACIN 500 MG: 500 TABLET, FILM COATED ORAL at 23:20

## 2021-10-01 RX ADMIN — Medication 10 ML: at 06:31

## 2021-10-01 RX ADMIN — PIPERACILLIN AND TAZOBACTAM 3.38 G: 3; .375 INJECTION, POWDER, LYOPHILIZED, FOR SOLUTION INTRAVENOUS at 00:52

## 2021-10-01 RX ADMIN — SODIUM CHLORIDE, SODIUM LACTATE, POTASSIUM CHLORIDE, AND CALCIUM CHLORIDE 100 ML/HR: 600; 310; 30; 20 INJECTION, SOLUTION INTRAVENOUS at 23:53

## 2021-10-01 RX ADMIN — PIPERACILLIN AND TAZOBACTAM 3.38 G: 3; .375 INJECTION, POWDER, LYOPHILIZED, FOR SOLUTION INTRAVENOUS at 06:31

## 2021-10-01 RX ADMIN — HEPARIN SODIUM 5000 UNITS: 5000 INJECTION INTRAVENOUS; SUBCUTANEOUS at 23:21

## 2021-10-01 RX ADMIN — Medication 10 ML: at 23:42

## 2021-10-01 RX ADMIN — DEXTROSE MONOHYDRATE 25 G: 25 INJECTION, SOLUTION INTRAVENOUS at 09:04

## 2021-10-01 NOTE — PROGRESS NOTES
Reason for Admission:  Pancreatitis [K85.90]  Acute pancreatitis [K85.90]                 RUR Score:    14%            Plan for utilizing home health:    No, patient's sister said patient already has a Caregiver, does not want a nurse to come to the home. Patient's sister is requesting patient be given a prescription to receive PT/OT at [facility] off of [address], where he usually goes when ordered at discharge. Likelihood of Readmission:   LOW                         Transition of Care Plan:              Initial assessment completed with relative(s), patient's sister Constanza Jeff. Cognitive status of patient: oriented to time, place, person and situation. Face sheet information confirmed:  yes. The patient's sister Constanza Jeff [phone] agrees  to participate in his discharge plan and to receive any needed information. This patient lives in a single family home with his sister, with 1 step to enter. Patient is able to navigate steps as needed. Prior to hospitalization, patient was considered to be independent with ADLs/IADLS : No .  If not independent,  patient needs assist with : dressing, bathing, food preparation and cooking    Patient has a Caregiver 2 times a week per patient's sister. Patient has a current ACP document on file: no.      Healthcare Decision Maker:   Primary Decision Maker: [facility]  - [phone]    Click here to complete [address] including selection of the Healthcare Decision Maker Relationship (ie \"Primary\")    The patient's caregiver will be available to transport patient home upon discharge. The patient already has Saul Bald, Shower chair, BSC, [employer], and [employer] medical equipment available in the home. Patient is not currently active with home health. Patient has stayed in a skilled nursing facility or rehab. Was  stay within last 60 days : no.       This patient is on dialysis :no      Currently, the discharge plan is Home with Family Assistance. The patient states that he can obtain his medications from the pharmacy, and take his medications as directed. Patient's current insurance is [facility] and [facility] Medicaid. Care Management Interventions  PCP Verified by CM:  Yes  Mode of Transport at Discharge: Self (Patient's Caregiver to transport patient home at time of discharge. )  Transition of Care Consult (CM Consult): Discharge Planning  Discharge Durable Medical Equipment: No  Physical Therapy Consult: Yes  Occupational Therapy Consult: Yes  Speech Therapy Consult: No  Support Systems: Other Family Member(s)  Confirm Follow Up Transport: Other (see comment) (Patient's Caregiver)  Discharge Location  Discharge Placement: Home with family assistance        Betsy Casillas RN  Case Management [phone]

## 2021-10-01 NOTE — PROGRESS NOTES
[email or web address]  [phone]    Gastroenterology follow up-Progress note    Impression:  1. Suspected 3 mm stone at ampulla per MRCP 9/28/21 - no clinical cholangitis  2. Acute/recurrent pancreatitis in patient with known gallstones - clinically without cholangitis              - lipase 315<4777<672<<<22,314              - BISAP score 0   - IgG subclass normal  2. Symptomatic cholelithiasis  3. Dilated CBD of 11 mm    4. Abnormal liver biochemistries in setting of gallstone pancreatitis              - ; ; Tbili 1.2; Alk phos 153   5. S/p laparoscopy open appendectomy 4/20/2018 by Dr Quang Espinoza for acute ruptured appendix with abscess  6. Rapid COVID negative    Plan:  1. Awaiting transportation to take him to [facility] today 10/1/21 for ERCP by Dr Shakira Barrientos. - please contact BODØ, charge nurse or Dhara Denthand nurse at [facility]. Tel: [phone] if changes in patient status will delay his arrival at [facility]  2. Keep NPO  3. Recommend general surgery consult (Dr Quang Espinoza or Hany Hughes) for elective cholecystectomy. 4. Outpatient MRI w MRCP in about 4-6 weeks to evaluate status of \"small amount of nonloculated peripancreatic fluid\"  5. Medical management deferred to primary team.  6. Outpatient GI follow-up recommended. Will sign off, but remain available as needed. Please call with question/concerns. Dr Betzaida Milton is covering this weekend        Subjective:  No GI events overnight. Tolerated diet without abdominal pain. No fever, chills, nausea, emesis, jaundice. He has no GI concerns. Patient's glucose was 74 this morning, which delayed transferring his to [facility]. LUCILLE Figueroa administered D50W infection this morning at 0904.      ROS: as in HPI    General: awake, alert, no distress    Eyes: anicteric   Cardiovascular: normal rate   Respiratory: unlabored effort    Abdominal: appearance normal, bowel sounds normal and soft, non-acute, non-tender; old lower midline surgical scar       Patient Active Problem List   Diagnosis Code    History of stroke with residual deficit I69.30    Hypertensive heart and kidney disease without heart failure and with chronic kidney disease stage III (HCC) I13.10, Q67.94    Diastolic dysfunction without heart failure I51.89    Dyslipidemia E78.5    CKD (chronic kidney disease) stage 3, GFR 30-59 ml/min ([facility]) N18.30    History of trichomonal urethritis Z86.19    Obesity, Class I, BMI 30-34.9 E66.9    History of noncompliance with medical treatment, presenting hazards to health Z91.19    History of tobacco use Z87.891    Impaired mobility and ADLs Z74.09, Z78.9    History of vitamin D deficiency Z86.39    Generalized weakness R53.1    Acute appendicitis with peritoneal abscess K35.33    Status post appendectomy Z90.49    Hemiparesis affecting left side as late effect of cerebrovascular accident (CVA) ([facility] [address] V01.058    Dysphagia as late effect of cerebrovascular accident (CVA) I69.391    Postoperative atrial fibrillation (HCC) I97.89, I48.91    Overactive bladder N32.81    Benign prostatic hyperplasia N40.0    Postoperative urinary retention N99.89, R33.8    Postoperative confusion R41.0    Acute blood loss as cause of postoperative anemia D62    Vitamin D deficiency E55.9    Statin intolerance Z78.9    Acute renal failure superimposed on stage 3 chronic kidney disease (HCC) N17.9, N18.30    Depression F32. A    Hyperkalemia E87.5    SBO (small bowel obstruction) (HCC) K56.609    Small bowel obstruction (HCC) K56.609    Pancreatitis, acute K85.90    Nausea and vomiting R11.2    Elevated liver enzymes R74.8    Pancreatitis K85.90    Acute pancreatitis K85.90         Visit Vitals  /79   Pulse 62   Temp 97.8 °F (36.6 °C)   Resp 17   Ht 5' 11\" (1.803 m)   Wt 96.3 kg (212 lb 4.8 oz)   SpO2 93% Comment: room air   BMI 29.61 kg/m²           Intake/Output Summary (Last 24 hours) at 10/1/2021 0949  Last data filed at 10/1/2021 0343  Gross per 24 hour   Intake 600 ml   Output 300 ml   Net 300 ml       CBC w/Diff    Lab Results   Component Value Date/Time    WBC 7.5 10/01/2021 04:47 AM    RBC 4.02 (L) 10/01/2021 04:47 AM    HGB 11.1 (L) 10/01/2021 04:47 AM    HCT 33.4 (L) 10/01/2021 04:47 AM    MCV 83.1 10/01/2021 04:47 AM    MCH 27.6 10/01/2021 04:47 AM    MCHC 33.2 10/01/2021 04:47 AM    RDW 15.1 (H) 10/01/2021 04:47 AM     10/01/2021 04:47 AM    Lab Results   Component Value Date/Time    GRANS 73 10/01/2021 04:47 AM    LYMPH 13 (L) 10/01/2021 04:47 AM    EOS 2 10/01/2021 04:47 AM    BANDS 3 08/04/2018 12:56 AM    BASOS 0 10/01/2021 04:47 AM    MYELO 2 (H) 04/27/2018 01:02 AM    METAS 2 (H) 04/28/2018 05:04 AM      Basic Metabolic Profile   Recent Labs     10/01/21  0447 09/30/21  0255 09/29/21  0350      < > 139   K 3.7   < > 4.2      < > 105   CO2 23   < > 26   BUN 15   < > 17   CA 9.4   < > 9.8   MG  --   --  2.0    < > = values in this interval not displayed. Hepatic Function    Lab Results   Component Value Date/Time    ALB 2.9 (L) 10/01/2021 04:47 AM    TP 6.5 10/01/2021 04:47 AM     (H) 10/01/2021 04:47 AM    No results found for: TBIL       Coags   Recent Labs     09/30/21  1510   PTP 13.4   INR 1.0               LINDA Schreiber    [facility]. [employer]  Phone: [phone]  Pager: [phone]

## 2021-10-01 NOTE — PROGRESS NOTES
CM called and spoke to patient's sister Jolene Romero [phone], patient's sister has medical questions and concerns, requesting a doctor to call her. CM called and spoke with PFM, and they said they would call and speak with patient's sister.          Micky Beth, RN  Case Management [phone]

## 2021-10-01 NOTE — PROGRESS NOTES
[facility]  (A Division of [address] [place] [address])     Progress Note      Date of Admission: 9/27/2021    Date of Note: 10/1/2021      Reason for Referral: Gram negative bacteremia      Current Antimicrobials:    Prior Antimicrobials:  Zosyn 9/29 - present        Assessment: Rec / Plan:   Blood Stream Invasion - E coli  - 1 of 2 blcx 9/27 + pan-sensitive E coli  - no cholecystitis, unlikely from pancreatitis since usually not bacterial, suspect from mild cholangitis  - there was mention of urinary frequency in H&P but no urinalysis done.   -9/29 blood cx: NGTD x2 -> change from Zosyn to Cipro 500 mg po bid x 7 more days  -> urinalysis, urine cx      Acute pancreatitis  - gallstone related. Lipase initially 22,314 - had improved to 672 but up again to 4,777  - unlikely source of E coli bacteremia -> per GI,  transfer to [facility] for ERCP today   Suspect mild cholangitis  - rising transaminases, alk phos and bili  - may be source of bacteremia -> abx as above   HTN    HLD    prior CVA    cholelithiasis    h/o gallstone pancreatitis          Microbiology:      Lines / Catheters:  peripheral    Subjective:  Seen and examined at bedside. Overall doing ok today. Wondering when he is going to [facility] for his EGD. No fevers overnight. No n/v. Poor appetite. Tolerating Zosyn well thus far. Active Hospital Problems    Diagnosis Date Noted    Acute pancreatitis 09/28/2021    Pancreatitis 12/04/2020     Allergies:  Lipitor [atorvastatin] and Simvastatin     Medications:  No current facility-administered medications for this encounter. No current outpatient medications on file.      Facility-Administered Medications Ordered in Other Encounters   Medication Dose Route Frequency    HYDROmorphone (DILAUDID) injection 0.5 mg  0.5 mg IntraVENous Q10MIN PRN    fentaNYL citrate (PF) injection 25 mcg  25 mcg IntraVENous Q5MIN PRN    flumazeniL (ROMAZICON) 0.1 mg/mL injection 0.2 mg  0.2 mg IntraVENous PRN    naloxone (NARCAN) injection 0.1 mg  0.1 mg IntraVENous PRN    promethazine (PHENERGAN) 6.25 mg in NS 50 mL IVPB  6.25 mg IntraVENous PRN    ondansetron (ZOFRAN) injection 4 mg  4 mg IntraVENous ONCE PRN    labetaloL (NORMODYNE;TRANDATE) injection 10 mg  10 mg IntraVENous Q5MIN PRN    hydrALAZINE (APRESOLINE) 20 mg/mL injection 5 mg  5 mg IntraVENous Q15MIN PRN    diphenhydrAMINE (BENADRYL) injection 25 mg  25 mg IntraVENous ONCE PRN          Physical Exam:    Temp (24hrs), Av.7 °F (36.5 °C), Min:97.3 °F (36.3 °C), Max:98.3 °F (36.8 °C)    Visit Vitals  /78 (BP 1 Location: Left upper arm, BP Patient Position: At rest)   Pulse (!) 56   Temp 97.7 °F (36.5 °C)   Resp 19   Ht 5' 11\" (1.803 m)   Wt 96.3 kg (212 lb 4.8 oz)   SpO2 93%   BMI 29.61 kg/m²       General: Well developed, well nourished 67 y.o. BLACK/ male in no acute distress. ENT: ENT exam normal, no neck nodes or sinus tenderness  Head: normocephalic, without obvious abnormality  Mouth:  mucous membranes moist, pharynx normal without lesions  Neck: supple, symmetrical, trachea midline   Cardio:  regular rate and rhythm, S1, S2 normal, no murmur, click, rub or gallop  Chest: inspection normal - no chest wall deformities or tenderness, respiratory effort normal  Lungs: no wheezes or rales  Abdomen: soft, non-tender. Bowel sounds normal. No masses, no organomegaly.  Mild tenderness to RUQ and SERGEI area  Extremities:  no redness or tenderness in the calves or thighs, no edema  Neuro: Grossly normal       Lab results:    Chemistry  Recent Labs     10/01/21  0447 21  0255 21  0350   GLU 74 96 85    137 139   K 3.7 3.9 4.2    106 105   CO2 23 24 26   BUN 15 17 17   CREA 1.57* 1.67* 1.59*   CA 9.4 9.7 9.8   AGAP 8 7 8   BUCR 10* 10* 11*   * 213* 99   TP 6.5 7.0 6.8   ALB 2.9* 3.0* 3.4   GLOB 3.6 4.0 3.4   AGRAT 0.8 0.8 1.0       CBC w/ Diff  Recent Labs     10/01/21  0443 09/30/21  0255 09/29/21  0350   WBC 7.5 8.3 12.4   RBC 4.02* 4.60 4.71   HGB 11.1* 12.4* 12.6*   HCT 33.4* 38.1 39.0    196 210   GRANS 73 79* 82*   LYMPH 13* 11* 8*   EOS 2 0 0       Microbiology  All Micro Results     Procedure Component Value Units Date/Time    CULTURE, BLOOD [[id number]] Collected: 09/29/21 2215    Order Status: Completed Specimen: Blood Updated: 10/01/21 0642     Special Requests: NO SPECIAL REQUESTS        Culture result: NO GROWTH 2 DAYS       CULTURE, BLOOD [[id number]] Collected: 09/29/21 2230    Order Status: Completed Specimen: Blood Updated: 10/01/21 0642     Special Requests: NO SPECIAL REQUESTS        Culture result: NO GROWTH 2 DAYS       CULTURE, BLOOD #2 [[id number]] Collected: 09/28/21 0600    Order Status: Completed Specimen: Blood Updated: 10/01/21 0641     Special Requests: NO SPECIAL REQUESTS        Culture result: NO GROWTH 3 DAYS       COVID-19 RAPID TEST [[id number]] Collected: 09/30/21 1315    Order Status: Completed Specimen: Nasopharyngeal Updated: 09/30/21 1402     Specimen source Nasopharyngeal        COVID-19 rapid test Not detected        Comment: Rapid Abbott ID Now       Rapid NAAT:  The specimen is NEGATIVE for SARS-CoV-2, the novel coronavirus associated with COVID-19. Negative results should be treated as presumptive and, if inconsistent with clinical signs and symptoms or necessary for patient management, should be tested with an alternative molecular assay. Negative results do not preclude SARS-CoV-2 infection and should not be used as the sole basis for patient management decisions. This test has been authorized by the FDA under an Emergency Use Authorization (EUA) for use by authorized laboratories.    Fact sheet for Healthcare Providers: ConventionUpdate.co.nz  Fact sheet for Patients: ConventionUpdate.co.nz       Methodology: Isothermal Nucleic Acid Amplification         CULTURE, URINE [[id number]] Order Status: Sent Specimen: Cath Urine     CULTURE, BLOOD #1 [[id number]]  (Abnormal)  (Susceptibility) Collected: 09/28/21 0610    Order Status: Completed Specimen: Blood Updated: 09/30/21 0859     Special Requests: NO SPECIAL REQUESTS        GRAM STAIN       ANAEROBIC BOTTLE GRAM NEGATIVE RODS                  SMEAR CALLED TO AND CORRECTLY REPEATED BY: LATRICE Massey [facility] AT [phone] [id number] TO [phone].            Culture result:       ESCHERICHIA COLI GROWING IN THE ANAEROBIC BOTTLE                   DO [facility]  10/1/2021   10:53 AM

## 2021-10-01 NOTE — PROGRESS NOTES
Physician Progress Note      PATIENT:               Geetha Philippe  [facility] #:                  [id number]  :                       1945  ADMIT DATE:       2021 5:25 PM  [address] [place] DATE:        10/1/2021 12:30 PM  RESPONDING  PROVIDER #:        Marco Roque MD          QUERY TEXT:    Pt admitted with pancreatitis. Pt noted to have E. Coli bacteremia with elevated white count and DARINEL. If possible, please document in the progress notes and discharge summary if you are evaluating and /or treating any of the following: The medical record reflects the following:  Risk Factors: 67 y.o. male with pancreatitis  Clinical Indicators: Per  ID - Blood Stream Invasion - E coli; - 1 of 2 blcx  + pan-sensitive E coli  WBC 24.9, 17.5, 12.4  DARINEL - Cr 1.9, baseline 1.4-1.6  Treatment: Zosyn and Vancomycin    Thank you,  Amanda Moore, [address] [place] [address]  Options provided:  -- E. Coli Sepsis, present on admission  -- Sepsis was ruled out  -- Other - I will add my own diagnosis  -- Disagree - Not applicable / Not valid  -- Disagree - Clinically unable to determine / Unknown  -- Refer to Clinical Documentation Reviewer    PROVIDER RESPONSE TEXT:    This patient has E. Coli sepsis which was present on admission.     Query created by: Renuka Lynn on 10/1/2021 1:00 PM      Electronically signed by:  Marco Roque MD 10/1/2021 6:00 PM

## 2021-10-01 NOTE — PROGRESS NOTES
CM went to see patient to complete an initial assessment, patient is off the unit.          Marcela Church RN  Case Management [phone]

## 2021-10-01 NOTE — PROGRESS NOTES
Bedside shift change report given to Teresa Navarrete RN (oncoming nurse) by [address], RN (offgoing nurse). Report included the following information SBAR, Kardex and Quality Measures.

## 2021-10-01 NOTE — PROGRESS NOTES
[facility]  Intern Progress Note    Patient: Jessika Lee MRN: [id number]   SSN: [id number]  YOB: 1945   Age: 67 y.o. Sex: male      Admit Date: 9/27/2021    LOS: 3 days   Chief Complaint   Patient presents with    Epigastric Pain     x 1 hour       Subjective:     MONICA overnight per pt, NT and nursing. Pt comfortable and resting at bedside visit this AM.  No complaints. Denies pain. Pt transferred to [facility] for ERCP and will return following the procedure. Lipase decreased on labs this AM: 38145>4320>672>4777>315. ALT elevated today: 356>470>267>1005>554  AST elevated today: 562>357>119>1057>255  Of note, Cr is slightly lower at 1.57 from 1.67 w/GFR of 52. Pt is seen by Dr. Jocelyn Shaw, nephrology. Will inform Dr. Jocelyn Shaw that pt is admitted. MRCP of Sept 28, 2021 showed: 3mm calculus within the ampullary channel. Mild intrahepatic and extrahepatic biliary ductal dilation. Multiple small gallstones w/o gallbladder distention. Cholelithiasis. No pancreatic ductal dilation. Peripancreatic fluid signal abnormalities consistent w/pancreatitis. ID following - per recs will switch zosyn to PO abx (cipro) if BCx (-) tomorrow (Oct 1)      Objective:     Visit Vitals  /78 (BP 1 Location: Left upper arm, BP Patient Position: At rest)   Pulse (!) 56   Temp 97.7 °F (36.5 °C)   Resp 19   Ht 5' 11\" (1.803 m)   Wt 96.3 kg (212 lb 4.8 oz)   SpO2 93%   BMI 29.61 kg/m²       Physical Exam:   General appearance: cooperative, no distress  Lungs: clear to auscultation bilaterally  Heart: regular rate and rhythm, S1, S2 normal, no murmur, click, rub or gallop  Abdomen: soft, non-tender. Bowel sounds normal. No masses,  no organomegaly  Pulses: 2+ and symmetric      Intake and Output:  Current Shift: No intake/output data recorded.   Last three shifts: 09/29 1901 - 10/01 0700  In: 600 [P.O.:600]  Out: 300 [Urine:300]    Lab/Data Review:  Recent Results (from the past 12 hour(s))   CBC WITH AUTOMATED DIFF    Collection Time: 10/01/21  4:47 AM   Result Value Ref Range    WBC 7.5 4.6 - 13.2 K/uL    RBC 4.02 (L) 4.35 - 5.65 M/uL    HGB 11.1 (L) 13.0 - 16.0 g/dL    HCT 33.4 (L) 36.0 - 48.0 %    MCV 83.1 78.0 - 100.0 FL    MCH 27.6 24.0 - 34.0 PG    MCHC 33.2 31.0 - 37.0 g/dL    RDW 15.1 (H) 11.6 - 14.5 %    PLATELET 305 238 - 339 K/uL    MPV 11.1 9.2 - 11.8 FL    NEUTROPHILS 73 40 - 73 %    LYMPHOCYTES 13 (L) 21 - 52 %    MONOCYTES 11 (H) 3 - 10 %    EOSINOPHILS 2 0 - 5 %    BASOPHILS 0 0 - 2 %    ABS. NEUTROPHILS 5.5 1.8 - 8.0 K/UL    ABS. LYMPHOCYTES 1.0 0.9 - 3.6 K/UL    ABS. MONOCYTES 0.9 0.05 - 1.2 K/UL    ABS. EOSINOPHILS 0.1 0.0 - 0.4 K/UL    ABS. BASOPHILS 0.0 0.0 - 0.1 K/UL    DF AUTOMATED     METABOLIC PANEL, COMPREHENSIVE    Collection Time: 10/01/21  4:47 AM   Result Value Ref Range    Sodium 138 136 - 145 mmol/L    Potassium 3.7 3.5 - 5.5 mmol/L    Chloride 107 100 - 111 mmol/L    CO2 23 21 - 32 mmol/L    Anion gap 8 3.0 - 18 mmol/L    Glucose 74 74 - 99 mg/dL    BUN 15 7.0 - 18 MG/DL    Creatinine 1.57 (H) 0.6 - 1.3 MG/DL    BUN/Creatinine ratio 10 (L) 12 - 20      GFR est AA 52 (L) >60 ml/min/1.73m2    GFR est non-AA 43 (L) >60 ml/min/1.73m2    Calcium 9.4 8.5 - 10.1 MG/DL    Bilirubin, total 1.2 (H) 0.2 - 1.0 MG/DL    ALT (SGPT) 554 (H) 16 - 61 U/L    AST (SGOT) 255 (H) 10 - 38 U/L    Alk. phosphatase 153 (H) 45 - 117 U/L    Protein, total 6.5 6.4 - 8.2 g/dL    Albumin 2.9 (L) 3.4 - 5.0 g/dL    Globulin 3.6 2.0 - 4.0 g/dL    A-G Ratio 0.8 0.8 - 1.7     LIPASE    Collection Time: 10/01/21  4:47 AM   Result Value Ref Range    Lipase 315 73 - 393 U/L   GLUCOSE, POC    Collection Time: 10/01/21 10:01 AM   Result Value Ref Range    Glucose (POC) 147 (H) 70 - 110 mg/dL         Assessment and Plan: Oct 1, 2021: ERCP today at [place]. Will return following the procedure. Post op check upon arrival.  F/U nephro recs    Ga Beltran Alan is a 67 y. o. male with asthma, history of gallstone pancreatitis in 2020, HLD, prior CVA, hypertension, now admitted for acute pancreatitis.       Epigastric pain  Patient started having sharp epigastric pain this morning associated with nausea but no vomiting. There was no radiation of pain. Denied fever, diarrhea, constipation, chest pain, sob, melena, or Hx of NSAID use . Endorsed urinary frequency and occasional urinary incontinence. Patient has not been able to keep anything down since this morning. He stopped drinking alcohol 25 years ago, smokes 4-5 cigarettes a day for 4-5 years, and has Hx of gallstone pancreatitis in 2020. Afebrile, WBC 25, , , marium phos 119, lipase 22K. Most likely d/t gallstone pancreatitis given Hx of gallstone pancreatitis, elevated LFT's, marium phos, elevated lipase, epigastric pain a/w n/v, and Hx of alcoholism and HLD. Gastric ulcer is on the differential given epigastric pain with tenderness of palpation in the epigastric region. Cholecystitis is also a possibility given Hx of gallstones, however, its lower on the ddx given negative Sharma's sign.                 - admit to medicine  - NPO, advance slowly  - LR maintenance fluid at 200 cc/hr   - daily CBC, CMP, lipase  - zofran PRN   - Pain control as needed      DARINEL in the setting of CKD  Likely 2/2 decreased PO intake 2/2 abdominal pain/nausea  - Cr 1.57 today, baseline 1.4-1.6  - CKD stage 3 w/ GFR 49  - fluid bolus and maintenance fluid  - daily BMP  - Avoid nephrotoxic meds     Asthma  - cont home meds Spiriva and albuterol pending med rec w sister     HLD/Hx CVA  - cont home med atorvastatin, ASA pending med rec w sister     HTN   - cont home med amlodipine  pending med rec w sister     OAB  - Continue home oxybutynin pending med rec     Mood  - Continue home sertraline pending med rec     [employer]:  - VS per unit routine  - supplemental O2 for sats < 92%  - incentive spirometer  - PT/OT/CM      Mirella Malik MD, PGY-3   [facility]   Intern Pager: [phone]   October 1, 2021, 12:10 PM

## 2021-10-01 NOTE — PROGRESS NOTES
*ATTENTION:  This note has been created by a medical student for educational purposes only. Please do not refer to the content of this note for clinical decision-making, billing, or other purposes. Please see attending physicians note to obtain clinical information on this patient. *         Intern Progress Note  [facility]    THIS IS A MEDICAL STUDENT NOTE AND IS FOR EDUCATIONAL PURPOSES ONLY. PLEASE REFER TO ATTENDING NOTE. Patient: Karla Saha MRN: [id number]  CSN: [id number]    YOB: 1945  Age: 67 y.o. Sex: male    DOA: 9/27/2021 LOS:  LOS: 3 days                    Subjective:      No acute events overnight. This morning, Mr. Tiffany Garner reports he is feeling well with no concerns at this time. Does report some RLQ abdominal pain overnight, but this has now resolved. Aware that he is scheduled to go [facility] morning for procedure and then return to [facility] tonight. Review of Systems   Respiratory: Negative for shortness of breath. Cardiovascular: Negative for chest pain, orthopnea and leg swelling. Gastrointestinal: Negative for abdominal pain, constipation, diarrhea, nausea and vomiting. Objective:     Patient Vitals for the past 12 hrs:   Temp Pulse Resp BP SpO2   10/01/21 0837 97.8 °F (36.6 °C) 62 17 133/79 93 %   10/01/21 0346 97.6 °F (36.4 °C) 69 20 (!) 141/82 96 %   09/30/21 2343 98.3 °F (36.8 °C) 63 18 123/84 95 %          Intake/Output Summary (Last 24 hours) at 10/1/2021 0856  Last data filed at 10/1/2021 0343  Gross per 24 hour   Intake 600 ml   Output 300 ml   Net 300 ml       Physical Exam:  General: well-appearing, NAD, AOx3  CV:  RRR, no M/G/R  RESP: Unlabored breathing. Lungs CTAB, no wheezes, rales or rhonchi appreciated. Equal expansion bilaterally. ABD:  Soft, nontender, nondistended. No hepatosplenomegaly. MS:  No joint deformity or instability. No atrophy. Ext:  No edema.   2+ radial and dp pulses bilaterally. Skin: Warm & dry. No rashes, lesions, or ulcers. Good turgor. Psych: normal mood and affect     Lab/Data Reviewed:  Lipase 22K>4K>672>5K>315  >1005>554  >1057>255  Alk phos 99>213>153  Bili 0.4>0.8>1>1.2  Cr 1.9>1.67>1.57  Pending: UA, UCx, blood cultures    Recent Results (from the past 24 hour(s))   COVID-19 RAPID TEST    Collection Time: 09/30/21  1:15 PM   Result Value Ref Range    Specimen source Nasopharyngeal      COVID-19 rapid test Not detected NOTD     PROTHROMBIN TIME + INR    Collection Time: 09/30/21  3:10 PM   Result Value Ref Range    Prothrombin time 13.4 11.5 - 15.2 sec    INR 1.0 0.8 - 1.2     CBC WITH AUTOMATED DIFF    Collection Time: 10/01/21  4:47 AM   Result Value Ref Range    WBC 7.5 4.6 - 13.2 K/uL    RBC 4.02 (L) 4.35 - 5.65 M/uL    HGB 11.1 (L) 13.0 - 16.0 g/dL    HCT 33.4 (L) 36.0 - 48.0 %    MCV 83.1 78.0 - 100.0 FL    MCH 27.6 24.0 - 34.0 PG    MCHC 33.2 31.0 - 37.0 g/dL    RDW 15.1 (H) 11.6 - 14.5 %    PLATELET 311 632 - 004 K/uL    MPV 11.1 9.2 - 11.8 FL    NEUTROPHILS 73 40 - 73 %    LYMPHOCYTES 13 (L) 21 - 52 %    MONOCYTES 11 (H) 3 - 10 %    EOSINOPHILS 2 0 - 5 %    BASOPHILS 0 0 - 2 %    ABS. NEUTROPHILS 5.5 1.8 - 8.0 K/UL    ABS. LYMPHOCYTES 1.0 0.9 - 3.6 K/UL    ABS. MONOCYTES 0.9 0.05 - 1.2 K/UL    ABS. EOSINOPHILS 0.1 0.0 - 0.4 K/UL    ABS.  BASOPHILS 0.0 0.0 - 0.1 K/UL    DF AUTOMATED     METABOLIC PANEL, COMPREHENSIVE    Collection Time: 10/01/21  4:47 AM   Result Value Ref Range    Sodium 138 136 - 145 mmol/L    Potassium 3.7 3.5 - 5.5 mmol/L    Chloride 107 100 - 111 mmol/L    CO2 23 21 - 32 mmol/L    Anion gap 8 3.0 - 18 mmol/L    Glucose 74 74 - 99 mg/dL    BUN 15 7.0 - 18 MG/DL    Creatinine 1.57 (H) 0.6 - 1.3 MG/DL    BUN/Creatinine ratio 10 (L) 12 - 20      GFR est AA 52 (L) >60 ml/min/1.73m2    GFR est non-AA 43 (L) >60 ml/min/1.73m2    Calcium 9.4 8.5 - 10.1 MG/DL    Bilirubin, total 1.2 (H) 0.2 - 1.0 MG/DL    ALT (SGPT) 554 (H) 16 - 61 U/L    AST (SGOT) 255 (H) 10 - 38 U/L    Alk. phosphatase 153 (H) 45 - 117 U/L    Protein, total 6.5 6.4 - 8.2 g/dL    Albumin 2.9 (L) 3.4 - 5.0 g/dL    Globulin 3.6 2.0 - 4.0 g/dL    A-G Ratio 0.8 0.8 - 1.7     LIPASE    Collection Time: 10/01/21  4:47 AM   Result Value Ref Range    Lipase 315 73 - 393 U/L       Assessment & Plan:     Mr. Una Encarnacion is a 68 y/o male with a PMH of asthma, gallstone pancreatitis (2020), HLD, prior CVA, and HTN who is now admitted for acute gallstone pancreatitis. Gallstone pancreatitis  Lipase, AST/ALT, alk phos have fluctuated throughout admission. Downtrending today. [] Transfer to [facility] today for ERCP  [] F/U with surgery (Dr. Shahida Contreras or Debbie Gore) for cholecystectomy after discharge  [] Repeat MRCP in 4-6 weeks per GI  [] Outpatient GI F/U    Bacteremia  Blood cultures positive for E. Coli in anaerobic bottle. Started on vanc/zosyn. Vanc discontinued. Afebrile. [] Per ID - Continue zosyn until blood cultures negative for two days and afebrile, then switch to PO cipro 500 mg BID x 7 days    DARINEL on CKD stage 3  Likely 2/2 decreased PO intake in setting of abdominal pain and nausea.  Creatinine trend: 1.9>1.67>1.57.   [] Daily BMP  [] Avoid nephrotoxic meds    Asthma  [] Home Spiriva and albuterol PRN    HLD/Hx CVA  [] Continue home atorvastatin 10 mg, ASA 81 mg    HTN  [] Continue home amlodipine 10 mg    OAB  [] Continue home oxybutynin 5 mg    Mood  [] Continue home sertraline 50 mg    [address] [facility] [address] (Sister)  [phone]      Lawrence Franks [facility]   October 1, 2021, 8:56 AM

## 2021-10-01 NOTE — PROGRESS NOTES
CM spoke with patient's sister. Patient lives with his sister. Patient's sister said no home health for patient, patient already has a Caregiver at home, and does not need a nurse. Patient's sister said patient could do PT and OT at [facility] [address], like he normally does, said there in no room in their home to work with patient.          Aliza Vega RN  Case Management [phone]

## 2021-10-01 NOTE — ROUTINE PROCESS
Bedside and Verbal shift change report given to LUCILLE Downs (oncoming nurse) by Tiffany Huynh RN (offgoing nurse). Report included the following information SBAR, Kardex, Intake/Output, MAR and Recent Results.

## 2021-10-02 LAB
ALBUMIN SERPL-MCNC: 3.1 G/DL (ref 3.4–5)
ALBUMIN/GLOB SERPL: 0.8 {RATIO} (ref 0.8–1.7)
ALP SERPL-CCNC: 146 U/L (ref 45–117)
ALT SERPL-CCNC: 425 U/L (ref 16–61)
ANION GAP SERPL CALC-SCNC: 9 MMOL/L (ref 3–18)
AST SERPL-CCNC: 123 U/L (ref 10–38)
BASOPHILS # BLD: 0 K/UL (ref 0–0.1)
BASOPHILS NFR BLD: 0 % (ref 0–2)
BILIRUB SERPL-MCNC: 0.6 MG/DL (ref 0.2–1)
BUN SERPL-MCNC: 16 MG/DL (ref 7–18)
BUN/CREAT SERPL: 12 (ref 12–20)
CALCIUM SERPL-MCNC: 9.9 MG/DL (ref 8.5–10.1)
CHLORIDE SERPL-SCNC: 106 MMOL/L (ref 100–111)
CO2 SERPL-SCNC: 23 MMOL/L (ref 21–32)
CREAT SERPL-MCNC: 1.38 MG/DL (ref 0.6–1.3)
DIFFERENTIAL METHOD BLD: ABNORMAL
EOSINOPHIL # BLD: 0 K/UL (ref 0–0.4)
EOSINOPHIL NFR BLD: 0 % (ref 0–5)
ERYTHROCYTE [DISTWIDTH] IN BLOOD BY AUTOMATED COUNT: 14.9 % (ref 11.6–14.5)
GLOBULIN SER CALC-MCNC: 4.1 G/DL (ref 2–4)
GLUCOSE SERPL-MCNC: 131 MG/DL (ref 74–99)
HCT VFR BLD AUTO: 36.1 % (ref 36–48)
HGB BLD-MCNC: 11.9 G/DL (ref 13–16)
LIPASE SERPL-CCNC: 91 U/L (ref 73–393)
LYMPHOCYTES # BLD: 0.5 K/UL (ref 0.9–3.6)
LYMPHOCYTES NFR BLD: 7 % (ref 21–52)
MCH RBC QN AUTO: 27.3 PG (ref 24–34)
MCHC RBC AUTO-ENTMCNC: 33 G/DL (ref 31–37)
MCV RBC AUTO: 82.8 FL (ref 78–100)
MONOCYTES # BLD: 0.2 K/UL (ref 0.05–1.2)
MONOCYTES NFR BLD: 3 % (ref 3–10)
NEUTS SEG # BLD: 6.7 K/UL (ref 1.8–8)
NEUTS SEG NFR BLD: 90 % (ref 40–73)
PLATELET # BLD AUTO: 217 K/UL (ref 135–420)
PMV BLD AUTO: 11.1 FL (ref 9.2–11.8)
POTASSIUM SERPL-SCNC: 4 MMOL/L (ref 3.5–5.5)
PROT SERPL-MCNC: 7.2 G/DL (ref 6.4–8.2)
RBC # BLD AUTO: 4.36 M/UL (ref 4.35–5.65)
SODIUM SERPL-SCNC: 138 MMOL/L (ref 136–145)
WBC # BLD AUTO: 7.4 K/UL (ref 4.6–13.2)

## 2021-10-02 PROCEDURE — 80053 COMPREHEN METABOLIC PANEL: CPT

## 2021-10-02 PROCEDURE — 74011250637 HC RX REV CODE- 250/637: Performed by: STUDENT IN AN ORGANIZED HEALTH CARE EDUCATION/TRAINING PROGRAM

## 2021-10-02 PROCEDURE — 2709999900 HC NON-CHARGEABLE SUPPLY

## 2021-10-02 PROCEDURE — 74011250636 HC RX REV CODE- 250/636: Performed by: STUDENT IN AN ORGANIZED HEALTH CARE EDUCATION/TRAINING PROGRAM

## 2021-10-02 PROCEDURE — 85025 COMPLETE CBC W/AUTO DIFF WBC: CPT

## 2021-10-02 PROCEDURE — 74011000258 HC RX REV CODE- 258: Performed by: STUDENT IN AN ORGANIZED HEALTH CARE EDUCATION/TRAINING PROGRAM

## 2021-10-02 PROCEDURE — 36415 COLL VENOUS BLD VENIPUNCTURE: CPT

## 2021-10-02 PROCEDURE — 65270000029 HC RM PRIVATE

## 2021-10-02 PROCEDURE — 83690 ASSAY OF LIPASE: CPT

## 2021-10-02 RX ADMIN — HEPARIN SODIUM 5000 UNITS: 5000 INJECTION INTRAVENOUS; SUBCUTANEOUS at 08:44

## 2021-10-02 RX ADMIN — SERTRALINE 50 MG: 50 TABLET, FILM COATED ORAL at 08:43

## 2021-10-02 RX ADMIN — OXYBUTYNIN CHLORIDE 5 MG: 5 TABLET, EXTENDED RELEASE ORAL at 08:43

## 2021-10-02 RX ADMIN — PIPERACILLIN AND TAZOBACTAM 3.38 G: 3; .375 INJECTION, POWDER, LYOPHILIZED, FOR SOLUTION INTRAVENOUS at 15:43

## 2021-10-02 RX ADMIN — CIPROFLOXACIN 500 MG: 500 TABLET, FILM COATED ORAL at 08:43

## 2021-10-02 RX ADMIN — Medication 10 ML: at 05:49

## 2021-10-02 RX ADMIN — Medication 10 ML: at 15:44

## 2021-10-02 RX ADMIN — ASPIRIN 81 MG CHEWABLE TABLET 81 MG: 81 TABLET CHEWABLE at 08:43

## 2021-10-02 RX ADMIN — PIPERACILLIN AND TAZOBACTAM 3.38 G: 3; .375 INJECTION, POWDER, LYOPHILIZED, FOR SOLUTION INTRAVENOUS at 21:00

## 2021-10-02 RX ADMIN — HEPARIN SODIUM 5000 UNITS: 5000 INJECTION INTRAVENOUS; SUBCUTANEOUS at 23:25

## 2021-10-02 RX ADMIN — Medication 10 ML: at 21:00

## 2021-10-02 RX ADMIN — HEPARIN SODIUM 5000 UNITS: 5000 INJECTION INTRAVENOUS; SUBCUTANEOUS at 15:44

## 2021-10-02 NOTE — PROGRESS NOTES
Bedside shift change report given to Ivonne Lucas RN (oncoming nurse) by Marisela Randolph RN (offgoing nurse). Report included the following information SBAR, Kardex, Intake/Output, MAR and Recent Results.

## 2021-10-02 NOTE — PROGRESS NOTES
Intern Progress Note  [facility]       Patient: Sindi Scott MRN: [id number]  CSN: [id number]    YOB: 1945  Age: 67 y.o. Sex: male    DOA: 9/27/2021 LOS:  LOS: 4 days                    Subjective:        Acute events: s/p ERCP yesterday and transportation back to [facility]    Patient seen and evaluated this AM.  No complaints today. Ate a meal last night without abdominal pain. Patient denies abdominal pain this morning. He wishes to go home today. Review of Systems   Constitutional: Negative for chills, fever and malaise/fatigue. Respiratory: Negative for cough, shortness of breath and wheezing. Cardiovascular: Negative for chest pain and palpitations. Gastrointestinal: Negative for abdominal pain, blood in stool, constipation, diarrhea, heartburn, melena, nausea and vomiting. Objective:     Patient Vitals for the past 12 hrs:   Temp Pulse Resp BP SpO2   10/02/21 0835 97 °F (36.1 °C) 67 16 (!) 144/87 96 %   10/02/21 0053 97.1 °F (36.2 °C) 62 16 139/88 95 %   10/01/21 2200     95 %   10/01/21 2140 97.2 °F (36.2 °C) 66 16 136/78 94 %          Intake/Output Summary (Last 24 hours) at 10/2/2021 0902  Last data filed at 10/1/2021 2200  Gross per 24 hour   Intake 740 ml   Output    Net 740 ml       Physical Exam:   General: well-appearing, NAD, AOx3  CV:  RRR, no M/G/R  RESP: Unlabored breathing. Lungs CTAB, no wheezes, rales or rhonchi appreciated. Equal expansion bilaterally. ABD:  Soft, nontender, nondistended. No hepatosplenomegaly. MS:  No joint deformity or instability. No atrophy. Ext:  No edema. 2+ radial and dp pulses bilaterally. Skin: Warm & dry. No rashes, lesions, or ulcers. Good turgor.    Psych: normal mood and affect     Lab/Data Reviewed:  Recent Results (from the past 24 hour(s))   GLUCOSE, POC    Collection Time: 10/01/21 10:01 AM   Result Value Ref Range    Glucose (POC) 147 (H) 70 - 183 mg/dL   METABOLIC PANEL, COMPREHENSIVE Collection Time: 10/02/21  3:14 AM   Result Value Ref Range    Sodium 138 136 - 145 mmol/L    Potassium 4.0 3.5 - 5.5 mmol/L    Chloride 106 100 - 111 mmol/L    CO2 23 21 - 32 mmol/L    Anion gap 9 3.0 - 18 mmol/L    Glucose 131 (H) 74 - 99 mg/dL    BUN 16 7.0 - 18 MG/DL    Creatinine 1.38 (H) 0.6 - 1.3 MG/DL    BUN/Creatinine ratio 12 12 - 20      GFR est AA >60 >60 ml/min/1.73m2    GFR est non-AA 50 (L) >60 ml/min/1.73m2    Calcium 9.9 8.5 - 10.1 MG/DL    Bilirubin, total 0.6 0.2 - 1.0 MG/DL    ALT (SGPT) 425 (H) 16 - 61 U/L    AST (SGOT) 123 (H) 10 - 38 U/L    Alk. phosphatase 146 (H) 45 - 117 U/L    Protein, total 7.2 6.4 - 8.2 g/dL    Albumin 3.1 (L) 3.4 - 5.0 g/dL    Globulin 4.1 (H) 2.0 - 4.0 g/dL    A-G Ratio 0.8 0.8 - 1.7     CBC WITH AUTOMATED DIFF    Collection Time: 10/02/21  3:14 AM   Result Value Ref Range    WBC 7.4 4.6 - 13.2 K/uL    RBC 4.36 4.35 - 5.65 M/uL    HGB 11.9 (L) 13.0 - 16.0 g/dL    HCT 36.1 36.0 - 48.0 %    MCV 82.8 78.0 - 100.0 FL    MCH 27.3 24.0 - 34.0 PG    MCHC 33.0 31.0 - 37.0 g/dL    RDW 14.9 (H) 11.6 - 14.5 %    PLATELET 819 945 - 567 K/uL    MPV 11.1 9.2 - 11.8 FL    NEUTROPHILS 90 (H) 40 - 73 %    LYMPHOCYTES 7 (L) 21 - 52 %    MONOCYTES 3 3 - 10 %    EOSINOPHILS 0 0 - 5 %    BASOPHILS 0 0 - 2 %    ABS. NEUTROPHILS 6.7 1.8 - 8.0 K/UL    ABS. LYMPHOCYTES 0.5 (L) 0.9 - 3.6 K/UL    ABS. MONOCYTES 0.2 0.05 - 1.2 K/UL    ABS. EOSINOPHILS 0.0 0.0 - 0.4 K/UL    ABS. BASOPHILS 0.0 0.0 - 0.1 K/UL    DF AUTOMATED     LIPASE    Collection Time: 10/02/21  3:14 AM   Result Value Ref Range    Lipase 91 73 - 393 U/L       Assessment & Plan:      Jessica Eng a 67 y. o. male with asthma, history of gallstone pancreatitis in 2020, HLD, prior CVA, hypertension, admitted for acute pancreatitis, likely gallstone pancreatitis     Gallstone Pancreatitis   Patient started having sharp epigastric pain this morning associated with nausea but no vomiting. There was no radiation of pain.   Denied fever, diarrhea, constipation, chest pain, sob, melena, or Hx of NSAID use . Endorsed urinary frequency and occasional urinary incontinence. Patient has not been able to keep anything down since this morning. He stopped drinking alcohol 25 years ago, smokes 4-5 cigarettes a day for 4-5 years, and has Hx of gallstone pancreatitis in 2020. Afebrile, WBC 25, , , marium phos 119, lipase 22K. Most likely d/t gallstone pancreatitis given Hx of gallstone pancreatitis, elevated LFT's, marium phos, elevated lipase, epigastric pain a/w n/v, and Hx of alcoholism and HLD. Gastric ulcer is on the differential given epigastric pain with tenderness of palpation in the epigastric region.   Cholecystitis is also a possibility given Hx of gallstones, however, its lower on the ddx given negative Sharma's sign.   -  Lipase 02249>4320>672>4777>315>91   - GI evaluated during admission, now signed off   - s/p ERCP yesterday at [facility]   - Lipase AST/ALT/AP all improving.     - advance diet   - General Surgery follow up this week outpatient   - Cipro x 7 days as outpatient   - Patient with change in mental status during rounds, restarting zosyn today, will follow     DARINEL in the setting of CKD  Likely 2/2 decreased PO intake 2/2 abdominal pain/nausea   - Cr 1.9>1.57>1.59>1.67>1.57>1.38   - CKD stage 3 w/ GFR 49   - daily BMP   - Avoid nephrotoxic meds     Asthma  - cont home meds Spiriva and albuterol pending med rec w sister     HLD/Hx CVA  - cont home med atorvastatin, ASA pending med rec w sister     HTN   - cont home med amlodipine  pending med rec w sister     OAB  - Continue home oxybutynin pending med rec     Mood  - Continue home sertraline pending med rec     [employer]:  - VS per unit routine  - supplemental O2 for sats < 92%  - incentive spirometer  - PT/OT/CM    Ml Thomas DO , PGY-1   [facility]   October 2, 2021, 9:02 AM

## 2021-10-02 NOTE — PROGRESS NOTES
Assume care of patient transferring in from [facility] via [address] [facility]. Alert and oriented X 4. Denies pain or discomfort at present. Bed locked in lowest position. Call light within reach and understand to use for assistance and needs. 0701 Dr. Leobardo Cagle in to assess patient's. No complaint of pain or discomfort after meal eating. 10/02/2021    0330 Resting quietly without complaints voiced. 0740 Bedside and Verbal shift change report given to [facility], RN (oncoming nurse) by Miracle Cowan RN (offgoing nurse). Report given with SBAR, Kardex, Intake/Output, MAR and Recent Results.

## 2021-10-02 NOTE — PROGRESS NOTES
Problem: Patient Education: Go to Patient Education Activity  Goal: Patient/Family Education  10/2/2021 1310 by Elvia Stanley RN  Outcome: Progressing Towards Goal  10/2/2021 1309 by Elvia Stanley RN  Outcome: Progressing Towards Goal     Problem: Patient Education: Go to Patient Education Activity  Goal: Patient/Family Education  10/2/2021 1310 by Elvia Stanley RN  Outcome: Progressing Towards Goal  10/2/2021 1309 by Elvia Stanley RN  Outcome: Progressing Towards Goal     Problem: Patient Education: Go to Patient Education Activity  Goal: Patient/Family Education  10/2/2021 1310 by Elvia Stanley RN  Outcome: Progressing Towards Goal  10/2/2021 1309 by Elvia Stanley RN  Outcome: Progressing Towards Goal     Problem: Pressure Injury - Risk of  Goal: *Prevention of pressure injury  Description: Document Juno Scale and appropriate interventions in the flowsheet. 10/2/2021 1310 by Elvia Stanley RN  Outcome: Progressing Towards Goal  Note: Pressure Injury Interventions:  Sensory Interventions: Assess changes in LOC, Monitor skin under medical devices, Keep linens dry and wrinkle-free, Turn and reposition approx. every two hours (pillows and wedges if needed), Minimize linen layers    Moisture Interventions: Absorbent underpads, Apply protective barrier, creams and emollients    Activity Interventions: Pressure redistribution bed/mattress(bed type)    Mobility Interventions: HOB 30 degrees or less, Pressure redistribution bed/mattress (bed type), Turn and reposition approx.  every two hours(pillow and wedges)    Nutrition Interventions: Document food/fluid/supplement intake, Offer support with meals,snacks and hydration    Friction and Shear Interventions: HOB 30 degrees or less, Apply protective barrier, creams and emollients             10/2/2021 1309 by Elvia Stanley RN  Outcome: Progressing Towards Goal  Note: Pressure Injury Interventions:  Sensory Interventions: Assess changes in LOC, Monitor skin under medical devices, Keep linens dry and wrinkle-free, Turn and reposition approx. every two hours (pillows and wedges if needed), Minimize linen layers    Moisture Interventions: Absorbent underpads, Apply protective barrier, creams and emollients    Activity Interventions: Pressure redistribution bed/mattress(bed type)    Mobility Interventions: HOB 30 degrees or less, Pressure redistribution bed/mattress (bed type), Turn and reposition approx. every two hours(pillow and wedges)    Nutrition Interventions: Document food/fluid/supplement intake, Offer support with meals,snacks and hydration    Friction and Shear Interventions: HOB 30 degrees or less, Apply protective barrier, creams and emollients                Problem: Patient Education: Go to Patient Education Activity  Goal: Patient/Family Education  10/2/2021 1310 by Betty Bray RN  Outcome: Progressing Towards Goal  10/2/2021 1309 by Betty Bray RN  Outcome: Progressing Towards Goal     Problem: Falls - Risk of  Goal: *Absence of Falls  Description: Document Nayely Porteruro Fall Risk and appropriate interventions in the flowsheet.   10/2/2021 1310 by Betty Bray RN  Outcome: Progressing Towards Goal  Note: Fall Risk Interventions:  Mobility Interventions: Bed/chair exit alarm, Patient to call before getting OOB, Communicate number of staff needed for ambulation/transfer         Medication Interventions: Bed/chair exit alarm, Patient to call before getting OOB    Elimination Interventions: Bed/chair exit alarm, Call light in reach           10/2/2021 1309 by Betty Bray RN  Outcome: Progressing Towards Goal  Note: Fall Risk Interventions:  Mobility Interventions: Bed/chair exit alarm, Patient to call before getting OOB, Communicate number of staff needed for ambulation/transfer         Medication Interventions: Bed/chair exit alarm, Patient to call before getting OOB    Elimination Interventions: Bed/chair exit alarm, Call light in reach Problem: Patient Education: Go to Patient Education Activity  Goal: Patient/Family Education  10/2/2021 1310 by Mundo Catherine RN  Outcome: Progressing Towards Goal  10/2/2021 1309 by Mundo Catherine RN  Outcome: Progressing Towards Goal     Problem: Pain  Goal: *Control of Pain  10/2/2021 1310 by Mundo Catherine RN  Outcome: Progressing Towards Goal  10/2/2021 1309 by Mundo Catherine RN  Outcome: Progressing Towards Goal     Problem: Patient Education: Go to Patient Education Activity  Goal: Patient/Family Education  10/2/2021 1310 by Mundo Catherine RN  Outcome: Progressing Towards Goal  10/2/2021 1309 by Mundo Catherine RN  Outcome: Progressing Towards Goal

## 2021-10-02 NOTE — PROGRESS NOTES
[email or web address]  [phone]    Gastroenterology follow up-Progress note    Impression:  1. Gallstone pancreatitis without cholangitis - s/p ERCP as noted; needs general surgery consult for elective cholecystectomy. 2. S/p ERCP 10/1/21 with biliary sphincterotomy, passed CBD stone, normal cholangiogram (except incomplete filling of the cystic duct)  3. Abnormal LFTs- likely due to gallstone pancreatitis and CBD stone- improved s/p ERCP  4. DARINEL in the setting of CKD  5. H/o CVA  6. Asthma    Plan:  1. Continue to monitor HCT/BUN, monitor LFTs  2. Regular diet  3. Recommend general surgery consult for elective cholecystectomy   4. Medical management per primary team  5. F/U as outpatient in 6-8 weeks in office  6. Will sign off, please let us know if there are any questions or concerns    Chief Complaint: gallstone pancreatitis        Subjective:  Doing well; no abd pain, jaundice, N/V  States he is tolerating regular diet. Passing flatus. ROS: Denies any fevers, chills, rash.      Eyes: conjunctiva normal, EOM normal   Neck: ROM normal, supple and trachea normal   Cardiovascular: heart normal, intact distal pulses, normal rate and regular rhythm   Pulmonary/Chest Wall: Respiratory effort normal   Abdominal: appearance normal, soft, non-acute, non-tender     Patient Active Problem List   Diagnosis Code    History of stroke with residual deficit I69.30    Hypertensive heart and kidney disease without heart failure and with chronic kidney disease stage III ([facility]) I13.10, V06.86    Diastolic dysfunction without heart failure I51.89    Dyslipidemia E78.5    CKD (chronic kidney disease) stage 3, GFR 30-59 ml/min ([facility]) N18.30    History of trichomonal urethritis Z86.19    Obesity, Class I, BMI 30-34.9 E66.9    History of noncompliance with medical treatment, presenting hazards to health Z91.19    History of tobacco use Z87.891    Impaired mobility and ADLs Z74.09, Z78.9    History of vitamin D deficiency Z86.39    Generalized weakness R53.1    Acute appendicitis with peritoneal abscess K35.33    Status post appendectomy Z90.49    Hemiparesis affecting left side as late effect of cerebrovascular accident (CVA) ([facility] [address] C52.599    Dysphagia as late effect of cerebrovascular accident (CVA) I69.391    Postoperative atrial fibrillation (HCC) I97.89, I48.91    Overactive bladder N32.81    Benign prostatic hyperplasia N40.0    Postoperative urinary retention N99.89, R33.8    Postoperative confusion R41.0    Acute blood loss as cause of postoperative anemia D62    Vitamin D deficiency E55.9    Statin intolerance Z78.9    Acute renal failure superimposed on stage 3 chronic kidney disease (HCC) N17.9, N18.30    Depression F32. A    Hyperkalemia E87.5    SBO (small bowel obstruction) ([facility]) K56.609    Small bowel obstruction ([facility] [address] K56.609    Pancreatitis, acute K85.90    Nausea and vomiting R11.2    Elevated liver enzymes R74.8    Pancreatitis K85.90    Acute pancreatitis K85.90         Visit Vitals  BP (!) 144/87 (BP 1 Location: Right upper arm, BP Patient Position: Sitting)   Pulse 67   Temp 97 °F (36.1 °C)   Resp 16   Ht 5' 11\" (1.803 m)   Wt 96.3 kg (212 lb 4.8 oz)   SpO2 96%   BMI 29.61 kg/m²           Intake/Output Summary (Last 24 hours) at 10/2/2021 1146  Last data filed at 10/1/2021 2200  Gross per 24 hour   Intake 740 ml   Output    Net 740 ml       CBC w/Diff    Lab Results   Component Value Date/Time    WBC 7.4 10/02/2021 03:14 AM    RBC 4.36 10/02/2021 03:14 AM    HGB 11.9 (L) 10/02/2021 03:14 AM    HCT 36.1 10/02/2021 03:14 AM    MCV 82.8 10/02/2021 03:14 AM    MCH 27.3 10/02/2021 03:14 AM    MCHC 33.0 10/02/2021 03:14 AM    RDW 14.9 (H) 10/02/2021 03:14 AM     10/02/2021 03:14 AM    Lab Results   Component Value Date/Time    GRANS 90 (H) 10/02/2021 03:14 AM    LYMPH 7 (L) 10/02/2021 03:14 AM    EOS 0 10/02/2021 03:14 AM    BANDS 3 08/04/2018 12:56 AM    BASOS 0 10/02/2021 03:14 AM    MYELO 2 (H) 04/27/2018 01:02 AM    METAS 2 (H) 04/28/2018 05:04 AM      Basic Metabolic Profile   Recent Labs     10/02/21  0314      K 4.0      CO2 23   BUN 16   CA 9.9        Hepatic Function    Lab Results   Component Value Date/Time    ALB 3.1 (L) 10/02/2021 03:14 AM    TP 7.2 10/02/2021 03:14 AM     (H) 10/02/2021 03:14 AM    No results found for: TBIL       Coags   Recent Labs     09/30/21  1510   PTP 13.4   INR 1.0               Reza Quintanilla NP    [facility]. [email or web address]  Phone: [phone]

## 2021-10-02 NOTE — PROGRESS NOTES
Problem: Pressure Injury - Risk of  Goal: *Prevention of pressure injury  Description: Document Juno Scale and appropriate interventions in the flowsheet. Outcome: Progressing Towards Goal  Note: Pressure Injury Interventions:  Sensory Interventions: Assess changes in LOC, Monitor skin under medical devices, Keep linens dry and wrinkle-free, Minimize linen layers    Moisture Interventions: Absorbent underpads    Activity Interventions: Pressure redistribution bed/mattress(bed type)    Mobility Interventions: HOB 30 degrees or less    Nutrition Interventions: Document food/fluid/supplement intake    Friction and Shear Interventions: Apply protective barrier, creams and emollients                Problem: Falls - Risk of  Goal: *Absence of Falls  Description: Document Leyla Fall Risk and appropriate interventions in the flowsheet.   Outcome: Progressing Towards Goal  Note: Fall Risk Interventions:  Mobility Interventions: Communicate number of staff needed for ambulation/transfer         Medication Interventions: Bed/chair exit alarm    Elimination Interventions: Call light in reach

## 2021-10-02 NOTE — PROGRESS NOTES
All events noted, seen & examined the patient. .Admitted with Dorise Setters induced Acute Pancreatitis. S/P ERCP & Sphicterectomy. Now lipase has Normalized  E. Coli Bacteriemia,source not clear. Currently on Cipro, no UA was done  H/O stage 3 CRF base line Serum Creatinine 1.66 to 1.72 in December 2020, Current  Serum creatinine has improved to 1.57 1.38. H/O Hypertension. Old CVA  Gall stone Pancreatitis. Tendency of Hypercalcemia    Plan : Renal wise no acute events, seems he will need elective Cholecystectomy. Renal wise may be DC  & can follow me as outpatient in 2 to 3 weeks. .  Avoid TUMS even on PRN basis as he has tendency of Hypercalcemia  Thanks.

## 2021-10-02 NOTE — PROGRESS NOTES
Post Procedure Progress Note  [facility]  Subjective:    Pt seen at bedside, after procedure today. Patient is doing well, eating dinner, and watching TV. Denies fever, abdominal pain, and N/V. Objective:     Visit Vitals  /78 (BP 1 Location: Left upper arm, BP Patient Position: At rest)   Pulse (!) 56   Temp 97.7 °F (36.5 °C)   Resp 19   Ht 5' 11\" (1.803 m)   Wt 96.3 kg (212 lb 4.8 oz)   SpO2 93%   BMI 29.61 kg/m²       No results found for this or any previous visit (from the past 12 hour(s)). Physical examination  Consitutional: NAD, AAO  Cardiovascular: RRR, no m/g/r  Respiratory: CTA b/l; good respiratory effort  Abdominal: Abdomen soft, NT/ND, BS+    Assessment & Plan:   s/p ERCP  - Patient tolerated procedure well. No acute issues. - potential d/c home tmr if labs are normal, vitals stable, and patient continues to eat.        Please see daily progress note for full assessment and plan    Yoni Colbert MD , PGY-1   [address]   October 1, 2021, 10:16 PM

## 2021-10-02 NOTE — PROGRESS NOTES
[facility]  (A Division of [address] [place] [address])     Progress Note      Date of Admission: 9/27/2021    Date of Note: 10/2/2021      Reason for Referral: Gram negative bacteremia      Current Antimicrobials:    Prior Antimicrobials:  Cipro 10/1- present Zosyn 9/29-10/1       Assessment: Rec / Plan:   Blood Stream Invasion - E coli  - 1 of 2 blcx 9/28 + pan-sensitive E coli  - no cholecystitis, unlikely from pancreatitis since usually not bacterial, suspect from mild cholangitis  - there was mention of urinary frequency in H&P but no urinalysis done.   -9/29 blood cx: NGTD x2 -> Cipro 500 mg po bid x 6 more days       Acute gallstone pancreatitis  - gallstone related. Lipase initially 22,314 - had improved to 672 but up again to 4,777  - unlikely source of E coli bacteremia  -s/p ERCP 10/1: s/p sphincterotomy -> advancing diet as tolerated  - ERCP notes reviewed   Suspect mild cholangitis  - transaminases trending down, alk phos and bili  - suspect as source of bacteremia -> abx as above   HTN    HLD    prior CVA    cholelithiasis    h/o gallstone pancreatitis          Microbiology:  9/28 blood cx: 1/4 E.coli  9/29 blood cx: NGTD x2    Lines / Catheters:  peripheral    Subjective:  Seen and examined at bedside. Feeling well. No fevers or chills. Ate dinner and breakfast without pain, nausea or vomiting. No other particular complaints today. Hoping to go home soon.      Active Hospital Problems    Diagnosis Date Noted    Acute pancreatitis 09/28/2021    Pancreatitis 12/04/2020     Allergies:  Lipitor [atorvastatin] and Simvastatin     Medications:  Current Facility-Administered Medications   Medication Dose Route Frequency    sodium chloride (NS) flush 5-40 mL  5-40 mL IntraVENous Q8H    sodium chloride (NS) flush 5-40 mL  5-40 mL IntraVENous PRN    acetaminophen (TYLENOL) tablet 650 mg  650 mg Oral Q6H PRN    Or    acetaminophen (TYLENOL) suppository 650 mg  650 mg Rectal Q6H PRN    heparin (porcine) injection 5,000 Units  5,000 Units SubCUTAneous Q8H    ciprofloxacin HCl (CIPRO) tablet 500 mg  500 mg Oral Q12H    polyethylene glycol (MIRALAX) packet 17 g  17 g Oral DAILY PRN    oxybutynin chloride XL (DITROPAN XL) tablet 5 mg  5 mg Oral DAILY    sertraline (ZOLOFT) tablet 50 mg  50 mg Oral DAILY    aspirin chewable tablet 81 mg  81 mg Oral DAILY    [Held by provider] amLODIPine (NORVASC) tablet 10 mg  10 mg Oral DAILY    nicotine (NICODERM CQ) 14 mg/24 hr patch 1 Patch  1 Patch TransDERmal DAILY          Physical Exam:    Temp (24hrs), Av.3 °F (36.3 °C), Min:97 °F (36.1 °C), Max:97.8 °F (36.6 °C)    Visit Vitals  BP (!) 144/87 (BP 1 Location: Right upper arm, BP Patient Position: Sitting)   Pulse 67   Temp 97 °F (36.1 °C)   Resp 16   Ht 5' 11\" (1.803 m)   Wt 96.3 kg (212 lb 4.8 oz)   SpO2 96%   BMI 29.61 kg/m²       General: Well developed, well nourished 67 y.o. BLACK/ male in no acute distress. ENT: ENT exam normal, no neck nodes or sinus tenderness  Head: normocephalic, without obvious abnormality  Mouth:  mucous membranes moist, pharynx normal without lesions  Neck: supple, symmetrical, trachea midline   Cardio:  regular rate and rhythm, S1, S2 normal, no murmur, click, rub or gallop  Chest: inspection normal - no chest wall deformities or tenderness, respiratory effort normal  Lungs: no wheezes or rales  Abdomen: soft,protuberant  non-tender. Bowel sounds normal. No masses, no organomegaly.    Extremities:  no redness or tenderness in the calves or thighs, no edema  Neuro: Grossly normal       Lab results:    Chemistry  Recent Labs     10/02/21  0314 10/01/21  0447 21  0255   * 74 96    138 137   K 4.0 3.7 3.9    107 106   CO2 23 23 24   BUN 16 15 17   CREA 1.38* 1.57* 1.67*   CA 9.9 9.4 9.7   AGAP 9 8 7   BUCR 12 10* 10*   * 153* 213*   TP 7.2 6.5 7.0   ALB 3.1* 2.9* 3.0*   GLOB 4.1* 3.6 4.0   AGRAT 0.8 0.8 0.8 CBC w/ Diff  Recent Labs     10/02/21  0314 10/01/21  0447 09/30/21  0255   WBC 7.4 7.5 8.3   RBC 4.36 4.02* 4.60   HGB 11.9* 11.1* 12.4*   HCT 36.1 33.4* 38.1    179 196   GRANS 90* 73 79*   LYMPH 7* 13* 11*   EOS 0 2 0       Microbiology  All Micro Results     Procedure Component Value Units Date/Time    CULTURE, BLOOD [[id number]] Collected: 09/29/21 2230    Order Status: Completed Specimen: Blood Updated: 10/02/21 0653     Special Requests: NO SPECIAL REQUESTS        Culture result: NO GROWTH 3 DAYS       CULTURE, BLOOD [[id number]] Collected: 09/29/21 2215    Order Status: Completed Specimen: Blood Updated: 10/02/21 0653     Special Requests: NO SPECIAL REQUESTS        Culture result: NO GROWTH 3 DAYS       CULTURE, BLOOD #2 [[id number]] Collected: 09/28/21 0600    Order Status: Completed Specimen: Blood Updated: 10/02/21 0653     Special Requests: NO SPECIAL REQUESTS        Culture result: NO GROWTH 4 DAYS       COVID-19 RAPID TEST [[id number]] Collected: 09/30/21 1315    Order Status: Completed Specimen: Nasopharyngeal Updated: 09/30/21 1402     Specimen source Nasopharyngeal        COVID-19 rapid test Not detected        Comment: Rapid Abbott ID Now       Rapid NAAT:  The specimen is NEGATIVE for SARS-CoV-2, the novel coronavirus associated with COVID-19. Negative results should be treated as presumptive and, if inconsistent with clinical signs and symptoms or necessary for patient management, should be tested with an alternative molecular assay. Negative results do not preclude SARS-CoV-2 infection and should not be used as the sole basis for patient management decisions. This test has been authorized by the FDA under an Emergency Use Authorization (EUA) for use by authorized laboratories.    Fact sheet for Healthcare Providers: ConventionUpdate.co.nz  Fact sheet for Patients: ConventionUpdate.co.nz       Methodology: Isothermal Nucleic Acid Amplification         CULTURE, URINE [[id number]]     Order Status: Sent Specimen: Cath Urine     CULTURE, BLOOD #1 [[id number]]  (Abnormal)  (Susceptibility) Collected: 09/28/21 0610    Order Status: Completed Specimen: Blood Updated: 09/30/21 0859     Special Requests: NO SPECIAL REQUESTS        GRAM STAIN       ANAEROBIC BOTTLE GRAM NEGATIVE RODS                  SMEAR CALLED TO AND CORRECTLY REPEATED BY: M. [id number] RN ED AT [phone] TO [phone].            Culture result:       ESCHERICHIA COLI GROWING IN THE ANAEROBIC BOTTLE                   DO [facility]  10/2/2021   10:53 AM

## 2021-10-03 VITALS
SYSTOLIC BLOOD PRESSURE: 129 MMHG | DIASTOLIC BLOOD PRESSURE: 79 MMHG | BODY MASS INDEX: 29.72 KG/M2 | OXYGEN SATURATION: 96 % | HEIGHT: 71 IN | TEMPERATURE: 97.5 F | HEART RATE: 56 BPM | RESPIRATION RATE: 16 BRPM | WEIGHT: 212.3 LBS

## 2021-10-03 PROBLEM — I10 HYPERTENSION: Status: ACTIVE | Noted: 2021-10-03

## 2021-10-03 PROBLEM — K85.10 GALL STONE PANCREATITIS: Status: ACTIVE | Noted: 2020-12-04

## 2021-10-03 PROBLEM — E83.52 HYPERCALCEMIA: Status: ACTIVE | Noted: 2021-10-03

## 2021-10-03 LAB
ALBUMIN SERPL-MCNC: 3 G/DL (ref 3.4–5)
ALBUMIN/GLOB SERPL: 0.8 {RATIO} (ref 0.8–1.7)
ALP SERPL-CCNC: 126 U/L (ref 45–117)
ALT SERPL-CCNC: 300 U/L (ref 16–61)
ANION GAP SERPL CALC-SCNC: 6 MMOL/L (ref 3–18)
AST SERPL-CCNC: 68 U/L (ref 10–38)
BASOPHILS # BLD: 0.1 K/UL (ref 0–0.1)
BASOPHILS NFR BLD: 1 % (ref 0–2)
BILIRUB SERPL-MCNC: 0.5 MG/DL (ref 0.2–1)
BUN SERPL-MCNC: 18 MG/DL (ref 7–18)
BUN/CREAT SERPL: 12 (ref 12–20)
CALCIUM SERPL-MCNC: 9.6 MG/DL (ref 8.5–10.1)
CHLORIDE SERPL-SCNC: 109 MMOL/L (ref 100–111)
CO2 SERPL-SCNC: 23 MMOL/L (ref 21–32)
CREAT SERPL-MCNC: 1.49 MG/DL (ref 0.6–1.3)
DIFFERENTIAL METHOD BLD: ABNORMAL
EOSINOPHIL # BLD: 0.1 K/UL (ref 0–0.4)
EOSINOPHIL NFR BLD: 1 % (ref 0–5)
ERYTHROCYTE [DISTWIDTH] IN BLOOD BY AUTOMATED COUNT: 15.1 % (ref 11.6–14.5)
GLOBULIN SER CALC-MCNC: 4 G/DL (ref 2–4)
GLUCOSE SERPL-MCNC: 93 MG/DL (ref 74–99)
HCT VFR BLD AUTO: 36.3 % (ref 36–48)
HGB BLD-MCNC: 11.7 G/DL (ref 13–16)
LIPASE SERPL-CCNC: 95 U/L (ref 73–393)
LYMPHOCYTES # BLD: 1.5 K/UL (ref 0.9–3.6)
LYMPHOCYTES NFR BLD: 15 % (ref 21–52)
MCH RBC QN AUTO: 27 PG (ref 24–34)
MCHC RBC AUTO-ENTMCNC: 32.2 G/DL (ref 31–37)
MCV RBC AUTO: 83.8 FL (ref 78–100)
MONOCYTES # BLD: 0.9 K/UL (ref 0.05–1.2)
MONOCYTES NFR BLD: 9 % (ref 3–10)
NEUTS SEG # BLD: 7.7 K/UL (ref 1.8–8)
NEUTS SEG NFR BLD: 75 % (ref 40–73)
PLATELET # BLD AUTO: 225 K/UL (ref 135–420)
PMV BLD AUTO: 11 FL (ref 9.2–11.8)
POTASSIUM SERPL-SCNC: 4 MMOL/L (ref 3.5–5.5)
PROT SERPL-MCNC: 7 G/DL (ref 6.4–8.2)
RBC # BLD AUTO: 4.33 M/UL (ref 4.35–5.65)
SODIUM SERPL-SCNC: 138 MMOL/L (ref 136–145)
WBC # BLD AUTO: 10.4 K/UL (ref 4.6–13.2)

## 2021-10-03 PROCEDURE — 74011250637 HC RX REV CODE- 250/637: Performed by: STUDENT IN AN ORGANIZED HEALTH CARE EDUCATION/TRAINING PROGRAM

## 2021-10-03 PROCEDURE — 74011250636 HC RX REV CODE- 250/636: Performed by: FAMILY MEDICINE

## 2021-10-03 PROCEDURE — 90471 IMMUNIZATION ADMIN: CPT

## 2021-10-03 PROCEDURE — 74011000258 HC RX REV CODE- 258: Performed by: STUDENT IN AN ORGANIZED HEALTH CARE EDUCATION/TRAINING PROGRAM

## 2021-10-03 PROCEDURE — 74011250636 HC RX REV CODE- 250/636: Performed by: STUDENT IN AN ORGANIZED HEALTH CARE EDUCATION/TRAINING PROGRAM

## 2021-10-03 PROCEDURE — 83690 ASSAY OF LIPASE: CPT

## 2021-10-03 PROCEDURE — 90686 IIV4 VACC NO PRSV 0.5 ML IM: CPT | Performed by: FAMILY MEDICINE

## 2021-10-03 PROCEDURE — 85025 COMPLETE CBC W/AUTO DIFF WBC: CPT

## 2021-10-03 PROCEDURE — 80053 COMPREHEN METABOLIC PANEL: CPT

## 2021-10-03 PROCEDURE — 0DJD8ZZ INSPECTION OF LOWER INTESTINAL TRACT, VIA NATURAL OR ARTIFICIAL OPENING ENDOSCOPIC: ICD-10-PCS | Performed by: INTERNAL MEDICINE

## 2021-10-03 RX ORDER — CIPROFLOXACIN 500 MG/1
500 TABLET ORAL 2 TIMES DAILY
Qty: 14 TABLET | Refills: 0 | Status: SHIPPED | OUTPATIENT
Start: 2021-10-03 | End: 2021-10-10

## 2021-10-03 RX ADMIN — ASPIRIN 81 MG CHEWABLE TABLET 81 MG: 81 TABLET CHEWABLE at 09:46

## 2021-10-03 RX ADMIN — Medication 10 ML: at 06:29

## 2021-10-03 RX ADMIN — PIPERACILLIN AND TAZOBACTAM 3.38 G: 3; .375 INJECTION, POWDER, LYOPHILIZED, FOR SOLUTION INTRAVENOUS at 15:09

## 2021-10-03 RX ADMIN — PIPERACILLIN AND TAZOBACTAM 3.38 G: 3; .375 INJECTION, POWDER, LYOPHILIZED, FOR SOLUTION INTRAVENOUS at 03:40

## 2021-10-03 RX ADMIN — HEPARIN SODIUM 5000 UNITS: 5000 INJECTION INTRAVENOUS; SUBCUTANEOUS at 06:29

## 2021-10-03 RX ADMIN — PIPERACILLIN AND TAZOBACTAM 3.38 G: 3; .375 INJECTION, POWDER, LYOPHILIZED, FOR SOLUTION INTRAVENOUS at 09:49

## 2021-10-03 RX ADMIN — Medication 10 ML: at 14:00

## 2021-10-03 RX ADMIN — SERTRALINE 50 MG: 50 TABLET, FILM COATED ORAL at 09:46

## 2021-10-03 RX ADMIN — OXYBUTYNIN CHLORIDE 5 MG: 5 TABLET, EXTENDED RELEASE ORAL at 09:00

## 2021-10-03 RX ADMIN — INFLUENZA VIRUS VACCINE 0.5 ML: 15; 15; 15; 15 SUSPENSION INTRAMUSCULAR at 16:45

## 2021-10-03 NOTE — PROGRESS NOTES
Problem: Patient Education: Go to Patient Education Activity  Goal: Patient/Family Education  Outcome: Progressing Towards Goal     Problem: Patient Education: Go to Patient Education Activity  Goal: Patient/Family Education  Outcome: Progressing Towards Goal     Problem: Patient Education: Go to Patient Education Activity  Goal: Patient/Family Education  Outcome: Progressing Towards Goal     Problem: Pressure Injury - Risk of  Goal: *Prevention of pressure injury  Description: Document Juno Scale and appropriate interventions in the flowsheet. Outcome: Progressing Towards Goal  Note: Pressure Injury Interventions:  Sensory Interventions: Assess changes in LOC, Check visual cues for pain, Float heels, Keep linens dry and wrinkle-free, Minimize linen layers    Moisture Interventions: Absorbent underpads, Check for incontinence Q2 hours and as needed, Limit adult briefs, Minimize layers, Offer toileting Q_hr    Activity Interventions: Increase time out of bed    Mobility Interventions: Float heels, HOB 30 degrees or less    Nutrition Interventions: Document food/fluid/supplement intake    Friction and Shear Interventions: HOB 30 degrees or less, Lift sheet, Minimize layers                Problem: Patient Education: Go to Patient Education Activity  Goal: Patient/Family Education  Outcome: Progressing Towards Goal     Problem: Falls - Risk of  Goal: *Absence of Falls  Description: Document Leyla Fall Risk and appropriate interventions in the flowsheet.   Outcome: Progressing Towards Goal  Note: Fall Risk Interventions:  Mobility Interventions: Bed/chair exit alarm, Patient to call before getting OOB, Utilize walker, cane, or other assistive device         Medication Interventions: Bed/chair exit alarm, Patient to call before getting OOB, Teach patient to arise slowly    Elimination Interventions: Bed/chair exit alarm, Call light in reach, Elevated toilet seat, Patient to call for help with toileting needs, Stay With Me (per policy), Toilet paper/wipes in reach, Toileting schedule/hourly rounds, Urinal in reach              Problem: Patient Education: Go to Patient Education Activity  Goal: Patient/Family Education  Outcome: Progressing Towards Goal     Problem: Pain  Goal: *Control of Pain  Outcome: Progressing Towards Goal     Problem: Patient Education: Go to Patient Education Activity  Goal: Patient/Family Education  Outcome: Progressing Towards Goal

## 2021-10-03 NOTE — PROGRESS NOTES
Completed discharge instructions with patient. Provided patient opportunity to ask any questions. All questions answered to patient's satisfaction. Patient discharged to home.

## 2021-10-03 NOTE — CONSULTS
[facility]    Name:  Karen Baig  MR#:   [id number]  :  1945  ACCOUNT #:  [de-identified]  DATE OF SERVICE:  10/02/2021    RENAL CONSULTATION    CONSULT REQUESTED BY:  [address]. ATTENDING ON RECORD:  Suha Morillo MD    REASON FOR CONSULTATION:  History of chronic renal failure. IMPRESSION:  1. The patient admitted at this time with gallstone-induced acute pancreatitis with very high lipase. 2.  Status post ERCP and sphincterotomy, no more stone in the bile duct. Now, the lipase has normalized. 3.  E. coli bacteremia, source not clear; initially was treated with Zosyn, now being treated with Cipro. ID has seen. Source of E. coli is not clear. Now definitive cholangitis. Now being treated with Cipro orally. 4.  History of stage III chronic renal failure with a current eGFR more than 60 mL/min, but the creatinine clearance is 53.9 mL/min. Baseline serum creatinine was 1.66-1.72 in 2020. Current serum creatinine has improved to 1.38, today and yesterday were 1.57. In between, the patient received IV fluid definitively. 5.  History of hypertension. 6.  Old CVA. 7.  History of gallstone pancreatitis. PLAN:  The patient basically was doing fine. Resolving acute pancreatitis. He is tolerating oral food without any problem. No urinary symptoms now. Advice is to continue to follow the renal functions. Avoid nephrotoxins. Advance diet as tolerated. This patient will need elective cholecystectomy by Surgery as an outpatient. Follow the IDs recommendations and complete the course of Cipro as outlined by the Infectious Disease consultant. HISTORY OF PRESENT ILLNESS:  This 11-year-old [facility] male, known to me for his medical as well as renal problem, was admitted to this hospital on 2021.   The patient has underlying history of hypertension, old stroke, hyperlipidemia, history of gallstone pancreatitis in , was admitted on that day of admission with abdominal pain since that morning. The patient was having sharp and radiating pain with nausea, but no vomiting. There was no fever. No diarrhea. No constipation. No chest pain. No shortness of breath, melena, or hematemesis. No use of any NSAID. He was having increased frequency of urination as well as urine incontinence, but unfortunately no urine exam was done. He has history of drinking in the past, but stopped drinking alcohol 25 years back. Still smokes 4-5 cigarettes per day for the last 5 years. PAST MEDICAL HISTORY:  Has history of hypertension, chronic renal failure stage III, old stroke, gallstone pancreatitis. Also has enlarged prostate and diastolic dysfunction without heart failure, hemiparesis affecting the left side as late effect of old stroke. Also has a history of trichomonal urethritis and vitamin D deficiency in the past, hypertensive heart and kidney disease without heart failure, obesity, overactive bladder, resolved atrial fibrillation in the past.    PAST SURGICAL HISTORY:  Includes colonoscopy with a healed snare polypectomy in 2018; appendectomy, ruptured appendix and abscess in 2018. FAMILY HISTORY:  Significant for hypertension in father, diabetes in mother and siblings, stroke in mother. No definitive family history of any kidney disease. SOCIAL HISTORY:  Single. Endorses smoking 2 cigarettes per day now. No smokeless tobacco use. ALLERGIES:  LIPITOR, CAUSED ELEVATED CPK. ALSO SIMVASTATIN, CAUSED ELEVATED CPK. LIST OF MEDICATIONS BEFORE ADMISSION:  Proventil inhaler 2 puffs every 6 hours, Norvasc 10 mg daily, aspirin 81 mg daily, Lipitor 10 mg daily, calcium carbonate, TUMS as needed, Imodium 2 mg capsule on needed basis, multivitamin once a day, Ditropan 5 mg tablet daily, Zoloft 50 mg tablet daily, Spiriva inhaler 18 mcg/inhalation.     CURRENT HOSPITAL MEDICATIONS:  Include Norvasc 10 mg daily, aspirin 81 mg daily, ciprofloxacin 500 mg twice daily, heparin 5000 units subcu every 8 hours, nicotine patch, oxybutynin, Zosyn going to be discontinued, Zoloft 50 mg daily. No IV fluid. PHYSICAL EXAMINATION:  VITAL SIGNS:  Today, temperature 97.7, heart rate 58, blood pressure 137/86, mean arterial pressure 103, oxygen saturation 95% on room air. No weight has been taken. HEENT:  Oral mucosa moist.  NECK:  Jugular veins are not distended. LUNGS:  Good air entry on both sides. HEART:  S1 and S2 without any gallop or murmur. ABDOMEN:  Soft. No palpable mass. EXTREMITIES:  Ankle, negative edema. No calf muscle tenderness. During this hospital admission, the patient did undergo ERCP  At [facility] by Dr. Trey Mascorro. Indication was gallstone pancreatitis with ampullary impacted stone by MRCP. Procedure was done as I mentioned. Impression was normal cholangiogram, except incomplete filling of cystic duct, status post biliary sphincterotomy, past common bile duct ampullary stone, gallstone pancreatitis. Subsequently, the patient was transferred to [facility].  Currently, pain free and tolerating his regular diet. LABORATORY DATA:  Relevant labs as of today; WBC 7.4 with hemoglobin of 11.9, hematocrit 36.1, platelets of 193,814. Chemistry: Today, sodium 138, potassium 4.0, chloride 106, CO2 of 22, glucose of 131, BUN of 16, creatinine of 1.38 with a creatinine clearance of 53.9 mL/min. Calcium 9.9, the patient has a tendency of high calcium off and on and by reviewing the list of medicine, the patient was on TUMS on needed basis. His calcium as of 09/2021 was as high as 10.5, mostly it was between 9.5-10.5. Now TUMS is off  and his calcium is Normal..  Magnesium is 2.0. Total protein 6.8 with albumin of 3.4.   On admission, lipase was as high as 22,314, subsequently decreased to 4320; as of 09/29/2021, 672 and today lipase has come down to 91, within normal limits, and yesterday was 315.    Blood culture was positive for Gram-negative rods, grew E. coli, and subsequently blood culture from 09/29/2021 so far negative. Final results pending. MRI/MRCP with contrast was done on 09/28/2021. Impression was mild biliary duct dilatation, suspected small calculus within the ampullary channel, but is not definitive; cholelithiasis; acute pancreatitis. No pseudocyst, necrosis, or pancreatic ductal dilatation. Small bowel containing ventral wall hernia and diabetic process. IMPRESSION AND PLAN:  As I explained on my initial part of the consultation, from renal point of view, the patient could be discharged and restarted on his home medications. Avoid nephrotoxins. Avoid TUMS as this patient has a tendency of hypercalcemia. Once he is discharged, he could be followed with me in 2-3 weeks with a new lab and again, this patient will need elective cholecystectomy that has to be arranged with a general surgeon.         Dequan Mujica MD      [id number]  D:  10/02/2021 14:13  T:  10/02/2021 19:50  JOB #:  [id number]

## 2021-10-03 NOTE — PROGRESS NOTES
Problem: Patient Education: Go to Patient Education Activity  Goal: Patient/Family Education  Outcome: Progressing Towards Goal     Problem: Patient Education: Go to Patient Education Activity  Goal: Patient/Family Education  Outcome: Progressing Towards Goal     Problem: Patient Education: Go to Patient Education Activity  Goal: Patient/Family Education  Outcome: Progressing Towards Goal     Problem: Pressure Injury - Risk of  Goal: *Prevention of pressure injury  Description: Document Juno Scale and appropriate interventions in the flowsheet. Outcome: Progressing Towards Goal  Note: Pressure Injury Interventions:  Sensory Interventions: Assess changes in LOC, Turn and reposition approx. every two hours (pillows and wedges if needed), Minimize linen layers    Moisture Interventions: Absorbent underpads, Check for incontinence Q2 hours and as needed, Minimize layers, Offer toileting Q_hr    Activity Interventions: Pressure redistribution bed/mattress(bed type)    Mobility Interventions: HOB 30 degrees or less, Pressure redistribution bed/mattress (bed type), Turn and reposition approx. every two hours(pillow and wedges)    Nutrition Interventions: Document food/fluid/supplement intake    Friction and Shear Interventions: HOB 30 degrees or less, Apply protective barrier, creams and emollients, Minimize layers                Problem: Patient Education: Go to Patient Education Activity  Goal: Patient/Family Education  Outcome: Progressing Towards Goal     Problem: Falls - Risk of  Goal: *Absence of Falls  Description: Document Leyla Fall Risk and appropriate interventions in the flowsheet.   Outcome: Progressing Towards Goal  Note: Fall Risk Interventions:  Mobility Interventions: Bed/chair exit alarm, Patient to call before getting OOB, Utilize walker, cane, or other assistive device         Medication Interventions: Bed/chair exit alarm, Patient to call before getting OOB    Elimination Interventions: Bed/chair exit alarm, Call light in reach, Urinal in reach, Patient to call for help with toileting needs, Toileting schedule/hourly rounds              Problem: Patient Education: Go to Patient Education Activity  Goal: Patient/Family Education  Outcome: Progressing Towards Goal     Problem: Pain  Goal: *Control of Pain  Outcome: Progressing Towards Goal     Problem: Patient Education: Go to Patient Education Activity  Goal: Patient/Family Education  Outcome: Progressing Towards Goal

## 2021-10-03 NOTE — PROGRESS NOTES
Intern Progress Note  [facility]       Patient: Sindi Scott MRN: [id number]  CSN: [id number]    YOB: 1945  Age: 67 y.o. Sex: male    DOA: 9/27/2021 LOS:  LOS: 5 days                    Subjective:        Patient is doing well and A&O x3 today, asking what time he can be discharged at today. S/p ERCP on 10/1. Denies abdominal pain, N/V, f/c. Ambulates independently to bathroom. 2x BM Yesterday. Review of Systems   Constitutional: Negative for chills, fever and malaise/fatigue. Eyes: Negative for blurred vision. Respiratory: Negative for cough and shortness of breath. Cardiovascular: Negative for chest pain, palpitations and leg swelling. Gastrointestinal: Negative for abdominal pain, constipation, diarrhea, nausea and vomiting. Genitourinary: Negative for dysuria. Musculoskeletal: Negative for myalgias. Neurological: Negative for weakness and headaches. All other systems reviewed and are negative. Objective:     Patient Vitals for the past 12 hrs:   Temp Pulse Resp BP SpO2   10/03/21 0808 98.2 °F (36.8 °C) (!) 52 16 134/83 95 %   10/03/21 0340 98.1 °F (36.7 °C) (!) 57 18 137/74 96 %   10/03/21 0018 98.4 °F (36.9 °C) (!) 56 17 134/82 97 %          Intake/Output Summary (Last 24 hours) at 10/3/2021 0908  Last data filed at 10/3/2021 0437  Gross per 24 hour   Intake    Output 1300 ml   Net -1300 ml       Physical Exam:   General: well-appearing, NAD, AOx3  CV:  RRR, no M/G/R  RESP: Unlabored breathing. Lungs CTAB, no wheezes, rales or rhonchi appreciated. Equal expansion bilaterally. ABD: Midline abd scar from previous sx. Soft, nontender, nondistended. MS:  No joint deformity or instability. No atrophy. Ext:  No edema. 2+ radial and dp pulses bilaterally. Skin: Warm & dry. No rashes, lesions, or ulcers. Good turgor. Psych: normal mood and affect.  A&O x3.    Lab/Data Reviewed:  Recent Results (from the past 24 hour(s))   METABOLIC PANEL, COMPREHENSIVE    Collection Time: 10/03/21  3:40 AM   Result Value Ref Range    Sodium 138 136 - 145 mmol/L    Potassium 4.0 3.5 - 5.5 mmol/L    Chloride 109 100 - 111 mmol/L    CO2 23 21 - 32 mmol/L    Anion gap 6 3.0 - 18 mmol/L    Glucose 93 74 - 99 mg/dL    BUN 18 7.0 - 18 MG/DL    Creatinine 1.49 (H) 0.6 - 1.3 MG/DL    BUN/Creatinine ratio 12 12 - 20      GFR est AA 56 (L) >60 ml/min/1.73m2    GFR est non-AA 46 (L) >60 ml/min/1.73m2    Calcium 9.6 8.5 - 10.1 MG/DL    Bilirubin, total 0.5 0.2 - 1.0 MG/DL    ALT (SGPT) 300 (H) 16 - 61 U/L    AST (SGOT) 68 (H) 10 - 38 U/L    Alk. phosphatase 126 (H) 45 - 117 U/L    Protein, total 7.0 6.4 - 8.2 g/dL    Albumin 3.0 (L) 3.4 - 5.0 g/dL    Globulin 4.0 2.0 - 4.0 g/dL    A-G Ratio 0.8 0.8 - 1.7     CBC WITH AUTOMATED DIFF    Collection Time: 10/03/21  3:40 AM   Result Value Ref Range    WBC 10.4 4.6 - 13.2 K/uL    RBC 4.33 (L) 4.35 - 5.65 M/uL    HGB 11.7 (L) 13.0 - 16.0 g/dL    HCT 36.3 36.0 - 48.0 %    MCV 83.8 78.0 - 100.0 FL    MCH 27.0 24.0 - 34.0 PG    MCHC 32.2 31.0 - 37.0 g/dL    RDW 15.1 (H) 11.6 - 14.5 %    PLATELET 438 271 - 227 K/uL    MPV 11.0 9.2 - 11.8 FL    NEUTROPHILS 75 (H) 40 - 73 %    LYMPHOCYTES 15 (L) 21 - 52 %    MONOCYTES 9 3 - 10 %    EOSINOPHILS 1 0 - 5 %    BASOPHILS 1 0 - 2 %    ABS. NEUTROPHILS 7.7 1.8 - 8.0 K/UL    ABS. LYMPHOCYTES 1.5 0.9 - 3.6 K/UL    ABS. MONOCYTES 0.9 0.05 - 1.2 K/UL    ABS. EOSINOPHILS 0.1 0.0 - 0.4 K/UL    ABS. BASOPHILS 0.1 0.0 - 0.1 K/UL    DF AUTOMATED     LIPASE    Collection Time: 10/03/21  3:40 AM   Result Value Ref Range    Lipase 95 73 - 393 U/L       Assessment & Plan:      Jessica Eng a 67 y. o. male with asthma, history of gallstone pancreatitis in 2020, HLD, prior CVA, hypertension, admitted for acute pancreatitis, likely gallstone pancreatitis     Gallstone Pancreatitis   Patient started having sharp epigastric pain this morning associated with nausea but no vomiting.   There was no radiation of pain.  Denied fever, diarrhea, constipation, chest pain, sob, melena, or Hx of NSAID use . Endorsed urinary frequency and occasional urinary incontinence. Patient has not been able to keep anything down since this morning. He stopped drinking alcohol 25 years ago, smokes 4-5 cigarettes a day for 4-5 years, and has Hx of gallstone pancreatitis in 2020. Afebrile, WBC 25, , , marium phos 119, lipase 22K. Most likely d/t gallstone pancreatitis given Hx of gallstone pancreatitis, elevated LFT's, marium phos, elevated lipase, epigastric pain a/w n/v, and Hx of alcoholism and HLD. Gastric ulcer is on the differential given epigastric pain with tenderness of palpation in the epigastric region.   Cholecystitis is also a possibility given Hx of gallstones, however, its lower on the ddx given negative Sharma's sign.   -  Lipase [id number]>91>95   - s/p ERCP 10/1 at [facility]   - Pt to see general surgery as oupt for elective cholecystectomy   - Lipase AST/ALT/AP all improving.     - tolerating regular diet well   - Cipro x 7 days as outpatient   - Mental status improved today; zosyn was restarted yesterday 10/2 for  AMS, to be dc'd today     DARINEL in the setting of CKD  Likely 2/2 decreased PO intake 2/2 abdominal pain/nausea   - Cr 1.9>1.57>1.59>1.67>1.57>1.38>1.49   - CKD stage 3 w/ GFR 49   - daily BMP   - Avoid nephrotoxic meds: amlodipine and lasix currently held   -pt to follow up with nephro in 2-3 weeks as outpt   -Per nephro, pt to avoid using Tums due to tendency of hypercalcemia     Asthma  - cont home meds Spiriva and albuterol pending med rec w sister     HLD/Hx CVA  - cont home med atorvastatin, ASA pending med rec w sister     HTN   - amlodipine and lasix currently held d/t kidney functions     OAB  - Continue home oxybutynin      Mood  - Continue home sertraline     Global Care:  - VS per unit routine  - supplemental O2 for sats < 92%  - incentive spirometer  - PT/OT/CM    Michelle Mijares, MD, PGY-1   [address] Medicine   10/03/21 12:51 PM

## 2021-10-03 NOTE — DISCHARGE INSTRUCTIONS
DISCHARGE SUMMARY from Nurse    PATIENT INSTRUCTIONS:    After general anesthesia or intravenous sedation, for 24 hours or while taking prescription Narcotics:  · Limit your activities  · Do not drive and operate hazardous machinery  · Do not make important personal or business decisions  · Do  not drink alcoholic beverages  · If you have not urinated within 8 hours after discharge, please contact your surgeon on call. Report the following to your surgeon:  · Excessive pain, swelling, redness or odor of or around the surgical area  · Temperature over 100.5  · Nausea and vomiting lasting longer than 4 hours or if unable to take medications  · Any signs of decreased circulation or nerve impairment to extremity: change in color, persistent  numbness, tingling, coldness or increase pain  · Any questions    What to do at Home:  Recommended activity: Activity as tolerated,     If you experience any of the following symptoms chest pain, shortness, nausea, vomiting or fever, please follow up with Dr Shruti Jose. *  Please give a list of your current medications to your Primary Care Provider. *  Please update this list whenever your medications are discontinued, doses are      changed, or new medications (including over-the-counter products) are added. *  Please carry medication information at all times in case of emergency situations. These are general instructions for a healthy lifestyle:    No smoking/ No tobacco products/ Avoid exposure to second hand smoke  Surgeon General's Warning:  Quitting smoking now greatly reduces serious risk to your health.     Obesity, smoking, and sedentary lifestyle greatly increases your risk for illness    A healthy diet, regular physical exercise & weight monitoring are important for maintaining a healthy lifestyle    You may be retaining fluid if you have a history of heart failure or if you experience any of the following symptoms:  Weight gain of 3 pounds or more overnight or 5 pounds in a week, increased swelling in our hands or feet or shortness of breath while lying flat in bed. Please call your doctor as soon as you notice any of these symptoms; do not wait until your next office visit. Patient armband removed and shredded. The discharge information has been reviewed with the patient. The patient verbalized understanding. Discharge medications reviewed with the patient and appropriate educational materials and side effects teaching were provided.   ___________________________________________________________________________________________________________________________________

## 2021-10-03 NOTE — PROGRESS NOTES
Progress Note    Dai Joshua  67 y.o. Admit Date: 9/27/2021  Principal Problem:    Acute pancreatitis (9/28/2021) POA: Unknown    Active Problems:    CKD (chronic kidney disease) stage 3, GFR 30-59 ml/min ([address] (9/1/2015) POA: Yes      Derick Lav stone pancreatitis (12/4/2020) POA: Unknown      Hypertension (10/3/2021) POA: Unknown      Hypercalcemia (10/3/2021) POA: Unknown            Subjective:     Patient feels good, no more abdominal pain, no SOB, tolerating PO food. A comprehensive review of systems was negative except for that written in the History of Present Illness.     Objective:     Visit Vitals  /79 (BP 1 Location: Left arm, BP Patient Position: Sitting)   Pulse (!) 56   Temp 97.5 °F (36.4 °C)   Resp 16   Ht 5' 11\" (1.803 m)   Wt 96.3 kg (212 lb 4.8 oz)   SpO2 96%   BMI 29.61 kg/m²         Intake/Output Summary (Last 24 hours) at 10/3/2021 1320  Last data filed at 10/3/2021 0437  Gross per 24 hour   Intake    Output 1100 ml   Net -1100 ml       Current Facility-Administered Medications   Medication Dose Route Frequency Provider Last Rate Last Admin    piperacillin-tazobactam (ZOSYN) 3.375 g in 0.9% sodium chloride (MBP/ADV) 100 mL MBP  3.375 g IntraVENous Q6H Senora Boor S,  mL/hr at 10/03/21 0949 3.375 g at 10/03/21 0949    sodium chloride (NS) flush 5-40 mL  5-40 mL IntraVENous Q8H Umesh Zimmer MD   10 mL at 10/03/21 0629    sodium chloride (NS) flush 5-40 mL  5-40 mL IntraVENous PRN Umesh Zimmer MD        acetaminophen (TYLENOL) tablet 650 mg  650 mg Oral Q6H PRN Umesh Zimmer MD        Or    acetaminophen (TYLENOL) suppository 650 mg  650 mg Rectal Q6H PRN Umesh Zimmer MD        heparin (porcine) injection 5,000 Units  5,000 Units SubCUTAneous Q8H Umesh Zimmer MD   5,000 Units at 10/03/21 0629    [Held by provider] ciprofloxacin HCl (CIPRO) tablet 500 mg  500 mg Oral Q12H Umesh Zimmer MD   500 mg at 10/02/21 0153    polyethylene glycol (MIRALAX) packet 17 g  17 g Oral DAILY PRN Manuelito Clemente MD        oxybutynin chloride XL (DITROPAN XL) tablet 5 mg  5 mg Oral DAILY Manuelito Clemente MD   5 mg at 10/03/21 0900    sertraline (ZOLOFT) tablet 50 mg  50 mg Oral DAILY Manuelito Clemente MD   50 mg at 10/03/21 0946    aspirin chewable tablet 81 mg  81 mg Oral DAILY Manuelito Clemente MD   81 mg at 10/03/21 0946    [Held by provider] amLODIPine (NORVASC) tablet 10 mg  10 mg Oral DAILY Manuelito Clemente MD        nicotine (NICODERM CQ) 14 mg/24 hr patch 1 Patch  1 Patch TransDERmal DAILY Manuelito Clemente MD   1 Patch at 10/03/21 3085        Physical Exam:     Physical Exam:   General:  Alert, cooperative, no distress, appears stated age. Neck: Supple, symmetrical, trachea midline, no adenopathy, thyroid: no enlargement/tenderness/nodules, no carotid bruit and no JVD. Lungs:   Clear to auscultation bilaterally. Heart:  Regular rate and rhythm, S1, S2 normal, no murmur, click, rub or gallop. Abdomen:   Soft, non-tender. Bowel sounds normal. No masses,  No organomegaly. Extremities: Extremities normal, atraumatic, no cyanosis or edema,   Skin: Skin color, texture, turgor normal. No rashes or lesions         Data Review:    CBC w/Diff    Recent Labs     10/03/21  0340 10/02/21  0314 10/02/21  0314 10/01/21  0447 10/01/21  0447   WBC 10.4  --  7.4  --  7.5   RBC 4.33*  --  4.36  --  4.02*   HGB 11.7*  --  11.9*  --  11.1*   HCT 36.3  --  36.1  --  33.4*   MCV 83.8   < > 82.8   < > 83.1   MCH 27.0   < > 27.3   < > 27.6   MCHC 32.2   < > 33.0   < > 33.2   RDW 15.1*   < > 14.9*   < > 15.1*    < > = values in this interval not displayed. Recent Labs     10/03/21  0340 10/02/21  0314 10/02/21  0314 10/01/21  0447 10/01/21  0447   MONOS 9  --  3  --  11*   EOS 1  --  0  --  2   BASOS 1   < > 0   < > 0   RDW 15.1*   < > 14.9*   < > 15.1*    < > = values in this interval not displayed.         Comprehensive Metabolic Profile    Recent Labs 10/03/21  0340 10/02/21  0314 10/01/21  0447    138 138   K 4.0 4.0 3.7    106 107   CO2 23 23 23   BUN 18 16 15   CREA 1.49* 1.38* 1.57*    Recent Labs     10/03/21  0340 10/02/21  0314 10/01/21  0447   CA 9.6 9.9 9.4   ALB 3.0* 3.1* 2.9*   TP 7.0 7.2 6.5   TBILI 0.5 0.6 1.2*                        Impression:       Active Hospital Problems    Diagnosis Date Noted    Hypertension 10/03/2021    Hypercalcemia 10/03/2021    Acute pancreatitis 09/28/2021    Gall stone pancreatitis 12/04/2020    CKD (chronic kidney disease) stage 3, GFR 30-59 ml/min ([facility] [address] 09/01/2015      At his Base line Renal function. Plan:     No need for any Lasix at out patient, watch for leg swelling as  He is on Amlodipine. No Tums even on PRN basis as he has tendency of Hypercalcemia, off TUMS  Calci is OK. OK to DC today, can follow with me as OP in 3 to 4 weeks.     Rosezetta Najjar, MD

## 2021-10-03 NOTE — ROUTINE PROCESS
Bedside and Verbal shift change report given to [facility], RN and Shelby Vallejo RN (oncoming nurse) by John Paniagua RN (offgoing nurse). Report included the following information SBAR, Kardex, MAR and Recent Results.     SITUATION:  Code Status: Full Code  Reason for Admission: Pancreatitis [K85.90]  Acute pancreatitis [K85.90]  Hospital day: 5  Problem List:       Hospital Problems  Date Reviewed: 10/1/2021        Codes Class Noted POA    * (Principal) Acute pancreatitis ICD-10-CM: K85.90  ICD-9-CM: 931.0  9/28/2021 Unknown        Pancreatitis ICD-10-CM: K85.90  ICD-9-CM: 805.9  12/4/2020 Unknown              BACKGROUND:   Past Medical History:   Past Medical History:   Diagnosis Date    Acute blood loss as cause of postoperative anemia 4/21/2018    Benign prostatic hyperplasia     Chronic kidney disease     Chronic obstructive pulmonary disease [address] [address]     CKD (chronic kidney disease) stage 3, GFR 30-59 ml/min ([facility] [address] 9/1/2015    Depression 0/9/7127    Diastolic dysfunction without heart failure 9/2/2015    Grade 1 diastolic dysfunction on 2D ECHO done 9/02/2015    Dyslipidemia 9/2/2015    Dysphagia as late effect of cerebrovascular accident (CVA) 9/1/2015    Hemiparesis affecting left side as late effect of cerebrovascular accident (CVA) ([facility] [address] 9/1/2015    History of noncompliance with medical treatment, presenting hazards to health 9/1/2015    History of stroke with residual deficit 9/1/2015    Acute Ischemic Stroke (early subacute infarct at the posterior right lentiform nucleus) with residual left hemiparesis and dysphagia     History of tobacco use 9/1/2015    History of trichomonal urethritis 9/1/2015    History of vitamin D deficiency 9/6/2015    Hypertension     Hypertensive heart and kidney disease without heart failure and with chronic kidney disease stage III ([address] 09/02/2015    Obesity, Class I, BMI 30-34.9 9/1/2015    Overactive bladder     Pancreatitis, acute 12/2020    Postoperative atrial fibrillation ([address] 4/21/2018    Resolved    Postoperative confusion 4/22/2018    Postoperative urinary retention 4/22/2018    Statin intolerance 05/07/2018    Atorvastatin and Simvastatin    Stroke [facility]) 2015    weakness rt side, dyphagia    Vitamin D deficiency 5/1/2018    Vitamin D 25-Hydroxy (5/1/2018) = 17.9       Patient taking anticoagulants yes    Patient has a defibrillator: no    History of shots YES for example, flu, pneumonia, tetanus   Isolation History NO for example, MRSA, CDiff    ASSESSMENT:  Changes in Assessment Throughout Shift: NONE  Significant Changes in 24 hours (for example, RR/code, fall)  Patient has Central Line: no   Patient has Chow Cath: no   Mobility Issues  PT  IV Patency  OR Checklist  Pending Tests    Last Vitals:  Vitals w/ MEWS Score (last day)     Date/Time MEWS Score Pulse Resp Temp BP Level of Consciousness SpO2    10/03/21 0340  1  (!) 57  18  98.1 °F (36.7 °C)  137/74  Alert (0)  96 %    10/03/21 0018  1  (!) 56  17  98.4 °F (36.9 °C)  134/82  Alert (0)  97 %    10/02/21 2023  1  86  18  98.1 °F (36.7 °C)  (!) 142/86  Alert (0)  95 %    10/02/21 1553  1  (!) 56  16  97.9 °F (36.6 °C)  (!) 147/86  Alert (0)  94 %    10/02/21 1220  1  (!) 58  16  97.7 °F (36.5 °C)  137/86  Alert (0)  95 %    10/02/21 0835  1  67  16  97 °F (36.1 °C)  (!) 144/87  Alert (0)  96 %    10/02/21 0053  1  62  16  97.1 °F (36.2 °C)  139/88  Alert (0)  95 %            PAIN    Pain Assessment    Pain Intensity 1: 0 (10/03/21 0340)              Patient Stated Pain Goal: 0  Intervention effective: N/A  Time of last intervention: N/A Reassessment Completed: yes   Other actions taken for pain: Distraction    Last 3 Weights:  Last 3 Recorded Weights in this Encounter    09/27/21 1723 09/29/21 0648   Weight: 98 kg (216 lb) 96.3 kg (212 lb 4.8 oz)   Weight change:     INTAKE/OUPUT    Current Shift: No intake/output data recorded.     Last three shifts: 10/01 1901 - 10/03 0700  In: 240 [P.O.:240]  Out: 1300 [[id number]]    RECOMMENDATIONS AND DISCHARGE PLANNING  Patient needs and requests: None    Pending tests/procedures: labs     Discharge plan for patient: Home    Discharge planning Needs or Barriers: None    Estimated Discharge Date: 10/03/2021 Posted on Whiteboard in Patients Room: yes       \"HEALS\" SAFETY CHECK  A safety check occurred in the patient's room between off going nurse and oncoming nurse listed above. The safety check included the below items:    H  High Alert Medications Verify all high alert medication drips (heparin, PCA, etc.)  E  Equipment Suction is set up for ALL patients (with dipti)  Red plugs utilized for all equipment (IV pumps, etc.)  WOWs wiped down at end of shift. Room stocked with oxygen, suction, and other unit-specific supplies  A  Alarms Bed alarm is set for fall risk patients  Ensure chair alarm is in place and activated if patient is up in a chair  L  Lines Check IV for any infiltration  Chow bag is empty if patient has a Chow   Tubing and IV bags are labeled  S  Safety  Room is clean, patient is clean, and equipment is clean. Hallways are clear from equipment besides carts. Fall bracelet on for fall risk patients  Ensure room is clear and free of clutter  Suction is set up for ALL patients (with dipti)  Hallways are clear from equipment besides carts.    Isolation precautions followed, supplies available outside room, sign posted    Laith Zimmer RN

## 2021-10-03 NOTE — PROGRESS NOTES
Discharge planning    Discharge order noted for today. Per Macy Sos called and will transport patient home. Per previous CM notes, patient's sister declined home health services for therapy . Patient has personal care aide in home.         JARED Berman, RN  Pager # [phone]  Care Manager

## 2021-10-03 NOTE — DISCHARGE SUMMARY
[address] Medicine  Discharge Summary    Patient: Shanti Alvarez MRN: [id number]  CSN: [id number]    YOB: 1945  Age: 67 y.o. Sex: male      Admission Date: 9/27/2021 Discharge Date: Oct 3, 2021   Attending: Mary Amezquita MD PCP: Cruz Sutton MD     ===================================================================    Reason for Admission: Pancreatitis [K85.90]  Acute pancreatitis [K85.90]    Discharge Diagnoses:   Chololithiasis  Pancreatitis - improving      Important notes to PCP/ follow-up studies and evaluations   -Pt has surg consult OP to evaluate for cholocystectomy: Dr. Rashid Vale, Tuesday October 5, 2021 @2:45PM  -will need BMP and CBC at [facility] F/U  -amlodipine is held. Eval BP as OP and reconsider restarting  -verify pt is taking statin again  -pt needs to follow-up w/nephrology and GI. These appointments have not been established    Pending labs and studies:  None    Operative Procedures:   ERCP    Discharge Medications:     Current Discharge Medication List      START taking these medications    Details   ciprofloxacin HCl (CIPRO) 500 mg tablet Take 1 Tablet by mouth two (2) times a day for 14 doses. Qty: 14 Tablet, Refills: 0  Start date: 10/3/2021, End date: 10/10/2021         CONTINUE these medications which have NOT CHANGED    Details   atorvastatin (LIPITOR) 10 mg tablet Take 10 mg by mouth nightly. loperamide (Imodium A-D) 2 mg capsule Take 2 mg by mouth four (4) times daily as needed for Diarrhea.      multivitamin (ONE A DAY) tablet Take 1 Tab by mouth daily. oxybutynin (DITROPAN) 5 mg tablet Take 5 mg by mouth daily. sertraline (ZOLOFT) 50 mg tablet Take 50 mg by mouth nightly. tiotropium (Spiriva with HandiHaler) 18 mcg inhalation capsule Take 1 Cap by inhalation daily. albuterol (PROVENTIL HFA, VENTOLIN HFA, PROAIR HFA) 90 mcg/actuation inhaler Take  by inhalation.       aspirin 81 mg chewable tablet Take 81 mg by mouth daily.         STOP taking these medications       furosemide (Lasix) 20 mg tablet Comments:   Reason for Stopping:         amLODIPine (NORVASC) 10 mg tablet Comments:   Reason for Stopping:               Disposition: Home with Home Health    Consultants:    [facility] (including pertinent history and physical findings)  Daniel kirkland 67 y. o. male with asthma, history of gallstone pancreatitis in 2020, HLD, prior CVA,, hypertension, now admitted for abdominal pain since this morning. Patient started having sharp epigastric pain this morning associated with nausea but no vomiting. There was no radiation of pain. Denied fever, diarrhea, constipation, chest pain, sob, melena, or Hx of NSAID use . Endorsed urinary frequency and occasional urinary incontinence. Patient has not been able to keep anything down since this morning. He stopped drinking alcohol 25 years ago, smokes 4-5 cigarettes a day for 4-5 years, and has Hx of gallstone pancreatitis in 2020. MRCP demonstrated chololithiasis. GI consulted and pt was temporarily transferred to [facility] for ERCP and transferred back to [facility] for further eval and discharge. ERCP showed nl cholangiogram (except incomplete filling of cystic duct). Further, Lipase fluctuated significantly throughout stay: [id number]>672>4777>315>91>95. This is suggestive of natural passing of at least 2 stones which would also explain (+) MRCP followed by a (-) ERCP as the variations in Lipase spiked between the two. CURRENT ADMISSION IMAGING RESULTS   No results found.       Cardiology Procedures/Testing:  MODALITY RESULTS   EKG Results for orders placed or performed during the hospital encounter of 09/27/21   EKG, 12 LEAD, INITIAL   Result Value Ref Range    Ventricular Rate 66 BPM    Atrial Rate 66 BPM    P-R Interval 198 ms    QRS Duration 98 ms    Q-T Interval 410 ms    QTC Calculation (Bezet) 429 ms    Calculated P Axis 50 degrees Calculated R Axis -53 degrees    Calculated T Axis 6 degrees    Diagnosis       Sinus rhythm with occasional premature ventricular complexes  Left axis deviation  Minimal voltage criteria for LVH, may be normal variant ( R in aVL )  Inferior-posterior infarct (cited on or before 27-SEP-2021)  Abnormal ECG  When compared with ECG of 04-DEC-2020 11:47,  premature ventricular complexes are now present  Left anterior fascicular block is no longer present  Questionable change in initial forces of Inferior leads  Confirmed by Reshma Lo ([id number]) on 9/28/2021 6:47:05 AM         ECHO 06/20/19    ECHO ADULT COMPLETE 06/20/2019 6/20/2019    Interpretation Summary  · Left Ventricle: Estimated left ventricular ejection fraction is 56 - 60%. No regional wall motion abnormality noted. Age-appropriate left ventricular diastolic function. · Aorta: Moderate aortic root dilatation. · Aortic Valve: Probably trileaflet aortic valve. Aortic valve sclerosis. Mild aortic valve regurgitation is present. Signed by: Jocelynn Alas MD on 6/20/2019  1:59 PM     Nuclear Medicine No results found for this or any previous visit. IR No results found for this or any previous visit.      CATH      Special Testing/Procedures:  MODALITY RESULTS   MICRO All Micro Results     Procedure Component Value Units Date/Time    CULTURE, BLOOD [[id number]] Collected: 09/29/21 2215    Order Status: Completed Specimen: Blood Updated: 10/03/21 0652     Special Requests: NO SPECIAL REQUESTS        Culture result: NO GROWTH 4 DAYS       CULTURE, BLOOD [[id number]] Collected: 09/29/21 2230    Order Status: Completed Specimen: Blood Updated: 10/03/21 0652     Special Requests: NO SPECIAL REQUESTS        Culture result: NO GROWTH 4 DAYS       CULTURE, BLOOD #2 [[id number]] Collected: 09/28/21 0600    Order Status: Completed Specimen: Blood Updated: 10/03/21 0652     Special Requests: NO SPECIAL REQUESTS        Culture result: NO GROWTH 5 DAYS       COVID-19 RAPID TEST [[id number]] Collected: 09/30/21 1315    Order Status: Completed Specimen: Nasopharyngeal Updated: 09/30/21 1402     Specimen source Nasopharyngeal        COVID-19 rapid test Not detected        Comment: Rapid Abbott ID Now       Rapid NAAT:  The specimen is NEGATIVE for SARS-CoV-2, the novel coronavirus associated with COVID-19. Negative results should be treated as presumptive and, if inconsistent with clinical signs and symptoms or necessary for patient management, should be tested with an alternative molecular assay. Negative results do not preclude SARS-CoV-2 infection and should not be used as the sole basis for patient management decisions. This test has been authorized by the FDA under an Emergency Use Authorization (EUA) for use by authorized laboratories. Fact sheet for Healthcare Providers: PoliticalMakeover.com.ee  Fact sheet for Patients: PoliticalMakeover.com.ee       Methodology: Isothermal Nucleic Acid Amplification         CULTURE, URINE [[id number]] Collected: 09/30/21 1200    Order Status: Canceled Specimen: Cath Urine     CULTURE, BLOOD #1 [[id number]]  (Abnormal)  (Susceptibility) Collected: 09/28/21 0610    Order Status: Completed Specimen: Blood Updated: 09/30/21 0859     Special Requests: NO SPECIAL REQUESTS        GRAM STAIN       ANAEROBIC BOTTLE GRAM NEGATIVE RODS                  SMEAR CALLED TO AND CORRECTLY REPEATED BY: LATRICE Massey [place] Skip Melia RN ED AT [phone] [id number].            Culture result:       ESCHERICHIA COLI GROWING IN THE ANAEROBIC BOTTLE               ABG No results found for: PH, PHI, PCO2, PCO2I, PO2, PO2I, HCO3, HCO3I, FIO2, FIO2I   UA Results for orders placed or performed in visit on 03/29/18   AMB POC URINALYSIS DIP STICK AUTO W/O MICRO     Status: None   Result Value Ref Range Status    Color (UA POC) Yellow  Final    Clarity (UA POC) Clear  Final    Glucose (UA POC) Negative Negative Final    Bilirubin (UA POC) Negative Negative Final    Ketones (UA POC) Negative Negative Final    Specific gravity (UA POC) 1.010 1.001 - 1.035 Final    Blood (UA POC) Trace Negative Final    pH (UA POC) 5.0 4.6 - 8.0 Final    Protein (UA POC) Negative Negative Final    Urobilinogen (UA POC) 0.2 mg/dL 0.2 - 1 Final    Nitrites (UA POC) Negative Negative Final    Leukocyte esterase (UA POC) Negative Negative Final        Laboratory Results:  LABORATORY RESULTS   HEMATOLOGY Lab Results   Component Value Date/Time    WBC 10.4 10/03/2021 03:40 AM    HGB 11.7 (L) 10/03/2021 03:40 AM    HCT 36.3 10/03/2021 03:40 AM    PLATELET 576 89/08/1437 03:40 AM    MCV 83.8 10/03/2021 03:40 AM       CHEMISTRIES Lab Results   Component Value Date/Time    Sodium 138 10/03/2021 03:40 AM    Potassium 4.0 10/03/2021 03:40 AM    Chloride 109 10/03/2021 03:40 AM    CO2 23 10/03/2021 03:40 AM    Anion gap 6 10/03/2021 03:40 AM    Glucose 93 10/03/2021 03:40 AM    BUN 18 10/03/2021 03:40 AM    Creatinine 1.49 (H) 10/03/2021 03:40 AM    BUN/Creatinine ratio 12 10/03/2021 03:40 AM    GFR est AA 56 (L) 10/03/2021 03:40 AM    GFR est non-AA 46 (L) 10/03/2021 03:40 AM    Calcium 9.6 10/03/2021 03:40 AM      HEPATIC FUNCTION Lab Results   Component Value Date/Time    Albumin 3.0 (L) 10/03/2021 03:40 AM    Bilirubin, direct 0.1 05/14/2018 06:28 AM    Bilirubin, total 0.5 10/03/2021 03:40 AM    Protein, total 7.0 10/03/2021 03:40 AM    Globulin 4.0 10/03/2021 03:40 AM    A-G Ratio 0.8 10/03/2021 03:40 AM    ALT (SGPT) 300 (H) 10/03/2021 03:40 AM    Alk.  phosphatase 126 (H) 10/03/2021 03:40 AM       LACTIC ACID Lab Results   Component Value Date/Time    Lactic acid 1.5 09/29/2021 03:50 AM    Lactic acid 1.1 08/03/2018 05:40 AM      CARDIAC PANEL Lab Results   Component Value Date/Time     09/27/2021 06:04 PM    CK - MB 1.4 09/27/2021 06:04 PM    CK-MB Index 0.6 09/27/2021 06:04 PM    Troponin-I, QT <0.02 09/27/2021 06:04 PM      NT-proBNP Lab Results   Component Value Date/Time NT pro-BNP 52 09/27/2021 06:04 PM      THYROID Lab Results   Component Value Date/Time    TSH 0.65 04/22/2018 03:14 AM      LIPID PANEL Lab Results   Component Value Date/Time    Cholesterol, total 87 12/05/2020 03:21 AM    HDL Cholesterol 41 12/05/2020 03:21 AM    LDL, calculated 31.4 12/05/2020 03:21 AM    VLDL, calculated 14.6 12/05/2020 03:21 AM    Triglyceride 73 12/05/2020 03:21 AM    CHOL/HDL Ratio 2.1 12/05/2020 03:21 AM         RISK CALCULATORS:  SCORE RESULT   ASCVD The ASCVD Risk score (Kesha Person, et al., 2013) failed to calculate for the following reasons: The patient has a prior MI or stroke diagnosis    [id number]     HAS-BLED    READMISSION RISK SCORE High Risk            23 Total Score    3 Has Seen PCP in Last 6 Months (Yes=3, No=0)    3 Patient Length of Stay (>5 days = 3)    4 IP Visits Last 12 Months (1-3=4, 4=9, >4=11)    9 Pt. Coverage (Medicare=5 , Medicaid, or Self-Pay=4)    4 Charlson Comorbidity Score (Age + Comorbid Conditions)        Criteria that do not apply:    . Living with Significant Other. Assisted Living. LTAC. SNF.  or   Rehab           Functional status and cognitive function:    Ambulates with: Cane  Status: alert, cooperative, no distress, appears stated age  Condition: STABLE  Disposition: Home    Diet: General Diet    Code status and advanced care plan: Full      Patient Education:  Patient was educated on the following topics prior to discharge: chololithiasis and possible cholocystectomy    Follow-up:   Follow-up Information     Follow up With Specialties Details Why Contact Gina Callaway MD General Surgery On 10/5/2021 @ 2:45PM for consultation to have gallbladder removed Nicolette Strand [address] Siomara Wilde MD Family Medicine On 10/4/2021 Follow up with Dr. Fredo Reagan at [address] [facility] Monday 10/4/21 at 2:00PM [address] [id number]  [address] [place] [address] MD Nephrology In 3 weeks For general follow up after hosptial discharge for chronic kidney disease (nephrology) [address] [facility]      Stacie Berry MD Gastroenterology In 8 weeks For general follow up after hosptial discharge (Gastroenterology) [address]  [address] [facility],Fourth Floor [address] [facility], [address]   [address]  [phone]            =================================================================    Bryce Granados MD, PGY-3   [address] Family Medicine   Intern Pager: [phone]   October 3, 2021, 5:48 PM

## 2021-10-04 LAB
BACTERIA SPEC CULT: NORMAL
SERVICE CMNT-IMP: NORMAL

## 2021-10-17 NOTE — PROGRESS NOTES
[facility]  [address] [facility]  [phone] fax    Occupational Therapy Discharge Summary    Patient name: Opal Murry Start of Care: 18   Referral source: Veronica Abdul MD : 1945   Medical/Treatment Diagnosis: Muscle weakness [M62.81] Onset [id number]     Prior Hospitalization: see medical history Provider#: [id number]   Medications: Verified on Patient Summary List    Comorbidities: CVA, cataracts, HTN  Prior Level of Function:[employer], cards, I self care  Visits from Start of Care: 1    Missed Visits: 0  Reporting Period : 18 to 18    Summary of Care:Patient seen for evaluaiton only. Did not return due to medical complications. Goal:1. Patient will be independent in home activities ot improve handwriting with right hand  Status at last note/certification:Did not have  Status at discharge: met    Goal:2. Patient will be able to don and doff upper extremity garments withut assistance  Status at last note/certification:Unable  Status at discharge: not met    Goal:3. Patient will be independent in home program for fine motor skills and hand strengthening for both hands. Status at last note/certification:Did not have  Status at discharge: not met    LT. Patient will increase bilateral fine motor skills at a least 20% for improved use of fasteners  Status at last note/certification:See eval  Status at discharge: not met    LT. Patient will increase bilateral  strength 10# for opening jars  Status at last note/certification:See eval  Status at discharge: not met    LTG. Patient will be able to complete personal information legibly and accurately to improve written communication. Status at last note/certification:Unable  Status at discharge: not met    ASSESSMENT/Changes in Function: Patient seen for evaluation only. Did not return due to medical complications. [facility] now requests orders and shares plan of care/discharge summaries for some patients through [facility].  Please REPLY TO THIS MESSAGE OR ROUTE BACK TO THE AUTHOR in order to give authorization for orders when needed.  This is considered a verbal order, you will still receive a faxed copy of orders for signature.  Thank you for your assistance in improving collaboration for our patients.    ORDER    Request for SN visits for 1w4 and 3prn for respiratory assessment, compliance with using O2 and CPAP, skin assessment and pain mgmt.          Chiki Ruelas RN, BSN, [facility]  Office: [phone]  Cell: [phone]  [email or web address]     ASSESSMENT/RECOMMENDATIONS:  [x]Discontinue therapy: []Patient has reached or is progressing toward set goals      []Patient is non-compliant or has abdicated      []Due to lack of appreciable progress towards set goals      X medical complications    SHANTELL Mendenhall/L 4/30/2018 7:55 AM    NOTE TO PHYSICIAN:  Please complete the following and fax to: [facility] at [phone]  . Retain this original for your records. If you are unable to process this request in   24 hours, please contact our office.      [] I have read the above report and request that my patient continue therapy with the following changes/special instructions:  [] I have read the above report and request that my patient be discharged from therapy    Physician's Signature:_____________________ Date:___________Time:__________

## 2021-10-19 ENCOUNTER — OFFICE VISIT (OUTPATIENT)
Dept: SURGERY | Age: 76
End: 2021-10-19
Payer: MEDICARE

## 2021-10-19 VITALS
BODY MASS INDEX: 30.1 KG/M2 | TEMPERATURE: 97.8 F | DIASTOLIC BLOOD PRESSURE: 94 MMHG | WEIGHT: 215 LBS | HEART RATE: 70 BPM | RESPIRATION RATE: 18 BRPM | OXYGEN SATURATION: 98 % | SYSTOLIC BLOOD PRESSURE: 168 MMHG | HEIGHT: 71 IN

## 2021-10-19 DIAGNOSIS — K80.20 GALLSTONES: Primary | ICD-10-CM

## 2021-10-19 PROCEDURE — 1111F DSCHRG MED/CURRENT MED MERGE: CPT | Performed by: SURGERY

## 2021-10-19 PROCEDURE — G8536 NO DOC ELDER MAL SCRN: HCPCS | Performed by: SURGERY

## 2021-10-19 PROCEDURE — G9717 DOC PT DX DEP/BP F/U NT REQ: HCPCS | Performed by: SURGERY

## 2021-10-19 PROCEDURE — G8419 CALC BMI OUT NRM PARAM NOF/U: HCPCS | Performed by: SURGERY

## 2021-10-19 PROCEDURE — 99213 OFFICE O/P EST LOW 20 MIN: CPT | Performed by: SURGERY

## 2021-10-19 PROCEDURE — G8427 DOCREV CUR MEDS BY ELIG CLIN: HCPCS | Performed by: SURGERY

## 2021-10-19 PROCEDURE — 1101F PT FALLS ASSESS-DOCD LE1/YR: CPT | Performed by: SURGERY

## 2021-10-19 NOTE — PATIENT INSTRUCTIONS
Low-Fat Diet for Gallbladder Disease: Care Instructions  Overview     When you eat, the gallbladder releases bile, which helps you digest the fat in food. If you have an inflamed gallbladder, this may cause pain. A low-fat diet may give your gallbladder a rest so you can start to heal. Your doctor and dietitian can help you make an eating plan that does not irritate your digestive system. Always talk with your doctor or dietitian before you make changes in your diet. Follow-up care is a key part of your treatment and safety. Be sure to make and go to all appointments, and call your doctor if you are having problems. It's also a good idea to know your test results and keep a list of the medicines you take. How can you care for yourself at home? · Eat many small meals and snacks each day instead of three large meals. · Choose lean meats. ? Eat no more than 5 to 6½ ounces of meat a day. ? Cut off all fat you can see. ? Eat chicken and turkey without the skin. ? Many types of fish, such as salmon, lake trout, tuna, and herring, provide healthy omega-3 fat. But, avoid fish canned in oil, such as sardines in olive oil. ? Bake, broil, or grill meats, poultry, or fish instead of frying them in butter or fat. · Drink or eat nonfat or low-fat milk, yogurt, cheese, or other milk products each day. ? Read the labels on cheeses, and choose those with less than 5 grams of fat an ounce. ? Try fat-free sour cream, cream cheese, or yogurt. ? Avoid cream soups and cream sauces on pasta. ? Eat low-fat ice cream, frozen yogurt, or sorbet. Avoid regular ice cream.  · Eat whole-grain cereals, breads, crackers, rice, or pasta. Avoid high-fat foods such as croissants, scones, biscuits, waffles, doughnuts, muffins, granola, and high-fat breads. · Flavor your foods with herbs and spices (such as basil, tarragon, or mint), fat-free sauces, or lemon juice instead of butter.  You can also use butter substitutes, fat-free mayonnaise, or fat-free dressing. · Try applesauce, prune puree, or mashed bananas to replace some or all of the fat when you bake. · Limit fats and oils, such as butter, margarine, mayonnaise, and salad dressing, to no more than 1 tablespoon a meal.  · Avoid high-fat foods, such as:  ? Chocolate, whole milk, ice cream, and processed cheese. ? Fried or buttered foods. ? Sausage, salami, and allen. ? Cinnamon rolls, cakes, pies, cookies, and other pastries. ? Prepared snack foods, such as potato chips, nut and granola bars, and mixed nuts. ? Coconut and avocado. · Learn how to read food labels for serving sizes and ingredients. Fast-food and convenience-food meals often have lots of fat. Where can you learn more? Go to http://www.gray.com/  Enter C454 in the search box to learn more about \"Low-Fat Diet for Gallbladder Disease: Care Instructions. \"  Current as of: December 17, 2020               Content Version: 13.0  © 9205-1674 [employer]. Care instructions adapted under license by [employer] (which disclaims liability or warranty for this information). If you have questions about a medical condition or this instruction, always ask your healthcare professional. [place] [address] any warranty or liability for your use of this information. High-Fiber Diet: Care Instructions  Overview     A high-fiber diet may help you relieve constipation and feel less bloated. Your doctor and dietitian will help you make a high-fiber eating plan based on your personal needs. The plan will include the things you like to eat. It will also make sure that you get 25 to 35 grams of fiber a day. Before you make changes to the way you eat, be sure to talk with your doctor or dietitian. Follow-up care is a key part of your treatment and safety. Be sure to make and go to all appointments, and call your doctor if you are having problems.  It's also a good idea to know your test results and keep a list of the medicines you take. How can you care for yourself at home? · You can increase how much fiber you get if you eat more of certain foods. These foods include:  ? Whole-grain breads and cereals. ? Fruits, such as pears, apples, and peaches. Eat the skins and peels if you can.  ? Vegetables, such as broccoli, cabbage, spinach, carrots, asparagus, and squash. ? Starchy vegetables. These include potatoes with skins, kidney beans, and lima beans. · Take a fiber supplement every day if your doctor recommends it. Examples are Benefiber, Citrucel, FiberCon, and Metamucil. Ask your doctor how much to take. · Drink plenty of fluids. If you have kidney, heart, or liver disease and have to limit fluids, talk with your doctor before you increase the amount of fluids you drink. Where can you learn more? Go to http://www.gray.com/  Enter T555 in the search box to learn more about \"High-Fiber Diet: Care Instructions. \"  Current as of: December 17, 2020               Content Version: 13.0  © 0421-1514 [employer]. Care instructions adapted under license by [employer] (which disclaims liability or warranty for this information). If you have questions about a medical condition or this instruction, always ask your healthcare professional. [place] [address] any warranty or liability for your use of this information.

## 2021-10-19 NOTE — PROGRESS NOTES
[facility]  General Surgery    Name: Aguila Hinds MRN: [id number]   : 1945 Hospital: [facility]   Date: 10/19/2021 Admission Date: No admission date for patient encounter. Subjective:  Patient returns today following GI work-up including MRCP and ERCP with sphincterotomy. Patient and his caregiver state that he has changed his diet. They deny any nausea vomiting with meals or abdominal pain. Objective:  Vitals:    10/19/21 1510 10/19/21 1524   BP: (!) 153/90 (!) 168/94   Pulse: 70    Resp: 18    Temp: 97.8 °F (36.6 °C)    TempSrc: Temporal    SpO2: 98%    Weight: 97.5 kg (215 lb)    Height: 5' 11\" (1.803 m)        Physical Exam:    General: Awake and alert, oriented x4, no apparent distress   Abdomen: abdomen is soft with minimal tenderness. No masses, organomegaly or guarding    Current Medications:  Current Outpatient Medications   Medication Sig Dispense Refill    loperamide (Imodium A-D) 2 mg capsule Take 2 mg by mouth four (4) times daily as needed for Diarrhea.  multivitamin (ONE A DAY) tablet Take 1 Tab by mouth daily.  atorvastatin (LIPITOR) 10 mg tablet Take 10 mg by mouth nightly.  oxybutynin (DITROPAN) 5 mg tablet Take 5 mg by mouth daily.  sertraline (ZOLOFT) 50 mg tablet Take 50 mg by mouth nightly.  tiotropium (Spiriva with HandiHaler) 18 mcg inhalation capsule Take 1 Cap by inhalation daily.  albuterol (PROVENTIL HFA, VENTOLIN HFA, PROAIR HFA) 90 mcg/actuation inhaler Take  by inhalation.  aspirin 81 mg chewable tablet Take 81 mg by mouth daily. Chart and notes reviewed. Data reviewed. I have evaluated and examined the patient. IMPRESSION:   · Patient with history of gallstones and gallstone pancreatitis without any evidence of common bile duct stones and recent ERCP. They also deny any postprandial pain nausea vomiting bloating reflux or heartburn.       PLAN:/DISCUSION:   · Continue dietary modifications  · Follow-up if symptoms of postprandial pain in the epigastrium right upper quadrant of the back, heartburn, reflux, bloating or nausea vomiting present        Yaquelin Rosenthal MD

## 2021-10-19 NOTE — PROGRESS NOTES
Jessika Lee is a 67 y.o. male  Chief Complaint   Patient presents with    Follow-up     acute gallstone pancreatitis. 1. Have you been to the ER, urgent care clinic since your last visit? Hospitalized since your last visit? No    2. Have you seen or consulted any other health care providers outside of the [address] [place] [address] since your last visit? Include any pap smears or colon screening. No    Mr. Laura Pedersen has been given the following recommendations today due to his elevated BP reading: referred to Alternative/PCP.

## 2021-10-28 ENCOUNTER — HOSPITAL ENCOUNTER (OUTPATIENT)
Dept: LAB | Age: 76
Discharge: HOME | End: 2021-10-28

## 2021-10-28 LAB — XX-LABCORP SPECIMEN COL,LCBCF: NORMAL

## 2021-10-28 PROCEDURE — 99001 SPECIMEN HANDLING PT-LAB: CPT

## 2022-02-04 ENCOUNTER — HOSPITAL ENCOUNTER (OUTPATIENT)
Dept: LAB | Age: 77
Discharge: HOME | End: 2022-02-04
Payer: MEDICARE

## 2022-02-04 LAB
ALBUMIN SERPL-MCNC: 3.6 G/DL (ref 3.4–5)
ALBUMIN/GLOB SERPL: 0.9 {RATIO} (ref 0.8–1.7)
ALP SERPL-CCNC: 71 U/L (ref 45–117)
ALT SERPL-CCNC: 27 U/L (ref 16–61)
ANION GAP SERPL CALC-SCNC: 5 MMOL/L (ref 3–18)
AST SERPL-CCNC: 23 U/L (ref 10–38)
BASOPHILS # BLD: 0 K/UL (ref 0–0.1)
BASOPHILS NFR BLD: 0 % (ref 0–2)
BILIRUB SERPL-MCNC: 0.5 MG/DL (ref 0.2–1)
BUN SERPL-MCNC: 15 MG/DL (ref 7–18)
BUN/CREAT SERPL: 11 (ref 12–20)
CALCIUM SERPL-MCNC: 10.7 MG/DL (ref 8.5–10.1)
CALCIUM SERPL-MCNC: 10.8 MG/DL (ref 8.5–10.1)
CHLORIDE SERPL-SCNC: 107 MMOL/L (ref 100–111)
CO2 SERPL-SCNC: 27 MMOL/L (ref 21–32)
CREAT SERPL-MCNC: 1.34 MG/DL (ref 0.6–1.3)
CREAT UR-MCNC: 165 MG/DL (ref 30–125)
DIFFERENTIAL METHOD BLD: ABNORMAL
EOSINOPHIL # BLD: 0.1 K/UL (ref 0–0.4)
EOSINOPHIL NFR BLD: 1 % (ref 0–5)
ERYTHROCYTE [DISTWIDTH] IN BLOOD BY AUTOMATED COUNT: 15.1 % (ref 11.6–14.5)
GLOBULIN SER CALC-MCNC: 3.9 G/DL (ref 2–4)
GLUCOSE SERPL-MCNC: 71 MG/DL (ref 74–99)
HCT VFR BLD AUTO: 40.1 % (ref 36–48)
HGB BLD-MCNC: 12.6 G/DL (ref 13–16)
IMM GRANULOCYTES # BLD AUTO: 0.1 K/UL (ref 0–0.04)
IMM GRANULOCYTES NFR BLD AUTO: 1 % (ref 0–0.5)
LYMPHOCYTES # BLD: 1.9 K/UL (ref 0.9–3.6)
LYMPHOCYTES NFR BLD: 16 % (ref 21–52)
MCH RBC QN AUTO: 26.6 PG (ref 24–34)
MCHC RBC AUTO-ENTMCNC: 31.4 G/DL (ref 31–37)
MCV RBC AUTO: 84.6 FL (ref 78–100)
MICROALBUMIN UR-MCNC: 6.67 MG/DL (ref 0–3)
MICROALBUMIN/CREAT UR-RTO: 40 MG/G (ref 0–30)
MONOCYTES # BLD: 1 K/UL (ref 0.05–1.2)
MONOCYTES NFR BLD: 8 % (ref 3–10)
NEUTS SEG # BLD: 8.5 K/UL (ref 1.8–8)
NEUTS SEG NFR BLD: 74 % (ref 40–73)
NRBC # BLD: 0 K/UL (ref 0–0.01)
NRBC BLD-RTO: 0 PER 100 WBC
PHOSPHATE SERPL-MCNC: 2.3 MG/DL (ref 2.5–4.9)
PLATELET # BLD AUTO: 210 K/UL (ref 135–420)
PMV BLD AUTO: 10.4 FL (ref 9.2–11.8)
POTASSIUM SERPL-SCNC: 4.2 MMOL/L (ref 3.5–5.5)
PROT SERPL-MCNC: 7.5 G/DL (ref 6.4–8.2)
PTH-INTACT SERPL-MCNC: 67.2 PG/ML (ref 18.4–88)
RBC # BLD AUTO: 4.74 M/UL (ref 4.35–5.65)
SODIUM SERPL-SCNC: 139 MMOL/L (ref 136–145)
WBC # BLD AUTO: 11.5 K/UL (ref 4.6–13.2)

## 2022-02-04 PROCEDURE — 36415 COLL VENOUS BLD VENIPUNCTURE: CPT

## 2022-02-04 PROCEDURE — 83970 ASSAY OF PARATHORMONE: CPT

## 2022-02-04 PROCEDURE — 84100 ASSAY OF PHOSPHORUS: CPT

## 2022-02-04 PROCEDURE — 85025 COMPLETE CBC W/AUTO DIFF WBC: CPT

## 2022-02-04 PROCEDURE — 83735 ASSAY OF MAGNESIUM: CPT

## 2022-02-04 PROCEDURE — 82043 UR ALBUMIN QUANTITATIVE: CPT

## 2022-02-04 PROCEDURE — 80053 COMPREHEN METABOLIC PANEL: CPT

## 2022-02-05 LAB — MAGNESIUM SERPL-MCNC: 2.1 MG/DL (ref 1.6–2.3)

## 2022-03-18 PROBLEM — Z78.9 STATIN INTOLERANCE: Status: ACTIVE | Noted: 2018-05-07

## 2022-03-18 PROBLEM — E55.9 VITAMIN D DEFICIENCY: Status: ACTIVE | Noted: 2018-05-01

## 2022-03-18 PROBLEM — E87.5 HYPERKALEMIA: Status: ACTIVE | Noted: 2018-05-14

## 2022-03-18 PROBLEM — R11.2 NAUSEA AND VOMITING: Status: ACTIVE | Noted: 2020-12-04

## 2022-03-18 PROBLEM — N99.89 POSTOPERATIVE URINARY RETENTION: Status: ACTIVE | Noted: 2018-04-22

## 2022-03-18 PROBLEM — R33.8 POSTOPERATIVE URINARY RETENTION: Status: ACTIVE | Noted: 2018-04-22

## 2022-03-18 PROBLEM — K35.33 ACUTE APPENDICITIS WITH PERITONEAL ABSCESS: Status: ACTIVE | Noted: 2018-04-20

## 2022-03-18 PROBLEM — R74.8 ELEVATED LIVER ENZYMES: Status: ACTIVE | Noted: 2020-12-04

## 2022-03-18 PROBLEM — K56.609 SBO (SMALL BOWEL OBSTRUCTION) (HCC): Status: ACTIVE | Noted: 2018-08-02

## 2022-03-19 PROBLEM — N18.30 ACUTE RENAL FAILURE SUPERIMPOSED ON STAGE 3 CHRONIC KIDNEY DISEASE (HCC): Status: ACTIVE | Noted: 2018-05-07

## 2022-03-19 PROBLEM — F32.A DEPRESSION: Status: ACTIVE | Noted: 2018-05-08

## 2022-03-19 PROBLEM — R53.1 GENERALIZED WEAKNESS: Status: ACTIVE | Noted: 2018-04-20

## 2022-03-19 PROBLEM — R41.0 POSTOPERATIVE CONFUSION: Status: ACTIVE | Noted: 2018-04-22

## 2022-03-19 PROBLEM — K56.609 SMALL BOWEL OBSTRUCTION (HCC): Status: ACTIVE | Noted: 2018-08-02

## 2022-03-19 PROBLEM — D62 ACUTE BLOOD LOSS AS CAUSE OF POSTOPERATIVE ANEMIA: Status: ACTIVE | Noted: 2018-04-21

## 2022-03-19 PROBLEM — Z90.49 STATUS POST APPENDECTOMY: Status: ACTIVE | Noted: 2018-04-21

## 2022-03-19 PROBLEM — K85.90 ACUTE PANCREATITIS: Status: ACTIVE | Noted: 2021-09-28

## 2022-03-19 PROBLEM — I10 HYPERTENSION: Status: ACTIVE | Noted: 2021-10-03

## 2022-03-19 PROBLEM — N17.9 ACUTE RENAL FAILURE SUPERIMPOSED ON STAGE 3 CHRONIC KIDNEY DISEASE (HCC): Status: ACTIVE | Noted: 2018-05-07

## 2022-03-20 PROBLEM — Z74.09 IMPAIRED MOBILITY AND ADLS: Status: ACTIVE | Noted: 2018-04-20

## 2022-03-20 PROBLEM — I97.89 POSTOPERATIVE ATRIAL FIBRILLATION (HCC): Status: ACTIVE | Noted: 2018-04-21

## 2022-03-20 PROBLEM — E83.52 HYPERCALCEMIA: Status: ACTIVE | Noted: 2021-10-03

## 2022-03-20 PROBLEM — K85.90 PANCREATITIS, ACUTE: Status: ACTIVE | Noted: 2020-12-04

## 2022-03-20 PROBLEM — Z78.9 IMPAIRED MOBILITY AND ADLS: Status: ACTIVE | Noted: 2018-04-20

## 2022-03-20 PROBLEM — K85.10 GALL STONE PANCREATITIS: Status: ACTIVE | Noted: 2020-12-04

## 2022-03-20 PROBLEM — I48.91 POSTOPERATIVE ATRIAL FIBRILLATION (HCC): Status: ACTIVE | Noted: 2018-04-21

## 2022-06-21 ENCOUNTER — HOSPITAL ENCOUNTER (OUTPATIENT)
Dept: LAB | Age: 77
Discharge: HOME | End: 2022-06-21

## 2022-06-21 LAB — XX-LABCORP SPECIMEN COL,LCBCF: NORMAL

## 2022-06-21 PROCEDURE — 99001 SPECIMEN HANDLING PT-LAB: CPT

## 2022-06-24 ENCOUNTER — TRANSCRIBE ORDER (OUTPATIENT)
Dept: SCHEDULING | Age: 77
End: 2022-06-24

## 2022-06-24 DIAGNOSIS — I77.810 AORTIC ROOT DILATION (HCC): Primary | ICD-10-CM

## 2022-06-27 ENCOUNTER — TRANSCRIBE ORDER (OUTPATIENT)
Dept: SCHEDULING | Age: 77
End: 2022-06-27

## 2022-06-27 DIAGNOSIS — I77.810 AORTIC ROOT DILATATION (HCC): Primary | ICD-10-CM

## 2022-07-01 ENCOUNTER — TRANSCRIBE ORDER (OUTPATIENT)
Dept: SCHEDULING | Age: 77
End: 2022-07-01

## 2022-07-01 DIAGNOSIS — R60.0 BILATERAL LOWER EXTREMITY EDEMA: ICD-10-CM

## 2022-07-01 DIAGNOSIS — I77.810 AORTIC ROOT DILATATION (HCC): Primary | ICD-10-CM

## 2022-09-26 ENCOUNTER — HOSPITAL ENCOUNTER (OUTPATIENT)
Dept: LAB | Age: 77
Discharge: HOME | End: 2022-09-26

## 2022-09-26 LAB — XX-LABCORP SPECIMEN COL,LCBCF: NORMAL

## 2022-09-26 PROCEDURE — 99001 SPECIMEN HANDLING PT-LAB: CPT

## 2022-10-23 ENCOUNTER — TRANSCRIBE ORDER (OUTPATIENT)
Dept: SCHEDULING | Age: 77
End: 2022-10-23

## 2022-10-23 DIAGNOSIS — Z87.891 PERSONAL HISTORY OF TOBACCO USE, PRESENTING HAZARDS TO HEALTH: Primary | ICD-10-CM

## 2022-11-16 ENCOUNTER — HOSPITAL ENCOUNTER (OUTPATIENT)
Dept: CT IMAGING | Age: 77
Discharge: HOME | End: 2022-11-16
Attending: FAMILY MEDICINE
Payer: MEDICARE

## 2022-11-16 VITALS — WEIGHT: 217 LBS | HEIGHT: 71 IN | BODY MASS INDEX: 30.38 KG/M2

## 2022-11-16 DIAGNOSIS — Z87.891 PERSONAL HISTORY OF TOBACCO USE, PRESENTING HAZARDS TO HEALTH: ICD-10-CM

## 2022-11-16 PROCEDURE — 71271 CT THORAX LUNG CANCER SCR C-: CPT

## 2022-12-30 ENCOUNTER — HOSPITAL ENCOUNTER (OUTPATIENT)
Dept: LAB | Age: 77
Discharge: HOME | End: 2022-12-30

## 2022-12-30 LAB — XX-LABCORP SPECIMEN COL,LCBCF: NORMAL

## 2022-12-30 PROCEDURE — 99001 SPECIMEN HANDLING PT-LAB: CPT

## 2023-01-02 NOTE — ANESTHESIA POSTPROCEDURE EVALUATION
Procedure(s):  COLONOSCOPY with Polypectomy.     MAC    Anesthesia Post Evaluation      Multimodal analgesia: multimodal analgesia used between 6 hours prior to anesthesia start to PACU discharge  Patient location during evaluation: PACU  Patient participation: complete - patient participated  Level of consciousness: awake and alert  Pain management: adequate  Airway patency: patent  Anesthetic complications: no  Cardiovascular status: acceptable  Respiratory status: acceptable  Hydration status: acceptable  Post anesthesia nausea and vomiting:  controlled  Final Post Anesthesia Temperature Assessment:  Normothermia (36.0-37.5 degrees C)      INITIAL Post-op Vital signs:   Vitals Value Taken Time   /72 04/19/21 1406   Temp 36.6 °C (97.9 °F) 04/19/21 1406   Pulse 52 04/19/21 1406   Resp 19 04/19/21 1406   SpO2 99 % 04/19/21 1406 negative...

## 2023-01-28 ENCOUNTER — TRANSCRIBE ORDERS (OUTPATIENT)
Facility: HOSPITAL | Age: 78
End: 2023-01-28

## 2023-01-28 DIAGNOSIS — I77.810 AORTIC ROOT DILATATION (HCC): Primary | ICD-10-CM

## 2023-01-28 DIAGNOSIS — R60.0 LOWER EXTREMITY EDEMA: ICD-10-CM

## 2023-02-21 ENCOUNTER — HOSPITAL ENCOUNTER (OUTPATIENT)
Facility: HOSPITAL | Age: 78
Discharge: HOME | End: 2023-02-23
Payer: MEDICARE

## 2023-02-21 VITALS
SYSTOLIC BLOOD PRESSURE: 168 MMHG | BODY MASS INDEX: 30.38 KG/M2 | HEIGHT: 71 IN | WEIGHT: 217 LBS | DIASTOLIC BLOOD PRESSURE: 94 MMHG

## 2023-02-21 DIAGNOSIS — I77.810 AORTIC ROOT DILATATION (HCC): ICD-10-CM

## 2023-02-21 DIAGNOSIS — R60.0 LOWER EXTREMITY EDEMA: ICD-10-CM

## 2023-02-21 LAB
ECHO AO ASC DIAM: 4 CM
ECHO AO ASCENDING AORTA INDEX: 1.83 CM/M2
ECHO AO ROOT DIAM: 4.1 CM
ECHO AO ROOT INDEX: 1.88 CM/M2
ECHO BSA: 2.22 M2
ECHO LA VOL 2C: 46 ML (ref 18–58)
ECHO LA VOL 4C: 32 ML (ref 18–58)
ECHO LA VOL BP: 39 ML (ref 18–58)
ECHO LA VOL/BSA BIPLANE: 18 ML/M2 (ref 16–34)
ECHO LA VOLUME AREA LENGTH: 43 ML
ECHO LA VOLUME INDEX A2C: 21 ML/M2 (ref 16–34)
ECHO LA VOLUME INDEX A4C: 15 ML/M2 (ref 16–34)
ECHO LA VOLUME INDEX AREA LENGTH: 20 ML/M2 (ref 16–34)
ECHO LV E' LATERAL VELOCITY: 9 CM/S
ECHO LV E' SEPTAL VELOCITY: 7 CM/S
ECHO LV FRACTIONAL SHORTENING: 32 % (ref 28–44)
ECHO LV INTERNAL DIMENSION DIASTOLE INDEX: 1.88 CM/M2
ECHO LV INTERNAL DIMENSION DIASTOLIC: 4.1 CM (ref 4.2–5.9)
ECHO LV INTERNAL DIMENSION SYSTOLIC INDEX: 1.28 CM/M2
ECHO LV INTERNAL DIMENSION SYSTOLIC: 2.8 CM
ECHO LV IVSD: 1.5 CM (ref 0.6–1)
ECHO LV MASS 2D: 204.9 G (ref 88–224)
ECHO LV MASS INDEX 2D: 94 G/M2 (ref 49–115)
ECHO LV POSTERIOR WALL DIASTOLIC: 1.2 CM (ref 0.6–1)
ECHO LV RELATIVE WALL THICKNESS RATIO: 0.59
ECHO LVOT AREA: 3.1 CM2
ECHO LVOT DIAM: 2 CM
ECHO LVOT MEAN GRADIENT: 4 MMHG
ECHO LVOT PEAK GRADIENT: 9 MMHG
ECHO LVOT PEAK VELOCITY: 1.5 M/S
ECHO LVOT STROKE VOLUME INDEX: 38.9 ML/M2
ECHO LVOT SV: 84.8 ML
ECHO LVOT VTI: 27 CM
ECHO MV A VELOCITY: 1.3 M/S
ECHO MV AREA VTI: 2.3 CM2
ECHO MV E DECELERATION TIME (DT): 370.3 MS
ECHO MV E VELOCITY: 0.66 M/S
ECHO MV E/A RATIO: 0.51
ECHO MV E/E' LATERAL: 7.33
ECHO MV E/E' RATIO (AVERAGED): 8.38
ECHO MV E/E' SEPTAL: 9.43
ECHO MV LVOT VTI INDEX: 1.35
ECHO MV MAX VELOCITY: 1.4 M/S
ECHO MV MEAN GRADIENT: 3 MMHG
ECHO MV MEAN VELOCITY: 0.8 M/S
ECHO MV PEAK GRADIENT: 8 MMHG
ECHO MV VTI: 36.5 CM
ECHO RV FREE WALL PEAK S': 16 CM/S
ECHO RV MID DIMENSION: 3.9 CM
ECHO RV TAPSE: 2 CM (ref 1.7–?)
LV EF: 63 %
LVEF MODALITY: ABNORMAL

## 2023-02-21 PROCEDURE — 93306 TTE W/DOPPLER COMPLETE: CPT

## 2023-02-21 PROCEDURE — 93306 TTE W/DOPPLER COMPLETE: CPT | Performed by: INTERNAL MEDICINE

## 2023-04-04 ENCOUNTER — HOSPITAL ENCOUNTER (OUTPATIENT)
Facility: HOSPITAL | Age: 78
Discharge: HOME | End: 2023-04-07

## 2023-04-04 LAB — LABCORP SPECIMEN COLLECTION: NORMAL

## 2023-04-04 PROCEDURE — 99001 SPECIMEN HANDLING PT-LAB: CPT

## 2023-07-18 ENCOUNTER — OFFICE VISIT (OUTPATIENT)
Age: 78
End: 2023-07-18
Payer: MEDICARE

## 2023-07-18 VITALS
RESPIRATION RATE: 14 BRPM | WEIGHT: 213.2 LBS | TEMPERATURE: 97.8 F | SYSTOLIC BLOOD PRESSURE: 121 MMHG | BODY MASS INDEX: 29.85 KG/M2 | HEART RATE: 71 BPM | OXYGEN SATURATION: 97 % | DIASTOLIC BLOOD PRESSURE: 73 MMHG | HEIGHT: 71 IN

## 2023-07-18 DIAGNOSIS — Z71.89 ADVANCED CARE PLANNING/COUNSELING DISCUSSION: ICD-10-CM

## 2023-07-18 DIAGNOSIS — Z00.00 MEDICARE ANNUAL WELLNESS VISIT, SUBSEQUENT: Primary | ICD-10-CM

## 2023-07-18 DIAGNOSIS — E78.2 MIXED HYPERLIPIDEMIA: ICD-10-CM

## 2023-07-18 DIAGNOSIS — Z23 NEED FOR PROPHYLACTIC VACCINATION AND INOCULATION AGAINST VARICELLA: ICD-10-CM

## 2023-07-18 PROCEDURE — 3078F DIAST BP <80 MM HG: CPT | Performed by: STUDENT IN AN ORGANIZED HEALTH CARE EDUCATION/TRAINING PROGRAM

## 2023-07-18 PROCEDURE — 3074F SYST BP LT 130 MM HG: CPT | Performed by: STUDENT IN AN ORGANIZED HEALTH CARE EDUCATION/TRAINING PROGRAM

## 2023-07-18 PROCEDURE — G0439 PPPS, SUBSEQ VISIT: HCPCS | Performed by: STUDENT IN AN ORGANIZED HEALTH CARE EDUCATION/TRAINING PROGRAM

## 2023-07-18 PROCEDURE — 1124F ACP DISCUSS-NO DSCNMKR DOCD: CPT | Performed by: STUDENT IN AN ORGANIZED HEALTH CARE EDUCATION/TRAINING PROGRAM

## 2023-07-18 RX ORDER — AMLODIPINE BESYLATE 10 MG/1
10 TABLET ORAL DAILY
COMMUNITY
Start: 2023-04-19

## 2023-07-18 RX ORDER — FUROSEMIDE 20 MG/1
20 TABLET ORAL DAILY
COMMUNITY
Start: 2023-06-09

## 2023-07-18 SDOH — ECONOMIC STABILITY: HOUSING INSECURITY
IN THE LAST 12 MONTHS, WAS THERE A TIME WHEN YOU DID NOT HAVE A STEADY PLACE TO SLEEP OR SLEPT IN A SHELTER (INCLUDING NOW)?: NO

## 2023-07-18 SDOH — ECONOMIC STABILITY: FOOD INSECURITY: WITHIN THE PAST 12 MONTHS, YOU WORRIED THAT YOUR FOOD WOULD RUN OUT BEFORE YOU GOT MONEY TO BUY MORE.: NEVER TRUE

## 2023-07-18 SDOH — ECONOMIC STABILITY: INCOME INSECURITY: HOW HARD IS IT FOR YOU TO PAY FOR THE VERY BASICS LIKE FOOD, HOUSING, MEDICAL CARE, AND HEATING?: NOT HARD AT ALL

## 2023-07-18 SDOH — ECONOMIC STABILITY: FOOD INSECURITY: WITHIN THE PAST 12 MONTHS, THE FOOD YOU BOUGHT JUST DIDN'T LAST AND YOU DIDN'T HAVE MONEY TO GET MORE.: NEVER TRUE

## 2023-07-18 ASSESSMENT — PATIENT HEALTH QUESTIONNAIRE - PHQ9
2. FEELING DOWN, DEPRESSED OR HOPELESS: 2
3. TROUBLE FALLING OR STAYING ASLEEP: 2
SUM OF ALL RESPONSES TO PHQ9 QUESTIONS 1 & 2: 2
10. IF YOU CHECKED OFF ANY PROBLEMS, HOW DIFFICULT HAVE THESE PROBLEMS MADE IT FOR YOU TO DO YOUR WORK, TAKE CARE OF THINGS AT HOME, OR GET ALONG WITH OTHER PEOPLE: 0
6. FEELING BAD ABOUT YOURSELF - OR THAT YOU ARE A FAILURE OR HAVE LET YOURSELF OR YOUR FAMILY DOWN: 0
9. THOUGHTS THAT YOU WOULD BE BETTER OFF DEAD, OR OF HURTING YOURSELF: 0
SUM OF ALL RESPONSES TO PHQ QUESTIONS 1-9: 7
5. POOR APPETITE OR OVEREATING: 0
4. FEELING TIRED OR HAVING LITTLE ENERGY: 3
SUM OF ALL RESPONSES TO PHQ QUESTIONS 1-9: 7
SUM OF ALL RESPONSES TO PHQ QUESTIONS 1-9: 7
8. MOVING OR SPEAKING SO SLOWLY THAT OTHER PEOPLE COULD HAVE NOTICED. OR THE OPPOSITE, BEING SO FIGETY OR RESTLESS THAT YOU HAVE BEEN MOVING AROUND A LOT MORE THAN USUAL: 0
7. TROUBLE CONCENTRATING ON THINGS, SUCH AS READING THE NEWSPAPER OR WATCHING TELEVISION: 0
SUM OF ALL RESPONSES TO PHQ QUESTIONS 1-9: 7
1. LITTLE INTEREST OR PLEASURE IN DOING THINGS: 0

## 2023-07-18 ASSESSMENT — LIFESTYLE VARIABLES
HOW MANY STANDARD DRINKS CONTAINING ALCOHOL DO YOU HAVE ON A TYPICAL DAY: PATIENT DOES NOT DRINK
HOW OFTEN DO YOU HAVE A DRINK CONTAINING ALCOHOL: NEVER

## 2023-07-18 NOTE — PATIENT INSTRUCTIONS
hours or after sweating, drying off with a towel, or swimming. Always wear a seat belt when traveling in a car. Always wear a helmet when riding a bicycle or motorcycle.

## 2023-07-18 NOTE — PROGRESS NOTES
1. \"Have you been to the ER, urgent care clinic since your last visit? Hospitalized since your last visit? \" No    2. \"Have you seen or consulted any other health care providers outside of the [address] [place] [address] since your last visit? \" No     3. For patients aged 43-73: Has the patient had a colonoscopy / FIT/ Cologuard?  NA - based on age

## 2023-07-19 ENCOUNTER — CLINICAL DOCUMENTATION (OUTPATIENT)
Dept: SPIRITUAL SERVICES | Age: 78
End: 2023-07-19

## 2023-07-19 NOTE — PROGRESS NOTES
Advance Care Planning   Ambulatory ACP Specialist Patient Outreach    Date:  7/19/2023    ACP Specialist:  Jana Lozano    Outreach call to patient in follow-up to ACP Specialist referral from:Henry Rondall Riedel, DO    [x] PCP  [] Provider   [] Ambulatory Care Management [] Other     For:                  [x] Advance Directive Assistance              [] Complete Portable DNR order              [] Complete POST/POLST/MOST              [] Code Status Discussion             [] Discuss Goals of Care             [] Early ACP Decision-Making              [] Other (Specify)    Date Referral Received:7/18/23    Next Step:   [] ACP scheduled conversation  [x] Outreach again in one week               [] Email / Mail [address]  [] Email / Mail Advance Directive   [] Closing referral.  Routing closure to referring provider/staff and to ACP Specialist . [] Closure letter mailed to patient with invitation to contact ACP Specialist if / when ready. [] Other (Specify here):       [x] At this time, Healthcare Decision Maker Is: Advance Care Planning   Healthcare Decision Maker:    Primary Decision Maker: Jocelyn Morocho - Other - [phone]        [] Primary agent named in scanned advance directive. [] Legal Next of Kin. [x] Unable to determine legal decision maker at this time. Outreaches:         [x] 1st -  Date:  7/19/23               Intervention:  [] Spoke with Patient   [] Left Voice mail [] Email / Mail    [] CareLuLuhart  [x] Other (Specify) : No Voicemail Box    Outcomes: Outreach phone call to the patient. Unable to reach. Received recording, the person you have called does not have a voicemail box set up yet. Will attempt to follow up in one week. [] 2nd -  Date:                 Intervention:  [] Spoke with Patient  [] Left Voice mail [] Email / Mail    [] CareLuLuhart  [] Other [phone]) :               Outcomes:                [] 3rd -  Date:

## 2023-07-20 ENCOUNTER — HOSPITAL ENCOUNTER (OUTPATIENT)
Facility: HOSPITAL | Age: 78
Discharge: HOME | End: 2023-07-23

## 2023-07-20 LAB — LABCORP SPECIMEN COLLECTION: NORMAL

## 2023-07-26 ENCOUNTER — CLINICAL DOCUMENTATION (OUTPATIENT)
Dept: SPIRITUAL SERVICES | Age: 78
End: 2023-07-26

## 2023-08-02 ENCOUNTER — CLINICAL DOCUMENTATION (OUTPATIENT)
Dept: SPIRITUAL SERVICES | Age: 78
End: 2023-08-02

## 2023-08-23 ENCOUNTER — CLINICAL DOCUMENTATION (OUTPATIENT)
Dept: CASE MANAGEMENT | Age: 78
End: 2023-08-23

## 2023-08-23 NOTE — ACP (ADVANCE CARE PLANNING)
Advance Care Planning     Advance Care Planning Clinical Specialist  Conversation Note      Date of ACP Conversation: 8/23/2023    [facility] Conducted with: Patient with [address]: Next of Kin by law (only applies in absence of above) (name) SisterMary    ACP Clinical Specialist: Milly Chapman LCSW    Healthcare Decision Maker:     Current Designated Healthcare Decision Maker:     Primary Decision Maker: Lissy Common - Child    Secondary Decision Maker: Mary Duggan - Brother/Sister - [phone]    Care Preferences    Ventilation: \"If you were in your present state of health and suddenly became very ill and were unable to breathe on your own, what would your preference be about the use of a ventilator (breathing machine) if it were available to you? \"      If the patient would desire the use of ventilator (breathing machine), answer \"yes\". If not, \"no\": yes    \"If your health worsens and it becomes clear that your chance of recovery is unlikely, what would your preference be about the use of a ventilator (breathing machine) if it were available to you? \"     Would the patient desire the use of ventilator (breathing machine)?: Yes      Resuscitation  \"CPR works best to restart the heart when there is a sudden event, like a heart attack, in someone who is otherwise healthy. Unfortunately, CPR does not typically restart the heart for people who have serious health conditions or who are very sick. \"    \"In the event your heart stopped as a result of an underlying serious health condition, would you want attempts to be made to restart your heart (answer \"yes\" for attempt to resuscitate) or would you prefer a natural death (answer \"no\" for do not attempt to resuscitate)? \" yes       [x] Yes   [] No   Educated Patient / Elradamaso Shandra regarding differences between Advance Directives and portable DNR orders.     Length of ACP Conversation in minutes:

## 2023-08-31 ENCOUNTER — TELEPHONE (OUTPATIENT)
Age: 78
End: 2023-08-31

## 2023-08-31 NOTE — TELEPHONE ENCOUNTER
Myriam called to find out about inconstant supplies for the patient. She stated that when he came to his appointment on 7/18 that they brought in documents for his supplies. I didn't see anything scanned in so I asked myriam to let me know what the patient needs. She stated that he needs Boost and depends. I informed her that the information was being passed to the nurse lead for completion. After farther research. The patient has no diagnosis for the needed supplies. I tried calling Myriam back but the number was for Lou. I Left her a message to give the office a call back and if we did not speak before then that his needs would be addressed at his next appt.

## 2023-09-12 ENCOUNTER — CLINICAL DOCUMENTATION (OUTPATIENT)
Dept: CASE MANAGEMENT | Age: 78
End: 2023-09-12

## 2023-09-12 NOTE — ACP (ADVANCE CARE PLANNING)
Advance Care Planning   Ambulatory ACP Specialist Patient Outreach    Date:  9/12/2023    ACP Specialist:  Ana Angel LCSW    Outreach call to patient in follow-up to ACP Specialist referral from:Milind Ellis DO       [x] PCP  [] Provider   [] Ambulatory Care Management [] Other      For:                  [x] Advance Directive Assistance              [] Complete Portable DNR order              [] Complete POST/POLST/MOST              [] Code Status Discussion             [] Discuss Goals of Care             [] Early ACP Decision-Making              [] Other (Specify)     Date Referral Received:7/18/23    Next Step:   [] ACP scheduled conversation  [] Outreach again in one week               [] Email / Mail [address]  [] Email / Mail Advance Directive   [x] Closing referral.  Routing closure to referring provider/staff and to ACP Specialist . [] Closure letter mailed to patient with invitation to contact ACP Specialist if / when ready. [] Other (Specify here):         [x] At this time, Healthcare Decision Maker Is:        [] Primary agent named in scanned advance directive. [x] Legal Next of Kin. [] Unable to determine legal decision maker at this time. Outreaches:    [x]  Additional Outreach -  Date:  9/12/2023   (Specify Dates & special circumstances): Spoke with patient's sister, Sandy Floyd    Outcomes: ACP Specialist made a telephone call to patient as a follow up to ensure the mailed [facility] Directive for [address] document had been received. Pt's sister, Sandy Floyd, answered call and confirmed that the document was received and had been mailed back last week. Once document is received, it will be uploaded into patient's medical record. This referral will be closed. Thank you for this referral.     Josephine Boudreaux.  RAMIREZ Weiner  Advance Care   [address]  [phone]

## 2023-09-18 ENCOUNTER — HOSPITAL ENCOUNTER (OUTPATIENT)
Facility: HOSPITAL | Age: 78
Discharge: HOME | End: 2023-09-21

## 2023-09-18 LAB — LABCORP SPECIMEN COLLECTION: NORMAL

## 2023-09-22 ENCOUNTER — OFFICE VISIT (OUTPATIENT)
Age: 78
End: 2023-09-22

## 2023-09-22 VITALS
WEIGHT: 211.8 LBS | DIASTOLIC BLOOD PRESSURE: 80 MMHG | SYSTOLIC BLOOD PRESSURE: 135 MMHG | OXYGEN SATURATION: 98 % | HEART RATE: 69 BPM | TEMPERATURE: 97.3 F | RESPIRATION RATE: 12 BRPM | BODY MASS INDEX: 29.65 KG/M2 | HEIGHT: 71 IN

## 2023-09-22 DIAGNOSIS — I69.359 HEMIPARESIS AS LATE EFFECT OF CEREBROVASCULAR ACCIDENT (CVA) (HCC): ICD-10-CM

## 2023-09-22 DIAGNOSIS — R53.1 GENERALIZED WEAKNESS: ICD-10-CM

## 2023-09-22 DIAGNOSIS — I10 PRIMARY HYPERTENSION: Primary | ICD-10-CM

## 2023-09-22 DIAGNOSIS — F32.A DEPRESSION, UNSPECIFIED DEPRESSION TYPE: ICD-10-CM

## 2023-09-22 DIAGNOSIS — E78.2 MIXED HYPERLIPIDEMIA: ICD-10-CM

## 2023-09-22 ASSESSMENT — ENCOUNTER SYMPTOMS
RHINORRHEA: 0
PHOTOPHOBIA: 0
ABDOMINAL PAIN: 0
SHORTNESS OF BREATH: 0
CONSTIPATION: 0
BACK PAIN: 1
COUGH: 0
SORE THROAT: 0
VOMITING: 0
DIARRHEA: 0
TROUBLE SWALLOWING: 0
WHEEZING: 0
NAUSEA: 0
EYE ITCHING: 0
EYE DISCHARGE: 0
VOICE CHANGE: 0

## 2023-09-22 NOTE — PROGRESS NOTES
1. \"Have you been to the ER, urgent care clinic since your last visit? Hospitalized since your last visit? \" No    2. \"Have you seen or consulted any other health care providers outside of the [address] [place] [address] since your last visit? \" No     3. For patients aged 43-73: Has the patient had a colonoscopy / FIT/ Cologuard?  NA - based on age

## 2023-10-09 NOTE — PROGRESS NOTES
NUTRITION    Nutrition Screen      RECOMMENDATIONS / PLAN:     - Monitor po intake and diet tolerance. - Recommend advancing diet as tolerated. - Consider SLP consult to determine appropriate diet consistency. - Continue IV fluid as medically appropriate.   - Continue RD inpatient monitoring and evaluation. NUTRITION INTERVENTIONS & DIAGNOSIS:     [x] Meals/snacks: modified composition  [x] Medical food supplement therapy: Ensure Enlive TID  [x] IV fluid: LR at 125 mL/hr   [x] Collaboration and referral of nutrition care: discussed consideration for SLP consult and swallow evaluation with MD JENN to order if appropriate    Nutrition Diagnosis: Unintended weight loss related to decreased appetite, inadequate energy intake as evidenced by 8 lb, 4% weight loss x 2 weeks PTA. Inadequate energy intake related to abdominal pain, diet intolerance due to altered GI function as evidenced by poor meal intake x 9 days PTA, NPO x 5 days from admission, started on clears. Patient meets criteria for Severe Protein Calorie Malnutrition as evidenced by:   ASPEN Malnutrition Criteria  Acute Illness, Chronic Illness, or Social/Enviornmental: Acute illness  Energy Intake: Less than/equal to 50% est energy req for greater than/equal to 5 days  Weight Loss: Greater than 1-2% x 1 wk  ASPEN Malnutrition Score - Acute Illness: 12  Acute Illness - Malnutrition Diagnosis: Severe malnutrition. ASSESSMENT:     S/p appendectomy and abscess drainage on 4/21/18. Some small bowel distension suggestive of postoperative ileus per CT 4/24. Pt NPO x 5 days and started on clear liquids with Ensure Enlive supplements today, no meal intake yet. Pt c/o abdominal pain but improved today, denies nausea/vomiting and +BM today and multiple BMs yesterday. Not hungry.      Average po intake adequate to meet patients estimated nutritional needs:   [] Yes     [x] No   [] Unable to determine at this time    Diet: [address] Allergies: NKFA  Current Appetite:   [] Good     [] Fair     [x] Poor     [] Other:  Appetite/meal intake prior to admission:   [] Good     [] Fair     [x] Poor x 9 days PTA per MD note, fair appetite prior to that reported by patient    [] Other:  Feeding Limitations:  [x] Swallowing difficulty: hx of stroke and dysphagia, followed by SLP in the past    [] Chewing difficulty    [] Other:  Current Meal Intake: Patient Vitals for the past 100 hrs:   % Diet Eaten   04/21/18 1706 0 %   04/21/18 1330 25 %   04/21/18 0924 25 %   04/21/18 0921 10 %     BM: 4/25   Skin Integrity: abdominal surgical incision; abdominal STEVEN drain   Edema: trace LEs  Pertinent Medications: Reviewed: LR at 125 mL/hr    Recent Labs      04/25/18   0525  04/24/18   0320  04/23/18   0840   NA  140  142  143   K  3.9  4.0  4.3   CL  106  109*  113*   CO2  25  26  22   GLU  89  81  90   BUN  16  20*  26*   CREA  1.16  1.22  1.39*   CA  8.8  9.3  9.2       Intake/Output Summary (Last 24 hours) at 04/25/18 1248  Last data filed at 04/25/18 1153   Gross per 24 hour   Intake             1892 ml   Output             4495 ml   Net            -2603 ml       Anthropometrics:  Ht Readings from Last 1 Encounters:   04/02/18 6' 2\" (1.88 m)     Last 3 Recorded Weights in this Encounter    04/22/18 0650 04/23/18 0400 04/25/18 0400   Weight: 102 kg (224 lb 12.8 oz) 105.2 kg (232 lb) 105 kg (231 lb 6.4 oz)     Body mass index is 29.71 kg/(m^2).           Weight History: patient reports recent 8 lb (4%) weight loss x 2 weeks PTA    Weight Metrics 4/25/2018 4/3/2018 3/29/2018 5/25/2016 9/8/2015 9/5/2015   Weight 231 lb 6.4 oz 226 lb 10.1 oz - 228 lb 235 lb 240 lb   BMI 29.71 kg/m2 29.1 kg/m2 - 31.81 kg/m2 32.79 kg/m2 33.49 kg/m2        Admitting Diagnosis: Ruptured appendicitis  Intra-abdominal abscess (HCC)  Acute UTI  Nausea  Abdominal pain  ACUTE APPENDICITIS  Appendicitis with abscess  Pertinent PMHx: HTN, CVA, CKD stage III, dyslipidemia    Education Needs: [x] None identified  [] Identified - Not appropriate at this time  []  Identified and addressed - refer to education log  Learning Limitations:   [] None identified  [x] Identified: intermittent agitation, able to answer questions on 4/25/18     Cultural, Druze & ethnic food preferences:  [x] None identified    [] Identified and addressed     ESTIMATED NUTRITION NEEDS:     Calories: 1870-3201 kcal (25-35 kcal/kg) based on  [] Actual BW      [x] SBW 77 kg  Protein: 62-92 gm (0.8-1.2 gm/kg) based on  [] Actual BW      [x] SBW   Fluid: 1 mL/kcal     MONITORING & EVALUATION:     Nutrition Goal(s):   1. Po intake of meals will meet >75% of patient estimated nutritional needs within the next 7 days.   Outcome:  [] Met/Ongoing    []  Not Met    [x] New/Initial Goal     Monitoring:   [x] Food and beverage intake   [x] Diet order   [x] Nutrition-focused physical findings   [x] Treatment/therapy   [] Weight   [] Enteral nutrition intake        Previous Recommendations (for follow-up assessments only):     []   Implemented       []   Not Implemented (RD to address)      [] No Longer Appropriate     [] No Recommendation Made     Discharge Planning: cardiac diet as tolerated   [x] Participated in care planning, discharge planning, & interdisciplinary rounds as appropriate      Shahbaz Josue, [address], [address] [place] [address]    Pager: [phone] Plan: rec spf 30 daily with zinc oxide or titanium dioxide >5% Detail Level: Zone Plan: start/cont spf 30+ daily Otc Regimen: Compound W

## 2023-10-18 ENCOUNTER — HOSPITAL ENCOUNTER (OUTPATIENT)
Facility: HOSPITAL | Age: 78
Discharge: HOME | End: 2023-10-21

## 2023-10-18 LAB — LABCORP SPECIMEN COLLECTION: NORMAL

## 2023-10-19 LAB
CHOLEST SERPL-MCNC: 126 MG/DL (ref 100–199)
HDLC SERPL-MCNC: 40 MG/DL
LDLC SERPL CALC-MCNC: 66 MG/DL (ref 0–99)
SPECIMEN STATUS REPORT: NORMAL
TRIGL SERPL-MCNC: 109 MG/DL (ref 0–149)
VLDLC SERPL CALC-MCNC: 20 MG/DL (ref 5–40)

## 2023-11-14 NOTE — TELEPHONE ENCOUNTER
Last appointment: 09/22/2023    Next appointment: 01/02/2024    Patient requesting refill for     amLODIPine (NORVASC) 10 MG tablet [[id number]]       Pharmacy   [address] [facility]   [address], [address]

## 2023-11-16 RX ORDER — AMLODIPINE BESYLATE 10 MG/1
10 TABLET ORAL DAILY
Qty: 90 TABLET | Refills: 1 | Status: SHIPPED | OUTPATIENT
Start: 2023-11-16

## 2024-01-23 ENCOUNTER — HOSPITAL ENCOUNTER (OUTPATIENT)
Facility: HOSPITAL | Age: 79
Discharge: HOME | End: 2024-01-26
Payer: MEDICARE

## 2024-01-23 LAB
25(OH)D3 SERPL-MCNC: 40.8 NG/ML (ref 30–100)
ALBUMIN SERPL-MCNC: 3.9 G/DL (ref 3.4–5)
ALBUMIN/GLOB SERPL: 1.2 (ref 0.8–1.7)
ALP SERPL-CCNC: 88 U/L (ref 45–117)
ALT SERPL-CCNC: 70 U/L (ref 16–61)
ANION GAP SERPL CALC-SCNC: 3 MMOL/L (ref 3–18)
AST SERPL-CCNC: 36 U/L (ref 10–38)
BASOPHILS # BLD: 0 K/UL (ref 0–0.1)
BASOPHILS NFR BLD: 1 % (ref 0–2)
BILIRUB SERPL-MCNC: 0.6 MG/DL (ref 0.2–1)
BUN SERPL-MCNC: 13 MG/DL (ref 7–18)
BUN/CREAT SERPL: 9 (ref 12–20)
CALCIUM SERPL-MCNC: 10 MG/DL (ref 8.5–10.1)
CALCIUM SERPL-MCNC: 10.4 MG/DL (ref 8.5–10.1)
CHLORIDE SERPL-SCNC: 105 MMOL/L (ref 100–111)
CHOLEST SERPL-MCNC: 137 MG/DL
CK SERPL-CCNC: 200 U/L (ref 39–308)
CO2 SERPL-SCNC: 29 MMOL/L (ref 21–32)
CREAT SERPL-MCNC: 1.5 MG/DL (ref 0.6–1.3)
CREAT UR-MCNC: 39 MG/DL (ref 30–125)
DIFFERENTIAL METHOD BLD: ABNORMAL
EOSINOPHIL # BLD: 0.1 K/UL (ref 0–0.4)
EOSINOPHIL NFR BLD: 1 % (ref 0–5)
ERYTHROCYTE [DISTWIDTH] IN BLOOD BY AUTOMATED COUNT: 14.8 % (ref 11.6–14.5)
GLOBULIN SER CALC-MCNC: 3.2 G/DL (ref 2–4)
GLUCOSE SERPL-MCNC: 90 MG/DL (ref 74–99)
HCT VFR BLD AUTO: 41 % (ref 36–48)
HDLC SERPL-MCNC: 47 MG/DL (ref 40–60)
HDLC SERPL: 2.9 (ref 0–5)
HGB BLD-MCNC: 13.2 G/DL (ref 13–16)
IMM GRANULOCYTES # BLD AUTO: 0.1 K/UL (ref 0–0.04)
IMM GRANULOCYTES NFR BLD AUTO: 1 % (ref 0–0.5)
LDLC SERPL CALC-MCNC: 72.8 MG/DL (ref 0–100)
LIPID PANEL: NORMAL
LYMPHOCYTES # BLD: 1.6 K/UL (ref 0.9–3.6)
LYMPHOCYTES NFR BLD: 18 % (ref 21–52)
MAGNESIUM SERPL-MCNC: 2.5 MG/DL (ref 1.6–2.6)
MCH RBC QN AUTO: 27.1 PG (ref 24–34)
MCHC RBC AUTO-ENTMCNC: 32.2 G/DL (ref 31–37)
MCV RBC AUTO: 84.2 FL (ref 78–100)
MICROALBUMIN UR-MCNC: 4.21 MG/DL (ref 0–3)
MICROALBUMIN/CREAT UR-RTO: 108 MG/G (ref 0–30)
MONOCYTES # BLD: 0.6 K/UL (ref 0.05–1.2)
MONOCYTES NFR BLD: 7 % (ref 3–10)
NEUTS SEG # BLD: 6.4 K/UL (ref 1.8–8)
NEUTS SEG NFR BLD: 73 % (ref 40–73)
NRBC # BLD: 0 K/UL (ref 0–0.01)
NRBC BLD-RTO: 0 PER 100 WBC
PHOSPHATE SERPL-MCNC: 2.2 MG/DL (ref 2.5–4.9)
PLATELET # BLD AUTO: 240 K/UL (ref 135–420)
PMV BLD AUTO: 10.5 FL (ref 9.2–11.8)
POTASSIUM SERPL-SCNC: 4.2 MMOL/L (ref 3.5–5.5)
PROT SERPL-MCNC: 7.1 G/DL (ref 6.4–8.2)
PTH-INTACT SERPL-MCNC: 123.6 PG/ML (ref 18.4–88)
RBC # BLD AUTO: 4.87 M/UL (ref 4.35–5.65)
SODIUM SERPL-SCNC: 137 MMOL/L (ref 136–145)
TRIGL SERPL-MCNC: 86 MG/DL
VLDLC SERPL CALC-MCNC: 17.2 MG/DL
WBC # BLD AUTO: 8.8 K/UL (ref 4.6–13.2)

## 2024-01-23 PROCEDURE — 82570 ASSAY OF URINE CREATININE: CPT

## 2024-01-23 PROCEDURE — 84100 ASSAY OF PHOSPHORUS: CPT

## 2024-01-23 PROCEDURE — 82306 VITAMIN D 25 HYDROXY: CPT

## 2024-01-23 PROCEDURE — 83970 ASSAY OF PARATHORMONE: CPT

## 2024-01-23 PROCEDURE — 82043 UR ALBUMIN QUANTITATIVE: CPT

## 2024-01-23 PROCEDURE — 85025 COMPLETE CBC W/AUTO DIFF WBC: CPT

## 2024-01-23 PROCEDURE — 80061 LIPID PANEL: CPT

## 2024-01-23 PROCEDURE — 82550 ASSAY OF CK (CPK): CPT

## 2024-01-23 PROCEDURE — 80053 COMPREHEN METABOLIC PANEL: CPT

## 2024-01-23 PROCEDURE — 83735 ASSAY OF MAGNESIUM: CPT

## 2024-01-23 PROCEDURE — 36415 COLL VENOUS BLD VENIPUNCTURE: CPT

## 2024-02-06 ENCOUNTER — OFFICE VISIT (OUTPATIENT)
Age: 79
End: 2024-02-06
Payer: MEDICARE

## 2024-02-06 VITALS
RESPIRATION RATE: 16 BRPM | SYSTOLIC BLOOD PRESSURE: 139 MMHG | HEART RATE: 73 BPM | HEIGHT: 71 IN | BODY MASS INDEX: 29.4 KG/M2 | WEIGHT: 210 LBS | OXYGEN SATURATION: 97 % | DIASTOLIC BLOOD PRESSURE: 86 MMHG | TEMPERATURE: 97.5 F

## 2024-02-06 DIAGNOSIS — Z78.9 IMPAIRED MOBILITY AND ADLS: ICD-10-CM

## 2024-02-06 DIAGNOSIS — R53.1 GENERALIZED WEAKNESS: ICD-10-CM

## 2024-02-06 DIAGNOSIS — I10 PRIMARY HYPERTENSION: Primary | ICD-10-CM

## 2024-02-06 DIAGNOSIS — I69.354 HEMIPARESIS AFFECTING LEFT SIDE AS LATE EFFECT OF CEREBROVASCULAR ACCIDENT (CVA) (HCC): ICD-10-CM

## 2024-02-06 DIAGNOSIS — N18.31 STAGE 3A CHRONIC KIDNEY DISEASE (HCC): ICD-10-CM

## 2024-02-06 DIAGNOSIS — Z74.09 IMPAIRED MOBILITY AND ADLS: ICD-10-CM

## 2024-02-06 PROCEDURE — 1123F ACP DISCUSS/DSCN MKR DOCD: CPT | Performed by: STUDENT IN AN ORGANIZED HEALTH CARE EDUCATION/TRAINING PROGRAM

## 2024-02-06 PROCEDURE — G8419 CALC BMI OUT NRM PARAM NOF/U: HCPCS | Performed by: STUDENT IN AN ORGANIZED HEALTH CARE EDUCATION/TRAINING PROGRAM

## 2024-02-06 PROCEDURE — G8484 FLU IMMUNIZE NO ADMIN: HCPCS | Performed by: STUDENT IN AN ORGANIZED HEALTH CARE EDUCATION/TRAINING PROGRAM

## 2024-02-06 PROCEDURE — 99214 OFFICE O/P EST MOD 30 MIN: CPT | Performed by: STUDENT IN AN ORGANIZED HEALTH CARE EDUCATION/TRAINING PROGRAM

## 2024-02-06 PROCEDURE — 3079F DIAST BP 80-89 MM HG: CPT | Performed by: STUDENT IN AN ORGANIZED HEALTH CARE EDUCATION/TRAINING PROGRAM

## 2024-02-06 PROCEDURE — 3075F SYST BP GE 130 - 139MM HG: CPT | Performed by: STUDENT IN AN ORGANIZED HEALTH CARE EDUCATION/TRAINING PROGRAM

## 2024-02-06 PROCEDURE — G8427 DOCREV CUR MEDS BY ELIG CLIN: HCPCS | Performed by: STUDENT IN AN ORGANIZED HEALTH CARE EDUCATION/TRAINING PROGRAM

## 2024-02-06 PROCEDURE — 4004F PT TOBACCO SCREEN RCVD TLK: CPT | Performed by: STUDENT IN AN ORGANIZED HEALTH CARE EDUCATION/TRAINING PROGRAM

## 2024-02-06 RX ORDER — FUROSEMIDE 20 MG/1
20 TABLET ORAL DAILY
Qty: 60 TABLET | Refills: 1 | Status: SHIPPED | OUTPATIENT
Start: 2024-02-06

## 2024-02-06 ASSESSMENT — PATIENT HEALTH QUESTIONNAIRE - PHQ9
SUM OF ALL RESPONSES TO PHQ QUESTIONS 1-9: 12
8. MOVING OR SPEAKING SO SLOWLY THAT OTHER PEOPLE COULD HAVE NOTICED. OR THE OPPOSITE, BEING SO FIGETY OR RESTLESS THAT YOU HAVE BEEN MOVING AROUND A LOT MORE THAN USUAL: 2
5. POOR APPETITE OR OVEREATING: 2
10. IF YOU CHECKED OFF ANY PROBLEMS, HOW DIFFICULT HAVE THESE PROBLEMS MADE IT FOR YOU TO DO YOUR WORK, TAKE CARE OF THINGS AT HOME, OR GET ALONG WITH OTHER PEOPLE: 2
6. FEELING BAD ABOUT YOURSELF - OR THAT YOU ARE A FAILURE OR HAVE LET YOURSELF OR YOUR FAMILY DOWN: 0
SUM OF ALL RESPONSES TO PHQ QUESTIONS 1-9: 12
3. TROUBLE FALLING OR STAYING ASLEEP: 3
SUM OF ALL RESPONSES TO PHQ QUESTIONS 1-9: 12
SUM OF ALL RESPONSES TO PHQ9 QUESTIONS 1 & 2: 0
4. FEELING TIRED OR HAVING LITTLE ENERGY: 3
9. THOUGHTS THAT YOU WOULD BE BETTER OFF DEAD, OR OF HURTING YOURSELF: 0
SUM OF ALL RESPONSES TO PHQ QUESTIONS 1-9: 12
1. LITTLE INTEREST OR PLEASURE IN DOING THINGS: 0
7. TROUBLE CONCENTRATING ON THINGS, SUCH AS READING THE NEWSPAPER OR WATCHING TELEVISION: 2

## 2024-02-06 ASSESSMENT — ENCOUNTER SYMPTOMS
VOICE CHANGE: 0
TROUBLE SWALLOWING: 0
WHEEZING: 0
DIARRHEA: 0
EYE ITCHING: 0
BACK PAIN: 0
NAUSEA: 0
ABDOMINAL PAIN: 0
EYE DISCHARGE: 0
SHORTNESS OF BREATH: 0
VOMITING: 0
CONSTIPATION: 0
SORE THROAT: 0
PHOTOPHOBIA: 0
COUGH: 0
RHINORRHEA: 0

## 2024-02-06 NOTE — PROGRESS NOTES
1. \"Have you been to the ER, urgent care clinic since your last visit?  Hospitalized since your last visit?\" No    2. \"Have you seen or consulted any other health care providers outside of the [facility] since your last visit?\" No     3. For patients aged 45-75: Has the patient had a colonoscopy / FIT/ Cologuard? NA - based on age

## 2024-02-06 NOTE — PROGRESS NOTES
Subjective:      Patient ID: Joo Norris is a 78 y.o. male.    Pleasant 77-year-old gentleman with multiple comorbidities which includes HTN, hemiparesis as a late effect of CVA, and depression who presents today in company of his caretaker to discuss his chronic medical issues.  Patient is not a good historian, and some history was gleaned from chart review and from his current caretaker (recently  assumed the position).  Patient lives with his sister who is over 80 years old.  Per caretaker, patient is currently unable to care for himself and also cannot get much help from his sister.  Patient is said to have home health aide who comes in to assist for total of 19 hours/week. Per caretaker, patient has had increasing difficulty arising from a chair. Patient at this time denies pain in acute distress.          Review of Systems   Constitutional:  Negative for activity change, appetite change, fatigue and fever.   HENT:  Negative for congestion, hearing loss, postnasal drip, rhinorrhea, sore throat, trouble swallowing and voice change.    Eyes:  Negative for photophobia, discharge, itching and visual disturbance.   Respiratory:  Negative for cough, shortness of breath and wheezing.    Cardiovascular:  Negative for chest pain, palpitations and leg swelling.   Gastrointestinal:  Negative for abdominal pain, constipation, diarrhea, nausea and vomiting.   Genitourinary:  Negative for difficulty urinating and dysuria.   Musculoskeletal:  Negative for arthralgias and back pain.   Skin:  Negative for rash.   Neurological:  Positive for weakness. Negative for dizziness, light-headedness and headaches.   Psychiatric/Behavioral:  Negative for sleep disturbance. The patient is not nervous/anxious.        Objective: /86 (Site: Left Upper Arm, Position: Sitting, Cuff Size: Large Adult)   Pulse 73   Temp 97.5 °F (36.4 °C) (Temporal)   Resp 16   Ht 1.803 m (5' 11\")   Wt 95.3 kg (210 lb)   SpO2 97%   BMI 29.29

## 2024-02-08 ENCOUNTER — HOME HEALTH ADMISSION (OUTPATIENT)
Age: 79
End: 2024-02-08
Payer: MEDICARE

## 2024-02-09 ENCOUNTER — HOME CARE VISIT (OUTPATIENT)
Age: 79
End: 2024-02-09
Payer: MEDICARE

## 2024-02-09 VITALS
DIASTOLIC BLOOD PRESSURE: 82 MMHG | BODY MASS INDEX: 30.24 KG/M2 | HEART RATE: 71 BPM | TEMPERATURE: 98.5 F | WEIGHT: 216 LBS | HEIGHT: 71 IN | OXYGEN SATURATION: 95 % | SYSTOLIC BLOOD PRESSURE: 128 MMHG

## 2024-02-09 PROCEDURE — G0151 HHCP-SERV OF PT,EA 15 MIN: HCPCS

## 2024-02-09 ASSESSMENT — ENCOUNTER SYMPTOMS
PAIN LOCATION - PAIN QUALITY: PRESSURE
DYSPNEA ACTIVITY LEVEL: AFTER AMBULATING 10 - 20 FT

## 2024-02-09 NOTE — HOME HEALTH
PT SOC summary:  Pt is a 77 y/o male who is a community referral by his PCP Dr. Milind VIEIRA due to new onset  of generalized weakness and fall without injury a few days ago.  Has history of CVA in 2015 due to residual L hemiparesis.    Home care PT for strengthening and gait training.  PMH:   Primary hypertension;  Generalized weakness;  Hemiparesis affecting left side as late effect of cerebrovascular accident (CVA); Stage 3a chronic kidney disease;  Impaired mobility and ADLs;  depression.  PLOF/living environment:  ind and ambulatory with a WBQC x household distance;  retired;  lives with sister Lou in a first floor, one story apartment.  Has a personal caregiver aide x 19 hours/week (Mon - Fri).  Smokes 4 sticks of cigarettes every day.  PREC:  high fall risk;  excessive drooling due to CVA;    S:  Pt.'s sister reports of pt falling off bed \"a few days\" ago, without injury.   Aide reported and showed his medications.  Aide reports that pt has been having increased difficulty standing up from seated position and she has to \"pull up and help\" pt stand up.   Pt's goal in PT is  to get stronger and walk better.  O:  Pain:  See pain assessment.  Integumentary:  intact.  ROM:  BLE joints = WNL;  BUE = WNL.  MMT:  LLE = 3 to 3+/5;  LUE = 3 to 3+/5;  RUE and RLE = 4-5/5.  Functional strength test:  FTSTS:  unable at this time, indicating  poor BLE functional strength.    Bed  mobility:  sit <> supine =  SBA with difficulty, uses a foot stool because bed is elevated.  Recommended to lower bed height so foot stool will not be needed  ;  rolling =  ind  ;  Transfers:  sit <> stand from multiple surfaces  =  CGA = min A, needing instructions on correct hand and feet placement and safety technique.    Balance:  Tinetti  = 11 / 28, indicating high fall risk;  TUG = 45  seconds with WBQC, indicating  high fall risk;  Gait:  20 ft with CGA using WBQC exhibiting L hemiplegic, intermittent L foot drag gait pattern.  Endurance:

## 2024-02-15 ENCOUNTER — HOME CARE VISIT (OUTPATIENT)
Age: 79
End: 2024-02-15
Payer: MEDICARE

## 2024-02-15 VITALS
TEMPERATURE: 99 F | RESPIRATION RATE: 17 BRPM | HEART RATE: 77 BPM | OXYGEN SATURATION: 95 % | SYSTOLIC BLOOD PRESSURE: 143 MMHG | DIASTOLIC BLOOD PRESSURE: 88 MMHG

## 2024-02-15 PROCEDURE — G0157 HHC PT ASSISTANT EA 15: HCPCS

## 2024-02-15 PROCEDURE — G0152 HHCP-SERV OF OT,EA 15 MIN: HCPCS

## 2024-02-16 ENCOUNTER — HOME CARE VISIT (OUTPATIENT)
Age: 79
End: 2024-02-16
Payer: MEDICARE

## 2024-02-16 PROCEDURE — G0157 HHC PT ASSISTANT EA 15: HCPCS

## 2024-02-16 NOTE — HOME HEALTH
for ADL/IADL tasks and opening containers. Pt is right handed.     OT instructed/demonstrated pt the following with good understanding:     IADL: Pt wife completes all IADLs  AMBULATION: Pt CGA for ambulation using WBQC for support.  EOB/BED TRANSFER: Pt supervision for bed mobility. Pt with high bed height and would benefit from lowering bed ot decrease need to use stool  COUCH: Pt CGA for sit to stand transfers using WBQC for support  TOILET: Pt CGA for toilet transfers with raised toilet  TUB SHOWER: Pt moderate assistance for shower transfers into/out of tub shower     PATIENT RESPONSE TO TREATMENT: Pt responded well to skilled home health occupational therapy assessment with no increase in pain    PATIENT EDUCATION PROVIDED THIS VISIT: OT role, adaptive equipment, RUE HEP, energy conservation, fall prevention/safety training ADL/IADL tasks, continue diet and medications as instructed per MD, consult MD or urgent care for medical assistance as opposed to ER unless situation emergent.    PATIENT LEVEL OF UNDERSTANDING OF EDUCATION PROVIDED: Pt able to teach back role of OT with good understanding. Pt able to teach back energy conservation while performing bathing, dressing, and setup such as set clothing out night before, gather items required to perform task in one trip, sit while performing tasks, take rest breaks as needed, perform shower/bathing on days with no other appointments or activities scheduled, etc. Pt able to teach back education on obtaining tub transfer bench to increase safety with tub transfers.      REHAB POTENTIAL: Mr. Norris presents with good rehab potential to increase their strength, balance and safety needed for increasing their independence within their daily routine. Pt with decreased dynamic standing balance needed to complete ADLs and functional mobility safely. Pt and caregiver agreeable to continued home health occupational therapy services to increase their safety and independence  Develop coping skills.  For example, strategies and lifestyle changes that reduce negative moods, stress, and exposure to smoking cues.

## 2024-02-18 VITALS
SYSTOLIC BLOOD PRESSURE: 137 MMHG | TEMPERATURE: 99 F | RESPIRATION RATE: 18 BRPM | OXYGEN SATURATION: 96 % | DIASTOLIC BLOOD PRESSURE: 83 MMHG | HEART RATE: 65 BPM

## 2024-02-20 ENCOUNTER — HOME CARE VISIT (OUTPATIENT)
Age: 79
End: 2024-02-20
Payer: MEDICARE

## 2024-02-20 VITALS
OXYGEN SATURATION: 99 % | TEMPERATURE: 98.7 F | SYSTOLIC BLOOD PRESSURE: 151 MMHG | DIASTOLIC BLOOD PRESSURE: 92 MMHG | HEART RATE: 76 BPM | RESPIRATION RATE: 18 BRPM

## 2024-02-20 VITALS
TEMPERATURE: 98.7 F | DIASTOLIC BLOOD PRESSURE: 93 MMHG | SYSTOLIC BLOOD PRESSURE: 144 MMHG | OXYGEN SATURATION: 99 % | HEART RATE: 76 BPM

## 2024-02-20 PROCEDURE — G0158 HHC OT ASSISTANT EA 15: HCPCS

## 2024-02-20 PROCEDURE — G0157 HHC PT ASSISTANT EA 15: HCPCS

## 2024-02-21 NOTE — HOME HEALTH
SUBJECTIVE: Patient reports \"I have been walking around some\".  Patient denies any present pain. Patient denies any recent falls or medication changes.    CAREGIVER INVOLVEMENT/ASSISTANCE NEEDED FOR: Patient lives in a first floor apartment with his wife who is aiding him with meal preparation and ADLs. He has steps present to enter/exit home.    OBJECTIVE:  See interventions.    PATIENT RESPONSE TO TREATMENT: Patient is agreeable and motivated with PT session today- he fatigued quickly and does require sitting rest breaks for fatigue     PATIENT LEVEL OF UNDERSTANDING OF EDUCATION PROVIDED: Patient was instructed on importance of using AD at all times with standing to decrease risk for tripping/falls and to improve patient safety.    ASSESSMENT OF PROGRESS TOWARD GOALS: Patient was seen today for a PT follow up and was able to perform gait in home x50 feet with use of cane- he is educated to increase erect posture and step length. Patient has no LOB present. Patient then performed an increase in therex reps aiding with LE strength gains but does require cues to increase ROM as able with each. Worked with patient on sit to stands from low den sofa with BUE support and CGA for safety.      HOME EXERCISE PROGRAM:Patient was instructed on taking a walk at least once per hour to increase time spent in standing. Patient was educated that therex will be added in coming visit.    THE FOLLOWING DISCHARGE PLANNING WAS DISCUSSED WITH THE PATIENT/CAREGIVER: DC from  PT once goals are met    PLAN NEXT VISIT: Plan in coming visit to increase therex and gait in home as patient is able

## 2024-02-21 NOTE — HOME HEALTH
SUBJECTIVE: I just took my medication 15 minutes ago.    CAREGIVER INVOLVEMENT/ASSISTANCE NEEDED FOR: The patient's wife assists with ADL/IADLs as needed.  The wife was present.  .  OBJECTIVE:  See interventions.  PATIENT EDUCATION PROVIDED THIS VISIT: Treatment focused on energy conservation stategies durning ADL tasks to prevent over-exertion.    PATIENT RESPONSE TO EDUCATION PROVIDED: The patient/caregiver were instructed on energy conservation while performing bathing, dressing, and setup such as set clothing out night before, gather items required to perform task in one trip, sit while performing tasks, take rest breaks as needed, perform shower/bathing on days with now other appointments or activities shceduled, etc; following the training the patient was alble to recall 2 energy conserving strategies with minimal verbal cues.    PATIENT RESPONSE TO TREATMENT: The patient tolerated treatment well, the patient's BP was high out of parameters due to just taking his medications, a call was attempted to the primary provider's office but there was no answer, A case communication was sent to the physician.    .  ASSESSMENT OF PROGRESS TOWARD GOALS: The patient demonstrated an understanding of the energy conservation strategies to decrease the risk of over-exertion.  .  PLAN FOR NEXT VISIT: Focus on functional transfers, standing balance/tolerance and energy conservation strategies.    THE FOLLOWING DISCHARGE PLANNING WAS DISCUSSED WITH THE PATIENT/CAREGIVER: Discharge the patient to self  and wife, when all goals are met or maximum potential has been reached.  There are 2 visits remaining.

## 2024-02-22 ENCOUNTER — HOME CARE VISIT (OUTPATIENT)
Age: 79
End: 2024-02-22
Payer: MEDICARE

## 2024-02-22 VITALS
TEMPERATURE: 97.7 F | OXYGEN SATURATION: 98 % | DIASTOLIC BLOOD PRESSURE: 86 MMHG | HEART RATE: 78 BPM | SYSTOLIC BLOOD PRESSURE: 130 MMHG

## 2024-02-22 PROCEDURE — G0157 HHC PT ASSISTANT EA 15: HCPCS

## 2024-02-22 PROCEDURE — G0158 HHC OT ASSISTANT EA 15: HCPCS

## 2024-02-22 NOTE — HOME HEALTH
SUBJECTIVE: I stayed up late last night, thats why I slept in today.    CAREGIVER INVOLVEMENT/ASSISTANCE NEEDED FOR: The patient's wife assists with ADL/IADLs as needed and during tub/shower transfers for safety .  The wife was present.  .  OBJECTIVE:  See interventions.  PATIENT EDUCATION PROVIDED THIS VISIT: The treatment focused on standing balance/tolerance, energy conservation strategies, and functional transfers.    PATIENT RESPONSE TO EDUCATION PROVIDED: The patient was trained in energy conservation while performing bathing, dressing, and setup such as set clothing out night before, gather items required to perform task in one trip, sit while performing tasks, take rest breaks as needed, perform shower/bathing on days with now other appointments or activities shceduled, etc.; following the training the patient demonstrated energy conservation strategies when getting dressed sitting on the edge of the bed.  Trained the patient on safe transfers using appropriate body mechanics and the necessary equipment, following the training the patient performed a tub/shower transfer using grab bars and tub seat with close supervision and commode transfers with distant supervision using raised commode seat with handles.  The patient performed a static and dynamic standing balance/tolerance task reaching outside of his base of support without loss of balance with the use of the quad cane for support.  The patient was instructe to always turn the lights on when ambulating into dark hallways and rooms for safety.    PATIENT RESPONSE TO TREATMENT: The patient tolerated treatment well, maintaining vital signs within acceptable parameters and without complaints of increased pain level.  .  ASSESSMENT OF PROGRESS TOWARD GOALS: The patient has made progress, meeting all the short term goals.  .  PLAN FOR NEXT VISIT: Complete OT discharge.    THE FOLLOWING DISCHARGE PLANNING WAS DISCUSSED WITH THE PATIENT/CAREGIVER: Discharge the

## 2024-02-25 VITALS
SYSTOLIC BLOOD PRESSURE: 130 MMHG | OXYGEN SATURATION: 98 % | DIASTOLIC BLOOD PRESSURE: 86 MMHG | RESPIRATION RATE: 17 BRPM | HEART RATE: 78 BPM | TEMPERATURE: 97.8 F

## 2024-02-27 ENCOUNTER — HOME CARE VISIT (OUTPATIENT)
Age: 79
End: 2024-02-27
Payer: MEDICARE

## 2024-02-27 VITALS
SYSTOLIC BLOOD PRESSURE: 121 MMHG | RESPIRATION RATE: 18 BRPM | DIASTOLIC BLOOD PRESSURE: 80 MMHG | TEMPERATURE: 98.7 F | HEART RATE: 68 BPM | OXYGEN SATURATION: 98 %

## 2024-02-27 VITALS
DIASTOLIC BLOOD PRESSURE: 80 MMHG | RESPIRATION RATE: 16 BRPM | OXYGEN SATURATION: 98 % | HEART RATE: 68 BPM | SYSTOLIC BLOOD PRESSURE: 121 MMHG | TEMPERATURE: 98.7 F

## 2024-02-27 PROCEDURE — G0157 HHC PT ASSISTANT EA 15: HCPCS

## 2024-02-27 PROCEDURE — G0152 HHCP-SERV OF OT,EA 15 MIN: HCPCS

## 2024-02-27 NOTE — HOME HEALTH
SUBJECTIVE: Pt reports no pain on this date. Pt wife present throughout occupational therapy session.    CAREGIVER INVOLVEMENT/ASSISTANCE NEEDED FOR: Pt wife assists with IADLs    HOME HEALTH SUPPLIES BY TYPE AND QUANTITY ORDERED/DELIVERED THIS VISIT INCLUDE: N/A    OBJECTIVE: See interventions    PATIENT RESPONSE TO TREATMENT: Pt responded well to occupational therapy session on this date with no increase in pain or discomfort throughout session    PATIENT LEVEL OF UNDERSTANDING OF EDUCATION PROVIDED: Pt educated on continuing to use energy conservation strategies within their daily routine to decrease fatigue and risk of falls. Pt educated on notifying MD of any changes in health status.    OCCUPATIONAL THERAPY DISCHARGE: Mr. Norris has been seen by skilled occupational therapy services to addres deficits in activity tolerance, functional mobility, and balance. Pt has met all goals and is agreeable/ready to discharge at this time. Activity tolerance: Pt has been educated on implementing energy conservation strategies into his daily routine to decrease risk of falls and fatigue. Pt has progressed to implementing sitting for all tasks, allowing for adequate rest, and bathing on days with no other appointments.Functional Mobility: Pt has progressed to completing toilet transfers with distant supervision. Pt has progressed to completing tub transfers with distant supervison. Balance: Pt has progressed to fair dynamic standing balance while completing ADLs and functional mobility. Pt medications have been reconciled and MD has been notified of OT discipline DC.

## 2024-02-28 NOTE — HOME HEALTH
SUBJECTIVE: Patient denies any present pain. Patient denies any recent falls or medication changes.    CAREGIVER INVOLVEMENT/ASSISTANCE NEEDED FOR: Patient lives in a first floor apartment with his wife who is aiding him with meal preparation and ADLs. He has steps present to enter/exit home.    OBJECTIVE:  See interventions.    PATIENT RESPONSE TO TREATMENT: Patient is agreeable and motivated with PT session today- he fatigued quickly and does require sitting rest breaks for fatigue     PATIENT LEVEL OF UNDERSTANDING OF EDUCATION PROVIDED: Patient was instructed on importance of using AD at all times with standing to decrease risk for tripping/falls and to improve patient safety.    ASSESSMENT OF PROGRESS TOWARD GOALS: Patient was seen today for a PT follow up and was able to perform TINETTI with score of 16/28 which is an improvement from evaluation at 11/28- which shows a decrease in fall risk potential. During strengthening tasks patient required cues to increase ROM as he is able with each- no increase in pain is present but he does require often sitting rest breaks to aide with energy conservation. Reviewed HEP with patient as below.     HOME EXERCISE PROGRAM:Patient was instructed on taking a walk at least once per hour to increase time spent in standing. Patient was instructed to perform to BLEs with AROM as follows: LAQ, knee flexion, ankle pumps, hip marching x10-15 reps each all 3x per day as follows.    THE FOLLOWING DISCHARGE PLANNING WAS DISCUSSED WITH THE PATIENT/CAREGIVER: DC from  PT once goals are met    PLAN NEXT VISIT: Plan in coming visit to perform standing therex tasks as patient is able.

## 2024-02-28 NOTE — HOME HEALTH
SUBJECTIVE: Patient was outside sitting on porch at arrival and denies any complaints. Patient denies any recent falls or medication changes.    CAREGIVER INVOLVEMENT/ASSISTANCE NEEDED FOR: Patient lives in a first floor apartment with his wife who is aiding him with meal preparation and ADLs. He has steps present to enter/exit home.    OBJECTIVE:  See interventions.    PATIENT RESPONSE TO TREATMENT: Patient is agreeable and motivated with PT session today- he fatigued quickly and does require sitting rest breaks for fatigue     PATIENT LEVEL OF UNDERSTANDING OF EDUCATION PROVIDED: Patient was instructed on importance of using AD at all times with standing to decrease risk for tripping/falls and to improve patient safety.    ASSESSMENT OF PROGRESS TOWARD GOALS: Patient was seen today for a PT follow up and was able to perform an increase in sit to stand reps with UE support and SBA- he had one instance of posterior lean. Patient also performed an increase in therex tasks in standing and sitting - he requires demonstrations to carryover each properly. Patient was able to perform gait in just under 2 minutes (attempting 2 MWT) before requiring sitting rest break- he is educated to heel strike and step length. No pain is present with session- he is on track for DC in 2 visits and is agreeable.    HOME EXERCISE PROGRAM:Patient was instructed on taking a walk at least once per hour to increase time spent in standing. Patient was instructed to perform to BLEs with AROM as follows: LAQ, knee flexion, ankle pumps, hip marching x10-15 reps each all 3x per day as follows.     THE FOLLOWING DISCHARGE PLANNING WAS DISCUSSED WITH THE PATIENT/CAREGIVER: DC from  PT once goals are met    PLAN NEXT VISIT: Plan in coming visit to perform outdoor gait training and stairs.

## 2024-02-29 ENCOUNTER — HOME CARE VISIT (OUTPATIENT)
Age: 79
End: 2024-02-29
Payer: MEDICARE

## 2024-02-29 VITALS
DIASTOLIC BLOOD PRESSURE: 78 MMHG | OXYGEN SATURATION: 98 % | HEART RATE: 77 BPM | TEMPERATURE: 98.3 F | RESPIRATION RATE: 18 BRPM | SYSTOLIC BLOOD PRESSURE: 138 MMHG

## 2024-02-29 PROCEDURE — G0157 HHC PT ASSISTANT EA 15: HCPCS

## 2024-03-01 NOTE — HOME HEALTH
SUBJECTIVE: Patient was sitting on his porch and denies any pain. Patient denies any recent falls or medication changes.    CAREGIVER INVOLVEMENT/ASSISTANCE NEEDED FOR: Patient lives in a first floor apartment with his wife who is aiding him with meal preparation and ADLs. He has steps present to enter/exit home.    OBJECTIVE:  See interventions.    PATIENT RESPONSE TO TREATMENT: Patient was very distracted during PT session but was agreeable. He has no present pain with tasks performed    PATIENT LEVEL OF UNDERSTANDING OF EDUCATION PROVIDED: Patient was instructed on importance of using AD at all times with standing to decrease risk for tripping/falls and to improve patient safety.    PLAN NEXT VISIT: Plan in coming visit to perform outdoor gait training and stairs.  .Assessment and Summary of Care:  Patient's current functional status before discharge is as follows  Strength: Please assess at DC  ROM:  BLEs are WNLs  Bed Mobility: Patient is (I) with all aspects of bed mobility  Transfers: FTSTS 22.40 seconds  Gait/WC mobility: Patient is ambulating indoors and outdoors with use of quad cane x 3 minutes and 7 seconds before he required sitting rest break- he continues to require cues to increase step length, width and heel strike bilaterally to aide with stability/decrease risk for falls.   Stairs: Patient is performing going up and down 2 steps present without handrails, cane and SBA for safety.   Special Tests: TINETTI is 20/28  Recommendations: Plan to DC patient to HEP in coming visit.    HOME EXERCISE PROGRAM:Patient was instructed on taking a walk at least once per hour to increase time spent in standing. Patient was instructed to perform to BLEs with AROM as follows: LAQ, knee flexion, ankle pumps, hip marching x10-15 reps each all 3x per day as follows.

## 2024-03-05 ENCOUNTER — HOME CARE VISIT (OUTPATIENT)
Age: 79
End: 2024-03-05
Payer: MEDICARE

## 2024-03-05 VITALS
TEMPERATURE: 98.3 F | SYSTOLIC BLOOD PRESSURE: 142 MMHG | RESPIRATION RATE: 18 BRPM | HEART RATE: 76 BPM | OXYGEN SATURATION: 95 % | DIASTOLIC BLOOD PRESSURE: 88 MMHG

## 2024-03-05 PROCEDURE — G0151 HHCP-SERV OF PT,EA 15 MIN: HCPCS

## 2024-03-05 ASSESSMENT — ENCOUNTER SYMPTOMS: DYSPNEA ACTIVITY LEVEL: AFTER AMBULATING MORE THAN 20 FT

## 2024-03-05 NOTE — CASE COMMUNICATION
SUBJECTIVE: Patient was sitting on his porch and denies any pain. Patient denies any recent falls or medication changes.    CAREGIVER INVOLVEMENT/ASSISTANCE NEEDED FOR: Patient lives in a first floor apartment with his wife who is aiding him with meal preparation and ADLs. He has steps present to enter/exit home.    OBJECTIVE:  See interventions.    PATIENT RESPONSE TO TREATMENT: Patient was very distracted during PT session but was agr eeable. He has no present pain with tasks performed    PATIENT LEVEL OF UNDERSTANDING OF EDUCATION PROVIDED: Patient was instructed on importance of using AD at all times with standing to decrease risk for tripping/falls and to improve patient safety.    PLAN NEXT VISIT: Plan in coming visit to perform outdoor gait training and stairs.  .Assessment and Summary of Care:  Patient's current functional status before discharge is as follows   Strength: Please assess at DC  ROM:  BLEs are WNLs  Bed Mobility: Patient is (I) with all aspects of bed mobility  Transfers: FTSTS 22.40 seconds  Gait/WC mobility: Patient is ambulating indoors and outdoors with use of quad cane x 3 minutes and 7 seconds before he required sitting rest break- he continues to require cues to increase step length, width and heel strike bilaterally to aide with stability/decrease risk for falls.   Stairs : Patient is performing going up and down 2 steps present without handrails, cane and SBA for safety.   Special Tests: TINETTI is 20/28  Recommendations: Plan to DC patient to HEP in coming visit.    HOME EXERCISE PROGRAM:Patient was instructed on taking a walk at least once per hour to increase time spent in standing. Patient was instructed to perform to BLEs with AROM as follows: LAQ, knee flexion, ankle pumps, hip marching x10-15 rep s each all 3x per day as follows.

## 2024-03-06 NOTE — HOME HEALTH
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    S:  Pt is doing better.  Pt denies falls nor change in medications.  O:   Medication list updated and reconciled.  PT home care eval date is 24.  Pt  received 2x/wk PT sessions for strengthening, bed mob and transfer trng, gait trng, HEP, balance trng, fall risk reduction strategies.    Integumentary:  intact.  Pain:  Denies.  Goal met.  ROM:  all peripheral joints = WNL.   MMT BLE = 4 to 4+/5  FTSTS (24) :  22.4 sec  = indicative of improved BLE functional strength.  Goal achieved.  HEP:  ind using  HEP handout and pt has been doing exercises daily.  PCG reminds pt to do his HEP and ambulation program.  Goal met.  Patient DC instructions:  Continue doing HEP and ambulate once every hour during daytime.   Bed mobility:  sit <> supine = ind.  Goal met.  Transfers:  sit <> stand from multiple surfaces = ind with QC except car and shower transfers = SBA for safety.  Goal met.  Gait:  300 ft, ind, using WBQC exhibiting slow aurelio gait pattern    Goal met.  6 MWT:  300  ft, WBQC,  O2 sats = 98%, HR = 86 bpm, indicative of improved walking endurance.  Goal met.  one front entry Step negotiation for ingress/egress to home:  SBA holding on to QC and wall = goal met.  TU.8 with WBQC sec = indicative of balance improvement but continues to be a fall risk.  Goal met  Tinetti (24):  20 /28   = indicative of balance improvement but still a fall risk.  Goal met.  Fall risk reduction strategies reviewed and pt verbalized compliance  Patient level of understanding of education provided: verbalized.  Patient response to treatment:  verbalized.  Sister and PCG verbalized.  Caregiver involvement/assistance needed: sister and PCG (name of caregiver) assist with self care, ADLs, iADLs, functional mobility, transportation.  A:

## 2024-03-29 ENCOUNTER — HOSPITAL ENCOUNTER (EMERGENCY)
Facility: HOSPITAL | Age: 79
Discharge: HOME | End: 2024-03-30
Attending: STUDENT IN AN ORGANIZED HEALTH CARE EDUCATION/TRAINING PROGRAM
Payer: MEDICARE

## 2024-03-29 ENCOUNTER — APPOINTMENT (OUTPATIENT)
Facility: HOSPITAL | Age: 79
End: 2024-03-29
Payer: MEDICARE

## 2024-03-29 DIAGNOSIS — K59.00 CONSTIPATION, UNSPECIFIED CONSTIPATION TYPE: Primary | ICD-10-CM

## 2024-03-29 LAB
ALBUMIN SERPL-MCNC: 3.6 G/DL (ref 3.4–5)
ALBUMIN/GLOB SERPL: 1 (ref 0.8–1.7)
ALP SERPL-CCNC: 73 U/L (ref 45–117)
ALT SERPL-CCNC: 27 U/L (ref 16–61)
ANION GAP SERPL CALC-SCNC: 6 MMOL/L (ref 3–18)
AST SERPL-CCNC: 21 U/L (ref 10–38)
BASOPHILS # BLD: 0 K/UL (ref 0–0.1)
BASOPHILS NFR BLD: 0 % (ref 0–2)
BILIRUB SERPL-MCNC: 0.5 MG/DL (ref 0.2–1)
BUN SERPL-MCNC: 18 MG/DL (ref 7–18)
BUN/CREAT SERPL: 11 (ref 12–20)
CALCIUM SERPL-MCNC: 9.8 MG/DL (ref 8.5–10.1)
CHLORIDE SERPL-SCNC: 105 MMOL/L (ref 100–111)
CO2 SERPL-SCNC: 30 MMOL/L (ref 21–32)
CREAT SERPL-MCNC: 1.62 MG/DL (ref 0.6–1.3)
DIFFERENTIAL METHOD BLD: ABNORMAL
EOSINOPHIL # BLD: 0.1 K/UL (ref 0–0.4)
EOSINOPHIL NFR BLD: 1 % (ref 0–5)
ERYTHROCYTE [DISTWIDTH] IN BLOOD BY AUTOMATED COUNT: 15.2 % (ref 11.6–14.5)
GLOBULIN SER CALC-MCNC: 3.5 G/DL (ref 2–4)
GLUCOSE SERPL-MCNC: 103 MG/DL (ref 74–99)
HCT VFR BLD AUTO: 36.9 % (ref 36–48)
HGB BLD-MCNC: 12.2 G/DL (ref 13–16)
IMM GRANULOCYTES # BLD AUTO: 0.1 K/UL (ref 0–0.04)
IMM GRANULOCYTES NFR BLD AUTO: 1 % (ref 0–0.5)
LIPASE SERPL-CCNC: 28 U/L (ref 13–75)
LYMPHOCYTES # BLD: 1.5 K/UL (ref 0.9–3.6)
LYMPHOCYTES NFR BLD: 15 % (ref 21–52)
MAGNESIUM SERPL-MCNC: 2.4 MG/DL (ref 1.6–2.6)
MCH RBC QN AUTO: 27.4 PG (ref 24–34)
MCHC RBC AUTO-ENTMCNC: 33.1 G/DL (ref 31–37)
MCV RBC AUTO: 82.9 FL (ref 78–100)
MONOCYTES # BLD: 0.7 K/UL (ref 0.05–1.2)
MONOCYTES NFR BLD: 7 % (ref 3–10)
NEUTS SEG # BLD: 7.8 K/UL (ref 1.8–8)
NEUTS SEG NFR BLD: 77 % (ref 40–73)
NRBC # BLD: 0 K/UL (ref 0–0.01)
NRBC BLD-RTO: 0 PER 100 WBC
PLATELET # BLD AUTO: 230 K/UL (ref 135–420)
PMV BLD AUTO: 10 FL (ref 9.2–11.8)
POTASSIUM SERPL-SCNC: 3.6 MMOL/L (ref 3.5–5.5)
PROT SERPL-MCNC: 7.1 G/DL (ref 6.4–8.2)
RBC # BLD AUTO: 4.45 M/UL (ref 4.35–5.65)
SODIUM SERPL-SCNC: 141 MMOL/L (ref 136–145)
WBC # BLD AUTO: 10.1 K/UL (ref 4.6–13.2)

## 2024-03-29 PROCEDURE — 83690 ASSAY OF LIPASE: CPT

## 2024-03-29 PROCEDURE — 74176 CT ABD & PELVIS W/O CONTRAST: CPT

## 2024-03-29 PROCEDURE — 80053 COMPREHEN METABOLIC PANEL: CPT

## 2024-03-29 PROCEDURE — 85025 COMPLETE CBC W/AUTO DIFF WBC: CPT

## 2024-03-29 PROCEDURE — 83735 ASSAY OF MAGNESIUM: CPT

## 2024-03-29 PROCEDURE — 99284 EMERGENCY DEPT VISIT MOD MDM: CPT

## 2024-03-29 PROCEDURE — 81001 URINALYSIS AUTO W/SCOPE: CPT

## 2024-03-29 ASSESSMENT — PAIN DESCRIPTION - DESCRIPTORS: DESCRIPTORS: ACHING

## 2024-03-29 ASSESSMENT — PAIN DESCRIPTION - LOCATION: LOCATION: ABDOMEN

## 2024-03-29 ASSESSMENT — PAIN - FUNCTIONAL ASSESSMENT: PAIN_FUNCTIONAL_ASSESSMENT: 0-10

## 2024-03-29 ASSESSMENT — PAIN DESCRIPTION - ORIENTATION: ORIENTATION: RIGHT;LOWER

## 2024-03-29 ASSESSMENT — PAIN SCALES - GENERAL: PAINLEVEL_OUTOF10: 2

## 2024-03-30 VITALS
HEIGHT: 71 IN | BODY MASS INDEX: 30.24 KG/M2 | RESPIRATION RATE: 18 BRPM | TEMPERATURE: 97.7 F | DIASTOLIC BLOOD PRESSURE: 87 MMHG | OXYGEN SATURATION: 96 % | HEART RATE: 73 BPM | SYSTOLIC BLOOD PRESSURE: 141 MMHG | WEIGHT: 216 LBS

## 2024-03-30 LAB
APPEARANCE UR: CLEAR
BACTERIA URNS QL MICRO: NEGATIVE /HPF
BILIRUB UR QL: NEGATIVE
COLOR UR: YELLOW
EPITH CASTS URNS QL MICRO: NORMAL /LPF (ref 0–5)
GLUCOSE UR STRIP.AUTO-MCNC: NEGATIVE MG/DL
HGB UR QL STRIP: NEGATIVE
KETONES UR QL STRIP.AUTO: NEGATIVE MG/DL
LEUKOCYTE ESTERASE UR QL STRIP.AUTO: NEGATIVE
NITRITE UR QL STRIP.AUTO: NEGATIVE
PH UR STRIP: 7 (ref 5–8)
PROT UR STRIP-MCNC: 30 MG/DL
RBC #/AREA URNS HPF: NEGATIVE /HPF (ref 0–5)
SP GR UR REFRACTOMETRY: 1.01 (ref 1–1.03)
UROBILINOGEN UR QL STRIP.AUTO: 0.2 EU/DL (ref 0.2–1)
WBC URNS QL MICRO: NEGATIVE /HPF (ref 0–4)

## 2024-03-30 RX ORDER — POLYETHYLENE GLYCOL 3350 17 G/17G
17 POWDER, FOR SOLUTION ORAL DAILY PRN
Qty: 510 G | Refills: 0 | Status: SHIPPED | OUTPATIENT
Start: 2024-03-30 | End: 2024-04-29

## 2024-03-30 RX ORDER — SENNA AND DOCUSATE SODIUM 50; 8.6 MG/1; MG/1
1 TABLET, FILM COATED ORAL DAILY
Qty: 14 TABLET | Refills: 0 | Status: SHIPPED | OUTPATIENT
Start: 2024-03-30 | End: 2024-04-13

## 2024-03-30 NOTE — ED NOTES
Blood work and UA were collected and sent to lab. Pt was repositioned. Linens were adjusted. Bed is currently at the lowest position with brakes on and x2 rails up. Call light is within reach.

## 2024-03-30 NOTE — ED TRIAGE NOTES
EMS reports Pt called d/t 2 days of constipation and RLQ. Pt denies change in diet, change in medication, taking narcotics, and change in urination.

## 2024-03-30 NOTE — DISCHARGE INSTRUCTIONS
You were evaluated for constipation .  Based on your work-up it was deemed that she was stable for discharge.  Please  your medication of senna and mirilax which was prescribed to you.  Please follow-up with your primary care physician if you have any further concerns and go over your work-up.  If you experience any chest pain, shortness of breath, worsening abdominal pain, vomiting blood, worsening headache, seizures, or any worsening of your symptoms please return to the emergency department immediately.  If you have any pending results or any further questions please contact the emergency department at [phone].

## 2024-03-30 NOTE — ED PROVIDER NOTES
EMERGENCY DEPARTMENT HISTORY AND PHYSICAL EXAM    6:13 PM      Date: 3/29/2024  Patient Name: Joo Norris    History of Presenting Illness     Chief Complaint   Patient presents with    Constipation    Abdominal Pain       History From: Patient    Joo Norris is a 78 y.o. male   HPI  78-year-old male with past medical history of SBO, urethritis, CKD, pancreatitis, CVA, who presents with abdominal pain.  Patient says that he has been having abdominal pain for the past 2 days and has been constipated.  Denies changes in meds, sick contacts, or any other changes.  Pain is mild to moderate, intermittent, aching.  No aggravating or alleviating factors.  When assessing ROS he denies any nausea, vomiting, chest pain, shortness of breath, change in urination, or any other changes.        Nursing Notes were all reviewed and agreed with or any disagreements were addressed in the HPI.    PCP: Milind Durán, DO    No current facility-administered medications for this encounter.     Current Outpatient Medications   Medication Sig Dispense Refill    sennosides-docusate sodium (SENOKOT-S) 8.6-50 MG tablet Take 1 tablet by mouth daily for 14 days 14 tablet 0    polyethylene glycol (GLYCOLAX) 17 GM/SCOOP powder Take 17 g by mouth daily as needed (constipation) 510 g 0    Multiple Vitamins-Minerals (MULTIVITAMIN ADULT, MINERALS, PO) Take 1 tablet by mouth daily.      furosemide (LASIX) 20 MG tablet Take 1 tablet by mouth daily 60 tablet 1    amLODIPine (NORVASC) 10 MG tablet Take 1 tablet by mouth daily 90 tablet 1    albuterol sulfate HFA (PROVENTIL;VENTOLIN;PROAIR) 108 (90 Base) MCG/ACT inhaler Inhale 1 puff into the lungs 4 times daily      aspirin 81 MG chewable tablet Take 1 tablet by mouth daily      atorvastatin (LIPITOR) 10 MG tablet Take 1 tablet by mouth      loperamide (IMODIUM) 2 MG capsule Take 1 capsule by mouth 4 times daily as needed (Patient not taking: Reported on 2/6/2024)      sertraline (ZOLOFT)  Minor typographical errors may be present. If questions arise, please do not hesitate to contact me or call our department.          Marcos Orozco MD  03/31/24 [id number]

## 2024-03-30 NOTE — ED NOTES
Pt had a large BM in the commode. He states he does not currently have pain and has no further complaints. Provider notified.

## 2024-06-05 RX ORDER — AMLODIPINE BESYLATE 10 MG/1
10 TABLET ORAL DAILY
Qty: 90 TABLET | Refills: 1 | Status: SHIPPED | OUTPATIENT
Start: 2024-06-05

## 2024-07-15 ENCOUNTER — OFFICE VISIT (OUTPATIENT)
Facility: CLINIC | Age: 79
End: 2024-07-15
Payer: MEDICARE

## 2024-07-15 VITALS
DIASTOLIC BLOOD PRESSURE: 83 MMHG | WEIGHT: 215 LBS | OXYGEN SATURATION: 96 % | RESPIRATION RATE: 18 BRPM | HEIGHT: 71 IN | HEART RATE: 66 BPM | SYSTOLIC BLOOD PRESSURE: 139 MMHG | BODY MASS INDEX: 30.1 KG/M2 | TEMPERATURE: 97.5 F

## 2024-07-15 DIAGNOSIS — F17.200 TOBACCO USE DISORDER: ICD-10-CM

## 2024-07-15 DIAGNOSIS — Z23 NEED FOR PROPHYLACTIC VACCINATION AGAINST DIPHTHERIA-TETANUS-PERTUSSIS (DTP): ICD-10-CM

## 2024-07-15 DIAGNOSIS — R60.0 BILATERAL LOWER EXTREMITY EDEMA: ICD-10-CM

## 2024-07-15 DIAGNOSIS — Z23 NEED FOR PROPHYLACTIC VACCINATION AND INOCULATION AGAINST VARICELLA: ICD-10-CM

## 2024-07-15 DIAGNOSIS — I10 PRIMARY HYPERTENSION: ICD-10-CM

## 2024-07-15 DIAGNOSIS — F32.A DEPRESSIVE DISORDER: ICD-10-CM

## 2024-07-15 DIAGNOSIS — Z00.00 MEDICARE ANNUAL WELLNESS VISIT, SUBSEQUENT: Primary | ICD-10-CM

## 2024-07-15 PROCEDURE — 1123F ACP DISCUSS/DSCN MKR DOCD: CPT | Performed by: STUDENT IN AN ORGANIZED HEALTH CARE EDUCATION/TRAINING PROGRAM

## 2024-07-15 PROCEDURE — G0439 PPPS, SUBSEQ VISIT: HCPCS | Performed by: STUDENT IN AN ORGANIZED HEALTH CARE EDUCATION/TRAINING PROGRAM

## 2024-07-15 PROCEDURE — 4004F PT TOBACCO SCREEN RCVD TLK: CPT | Performed by: STUDENT IN AN ORGANIZED HEALTH CARE EDUCATION/TRAINING PROGRAM

## 2024-07-15 PROCEDURE — G8427 DOCREV CUR MEDS BY ELIG CLIN: HCPCS | Performed by: STUDENT IN AN ORGANIZED HEALTH CARE EDUCATION/TRAINING PROGRAM

## 2024-07-15 PROCEDURE — 3079F DIAST BP 80-89 MM HG: CPT | Performed by: STUDENT IN AN ORGANIZED HEALTH CARE EDUCATION/TRAINING PROGRAM

## 2024-07-15 PROCEDURE — 3075F SYST BP GE 130 - 139MM HG: CPT | Performed by: STUDENT IN AN ORGANIZED HEALTH CARE EDUCATION/TRAINING PROGRAM

## 2024-07-15 PROCEDURE — 99214 OFFICE O/P EST MOD 30 MIN: CPT | Performed by: STUDENT IN AN ORGANIZED HEALTH CARE EDUCATION/TRAINING PROGRAM

## 2024-07-15 PROCEDURE — G8417 CALC BMI ABV UP PARAM F/U: HCPCS | Performed by: STUDENT IN AN ORGANIZED HEALTH CARE EDUCATION/TRAINING PROGRAM

## 2024-07-15 RX ORDER — ATORVASTATIN CALCIUM 10 MG/1
10 TABLET, FILM COATED ORAL DAILY
Qty: 90 TABLET | Refills: 0 | Status: CANCELLED | OUTPATIENT
Start: 2024-07-15 | End: 2024-10-13

## 2024-07-15 RX ORDER — FUROSEMIDE 20 MG/1
20 TABLET ORAL DAILY
Qty: 60 TABLET | Refills: 1 | Status: SHIPPED | OUTPATIENT
Start: 2024-07-15

## 2024-07-15 ASSESSMENT — PATIENT HEALTH QUESTIONNAIRE - PHQ9
10. IF YOU CHECKED OFF ANY PROBLEMS, HOW DIFFICULT HAVE THESE PROBLEMS MADE IT FOR YOU TO DO YOUR WORK, TAKE CARE OF THINGS AT HOME, OR GET ALONG WITH OTHER PEOPLE: NOT DIFFICULT AT ALL
SUM OF ALL RESPONSES TO PHQ QUESTIONS 1-9: 6
2. FEELING DOWN, DEPRESSED OR HOPELESS: NOT AT ALL
SUM OF ALL RESPONSES TO PHQ QUESTIONS 1-9: 6
4. FEELING TIRED OR HAVING LITTLE ENERGY: NEARLY EVERY DAY
SUM OF ALL RESPONSES TO PHQ QUESTIONS 1-9: 6
7. TROUBLE CONCENTRATING ON THINGS, SUCH AS READING THE NEWSPAPER OR WATCHING TELEVISION: NOT AT ALL
3. TROUBLE FALLING OR STAYING ASLEEP: SEVERAL DAYS
8. MOVING OR SPEAKING SO SLOWLY THAT OTHER PEOPLE COULD HAVE NOTICED. OR THE OPPOSITE, BEING SO FIGETY OR RESTLESS THAT YOU HAVE BEEN MOVING AROUND A LOT MORE THAN USUAL: NOT AT ALL
SUM OF ALL RESPONSES TO PHQ9 QUESTIONS 1 & 2: 0
SUM OF ALL RESPONSES TO PHQ QUESTIONS 1-9: 6
9. THOUGHTS THAT YOU WOULD BE BETTER OFF DEAD, OR OF HURTING YOURSELF: NOT AT ALL
5. POOR APPETITE OR OVEREATING: MORE THAN HALF THE DAYS
6. FEELING BAD ABOUT YOURSELF - OR THAT YOU ARE A FAILURE OR HAVE LET YOURSELF OR YOUR FAMILY DOWN: NOT AT ALL
1. LITTLE INTEREST OR PLEASURE IN DOING THINGS: NOT AT ALL

## 2024-07-15 ASSESSMENT — ENCOUNTER SYMPTOMS
SHORTNESS OF BREATH: 0
VOMITING: 0
DIARRHEA: 1
NAUSEA: 0
RHINORRHEA: 0
VOICE CHANGE: 0
EYE ITCHING: 0
CONSTIPATION: 0
WHEEZING: 1
BACK PAIN: 0
EYE DISCHARGE: 0
COUGH: 0
ABDOMINAL PAIN: 0
TROUBLE SWALLOWING: 0
SORE THROAT: 0
PHOTOPHOBIA: 0

## 2024-07-15 ASSESSMENT — LIFESTYLE VARIABLES
HOW OFTEN DO YOU HAVE A DRINK CONTAINING ALCOHOL: NEVER
HOW MANY STANDARD DRINKS CONTAINING ALCOHOL DO YOU HAVE ON A TYPICAL DAY: PATIENT DOES NOT DRINK

## 2024-07-15 NOTE — PATIENT INSTRUCTIONS
media, and email if it is hard for you to communicate by telephone.  Try to learn a listening technique called speechreading. It is not lipreading. You pay attention to people's gestures, expressions, posture, and tone of voice. These clues can help you understand what a person is saying. Face the person you are talking to, and have them face you. Make sure the lighting is good. You need to see the other person's face clearly.  Think about counseling if you need help to adjust to your hearing loss.  When should you call for help?  Watch closely for changes in your health, and be sure to contact your doctor if:    You think your hearing is getting worse.     You have new symptoms, such as dizziness or nausea.   Where can you learn more?  Go to https://www.[email or web address].net/patientEd and enter R798 to learn more about \"Hearing Loss: Care Instructions.\"  Current as of: September 27, 2023  Content Version: 14.1  © 2006-2024 [employer].   Care instructions adapted under license by [employer]. If you have questions about a medical condition or this instruction, always ask your healthcare professional. [employer] disclaims any warranty or liability for your use of this information.           Learning About Vision Tests  What are vision tests?     The four most common vision tests are visual acuity tests, refraction, visual field tests, and color vision tests.  Visual acuity (sharpness) tests  These tests are used:  To see if you need glasses or contact lenses.  To monitor an eye problem.  To check an eye injury.  Visual acuity tests are done as part of routine exams. You may also have this test when you get your 's license or apply for some types of jobs.  Visual field tests  These tests are used:  To check for vision loss in any area of your range of vision.  To screen for certain eye diseases.  To look for nerve damage after a stroke, head injury, or other problem that could reduce blood

## 2024-07-15 NOTE — PROGRESS NOTES
1. \"Have you been to the ER, urgent care clinic since your last visit?  Hospitalized since your last visit?\"  Yes    2. \"Have you seen or consulted any other health care providers outside of the [facility] since your last visit?\"  Yes, Urgent Care      3. For patients aged 45-75: Has the patient had a colonoscopy / FIT/ Cologuard? NA - based on age    
providers/suppliers regularly involved in providing care):   Patient Care Team:  Milind Durán DO as PCP - General (Family Medicine)  Milind Durán DO as PCP - Empaneled Provider  Greg Iniguez MD as Physician  Jalen Girard MD as Surgeon      Reviewed and updated this visit:  Tobacco  Allergies  Meds  Med Hx  Surg Hx  Soc Hx  Fam Hx          The patient (or guardian, if applicable) and other individuals in attendance with the patient were advised that Artificial Intelligence will be utilized during this visit to record and process the conversation to generate a clinical note. The patient (or guardian, if applicable) and other individuals in attendance at the appointment consented to the use of AI, including the recording.        This note was dictated utilizing voice recognition software which may lead to typographical errors.  I apologize in advance if the situation occurs.  If questions arise please do not hesitate to contact me or call our department.      
Pt given 1tub of oral hydration.

## 2024-09-16 ENCOUNTER — HOSPITAL ENCOUNTER (OUTPATIENT)
Facility: HOSPITAL | Age: 79
Discharge: HOME | End: 2024-09-19

## 2024-09-16 ENCOUNTER — TRANSCRIBE ORDERS (OUTPATIENT)
Facility: HOSPITAL | Age: 79
End: 2024-09-16

## 2024-09-16 DIAGNOSIS — I10 ESSENTIAL HYPERTENSION, MALIGNANT: ICD-10-CM

## 2024-09-16 DIAGNOSIS — E13.69 OTHER SPECIFIED DIABETES MELLITUS WITH OTHER SPECIFIED COMPLICATION, UNSPECIFIED WHETHER LONG TERM INSULIN USE (HCC): ICD-10-CM

## 2024-09-16 DIAGNOSIS — N18.30 STAGE 3 CHRONIC KIDNEY DISEASE, UNSPECIFIED WHETHER STAGE 3A OR 3B CKD (HCC): ICD-10-CM

## 2024-09-16 DIAGNOSIS — N18.30 STAGE 3 CHRONIC KIDNEY DISEASE, UNSPECIFIED WHETHER STAGE 3A OR 3B CKD (HCC): Primary | ICD-10-CM

## 2024-09-16 LAB — LABCORP SPECIMEN COLLECTION: NORMAL

## 2024-09-16 PROCEDURE — 99001 SPECIMEN HANDLING PT-LAB: CPT

## 2024-10-15 ENCOUNTER — OFFICE VISIT (OUTPATIENT)
Facility: CLINIC | Age: 79
End: 2024-10-15
Payer: MEDICARE

## 2024-10-15 VITALS
BODY MASS INDEX: 29.82 KG/M2 | TEMPERATURE: 97.8 F | SYSTOLIC BLOOD PRESSURE: 133 MMHG | DIASTOLIC BLOOD PRESSURE: 78 MMHG | HEIGHT: 71 IN | RESPIRATION RATE: 18 BRPM | WEIGHT: 213 LBS | HEART RATE: 78 BPM | OXYGEN SATURATION: 98 %

## 2024-10-15 DIAGNOSIS — F32.A DEPRESSIVE DISORDER: ICD-10-CM

## 2024-10-15 DIAGNOSIS — Z23 ENCOUNTER FOR IMMUNIZATION: ICD-10-CM

## 2024-10-15 DIAGNOSIS — I10 PRIMARY HYPERTENSION: Primary | ICD-10-CM

## 2024-10-15 DIAGNOSIS — G47.00 INSOMNIA, UNSPECIFIED TYPE: ICD-10-CM

## 2024-10-15 DIAGNOSIS — R19.7 DIARRHEA, UNSPECIFIED TYPE: ICD-10-CM

## 2024-10-15 DIAGNOSIS — Z23 NEED FOR PROPHYLACTIC VACCINATION AND INOCULATION AGAINST VARICELLA: ICD-10-CM

## 2024-10-15 DIAGNOSIS — F17.200 TOBACCO USE DISORDER: ICD-10-CM

## 2024-10-15 DIAGNOSIS — Z23 NEED FOR PROPHYLACTIC VACCINATION AGAINST DIPHTHERIA-TETANUS-PERTUSSIS (DTP): ICD-10-CM

## 2024-10-15 PROCEDURE — 99214 OFFICE O/P EST MOD 30 MIN: CPT | Performed by: STUDENT IN AN ORGANIZED HEALTH CARE EDUCATION/TRAINING PROGRAM

## 2024-10-15 PROCEDURE — 90653 IIV ADJUVANT VACCINE IM: CPT | Performed by: STUDENT IN AN ORGANIZED HEALTH CARE EDUCATION/TRAINING PROGRAM

## 2024-10-15 PROCEDURE — G8427 DOCREV CUR MEDS BY ELIG CLIN: HCPCS | Performed by: STUDENT IN AN ORGANIZED HEALTH CARE EDUCATION/TRAINING PROGRAM

## 2024-10-15 PROCEDURE — 1123F ACP DISCUSS/DSCN MKR DOCD: CPT | Performed by: STUDENT IN AN ORGANIZED HEALTH CARE EDUCATION/TRAINING PROGRAM

## 2024-10-15 PROCEDURE — 3078F DIAST BP <80 MM HG: CPT | Performed by: STUDENT IN AN ORGANIZED HEALTH CARE EDUCATION/TRAINING PROGRAM

## 2024-10-15 PROCEDURE — G8482 FLU IMMUNIZE ORDER/ADMIN: HCPCS | Performed by: STUDENT IN AN ORGANIZED HEALTH CARE EDUCATION/TRAINING PROGRAM

## 2024-10-15 PROCEDURE — G0008 ADMIN INFLUENZA VIRUS VAC: HCPCS | Performed by: STUDENT IN AN ORGANIZED HEALTH CARE EDUCATION/TRAINING PROGRAM

## 2024-10-15 PROCEDURE — 3075F SYST BP GE 130 - 139MM HG: CPT | Performed by: STUDENT IN AN ORGANIZED HEALTH CARE EDUCATION/TRAINING PROGRAM

## 2024-10-15 PROCEDURE — G8417 CALC BMI ABV UP PARAM F/U: HCPCS | Performed by: STUDENT IN AN ORGANIZED HEALTH CARE EDUCATION/TRAINING PROGRAM

## 2024-10-15 PROCEDURE — 4004F PT TOBACCO SCREEN RCVD TLK: CPT | Performed by: STUDENT IN AN ORGANIZED HEALTH CARE EDUCATION/TRAINING PROGRAM

## 2024-10-15 RX ORDER — LOSARTAN POTASSIUM 25 MG/1
TABLET ORAL
COMMUNITY
Start: 2024-10-12

## 2024-10-15 RX ORDER — AMLODIPINE BESYLATE 10 MG/1
10 TABLET ORAL DAILY
Qty: 90 TABLET | Refills: 1 | Status: SHIPPED | OUTPATIENT
Start: 2024-12-02 | End: 2025-05-31

## 2024-10-15 SDOH — ECONOMIC STABILITY: INCOME INSECURITY: HOW HARD IS IT FOR YOU TO PAY FOR THE VERY BASICS LIKE FOOD, HOUSING, MEDICAL CARE, AND HEATING?: NOT HARD AT ALL

## 2024-10-15 SDOH — ECONOMIC STABILITY: FOOD INSECURITY: WITHIN THE PAST 12 MONTHS, YOU WORRIED THAT YOUR FOOD WOULD RUN OUT BEFORE YOU GOT MONEY TO BUY MORE.: NEVER TRUE

## 2024-10-15 SDOH — ECONOMIC STABILITY: FOOD INSECURITY: WITHIN THE PAST 12 MONTHS, THE FOOD YOU BOUGHT JUST DIDN'T LAST AND YOU DIDN'T HAVE MONEY TO GET MORE.: NEVER TRUE

## 2024-10-15 ASSESSMENT — ENCOUNTER SYMPTOMS
NAUSEA: 0
TROUBLE SWALLOWING: 0
WHEEZING: 0
SHORTNESS OF BREATH: 0
SORE THROAT: 0
RHINORRHEA: 0
PHOTOPHOBIA: 0
VOICE CHANGE: 0
EYE ITCHING: 0
COUGH: 0
VOMITING: 0
EYE DISCHARGE: 0
CONSTIPATION: 0
BACK PAIN: 0
DIARRHEA: 1
ABDOMINAL PAIN: 0

## 2024-10-15 NOTE — PROGRESS NOTES
\"Have you been to the ER, urgent care clinic since your last visit?  Hospitalized since your last visit?\"    NO    “Have you seen or consulted any other health care providers outside our system since your last visit?”    YES - When: approximately   ago.  Where and Why: nephrologist.        Click Here for Release of Records Request  
Behavior: Behavior normal.             This note was dictated utilizing voice recognition software which may lead to typographical errors.  I apologize in advance if the situation occurs.  If questions arise please do not hesitate to contact me or call our department.      The patient (or guardian, if applicable) and other individuals in attendance with the patient were advised that Artificial Intelligence will be utilized during this visit to record, process the conversation to generate a clinical note and to support improvement of the AI technology. The patient (or guardian, if applicable) and other individuals in attendance at the appointment consented to the use of AI, including the recording.      An electronic signature was used to authenticate this note.    --Milind Durán,

## 2025-01-13 ENCOUNTER — HOSPITAL ENCOUNTER (OUTPATIENT)
Facility: HOSPITAL | Age: 80
Setting detail: SPECIMEN
Discharge: HOME | End: 2025-01-16

## 2025-01-13 LAB — LABCORP SPECIMEN COLLECTION: NORMAL

## 2025-01-13 PROCEDURE — 99001 SPECIMEN HANDLING PT-LAB: CPT

## 2025-01-17 ENCOUNTER — OFFICE VISIT (OUTPATIENT)
Facility: CLINIC | Age: 80
End: 2025-01-17
Payer: MEDICARE

## 2025-01-17 VITALS
SYSTOLIC BLOOD PRESSURE: 131 MMHG | BODY MASS INDEX: 29.4 KG/M2 | WEIGHT: 210 LBS | HEIGHT: 71 IN | RESPIRATION RATE: 18 BRPM | DIASTOLIC BLOOD PRESSURE: 89 MMHG | OXYGEN SATURATION: 98 % | HEART RATE: 82 BPM | TEMPERATURE: 97.7 F

## 2025-01-17 DIAGNOSIS — R51.9 NONINTRACTABLE HEADACHE, UNSPECIFIED CHRONICITY PATTERN, UNSPECIFIED HEADACHE TYPE: Primary | ICD-10-CM

## 2025-01-17 DIAGNOSIS — N18.30 HYPERTENSIVE HEART AND KIDNEY DISEASE WITHOUT HEART FAILURE AND WITH STAGE 3 CHRONIC KIDNEY DISEASE, UNSPECIFIED WHETHER STAGE 3A OR 3B CKD (HCC): ICD-10-CM

## 2025-01-17 DIAGNOSIS — I48.91 POSTOPERATIVE ATRIAL FIBRILLATION (HCC): ICD-10-CM

## 2025-01-17 DIAGNOSIS — I13.10 HYPERTENSIVE HEART AND KIDNEY DISEASE WITHOUT HEART FAILURE AND WITH STAGE 3 CHRONIC KIDNEY DISEASE, UNSPECIFIED WHETHER STAGE 3A OR 3B CKD (HCC): ICD-10-CM

## 2025-01-17 DIAGNOSIS — I69.354 HEMIPARESIS AFFECTING LEFT SIDE AS LATE EFFECT OF CEREBROVASCULAR ACCIDENT (CVA) (HCC): ICD-10-CM

## 2025-01-17 DIAGNOSIS — I97.89 POSTOPERATIVE ATRIAL FIBRILLATION (HCC): ICD-10-CM

## 2025-01-17 PROCEDURE — 3079F DIAST BP 80-89 MM HG: CPT | Performed by: STUDENT IN AN ORGANIZED HEALTH CARE EDUCATION/TRAINING PROGRAM

## 2025-01-17 PROCEDURE — 1126F AMNT PAIN NOTED NONE PRSNT: CPT | Performed by: STUDENT IN AN ORGANIZED HEALTH CARE EDUCATION/TRAINING PROGRAM

## 2025-01-17 PROCEDURE — 1159F MED LIST DOCD IN RCRD: CPT | Performed by: STUDENT IN AN ORGANIZED HEALTH CARE EDUCATION/TRAINING PROGRAM

## 2025-01-17 PROCEDURE — 1123F ACP DISCUSS/DSCN MKR DOCD: CPT | Performed by: STUDENT IN AN ORGANIZED HEALTH CARE EDUCATION/TRAINING PROGRAM

## 2025-01-17 PROCEDURE — 99213 OFFICE O/P EST LOW 20 MIN: CPT | Performed by: STUDENT IN AN ORGANIZED HEALTH CARE EDUCATION/TRAINING PROGRAM

## 2025-01-17 PROCEDURE — 3075F SYST BP GE 130 - 139MM HG: CPT | Performed by: STUDENT IN AN ORGANIZED HEALTH CARE EDUCATION/TRAINING PROGRAM

## 2025-01-17 SDOH — ECONOMIC STABILITY: FOOD INSECURITY: WITHIN THE PAST 12 MONTHS, THE FOOD YOU BOUGHT JUST DIDN'T LAST AND YOU DIDN'T HAVE MONEY TO GET MORE.: NEVER TRUE

## 2025-01-17 SDOH — ECONOMIC STABILITY: FOOD INSECURITY: WITHIN THE PAST 12 MONTHS, YOU WORRIED THAT YOUR FOOD WOULD RUN OUT BEFORE YOU GOT MONEY TO BUY MORE.: NEVER TRUE

## 2025-01-17 ASSESSMENT — ENCOUNTER SYMPTOMS
DIARRHEA: 0
COUGH: 0
ABDOMINAL PAIN: 0
CONSTIPATION: 0
RHINORRHEA: 0
NAUSEA: 0
SORE THROAT: 0
VOICE CHANGE: 0
EYE DISCHARGE: 0
BACK PAIN: 0
WHEEZING: 0
PHOTOPHOBIA: 0
EYE ITCHING: 0
VOMITING: 0
TROUBLE SWALLOWING: 0
SHORTNESS OF BREATH: 0

## 2025-01-17 ASSESSMENT — PATIENT HEALTH QUESTIONNAIRE - PHQ9
7. TROUBLE CONCENTRATING ON THINGS, SUCH AS READING THE NEWSPAPER OR WATCHING TELEVISION: NOT AT ALL
8. MOVING OR SPEAKING SO SLOWLY THAT OTHER PEOPLE COULD HAVE NOTICED. OR THE OPPOSITE, BEING SO FIGETY OR RESTLESS THAT YOU HAVE BEEN MOVING AROUND A LOT MORE THAN USUAL: NOT AT ALL
SUM OF ALL RESPONSES TO PHQ QUESTIONS 1-9: 7
SUM OF ALL RESPONSES TO PHQ QUESTIONS 1-9: 7
SUM OF ALL RESPONSES TO PHQ9 QUESTIONS 1 & 2: 0
9. THOUGHTS THAT YOU WOULD BE BETTER OFF DEAD, OR OF HURTING YOURSELF: NOT AT ALL
SUM OF ALL RESPONSES TO PHQ QUESTIONS 1-9: 7
4. FEELING TIRED OR HAVING LITTLE ENERGY: NEARLY EVERY DAY
1. LITTLE INTEREST OR PLEASURE IN DOING THINGS: NOT AT ALL
5. POOR APPETITE OR OVEREATING: NEARLY EVERY DAY
2. FEELING DOWN, DEPRESSED OR HOPELESS: NOT AT ALL
3. TROUBLE FALLING OR STAYING ASLEEP: SEVERAL DAYS
10. IF YOU CHECKED OFF ANY PROBLEMS, HOW DIFFICULT HAVE THESE PROBLEMS MADE IT FOR YOU TO DO YOUR WORK, TAKE CARE OF THINGS AT HOME, OR GET ALONG WITH OTHER PEOPLE: NOT DIFFICULT AT ALL
SUM OF ALL RESPONSES TO PHQ QUESTIONS 1-9: 7
6. FEELING BAD ABOUT YOURSELF - OR THAT YOU ARE A FAILURE OR HAVE LET YOURSELF OR YOUR FAMILY DOWN: NOT AT ALL

## 2025-01-17 NOTE — PROGRESS NOTES
Joo Norris (:  1945) is a 79 y.o. male, Established patient, here for evaluation of the following chief complaint(s):  Fall (Patient was on the porch and he stepped down and fell on his back on 1/3/2025. He did not go to the ER when he fell and he's been complaining of headaches. ) and Diarrhea (X's 2 months.)         Assessment & Plan  1. Headaches.  He has been experiencing headaches since a fall on 2024. The headaches occur daily around 6:00 PM and last for a few minutes. There is no associated blurry vision, loss of consciousness, dizziness, or vomiting. He rates the pain at about a 3 out of 10. Given the history of the fall and persistent headaches, monitoring is advised. If the headaches worsen or new symptoms such as confusion or blurry vision arise, he should go to the ER for a CT scan.    2. Loose stools.  He has been experiencing loose stools since October. He passes stool once a day, sometimes twice, and does not consume dairy products. His appetite is low, and he has lost about 3 pounds since October. Monitoring will continue, and further evaluation will be done if there is no improvement.    3. Proteinuria.  He has been advised by his nephrologist to undergo lab work due to abnormal protein levels in his urine. He visited the nephrologist yesterday, who recommended further tests and a follow-up in 3 months. He should complete the lab work at [facility] or [facility] as instructed by the nephrologist.    Follow-up  The patient will follow up in 4 weeks.    Results    1. Nonintractable headache, unspecified chronicity pattern, unspecified headache type  2. Hemiparesis affecting left side as late effect of cerebrovascular accident (CVA) ([facility])  Assessment & Plan:   Monitored by specialist- no acute findings meriting change in the plan  3. Postoperative atrial fibrillation (HCC)  Assessment & Plan:   Monitored by specialist- no acute findings meriting change in the plan  4.

## 2025-04-21 ENCOUNTER — APPOINTMENT (OUTPATIENT)
Facility: HOSPITAL | Age: 80
End: 2025-04-21
Payer: MEDICARE

## 2025-04-21 ENCOUNTER — HOSPITAL ENCOUNTER (EMERGENCY)
Facility: HOSPITAL | Age: 80
Discharge: HOME HEALTH | End: 2025-04-21
Payer: MEDICARE

## 2025-04-21 VITALS
RESPIRATION RATE: 18 BRPM | HEART RATE: 64 BPM | HEIGHT: 71 IN | DIASTOLIC BLOOD PRESSURE: 78 MMHG | WEIGHT: 206.6 LBS | TEMPERATURE: 97.8 F | SYSTOLIC BLOOD PRESSURE: 130 MMHG | OXYGEN SATURATION: 100 % | BODY MASS INDEX: 28.92 KG/M2

## 2025-04-21 DIAGNOSIS — N18.30 STAGE 3 CHRONIC KIDNEY DISEASE, UNSPECIFIED WHETHER STAGE 3A OR 3B CKD (HCC): ICD-10-CM

## 2025-04-21 DIAGNOSIS — M54.2 NECK PAIN: ICD-10-CM

## 2025-04-21 DIAGNOSIS — M43.6 TORTICOLLIS: Primary | ICD-10-CM

## 2025-04-21 DIAGNOSIS — M62.838 TRAPEZIUS MUSCLE SPASM: ICD-10-CM

## 2025-04-21 LAB
ALBUMIN SERPL-MCNC: 3.8 G/DL (ref 3.4–5)
ALBUMIN/GLOB SERPL: 1.2 (ref 0.8–1.7)
ALP SERPL-CCNC: 81 U/L (ref 45–117)
ALT SERPL-CCNC: 26 U/L (ref 16–61)
ANION GAP SERPL CALC-SCNC: 6 MMOL/L (ref 3–18)
AST SERPL-CCNC: 16 U/L (ref 10–38)
BASOPHILS # BLD: 0.05 K/UL (ref 0–0.1)
BASOPHILS NFR BLD: 0.5 % (ref 0–2)
BILIRUB SERPL-MCNC: 0.6 MG/DL (ref 0.2–1)
BUN SERPL-MCNC: 19 MG/DL (ref 7–18)
BUN/CREAT SERPL: 13 (ref 12–20)
CALCIUM SERPL-MCNC: 9.7 MG/DL (ref 8.5–10.1)
CHLORIDE SERPL-SCNC: 108 MMOL/L (ref 100–111)
CO2 SERPL-SCNC: 25 MMOL/L (ref 21–32)
CREAT SERPL-MCNC: 1.52 MG/DL (ref 0.6–1.3)
DIFFERENTIAL METHOD BLD: ABNORMAL
EKG DIAGNOSIS: NORMAL
EKG Q-T INTERVAL: 416 MS
EKG QRS DURATION: 110 MS
EKG QTC CALCULATION (BAZETT): 418 MS
EKG R AXIS: 107 DEGREES
EKG T AXIS: 0 DEGREES
EKG VENTRICULAR RATE: 61 BPM
EOSINOPHIL # BLD: 0.07 K/UL (ref 0–0.4)
EOSINOPHIL NFR BLD: 0.7 % (ref 0–5)
ERYTHROCYTE [DISTWIDTH] IN BLOOD BY AUTOMATED COUNT: 15.4 % (ref 11.6–14.5)
GLOBULIN SER CALC-MCNC: 3.3 G/DL (ref 2–4)
GLUCOSE SERPL-MCNC: 92 MG/DL (ref 74–99)
HCT VFR BLD AUTO: 38.6 % (ref 36–48)
HGB BLD-MCNC: 12.8 G/DL (ref 13–16)
IMM GRANULOCYTES # BLD AUTO: 0.05 K/UL (ref 0–0.04)
IMM GRANULOCYTES NFR BLD AUTO: 0.5 % (ref 0–0.5)
LYMPHOCYTES # BLD: 2.02 K/UL (ref 0.9–3.6)
LYMPHOCYTES NFR BLD: 19.4 % (ref 21–52)
MCH RBC QN AUTO: 27.8 PG (ref 24–34)
MCHC RBC AUTO-ENTMCNC: 33.2 G/DL (ref 31–37)
MCV RBC AUTO: 83.7 FL (ref 78–100)
MONOCYTES # BLD: 0.73 K/UL (ref 0.05–1.2)
MONOCYTES NFR BLD: 7 % (ref 3–10)
NEUTS SEG # BLD: 7.5 K/UL (ref 1.8–8)
NEUTS SEG NFR BLD: 71.9 % (ref 40–73)
NRBC # BLD: 0 K/UL (ref 0–0.01)
NRBC BLD-RTO: 0 PER 100 WBC
NT PRO BNP: 65 PG/ML (ref 0–1800)
PLATELET # BLD AUTO: 182 K/UL (ref 135–420)
PMV BLD AUTO: 12 FL (ref 9.2–11.8)
POTASSIUM SERPL-SCNC: 3.8 MMOL/L (ref 3.5–5.5)
PROCALCITONIN SERPL-MCNC: <0.05 NG/ML
PROT SERPL-MCNC: 7.1 G/DL (ref 6.4–8.2)
RBC # BLD AUTO: 4.61 M/UL (ref 4.35–5.65)
SODIUM SERPL-SCNC: 139 MMOL/L (ref 136–145)
TROPONIN I SERPL HS-MCNC: 19 NG/L (ref 0–78)
WBC # BLD AUTO: 10.4 K/UL (ref 4.6–13.2)

## 2025-04-21 PROCEDURE — 71045 X-RAY EXAM CHEST 1 VIEW: CPT

## 2025-04-21 PROCEDURE — 83880 ASSAY OF NATRIURETIC PEPTIDE: CPT

## 2025-04-21 PROCEDURE — 93005 ELECTROCARDIOGRAM TRACING: CPT | Performed by: EMERGENCY MEDICINE

## 2025-04-21 PROCEDURE — 6370000000 HC RX 637 (ALT 250 FOR IP)

## 2025-04-21 PROCEDURE — 84484 ASSAY OF TROPONIN QUANT: CPT

## 2025-04-21 PROCEDURE — 80053 COMPREHEN METABOLIC PANEL: CPT

## 2025-04-21 PROCEDURE — 84145 PROCALCITONIN (PCT): CPT

## 2025-04-21 PROCEDURE — 99285 EMERGENCY DEPT VISIT HI MDM: CPT

## 2025-04-21 PROCEDURE — 93010 ELECTROCARDIOGRAM REPORT: CPT | Performed by: INTERNAL MEDICINE

## 2025-04-21 PROCEDURE — 73030 X-RAY EXAM OF SHOULDER: CPT

## 2025-04-21 PROCEDURE — 85025 COMPLETE CBC W/AUTO DIFF WBC: CPT

## 2025-04-21 RX ORDER — LIDOCAINE 4 G/G
1 PATCH TOPICAL
Status: DISCONTINUED | OUTPATIENT
Start: 2025-04-21 | End: 2025-04-21 | Stop reason: HOSPADM

## 2025-04-21 RX ORDER — LIDOCAINE 50 MG/G
1 PATCH TOPICAL DAILY
Qty: 10 PATCH | Refills: 0 | Status: SHIPPED | OUTPATIENT
Start: 2025-04-21 | End: 2025-05-01

## 2025-04-21 RX ORDER — TIZANIDINE 2 MG/1
2 TABLET ORAL NIGHTLY PRN
Qty: 3 TABLET | Refills: 0 | Status: SHIPPED | OUTPATIENT
Start: 2025-04-21 | End: 2025-04-24

## 2025-04-21 RX ORDER — TIZANIDINE 2 MG/1
2 TABLET ORAL ONCE
Status: DISCONTINUED | OUTPATIENT
Start: 2025-04-21 | End: 2025-04-21

## 2025-04-21 ASSESSMENT — PAIN DESCRIPTION - LOCATION: LOCATION: NECK

## 2025-04-21 ASSESSMENT — PAIN SCALES - GENERAL
PAINLEVEL_OUTOF10: 2
PAINLEVEL_OUTOF10: 3

## 2025-04-21 ASSESSMENT — PAIN - FUNCTIONAL ASSESSMENT
PAIN_FUNCTIONAL_ASSESSMENT: 0-10
PAIN_FUNCTIONAL_ASSESSMENT: PREVENTS OR INTERFERES SOME ACTIVE ACTIVITIES AND ADLS
PAIN_FUNCTIONAL_ASSESSMENT: 0-10

## 2025-04-21 ASSESSMENT — PAIN DESCRIPTION - ORIENTATION: ORIENTATION: RIGHT

## 2025-04-21 NOTE — DISCHARGE INSTRUCTIONS
Presented to ER with complaints of neck pain.  We discussed all lab and diagnostic studies.  We discussed management with heat, gentle stretching, pillow adjustments and topical lidocaine patches.  He may also utilize tizanidine the muscle relaxer as needed for spasms.  Please follow-up outpatient with your primary care doctor return.  Return for new or concerning

## 2025-04-25 NOTE — ED PROVIDER NOTES
[facility] EMERGENCY DEPARTMENT  EMERGENCY DEPARTMENT ENCOUNTER        Pt Name: Joo Norris  MRN: [id number]  Birthdate 1945  Date of evaluation: 4/21/2025  Provider: Jeannie Hoyos PA-C  PCP: Milind Durán DO  Note Started: 8:35 PM EDT 4/24/25      JIGNA. I have evaluated this patient.        CHIEF COMPLAINT       Chief Complaint   Patient presents with    Neck Pain       HISTORY OF PRESENT ILLNESS: 1 or more Elements     History From: Patient     Limitations to history : None    Social Determinants Significantly Affecting Health : None    Chief Complaint: Neck pain     Joo Norris is a 79 y.o. male who presents with a past medical history of CKD, HLD, pancreatitis, CVA with residual deficit who presents with complaints of right sided neck pain.  He states that he was leaning on the couch last night leaning on his right arm for a long period of time and he felt stiffness on the right side of his neck.  He did not any fall, injury, trauma.  He denies any numbness, tingling.  He states that he does not have any headache or dizziness.  He denies any chest pain or shortness of breath at this time but told his daughters that the pain in his neck did radiate to his right shoulder.  He not take any medication prior to arrival.  Denies any low back pain, bowel or bladder dysfunction, numbness, tingling.      Nursing Notes were all reviewed and agreed with or any disagreements were addressed in the HPI.    REVIEW OF SYSTEMS :      Review of Systems   Constitutional: Negative.    HENT: Negative.     Eyes: Negative.    Respiratory: Negative.     Cardiovascular: Negative.    Gastrointestinal: Negative.    Endocrine: Negative.    Musculoskeletal:  Positive for neck pain. Negative for back pain.   Skin: Negative.    Neurological: Negative.    Psychiatric/Behavioral: Negative.         Positives and Pertinent negatives as per HPI.     SURGICAL HISTORY     Past Surgical History:

## 2025-04-26 ASSESSMENT — ENCOUNTER SYMPTOMS
RESPIRATORY NEGATIVE: 1
BACK PAIN: 0
GASTROINTESTINAL NEGATIVE: 1
EYES NEGATIVE: 1

## 2025-05-13 ENCOUNTER — HOSPITAL ENCOUNTER (OUTPATIENT)
Age: 80
Setting detail: SPECIMEN
Discharge: HOME | End: 2025-05-16

## 2025-05-13 LAB — LABCORP SPECIMEN COLLECTION: NORMAL

## 2025-06-06 DIAGNOSIS — F32.A DEPRESSIVE DISORDER: ICD-10-CM

## 2025-06-06 NOTE — TELEPHONE ENCOUNTER
Last Visit: 1/17/2025   Next Appointment: 7/18/2025  Previous Refill Encounter: 7/15/2024 #90 with 1 refills    Requested Prescriptions     Pending Prescriptions Disp Refills    sertraline (ZOLOFT) 50 MG tablet [Pharmacy Med Name: Sertraline HCl 50 MG Oral Tablet] 90 tablet 0     Sig: Take 1 tablet by mouth once daily

## 2025-07-07 ENCOUNTER — TELEPHONE (OUTPATIENT)
Facility: CLINIC | Age: 80
End: 2025-07-07

## 2025-07-07 NOTE — TELEPHONE ENCOUNTER
Patient's daughter/niece called requesting acute appt, states preferred not to be seen by current provider states feels that only questions are asked but nothing is done, stated that provider is currently out of office so would schedule with another provider also advised that detailed questions are needed to understand and provide treatment, very important to provide complete information, stated patient has been constipated for over a week, not having much of an appetite and a overall feeling of \"not well\", no other details given, then stated that she did not want to take him to the ED so she wanted the constipation symptom removed from the list even though it was the main concern listed

## 2025-07-08 ENCOUNTER — OFFICE VISIT (OUTPATIENT)
Facility: CLINIC | Age: 80
End: 2025-07-08
Payer: MEDICARE

## 2025-07-08 VITALS
HEIGHT: 69 IN | DIASTOLIC BLOOD PRESSURE: 75 MMHG | WEIGHT: 191.6 LBS | SYSTOLIC BLOOD PRESSURE: 120 MMHG | OXYGEN SATURATION: 95 % | HEART RATE: 63 BPM | TEMPERATURE: 98 F | BODY MASS INDEX: 28.38 KG/M2 | RESPIRATION RATE: 12 BRPM

## 2025-07-08 DIAGNOSIS — K59.09 CHRONIC CONSTIPATION WITH OVERFLOW INCONTINENCE: ICD-10-CM

## 2025-07-08 DIAGNOSIS — R79.9 ABNORMAL BLOOD CHEMISTRY: ICD-10-CM

## 2025-07-08 DIAGNOSIS — K52.9 CHRONIC DIARRHEA: Primary | ICD-10-CM

## 2025-07-08 PROCEDURE — 3074F SYST BP LT 130 MM HG: CPT | Performed by: INTERNAL MEDICINE

## 2025-07-08 PROCEDURE — 3078F DIAST BP <80 MM HG: CPT | Performed by: INTERNAL MEDICINE

## 2025-07-08 PROCEDURE — 1123F ACP DISCUSS/DSCN MKR DOCD: CPT | Performed by: INTERNAL MEDICINE

## 2025-07-08 PROCEDURE — 1159F MED LIST DOCD IN RCRD: CPT | Performed by: INTERNAL MEDICINE

## 2025-07-08 PROCEDURE — 99214 OFFICE O/P EST MOD 30 MIN: CPT | Performed by: INTERNAL MEDICINE

## 2025-07-08 ASSESSMENT — PATIENT HEALTH QUESTIONNAIRE - PHQ9
2. FEELING DOWN, DEPRESSED OR HOPELESS: NOT AT ALL
SUM OF ALL RESPONSES TO PHQ QUESTIONS 1-9: 0
1. LITTLE INTEREST OR PLEASURE IN DOING THINGS: NOT AT ALL

## 2025-07-08 ASSESSMENT — ENCOUNTER SYMPTOMS
SHORTNESS OF BREATH: 0
NAUSEA: 0
COUGH: 0
ABDOMINAL PAIN: 0
DIARRHEA: 1

## 2025-07-08 NOTE — PROGRESS NOTES
Joo Norris is a 79 y.o. male (: 1945) presenting to address:    Chief Complaint   Patient presents with    Constipation       There were no vitals filed for this visit.    \"Have you been to the ER, urgent care clinic since your last visit?  Hospitalized since your last visit?\"    NO    “Have you seen or consulted any other health care providers outside of [facility] since your last visit?”    NO           
    PHYSICAL EXAM  Physical Exam  Vitals and nursing note reviewed.   Constitutional:       Appearance: Normal appearance. He is normal weight.   HENT:      Head: Normocephalic and atraumatic.   Cardiovascular:      Rate and Rhythm: Normal rate and regular rhythm.      Pulses: Normal pulses.      Heart sounds: Normal heart sounds.   Pulmonary:      Effort: Pulmonary effort is normal.      Breath sounds: Normal breath sounds.   Abdominal:      General: Abdomen is flat and protuberant. A surgical scar is present. Bowel sounds are decreased.      Palpations: Abdomen is soft.      Tenderness: There is generalized abdominal tenderness (Mild tenderness to palpation).      Hernia: A hernia is present. Hernia is present in the ventral area.   Musculoskeletal:         General: Normal range of motion.      Cervical back: Neck supple.   Skin:     General: Skin is warm and dry.   Neurological:      Mental Status: He is alert and oriented to person, place, and time.   Psychiatric:         Mood and Affect: Mood normal.         Behavior: Behavior normal.           LABS:  Lab Results   Component Value Date    WBC 10.4 04/21/2025    HGB 12.8 (L) 04/21/2025    HCT 38.6 04/21/2025    MCV 83.7 04/21/2025     04/21/2025     Lab Results   Component Value Date     04/21/2025    K 3.8 04/21/2025     04/21/2025    CO2 25 04/21/2025    BUN 19 (H) 04/21/2025    CREATININE 1.52 (H) 04/21/2025    GLUCOSE 92 04/21/2025    CALCIUM 9.7 04/21/2025    BILITOT 0.6 04/21/2025    ALKPHOS 81 04/21/2025    AST 16 04/21/2025    ALT 26 04/21/2025    LABGLOM 46 (L) 04/21/2025    GFRAA >60 02/04/2022    AGRATIO 0.9 02/04/2022    GLOB 3.3 04/21/2025     Lab Results   Component Value Date    CHOL 137 01/23/2024    TRIG 86 01/23/2024    HDL 47 01/23/2024    LDL 72.8 01/23/2024    VLDL 17.2 01/23/2024    CHOLHDLRATIO 2.9 01/23/2024      No results found for: \"LABA1C\"   Lab Results   Component Value Date/Time    APPEARANCE CLEAR 03/29/2024 
No

## 2025-07-09 LAB
ALBUMIN SERPL-MCNC: 4.1 G/DL (ref 3.8–4.8)
ALP SERPL-CCNC: 80 IU/L (ref 44–121)
ALT SERPL-CCNC: 40 IU/L (ref 0–44)
APPEARANCE UR: CLEAR
AST SERPL-CCNC: 41 IU/L (ref 0–40)
BASOPHILS # BLD AUTO: 0.1 X10E3/UL (ref 0–0.2)
BASOPHILS NFR BLD AUTO: 1 %
BILIRUB SERPL-MCNC: 0.4 MG/DL (ref 0–1.2)
BILIRUB UR QL STRIP: NEGATIVE
BUN SERPL-MCNC: 22 MG/DL (ref 8–27)
BUN/CREAT SERPL: 15 (ref 10–24)
CALCIUM SERPL-MCNC: 10.7 MG/DL (ref 8.6–10.2)
CHLORIDE SERPL-SCNC: 100 MMOL/L (ref 96–106)
CHOLEST SERPL-MCNC: 113 MG/DL (ref 100–199)
CO2 SERPL-SCNC: 21 MMOL/L (ref 20–29)
COLOR UR: YELLOW
CREAT SERPL-MCNC: 1.48 MG/DL (ref 0.76–1.27)
EGFRCR SERPLBLD CKD-EPI 2021: 48 ML/MIN/1.73
EOSINOPHIL # BLD AUTO: 0.2 X10E3/UL (ref 0–0.4)
EOSINOPHIL NFR BLD AUTO: 2 %
ERYTHROCYTE [DISTWIDTH] IN BLOOD BY AUTOMATED COUNT: 13.9 % (ref 11.6–15.4)
GLOBULIN SER CALC-MCNC: 2.7 G/DL (ref 1.5–4.5)
GLUCOSE SERPL-MCNC: 91 MG/DL (ref 70–99)
GLUCOSE UR QL STRIP: ABNORMAL
HCT VFR BLD AUTO: 37.7 % (ref 37.5–51)
HDLC SERPL-MCNC: 38 MG/DL
HGB BLD-MCNC: 11.7 G/DL (ref 13–17.7)
HGB UR QL STRIP: NEGATIVE
IMM GRANULOCYTES # BLD AUTO: 0.1 X10E3/UL (ref 0–0.1)
IMM GRANULOCYTES NFR BLD AUTO: 1 %
KETONES UR QL STRIP: NEGATIVE
LDLC SERPL CALC-MCNC: 59 MG/DL (ref 0–99)
LEUKOCYTE ESTERASE UR QL STRIP: NEGATIVE
LYMPHOCYTES # BLD AUTO: 1.5 X10E3/UL (ref 0.7–3.1)
LYMPHOCYTES NFR BLD AUTO: 17 %
MCH RBC QN AUTO: 27.3 PG (ref 26.6–33)
MCHC RBC AUTO-ENTMCNC: 31 G/DL (ref 31.5–35.7)
MCV RBC AUTO: 88 FL (ref 79–97)
MONOCYTES # BLD AUTO: 0.8 X10E3/UL (ref 0.1–0.9)
MONOCYTES NFR BLD AUTO: 10 %
NEUTROPHILS # BLD AUTO: 5.9 X10E3/UL (ref 1.4–7)
NEUTROPHILS NFR BLD AUTO: 69 %
NITRITE UR QL STRIP: NEGATIVE
PH UR STRIP: 6 [PH] (ref 5–7.5)
PLATELET # BLD AUTO: 270 X10E3/UL (ref 150–450)
POTASSIUM SERPL-SCNC: 3.7 MMOL/L (ref 3.5–5.2)
PROT SERPL-MCNC: 6.8 G/DL (ref 6–8.5)
PROT UR QL STRIP: ABNORMAL
RBC # BLD AUTO: 4.28 X10E6/UL (ref 4.14–5.8)
SODIUM SERPL-SCNC: 138 MMOL/L (ref 134–144)
SP GR UR STRIP: 1.02 (ref 1–1.03)
TRIGL SERPL-MCNC: 82 MG/DL (ref 0–149)
UROBILINOGEN UR STRIP-MCNC: 0.2 MG/DL (ref 0.2–1)
VLDLC SERPL CALC-MCNC: 16 MG/DL (ref 5–40)
WBC # BLD AUTO: 8.4 X10E3/UL (ref 3.4–10.8)

## 2025-07-11 LAB
EST. AVERAGE GLUCOSE BLD GHB EST-MCNC: 117 MG/DL
HBA1C MFR BLD: 5.7 %HB

## 2025-07-25 ENCOUNTER — HOSPITAL ENCOUNTER (OUTPATIENT)
Age: 80
Discharge: HOME | End: 2025-07-28
Payer: MEDICARE

## 2025-07-25 DIAGNOSIS — K59.09 CHRONIC CONSTIPATION WITH OVERFLOW INCONTINENCE: ICD-10-CM

## 2025-07-25 DIAGNOSIS — K52.9 CHRONIC DIARRHEA: ICD-10-CM

## 2025-07-25 PROCEDURE — 74176 CT ABD & PELVIS W/O CONTRAST: CPT

## 2025-07-28 ENCOUNTER — OFFICE VISIT (OUTPATIENT)
Facility: CLINIC | Age: 80
End: 2025-07-28

## 2025-07-28 VITALS
RESPIRATION RATE: 18 BRPM | DIASTOLIC BLOOD PRESSURE: 84 MMHG | OXYGEN SATURATION: 94 % | SYSTOLIC BLOOD PRESSURE: 132 MMHG | HEART RATE: 68 BPM | WEIGHT: 195.2 LBS | BODY MASS INDEX: 28.91 KG/M2 | HEIGHT: 69 IN | TEMPERATURE: 97.5 F

## 2025-07-28 DIAGNOSIS — K58.2 IRRITABLE BOWEL SYNDROME WITH BOTH CONSTIPATION AND DIARRHEA: Primary | ICD-10-CM

## 2025-07-28 DIAGNOSIS — R51.9 ACUTE NONINTRACTABLE HEADACHE, UNSPECIFIED HEADACHE TYPE: ICD-10-CM

## 2025-07-28 RX ORDER — DICYCLOMINE HCL 20 MG
20 TABLET ORAL 3 TIMES DAILY
Qty: 90 TABLET | Refills: 1 | Status: SHIPPED | OUTPATIENT
Start: 2025-07-28

## 2025-07-28 SDOH — ECONOMIC STABILITY: FOOD INSECURITY: WITHIN THE PAST 12 MONTHS, THE FOOD YOU BOUGHT JUST DIDN'T LAST AND YOU DIDN'T HAVE MONEY TO GET MORE.: NEVER TRUE

## 2025-07-28 SDOH — ECONOMIC STABILITY: FOOD INSECURITY: WITHIN THE PAST 12 MONTHS, YOU WORRIED THAT YOUR FOOD WOULD RUN OUT BEFORE YOU GOT MONEY TO BUY MORE.: NEVER TRUE

## 2025-07-28 ASSESSMENT — PATIENT HEALTH QUESTIONNAIRE - PHQ9
SUM OF ALL RESPONSES TO PHQ QUESTIONS 1-9: 9
SUM OF ALL RESPONSES TO PHQ QUESTIONS 1-9: 9
2. FEELING DOWN, DEPRESSED OR HOPELESS: MORE THAN HALF THE DAYS
6. FEELING BAD ABOUT YOURSELF - OR THAT YOU ARE A FAILURE OR HAVE LET YOURSELF OR YOUR FAMILY DOWN: NOT AT ALL
7. TROUBLE CONCENTRATING ON THINGS, SUCH AS READING THE NEWSPAPER OR WATCHING TELEVISION: NOT AT ALL
SUM OF ALL RESPONSES TO PHQ QUESTIONS 1-9: 9
10. IF YOU CHECKED OFF ANY PROBLEMS, HOW DIFFICULT HAVE THESE PROBLEMS MADE IT FOR YOU TO DO YOUR WORK, TAKE CARE OF THINGS AT HOME, OR GET ALONG WITH OTHER PEOPLE: SOMEWHAT DIFFICULT
4. FEELING TIRED OR HAVING LITTLE ENERGY: NEARLY EVERY DAY
9. THOUGHTS THAT YOU WOULD BE BETTER OFF DEAD, OR OF HURTING YOURSELF: NOT AT ALL
SUM OF ALL RESPONSES TO PHQ QUESTIONS 1-9: 9
8. MOVING OR SPEAKING SO SLOWLY THAT OTHER PEOPLE COULD HAVE NOTICED. OR THE OPPOSITE, BEING SO FIGETY OR RESTLESS THAT YOU HAVE BEEN MOVING AROUND A LOT MORE THAN USUAL: SEVERAL DAYS
5. POOR APPETITE OR OVEREATING: MORE THAN HALF THE DAYS
1. LITTLE INTEREST OR PLEASURE IN DOING THINGS: SEVERAL DAYS
3. TROUBLE FALLING OR STAYING ASLEEP: NOT AT ALL

## 2025-07-28 NOTE — PROGRESS NOTES
Have you been to the ER, urgent care clinic since your last visit?  Hospitalized since your last visit?   NO    Have you seen or consulted any other health care providers outside our system since your last visit?   NO    “Have you had a eye exam?”    NO 2023    No eye exam on file

## 2025-07-28 NOTE — PROGRESS NOTES
Patient given Questionnaires for PABLO-7 & PHQ-9 to be filled out before their appointment with the provider, thank you.

## 2025-08-11 DIAGNOSIS — I10 PRIMARY HYPERTENSION: ICD-10-CM

## 2025-08-12 ENCOUNTER — HOSPITAL ENCOUNTER (OUTPATIENT)
Age: 80
Setting detail: SPECIMEN
Discharge: HOME | End: 2025-08-15

## 2025-08-12 LAB — LABCORP SPECIMEN COLLECTION: NORMAL

## 2025-08-12 RX ORDER — AMLODIPINE BESYLATE 10 MG/1
10 TABLET ORAL DAILY
Qty: 90 TABLET | Refills: 0 | Status: SHIPPED | OUTPATIENT
Start: 2025-08-12

## 2025-08-12 RX ORDER — FUROSEMIDE 20 MG/1
20 TABLET ORAL DAILY
Qty: 60 TABLET | Refills: 0 | Status: SHIPPED | OUTPATIENT
Start: 2025-08-12

## (undated) DEVICE — Device

## (undated) DEVICE — KIT CLN UP BON SECOURS MARYV

## (undated) DEVICE — FLUFF AND POLYMER UNDERPAD,EXTRA HEAVY: Brand: WINGS

## (undated) DEVICE — BLADELESS OPTICAL TROCAR WITH FIXATION CANNULA: Brand: VERSAONE

## (undated) DEVICE — MEDI-VAC NON-CONDUCTIVE SUCTION TUBING: Brand: CARDINAL HEALTH

## (undated) DEVICE — SYR 10ML LUER LOK 1/5ML GRAD --

## (undated) DEVICE — CANNULA ORIG TL CLR W FOAM CUSHIONS AND 14FT SUPL TB 3 CHN

## (undated) DEVICE — 3M™ STERI-STRIP™ COMPOUND BENZOIN TINCTURE 40 BAGS/CARTON 4 CARTONS/CASE C1544: Brand: 3M™ STERI-STRIP™

## (undated) DEVICE — SOLUTION LACTATED RINGERS INJECTION USP

## (undated) DEVICE — GOWN ISOL IMPERV UNIV, DISP, OPEN BACK, BLUE --

## (undated) DEVICE — LIGHT HANDLE COVER ,2 PER PACK: Brand: DEROYAL

## (undated) DEVICE — TUBING IRRIG PRESSURIZED BG SET SPIK PRB + II

## (undated) DEVICE — ABDOMINAL PAD: Brand: DERMACEA

## (undated) DEVICE — SOFT SILICONE HYDROCELLULAR SACRUM DRESSING WITH LOCK AWAY LAYER: Brand: ALLEVYN LIFE SACRUM (LARGE) PACK OF 10

## (undated) DEVICE — SOLUTION IRRIG 1000ML H2O STRL BLT

## (undated) DEVICE — SNARE POLYP M W27MMXL240CM OVL STIFF DISP CAPTIVATOR

## (undated) DEVICE — GAUZE,SPONGE,4"X4",16PLY,STRL,LF,10/TRAY: Brand: MEDLINE

## (undated) DEVICE — ELECTRODE ES L34CM DIA5MM HK PRB + II ENDOPATH

## (undated) DEVICE — NON-WOVEN SPONGES,4 PLY: Brand: DERMACEA

## (undated) DEVICE — GLOVE SURG SZ 8 L11.77IN FNGR THK9.8MIL STRW LTX POLYMER

## (undated) DEVICE — ENDOSCOPY PUMP TUBING/ CAP SET: Brand: ERBE

## (undated) DEVICE — SPONGE LAP 18X18IN STRL -- 5/PK

## (undated) DEVICE — CORD ES L10FT MPLR LAP

## (undated) DEVICE — RELOAD STPL SZ 0 L45MM DIA3.5MM 0DEG STD REG TISS BLU TI

## (undated) DEVICE — SUTURE VCRL SZ 4-0 L27IN ABSRB UD L19MM PS-2 3/8 CIR PRIM J426H

## (undated) DEVICE — SUTURE PERMAHAND SZ 3-0 L18IN NONABSORBABLE BLK L26MM SH C013D

## (undated) DEVICE — KENDALL SCD EXPRESS SLEEVES, KNEE LENGTH, MEDIUM: Brand: KENDALL SCD

## (undated) DEVICE — RELOAD STPL 45MM THCK TISS GRN W/ GRIPPING SURF TECHNOLOGY

## (undated) DEVICE — HANDLE PRB DIA5MM HND CTRL PSTL GRP ENDOPATH PRB + II

## (undated) DEVICE — 2, DISPOSABLE SUCTION/IRRIGATOR WITH DISPOSABLE TIP: Brand: STRYKEFLOW

## (undated) DEVICE — INTENDED FOR TISSUE SEPARATION, AND OTHER PROCEDURES THAT REQUIRE A SHARP SURGICAL BLADE TO PUNCTURE OR CUT.: Brand: BARD-PARKER SAFETY BLADES SIZE 11, STERILE

## (undated) DEVICE — FLEX ADVANTAGE 3000CC: Brand: FLEX ADVANTAGE

## (undated) DEVICE — MEDI-VAC SUCTION HIGH CAPACITY: Brand: CARDINAL HEALTH

## (undated) DEVICE — SHEARS ENDOSCP L36CM DIA5MM ULTRASONIC CRV TIP W/ ADV

## (undated) DEVICE — SNARE ENDOSCP POLYP 2.4 MM 240 CM 10 MM 2.8 MM CAPTIVATOR

## (undated) DEVICE — GAUZE SPONGES,16 PLY: Brand: CURITY

## (undated) DEVICE — 3M™ MEDITPORE™ SOFT CLOTH TAPE 6 IN X 10 YD 12 ROLLS/CASE 2966: Brand: 3M™ MEDIPORE™

## (undated) DEVICE — (D)STRIP SKN CLSR 0.5X4IN WHT --

## (undated) DEVICE — TRAP SPEC COLL POLYP POLYSTYR --

## (undated) DEVICE — SYRINGE MED 25GA 3ML L5/8IN SUBQ PLAS W/ DETACH NDL SFTY

## (undated) DEVICE — TELFA NON-ADHERENT ABSORBENT DRESSING: Brand: TELFA

## (undated) DEVICE — SYR 20ML LL STRL LF --

## (undated) DEVICE — CATHETER SUCT TR FL TIP 14FR W/ O CTRL

## (undated) DEVICE — GLOVE SURG SZ 7.5 L11.73IN FNGR THK9.8MIL STRW LTX POLYMER

## (undated) DEVICE — BLADELESS OPTICAL TROCAR WITH FIXATION CANNULA: Brand: VERSAPORT

## (undated) DEVICE — DRAIN SURG L0.75IN TRCR

## (undated) DEVICE — SUTURE PDS II SZ 1 L36IN ABSRB VLT CTXB L48MM 1/2 CIR BLNT ZB371

## (undated) DEVICE — SPONGE DISSECT PNUT SM 3/8IN -- 5/PK

## (undated) DEVICE — SYR 50ML SLIP TIP NSAF LF STRL --

## (undated) DEVICE — SOLUTION IV 1000ML 0.9% SOD CHL

## (undated) DEVICE — REM POLYHESIVE ADULT PATIENT RETURN ELECTRODE: Brand: VALLEYLAB

## (undated) DEVICE — FORCEPS BX L240CM JAW DIA2.8MM L CAP W/ NDL MIC MESH TOOTH

## (undated) DEVICE — 3M™ BAIR PAWS FLEX™ WARMING GOWN, STANDARD, 20 PER CASE 81003: Brand: BAIR PAWS™

## (undated) DEVICE — SUTURE VCRL SZ 3-0 L18IN ABSRB VLT L26MM SH 1/2 CIR J774D

## (undated) DEVICE — BAG SPEC RETRV 275ML 10ML DISPOSABLE RELIACATCH

## (undated) DEVICE — UNIVERSAL STAPLER: Brand: ENDO GIA ULTRA

## (undated) DEVICE — FCPS ENDOSCP 5MMX33CM -- ENDOPATH

## (undated) DEVICE — CUTTER ENDOSCP L340MM LIN ARTC SGL STROKE FIRING ENDOPATH

## (undated) DEVICE — SMOKE EVACUATION PENCIL: Brand: VALLEYLAB

## (undated) DEVICE — TRAY CATH OD16FR SIL URIN M STATLOK STBL DEV SURSTP

## (undated) DEVICE — DRAIN SURG W10MMXL20CM SIL FULL PERF HUBLESS FLAT RADPQ

## (undated) DEVICE — NEEDLE HYPO 25GA L1.5IN BVL ORIENTED ECLIPSE

## (undated) DEVICE — SUTURE PERMA-HAND SZ 3-0 L24IN NONABSORBABLE BLK W/O NDL SA74H

## (undated) DEVICE — SUTURE VCRL SZ 3-0 L18IN ABSRB UD POLYGLACTIN 910 BRAID TIE J910T

## (undated) DEVICE — AIRLIFE™ NASAL OXYGEN CANNULA CURVED, NONFLARED TIP WITH 14 FOOT (4.3 M) CRUSH-RESISTANT TUBING, OVER-THE-EAR STYLE: Brand: AIRLIFE™

## (undated) DEVICE — SUTURE VCRL SZ 4-0 L27IN ABSRB UD L26MM SH 1/2 CIR J415H

## (undated) DEVICE — RELOAD STPL H1-2.5X45MM VASC THN TISS WHT 6 ROW B FRM SGL

## (undated) DEVICE — ARTICULATION RELOAD WITH TRI-STAPLE TECHNOLOGY: Brand: ENDO GIA

## (undated) DEVICE — (D)PREP SKN CHLRAPRP APPL 26ML -- CONVERT TO ITEM 371833